# Patient Record
Sex: MALE | Race: WHITE | URBAN - METROPOLITAN AREA
[De-identification: names, ages, dates, MRNs, and addresses within clinical notes are randomized per-mention and may not be internally consistent; named-entity substitution may affect disease eponyms.]

---

## 2017-01-06 ENCOUNTER — OPTICAL OFFICE (OUTPATIENT)
Dept: URBAN - METROPOLITAN AREA CLINIC 146 | Facility: CLINIC | Age: 52
Setting detail: OPHTHALMOLOGY
End: 2017-01-06
Payer: COMMERCIAL

## 2017-01-06 ENCOUNTER — DOCTOR'S OFFICE (OUTPATIENT)
Dept: URBAN - METROPOLITAN AREA CLINIC 137 | Facility: CLINIC | Age: 52
Setting detail: OPHTHALMOLOGY
End: 2017-01-06
Payer: COMMERCIAL

## 2017-01-06 DIAGNOSIS — H52.4: ICD-10-CM

## 2017-01-06 DIAGNOSIS — H52.223: ICD-10-CM

## 2017-01-06 DIAGNOSIS — E11.9: ICD-10-CM

## 2017-01-06 PROCEDURE — V2020 VISION SVCS FRAMES PURCHASES: HCPCS | Performed by: OPHTHALMOLOGY

## 2017-01-06 PROCEDURE — V2781 PROGRESSIVE LENS PER LENS: HCPCS | Performed by: OPHTHALMOLOGY

## 2017-01-06 PROCEDURE — 92014 COMPRE OPH EXAM EST PT 1/>: CPT | Performed by: OPHTHALMOLOGY

## 2017-01-06 PROCEDURE — V2203 LENS SPHCYL BIFOCAL 4.00D/.1: HCPCS | Performed by: OPHTHALMOLOGY

## 2017-01-06 ASSESSMENT — REFRACTION_OUTSIDERX
OD_AXIS: 016
OS_SPHERE: +1.25
OS_VA2: 20/20(J1+)
OS_ADD: +1.75
OS_VA1: 20/20
OD_SPHERE: +1.00
OS_AXIS: 044
OS_VA3: 20/
OD_VA3: 20/
OS_CYLINDER: +0.50
OU_VA: 20/
OD_ADD: +1.75
OD_VA2: 20/20(J1+)
OD_CYLINDER: +0.25
OD_VA1: 20/20

## 2017-01-06 ASSESSMENT — REFRACTION_CURRENTRX
OD_OVR_VA: 20/
OD_AXIS: 166
OD_OVR_VA: 20/
OD_VPRISM_DIRECTION: PROGS
OD_OVR_VA: 20/
OS_OVR_VA: 20/
OS_OVR_VA: 20/
OD_ADD: +1.75
OS_VPRISM_DIRECTION: PROGS
OD_CYLINDER: +0.25
OS_SPHERE: +1.50
OS_CYLINDER: +0.50
OS_AXIS: 016
OD_SPHERE: +1.25
OS_ADD: +1.75
OS_OVR_VA: 20/

## 2017-01-06 ASSESSMENT — REFRACTION_MANIFEST
OS_AXIS: 027
OD_VA1: 20/20
OD_ADD: +2.25
OD_AXIS: 024
OU_VA: 20/
OS_VA2: 20/
OS_ADD: +2.25
OS_CYLINDER: +0.50
OD_VA3: 20/
OU_VA: 20/
OS_VA1: 20/
OS_VA2: 20/
OD_VA3: 20/
OS_VA3: 20/
OS_VA1: 20/20
OD_VA2: 20/
OS_SPHERE: +1.25
OD_CYLINDER: +0.50
OD_VA1: 20/
OS_VA3: 20/
OD_VA2: 20/
OD_SPHERE: +1.00

## 2017-01-06 ASSESSMENT — VISUAL ACUITY
OS_BCVA: 20/25-1
OD_BCVA: 20/20-1

## 2017-01-06 ASSESSMENT — REFRACTION_AUTOREFRACTION
OD_AXIS: 016
OD_CYLINDER: +0.25
OD_SPHERE: +1.00
OS_AXIS: 044
OS_CYLINDER: +0.50
OS_SPHERE: +1.25

## 2017-01-06 ASSESSMENT — CONFRONTATIONAL VISUAL FIELD TEST (CVF)
OS_FINDINGS: FULL
OD_FINDINGS: FULL

## 2017-01-06 ASSESSMENT — SPHEQUIV_DERIVED
OD_SPHEQUIV: 1.125
OD_SPHEQUIV: 1.25
OS_SPHEQUIV: 1.5
OS_SPHEQUIV: 1.5

## 2017-01-23 ENCOUNTER — LAB CONVERSION - ENCOUNTER (OUTPATIENT)
Dept: OTHER | Facility: OTHER | Age: 52
End: 2017-01-23

## 2017-01-23 LAB
CREATININE, RANDOM URINE (HISTORICAL): 353 MG/DL (ref 20–370)
HBA1C MFR BLD HPLC: 9.8 % OF TOTAL HGB
MAGNESIUM, UR (HISTORICAL): 1.5 MG/DL
MICROALBUMIN/CREATININE RATIO (HISTORICAL): 4 MCG/MG CREAT
TSH SERPL DL<=0.05 MIU/L-ACNC: 1.6 MIU/L (ref 0.4–4.5)

## 2017-01-24 ENCOUNTER — ALLSCRIPTS OFFICE VISIT (OUTPATIENT)
Dept: OTHER | Facility: OTHER | Age: 52
End: 2017-01-24

## 2017-04-24 ENCOUNTER — GENERIC CONVERSION - ENCOUNTER (OUTPATIENT)
Dept: OTHER | Facility: OTHER | Age: 52
End: 2017-04-24

## 2017-04-24 ENCOUNTER — APPOINTMENT (OUTPATIENT)
Dept: LAB | Facility: CLINIC | Age: 52
End: 2017-04-24
Payer: COMMERCIAL

## 2017-04-24 DIAGNOSIS — I10 ESSENTIAL (PRIMARY) HYPERTENSION: ICD-10-CM

## 2017-04-24 DIAGNOSIS — Z72.0 TOBACCO USE: ICD-10-CM

## 2017-04-24 DIAGNOSIS — R73.9 HYPERGLYCEMIA: ICD-10-CM

## 2017-04-24 DIAGNOSIS — M06.9 RHEUMATOID ARTHRITIS (HCC): ICD-10-CM

## 2017-04-24 DIAGNOSIS — R53.83 OTHER FATIGUE: ICD-10-CM

## 2017-04-24 DIAGNOSIS — E11.9 TYPE 2 DIABETES MELLITUS WITHOUT COMPLICATIONS (HCC): ICD-10-CM

## 2017-04-24 DIAGNOSIS — E78.5 HYPERLIPIDEMIA: ICD-10-CM

## 2017-04-24 LAB
ALBUMIN SERPL BCP-MCNC: 3.9 G/DL (ref 3.5–5)
ALP SERPL-CCNC: 42 U/L (ref 46–116)
ALT SERPL W P-5'-P-CCNC: 63 U/L (ref 12–78)
ANION GAP SERPL CALCULATED.3IONS-SCNC: 8 MMOL/L (ref 4–13)
AST SERPL W P-5'-P-CCNC: 27 U/L (ref 5–45)
BASOPHILS # BLD AUTO: 0.09 THOUSANDS/ΜL (ref 0–0.1)
BASOPHILS NFR BLD AUTO: 1 % (ref 0–1)
BILIRUB SERPL-MCNC: 0.58 MG/DL (ref 0.2–1)
BUN SERPL-MCNC: 14 MG/DL (ref 5–25)
CALCIUM SERPL-MCNC: 9.1 MG/DL (ref 8.3–10.1)
CHLORIDE SERPL-SCNC: 100 MMOL/L (ref 100–108)
CHOLEST SERPL-MCNC: 158 MG/DL (ref 50–200)
CO2 SERPL-SCNC: 28 MMOL/L (ref 21–32)
CREAT SERPL-MCNC: 1 MG/DL (ref 0.6–1.3)
CREAT UR-MCNC: 198 MG/DL
EOSINOPHIL # BLD AUTO: 0.24 THOUSAND/ΜL (ref 0–0.61)
EOSINOPHIL NFR BLD AUTO: 3 % (ref 0–6)
ERYTHROCYTE [DISTWIDTH] IN BLOOD BY AUTOMATED COUNT: 13.2 % (ref 11.6–15.1)
EST. AVERAGE GLUCOSE BLD GHB EST-MCNC: 235 MG/DL
GFR SERPL CREATININE-BSD FRML MDRD: >60 ML/MIN/1.73SQ M
GLUCOSE P FAST SERPL-MCNC: 259 MG/DL (ref 65–99)
HBA1C MFR BLD: 9.8 % (ref 4.2–6.3)
HCT VFR BLD AUTO: 48 % (ref 36.5–49.3)
HDLC SERPL-MCNC: 38 MG/DL (ref 40–60)
HGB BLD-MCNC: 16.7 G/DL (ref 12–17)
LDLC SERPL CALC-MCNC: 74 MG/DL (ref 0–100)
LYMPHOCYTES # BLD AUTO: 2.12 THOUSANDS/ΜL (ref 0.6–4.47)
LYMPHOCYTES NFR BLD AUTO: 30 % (ref 14–44)
MCH RBC QN AUTO: 30.1 PG (ref 26.8–34.3)
MCHC RBC AUTO-ENTMCNC: 34.8 G/DL (ref 31.4–37.4)
MCV RBC AUTO: 87 FL (ref 82–98)
MICROALBUMIN UR-MCNC: 11.5 MG/L (ref 0–20)
MICROALBUMIN/CREAT 24H UR: 6 MG/G CREATININE (ref 0–30)
MONOCYTES # BLD AUTO: 1.01 THOUSAND/ΜL (ref 0.17–1.22)
MONOCYTES NFR BLD AUTO: 14 % (ref 4–12)
NEUTROPHILS # BLD AUTO: 3.59 THOUSANDS/ΜL (ref 1.85–7.62)
NEUTS SEG NFR BLD AUTO: 52 % (ref 43–75)
NRBC BLD AUTO-RTO: 0 /100 WBCS
PLATELET # BLD AUTO: 187 THOUSANDS/UL (ref 149–390)
PMV BLD AUTO: 11.6 FL (ref 8.9–12.7)
POTASSIUM SERPL-SCNC: 4.2 MMOL/L (ref 3.5–5.3)
PROT SERPL-MCNC: 7.2 G/DL (ref 6.4–8.2)
RBC # BLD AUTO: 5.55 MILLION/UL (ref 3.88–5.62)
SODIUM SERPL-SCNC: 136 MMOL/L (ref 136–145)
TRIGL SERPL-MCNC: 229 MG/DL
TSH SERPL DL<=0.05 MIU/L-ACNC: 2.3 UIU/ML (ref 0.36–3.74)
WBC # BLD AUTO: 7.09 THOUSAND/UL (ref 4.31–10.16)

## 2017-04-24 PROCEDURE — 82043 UR ALBUMIN QUANTITATIVE: CPT

## 2017-04-24 PROCEDURE — 80053 COMPREHEN METABOLIC PANEL: CPT

## 2017-04-24 PROCEDURE — 82570 ASSAY OF URINE CREATININE: CPT

## 2017-04-24 PROCEDURE — 85025 COMPLETE CBC W/AUTO DIFF WBC: CPT

## 2017-04-24 PROCEDURE — 80061 LIPID PANEL: CPT

## 2017-04-24 PROCEDURE — 36415 COLL VENOUS BLD VENIPUNCTURE: CPT

## 2017-04-24 PROCEDURE — 84443 ASSAY THYROID STIM HORMONE: CPT

## 2017-04-24 PROCEDURE — 83036 HEMOGLOBIN GLYCOSYLATED A1C: CPT

## 2017-04-25 ENCOUNTER — ALLSCRIPTS OFFICE VISIT (OUTPATIENT)
Dept: OTHER | Facility: OTHER | Age: 52
End: 2017-04-25

## 2017-07-03 ENCOUNTER — APPOINTMENT (OUTPATIENT)
Dept: LAB | Facility: CLINIC | Age: 52
End: 2017-07-03
Payer: COMMERCIAL

## 2017-07-03 ENCOUNTER — TRANSCRIBE ORDERS (OUTPATIENT)
Dept: LAB | Facility: CLINIC | Age: 52
End: 2017-07-03

## 2017-07-03 DIAGNOSIS — K21.9 GASTROESOPHAGEAL REFLUX DISEASE, ESOPHAGITIS PRESENCE NOT SPECIFIED: ICD-10-CM

## 2017-07-03 DIAGNOSIS — M54.2 CERVICALGIA: ICD-10-CM

## 2017-07-03 DIAGNOSIS — Z79.899 ENCOUNTER FOR LONG-TERM (CURRENT) USE OF OTHER MEDICATIONS: ICD-10-CM

## 2017-07-03 DIAGNOSIS — M54.41 ACUTE BACK PAIN WITH SCIATICA, RIGHT: ICD-10-CM

## 2017-07-03 DIAGNOSIS — M54.2 CERVICALGIA: Primary | ICD-10-CM

## 2017-07-03 DIAGNOSIS — M05.79 SEROPOSITIVE RHEUMATOID ARTHRITIS OF MULTIPLE SITES (HCC): ICD-10-CM

## 2017-07-03 DIAGNOSIS — J44.9 OBSTRUCTIVE CHRONIC BRONCHITIS WITHOUT EXACERBATION (HCC): ICD-10-CM

## 2017-07-03 LAB
25(OH)D3 SERPL-MCNC: 16.7 NG/ML (ref 30–100)
ALBUMIN SERPL BCP-MCNC: 4 G/DL (ref 3.5–5)
ALP SERPL-CCNC: 44 U/L (ref 46–116)
ALT SERPL W P-5'-P-CCNC: 63 U/L (ref 12–78)
ANION GAP SERPL CALCULATED.3IONS-SCNC: 8 MMOL/L (ref 4–13)
AST SERPL W P-5'-P-CCNC: 27 U/L (ref 5–45)
BASOPHILS # BLD AUTO: 0.06 THOUSANDS/ΜL (ref 0–0.1)
BASOPHILS NFR BLD AUTO: 1 % (ref 0–1)
BILIRUB SERPL-MCNC: 0.58 MG/DL (ref 0.2–1)
BUN SERPL-MCNC: 13 MG/DL (ref 5–25)
CALCIUM SERPL-MCNC: 8.9 MG/DL (ref 8.3–10.1)
CHLORIDE SERPL-SCNC: 101 MMOL/L (ref 100–108)
CO2 SERPL-SCNC: 26 MMOL/L (ref 21–32)
CREAT SERPL-MCNC: 0.96 MG/DL (ref 0.6–1.3)
CRP SERPL QL: <3 MG/L
EOSINOPHIL # BLD AUTO: 0.18 THOUSAND/ΜL (ref 0–0.61)
EOSINOPHIL NFR BLD AUTO: 3 % (ref 0–6)
ERYTHROCYTE [DISTWIDTH] IN BLOOD BY AUTOMATED COUNT: 13.8 % (ref 11.6–15.1)
ERYTHROCYTE [SEDIMENTATION RATE] IN BLOOD: 2 MM/HOUR (ref 0–10)
GFR SERPL CREATININE-BSD FRML MDRD: >60 ML/MIN/1.73SQ M
GLUCOSE SERPL-MCNC: 288 MG/DL (ref 65–140)
HCT VFR BLD AUTO: 46 % (ref 36.5–49.3)
HGB BLD-MCNC: 15.9 G/DL (ref 12–17)
LYMPHOCYTES # BLD AUTO: 1.58 THOUSANDS/ΜL (ref 0.6–4.47)
LYMPHOCYTES NFR BLD AUTO: 24 % (ref 14–44)
MCH RBC QN AUTO: 30.5 PG (ref 26.8–34.3)
MCHC RBC AUTO-ENTMCNC: 34.6 G/DL (ref 31.4–37.4)
MCV RBC AUTO: 88 FL (ref 82–98)
MONOCYTES # BLD AUTO: 0.83 THOUSAND/ΜL (ref 0.17–1.22)
MONOCYTES NFR BLD AUTO: 13 % (ref 4–12)
NEUTROPHILS # BLD AUTO: 3.97 THOUSANDS/ΜL (ref 1.85–7.62)
NEUTS SEG NFR BLD AUTO: 59 % (ref 43–75)
NRBC BLD AUTO-RTO: 0 /100 WBCS
PLATELET # BLD AUTO: 175 THOUSANDS/UL (ref 149–390)
PMV BLD AUTO: 11.8 FL (ref 8.9–12.7)
POTASSIUM SERPL-SCNC: 3.9 MMOL/L (ref 3.5–5.3)
PROT SERPL-MCNC: 7.1 G/DL (ref 6.4–8.2)
PTH-INTACT SERPL-MCNC: 22.7 PG/ML (ref 14–72)
RBC # BLD AUTO: 5.21 MILLION/UL (ref 3.88–5.62)
SODIUM SERPL-SCNC: 135 MMOL/L (ref 136–145)
TSH SERPL DL<=0.05 MIU/L-ACNC: 1.64 UIU/ML (ref 0.36–3.74)
URATE SERPL-MCNC: 3 MG/DL (ref 4.2–8)
WBC # BLD AUTO: 6.66 THOUSAND/UL (ref 4.31–10.16)

## 2017-07-03 PROCEDURE — 85025 COMPLETE CBC W/AUTO DIFF WBC: CPT

## 2017-07-03 PROCEDURE — 84155 ASSAY OF PROTEIN SERUM: CPT

## 2017-07-03 PROCEDURE — 86430 RHEUMATOID FACTOR TEST QUAL: CPT

## 2017-07-03 PROCEDURE — 86707 HEPATITIS BE ANTIBODY: CPT

## 2017-07-03 PROCEDURE — 85652 RBC SED RATE AUTOMATED: CPT

## 2017-07-03 PROCEDURE — 84165 PROTEIN E-PHORESIS SERUM: CPT

## 2017-07-03 PROCEDURE — 86618 LYME DISEASE ANTIBODY: CPT

## 2017-07-03 PROCEDURE — 82306 VITAMIN D 25 HYDROXY: CPT

## 2017-07-03 PROCEDURE — 86200 CCP ANTIBODY: CPT

## 2017-07-03 PROCEDURE — 86140 C-REACTIVE PROTEIN: CPT

## 2017-07-03 PROCEDURE — 86235 NUCLEAR ANTIGEN ANTIBODY: CPT

## 2017-07-03 PROCEDURE — 36415 COLL VENOUS BLD VENIPUNCTURE: CPT

## 2017-07-03 PROCEDURE — 86617 LYME DISEASE ANTIBODY: CPT

## 2017-07-03 PROCEDURE — 82955 ASSAY OF G6PD ENZYME: CPT

## 2017-07-03 PROCEDURE — 86431 RHEUMATOID FACTOR QUANT: CPT

## 2017-07-03 PROCEDURE — 84550 ASSAY OF BLOOD/URIC ACID: CPT

## 2017-07-03 PROCEDURE — 86803 HEPATITIS C AB TEST: CPT

## 2017-07-03 PROCEDURE — 86038 ANTINUCLEAR ANTIBODIES: CPT

## 2017-07-03 PROCEDURE — 80053 COMPREHEN METABOLIC PANEL: CPT

## 2017-07-03 PROCEDURE — 83970 ASSAY OF PARATHORMONE: CPT

## 2017-07-03 PROCEDURE — 84443 ASSAY THYROID STIM HORMONE: CPT

## 2017-07-03 PROCEDURE — 86225 DNA ANTIBODY NATIVE: CPT

## 2017-07-04 LAB — HCV AB SER QL: NORMAL

## 2017-07-05 LAB
B BURGDOR IGG SER IA-ACNC: 0.04
B BURGDOR IGM SER IA-ACNC: 1.53
CRYOGLOB RF SER-ACNC: ABNORMAL [IU]/ML
DSDNA AB SER-ACNC: 2 IU/ML (ref 0–9)
ENA RNP AB SER-ACNC: <0.2 AI (ref 0–0.9)
ENA SM AB SER-ACNC: <0.2 AI (ref 0–0.9)
HBV E AB SERPL QL IA: NEGATIVE
RHEUMATOID FACT SER QL LA: POSITIVE
RYE IGE QN: NEGATIVE

## 2017-07-06 LAB
G6PD BLD QN: 199 U/10E12 RBC (ref 146–376)
RBC # BLD AUTO: 5.26 X10E6/UL (ref 4.14–5.8)

## 2017-07-07 LAB
B BURGDOR IGG PATRN SER IB-IMP: NEGATIVE
B BURGDOR IGM PATRN SER IB-IMP: NEGATIVE
B BURGDOR18KD IGG SER QL IB: ABNORMAL
B BURGDOR23KD IGG SER QL IB: ABNORMAL
B BURGDOR23KD IGM SER QL IB: PRESENT
B BURGDOR28KD IGG SER QL IB: ABNORMAL
B BURGDOR30KD IGG SER QL IB: ABNORMAL
B BURGDOR39KD IGG SER QL IB: ABNORMAL
B BURGDOR39KD IGM SER QL IB: ABNORMAL
B BURGDOR41KD IGG SER QL IB: ABNORMAL
B BURGDOR41KD IGM SER QL IB: ABNORMAL
B BURGDOR45KD IGG SER QL IB: ABNORMAL
B BURGDOR58KD IGG SER QL IB: ABNORMAL
B BURGDOR66KD IGG SER QL IB: ABNORMAL
B BURGDOR93KD IGG SER QL IB: ABNORMAL

## 2017-07-10 LAB — MISCELLANEOUS LAB TEST RESULT: NORMAL

## 2017-08-01 LAB — CCP IGA+IGG SERPL IA-ACNC: NORMAL

## 2017-08-25 DIAGNOSIS — E78.5 HYPERLIPIDEMIA: ICD-10-CM

## 2017-08-25 DIAGNOSIS — I10 ESSENTIAL (PRIMARY) HYPERTENSION: ICD-10-CM

## 2017-08-25 DIAGNOSIS — E11.9 TYPE 2 DIABETES MELLITUS WITHOUT COMPLICATIONS (HCC): ICD-10-CM

## 2017-08-25 DIAGNOSIS — R07.89 OTHER CHEST PAIN: ICD-10-CM

## 2017-08-25 DIAGNOSIS — M06.9 RHEUMATOID ARTHRITIS(714.0): ICD-10-CM

## 2017-08-28 ENCOUNTER — GENERIC CONVERSION - ENCOUNTER (OUTPATIENT)
Dept: OTHER | Facility: OTHER | Age: 52
End: 2017-08-28

## 2017-08-28 ENCOUNTER — APPOINTMENT (OUTPATIENT)
Dept: LAB | Facility: CLINIC | Age: 52
End: 2017-08-28
Payer: COMMERCIAL

## 2017-08-28 ENCOUNTER — TRANSCRIBE ORDERS (OUTPATIENT)
Dept: LAB | Facility: CLINIC | Age: 52
End: 2017-08-28

## 2017-08-28 DIAGNOSIS — E78.5 HYPERLIPIDEMIA: ICD-10-CM

## 2017-08-28 DIAGNOSIS — M05.79 SEROPOSITIVE RHEUMATOID ARTHRITIS OF MULTIPLE SITES (HCC): ICD-10-CM

## 2017-08-28 DIAGNOSIS — Z79.899 ENCOUNTER FOR LONG-TERM (CURRENT) USE OF OTHER MEDICATIONS: ICD-10-CM

## 2017-08-28 DIAGNOSIS — M06.9 RHEUMATOID ARTHRITIS(714.0): ICD-10-CM

## 2017-08-28 DIAGNOSIS — J44.9 OBSTRUCTIVE CHRONIC BRONCHITIS WITHOUT EXACERBATION (HCC): ICD-10-CM

## 2017-08-28 DIAGNOSIS — E55.9 UNSPECIFIED VITAMIN D DEFICIENCY: ICD-10-CM

## 2017-08-28 DIAGNOSIS — I65.29 STENOSIS OF CAROTID ARTERY, UNSPECIFIED LATERALITY: Primary | ICD-10-CM

## 2017-08-28 DIAGNOSIS — M25.551 RIGHT HIP PAIN: ICD-10-CM

## 2017-08-28 DIAGNOSIS — E11.9 TYPE 2 DIABETES MELLITUS WITHOUT COMPLICATIONS (HCC): ICD-10-CM

## 2017-08-28 DIAGNOSIS — I10 ESSENTIAL (PRIMARY) HYPERTENSION: ICD-10-CM

## 2017-08-28 LAB
25(OH)D3 SERPL-MCNC: 30.5 NG/ML (ref 30–100)
ALBUMIN SERPL BCP-MCNC: 3.8 G/DL (ref 3.5–5)
ALP SERPL-CCNC: 45 U/L (ref 46–116)
ALT SERPL W P-5'-P-CCNC: 45 U/L (ref 12–78)
ANION GAP SERPL CALCULATED.3IONS-SCNC: 7 MMOL/L (ref 4–13)
AST SERPL W P-5'-P-CCNC: 20 U/L (ref 5–45)
BASOPHILS # BLD AUTO: 0.06 THOUSANDS/ΜL (ref 0–0.1)
BASOPHILS NFR BLD AUTO: 1 % (ref 0–1)
BILIRUB SERPL-MCNC: 0.73 MG/DL (ref 0.2–1)
BUN SERPL-MCNC: 13 MG/DL (ref 5–25)
CALCIUM SERPL-MCNC: 8.6 MG/DL (ref 8.3–10.1)
CHLORIDE SERPL-SCNC: 103 MMOL/L (ref 100–108)
CHOLEST SERPL-MCNC: 124 MG/DL (ref 50–200)
CO2 SERPL-SCNC: 27 MMOL/L (ref 21–32)
CREAT SERPL-MCNC: 0.92 MG/DL (ref 0.6–1.3)
CREAT UR-MCNC: 274 MG/DL
CRP SERPL QL: <3 MG/L
EOSINOPHIL # BLD AUTO: 0.24 THOUSAND/ΜL (ref 0–0.61)
EOSINOPHIL NFR BLD AUTO: 4 % (ref 0–6)
ERYTHROCYTE [DISTWIDTH] IN BLOOD BY AUTOMATED COUNT: 13.1 % (ref 11.6–15.1)
ERYTHROCYTE [SEDIMENTATION RATE] IN BLOOD: 3 MM/HOUR (ref 0–10)
EST. AVERAGE GLUCOSE BLD GHB EST-MCNC: 240 MG/DL
GFR SERPL CREATININE-BSD FRML MDRD: 97 ML/MIN/1.73SQ M
GLUCOSE P FAST SERPL-MCNC: 207 MG/DL (ref 65–99)
HBA1C MFR BLD: 10 % (ref 4.2–6.3)
HCT VFR BLD AUTO: 47.6 % (ref 36.5–49.3)
HDLC SERPL-MCNC: 37 MG/DL (ref 40–60)
HGB BLD-MCNC: 16.7 G/DL (ref 12–17)
LDLC SERPL CALC-MCNC: 54 MG/DL (ref 0–100)
LYMPHOCYTES # BLD AUTO: 1.92 THOUSANDS/ΜL (ref 0.6–4.47)
LYMPHOCYTES NFR BLD AUTO: 28 % (ref 14–44)
MCH RBC QN AUTO: 30.5 PG (ref 26.8–34.3)
MCHC RBC AUTO-ENTMCNC: 35.1 G/DL (ref 31.4–37.4)
MCV RBC AUTO: 87 FL (ref 82–98)
MICROALBUMIN UR-MCNC: 20.2 MG/L (ref 0–20)
MICROALBUMIN/CREAT 24H UR: 7 MG/G CREATININE (ref 0–30)
MONOCYTES # BLD AUTO: 0.86 THOUSAND/ΜL (ref 0.17–1.22)
MONOCYTES NFR BLD AUTO: 13 % (ref 4–12)
NEUTROPHILS # BLD AUTO: 3.69 THOUSANDS/ΜL (ref 1.85–7.62)
NEUTS SEG NFR BLD AUTO: 54 % (ref 43–75)
NRBC BLD AUTO-RTO: 0 /100 WBCS
PLATELET # BLD AUTO: 193 THOUSANDS/UL (ref 149–390)
PMV BLD AUTO: 11.7 FL (ref 8.9–12.7)
POTASSIUM SERPL-SCNC: 4 MMOL/L (ref 3.5–5.3)
PROT SERPL-MCNC: 7 G/DL (ref 6.4–8.2)
RBC # BLD AUTO: 5.48 MILLION/UL (ref 3.88–5.62)
SODIUM SERPL-SCNC: 137 MMOL/L (ref 136–145)
TRIGL SERPL-MCNC: 165 MG/DL
WBC # BLD AUTO: 6.82 THOUSAND/UL (ref 4.31–10.16)

## 2017-08-28 PROCEDURE — 86140 C-REACTIVE PROTEIN: CPT

## 2017-08-28 PROCEDURE — 80053 COMPREHEN METABOLIC PANEL: CPT

## 2017-08-28 PROCEDURE — 83036 HEMOGLOBIN GLYCOSYLATED A1C: CPT

## 2017-08-28 PROCEDURE — 85025 COMPLETE CBC W/AUTO DIFF WBC: CPT

## 2017-08-28 PROCEDURE — 82043 UR ALBUMIN QUANTITATIVE: CPT

## 2017-08-28 PROCEDURE — 85652 RBC SED RATE AUTOMATED: CPT

## 2017-08-28 PROCEDURE — 82306 VITAMIN D 25 HYDROXY: CPT

## 2017-08-28 PROCEDURE — 80061 LIPID PANEL: CPT

## 2017-08-28 PROCEDURE — 36415 COLL VENOUS BLD VENIPUNCTURE: CPT

## 2017-08-28 PROCEDURE — 82570 ASSAY OF URINE CREATININE: CPT

## 2017-08-29 ENCOUNTER — GENERIC CONVERSION - ENCOUNTER (OUTPATIENT)
Dept: OTHER | Facility: OTHER | Age: 52
End: 2017-08-29

## 2017-08-30 ENCOUNTER — ALLSCRIPTS OFFICE VISIT (OUTPATIENT)
Dept: OTHER | Facility: OTHER | Age: 52
End: 2017-08-30

## 2017-08-30 ENCOUNTER — TRANSCRIBE ORDERS (OUTPATIENT)
Dept: ADMINISTRATIVE | Facility: HOSPITAL | Age: 52
End: 2017-08-30

## 2017-08-30 DIAGNOSIS — I65.21 STENOSIS OF RIGHT CAROTID ARTERY: Primary | ICD-10-CM

## 2017-09-01 ENCOUNTER — GENERIC CONVERSION - ENCOUNTER (OUTPATIENT)
Dept: OTHER | Facility: OTHER | Age: 52
End: 2017-09-01

## 2017-09-05 ENCOUNTER — GENERIC CONVERSION - ENCOUNTER (OUTPATIENT)
Dept: OTHER | Facility: OTHER | Age: 52
End: 2017-09-05

## 2017-09-08 ENCOUNTER — HOSPITAL ENCOUNTER (OUTPATIENT)
Dept: NON INVASIVE DIAGNOSTICS | Facility: CLINIC | Age: 52
Discharge: HOME | End: 2017-09-08
Payer: COMMERCIAL

## 2017-09-08 DIAGNOSIS — I65.21 STENOSIS OF RIGHT CAROTID ARTERY: ICD-10-CM

## 2017-09-08 PROCEDURE — 93880 EXTRACRANIAL BILAT STUDY: CPT

## 2017-09-15 ENCOUNTER — HOSPITAL ENCOUNTER (OUTPATIENT)
Dept: NON INVASIVE DIAGNOSTICS | Facility: CLINIC | Age: 52
Discharge: HOME | End: 2017-09-15
Payer: COMMERCIAL

## 2017-09-15 DIAGNOSIS — R07.89 OTHER CHEST PAIN: ICD-10-CM

## 2017-09-15 LAB
CHEST PAIN STATEMENT: NORMAL
MAX DIASTOLIC BP: 80 MMHG
MAX HEART RATE: 160 BPM
MAX PREDICTED HEART RATE: 171 BPM
MAX. SYSTOLIC BP: 192 MMHG
PROTOCOL NAME: NORMAL
REASON FOR TERMINATION: NORMAL
TARGET HR FORMULA: NORMAL
TEST INDICATION: NORMAL
TIME IN EXERCISE PHASE: 393 S

## 2017-09-15 PROCEDURE — 93017 CV STRESS TEST TRACING ONLY: CPT

## 2017-12-30 DIAGNOSIS — E11.9 TYPE 2 DIABETES MELLITUS WITHOUT COMPLICATIONS (HCC): ICD-10-CM

## 2017-12-30 DIAGNOSIS — E55.9 VITAMIN D DEFICIENCY: ICD-10-CM

## 2017-12-30 DIAGNOSIS — Z23 ENCOUNTER FOR IMMUNIZATION: ICD-10-CM

## 2017-12-30 DIAGNOSIS — I65.29 OCCLUSION AND STENOSIS OF UNSPECIFIED CAROTID ARTERY: ICD-10-CM

## 2017-12-30 DIAGNOSIS — Z12.11 ENCOUNTER FOR SCREENING FOR MALIGNANT NEOPLASM OF COLON: ICD-10-CM

## 2017-12-30 DIAGNOSIS — M06.9 RHEUMATOID ARTHRITIS(714.0): ICD-10-CM

## 2017-12-30 DIAGNOSIS — I10 ESSENTIAL (PRIMARY) HYPERTENSION: ICD-10-CM

## 2017-12-30 DIAGNOSIS — Z72.0 TOBACCO USE: ICD-10-CM

## 2017-12-30 DIAGNOSIS — Z12.5 ENCOUNTER FOR SCREENING FOR MALIGNANT NEOPLASM OF PROSTATE: ICD-10-CM

## 2017-12-30 DIAGNOSIS — E78.5 HYPERLIPIDEMIA: ICD-10-CM

## 2018-01-02 ENCOUNTER — ALLSCRIPTS OFFICE VISIT (OUTPATIENT)
Dept: OTHER | Facility: OTHER | Age: 53
End: 2018-01-02

## 2018-01-02 ENCOUNTER — GENERIC CONVERSION - ENCOUNTER (OUTPATIENT)
Dept: OTHER | Facility: OTHER | Age: 53
End: 2018-01-02

## 2018-01-03 NOTE — PROGRESS NOTES
Assessment   1  Diabetes mellitus type 2, uncontrolled (250 02) (E11 65)   2  Benign essential hypertension (401 1) (I10)   3  Carotid artery calcification (433 10) (I65 29)   4  Rheumatoid arthritis (714 0) (M06 9)   5  Hyperlipidemia (272 4) (E78 5)   6  Obesity (BMI 30 0-34 9) (278 00) (E66 9)   7  Current tobacco use (305 1) (Z72 0)   8  Vitamin D deficiency (268 9) (E55 9)    Plan   Benign essential hypertension, Carotid artery calcification, Diabetes mellitus type 2,    uncontrolled, Hyperlipidemia, Rheumatoid arthritis, Vitamin D deficiency    · (1) CBC/PLT/DIFF; Status:Active; Requested for:02May2018;    · (1) COMPREHENSIVE METABOLIC PANEL; Status:Active; Requested for:02May2018;    · (1) HEMOGLOBIN A1C; Status:Active; Requested for:02May2018;    · (1) LIPID PANEL, FASTING; Status:Active; Requested for:02May2018;    · (1) MICROALBUMIN CREATININE RATIO, RANDOM URINE; Status:Active; Requested    for:02May2018;    · (1) TSH WITH FT4 REFLEX; Status:Active; Requested for:02May2018;   Diabetes mellitus type 2, uncontrolled    · Basaglar KwikPen 100 UNIT/ML Subcutaneous Solution Pen-injector; INJECT 30    UNIT Twice daily   · *VB - Foot Exam; Status:Active; Requested JZW:[id number];   PMH: History of type 2 diabetes mellitus    · Lantus 100 UNIT/ML Subcutaneous Solution   · Victoza 18 MG/3ML Subcutaneous Solution Pen-injector; INJECT 1 8 MG Daily  SocHx: Current tobacco use    · Chantix 1 MG Oral Tablet; take 1 tablet by mouth twice daily   · Chantix Starting Month Jaswant 0 5 MG X 11 & 1 MG X 42 Oral Tablet; TAKE AS    DIRECTED PER PACKAGE INSTRUCTIONS                                     Discussion/Summary      -weight has increased due to lack of aerobic activity  He diabetic parameters are worse  Hemoglobin A1c has increased  Will increase dose of basal insulin to 30 units twice daily and continue, Januvia 100 mg daily, Victoza Repeat labs in 4 months is at ADA goal with LDL around 70   Continue atorvastatin 40 mg daily Hypertension is well-controlled on valsartan/chlorothiazide  Continue on same dosage  Refill provided counseled on benefits of smoking cessation  He is aware  Willing to try Chantix  Prescription provided for starter pack and maintenance therapy  Advised of side effects of medication  Patient will continue on Enbrel and leflunomide as prescribed by rheumatologist   History of carotid calcifications  Cardiac stress testing was negative  in 4 months with labs  PSA was normal  Patient will eventually need screening colonoscopy  He is potentially interested in bariatric surgery however he would have to quit smoking 1st  If he was going for bariatric surgery he would have to have upper endoscopy performed at which time lower endoscopy could also be performed  The patient was counseled regarding diagnostic results,-- instructions for management,-- risk factor reductions,-- prognosis,-- impressions  total time of encounter was 43 minutes-- and-- 23 minutes was spent counseling  Possible side effects of new medications were reviewed with the patient/guardian today  The treatment plan was reviewed with the patient/guardian  The patient/guardian understands and agrees with the treatment plan      Chief Complaint   4 month follow up and lab review  Patient is here today for follow up of chronic conditions described in HPI  History of Present Illness   Patient presents for four-month follow-up and review of labs  Patient has gained weight  He has not been as active during the winter  Hemoglobin A1c increased from 10 0-10 6%  Patient continues smoking cigarettes  Cholesterol is slightly worse but still at diabetic goal with LDL of 74  Patient remains on atorvastatin  Patient does continue to have rheumatic pain  He does see rheumatologist  Patient did undergo stress testing which was negative and just showed poor exercise tolerance  Patient is interested in smoking cessation   He has tried nicotine patch, Wellbutrin  Review of Systems        Constitutional: recent 7 lb weight gain, but-- No fever or chills, feels well, no tiredness, no recent weight gain or weight loss-- and-- as noted in HPI  Eyes: No complaints of eye pain, no red eyes, no discharge from eyes, no itchy eyes  ENT: no complaints of earache, no hearing loss, no nosebleeds, no nasal discharge, no sore throat, no hoarseness  Cardiovascular: No complaints of slow heart rate, no fast heart rate, no chest pain, no palpitations, no leg claudication, no lower extremity  Respiratory: No complaints of shortness of breath, no wheezing, no cough, no SOB on exertion, no orthopnea or PND  Gastrointestinal: No complaints of abdominal pain, no constipation, no nausea or vomiting, no diarrhea or bloody stools  Genitourinary: Erectile dysfunction, but-- as noted in HPI  Musculoskeletal: arthralgias,-- joint swelling,-- joint stiffness-- and-- Rheumatoid arthritis, but-- as noted in HPI  Integumentary: No complaints of skin rash or skin lesions, no itching, no skin wound, no dry skin  Neurological: No compliants of headache, no confusion, no convulsions, no numbness or tingling, no dizziness or fainting, no limb weakness, no difficulty walking  Psychiatric: Is not suicidal, no sleep disturbances, no anxiety or depression, no change in personality, no emotional problems  Endocrine: No complaints of proptosis, no hot flashes, no muscle weakness, no erectile dysfunction, no deepening of the voice, no feelings of weakness  Hematologic/Lymphatic: No complaints of swollen glands, no swollen glands in the neck, does not bleed easily, no easy bruising  Active Problems   1  Atypical chest pain (786 59) (R07 89)   2  Benign essential hypertension (401 1) (I10)   3  Carotid artery calcification (433 10) (I65 29)   4  COPD, mild (496) (J44 9)   5  Current tobacco use (305 1) (Z72 0)   6   Decreased libido (799 81) (R68 82)   7  Diabetes mellitus type 2, uncontrolled (250 02) (E11 65)   8  Erectile disorder due to medical condition in male patient (26 80) (N52 1)   9  Fatigue (780 79) (R53 83)   10  Hyperlipidemia (272 4) (E78 5)   11  Need for pneumococcal vaccination (V03 82) (Z23)   12  Needs flu shot (V04 81) (Z23)   13  Prostate cancer screening (V76 44) (Z12 5)   14  Rheumatoid arthritis (714 0) (M06 9)   15  Screen for colon cancer (V76 51) (Z12 11)   16  Vitamin D deficiency (268 9) (E55 9)    Past Medical History   1  History of hyperglycemia (V12 29) (Z86 39)   2  History of type 2 diabetes mellitus (V12 29) (Z86 39)   3  History of Need for 23-polyvalent pneumococcal polysaccharide vaccine (V03 82) (Z23)   4  History of Needs flu shot (V04 81) (Z23)     The active problems and past medical history were reviewed and updated today  Family History   Mother    1  Family history of Diabetes (250 00) (E11 9)   2  Family history of cerebrovascular accident (CVA) (V17 1) (Z82 3)   3  Family history of coronary artery disease (V17 3) (Z82 49)   4  Family history of Mental illness in member of household  Father    5  Family history of Diabetes (250 00) (E11 9)   6  Family history of cerebrovascular accident (CVA) (V17 1) (Z82 3)  Brother    9  Family history of substance abuse (V17 0) (Z81 4)  Family History    8  Family history of type 2 diabetes mellitus (V18 0) (Z83 3)   9  Family history of Heart disease (429 9) (I51 9)     The family history was reviewed and updated today  Social History    · Alcohol use (V49 89) (Z78 9)   · Caffeine use (V49 89) (F15 90)   · Current every day smoker (305 1) (F17 200)   · Current tobacco use (305 1) (Z72 0)   ·    · Occupation   · Three children  The social history was reviewed and updated today  The social history was reviewed and is unchanged  Current Meds    1   Accu-Chek FastClix Lancets Miscellaneous; ONE LANCET BEFORE MEALS AND AT     BEDTIME; Therapy: 43HQD8004 to (RTIVPNKH:30YQN3232)  Requested for: 23Oct2017; Last     YH:52IRH5879 Ordered   2  Accu-Chek SmartView In [employer]; Test blood sugar before meals and at bedtime; Therapy: 81FKB1130 to (Evaluate:31Jan2018)  Requested for: 23Oct2017; Last     Rx:23Oct2017 Ordered   3  Atorvastatin Calcium 40 MG Oral Tablet; TAKE 1 TABLET AT BEDTIME; Therapy: 96IKP2819 to ([id number])  Requested for: 30Aug2017; Last     Rx:30Aug2017 Ordered   4  Easy Touch Insulin Syringe 30G X 1/2 0 5 ML Miscellaneous; ONE SYRINGE AT     BEDTIME FOR LANTUS; Therapy: 53Gtv9743 to (Evaluate:01Oct2017)  Requested for: 02Aug2017; Last     Rx:02Aug2017 Ordered   5  Enbrel 25 MG/0 5ML Subcutaneous Solution Prefilled Syringe; INJECT 1 ML Weekly     Recorded   6  Fluticasone Propionate 50 MCG/ACT Nasal Suspension; USE 2 SPRAYS IN EACH     NOSTRIL ONCE DAILY; Therapy: 25Apr2017 to (Last Rx:28Xtp3934)  Requested for: 12Ngs8153 Ordered   7  Hydroxychloroquine Sulfate 200 MG Oral Tablet; Therapy: 01KRF1786 to (Evaluate:17Mar2016) Recorded   8  Ibuprofen 800 MG Oral Tablet; TAKE 1 TABLET EVERY 8 HOURS AS NEEDED Recorded   9  Januvia 100 MG Oral Tablet; ONE TAB DAILY; Therapy: 25Apr2016 to (Last Rx:17Hwz4803)  Requested for: 12Ick9466 Ordered   10  Lantus 100 UNIT/ML Subcutaneous Solution; INJECT 50 UNIT Bedtime; Therapy: 02REU2786 to (Last Rx:60Syr0052)  Requested for: 36Uar1744 Ordered   11  Leflunomide 20 MG Oral Tablet; Take 1 tablet daily; Therapy: 50VWW0250 to (Rosa Lights)  Requested for: 81EAD4849; Last      Rx:10Mar2016 Ordered   12  Naproxen 500 MG Oral Tablet; TAKE 1 TABLET EVERY 12 HOURS WITH FOOD AS      NEEDED; Therapy: 12VZQ3936 to (Evaluate:73Uqx4843)  Requested for: 83TVU3709; Last      Rx:09Ass7754 Ordered   13  NovoFine 30G X 8 MM Miscellaneous; TO BE USED WITH VICTOZA ONCE DAILY;       Therapy: 25Apr2016 to (Evaluate:20Jan2017)  Requested for: 00CDY3522; Last Rx: 64OMI2454 Ordered   14  Omeprazole 40 MG Oral Capsule Delayed Release; Therapy: 72TJU9209 to Recorded   15  ProAir  (90 Base) MCG/ACT Inhalation Aerosol Solution; INHALE 2 PUFFS      EVERY 4 HOURS AS NEEDED; Therapy: 13SFH5248 to (Last Rx:24Jan2017)  Requested for: 24Jan2017 Ordered   16  Unifine Pentips 32G X 4 MM Miscellaneous; ONE PEN NEEDLE EVERY DAY WITH      VICTOZA AND BASAGLAR; Therapy: 10XKM3918 to (Evaluate:15Mar2018)  Requested for: 48XMO7453; Last      Rx:86Pdq0100 Ordered   17  Valsartan-Hydrochlorothiazide 80-12 5 MG Oral Tablet; TAKE 1 TABLET DAILY; Therapy: 19BWT4152 to (Glory Morse)  Requested for: 34Buc3021; Last      Rx:76Qxv3142 Ordered   18  Ventolin  (90 Base) MCG/ACT Inhalation Aerosol Solution; 2 PUFFS EVERY      FOUR HOURS AS NEEDED --- DO NOT EXCEED 8 PUFFS IN 24 HOURS ---;      Therapy: 37USS2267 to (Evaluate:71Awm1718)  Requested for: 47JLY1571; Last      Rx:23Oct2017 Ordered   19  Victoza 18 MG/3ML Subcutaneous Solution Pen-injector; INJECT 1 8 MG Daily; Therapy: 25Apr2016 to (Evaluate:38Nip7065)  Requested for: 31Nmj2507; Last      Rx:92Bxj5935 Ordered   20  Vitamin D (Ergocalciferol) 39155 UNIT Oral Capsule; TAKE 1 TABLET ONCE WEEKLY; Therapy: 85WWH1857 to Recorded     The medication list was reviewed and updated today  Allergies   1  No Known Drug Allergies    Vitals   Vital Signs    Recorded: 01RQW5828 07:56AM   Heart Rate 88   Systolic 138   Diastolic 80   Height 5 ft 11 5 in   Weight 245 lb    BMI Calculated 33 69   BSA Calculated 2 31     Physical Exam        Constitutional      General appearance: Abnormal   well developed,-- appears healthy,-- well nourished,-- overweight,-- rested,-- not exhausted-- and-- well hydrated  Eyes      Conjunctiva and lids: No swelling, erythema, or discharge  Pupils and irises: Equal, round and reactive to light         Ears, Nose, Mouth, and Throat      External inspection of ears and nose: Normal        Otoscopic examination: Tympanic membrance translucent with normal light reflex  Canals patent without erythema  Nasal mucosa, septum, and turbinates: Normal without edema or erythema  Oropharynx: Normal with no erythema, edema, exudate or lesions  Pulmonary      Respiratory effort: No increased work of breathing or signs of respiratory distress  Auscultation of lungs: Clear to auscultation, equal breath sounds bilaterally, no wheezes, no rales, no rhonci  Cardiovascular      Palpation of heart: Normal PMI, no thrills  Auscultation of heart: Normal rate and rhythm, normal S1 and S2, without murmurs  Examination of extremities for edema and/or varicosities: Normal        Carotid pulses: Normal        Abdomen      Abdomen: Non-tender, no masses  Liver and spleen: No hepatomegaly or splenomegaly  Lymphatic      Palpation of lymph nodes in neck: No lymphadenopathy  Musculoskeletal      Gait and station: Normal        Digits and nails: Normal without clubbing or cyanosis  Inspection/palpation of joints, bones, and muscles: Abnormal  -- Patient does have rheumatic nodules at the left knee, large rheumatic nodules bilateral elbows, patient does have slight joint deformity with ulnar deviation of the fingers of bilateral hands  There is no significant warmth or effusions of his joints at this time  Skin      Skin and subcutaneous tissue: Normal without rashes or lesions  Neurologic      Cranial nerves: Cranial nerves 2-12 intact  Reflexes: 2+ and symmetric  Sensation: No sensory loss  Psychiatric      Orientation to person, place and time: Normal        Mood and affect: Normal        Diabetic Foot Screen: Normal           Socks and shoes removed, Right Foot Findings: normal foot, no swelling, no erythema  The right toes were normal-- and-- had full range of motion        The sensory exam showed normal vibratory sensation at the level of the toes on the right  Normal tactile sensation with monofilament testing throughout the right foot  Socks and shoes removed, Left Foot Findings: normal foot, no swelling, no erythema  The left toes were normal-- and-- had full range of motion  The sensory exam showed normal vibratory sensation at the level of the toes on the left  Normal tactile sensation with monofilament testing throughout the left foot  Pulses:      2+ in the dorsalis pedis on the right  Pulses:      2+ in the dorsalis pedis on the left  Assign Risk Category: 0: No loss of protective sensation, no deformity  No present risk        Signatures    Electronically signed by : Jareth Velasquez DO; Jan 2 2018  9:12AM EST                       (Author)

## 2018-01-09 ENCOUNTER — OPTICAL OFFICE (OUTPATIENT)
Dept: URBAN - METROPOLITAN AREA CLINIC 146 | Facility: CLINIC | Age: 53
Setting detail: OPHTHALMOLOGY
End: 2018-01-09
Payer: COMMERCIAL

## 2018-01-09 ENCOUNTER — DOCTOR'S OFFICE (OUTPATIENT)
Dept: URBAN - METROPOLITAN AREA CLINIC 137 | Facility: CLINIC | Age: 53
Setting detail: OPHTHALMOLOGY
End: 2018-01-09
Payer: COMMERCIAL

## 2018-01-09 ENCOUNTER — GENERIC CONVERSION - ENCOUNTER (OUTPATIENT)
Dept: FAMILY MEDICINE CLINIC | Facility: CLINIC | Age: 53
End: 2018-01-09

## 2018-01-09 DIAGNOSIS — H52.223: ICD-10-CM

## 2018-01-09 DIAGNOSIS — H52.4: ICD-10-CM

## 2018-01-09 LAB
LEFT EYE DIABETIC RETINOPATHY: NORMAL
RIGHT EYE DIABETIC RETINOPATHY: NORMAL

## 2018-01-09 PROCEDURE — 92014 COMPRE OPH EXAM EST PT 1/>: CPT | Performed by: OPHTHALMOLOGY

## 2018-01-09 PROCEDURE — V2781 PROGRESSIVE LENS PER LENS: HCPCS | Performed by: OPHTHALMOLOGY

## 2018-01-09 PROCEDURE — V2020 VISION SVCS FRAMES PURCHASES: HCPCS | Performed by: OPHTHALMOLOGY

## 2018-01-09 PROCEDURE — V2203 LENS SPHCYL BIFOCAL 4.00D/.1: HCPCS | Performed by: OPHTHALMOLOGY

## 2018-01-09 ASSESSMENT — REFRACTION_CURRENTRX
OS_OVR_VA: 20/
OS_VPRISM_DIRECTION: PROGS
OD_OVR_VA: 20/
OD_OVR_VA: 20/
OS_AXIS: 037
OD_SPHERE: +1.25
OD_OVR_VA: 20/
OS_OVR_VA: 20/
OD_VPRISM_DIRECTION: PROGS
OD_AXIS: 166
OS_OVR_VA: 20/
OS_ADD: +1.75
OD_ADD: +1.75
OS_CYLINDER: +1.50
OD_CYLINDER: +0.50
OS_SPHERE: +1.00

## 2018-01-09 ASSESSMENT — REFRACTION_AUTOREFRACTION
OS_SPHERE: +1.25
OD_CYLINDER: +0.25
OS_AXIS: 025
OD_SPHERE: +1.50
OD_AXIS: 042
OS_CYLINDER: +1.00

## 2018-01-09 ASSESSMENT — REFRACTION_OUTSIDERX
OS_ADD: +2.00
OU_VA: 20/
OD_CYLINDER: +0.50
OU_VA: 20/
OD_SPHERE: +1.25
OS_CYLINDER: +0.50
OS_CYLINDER: +0.75
OD_VA2: 20/20(J1+)
OS_VA2: 20/20(J1+)
OD_VA3: 20/
OD_VA1: 20/20
OD_ADD: +2.00
OS_VA1: 20/20
OS_SPHERE: +1.00
OS_VA3: 20/
OD_VA1: 20/20
OD_CYLINDER: +0.25
OD_VA2: 20/25(J1)
OS_VA2: 20/25(J1)
OS_VA3: 20/
OD_SPHERE: +1.00
OS_AXIS: 044
OD_AXIS: 50
OS_AXIS: 25
OS_VA1: 20/20
OS_SPHERE: +1.25
OS_ADD: +1.75
OD_ADD: +1.75
OD_VA3: 20/
OD_AXIS: 016

## 2018-01-09 ASSESSMENT — SPHEQUIV_DERIVED
OS_SPHEQUIV: 1.75
OD_SPHEQUIV: 1.625

## 2018-01-09 ASSESSMENT — REFRACTION_MANIFEST
OD_VA2: 20/
OD_VA3: 20/
OD_VA1: 20/
OS_VA2: 20/
OS_VA3: 20/
OU_VA: 20/
OS_VA1: 20/

## 2018-01-09 ASSESSMENT — VISUAL ACUITY
OD_BCVA: 20/25-2
OS_BCVA: 20/25-1

## 2018-01-09 ASSESSMENT — CONFRONTATIONAL VISUAL FIELD TEST (CVF)
OS_FINDINGS: FULL
OD_FINDINGS: FULL

## 2018-01-11 NOTE — RESULT NOTES
Discussion/Summary   Diabetes remains poorly controlled  Hemoglobin A1c of 9 8%     Verified Results  (1) CBC/PLT/DIFF 24Apr2017 07:56AM [facility]    Order Number: [id number]     Test Name Result Flag Reference   WBC COUNT 7 09 Thousand/uL  4 31-10 16   RBC COUNT 5 55 Million/uL  3 88-5 62   HEMOGLOBIN 16 7 g/dL  12 0-17 0   HEMATOCRIT 48 0 %  36 5-49 3   MCV 87 fL  82-98   MCH 30 1 pg  26 8-34 3   MCHC 34 8 g/dL  31 4-37 4   RDW 13 2 %  11 6-15 1   MPV 11 6 fL  8 9-12 7   PLATELET COUNT 875 Thousands/uL  149-390   nRBC AUTOMATED 0 /100 WBCs     NEUTROPHILS RELATIVE PERCENT 52 %  43-75   LYMPHOCYTES RELATIVE PERCENT 30 %  14-44   MONOCYTES RELATIVE PERCENT 14 % H 4-12   EOSINOPHILS RELATIVE PERCENT 3 %  0-6   BASOPHILS RELATIVE PERCENT 1 %  0-1   NEUTROPHILS ABSOLUTE COUNT 3 59 Thousands/? ??L  1 85-7 62   LYMPHOCYTES ABSOLUTE COUNT 2 12 Thousands/? ??L  0 60-4 47   MONOCYTES ABSOLUTE COUNT 1 01 Thousand/? ??L  0 17-1 22   EOSINOPHILS ABSOLUTE COUNT 0 24 Thousand/? ??L  0 00-0 61   BASOPHILS ABSOLUTE COUNT 0 09 Thousands/? ??L  0 00-0 10   - Patient Instructions: This bloodwork is non-fasting  Please drink two glasses of water morning of bloodwork       (1) COMPREHENSIVE METABOLIC PANEL 50YOX7997 27:12AE Braxton Hopper Order Number: [id number]     Test Name Result Flag Reference   SODIUM 136 mmol/L  136-145   POTASSIUM 4 2 mmol/L  3 5-5 3   CHLORIDE 100 mmol/L  100-108   CARBON DIOXIDE 28 mmol/L  21-32   ANION GAP (CALC) 8 mmol/L  4-13   BLOOD UREA NITROGEN 14 mg/dL  5-25   CREATININE 1 00 mg/dL  0 60-1 30   Standardized to IDMS reference method   CALCIUM 9 1 mg/dL  8 3-10 1   BILI, TOTAL 0 58 mg/dL  0 20-1 00   ALK PHOSPHATAS 42 U/L L    ALT (SGPT) 63 U/L  12-78   AST(SGOT) 27 U/L  5-45   ALBUMIN 3 9 g/dL  3 5-5 0   TOTAL PROTEIN 7 2 g/dL  6 4-8 2   eGFR Non-African American      >60 0 ml/min/1 73sq m   [employer] Disease Education Program recommendations are as follows:  GFR calculation is accurate only with a steady state creatinine  Chronic Kidney disease less than 60 ml/min/1 73 sq  meters  Kidney failure less than 15 ml/min/1 73 sq  meters  GLUCOSE FASTING 259 mg/dL H 65-99     (1) LIPID PANEL, FASTING 24Apr2017 07:56AM [facility]    Order Number: [id number]     Test Name Result Flag Reference   CHOLESTEROL 158 mg/dL     HDL,DIRECT 38 mg/dL L 40-60   Specimen collection should occur prior to Metamizole administration due to the potential for falsely depressed results  LDL CHOLESTEROL CALCULATED 74 mg/dL  0-100   - Patient Instructions: This is a fasting blood test  Water,black tea or black  coffee only after 9:00pm the night before test   Drink 2 glasses of water the morning of test       Triglyceride:         Normal              <150 mg/dl       Borderline High    150-199 mg/dl       High               200-499 mg/dl       Very High          >499 mg/dl  Cholesterol:         Desirable        <200 mg/dl      Borderline High  200-239 mg/dl      High             >239 mg/dl  HDL Cholesterol:        High    >59 mg/dL      Low     <41 mg/dL  LDL CALCULATED:    This screening LDL is a calculated result  It does not have the accuracy of the Direct Measured LDL in the monitoring of patients with hyperlipidemia and/or statin therapy  Direct Measure LDL (QPR942) must be ordered separately in these patients  TRIGLYCERIDES 229 mg/dL H <=150   Specimen collection should occur prior to N-Acetylcysteine or Metamizole administration due to the potential for falsely depressed results  (1) TSH WITH FT4 REFLEX 24Apr2017 07:56AM [facility]    Order Number: [id number]     Test Name Result Flag Reference   TSH 2 300 uIU/mL  0 358-3 740   Patients undergoing fluorescein dye angiography may retain small amounts of fluorescein in the body for 48-72 hours post procedure  Samples containing fluorescein can produce falsely depressed TSH values   If the patient had this procedure,a specimen should be resubmitted post fluorescein clearance  (1) HEMOGLOBIN A1C 11Bbw7477 07:56AM [facility] Order Number: [id number]     Test Name Result Flag Reference   HEMOGLOBIN A1C 9 8 % H 4 2-6 3   EST  AVG   GLUCOSE 235 mg/dl       (1) MICROALBUMIN CREATININE RATIO, RANDOM URINE 47Bmc1713 07:56AM [facility] Order Number: [id number]     Test Name Result Flag Reference   MICROALBUMIN/ CREAT R 6 mg/g creatinine  0-30   MICROALBUMIN,URINE 11 5 mg/L  0 0-20 0   CREATININE URINE 198 0 mg/dL

## 2018-01-13 VITALS
SYSTOLIC BLOOD PRESSURE: 126 MMHG | WEIGHT: 249.38 LBS | HEIGHT: 71 IN | HEART RATE: 84 BPM | BODY MASS INDEX: 34.91 KG/M2 | RESPIRATION RATE: 16 BRPM | DIASTOLIC BLOOD PRESSURE: 82 MMHG

## 2018-01-13 NOTE — RESULT NOTES
Verified Results  (1) TSH WITH FT4 REFLEX [id number] 11:03AM Estefany Look     Test Name Result Flag Reference   TSH 1 431 uIU/mL  0 358-3 740   - Patient Instructions: This is a fasting blood test  Water,black tea or black  coffee only after 9:00pm the night before test Drink 2 glasses of water the morning of test   Patients undergoing fluorescein dye angiography may retain small amounts of fluorescein in the body for 48-72 hours post procedure  Samples containing fluorescein can produce falsely depressed TSH values  If the patient had this procedure,a specimen should be resubmitted post fluorescein clearance  (1) TESTOSTERONE, FREE (DIRECT) AND TOTAL [id number] 11:03AM Hortensia Covington Order Number: [id number]     Test Name Result Flag Reference   FREE TESTOSTERONE, DIRECT 17 0 pg/mL  6 8 - 21 5   COMMENT Comment     Adult male reference interval is based on a population of lean males  up to 36years old     TESTOSTERONE (TOTAL) 318 ng/dL L 348 - 1197   Performed at:  [address] [place] [address]  [id number]  : Evie Diamond MD, Phone:  [phone]     (1) CBC/PLT/DIFF [id number] 11:03AM Hortensia Covington Order Number: [id number]     Test Name Result Flag Reference   WBC COUNT 7 28 Thousand/uL  4 31-10 16   RBC COUNT 5 22 Million/uL  3 88-5 62   HEMOGLOBIN 15 6 g/dL  12 0-17 0   HEMATOCRIT 44 6 %  36 5-49 3   MCV 85 fL  82-98   MCH 29 9 pg  26 8-34 3   MCHC 35 0 g/dL  31 4-37 4   RDW 13 5 %  11 6-15 1   MPV 10 1 fL  8 9-12 7   PLATELET COUNT 605 Thousands/uL  149-390   NEUTROPHILS RELATIVE PERCENT 57 %  43-75   LYMPHOCYTES RELATIVE PERCENT 31 %  14-44   MONOCYTES RELATIVE PERCENT 10 %  4-12   EOSINOPHILS RELATIVE PERCENT 2 %  0-6   BASOPHILS RELATIVE PERCENT 0 %  0-1   NEUTROPHILS ABSOLUTE COUNT 4 15 Thousands/?L  1 85-7 62   LYMPHOCYTES ABSOLUTE COUNT 2 28 Thousands/?L  0 60-4 47   MONOCYTES ABSOLUTE COUNT 0 69 Thousand/?L  0 17-1 22   EOSINOPHILS ABSOLUTE COUNT 0 13 Thousand/?L  0 00-0 61   BASOPHILS ABSOLUTE COUNT 0 03 Thousands/?L  0 00-0 10   - Patient Instructions: This bloodwork is non-fasting  Please drink two glasses of water morning of bloodwork  - Patient Instructions: This bloodwork is non-fasting  Please drink two glasses of water morning of bloodwork  (1) COMPREHENSIVE METABOLIC PANEL [id number] 35:59VH Willena Levels Order Number: [id number]     Test Name Result Flag Reference   GLUCOSE,RANDM 178 mg/dL H    If the patient is fasting, the ADA then defines impaired fasting glucose as > 100 mg/dL and diabetes as > or equal to 123 mg/dL  SODIUM 138 mmol/L  136-145   POTASSIUM 4 3 mmol/L  3 5-5 3   CHLORIDE 102 mmol/L  100-108   CARBON DIOXIDE 27 mmol/L  21-32   ANION GAP (CALC) 9 mmol/L  4-13   BLOOD UREA NITROGEN 12 mg/dL  5-25   CREATININE 0 92 mg/dL  0 60-1 30   Standardized to IDMS reference method   CALCIUM 8 9 mg/dL  8 3-10 1   BILI, TOTAL 0 50 mg/dL  0 20-1 00   ALK PHOSPHATAS 35 U/L L    ALT (SGPT) 46 U/L  12-78   AST(SGOT) 22 U/L  5-45   ALBUMIN 3 9 g/dL  3 5-5 0   TOTAL PROTEIN 7 1 g/dL  6 4-8 2   eGFR Non-African American      >60 0 ml/min/1 73sq m   - Patient Instructions: This is a fasting blood test  Water,black tea or black  coffee only after 9:00pm the night before test Drink 2 glasses of water the morning of test   National Kidney Disease Education Program recommendations are as follows:  GFR calculation is accurate only with a steady state creatinine  Chronic Kidney disease less than 60 ml/min/1 73 sq  meters  Kidney failure less than 15 ml/min/1 73 sq  meters  (1) HEMOGLOBIN A1C [id number] 11:03AM Willena Levels Order Number: [id number]     Test Name Result Flag Reference   HEMOGLOBIN A1C 7 4 % H 4 2-6 3   EST  AVG   GLUCOSE 166 mg/dl       (1) MICROALBUMIN CREATININE RATIO, RANDOM URINE [id number] 11:03AM Willena Levels Order Number: [id number]     Test Name Result Flag Reference   MICROALBUMIN/ CREAT R 5 mg/g creatinine  0-30   MICROALBUMIN,URINE 8 7 mg/L  0 0-20 0   CREATININE URINE 173 0 mg/dL       (1) LIPID PANEL, FASTING [id number] 11:03AM Willena Levels Order Number: [id number]     Test Name Result Flag Reference   CHOLESTEROL 146 mg/dL     HDL,DIRECT 45 mg/dL  40-60   Specimen collection should occur prior to Metamizole administration due to the potential for falsely depressed results  LDL CHOLESTEROL CALCULATED 77 mg/dL  0-100   - Patient Instructions: This is a fasting blood test  Water,black tea or black  coffee only after 9:00pm the night before test   Drink 2 glasses of water the morning of test     - Patient Instructions: This is a fasting blood test  Water,black tea or black  coffee only after 9:00pm the night before test Drink 2 glasses of water the morning of test   Triglyceride:         Normal              <150 mg/dl       Borderline High    150-199 mg/dl       High               200-499 mg/dl       Very High          >499 mg/dl  Cholesterol:         Desirable        <200 mg/dl      Borderline High  200-239 mg/dl      High             >239 mg/dl  HDL Cholesterol:        High    >59 mg/dL      Low     <41 mg/dL  LDL CALCULATED:    This screening LDL is a calculated result  It does not have the accuracy of the Direct Measured LDL in the monitoring of patients with hyperlipidemia and/or statin therapy  Direct Measure LDL (RKA886) must be ordered separately in these patients  TRIGLYCERIDES 122 mg/dL  <=150   Specimen collection should occur prior to N-Acetylcysteine or Metamizole administration due to the potential for falsely depressed results  Plan  Benign essential hypertension, Hyperlipidemia, Type 2 diabetes mellitus    · (1) CBC/PLT/DIFF; Status:Active; Requested for:22Jan2017;    · (1) COMPREHENSIVE METABOLIC PANEL; Status:Active; Requested for:22Jan2017;    · (1) HEMOGLOBIN A1C; Status:Active;  Requested [id number];    · (1) LIPID PANEL, FASTING; Status:Active; Requested for:22Jan2017;    · (1) MICROALBUMIN CREATININE RATIO, RANDOM URINE; Status:Active; Requested  for:22Jan2017;    · (1) TSH WITH FT4 REFLEX; Status:Active; Requested for:22Jan2017;   Hyperlipidemia    · Atorvastatin Calcium 40 MG Oral Tablet; TAKE ONE TABLET EVERY DAYIN THE  EVENING  Type 2 diabetes mellitus    · BD Insulin Syringe 30G X 1/2" 0 5 ML Miscellaneous; Use as directed with Lantus  at bedtime   · Lantus 100 UNIT/ML Subcutaneous Solution; INJECT 31 UNIT BEDTIME  ASDIRECTED BY YOUR DOCTOR   · NovoFine 30G X 8 MM Miscellaneous; TO BE USED WITH VICTOZA ONCE  DAILY   · Victoza 18 MG/3ML Subcutaneous Solution Pen-injector; INJECT 1 8 MG Daily    Discussion/Summary   Diabetic control is significantly improved  Last hemoglobin A1c yet  Please continue on same dosage of medications  Total testosterone slightly low however your free testosterone is at a normal value  No need for testosterone supplementation  Cholesterol is at goal  Please continue on all same medications  Follow-up in 4 months as scheduled  Please complete labs prior to office visit   See order

## 2018-01-14 VITALS
HEIGHT: 71 IN | HEART RATE: 78 BPM | DIASTOLIC BLOOD PRESSURE: 62 MMHG | SYSTOLIC BLOOD PRESSURE: 128 MMHG | BODY MASS INDEX: 33.88 KG/M2 | WEIGHT: 242 LBS | RESPIRATION RATE: 16 BRPM

## 2018-01-14 NOTE — RESULT NOTES
Verified Results  (1) VITAMIN D 25-HYDROXY [id number] 08:29AM Domonique Links     Test Name Result Flag Reference   VIT D 25-HYDROX 30 5 ng/mL  30 0-100 0   This assay is a certified procedure of the CDC Vitamin D Standardization Certification Program (VDSCP)     Deficiency <20ng/ml   Insufficiency 20-30ng/ml   Sufficient  ng/ml     *Patients undergoing fluorescein dye angiography may retain small amounts of fluorescein in the body for 48-72 hours post procedure  Samples containing fluorescein can produce falsely elevated Vitamin D values  If the patient had this procedure, a specimen should be resubmitted post fluorescein clearance       (1) C-REACTIVE PROTEIN [id number] 08:29AM Adams Links     Test Name Result Flag Reference   C-REACT PROTEIN <3 0 mg/L  <3 0     (1) SED RATE [id number] 08:29AM Domonique Links     Test Name Result Flag Reference   SED RATE 3 mm/hour  0-10

## 2018-01-15 VITALS
TEMPERATURE: 97.1 F | HEIGHT: 71 IN | RESPIRATION RATE: 16 BRPM | DIASTOLIC BLOOD PRESSURE: 84 MMHG | BODY MASS INDEX: 35.33 KG/M2 | HEART RATE: 78 BPM | SYSTOLIC BLOOD PRESSURE: 126 MMHG | WEIGHT: 252.38 LBS

## 2018-01-17 NOTE — RESULT NOTES
Discussion/Summary   On the up side of things your cholesterol is very well controlled however your diabetes is not well controlled, hemoglobin A1c of 10 0%     Verified Results  (1) CBC/PLT/DIFF [id number] 08:29AM Abhinav Ivins Order Number: [id number]     Test Name Result Flag Reference   WBC COUNT 6 82 Thousand/uL  4 31-10 16   RBC COUNT 5 48 Million/uL  3 88-5 62   HEMOGLOBIN 16 7 g/dL  12 0-17 0   HEMATOCRIT 47 6 %  36 5-49 3   MCV 87 fL  82-98   MCH 30 5 pg  26 8-34 3   MCHC 35 1 g/dL  31 4-37 4   RDW 13 1 %  11 6-15 1   MPV 11 7 fL  8 9-12 7   PLATELET COUNT 328 Thousands/uL  149-390   nRBC AUTOMATED 0 /100 WBCs     NEUTROPHILS RELATIVE PERCENT 54 %  43-75   LYMPHOCYTES RELATIVE PERCENT 28 %  14-44   MONOCYTES RELATIVE PERCENT 13 % H 4-12   EOSINOPHILS RELATIVE PERCENT 4 %  0-6   BASOPHILS RELATIVE PERCENT 1 %  0-1   NEUTROPHILS ABSOLUTE COUNT 3 69 Thousands/? ??L  1 85-7 62   LYMPHOCYTES ABSOLUTE COUNT 1 92 Thousands/? ??L  0 60-4 47   MONOCYTES ABSOLUTE COUNT 0 86 Thousand/? ??L  0 17-1 22   EOSINOPHILS ABSOLUTE COUNT 0 24 Thousand/? ??L  0 00-0 61   BASOPHILS ABSOLUTE COUNT 0 06 Thousands/? ??L  0 00-0 10     (1) COMPREHENSIVE METABOLIC PANEL [id number] 85:98IQ Abhinav Ivins Order Number: [id number]     Test Name Result Flag Reference   SODIUM 137 mmol/L  136-145   POTASSIUM 4 0 mmol/L  3 5-5 3   CHLORIDE 103 mmol/L  100-108   CARBON DIOXIDE 27 mmol/L  21-32   ANION GAP (CALC) 7 mmol/L  4-13   BLOOD UREA NITROGEN 13 mg/dL  5-25   CREATININE 0 92 mg/dL  0 60-1 30   Standardized to IDMS reference method   CALCIUM 8 6 mg/dL  8 3-10 1   BILI, TOTAL 0 73 mg/dL  0 20-1 00   ALK PHOSPHATAS 45 U/L L    ALT (SGPT) 45 U/L  12-78   Specimen collection should occur prior to Sulfasalazine and/or Sulfapyridine administration due to the potential for falsely depressed results     AST(SGOT) 20 U/L  5-45   Specimen collection should occur prior to Sulfasalazine administration due to the potential for falsely depressed results  ALBUMIN 3 8 g/dL  3 5-5 0   TOTAL PROTEIN 7 0 g/dL  6 4-8 2   eGFR 97 ml/min/1 73sq m     National Kidney Disease Education Program recommendations are as follows:  GFR calculation is accurate only with a steady state creatinine  Chronic Kidney disease less than 60 ml/min/1 73 sq  meters  Kidney failure less than 15 ml/min/1 73 sq  meters  GLUCOSE FASTING 207 mg/dL H 65-99   Specimen collection should occur prior to Sulfasalazine administration due to the potential for falsely depressed results  Specimen collection should occur prior to Sulfapyridine administration due to the potential for falsely elevated results  (1) LIPID PANEL, FASTING [id number] 08:29AM Maricel Goldstein Order Number: [id number]     Test Name Result Flag Reference   CHOLESTEROL 124 mg/dL     HDL,DIRECT 37 mg/dL L 40-60   Specimen collection should occur prior to Metamizole administration due to the potential for falsley depressed results  LDL CHOLESTEROL CALCULATED 54 mg/dL  0-100   Triglyceride:        Normal ??? ??? ??? ??? ??? ??? ??? <150 mg/dl   ??? ??? ???Borderline High ??? ??? 150-199 mg/dl   ??? ??? ? ?? High ??? ??? ??? ??? ??? ??? ??? 200-499 mg/dl   ??? ??? ? ??Very High ??? ??? ??? ??? ??? >499 mg/dl      Cholesterol:       Desirable ??? ??? ??? ??? <200 mg/dl   ??? ??? Borderline High ??? 200-239 mg/dl   ??? ??? High ??? ??? ??? ??? ??? ??? >239 mg/dl      HDL Cholesterol:       High ??? ???>59 mg/dL   ??? ??? Low ??? ??? <41 mg/dL      This screening LDL is a calculated result  It does not have the accuracy of the Direct Measured LDL in the monitoring of patients with hyperlipidemia and/or statin therapy  Direct Measure LDL (XSD430) must be ordered separately in these patients  TRIGLYCERIDES 165 mg/dL H <=150   Specimen collection should occur prior to N-Acetylcysteine or Metamizole administration due to the potential for falsely depressed results       (1) HEMOGLOBIN A1C [id number] 08:29AM The Clymb Pointer Order Number: [id number]     Test Name Result Flag Reference   HEMOGLOBIN A1C 10 0 % H 4 2-6 3   EST  AVG   GLUCOSE 240 mg/dl       (1) MICROALBUMIN CREATININE RATIO, RANDOM URINE [id number] 08:29AM [facility] Cly[facility] Pointer Order Number: [id number]     Test Name Result Flag Reference   MICROALBUMIN/ CREAT R 7 mg/g creatinine  0-30   MICROALBUMIN,URINE 20 2 mg/L H 0 0-20 0   CREATININE URINE 274 0 mg/dL

## 2018-01-17 NOTE — RESULT NOTES
Verified Results  US GALLBLADDER [id number] 08:02AM Alpa Barnes Order Number: [id number]    - Patient Instructions: To schedule this appointment, please contact Central Scheduling at [phone]   Order Number: [id number]    - Patient Instructions: To schedule this appointment, please contact Central Scheduling at [phone]  Test Name Result Flag Reference   US GALLBLADDER (Report)     RIGHT UPPER QUADRANT ULTRASOUND     INDICATION: Right upper quadrant pain  COMPARISON: None  TECHNIQUE:  Real-time ultrasound of the right upper quadrant was performed with a curvilinear transducer with both volumetric sweeps and still imaging techniques  FINDINGS:     PANCREAS: Visualized portions of the pancreas are within normal limits  AORTA AND IVC: Visualized portions are normal for patient age  LIVER:   Size: Mildly enlarged  The liver measures 17 2 cm in the midclavicular line  Contour: Surface contour is smooth  Parenchyma: There is moderate diffuse increased echogenicity with smooth echotexture and acoustic beam attenuation  Most consistent with moderate hepatic steatosis  No evidence of suspicious mass  Focal geographic hypoechogenicity adjacent to the gallbladder fossa most consistent with focal fatty sparing  The main portal vein is patent and hepatopetal       BILIARY:   The gallbladder is normal in caliber  No wall thickening or pericholecystic fluid  No stones or sludge identified  Phrygian cap configuration noted  No sonographic Christopher's sign  No intrahepatic biliary dilatation  CBD measures 2 mm  No choledocholithiasis  KIDNEY:    Right kidney measures 12 7 x 6 4 cm  Within normal limits  ASCITES:  None  IMPRESSION:     Mild hepatomegaly and steatosis  No acute abnormality identified         Workstation performed: [id number]     Signed by:   Elio Ramirez MD   10/10/16       Discussion/Summary   No acute abnormality on right upper quadrant ultrasound  No evidence of gallbladder disease  Please follow-up as scheduled in January   Call for an appointment sooner if still having right upper quadrant pain

## 2018-01-22 ENCOUNTER — GENERIC CONVERSION - ENCOUNTER (OUTPATIENT)
Dept: OTHER | Facility: OTHER | Age: 53
End: 2018-01-22

## 2018-01-22 VITALS
WEIGHT: 238.54 LBS | SYSTOLIC BLOOD PRESSURE: 130 MMHG | BODY MASS INDEX: 32.31 KG/M2 | TEMPERATURE: 98.3 F | HEIGHT: 72 IN | HEART RATE: 100 BPM | DIASTOLIC BLOOD PRESSURE: 60 MMHG

## 2018-01-23 VITALS
SYSTOLIC BLOOD PRESSURE: 130 MMHG | HEIGHT: 72 IN | HEART RATE: 88 BPM | DIASTOLIC BLOOD PRESSURE: 80 MMHG | WEIGHT: 245 LBS | BODY MASS INDEX: 33.18 KG/M2

## 2018-02-09 RX ORDER — NAPROXEN 500 MG/1
1 TABLET ORAL EVERY 12 HOURS
COMMUNITY
Start: 2016-01-06 | End: 2022-07-15 | Stop reason: SDUPTHER

## 2018-02-09 RX ORDER — HYDROXYCHLOROQUINE SULFATE 200 MG/1
200 TABLET, FILM COATED ORAL 2 TIMES DAILY
COMMUNITY
Start: 2016-01-06 | End: 2022-07-15 | Stop reason: SDUPTHER

## 2018-02-09 RX ORDER — VARENICLINE TARTRATE 25 MG
KIT ORAL
COMMUNITY
Start: 2018-01-02 | End: 2018-02-12

## 2018-02-09 RX ORDER — ATORVASTATIN CALCIUM 40 MG/1
1 TABLET, FILM COATED ORAL
COMMUNITY
Start: 2015-11-12 | End: 2018-02-12 | Stop reason: SDUPTHER

## 2018-02-09 RX ORDER — OMEPRAZOLE 40 MG/1
40 CAPSULE, DELAYED RELEASE ORAL
COMMUNITY
Start: 2017-07-03 | End: 2018-03-16

## 2018-02-09 RX ORDER — VALSARTAN AND HYDROCHLOROTHIAZIDE 80; 12.5 MG/1; MG/1
1 TABLET, FILM COATED ORAL DAILY
COMMUNITY
Start: 2015-11-12 | End: 2018-02-12 | Stop reason: SDUPTHER

## 2018-02-09 RX ORDER — ERGOCALCIFEROL 1.25 MG/1
1 CAPSULE ORAL WEEKLY
COMMUNITY
Start: 2017-08-17 | End: 2018-03-16

## 2018-02-09 RX ORDER — INSULIN GLARGINE 100 [IU]/ML
50 INJECTION, SOLUTION SUBCUTANEOUS
COMMUNITY
Start: 2016-02-16 | End: 2018-03-16

## 2018-02-09 RX ORDER — FLUTICASONE PROPIONATE 50 MCG
2 SPRAY, SUSPENSION (ML) NASAL
COMMUNITY
Start: 2017-04-25 | End: 2019-02-21 | Stop reason: SDUPTHER

## 2018-02-09 RX ORDER — ALBUTEROL SULFATE 90 UG/1
AEROSOL, METERED RESPIRATORY (INHALATION) EVERY 6 HOURS PRN
COMMUNITY
Start: 2016-01-06 | End: 2018-05-07

## 2018-02-09 RX ORDER — LANCETS
EACH MISCELLANEOUS
COMMUNITY
Start: 2016-01-06 | End: 2018-02-12 | Stop reason: SDUPTHER

## 2018-02-09 RX ORDER — VARENICLINE TARTRATE 1 MG/1
1 TABLET, FILM COATED ORAL 2 TIMES DAILY
COMMUNITY
Start: 2018-01-02 | End: 2018-02-12

## 2018-02-09 RX ORDER — LEFLUNOMIDE 20 MG/1
1 TABLET ORAL
COMMUNITY
Start: 2016-02-01 | End: 2022-07-15 | Stop reason: SDUPTHER

## 2018-02-12 ENCOUNTER — OFFICE VISIT (OUTPATIENT)
Dept: FAMILY MEDICINE CLINIC | Facility: CLINIC | Age: 53
End: 2018-02-12
Payer: COMMERCIAL

## 2018-02-12 VITALS
DIASTOLIC BLOOD PRESSURE: 84 MMHG | RESPIRATION RATE: 18 BRPM | WEIGHT: 249 LBS | SYSTOLIC BLOOD PRESSURE: 126 MMHG | HEIGHT: 71 IN | BODY MASS INDEX: 34.86 KG/M2 | HEART RATE: 82 BPM

## 2018-02-12 DIAGNOSIS — E11.9 TYPE 2 DIABETES MELLITUS WITHOUT COMPLICATION, WITHOUT LONG-TERM CURRENT USE OF INSULIN (HCC): ICD-10-CM

## 2018-02-12 DIAGNOSIS — R10.11 RUQ ABDOMINAL PAIN: Primary | ICD-10-CM

## 2018-02-12 DIAGNOSIS — I10 ESSENTIAL HYPERTENSION: Primary | ICD-10-CM

## 2018-02-12 DIAGNOSIS — L98.9 LESION OF SKIN OF SCALP: ICD-10-CM

## 2018-02-12 DIAGNOSIS — E78.2 MIXED HYPERLIPIDEMIA: Primary | ICD-10-CM

## 2018-02-12 PROCEDURE — 11100 PR BIOPSY OF SKIN LESION: CPT | Performed by: FAMILY MEDICINE

## 2018-02-12 PROCEDURE — 99213 OFFICE O/P EST LOW 20 MIN: CPT | Performed by: FAMILY MEDICINE

## 2018-02-12 RX ORDER — ETANERCEPT 50 MG/ML
SOLUTION SUBCUTANEOUS
COMMUNITY
Start: 2018-02-02 | End: 2018-02-12

## 2018-02-12 RX ORDER — ATORVASTATIN CALCIUM 40 MG/1
TABLET, FILM COATED ORAL
Qty: 90 TABLET | Refills: 1 | Status: SHIPPED | OUTPATIENT
Start: 2018-02-12 | End: 2018-07-31 | Stop reason: SDUPTHER

## 2018-02-12 RX ORDER — INSULIN GLARGINE 100 [IU]/ML
INJECTION, SOLUTION SUBCUTANEOUS
Qty: 15 ML | Refills: 1 | Status: SHIPPED | OUTPATIENT
Start: 2018-02-12 | End: 2018-04-06 | Stop reason: SDUPTHER

## 2018-02-12 RX ORDER — SITAGLIPTIN 100 MG/1
TABLET, FILM COATED ORAL
Qty: 90 TABLET | Refills: 1 | Status: SHIPPED | OUTPATIENT
Start: 2018-02-12 | End: 2018-07-31 | Stop reason: SDUPTHER

## 2018-02-12 RX ORDER — LANCETS
EACH MISCELLANEOUS 2 TIMES DAILY
Qty: 102 EACH | Refills: 1 | Status: SHIPPED | OUTPATIENT
Start: 2018-02-12 | End: 2018-04-11 | Stop reason: SDUPTHER

## 2018-02-12 RX ORDER — CHOLECALCIFEROL (VITAMIN D3) 50 MCG
2000 TABLET ORAL
COMMUNITY
Start: 2018-02-09 | End: 2019-02-08 | Stop reason: DRUGHIGH

## 2018-02-12 RX ORDER — VALSARTAN AND HYDROCHLOROTHIAZIDE 80; 12.5 MG/1; MG/1
TABLET, FILM COATED ORAL
Qty: 90 TABLET | Refills: 1 | Status: SHIPPED | OUTPATIENT
Start: 2018-02-12 | End: 2018-07-26

## 2018-02-12 NOTE — PROGRESS NOTES
Assessment/Plan:    RUQ abdominal pain  HIDA scan ordered  RUQ ultrasound was negative    Lesion of skin of scalp  Punch biopsy  After informed written consent was obtained, using Betadine for cleansing and 1% Lidocaine with epinephrine for anesthetic, with sterile technique a 6 mm punch biopsy was used to obtain a biopsy specimen of the lesion  Hemostasis was obtained by pressure and wound was closed 4 3-0 interupted sutures  Antibiotic dressing is applied, and wound care instructions provided  Be alert for any signs of cutaneous infection  The specimen is labeled and sent to pathology for evaluation  The procedure was well tolerated without complications  Procedures         Problem List Items Addressed This Visit     RUQ abdominal pain - Primary     HIDA scan ordered  RUQ ultrasound was negative         Relevant Orders    NM hepatobiliary w rx            Subjective:      Patient ID: Abel Sanchez  is a 50 y o  male  Patient presents for evaluation of lesion at the posterior aspect of the scalp  Raised lesion that the patient keeps on cutting  It is been there for a few years  He states that it seems to be increasing in size  Any time he goes shave his head he will nick it and it will bleed  Patient also continues to complain of right upper quadrant abdominal pain that is intermittent in nature  At times sharp and other times dull  Sometimes associated with eating  Did have right upper quadrant ultrasound last year which was negative for structural biliary disease  No past medical history on file  No family history on file  No past surgical history on file  reports that he has been smoking  He has never used smokeless tobacco  He reports that he drinks alcohol  He reports that he does not use drugs        Current Outpatient Prescriptions:     ACCU-CHEK FASTCLIX LANCETS MISC, by Does not apply route, Disp: , Rfl:     albuterol (VENTOLIN HFA) 90 mcg/act inhaler, Inhale, Disp: , Rfl:     atorvastatin (LIPITOR) 40 mg tablet, Take 1 tablet by mouth, Disp: , Rfl:     Cholecalciferol (VITAMIN D) 2000 units tablet, , Disp: , Rfl:     ergocalciferol (VITAMIN D2) 50,000 units, Take 1 tablet by mouth once a week, Disp: , Rfl:     Etanercept (ENBREL) 25 MG/0 5ML SOSY, Inject 1 mL under the skin once a week, Disp: , Rfl:     fluticasone (FLONASE) 50 mcg/act nasal spray, 2 sprays into each nostril daily, Disp: , Rfl:     glucose blood test strip, by In Vitro route, Disp: , Rfl:     hydroxychloroquine (PLAQUENIL) 200 mg tablet, Take by mouth, Disp: , Rfl:     insulin glargine (BASAGLAR KWIKPEN) injection pen 100 units/mL, Inject 30 Units under the skin 2 (two) times a day, Disp: , Rfl:     insulin glargine (LANTUS) 100 units/mL subcutaneous injection, Inject 50 Units under the skin, Disp: , Rfl:     Insulin Pen Needle (UNIFINE PENTIPS) 32G X 4 MM MISC, by Does not apply route, Disp: , Rfl:     Insulin Syringe-Needle U-100 (B-D INS SYRINGE 0 5CC/30GX1/2") 30G X 1/2" 0 5 ML MISC, by Does not apply route, Disp: , Rfl:     leflunomide (ARAVA) 20 MG tablet, Take 1 tablet by mouth daily, Disp: , Rfl:     Liraglutide (VICTOZA) 18 MG/3ML SOPN, Inject 1 8 mg under the skin daily, Disp: , Rfl:     naproxen (NAPROSYN) 500 mg tablet, Take 1 tablet by mouth every 12 (twelve) hours, Disp: , Rfl:     omeprazole (PriLOSEC) 40 MG capsule, Take by mouth, Disp: , Rfl:     sitaGLIPtin (JANUVIA) 100 mg tablet, Take 1 tablet by mouth daily, Disp: , Rfl:     valsartan-hydrochlorothiazide (DIOVAN-HCT) 80-12 5 MG per tablet, TAKE 1 TABLET DAILY  , Disp: 90 tablet, Rfl: 1    The following portions of the patient's history were reviewed and updated as appropriate: allergies, current medications, past family history, past medical history, past social history, past surgical history and problem list     Review of Systems   Constitutional: Negative  Cardiovascular: Negative      Gastrointestinal: Positive for abdominal pain  Negative for abdominal distention, blood in stool, constipation, diarrhea, nausea and vomiting  Skin:        Raised skin lesion on the posterior aspect of the scalp         @lab@    Objective:    /84 (BP Location: Left arm, Patient Position: Sitting, Cuff Size: Large)   Pulse 82   Resp 18   Ht 5' 11" (1 803 m)   Wt 113 kg (249 lb)   BMI 34 73 kg/m²      Physical Exam   Constitutional: He appears well-developed and well-nourished  Neck: Neck supple  Abdominal: Soft  Bowel sounds are normal  He exhibits no distension  There is no tenderness  There is no rebound and no guarding  Skin: Lesion noted  Problem List Items Addressed This Visit     RUQ abdominal pain - Primary     HIDA scan ordered   RUQ ultrasound was negative         Relevant Orders    NM hepatobiliary w rx

## 2018-02-12 NOTE — TELEPHONE ENCOUNTER
Patients pharmacy called and I did call wife and clarify if patient is in need of these  He is    Also requested Ventolin Inhaler which I do not see on his current medication list

## 2018-02-14 LAB
CLINICAL INFO: NORMAL
PATH REPORT.FINAL DX SPEC: NORMAL
PROCEDURE TYPE: NORMAL
SPECIMEN SOURCE: NORMAL

## 2018-02-19 ENCOUNTER — OFFICE VISIT (OUTPATIENT)
Dept: FAMILY MEDICINE CLINIC | Facility: CLINIC | Age: 53
End: 2018-02-19
Payer: COMMERCIAL

## 2018-02-19 VITALS
DIASTOLIC BLOOD PRESSURE: 78 MMHG | RESPIRATION RATE: 16 BRPM | OXYGEN SATURATION: 98 % | WEIGHT: 249 LBS | SYSTOLIC BLOOD PRESSURE: 138 MMHG | HEIGHT: 71 IN | HEART RATE: 111 BPM | BODY MASS INDEX: 34.86 KG/M2

## 2018-02-19 DIAGNOSIS — L98.9 LESION OF SKIN OF SCALP: Primary | ICD-10-CM

## 2018-02-19 PROBLEM — E66.9 OBESITY (BMI 30.0-34.9): Status: ACTIVE | Noted: 2018-01-02

## 2018-02-19 PROBLEM — E66.811 OBESITY (BMI 30.0-34.9): Status: ACTIVE | Noted: 2018-01-02

## 2018-02-19 PROBLEM — IMO0002 DIABETES MELLITUS TYPE 2, UNCONTROLLED: Status: ACTIVE | Noted: 2017-09-01

## 2018-02-19 PROBLEM — I65.29 CAROTID ARTERY CALCIFICATION: Status: ACTIVE | Noted: 2017-08-30

## 2018-02-19 PROBLEM — E55.9 VITAMIN D DEFICIENCY: Status: ACTIVE | Noted: 2017-08-30

## 2018-02-19 PROCEDURE — 99212 OFFICE O/P EST SF 10 MIN: CPT | Performed by: PHYSICIAN ASSISTANT

## 2018-02-19 PROCEDURE — 99024 POSTOP FOLLOW-UP VISIT: CPT | Performed by: PHYSICIAN ASSISTANT

## 2018-02-19 NOTE — ASSESSMENT & PLAN NOTE
- Discussed findings with Patient   They have scheduled an appointment with the surgeon for additional excision  - 4 sutures were removed with no bleeding or complications

## 2018-02-19 NOTE — PROGRESS NOTES
Assessment/Plan:      Diagnoses and all orders for this visit:    Lesion of skin of scalp    Other orders  -     Suture removal          Subjective:     Patient ID: Lowell Gomes  is a 50 y o  male      HPI    Review of Systems      Objective:     Physical Exam

## 2018-02-19 NOTE — PROGRESS NOTES
Suture removal  Date/Time: 2/19/2018 2:28 PM  Performed by: Lori Jaramillo  Authorized by: Lori Jaramillo     Patient location:  Clinic  Other Assisting Provider: No    Consent:     Consent obtained:  Verbal    Consent given by:  Patient    Risks discussed:  Bleeding, pain and wound separation    Alternatives discussed:  No treatment  Universal protocol:     Procedure explained and questions answered to patient or proxy's satisfaction: yes      Site/side marked: yes      Patient identity confirmed:  Verbally with patient  Location:     Laterality:  Median    [facility]  [address] [facility]  [facility]  Procedure details: Tools used:  Suture removal kit    Wound appearance:  No sign(s) of infection and draining    Number of sutures removed:  4  Post-procedure details:     Post-removal:  Band-Aid applied and antibiotic ointment applied    Patient tolerance of procedure: Tolerated well, no immediate complications        4 Sutures removed from posterior midline scalp  Procedure was tolerated well  Pt has an appointment to see surgical oncologist next Friday to discuss further plans  Recommended to keep the site clean

## 2018-02-22 ENCOUNTER — OFFICE VISIT (OUTPATIENT)
Dept: SURGICAL ONCOLOGY | Facility: CLINIC | Age: 53
End: 2018-02-22
Payer: COMMERCIAL

## 2018-02-22 VITALS
RESPIRATION RATE: 14 BRPM | HEIGHT: 71 IN | TEMPERATURE: 98 F | WEIGHT: 246.38 LBS | DIASTOLIC BLOOD PRESSURE: 82 MMHG | BODY MASS INDEX: 34.49 KG/M2 | HEART RATE: 80 BPM | SYSTOLIC BLOOD PRESSURE: 132 MMHG

## 2018-02-22 DIAGNOSIS — D23.9 CLEAR CELL HIDRADENOMA: Primary | ICD-10-CM

## 2018-02-22 PROCEDURE — 99244 OFF/OP CNSLTJ NEW/EST MOD 40: CPT | Performed by: SURGERY

## 2018-02-22 RX ORDER — HYDROCODONE BITARTRATE AND ACETAMINOPHEN 5; 325 MG/1; MG/1
1 TABLET ORAL EVERY 6 HOURS PRN
Qty: 15 TABLET | Refills: 0 | Status: SHIPPED | OUTPATIENT
Start: 2018-02-22 | End: 2019-02-07

## 2018-02-22 NOTE — LETTER
February 22, 2018     Rebeca Rod DO  [address]  [facility] [address]    Patient: Radha Nagel  YOB: 1965   Date of Visit: 2/22/2018       Dear Dr James Route:    Thank you for referring Vamsi Ornelas to me for evaluation  Below are my notes for this consultation  If you have questions, please do not hesitate to call me  I look forward to following your patient along with you           Sincerely,        Jia Fonseca MD        CC: No Recipients

## 2018-02-22 NOTE — PROGRESS NOTES
Surgical Oncology Consult       [address] [facility]  [address]  [address] [address] [phone]    [facility]  1965  [id number]      Chief Complaint   Patient presents with    Scalp lesion     Pt is here for consult regarding scalp lesion  Pt recently had a biopsy on posterior scalp lesion  He reports having a mole there for several years  He is currently on Enbrel for Ra  Positive family history - his sister has had several melanomas removed  No history exists  History of Present Illness:    51-year-old male who had a lesion on his scalp for approximately 3 years  Recently he cut this and it started to bleed  Intermittently it would become raised and then flatten out  Ultimately brought this to the attention of his primary physician  Biopsy revealed apocrine hidradenoma  The deep margin was involved  He comes in now to discuss further therapy  He denies any new abdominal pain, nausea or vomiting  No adenopathy  Review of Systems   Skin: Positive for wound  Complete ROS Surg Onc:   Constitutional: The patient denies new or recent history of general fatigue, no recent weight loss, no change in appetite  Eyes: No complaints of visual problems, no scleral icterus  ENT: no complaints of ear pain, no hoarseness, no difficulty swallowing,  no tinnitus and no new masses in head, oral cavity, or neck  Cardiovascular: No complaints of chest pain, no palpitations, no ankle edema  Respiratory: No complaints of shortness of breath, no cough  Gastrointestinal: No complaints of jaundice, no bloody stools, no pale stools  Genitourinary: No complaints of dysuria, no hematuria, no nocturia, no frequent urination, no urethral discharge  Musculoskeletal: No complaints of weakness, paralysis, joint stiffness or arthralgias    Integumentary: No complaints of rash, no new lesions  Neurological: No complaints of convulsions, no seizures, no dizziness  Hematologic/Lymphatic: No complaints of easy bruising  Endocrine:  No hot or cold intolerance  No polydipsia, polyphagia, or polyuria  Allergy/immunology:  No environmental allergies  No food allergies  Not immunocompromised  Skin:  No pallor or rash  Wound  Patient Active Problem List   Diagnosis    RUQ abdominal pain    Lesion of skin of scalp    Benign essential hypertension    Carotid artery calcification    COPD, mild (HCC)    Reduced libido    Erectile disorder due to medical condition in male patient    Diabetes mellitus type 2, uncontrolled ([place] [address] )    Hyperlipidemia    Obesity (BMI 30 0-34  9)    Vitamin D deficiency    Rheumatoid arthritis ([place] [address] )    Clear cell hidradenoma     Past Medical History:   Diagnosis Date    Diabetes mellitus, type 2 ([place] [address] )     Resolved: 1/2/2018    Hyperglycemia     Resolved: 4/25/2017     Past Surgical History:   Procedure Laterality Date    SKIN BIOPSY      scalp     Family History   Problem Relation Age of Onset    Diabetes Mother     Stroke Mother     Mental illness Mother     Diabetes Father     Stroke Father     Alcohol abuse Brother     Coronary artery disease Family      Age 51-55    Diabetes type II Family     Heart disease Family      Social History     Social History    Marital status: /Civil Union     Spouse name: N/A    Number of children: N/A    Years of education: N/A     Occupational History    [place]       Social History Main Topics    Smoking status: Current Every Day Smoker    Smokeless tobacco: Never Used    Alcohol use Yes      Comment: socially     Drug use: No    Sexual activity: Not on file     Other Topics Concern    Not on file     Social History Narrative    Caffeine Use- Coffee and Tea        Current Outpatient Prescriptions:     ACCU-CHEK FASTCLIX LANCETS MISC, by Does not apply route 2 (two) times a day, Disp: 102 each, Rfl: 1    albuterol (VENTOLIN HFA) 90 mcg/act inhaler, Inhale, Disp: , Rfl:     atorvastatin (LIPITOR) 40 mg tablet, TAKE 1 TABLET AT BEDTIME , Disp: 90 tablet, Rfl: 1    BASAGLAR KWIKPEN injection pen 100 units/mL, INJECT 30 UNIT TWICE DAILY, Disp: 15 mL, Rfl: 1    Cholecalciferol (VITAMIN D) 2000 units tablet, , Disp: , Rfl:     ergocalciferol (VITAMIN D2) 50,000 units, Take 1 tablet by mouth once a week, Disp: , Rfl:     fluticasone (FLONASE) 50 mcg/act nasal spray, 2 sprays into each nostril daily, Disp: , Rfl:     glucose blood test strip, 1 each by Other route 4 (four) times a day (before meals and at bedtime), Disp: 100 each, Rfl: 0    hydroxychloroquine (PLAQUENIL) 200 mg tablet, Take by mouth, Disp: , Rfl:     insulin glargine (LANTUS) 100 units/mL subcutaneous injection, Inject 50 Units under the skin, Disp: , Rfl:     Insulin Pen Needle (UNIFINE PENTIPS) 32G X 4 MM MISC, by Does not apply route, Disp: , Rfl:     Insulin Syringe-Needle U-100 (B-D INS SYRINGE 0 5CC/30GX1/2") 30G X 1/2" 0 5 ML MISC, by Does not apply route, Disp: , Rfl:     JANUVIA 100 MG tablet, ONE TAB DAILY, Disp: 90 tablet, Rfl: 1    leflunomide (ARAVA) 20 MG tablet, Take 1 tablet by mouth daily, Disp: , Rfl:     Liraglutide (VICTOZA) 18 MG/3ML SOPN, Inject 1 8 mg under the skin daily, Disp: , Rfl:     naproxen (NAPROSYN) 500 mg tablet, Take 1 tablet by mouth every 12 (twelve) hours, Disp: , Rfl:     omeprazole (PriLOSEC) 40 MG capsule, Take by mouth, Disp: , Rfl:     valsartan-hydrochlorothiazide (DIOVAN-HCT) 80-12 5 MG per tablet, TAKE 1 TABLET DAILY  , Disp: 90 tablet, Rfl: 1  No Known Allergies  There were no vitals filed for this visit  Physical Exam   Constitutional: General appearance: The Patient is well-developed and well-nourished who appears the stated age in no acute distress  Patient is pleasant and talkative  HEENT:  Normocephalic  Sclerae are anicteric   Mucous membranes are moist  Neck is supple without adenopathy  No JVD  Chest: The lungs are clear to auscultation  Cardiac: Heart is regular rate  Abdomen: Abdomen is soft, non-tender, non-distended and without masses  Extremities: There is no clubbing or cyanosis  There is no edema  Symmetric  Neuro: Grossly nonfocal  Gait is normal      Lymphatic: No evidence of cervical adenopathy bilaterally  No evidence of axillary adenopathy bilaterally  No evidence of inguinal adenopathy bilaterally  Skin: Warm, anicteric  There is a healing biopsy site on his posterior scalp  No satellite lesions  Psych:  Patient is pleasant and talkative  Breasts:      Pathology:  Scalp lesion revealed Apocrine hidradenoma    Labs:    Imaging  No results found  I reviewed the above laboratory and imaging data  Discussion/Summary:  22-year-old male with a scalp lesion that was an apocrine hidradenoma  Based on this pathology, this is benign  There apparently was some question that this could be carcinoma  We will have the pathology reviewed by our dermatopathologist   If this is benign, he will just need conservative wider excision of this lesion  If this is malignant, he will require wide excision along with sentinel lymph node biopsy and postoperative radiation  We will have his slides reviewed  I will set him up with Plastic surgery since he will need a skin graft for coverage  I did discuss conservative excision versus more aggressive surgery and treating this as if it is malignant to perform definitive surgery under 1 general anesthetic  After some discussion, we elected on conservative re-excision at this time  The risks of wide excision of his scalp hidradenoma were explained including bleeding, infection, recurrence, need for further surgery, and wound complications  Informed consent was obtained  We will schedule this at our earliest mutual convenience  He is agreeable to this  All his questions were answered

## 2018-02-27 ENCOUNTER — TELEPHONE (OUTPATIENT)
Dept: FAMILY MEDICINE CLINIC | Facility: CLINIC | Age: 53
End: 2018-02-27

## 2018-02-27 NOTE — TELEPHONE ENCOUNTER
Patients wife called to request a call back from Dr Refugio Olguin  They just met with the oncologist and had a couple of questions  She did not disclose the questions to me and she did not want to speak to anyone else from the clinical team  They just want a call back from Dr Refugio Olguin when he has a moment

## 2018-03-01 NOTE — TELEPHONE ENCOUNTER
Called and spoke with the patient's wife  They questions  The oncologic surgeon had pathology reviewed  Stated that the lesion is a benign lesion  And is not malignant  Deep margin still was not clear which could theoretically   Lead to recurrence of the lesion  He did recommend re-excision with simple closure  I informed her that  They were referred to the oncologic surgeon because I did not have familiarity with the tissue pathology  Recommendations for treatment are based upon his expertise

## 2018-03-14 DIAGNOSIS — E11.9 TYPE 2 DIABETES MELLITUS WITHOUT COMPLICATION, WITHOUT LONG-TERM CURRENT USE OF INSULIN (HCC): ICD-10-CM

## 2018-03-14 RX ORDER — BLOOD SUGAR DIAGNOSTIC
1 STRIP MISCELLANEOUS
Qty: 100 EACH | Refills: 0 | Status: SHIPPED | OUTPATIENT
Start: 2018-03-14 | End: 2018-04-11 | Stop reason: SDUPTHER

## 2018-03-16 NOTE — PRE-PROCEDURE INSTRUCTIONS
Pre-Surgery Instructions:   Medication Instructions    albuterol (VENTOLIN HFA) 90 mcg/act inhaler Instructed patient per Anesthesia Guidelines   atorvastatin (LIPITOR) 40 mg tablet Instructed patient per Anesthesia Guidelines   BASAGLAR KWIKPEN injection pen 100 units/mL Instructed patient per Anesthesia Guidelines   Cholecalciferol (VITAMIN D) 2000 units tablet Instructed patient per Anesthesia Guidelines   Etanercept (ENBREL SC) Patient was instructed by Physician and understands   fluticasone (FLONASE) 50 mcg/act nasal spray Instructed patient per Anesthesia Guidelines   hydroxychloroquine (PLAQUENIL) 200 mg tablet Patient was instructed by Physician and understands   JANUVIA 100 MG tablet Instructed patient per Anesthesia Guidelines   leflunomide (ARAVA) 20 MG tablet Patient was instructed by Physician and understands   Liraglutide (VICTOZA) 18 MG/3ML SOPN Instructed patient per Anesthesia Guidelines   naproxen (NAPROSYN) 500 mg tablet Instructed patient per Anesthesia Guidelines   valsartan-hydrochlorothiazide (DIOVAN-HCT) 80-12 5 MG per tablet Instructed patient per Anesthesia Guidelines      Pre op instructions reviewed with wife-showering instructions reviewed with wife

## 2018-03-19 ENCOUNTER — APPOINTMENT (OUTPATIENT)
Dept: LAB | Facility: CLINIC | Age: 53
End: 2018-03-19
Payer: COMMERCIAL

## 2018-03-19 ENCOUNTER — TRANSCRIBE ORDERS (OUTPATIENT)
Dept: LAB | Facility: CLINIC | Age: 53
End: 2018-03-19

## 2018-03-19 DIAGNOSIS — R53.83 FATIGUE, UNSPECIFIED TYPE: ICD-10-CM

## 2018-03-19 DIAGNOSIS — M54.2 CERVICALGIA: Primary | ICD-10-CM

## 2018-03-19 DIAGNOSIS — Z79.899 ENCOUNTER FOR LONG-TERM (CURRENT) USE OF HIGH-RISK MEDICATION: ICD-10-CM

## 2018-03-19 DIAGNOSIS — E11.9 DIABETES MELLITUS WITHOUT COMPLICATION (HCC): ICD-10-CM

## 2018-03-19 DIAGNOSIS — I10 BENIGN ESSENTIAL HYPERTENSION: ICD-10-CM

## 2018-03-19 DIAGNOSIS — D23.9 CLEAR CELL HIDRADENOMA: ICD-10-CM

## 2018-03-19 DIAGNOSIS — J44.9 OBSTRUCTIVE CHRONIC BRONCHITIS WITHOUT EXACERBATION (HCC): ICD-10-CM

## 2018-03-19 DIAGNOSIS — M06.39 RHEUMATOID NODULE OF MULTIPLE SITES (HCC): ICD-10-CM

## 2018-03-19 DIAGNOSIS — M54.2 CERVICALGIA: ICD-10-CM

## 2018-03-19 DIAGNOSIS — M05.79 SEROPOSITIVE RHEUMATOID ARTHRITIS OF MULTIPLE SITES (HCC): ICD-10-CM

## 2018-03-19 DIAGNOSIS — E55.9 AVITAMINOSIS D: ICD-10-CM

## 2018-03-19 LAB
25(OH)D3 SERPL-MCNC: 32.3 NG/ML (ref 30–100)
ALBUMIN SERPL BCP-MCNC: 4.4 G/DL (ref 3.5–5)
ALP SERPL-CCNC: 47 U/L (ref 46–116)
ALT SERPL W P-5'-P-CCNC: 74 U/L (ref 12–78)
ANION GAP SERPL CALCULATED.3IONS-SCNC: 8 MMOL/L (ref 4–13)
APTT PPP: 22 SECONDS (ref 23–35)
AST SERPL W P-5'-P-CCNC: 35 U/L (ref 5–45)
BASOPHILS # BLD AUTO: 0.06 THOUSANDS/ΜL (ref 0–0.1)
BASOPHILS NFR BLD AUTO: 1 % (ref 0–1)
BILIRUB SERPL-MCNC: 0.7 MG/DL (ref 0.2–1)
BUN SERPL-MCNC: 13 MG/DL (ref 5–25)
CALCIUM SERPL-MCNC: 9.1 MG/DL (ref 8.3–10.1)
CHLORIDE SERPL-SCNC: 100 MMOL/L (ref 100–108)
CO2 SERPL-SCNC: 29 MMOL/L (ref 21–32)
CREAT SERPL-MCNC: 1.06 MG/DL (ref 0.6–1.3)
CRP SERPL QL: <3 MG/L
EOSINOPHIL # BLD AUTO: 0.22 THOUSAND/ΜL (ref 0–0.61)
EOSINOPHIL NFR BLD AUTO: 4 % (ref 0–6)
ERYTHROCYTE [DISTWIDTH] IN BLOOD BY AUTOMATED COUNT: 13.7 % (ref 11.6–15.1)
ERYTHROCYTE [SEDIMENTATION RATE] IN BLOOD: 3 MM/HOUR (ref 0–10)
EST. AVERAGE GLUCOSE BLD GHB EST-MCNC: 223 MG/DL
GFR SERPL CREATININE-BSD FRML MDRD: 81 ML/MIN/1.73SQ M
GLUCOSE SERPL-MCNC: 252 MG/DL (ref 65–140)
HBA1C MFR BLD: 9.4 % (ref 4.2–6.3)
HCT VFR BLD AUTO: 46.5 % (ref 36.5–49.3)
HGB BLD-MCNC: 16.1 G/DL (ref 12–17)
LYMPHOCYTES # BLD AUTO: 1.78 THOUSANDS/ΜL (ref 0.6–4.47)
LYMPHOCYTES NFR BLD AUTO: 30 % (ref 14–44)
MCH RBC QN AUTO: 29.2 PG (ref 26.8–34.3)
MCHC RBC AUTO-ENTMCNC: 34.6 G/DL (ref 31.4–37.4)
MCV RBC AUTO: 84 FL (ref 82–98)
MONOCYTES # BLD AUTO: 0.61 THOUSAND/ΜL (ref 0.17–1.22)
MONOCYTES NFR BLD AUTO: 10 % (ref 4–12)
NEUTROPHILS # BLD AUTO: 3.26 THOUSANDS/ΜL (ref 1.85–7.62)
NEUTS SEG NFR BLD AUTO: 55 % (ref 43–75)
PLATELET # BLD AUTO: 198 THOUSANDS/UL (ref 149–390)
PMV BLD AUTO: 10.8 FL (ref 8.9–12.7)
POTASSIUM SERPL-SCNC: 4.3 MMOL/L (ref 3.5–5.3)
PROT SERPL-MCNC: 7.7 G/DL (ref 6.4–8.2)
RBC # BLD AUTO: 5.51 MILLION/UL (ref 3.88–5.62)
SODIUM SERPL-SCNC: 137 MMOL/L (ref 136–145)
WBC # BLD AUTO: 5.93 THOUSAND/UL (ref 4.31–10.16)

## 2018-03-19 PROCEDURE — 87340 HEPATITIS B SURFACE AG IA: CPT

## 2018-03-19 PROCEDURE — 83036 HEMOGLOBIN GLYCOSYLATED A1C: CPT

## 2018-03-19 PROCEDURE — 86140 C-REACTIVE PROTEIN: CPT

## 2018-03-19 PROCEDURE — 85652 RBC SED RATE AUTOMATED: CPT

## 2018-03-19 PROCEDURE — 80053 COMPREHEN METABOLIC PANEL: CPT

## 2018-03-19 PROCEDURE — 86803 HEPATITIS C AB TEST: CPT

## 2018-03-19 PROCEDURE — 86704 HEP B CORE ANTIBODY TOTAL: CPT

## 2018-03-19 PROCEDURE — 86706 HEP B SURFACE ANTIBODY: CPT

## 2018-03-19 PROCEDURE — 86480 TB TEST CELL IMMUN MEASURE: CPT

## 2018-03-19 PROCEDURE — 85730 THROMBOPLASTIN TIME PARTIAL: CPT

## 2018-03-19 PROCEDURE — 82306 VITAMIN D 25 HYDROXY: CPT

## 2018-03-19 PROCEDURE — 85025 COMPLETE CBC W/AUTO DIFF WBC: CPT

## 2018-03-19 PROCEDURE — 36415 COLL VENOUS BLD VENIPUNCTURE: CPT

## 2018-03-20 LAB
HBV CORE AB SER QL: NORMAL
HBV SURFACE AB SER-ACNC: <3.1 MIU/ML
HBV SURFACE AG SER QL: NORMAL
HCV AB SER QL: NORMAL

## 2018-03-21 LAB
ANNOTATION COMMENT IMP: NORMAL
GAMMA INTERFERON BACKGROUND BLD IA-ACNC: 0.01 IU/ML
M TB IFN-G BLD-IMP: NEGATIVE
M TB IFN-G CD4+ BCKGRND COR BLD-ACNC: 0.01 IU/ML
M TB IFN-G CD4+ T-CELLS BLD-ACNC: 0.02 IU/ML
MITOGEN IGNF BLD-ACNC: 7.1 IU/ML
QUANTIFERON-TB GOLD IN TUBE: NORMAL
SERVICE CMNT-IMP: NORMAL

## 2018-03-23 ENCOUNTER — APPOINTMENT (OUTPATIENT)
Dept: RADIOLOGY | Facility: CLINIC | Age: 53
End: 2018-03-23
Payer: COMMERCIAL

## 2018-03-23 PROCEDURE — 71046 X-RAY EXAM CHEST 2 VIEWS: CPT

## 2018-03-28 ENCOUNTER — HOSPITAL ENCOUNTER (OUTPATIENT)
Facility: HOSPITAL | Age: 53
Setting detail: OUTPATIENT SURGERY
Discharge: HOME | End: 2018-03-28
Attending: SURGERY | Admitting: SURGERY
Payer: COMMERCIAL

## 2018-03-28 ENCOUNTER — ANESTHESIA (OUTPATIENT)
Dept: PERIOP | Facility: HOSPITAL | Age: 53
End: 2018-03-28
Payer: COMMERCIAL

## 2018-03-28 ENCOUNTER — ANESTHESIA EVENT (OUTPATIENT)
Dept: PERIOP | Facility: HOSPITAL | Age: 53
End: 2018-03-28
Payer: COMMERCIAL

## 2018-03-28 VITALS
HEIGHT: 71 IN | RESPIRATION RATE: 18 BRPM | SYSTOLIC BLOOD PRESSURE: 125 MMHG | WEIGHT: 246 LBS | DIASTOLIC BLOOD PRESSURE: 72 MMHG | OXYGEN SATURATION: 95 % | TEMPERATURE: 97.9 F | BODY MASS INDEX: 34.44 KG/M2 | HEART RATE: 84 BPM

## 2018-03-28 DIAGNOSIS — D23.9 CLEAR CELL HIDRADENOMA: ICD-10-CM

## 2018-03-28 LAB
GLUCOSE SERPL-MCNC: 202 MG/DL (ref 65–140)
GLUCOSE SERPL-MCNC: 260 MG/DL (ref 65–140)

## 2018-03-28 PROCEDURE — 11624 EXC S/N/H/F/G MAL+MRG 3.1-4: CPT | Performed by: SURGERY

## 2018-03-28 PROCEDURE — 88305 TISSUE EXAM BY PATHOLOGIST: CPT | Performed by: PATHOLOGY

## 2018-03-28 PROCEDURE — 88342 IMHCHEM/IMCYTCHM 1ST ANTB: CPT | Performed by: PATHOLOGY

## 2018-03-28 PROCEDURE — 82948 REAGENT STRIP/BLOOD GLUCOSE: CPT

## 2018-03-28 PROCEDURE — 12032 INTMD RPR S/A/T/EXT 2.6-7.5: CPT | Performed by: SURGERY

## 2018-03-28 RX ORDER — ACETAMINOPHEN 325 MG/1
650 TABLET ORAL EVERY 6 HOURS PRN
Status: DISCONTINUED | OUTPATIENT
Start: 2018-03-28 | End: 2018-03-28 | Stop reason: HOSPADM

## 2018-03-28 RX ORDER — MIDAZOLAM HYDROCHLORIDE 1 MG/ML
INJECTION INTRAMUSCULAR; INTRAVENOUS AS NEEDED
Status: DISCONTINUED | OUTPATIENT
Start: 2018-03-28 | End: 2018-03-28 | Stop reason: SURG

## 2018-03-28 RX ORDER — PROPOFOL 10 MG/ML
INJECTION, EMULSION INTRAVENOUS AS NEEDED
Status: DISCONTINUED | OUTPATIENT
Start: 2018-03-28 | End: 2018-03-28 | Stop reason: SURG

## 2018-03-28 RX ORDER — METOCLOPRAMIDE HYDROCHLORIDE 5 MG/ML
INJECTION INTRAMUSCULAR; INTRAVENOUS AS NEEDED
Status: DISCONTINUED | OUTPATIENT
Start: 2018-03-28 | End: 2018-03-28 | Stop reason: SURG

## 2018-03-28 RX ORDER — ONDANSETRON 2 MG/ML
INJECTION INTRAMUSCULAR; INTRAVENOUS AS NEEDED
Status: DISCONTINUED | OUTPATIENT
Start: 2018-03-28 | End: 2018-03-28 | Stop reason: SURG

## 2018-03-28 RX ORDER — FENTANYL CITRATE/PF 50 MCG/ML
25 SYRINGE (ML) INJECTION
Status: DISCONTINUED | OUTPATIENT
Start: 2018-03-28 | End: 2018-03-28 | Stop reason: HOSPADM

## 2018-03-28 RX ORDER — OXYCODONE HYDROCHLORIDE AND ACETAMINOPHEN 5; 325 MG/1; MG/1
1 TABLET ORAL EVERY 4 HOURS PRN
Status: DISCONTINUED | OUTPATIENT
Start: 2018-03-28 | End: 2018-03-28 | Stop reason: HOSPADM

## 2018-03-28 RX ORDER — ONDANSETRON 2 MG/ML
4 INJECTION INTRAMUSCULAR; INTRAVENOUS EVERY 4 HOURS PRN
Status: DISCONTINUED | OUTPATIENT
Start: 2018-03-28 | End: 2018-03-28 | Stop reason: HOSPADM

## 2018-03-28 RX ORDER — BUPIVACAINE HYDROCHLORIDE AND EPINEPHRINE 5; 5 MG/ML; UG/ML
INJECTION, SOLUTION EPIDURAL; INTRACAUDAL; PERINEURAL AS NEEDED
Status: DISCONTINUED | OUTPATIENT
Start: 2018-03-28 | End: 2018-03-28 | Stop reason: HOSPADM

## 2018-03-28 RX ORDER — SODIUM CHLORIDE 9 MG/ML
INJECTION, SOLUTION INTRAVENOUS CONTINUOUS PRN
Status: DISCONTINUED | OUTPATIENT
Start: 2018-03-28 | End: 2018-03-28 | Stop reason: SURG

## 2018-03-28 RX ORDER — FENTANYL CITRATE 50 UG/ML
INJECTION, SOLUTION INTRAMUSCULAR; INTRAVENOUS AS NEEDED
Status: DISCONTINUED | OUTPATIENT
Start: 2018-03-28 | End: 2018-03-28 | Stop reason: SURG

## 2018-03-28 RX ADMIN — INSULIN HUMAN 10 UNITS: 100 INJECTION, SOLUTION PARENTERAL at 09:56

## 2018-03-28 RX ADMIN — LIDOCAINE HYDROCHLORIDE 100 MG: 20 INJECTION, SOLUTION INTRAVENOUS at 08:46

## 2018-03-28 RX ADMIN — METOCLOPRAMIDE HYDROCHLORIDE 10 MG: 5 INJECTION INTRAMUSCULAR; INTRAVENOUS at 09:01

## 2018-03-28 RX ADMIN — SODIUM CHLORIDE: 0.9 INJECTION, SOLUTION INTRAVENOUS at 08:44

## 2018-03-28 RX ADMIN — CEFAZOLIN SODIUM 2000 MG: 2 SOLUTION INTRAVENOUS at 08:40

## 2018-03-28 RX ADMIN — FENTANYL CITRATE 25 MCG: 50 INJECTION INTRAMUSCULAR; INTRAVENOUS at 09:22

## 2018-03-28 RX ADMIN — PROPOFOL 200 MG: 10 INJECTION, EMULSION INTRAVENOUS at 08:46

## 2018-03-28 RX ADMIN — MIDAZOLAM HYDROCHLORIDE 2 MG: 1 INJECTION, SOLUTION INTRAMUSCULAR; INTRAVENOUS at 08:42

## 2018-03-28 RX ADMIN — FENTANYL CITRATE 25 MCG: 50 INJECTION INTRAMUSCULAR; INTRAVENOUS at 09:12

## 2018-03-28 RX ADMIN — FENTANYL CITRATE 50 MCG: 50 INJECTION INTRAMUSCULAR; INTRAVENOUS at 08:46

## 2018-03-28 RX ADMIN — ONDANSETRON 4 MG: 2 INJECTION INTRAMUSCULAR; INTRAVENOUS at 09:01

## 2018-03-28 NOTE — PROGRESS NOTES
Surgical Oncology Consult       [address] [facility]  [address] [facility] [address]  [facility] [address] [facility] [address]  [address]   1965  [id number]      No chief complaint on file  Pt recently had a biopsy on posterior scalp lesion  He reports having a mole there for several years  He is currently on Enbrel for Ra  Positive family history - his sister has had several melanomas removed  No history exists  History of Present Illness:    51-year-old male who had a lesion on his scalp for approximately 3 years  Recently he cut this and it started to bleed  Intermittently it would become raised and then flatten out  Ultimately brought this to the attention of his primary physician  Biopsy revealed apocrine hidradenoma  The deep margin was involved  He comes in now to discuss further therapy  He denies any new abdominal pain, nausea or vomiting  No adenopathy  Review of Systems   Skin: Positive for wound  Complete ROS Surg Onc:   Constitutional: The patient denies new or recent history of general fatigue, no recent weight loss, no change in appetite  Eyes: No complaints of visual problems, no scleral icterus  ENT: no complaints of ear pain, no hoarseness, no difficulty swallowing,  no tinnitus and no new masses in head, oral cavity, or neck  Cardiovascular: No complaints of chest pain, no palpitations, no ankle edema  Respiratory: No complaints of shortness of breath, no cough  Gastrointestinal: No complaints of jaundice, no bloody stools, no pale stools  Genitourinary: No complaints of dysuria, no hematuria, no nocturia, no frequent urination, no urethral discharge  Musculoskeletal: No complaints of weakness, paralysis, joint stiffness or arthralgias  Integumentary: No complaints of rash, no new lesions  Neurological: No complaints of convulsions, no seizures, no dizziness  Hematologic/Lymphatic: No complaints of easy bruising  Endocrine:  No hot or cold intolerance  No polydipsia, polyphagia, or polyuria  Allergy/immunology:  No environmental allergies  No food allergies  Not immunocompromised  Skin:  No pallor or rash  Wound  Patient Active Problem List   Diagnosis    RUQ abdominal pain    Lesion of skin of scalp    Benign essential hypertension    Carotid artery calcification    COPD, mild (HCC)    Reduced libido    Erectile disorder due to medical condition in male patient    Diabetes mellitus type 2, uncontrolled ([place] [address] )    Hyperlipidemia    Obesity (BMI 30 0-34  9)    Vitamin D deficiency    Rheumatoid arthritis ([place] [address] )    Clear cell hidradenoma     Past Medical History:   Diagnosis Date    COPD (chronic obstructive pulmonary disease) ([place] [address] )     Diabetes mellitus, type 2 ([place] [address] )     Resolved: 1/2/2018    GERD (gastroesophageal reflux disease)     Hyperglycemia     Resolved: 4/25/2017    Hyperlipidemia     Hypertension     Neuropathy     RA (rheumatoid arthritis) ([place] [address] )      Past Surgical History:   Procedure Laterality Date    APPENDECTOMY      SKIN BIOPSY      scalp    WISDOM TOOTH EXTRACTION  1998     Family History   Problem Relation Age of Onset    Diabetes Mother     Stroke Mother     Mental illness Mother     Diabetes Father     Stroke Father     Alcohol abuse Brother     Coronary artery disease Family      Age 51-55    Diabetes type II Family     Heart disease Family      Social History     Social History    Marital status: /Civil Union     Spouse name: N/A    Number of children: N/A    Years of education: N/A     Occupational History           Social History Main Topics    Smoking status: Current Every Day Smoker     Packs/day: 1 00    Smokeless tobacco: Never Used    Alcohol use Yes      Comment: socially     Drug use: No    Sexual activity: Not on file     Other Topics Concern    Not on file     Social History Narrative    Caffeine Use- Coffee and Tea        Current Facility-Administered Medications:     ceFAZolin (ANCEF) IVPB (premix) 2,000 mg, 2,000 mg, Intravenous, Once, Ney Keyes MD  No Known Allergies  Vitals:    03/28/18 0748   BP: 133/81   Pulse: 92   Resp: 18   Temp: 97 6 °F (36 4 °C)   SpO2: 96%       Physical Exam   Constitutional: General appearance: The Patient is well-developed and well-nourished who appears the stated age in no acute distress  Patient is pleasant and talkative  HEENT:  Normocephalic  Sclerae are anicteric  Mucous membranes are moist  Neck is supple without adenopathy  No JVD  Chest: The lungs are clear to auscultation  Cardiac: Heart is regular rate  Abdomen: Abdomen is soft, non-tender, non-distended and without masses  Extremities: There is no clubbing or cyanosis  There is no edema  Symmetric  Neuro: Grossly nonfocal  Gait is normal      Lymphatic: No evidence of cervical adenopathy bilaterally  No evidence of axillary adenopathy bilaterally  No evidence of inguinal adenopathy bilaterally  Skin: Warm, anicteric  There is a healing biopsy site on his posterior scalp  No satellite lesions  Psych:  Patient is pleasant and talkative  Breasts:      Pathology:  Scalp lesion revealed Apocrine hidradenoma    Labs:    Imaging  No results found  I reviewed the above laboratory and imaging data  Discussion/Summary:  57-year-old male with a scalp lesion that was an apocrine hidradenoma  Based on this pathology, this is benign  There apparently was some question that this could be carcinoma  We will have the pathology reviewed by our dermatopathologist   If this is benign, he will just need conservative wider excision of this lesion  If this is malignant, he will require wide excision along with sentinel lymph node biopsy and postoperative radiation  We will have his slides reviewed    I will set him up with Plastic surgery since he will need a skin graft for coverage  I did discuss conservative excision versus more aggressive surgery and treating this as if it is malignant to perform definitive surgery under 1 general anesthetic  After some discussion, we elected on conservative re-excision at this time  The risks of wide excision of his scalp hidradenoma were explained including bleeding, infection, recurrence, need for further surgery, and wound complications  Informed consent was obtained  We will schedule this at our earliest mutual convenience  He is agreeable to this  All his questions were answered

## 2018-03-28 NOTE — ANESTHESIA PREPROCEDURE EVALUATION
Review of Systems/Medical History  Patient summary reviewed  Chart reviewed  No history of anesthetic complications     Cardiovascular  Exercise tolerance: good,  Hyperlipidemia, Hypertension ,    Pulmonary  Smoker cigarette smoker  ,        GI/Hepatic    GERD ,        Negative  ROS        Endo/Other  Diabetes poorly controlled type 2 Insulin,      GYN       Hematology  Negative hematology ROS      Musculoskeletal  Rheumatoid arthritis ,        Neurology  Negative neurology ROS      Psychology   Negative psychology ROS              Physical Exam    Airway    Mallampati score: III  TM Distance: >3 FB  Neck ROM: full     Dental   No notable dental hx     Cardiovascular  Cardiovascular exam normal    Pulmonary  Pulmonary exam normal     Other Findings      Lab Results   Component Value Date    WBC 5 93 03/19/2018    HGB 16 1 03/19/2018    HCT 46 5 03/19/2018    MCV 84 03/19/2018     03/19/2018     Lab Results   Component Value Date    GLUCOSE 252 (H) 03/19/2018    CALCIUM 9 1 03/19/2018     03/19/2018    K 4 3 03/19/2018    CO2 29 03/19/2018     03/19/2018    BUN 13 03/19/2018    CREATININE 1 06 03/19/2018     No results found for: INR, PROTIME  Lab Results   Component Value Date    PTT 22 (L) 03/19/2018     Type and Screen:      Anesthesia Plan  ASA Score- 3     Anesthesia Type- general with ASA Monitors  Additional Monitors:   Airway Plan: ETT and LMA  Plan Factors-    Induction- intravenous  Postoperative Plan- Plan for postoperative opioid use  Informed Consent- Anesthetic plan and risks discussed with patient and spouse  I personally reviewed this patient with the CRNA  Discussed and agreed on the Anesthesia Plan with the CRNA  Kelley Chester

## 2018-03-28 NOTE — ANESTHESIA POSTPROCEDURE EVALUATION
Post-Op Assessment Note      CV Status:  Stable    Mental Status:  Alert and awake    Hydration Status:  Euvolemic    PONV Controlled:  Controlled    Airway Patency:  Patent    Post Op Vitals Reviewed: Yes          Staff: CRNA, Anesthesiologist           BP   142/71   Temp   97 6   Pulse  93   Resp   12   SpO2   100

## 2018-03-28 NOTE — OP NOTE
OPERATIVE REPORT  PATIENT NAME: Rasta Vargas  :  1965  MRN: [id number]  Pt Location: AN OR ROOM 02    SURGERY DATE: 3/28/2018    Surgeon(s) and Role:     * Belen Luciano MD - Primary     * Zahira Chanel MD - Assisting    Preop Diagnosis:  Clear cell hidradenoma [D23 9]    Post-Op Diagnosis Codes:     * Clear cell hidradenoma [D23 9]    Procedure(s) (LRB):  EXCISION WIDE LESION HEAD/FACIAL/NECK (N/A)    Specimen(s):  ID Type Source Tests Collected by Time Destination   1 : excision hidradenitis scalp suture capo 12o'clock Tissue Hidradenitis TISSUE EXAM Belen Luciano MD 3/28/2018 0910        Estimated Blood Loss:   Minimal    Drains:       Anesthesia Type:   General    Operative Indications:  Clear cell hidradenoma [D219]  82-year-old male with recently diagnosed clear cell hydrated no  It was recommended that he undergo re-excision  Risks and benefits were explained  Informed consent was obtained  Patient was brought to the operating room  Operative Findings: The lesion measured 1 x 1 cm  The wide excision measured 2 x 4 cm    Complications:   None    Procedure and Technique:  After identifying the patient, general anesthesia was achieved  Patient was prepped and draped in the usual sterile fashion  A time-out was performed  0 5 cm margins were mapped out circumferentially around the lesion  An elliptical incision measuring 2 x 4 cm was made  Using sharp dissection this was taken down through the skin, subcutaneous tissue down to the underlying galea  This was excised in total   Specimen was oriented and handed off the table  Wound was copiously irrigated  There was excellent hemostasis  We now raised flaps to bring this together without significant tension  This was done with the Bovie electrocautery and there continued to be excellent hemostasis  Wound was copiously irrigated  There was excellent hemostasis    The incision was approximated with interrupted 2 0 Vicryl suture subcutaneously and then interrupted 2 0 nylon sutures in a vertical mattress fashion for the skin  Bacitracin was placed on the wound  Patient was extubated having tolerated the procedure well  I was present and participated in all aspects of this procedure       I was present for the entire procedure    Patient Disposition:  PACU     SIGNATURE: Perlita Hunter MD  DATE: March 28, 2018  TIME: 9:33 AM

## 2018-03-28 NOTE — DISCHARGE INSTRUCTIONS
POST-OPERATIVE WOUND CARE INSTRUCTIONS    Your wound is closed with:     Sutures on the outside-they will need to be removed in the office in 2 weeks       Wound care: You may remove your dressing after 24 hours   You may shower using soap and water to clean your wound  Gently pat it dry  You may redress your wound for comfort as needed  Activity:   Did not perform any heavy lifting or strenuous physical activity for 7 days  Your activity restrictions will be reevaluated at your postoperative visit  Medications: You may resume all your preoperative medications and diet  Pain medication as directed on the prescription given in the office  Other:   May use ice on the wound for 24-48 hours as needed for comfort  Call the office at [phone] if you have any of the followin  Redness, swelling, heat, unusual drainage or heavy bleeding from the wound     2  Fever greater than 101°F    3  Pain not relieved by the prescribed pain medication

## 2018-04-06 DIAGNOSIS — E11.9 TYPE 2 DIABETES MELLITUS WITHOUT COMPLICATION, WITHOUT LONG-TERM CURRENT USE OF INSULIN (HCC): ICD-10-CM

## 2018-04-06 RX ORDER — INSULIN GLARGINE 100 [IU]/ML
INJECTION, SOLUTION SUBCUTANEOUS
Qty: 15 ML | Refills: 1 | Status: SHIPPED | OUTPATIENT
Start: 2018-04-06 | End: 2018-05-09 | Stop reason: SDUPTHER

## 2018-04-11 DIAGNOSIS — E11.9 TYPE 2 DIABETES MELLITUS WITHOUT COMPLICATION, WITHOUT LONG-TERM CURRENT USE OF INSULIN (HCC): ICD-10-CM

## 2018-04-11 DIAGNOSIS — E13.9 DIABETES MELLITUS OF OTHER TYPE WITHOUT COMPLICATION, UNSPECIFIED WHETHER LONG TERM INSULIN USE (HCC): Primary | ICD-10-CM

## 2018-04-11 RX ORDER — BLOOD SUGAR DIAGNOSTIC
STRIP MISCELLANEOUS
Qty: 100 EACH | Refills: 0 | Status: SHIPPED | OUTPATIENT
Start: 2018-04-11 | End: 2018-05-07 | Stop reason: SDUPTHER

## 2018-04-11 RX ORDER — LANCETS
EACH MISCELLANEOUS
Qty: 102 EACH | Refills: 1 | Status: SHIPPED | OUTPATIENT
Start: 2018-04-11 | End: 2018-05-07 | Stop reason: SDUPTHER

## 2018-04-13 ENCOUNTER — OFFICE VISIT (OUTPATIENT)
Dept: SURGICAL ONCOLOGY | Facility: CLINIC | Age: 53
End: 2018-04-13

## 2018-04-13 VITALS
DIASTOLIC BLOOD PRESSURE: 88 MMHG | BODY MASS INDEX: 34.16 KG/M2 | SYSTOLIC BLOOD PRESSURE: 128 MMHG | HEIGHT: 71 IN | TEMPERATURE: 97.9 F | HEART RATE: 93 BPM | WEIGHT: 244 LBS | RESPIRATION RATE: 18 BRPM

## 2018-04-13 DIAGNOSIS — D23.9 CLEAR CELL HIDRADENOMA: Primary | ICD-10-CM

## 2018-04-13 PROCEDURE — 99024 POSTOP FOLLOW-UP VISIT: CPT | Performed by: SURGERY

## 2018-04-13 NOTE — PROGRESS NOTES
Surgical Oncology Follow Up       [address] [facility],[address]  [facility] [place] [address]   1965  [id number]    Diagnoses and all orders for this visit:    Clear cell hidradenoma  -     Ambulatory referral to Dermatology; Future      Chief Complaint   Patient presents with    Post-op     Pt here for post-op visit following wide excision of scalp hidradenoma  Incision is healing well; sutures are intact  Pt reports banging his head on accident, has noticed some drainage since then  No history exists  Staging:  Scalp hidradenoma March 2018  Treatment history: Wide excision scalp hidradenoma March 2018  Current treatment:  Observation  Disease status:   SEE    History of Present Illness:   Patient returns after his wide excision  He did have some pain at the site and noticed a small amount of drainage after week  This is stopped  He occasionally covers this to avoid trauma to the region  Review of Systems   Skin: Positive for wound (posterior superior scalp)  Complete ROS Surg Onc:   Complete ROS Surg Onc:   Constitutional: The patient denies new or recent history of general fatigue, no recent weight loss, no change in appetite  Eyes: No complaints of visual problems, no scleral icterus  ENT: no complaints of ear pain, no hoarseness, no difficulty swallowing,  no tinnitus and no new masses in head, oral cavity, or neck  Cardiovascular: No complaints of chest pain, no palpitations, no ankle edema  Respiratory: No complaints of shortness of breath, no cough  Gastrointestinal: No complaints of jaundice, no bloody stools, no pale stools  Genitourinary: No complaints of dysuria, no hematuria, no nocturia, no frequent urination, no urethral discharge  Musculoskeletal: No complaints of weakness, paralysis, joint stiffness or arthralgias    Integumentary: No complaints of rash, no new lesions  Neurological: No complaints of convulsions, no seizures, no dizziness  Hematologic/Lymphatic: No complaints of easy bruising  Endocrine:  No hot or cold intolerance  No polydipsia, polyphagia, or polyuria  Allergy/immunology:  No environmental allergies  No food allergies  Not immunocompromised  Skin:  No pallor or rash  Surgical wound  Patient Active Problem List   Diagnosis    RUQ abdominal pain    Lesion of skin of scalp    Benign essential hypertension    Carotid artery calcification    COPD, mild (HCC)    Reduced libido    Erectile disorder due to medical condition in male patient    Diabetes mellitus type 2, uncontrolled ([place] [id number] )    Hyperlipidemia    Obesity (BMI 30 0-34  9)    Vitamin D deficiency    [facility]oid arthritis ([facility] )    Clear cell hidradenoma     Past Medical History:   Diagnosis Date    COPD (chronic obstructive pulmonary disease) (HCC)     GERD (gastroesophageal reflux disease)     Hyperglycemia     Resolved: 4/25/2017    Hyperlipidemia     Neuropathy      Past Surgical History:   Procedure Laterality Date    APPENDECTOMY      MT EXC SKIN MALIG <0 5 CM REMAINDER BODY N/A 3/28/2018    Procedure: EXCISION WIDE LESION HEAD/FACIAL/NECK;  Surgeon: Radha Michaud MD;  [facility];  Service: Surgical Oncology    SKIN BIOPSY      scalp    WISDOM TOOTH EXTRACTION  1998     Family History   Problem Relation Age of Onset    Diabetes Mother     Stroke Mother     Mental illness Mother     Diabetes Father     Stroke Father     Alcohol abuse Brother     Coronary artery disease Family      Age 51-55    Diabetes type II Family     Heart disease Family      Social History     Social History    Marital status: /Civil Union     Spouse name: N/A    Number of children: N/A    Years of education: N/A     Occupational History    [place]       Social History Main Topics    Smoking status: Current Every Day Smoker     Packs/day: 1 00    Smokeless tobacco: Never Used    Alcohol use Yes      Comment: socially     Drug use: No    Sexual activity: Not on file     Other Topics Concern    Not on file     Social History Narrative    Caffeine Use- Coffee and Tea        Current Outpatient Prescriptions:     ACCU-CHEK FASTCLIX LANCETS MISC, ONE LANCET TWO TIMES A DAY, Disp: 102 each, Rfl: 1    ACCU-CHEK SMARTVIEW test strip, TEST BLOOD SUGAR FOUR TIMES A DAY, Disp: 100 each, Rfl: 0    albuterol (VENTOLIN HFA) 90 mcg/act inhaler, Inhale every 6 (six) hours as needed  , Disp: , Rfl:     atorvastatin (LIPITOR) 40 mg tablet, TAKE 1 TABLET AT BEDTIME , Disp: 90 tablet, Rfl: 1    BASAGLAR KWIKPEN injection pen 100 units/mL, INJECT 30 UNIT TWICE DAILY, Disp: 15 mL, Rfl: 1    Cholecalciferol (VITAMIN D) 2000 units tablet, 2,000 Units daily in the early morning  , Disp: , Rfl:     Etanercept (ENBREL SC), Inject 50 mg under the skin once a week Takes on Friday, Disp: , Rfl:     fluticasone (FLONASE) 50 mcg/act nasal spray, 2 sprays into each nostril daily at bedtime  , Disp: , Rfl:     HYDROcodone-acetaminophen (NORCO) 5-325 mg per tablet, Take 1 tablet by mouth every 6 (six) hours as needed for pain Max Daily Amount: 4 tablets, Disp: 15 tablet, Rfl: 0    hydroxychloroquine (PLAQUENIL) 200 mg tablet, Take 200 mg by mouth 2 (two) times a day  , Disp: , Rfl:     Insulin Pen Needle (UNIFINE PENTIPS) 32G X 4 MM MISC, Inject under the skin 3 (three) times a day, Disp: 100 each, Rfl: 0    Insulin Syringe-Needle U-100 (B-D INS SYRINGE 0 5CC/30GX1/2") 30G X 1/2" 0 5 ML MISC, by Does not apply route, Disp: , Rfl:     JANUVIA 100 MG tablet, ONE TAB DAILY (Patient taking differently: ONE TAB DAILY with dinner), Disp: 90 tablet, Rfl: 1    leflunomide (ARAVA) 20 MG tablet, Take 1 tablet by mouth daily with dinner  , Disp: , Rfl:     Liraglutide (VICTOZA) 18 MG/3ML SOPN, Inject 1 8 mg under the skin daily in the early morning  , Disp: , Rfl:     naproxen (NAPROSYN) 500 mg tablet, Take 1 tablet by mouth every 12 (twelve) hours, Disp: , Rfl:     valsartan-hydrochlorothiazide (DIOVAN-HCT) 80-12 5 MG per tablet, TAKE 1 TABLET DAILY  (Patient taking differently: TAKE 1 TABLET DAILY am), Disp: 90 tablet, Rfl: 1  No Known Allergies  Vitals:    04/13/18 0839   BP: 128/88   Pulse: 93   Resp: 18   Temp: 97 9 °F (36 6 °C)       Physical Exam  Constitutional: General appearance: The Patient is well-developed and well-nourished who appears the stated age in no acute distress  Patient is pleasant and talkative  HEENT:  Normocephalic  Sclerae are anicteric  Mucous membranes are moist  Neck is supple without adenopathy  No JVD  Extremities: There is no clubbing or cyanosis  There is no edema  Symmetric  Neuro: Grossly nonfocal  Gait is normal      Lymphatic: No evidence of cervical adenopathy bilaterally  No evidence of axillary adenopathy bilaterally  No evidence of inguinal adenopathy bilaterally  Skin: Warm, anicteric    wide excision site is healing well  No significant erythema or drainage  Psych:  Patient is pleasant and talkative  Breasts:        Pathology:  Surgical Pathology Report                         Case: [id number]                                    Authorizing Provider: Lina Isidro MD           Collected:           03/28/2018 0910               Ordering Location:     [facility]        Received:            03/28/2018 [address] [place] [address] Operating Room                                                       Pathologist:           Lydia Henry MD                                                              Specimen:    Lesion, excision hidradenoma scalp suture capo 12 o'clock                                  Final Diagnosis   A  Skin Lesion, excision hidradenoma scalp suture capo 12 o'clock:  - Scar and residual apocrine (clear cell) hidradenoma with atypical features (0 4 cm), margins uninvolved       See Note      Note: Sections reveal within the dermis and superficial subcutis, focal residual apocrine (clear cell) hidradenoma consisting of circumscribed but unencapsulated tumor nodules with cystic degeneration and connection to the epidermis  The tumor cells are composed of round to oval nuclei containing conspicuous nucleoli and eosinophilic cytoplasm and cells with eccentric small dark nuclei with clear cytoplasm  Duct-like structures are noted  Atypical features include occasional tumor cells with nuclear pleomorphism (giant nuclei and multinucleation) and macronucleoli and up to 3 mitoses/10 HPF  Ki-67 immunostain reveals a low proliferation index (< 10%)  The presence of atypical features is associated with an increased risk for recurrence and possible malignant biologic potential  Margins are uninvolved  Close clinical follow-up is advised  (Elicia LIVINGSTON  et al  Nathan's Pathology of the Skin, 4th ed  pp  [id number]  )  The patient's prior diagnostic slide ([id number]) is reviewed  A copy of the final report is faxed to Dr Yanni Sahu on 4/2/18 @ 1445 hours       Interpretation performed at [facility], [address] [place] [address]  Electronically signed by Marium Aguero MD on 4/2/2018 at  2:48 PM         Labs:      Imaging  Xr Chest Pa & Lateral    Result Date: 3/27/2018  Narrative: CHEST INDICATION:   D23 9: Other benign neoplasm of skin, unspecified  Preoperative examination  COMPARISON:  None EXAM PERFORMED/VIEWS:  XR CHEST PA & LATERAL  The frontal view was performed utilizing dual energy radiographic technique  FINDINGS: Cardiomediastinal silhouette appears unremarkable  The lungs are clear  No pneumothorax or pleural effusion  Osseous structures appear within normal limits for patient age  Impression: No acute cardiopulmonary disease  Workstation performed: [id number]     I reviewed the above laboratory and imaging data  Discussion/Summary:   42-year-old male status post wide excision of a hidradenoma  This did have atypical features  I did discuss that there is increased risk local recurrence or even malignant potential   He should continue to have follow-up  We will see him in 1 year  I have left the sutures in place  We will see him next week to remove the sutures as well  I have recommended that he close follow-up with a dermatologist   He is agreeable to this plan  All his questions were answered

## 2018-04-13 NOTE — LETTER
April 13, 2018     [employer] [facility]  [address]  [facility] [address]    Patient: Aden Robbins  YOB: 1965   Date of Visit: 4/13/2018       Dear Dr Rinku Johnson:    Thank you for referring Andre Salinas to me for evaluation  Below are my notes for this consultation  If you have questions, please do not hesitate to call me  I look forward to following your patient along with you  Sincerely,        Kirk Espino MD        CC: No Recipients  Kirk Espino MD  4/13/2018  9:27 AM  Sign at close encounter               Surgical Oncology Follow Up       [address] [place] [address] [facility]   1965  [id number]    Diagnoses and all orders for this visit:    Clear cell hidradenoma  -     Ambulatory referral to Dermatology; Future      Chief Complaint   Patient presents with    Post-op     Pt here for post-op visit following wide excision of scalp hidradenoma  Incision is healing well; sutures are intact  Pt reports banging his head on accident, has noticed some drainage since then  No history exists  Staging:  Scalp hidradenoma March 2018  Treatment history: Wide excision scalp hidradenoma March 2018  Current treatment:  Observation  Disease status:   SEE    History of Present Illness:   Patient returns after his wide excision  He did have some pain at the site and noticed a small amount of drainage after week  This is stopped  He occasionally covers this to avoid trauma to the region  Review of Systems   Skin: Positive for wound (posterior superior scalp)  Complete ROS Surg Onc:   Complete ROS Surg Onc:   Constitutional: The patient denies new or recent history of general fatigue, no recent weight loss, no change in appetite  Eyes: No complaints of visual problems, no scleral icterus     ENT: no complaints of ear pain, no hoarseness, no difficulty swallowing,  no tinnitus and no new masses in head, oral cavity, or neck  Cardiovascular: No complaints of chest pain, no palpitations, no ankle edema  Respiratory: No complaints of shortness of breath, no cough  Gastrointestinal: No complaints of jaundice, no bloody stools, no pale stools  Genitourinary: No complaints of dysuria, no hematuria, no nocturia, no frequent urination, no urethral discharge  Musculoskeletal: No complaints of weakness, paralysis, joint stiffness or arthralgias  Integumentary: No complaints of rash, no new lesions  Neurological: No complaints of convulsions, no seizures, no dizziness  Hematologic/Lymphatic: No complaints of easy bruising  Endocrine:  No hot or cold intolerance  No polydipsia, polyphagia, or polyuria  Allergy/immunology:  No environmental allergies  No food allergies  Not immunocompromised  Skin:  No pallor or rash  Surgical wound  Patient Active Problem List   Diagnosis    RUQ abdominal pain    Lesion of skin of scalp    Benign essential hypertension    Carotid artery calcification    COPD, mild (HCC)    Reduced libido    Erectile disorder due to medical condition in male patient    Diabetes mellitus type 2, uncontrolled ([facility] )    Hyperlipidemia    Obesity (BMI 30 0-34  9)    Vitamin D deficiency    Rheumatoid arthritis ([facility] )    Clear cell hidradenoma     Past Medical History:   Diagnosis Date    COPD (chronic obstructive pulmonary disease) (HCC)     GERD (gastroesophageal reflux disease)     Hyperglycemia     Resolved: 4/25/2017    Hyperlipidemia     Neuropathy      Past Surgical History:   Procedure Laterality Date    APPENDECTOMY      NV EXC SKIN MALIG <0 5 CM REMAINDER BODY N/A 3/28/2018    Procedure: EXCISION WIDE LESION HEAD/FACIAL/NECK;  Surgeon: Jarred Avila MD;  [facility];  Service: Surgical Oncology    SKIN BIOPSY      scalp    WISDOM TOOTH EXTRACTION  1998     Family History   Problem Relation Age of Onset    Diabetes Mother     Stroke Mother     Mental illness Mother     Diabetes Father     Stroke Father     Alcohol abuse Brother     Coronary artery disease Family      Age 51-55    Diabetes type II Family     Heart disease Family      Social History     Social History    Marital status: /Civil Union     Spouse name: N/A    Number of children: N/A    Years of education: N/A     Occupational History    [place]       Social History Main Topics    Smoking status: Current Every Day Smoker     Packs/day: 1 00    Smokeless tobacco: Never Used    Alcohol use Yes      Comment: socially     Drug use: No    Sexual activity: Not on file     Other Topics Concern    Not on file     Social History Narrative    Caffeine Use- Coffee and Tea        Current Outpatient Prescriptions:     ACCU-CHEK FASTCLIX LANCETS MISC, ONE LANCET TWO TIMES A DAY, Disp: 102 each, Rfl: 1    ACCU-CHEK SMARTVIEW test strip, TEST BLOOD SUGAR FOUR TIMES A DAY, Disp: 100 each, Rfl: 0    albuterol (VENTOLIN HFA) 90 mcg/act inhaler, Inhale every 6 (six) hours as needed  , Disp: , Rfl:     atorvastatin (LIPITOR) 40 mg tablet, TAKE 1 TABLET AT BEDTIME , Disp: 90 tablet, Rfl: 1    BASAGLAR KWIKPEN injection pen 100 units/mL, INJECT 30 UNIT TWICE DAILY, Disp: 15 mL, Rfl: 1    Cholecalciferol (VITAMIN D) 2000 units tablet, 2,000 Units daily in the early morning  , Disp: , Rfl:     Etanercept (ENBREL SC), Inject 50 mg under the skin once a week Takes on Friday, Disp: , Rfl:     fluticasone (FLONASE) 50 mcg/act nasal spray, 2 sprays into each nostril daily at bedtime  , Disp: , Rfl:     HYDROcodone-acetaminophen (NORCO) 5-325 mg per tablet, Take 1 tablet by mouth every 6 (six) hours as needed for pain Max Daily Amount: 4 tablets, Disp: 15 tablet, Rfl: 0    hydroxychloroquine (PLAQUENIL) 200 mg tablet, Take 200 mg by mouth 2 (two) times a day  , Disp: , Rfl:     Insulin Pen Needle (UNIFINE PENTIPS) 32G X 4 MM MISC, Inject under the skin 3 (three) times a day, Disp: 100 each, Rfl: 0    Insulin Syringe-Needle U-100 (B-D INS SYRINGE 0 5CC/30GX1/2") 30G X 1/2" 0 5 ML MISC, by Does not apply route, Disp: , Rfl:     JANUVIA 100 MG tablet, ONE TAB DAILY (Patient taking differently: ONE TAB DAILY with dinner), Disp: 90 tablet, Rfl: 1    leflunomide (ARAVA) 20 MG tablet, Take 1 tablet by mouth daily with dinner  , Disp: , Rfl:     Liraglutide (VICTOZA) 18 MG/3ML SOPN, Inject 1 8 mg under the skin daily in the early morning  , Disp: , Rfl:     naproxen (NAPROSYN) 500 mg tablet, Take 1 tablet by mouth every 12 (twelve) hours, Disp: , Rfl:     valsartan-hydrochlorothiazide (DIOVAN-HCT) 80-12 5 MG per tablet, TAKE 1 TABLET DAILY  (Patient taking differently: TAKE 1 TABLET DAILY am), Disp: 90 tablet, Rfl: 1  No Known Allergies  Vitals:    04/13/18 0839   BP: 128/88   Pulse: 93   Resp: 18   Temp: 97 9 °F (36 6 °C)       Physical Exam  Constitutional: General appearance: The Patient is well-developed and well-nourished who appears the stated age in no acute distress  Patient is pleasant and talkative  HEENT:  Normocephalic  Sclerae are anicteric  Mucous membranes are moist  Neck is supple without adenopathy  No JVD  Extremities: There is no clubbing or cyanosis  There is no edema  Symmetric  Neuro: Grossly nonfocal  Gait is normal      Lymphatic: No evidence of cervical adenopathy bilaterally  No evidence of axillary adenopathy bilaterally  No evidence of inguinal adenopathy bilaterally  Skin: Warm, anicteric     wide excision site is healing well  No significant erythema or drainage  Psych:  Patient is pleasant and talkative    Breasts:        Pathology:  Surgical Pathology Report                         Case: [id number]                                    Authorizing Provider: Duglas Gerardo MD           Collected:           03/28/2018 0910               Ordering Location:     [facility]        Received:            03/28/2018 [address] [place] [address] Operating Room                                                       Pathologist:           Kristen Multani MD                                                              Specimen:    Lesion, excision hidradenoma scalp suture capo 12 o'clock                                  Final Diagnosis   A  Skin Lesion, excision hidradenoma scalp suture capo 12 o'clock:  - Scar and residual apocrine (clear cell) hidradenoma with atypical features (0 4 cm), margins uninvolved  See Note      Note: Sections reveal within the dermis and superficial subcutis, focal residual apocrine (clear cell) hidradenoma consisting of circumscribed but unencapsulated tumor nodules with cystic degeneration and connection to the epidermis  The tumor cells are composed of round to oval nuclei containing conspicuous nucleoli and eosinophilic cytoplasm and cells with eccentric small dark nuclei with clear cytoplasm  Duct-like structures are noted  Atypical features include occasional tumor cells with nuclear pleomorphism (giant nuclei and multinucleation) and macronucleoli and up to 3 mitoses/10 HPF  Ki-67 immunostain reveals a low proliferation index (< 10%)  The presence of atypical features is associated with an increased risk for recurrence and possible malignant biologic potential  Margins are uninvolved  Close clinical follow-up is advised  (Elicia LIVINGSTON  et al  Nathan's Pathology of the Skin, 4th ed  pp  [id number]  )  The patient's prior diagnostic slide ([id number]) is reviewed  A copy of the final report is faxed to Dr Terri Brittle on 4/2/18 @ 1448 hours       Interpretation performed at [facility], [address] [place] [id number]  Electronically signed by Kristen Multani MD on 4/2/2018 at  2:48 PM         Labs:      Imaging  Xr Chest Pa & Lateral    Result Date: 3/27/2018  Narrative: CHEST INDICATION:   D23 9: Other benign neoplasm of skin, unspecified  Preoperative examination  COMPARISON:  None EXAM PERFORMED/VIEWS:  XR CHEST PA & LATERAL  The frontal view was performed utilizing dual energy radiographic technique  FINDINGS: Cardiomediastinal silhouette appears unremarkable  The lungs are clear  No pneumothorax or pleural effusion  Osseous structures appear within normal limits for patient age  Impression: No acute cardiopulmonary disease  Workstation performed: [id number]     I reviewed the above laboratory and imaging data  Discussion/Summary:   51-year-old male status post wide excision of a hidradenoma  This did have atypical features  I did discuss that there is increased risk local recurrence or even malignant potential   He should continue to have follow-up  We will see him in 1 year  I have left the sutures in place  We will see him next week to remove the sutures as well  I have recommended that he close follow-up with a dermatologist   He is agreeable to this plan  All his questions were answered

## 2018-04-18 ENCOUNTER — DOCUMENTATION (OUTPATIENT)
Dept: SURGICAL ONCOLOGY | Facility: CLINIC | Age: 53
End: 2018-04-18

## 2018-04-18 DIAGNOSIS — D23.9 CLEAR CELL HIDRADENOMA: Primary | ICD-10-CM

## 2018-05-02 DIAGNOSIS — E55.9 VITAMIN D DEFICIENCY: ICD-10-CM

## 2018-05-02 DIAGNOSIS — I65.29 OCCLUSION AND STENOSIS OF UNSPECIFIED CAROTID ARTERY: ICD-10-CM

## 2018-05-02 DIAGNOSIS — E11.65 TYPE 2 DIABETES MELLITUS WITH HYPERGLYCEMIA (HCC): ICD-10-CM

## 2018-05-02 DIAGNOSIS — I10 ESSENTIAL (PRIMARY) HYPERTENSION: ICD-10-CM

## 2018-05-02 DIAGNOSIS — E78.5 HYPERLIPIDEMIA: ICD-10-CM

## 2018-05-02 DIAGNOSIS — M06.9 RHEUMATOID ARTHRITIS (HCC): ICD-10-CM

## 2018-05-07 ENCOUNTER — APPOINTMENT (OUTPATIENT)
Dept: LAB | Facility: CLINIC | Age: 53
End: 2018-05-07
Payer: COMMERCIAL

## 2018-05-07 DIAGNOSIS — J45.909 UNCOMPLICATED ASTHMA, UNSPECIFIED ASTHMA SEVERITY, UNSPECIFIED WHETHER PERSISTENT: Primary | ICD-10-CM

## 2018-05-07 DIAGNOSIS — E11.9 TYPE 2 DIABETES MELLITUS WITHOUT COMPLICATION, WITHOUT LONG-TERM CURRENT USE OF INSULIN (HCC): ICD-10-CM

## 2018-05-07 DIAGNOSIS — E13.9 DIABETES MELLITUS OF OTHER TYPE WITHOUT COMPLICATION, UNSPECIFIED WHETHER LONG TERM INSULIN USE (HCC): ICD-10-CM

## 2018-05-07 DIAGNOSIS — I65.29 OCCLUSION AND STENOSIS OF UNSPECIFIED CAROTID ARTERY: ICD-10-CM

## 2018-05-07 DIAGNOSIS — M06.9 RHEUMATOID ARTHRITIS (HCC): ICD-10-CM

## 2018-05-07 DIAGNOSIS — E11.65 TYPE 2 DIABETES MELLITUS WITH HYPERGLYCEMIA (HCC): ICD-10-CM

## 2018-05-07 DIAGNOSIS — E78.5 HYPERLIPIDEMIA: ICD-10-CM

## 2018-05-07 DIAGNOSIS — E55.9 VITAMIN D DEFICIENCY: ICD-10-CM

## 2018-05-07 DIAGNOSIS — I10 ESSENTIAL (PRIMARY) HYPERTENSION: ICD-10-CM

## 2018-05-07 LAB
ALBUMIN SERPL BCP-MCNC: 3.8 G/DL (ref 3.5–5)
ALP SERPL-CCNC: 39 U/L (ref 46–116)
ALT SERPL W P-5'-P-CCNC: 52 U/L (ref 12–78)
ANION GAP SERPL CALCULATED.3IONS-SCNC: 7 MMOL/L (ref 4–13)
AST SERPL W P-5'-P-CCNC: 24 U/L (ref 5–45)
BASOPHILS # BLD AUTO: 0.07 THOUSANDS/ΜL (ref 0–0.1)
BASOPHILS NFR BLD AUTO: 1 % (ref 0–1)
BILIRUB SERPL-MCNC: 0.61 MG/DL (ref 0.2–1)
BUN SERPL-MCNC: 8 MG/DL (ref 5–25)
CALCIUM SERPL-MCNC: 8.7 MG/DL (ref 8.3–10.1)
CHLORIDE SERPL-SCNC: 107 MMOL/L (ref 100–108)
CHOLEST SERPL-MCNC: 119 MG/DL (ref 50–200)
CO2 SERPL-SCNC: 24 MMOL/L (ref 21–32)
CREAT SERPL-MCNC: 0.88 MG/DL (ref 0.6–1.3)
CREAT UR-MCNC: 255 MG/DL
EOSINOPHIL # BLD AUTO: 0.22 THOUSAND/ΜL (ref 0–0.61)
EOSINOPHIL NFR BLD AUTO: 4 % (ref 0–6)
ERYTHROCYTE [DISTWIDTH] IN BLOOD BY AUTOMATED COUNT: 13.3 % (ref 11.6–15.1)
EST. AVERAGE GLUCOSE BLD GHB EST-MCNC: 200 MG/DL
GFR SERPL CREATININE-BSD FRML MDRD: 100 ML/MIN/1.73SQ M
GLUCOSE P FAST SERPL-MCNC: 201 MG/DL (ref 65–99)
HBA1C MFR BLD: 8.6 % (ref 4.2–6.3)
HCT VFR BLD AUTO: 46.5 % (ref 36.5–49.3)
HDLC SERPL-MCNC: 39 MG/DL (ref 40–60)
HGB BLD-MCNC: 15.6 G/DL (ref 12–17)
LDLC SERPL CALC-MCNC: 59 MG/DL (ref 0–100)
LYMPHOCYTES # BLD AUTO: 1.69 THOUSANDS/ΜL (ref 0.6–4.47)
LYMPHOCYTES NFR BLD AUTO: 34 % (ref 14–44)
MCH RBC QN AUTO: 29.8 PG (ref 26.8–34.3)
MCHC RBC AUTO-ENTMCNC: 33.5 G/DL (ref 31.4–37.4)
MCV RBC AUTO: 89 FL (ref 82–98)
MICROALBUMIN UR-MCNC: 16.7 MG/L (ref 0–20)
MICROALBUMIN/CREAT 24H UR: 7 MG/G CREATININE (ref 0–30)
MONOCYTES # BLD AUTO: 0.58 THOUSAND/ΜL (ref 0.17–1.22)
MONOCYTES NFR BLD AUTO: 12 % (ref 4–12)
NEUTROPHILS # BLD AUTO: 2.4 THOUSANDS/ΜL (ref 1.85–7.62)
NEUTS SEG NFR BLD AUTO: 49 % (ref 43–75)
NONHDLC SERPL-MCNC: 80 MG/DL
NRBC BLD AUTO-RTO: 0 /100 WBCS
PLATELET # BLD AUTO: 199 THOUSANDS/UL (ref 149–390)
PMV BLD AUTO: 11.4 FL (ref 8.9–12.7)
POTASSIUM SERPL-SCNC: 4.1 MMOL/L (ref 3.5–5.3)
PROT SERPL-MCNC: 7 G/DL (ref 6.4–8.2)
RBC # BLD AUTO: 5.24 MILLION/UL (ref 3.88–5.62)
SODIUM SERPL-SCNC: 138 MMOL/L (ref 136–145)
TRIGL SERPL-MCNC: 105 MG/DL
TSH SERPL DL<=0.05 MIU/L-ACNC: 1.36 UIU/ML (ref 0.36–3.74)
WBC # BLD AUTO: 4.98 THOUSAND/UL (ref 4.31–10.16)

## 2018-05-07 PROCEDURE — 36415 COLL VENOUS BLD VENIPUNCTURE: CPT

## 2018-05-07 PROCEDURE — 82570 ASSAY OF URINE CREATININE: CPT

## 2018-05-07 PROCEDURE — 84443 ASSAY THYROID STIM HORMONE: CPT

## 2018-05-07 PROCEDURE — 3061F NEG MICROALBUMINURIA REV: CPT | Performed by: FAMILY MEDICINE

## 2018-05-07 PROCEDURE — 83036 HEMOGLOBIN GLYCOSYLATED A1C: CPT

## 2018-05-07 PROCEDURE — 85025 COMPLETE CBC W/AUTO DIFF WBC: CPT

## 2018-05-07 PROCEDURE — 80053 COMPREHEN METABOLIC PANEL: CPT

## 2018-05-07 PROCEDURE — 80061 LIPID PANEL: CPT

## 2018-05-07 PROCEDURE — 82043 UR ALBUMIN QUANTITATIVE: CPT

## 2018-05-07 RX ORDER — LIRAGLUTIDE 6 MG/ML
INJECTION SUBCUTANEOUS
Qty: 9 ML | Refills: 4 | Status: SHIPPED | OUTPATIENT
Start: 2018-05-07 | End: 2018-09-27 | Stop reason: SDUPTHER

## 2018-05-07 RX ORDER — LANCETS
EACH MISCELLANEOUS
Qty: 102 EACH | Refills: 1 | Status: SHIPPED | OUTPATIENT
Start: 2018-05-07 | End: 2018-08-29 | Stop reason: SDUPTHER

## 2018-05-07 RX ORDER — PEN NEEDLE, DIABETIC 32GX 5/32"
NEEDLE, DISPOSABLE MISCELLANEOUS 3 TIMES DAILY
Qty: 100 EACH | Refills: 0 | Status: SHIPPED | OUTPATIENT
Start: 2018-05-07 | End: 2018-06-06 | Stop reason: SDUPTHER

## 2018-05-07 RX ORDER — BLOOD SUGAR DIAGNOSTIC
STRIP MISCELLANEOUS
Qty: 100 EACH | Refills: 0 | Status: SHIPPED | OUTPATIENT
Start: 2018-05-07 | End: 2018-07-05 | Stop reason: SDUPTHER

## 2018-05-07 RX ORDER — ALBUTEROL SULFATE 90 UG/1
2 AEROSOL, METERED RESPIRATORY (INHALATION) EVERY 4 HOURS PRN
Qty: 1 INHALER | Refills: 0 | Status: SHIPPED | OUTPATIENT
Start: 2018-05-07 | End: 2018-06-06 | Stop reason: SDUPTHER

## 2018-05-08 RX ORDER — OMEPRAZOLE 40 MG/1
CAPSULE, DELAYED RELEASE ORAL
COMMUNITY
Start: 2018-04-11 | End: 2019-02-08 | Stop reason: SINTOL

## 2018-05-09 ENCOUNTER — OFFICE VISIT (OUTPATIENT)
Dept: FAMILY MEDICINE CLINIC | Facility: CLINIC | Age: 53
End: 2018-05-09
Payer: COMMERCIAL

## 2018-05-09 VITALS
BODY MASS INDEX: 33.6 KG/M2 | DIASTOLIC BLOOD PRESSURE: 76 MMHG | OXYGEN SATURATION: 98 % | HEART RATE: 108 BPM | SYSTOLIC BLOOD PRESSURE: 128 MMHG | WEIGHT: 240 LBS | HEIGHT: 71 IN | RESPIRATION RATE: 16 BRPM

## 2018-05-09 DIAGNOSIS — S01.00XS OPEN WOUND OF SCALP, UNSPECIFIED OPEN WOUND TYPE, SEQUELA: ICD-10-CM

## 2018-05-09 DIAGNOSIS — Z12.5 PROSTATE CANCER SCREENING: ICD-10-CM

## 2018-05-09 DIAGNOSIS — E55.9 VITAMIN D DEFICIENCY: ICD-10-CM

## 2018-05-09 DIAGNOSIS — M05.9 RHEUMATOID ARTHRITIS WITH POSITIVE RHEUMATOID FACTOR, INVOLVING UNSPECIFIED SITE (HCC): ICD-10-CM

## 2018-05-09 DIAGNOSIS — E11.9 TYPE 2 DIABETES MELLITUS WITHOUT COMPLICATION, WITHOUT LONG-TERM CURRENT USE OF INSULIN (HCC): ICD-10-CM

## 2018-05-09 DIAGNOSIS — I10 BENIGN ESSENTIAL HYPERTENSION: ICD-10-CM

## 2018-05-09 DIAGNOSIS — E78.2 MIXED HYPERLIPIDEMIA: ICD-10-CM

## 2018-05-09 DIAGNOSIS — D23.9 CLEAR CELL HIDRADENOMA: ICD-10-CM

## 2018-05-09 DIAGNOSIS — IMO0002 UNCONTROLLED TYPE 2 DIABETES MELLITUS WITH DIABETIC PERIPHERAL ANGIOPATHY WITHOUT GANGRENE, WITH LONG-TERM CURRENT USE OF INSULIN: Primary | ICD-10-CM

## 2018-05-09 PROBLEM — S01.00XA SCALP WOUND: Status: ACTIVE | Noted: 2018-05-09

## 2018-05-09 PROCEDURE — 99215 OFFICE O/P EST HI 40 MIN: CPT | Performed by: FAMILY MEDICINE

## 2018-05-09 RX ORDER — SULFAMETHOXAZOLE AND TRIMETHOPRIM 800; 160 MG/1; MG/1
1 TABLET ORAL EVERY 12 HOURS SCHEDULED
Qty: 20 TABLET | Refills: 0 | Status: SHIPPED | OUTPATIENT
Start: 2018-05-09 | End: 2018-05-19

## 2018-05-09 NOTE — ASSESSMENT & PLAN NOTE
Well controlled cholesterol profile on atorvastatin 40 mg once daily  Continue same    Repeat lipid profile in 4 months

## 2018-05-09 NOTE — ASSESSMENT & PLAN NOTE
Glycemic control is not ideal   Hemoglobin A1c has improved from 9 4-8 6%  Patient remains on Bill Loss  Increased dose of Basaglar  To 35 units twice daily  Repeat diabetic parameters in 4 months

## 2018-05-09 NOTE — ASSESSMENT & PLAN NOTE
Culture of the site obtained  Patient is immunocompromised on enbrel  Will need to be treated for community acquired MRSA with 10 day course of bactrim

## 2018-05-09 NOTE — ASSESSMENT & PLAN NOTE
Patient remains on Enbrel, leflunomide and Plaquenil  He does follow up with rheumatologist   Significant improvement in his rheumatic nodules and joint swelling especially at his elbows

## 2018-05-09 NOTE — ASSESSMENT & PLAN NOTE
Patient did have wide excision of hydradenoma from the scalp  This did show some malignant potential   He will be following up with dermatologist, actually establishing with dermatologist, in September  That was the earliest appointment  This was excised in its entirety    Clear margins on pathological specimen

## 2018-05-09 NOTE — PROGRESS NOTES
Subjective:      Patient ID: Carlos Munoz  is a 50 y o  male  Patient presents with his wife for follow-up of chronic medical conditions including insulin-requiring diabetes mellitus type 2, hyperlipidemia, history of hidradenitis [place] of the scalp, rheumatoid arthritis  Patient does see rheumatologist for management of rheumatoid arthritis  Patient was placed on Enbrel and has seen significant improvement in his joint pains  Patient does complain of feeling "off" patient states that initially after his scalp surgery he did have significant amount of swelling on the left side of his neck that extended into the left axillary region  This did improve  He was seen by oncologic surgeon and had incision evaluated and external sutures removed  There was a point when the patient hit his head at the site of his incision twice  There was some swelling but the wound never opened up  No fever  He does complain of occasional episodes of dizziness  Past Medical History:   Diagnosis Date    COPD (chronic obstructive pulmonary disease) (HCC)     GERD (gastroesophageal reflux disease)     Hyperglycemia     Resolved: 4/25/2017    Hyperlipidemia     Neuropathy        Family History   Problem Relation Age of Onset    Diabetes Mother     Stroke Mother     Mental illness Mother     Diabetes Father     Stroke Father     Alcohol abuse Brother     Coronary artery disease Family      Age 51-55    Diabetes type II Family     Heart disease Family        Past Surgical History:   Procedure Laterality Date    APPENDECTOMY      WA EXC SKIN MALIG <0 5 CM REMAINDER BODY N/A 3/28/2018    Procedure: EXCISION WIDE LESION HEAD/FACIAL/NECK;  Surgeon: Sivan Huff MD;  [facility];  Service: Surgical Oncology    SKIN BIOPSY      scalp    [address]        reports that he has been smoking  He has been smoking about 1 00 pack per day   He has never used smokeless tobacco  He reports that he drinks alcohol  He reports that he does not use drugs  Current Outpatient Prescriptions:     ACCU-CHEK FASTCLIX LANCETS MISC, ONE LANCET FOUR TIMES A DAY, Disp: 102 each, Rfl: 1    ACCU-CHEK SMARTVIEW test strip, TEST BLOOD SUGAR FOUR TIMES A DAY, Disp: 100 each, Rfl: 0    albuterol (VENTOLIN HFA) 90 mcg/act inhaler, Inhale 2 puffs every 4 (four) hours as needed for wheezing, Disp: 1 Inhaler, Rfl: 0    atorvastatin (LIPITOR) 40 mg tablet, TAKE 1 TABLET AT BEDTIME , Disp: 90 tablet, Rfl: 1    BASAGLAR KWIKPEN injection pen 100 units/mL, INJECT 30 UNIT TWICE DAILY, Disp: 15 mL, Rfl: 1    Cholecalciferol (VITAMIN D) 2000 units tablet, 2,000 Units daily in the early morning  , Disp: , Rfl:     Etanercept (ENBREL SC), Inject 50 mg under the skin once a week Takes on Friday, Disp: , Rfl:     fluticasone (FLONASE) 50 mcg/act nasal spray, 2 sprays into each nostril daily at bedtime  , Disp: , Rfl:     HYDROcodone-acetaminophen (NORCO) 5-325 mg per tablet, Take 1 tablet by mouth every 6 (six) hours as needed for pain Max Daily Amount: 4 tablets, Disp: 15 tablet, Rfl: 0    hydroxychloroquine (PLAQUENIL) 200 mg tablet, Take 200 mg by mouth 2 (two) times a day  , Disp: , Rfl:     Insulin Syringe-Needle U-100 (B-D INS SYRINGE 0 5CC/30GX1/2") 30G X 1/2" 0 5 ML MISC, by Does not apply route, Disp: , Rfl:     JANUVIA 100 MG tablet, ONE TAB DAILY (Patient taking differently: ONE TAB DAILY with dinner), Disp: 90 tablet, Rfl: 1    leflunomide (ARAVA) 20 MG tablet, Take 1 tablet by mouth daily with dinner  , Disp: , Rfl:     naproxen (NAPROSYN) 500 mg tablet, Take 1 tablet by mouth every 12 (twelve) hours, Disp: , Rfl:     omeprazole (PriLOSEC) 40 MG capsule, , Disp: , Rfl:     ULTICARE MICRO PEN NEEDLES 32G X 4 MM MISC, INJECT UNDER THE SKIN 3 (THREE) TIMES A DAY, Disp: 100 each, Rfl: 0    valsartan-hydrochlorothiazide (DIOVAN-HCT) 80-12 5 MG per tablet, TAKE 1 TABLET DAILY   (Patient taking differently: TAKE 1 TABLET DAILY am), Disp: 90 tablet, Rfl: 1    VICTOZA 18 MG/3ML SOPN, INJECT 1 8 MG DAILY, Disp: 9 mL, Rfl: 4    The following portions of the patient's history were reviewed and updated as appropriate: allergies, current medications, past family history, past medical history, past social history, past surgical history and problem list     Review of Systems   Constitutional: Negative  HENT: Negative  Eyes: Negative  Respiratory: Negative  Cardiovascular: Negative  Gastrointestinal: Negative  Endocrine: Negative  Genitourinary: Negative  Musculoskeletal: Positive for arthralgias and joint swelling  Skin: Positive for wound  Allergic/Immunologic: Positive for immunocompromised state (On treatment for rheumatoid arthritis)  Neurological: Positive for dizziness and light-headedness  Negative for headaches  Hematological: Negative  Psychiatric/Behavioral: Negative  All other systems reviewed and are negative  Objective:    /76   Pulse (!) 108   Resp 16   Ht 5' 11" (1 803 m)   Wt 109 kg (240 lb)   SpO2 98%   BMI 33 47 kg/m²      Physical Exam   Constitutional: He is oriented to person, place, and time  He appears well-developed and well-nourished  Overweight   HENT:   Head: Normocephalic  Eyes: Conjunctivae and EOM are normal  Pupils are equal, round, and reactive to light  Neck: Normal range of motion  Neck supple  Cardiovascular: Normal rate, regular rhythm and normal heart sounds  Pulmonary/Chest: Effort normal and breath sounds normal    Abdominal: Soft  Bowel sounds are normal    Musculoskeletal: Normal range of motion  Lymphadenopathy:     He has no cervical adenopathy  He has no axillary adenopathy  Neurological: He is alert and oriented to person, place, and time  Skin:        Psychiatric: He has a normal mood and affect   His behavior is normal  Judgment and thought content normal    Nursing note and vitals reviewed  Recent Results (from the past 1008 hour(s))   Tissue Exam    Collection Time: 03/28/18  9:10 AM   Result Value Ref Range    Case Report       Surgical Pathology Report                         Case: [id number]                                   Authorizing Provider:  Charlotte Esteban MD           Collected:           03/28/2018 0910              Ordering Location:     [facility]        Received:            03/28/2018 [address] [place] [address]                                                      Pathologist:           Francisco Coleman MD                                                             Specimen:    Lesion, excision hidradenoma scalp suture capo 12 o'clock                                  Final Diagnosis       A  Skin Lesion, excision hidradenoma scalp suture capo 12 o'clock:  - Scar and residual apocrine (clear cell) hidradenoma with atypical features (0 4 cm), margins uninvolved  See Note  Note: Sections reveal within the dermis and superficial subcutis, focal residual apocrine (clear cell) hidradenoma consisting of circumscribed but unencapsulated tumor nodules with cystic degeneration and connection to the epidermis  The tumor cells are composed of round to oval nuclei containing conspicuous nucleoli and eosinophilic cytoplasm and cells with eccentric small dark nuclei with clear cytoplasm  Duct-like structures are noted  Atypical features include occasional tumor cells with nuclear pleomorphism (giant nuclei and multinucleation) and macronucleoli and up to 3 mitoses/10 HPF  Ki-67 immunostain reveals a low proliferation index (< 10%)  The presence of atypical features is associated with an increased risk for recurrence and possible malignant biologic potential  Margins are uninvolved  Close clinical follow-up is advised  (Elicia LIVINGSTON  et al  Nathan's Pathology of the Skin, 4th ed  pp  [id number]  )  The patient's prior diagnostic slide ([id number]) is reviewed  A copy of the final report is faxed to Dr Mehul Aponte on 4/2/18 @ 1445 hours  Interpretation performed at [facility], [address] [place] [address]  Additional Information       All controls performed with the immunohistochemical stains reported above reacted appropriately  These tests were developed and their performance characteristics determined by [facility] or [facility]  They may not be cleared or approved by the [place]  Food and Drug Administration  The FDA has determined that such clearance or approval is not necessary  These tests are used for clinical purposes  They should not be regarded as investigational or for research  This laboratory has been approved by [place] [id number], designated as a high-complexity laboratory and is qualified to perform these tests  Gross Description       A  The specimen is received in formalin, labeled with the patient's name and hospital number, and is designated "excision hidradenoma scalp  The specimen consists of a tan ellipse of skin measuring 3 3 x 1 4 cm, excised to a maximum depth of 0 8 cm  The specimen is marked by 1 suture which according to the requisition slip indicates 12 o'clock  The specimen is oriented and inked as follows:  12-3 o'clock yellow, 3-6 o'clock green, 6-9 o'clock orange, 9-12 o'clock blue the skin surface exhibits an area of scarring measuring 0 6 cm in diameter which appears to extend to within 2 mm of the peripheral margin in the area of 6 o'clock  The specimen is sectioned in a 3-9 o'clock progression revealing unremarkable tan-yellow cut surfaces  Entirely submitted  Six cassettes  1:  3 and 9 o'clock tips, en face  2-6:  Center, sequentially submitted, 2 pieces in each cassette    Note: The estimated total formalin fixation time based upon information provided by the submitting clinician and the standard processing schedule is under 72 hours   [facility]       Clinical Information Apocrine (clear cell) samir [id number]    Fingerstick Glucose (POCT)    Collection Time: 03/28/18  9:51 AM   Result Value Ref Range    POC Glucose 260 (H) 65 - 140 mg/dl   CBC and differential    Collection Time: 05/07/18  8:33 AM   Result Value Ref Range    WBC 4 98 4 31 - 10 16 Thousand/uL    RBC 5 24 3 88 - 5 62 Million/uL    Hemoglobin 15 6 12 0 - 17 0 g/dL    Hematocrit 46 5 36 5 - 49 3 %    MCV 89 82 - 98 fL    MCH 29 8 26 8 - 34 3 pg    MCHC 33 5 31 4 - 37 4 g/dL    RDW 13 3 11 6 - 15 1 %    MPV 11 4 8 9 - 12 7 fL    Platelets 216 853 - 504 Thousands/uL    nRBC 0 /100 WBCs    Neutrophils Relative 49 43 - 75 %    Lymphocytes Relative 34 14 - 44 %    Monocytes Relative 12 4 - 12 %    Eosinophils Relative 4 0 - 6 %    Basophils Relative 1 0 - 1 %    Neutrophils Absolute 2 40 1 85 - 7 62 Thousands/µL    Lymphocytes Absolute 1 69 0 60 - 4 47 Thousands/µL    Monocytes Absolute 0 58 0 17 - 1 22 Thousand/µL    Eosinophils Absolute 0 22 0 00 - 0 61 Thousand/µL    Basophils Absolute 0 07 0 00 - 0 10 Thousands/µL   Comprehensive metabolic panel    Collection Time: 05/07/18  8:33 AM   Result Value Ref Range    Sodium 138 136 - 145 mmol/L    Potassium 4 1 3 5 - 5 3 mmol/L    Chloride 107 100 - 108 mmol/L    CO2 24 21 - 32 mmol/L    Anion Gap 7 4 - 13 mmol/L    BUN 8 5 - 25 mg/dL    Creatinine 0 88 0 60 - 1 30 mg/dL    Glucose, Fasting 201 (H) 65 - 99 mg/dL    Calcium 8 7 8 3 - 10 1 mg/dL    AST 24 5 - 45 U/L    ALT 52 12 - 78 U/L    Alkaline Phosphatase 39 (L) 46 - 116 U/L    Total Protein 7 0 6 4 - 8 2 g/dL    Albumin 3 8 3 5 - 5 0 g/dL    Total Bilirubin 0 61 0 20 - 1 00 mg/dL    eGFR 100 ml/min/1 73sq m   Lipid panel    Collection Time: 05/07/18  8:33 AM   Result Value Ref Range    Cholesterol 119 50 - 200 mg/dL    Triglycerides 105 <=150 mg/dL    HDL, Direct 39 (L) 40 - 60 mg/dL    LDL Calculated 59 0 - 100 mg/dL    Non-HDL-Chol (CHOL-HDL) 80 mg/dl   TSH, 3rd generation with T4 reflex    Collection Time: 05/07/18  8:33 AM   Result Value Ref Range    TSH 3RD GENERATON 1 360 0 358 - 3 740 uIU/mL   Hemoglobin A1c    Collection Time: 05/07/18  8:33 AM   Result Value Ref Range    Hemoglobin A1C 8 6 (H) 4 2 - 6 3 %     mg/dl   Microalbumin / creatinine urine ratio    Collection Time: 05/07/18  8:33 AM   Result Value Ref Range    Creatinine, Ur 255 0 mg/dL    Microalbum  ,U,Random 16 7 0 0 - 20 0 mg/L    Microalb Creat Ratio 7 0 - 30 mg/g creatinine       Assessment/Plan:    No problem-specific Assessment & Plan notes found for this encounter  Problem List Items Addressed This Visit     Benign essential hypertension       Hypertension is very well controlled on Diovan / HCT  Continue same  Relevant Orders    CBC and differential    Comprehensive metabolic panel    HEMOGLOBIN A1C W/ EAG ESTIMATION    Lipid panel    Microalbumin / creatinine urine ratio    TSH, 3rd generation with T4 reflex    Diabetes mellitus type 2, uncontrolled (HCC) - Primary       Glycemic control is not ideal   Hemoglobin A1c has improved from 9 4-8 6%  Patient remains on Ceci Ortega  Increased dose of Basaglar  To 35 units twice daily  Repeat diabetic parameters in 4 months  Relevant Medications    insulin glargine (BASAGLAR KWIKPEN) injection pen 100 units/mL    Other Relevant Orders    CBC and differential    Comprehensive metabolic panel    HEMOGLOBIN A1C W/ EAG ESTIMATION    Lipid panel    Microalbumin / creatinine urine ratio    TSH, 3rd generation with T4 reflex    Hyperlipidemia      Well controlled cholesterol profile on atorvastatin 40 mg once daily  Continue same    Repeat lipid profile in 4 months         Relevant Orders    CBC and differential    Comprehensive metabolic panel    HEMOGLOBIN A1C W/ EAG ESTIMATION    Lipid panel    Microalbumin / creatinine urine ratio    TSH, 3rd generation with T4 reflex    Vitamin D deficiency      Remains on vitamin-D supplementation check a vitamin-D level with next set of labs in 4 months         Relevant Orders    CBC and differential    Comprehensive metabolic panel    HEMOGLOBIN A1C W/ EAG ESTIMATION    Lipid panel    Microalbumin / creatinine urine ratio    TSH, 3rd generation with T4 reflex    Vitamin D 1,25 dihydroxy    Rheumatoid arthritis ([address] )       Patient remains on Enbrel, leflunomide and Plaquenil  He does follow up with rheumatologist   Significant improvement in his rheumatic nodules and joint swelling especially at his elbows  Relevant Orders    CBC and differential    Comprehensive metabolic panel    HEMOGLOBIN A1C W/ EAG ESTIMATION    Lipid panel    Microalbumin / creatinine urine ratio    TSH, 3rd generation with T4 reflex    Clear cell hidradenoma       Patient did have wide excision of hydradenoma from the scalp  This did show some malignant potential   He will be following up with dermatologist, actually establishing with dermatologist, in September  That was the earliest appointment  This was excised in its entirety  Clear margins on pathological specimen         Scalp wound     Culture of the site obtained  Patient is immunocompromised on enbrel  Will need to be treated for community acquired MRSA with 10 day course of bactrim           Relevant Medications    sulfamethoxazole-trimethoprim (BACTRIM DS) 800-160 mg per tablet    Other Relevant Orders    Wound culture and Gram stain    CBC and differential    Comprehensive metabolic panel    HEMOGLOBIN A1C W/ EAG ESTIMATION    Lipid panel    Microalbumin / creatinine urine ratio    TSH, 3rd generation with T4 reflex      Other Visit Diagnoses     Type 2 diabetes mellitus without complication, without long-term current use of insulin (HCC)        Relevant Medications    insulin glargine (BASAGLAR KWIKPEN) injection pen 100 units/mL    Other Relevant Orders    CBC and differential    Comprehensive metabolic panel    HEMOGLOBIN A1C W/ EAG ESTIMATION    Lipid panel    Microalbumin / creatinine urine ratio    TSH, 3rd generation with T4 reflex    Prostate cancer screening        Relevant Orders    PSA, total and free          I have spent 45 minutes with Patient and family today in which greater than 50% of this time was spent in counseling/coordination of care regarding Diagnostic results, Prognosis, Risks and benefits of tx options, Intructions for management, Patient and family education, Importance of tx compliance, Risk factor reductions and Impressions

## 2018-05-23 ENCOUNTER — OFFICE VISIT (OUTPATIENT)
Dept: FAMILY MEDICINE CLINIC | Facility: CLINIC | Age: 53
End: 2018-05-23
Payer: COMMERCIAL

## 2018-05-23 VITALS
TEMPERATURE: 98.3 F | DIASTOLIC BLOOD PRESSURE: 64 MMHG | SYSTOLIC BLOOD PRESSURE: 132 MMHG | BODY MASS INDEX: 34.16 KG/M2 | WEIGHT: 244 LBS | HEIGHT: 71 IN

## 2018-05-23 DIAGNOSIS — S01.00XS OPEN WOUND OF SCALP, UNSPECIFIED OPEN WOUND TYPE, SEQUELA: Primary | ICD-10-CM

## 2018-05-23 PROBLEM — M79.671 RIGHT FOOT PAIN: Status: ACTIVE | Noted: 2018-05-23

## 2018-05-23 PROCEDURE — 99213 OFFICE O/P EST LOW 20 MIN: CPT | Performed by: FAMILY MEDICINE

## 2018-05-23 PROCEDURE — 3008F BODY MASS INDEX DOCD: CPT | Performed by: FAMILY MEDICINE

## 2018-05-23 RX ORDER — ETANERCEPT 50 MG/ML
50 SOLUTION SUBCUTANEOUS WEEKLY
COMMUNITY
Start: 2018-05-08 | End: 2020-06-17 | Stop reason: ALTCHOICE

## 2018-05-23 NOTE — ASSESSMENT & PLAN NOTE
Uncertain as to the exact cause of his right foot pain and swelling  Pain is gradually improving  Clearly if he is having some swelling in did have some bruising then he may have had a ligamentous tear  There is no bony tenderness  He is able to bear weight    Based upon [place] foot and ankle rules he does not need imaging

## 2018-05-23 NOTE — ASSESSMENT & PLAN NOTE
Scalp wound and cellulitis of the scalp has resolved  Patient did finish course of antibiotic  I see no evidence of residual infection    Patient can resume his Enbrel for rheumatoid arthritis

## 2018-05-23 NOTE — PROGRESS NOTES
Subjective:      Patient ID: Darvin Cruz  is a 50 y o  male  Patient presents for follow-up in re-evaluation of scalp wound  Scalp wound had become infected postoperatively  Wound culture grew out heavy growth of Staph aureus and moderate growth of Serratia marcescens that was sensitive to clindamycin  Patient finish course of clindamycin approximately 4 days ago  No further drainage  Patient does get some crusting  Patient also states that 4 days ago he was walking and felt a pain and pop in his right foot  There was some swelling and bruising  He states that initially he was and able to bear weight but this is getting better  There is still some swelling in his foot  He does not recall tripping or rolling his ankle  Patient states that he was walking on a flat surface when this happened  Past Medical History:   Diagnosis Date    COPD (chronic obstructive pulmonary disease) (HCC)     GERD (gastroesophageal reflux disease)     Hyperglycemia     Resolved: 4/25/2017    Hyperlipidemia     Neuropathy        Family History   Problem Relation Age of Onset    Diabetes Mother     Stroke Mother     Mental illness Mother     Diabetes Father     Stroke Father     Alcohol abuse Brother     Coronary artery disease Family      Age 51-55    Diabetes type II Family     Heart disease Family        Past Surgical History:   Procedure Laterality Date    APPENDECTOMY      SD EXC SKIN MALIG <0 5 CM REMAINDER BODY N/A 3/28/2018    Procedure: EXCISION WIDE LESION HEAD/FACIAL/NECK;  Surgeon: Cristal Morgan MD;  [facility];  Service: Surgical Oncology    SKIN BIOPSY      scalp    [address]        reports that he has been smoking  He has been smoking about 1 00 pack per day  He has never used smokeless tobacco  He reports that he drinks alcohol  He reports that he does not use drugs        Current Outpatient Prescriptions:     ACCU-CHEK FASTCLIX LANCETS MISC, ONE LANCET FOUR TIMES A DAY, Disp: 102 each, Rfl: 1    ACCU-CHEK SMARTVIEW test strip, TEST BLOOD SUGAR FOUR TIMES A DAY, Disp: 100 each, Rfl: 0    albuterol (VENTOLIN HFA) 90 mcg/act inhaler, Inhale 2 puffs every 4 (four) hours as needed for wheezing, Disp: 1 Inhaler, Rfl: 0    atorvastatin (LIPITOR) 40 mg tablet, TAKE 1 TABLET AT BEDTIME , Disp: 90 tablet, Rfl: 1    Cholecalciferol (VITAMIN D) 2000 units tablet, 2,000 Units daily in the early morning  , Disp: , Rfl:     ENBREL SURECLICK 50 MG/ML injection, , Disp: , Rfl:     Etanercept (ENBREL SC), Inject 50 mg under the skin once a week Takes on Friday, Disp: , Rfl:     fluticasone (FLONASE) 50 mcg/act nasal spray, 2 sprays into each nostril daily at bedtime  , Disp: , Rfl:     HYDROcodone-acetaminophen (NORCO) 5-325 mg per tablet, Take 1 tablet by mouth every 6 (six) hours as needed for pain Max Daily Amount: 4 tablets, Disp: 15 tablet, Rfl: 0    hydroxychloroquine (PLAQUENIL) 200 mg tablet, Take 200 mg by mouth 2 (two) times a day  , Disp: , Rfl:     insulin glargine (BASAGLAR KWIKPEN) injection pen 100 units/mL, Inject 0 35 mL (35 Units total) under the skin 2 (two) times a day, Disp: 21 mL, Rfl: 0    Insulin Syringe-Needle U-100 (B-D INS SYRINGE 0 5CC/30GX1/2") 30G X 1/2" 0 5 ML MISC, by Does not apply route, Disp: , Rfl:     JANUVIA 100 MG tablet, ONE TAB DAILY (Patient taking differently: ONE TAB DAILY with dinner), Disp: 90 tablet, Rfl: 1    leflunomide (ARAVA) 20 MG tablet, Take 1 tablet by mouth daily with dinner  , Disp: , Rfl:     naproxen (NAPROSYN) 500 mg tablet, Take 1 tablet by mouth every 12 (twelve) hours, Disp: , Rfl:     omeprazole (PriLOSEC) 40 MG capsule, , Disp: , Rfl:     ULTICARE MICRO PEN NEEDLES 32G X 4 MM MISC, INJECT UNDER THE SKIN 3 (THREE) TIMES A DAY, Disp: 100 each, Rfl: 0    valsartan-hydrochlorothiazide (DIOVAN-HCT) 80-12 5 MG per tablet, TAKE 1 TABLET DAILY   (Patient taking differently: TAKE 1 TABLET DAILY am), Disp: 90 tablet, Rfl: 1    VICTOZA 18 MG/3ML SOPN, INJECT 1 8 MG DAILY, Disp: 9 mL, Rfl: 4    The following portions of the patient's history were reviewed and updated as appropriate: allergies, current medications, past family history, past medical history, past social history, past surgical history and problem list     Review of Systems   Constitutional: Negative  HENT: Negative  Eyes: Negative  Respiratory: Negative  Cardiovascular: Negative  Gastrointestinal: Negative  Endocrine: Negative  Genitourinary: Negative  Musculoskeletal: Positive for arthralgias and joint swelling  Swelling of his right foot   Skin: Positive for wound  Allergic/Immunologic: Positive for immunocompromised state (On treatment for rheumatoid arthritis)  Neurological: Positive for dizziness and light-headedness  Negative for headaches  Hematological: Negative  Psychiatric/Behavioral: Negative  All other systems reviewed and are negative  Objective:    /64   Temp 98 3 °F (36 8 °C) (Tympanic)   Ht 5' 11" (1 803 m)   Wt 111 kg (244 lb)   BMI 34 03 kg/m²      Physical Exam   Constitutional: He is oriented to person, place, and time  He appears well-developed and well-nourished  Overweight   HENT:   Head: Normocephalic  Eyes: Conjunctivae and EOM are normal  Pupils are equal, round, and reactive to light  Neck: Normal range of motion  Neck supple  Cardiovascular: Normal rate, regular rhythm and normal heart sounds  Pulmonary/Chest: Effort normal and breath sounds normal    Abdominal: Soft  Bowel sounds are normal    Musculoskeletal: Normal range of motion  Feet:    Lymphadenopathy:     He has no cervical adenopathy  He has no axillary adenopathy  Neurological: He is alert and oriented to person, place, and time  Skin:        Psychiatric: He has a normal mood and affect   His behavior is normal  Judgment and thought content normal    Nursing note and vitals reviewed          Recent Results (from the past 1008 hour(s))   CBC and differential    Collection Time: 05/07/18  8:33 AM   Result Value Ref Range    WBC 4 98 4 31 - 10 16 Thousand/uL    RBC 5 24 3 88 - 5 62 Million/uL    Hemoglobin 15 6 12 0 - 17 0 g/dL    Hematocrit 46 5 36 5 - 49 3 %    MCV 89 82 - 98 fL    MCH 29 8 26 8 - 34 3 pg    MCHC 33 5 31 4 - 37 4 g/dL    RDW 13 3 11 6 - 15 1 %    MPV 11 4 8 9 - 12 7 fL    Platelets 561 413 - 488 Thousands/uL    nRBC 0 /100 WBCs    Neutrophils Relative 49 43 - 75 %    Lymphocytes Relative 34 14 - 44 %    Monocytes Relative 12 4 - 12 %    Eosinophils Relative 4 0 - 6 %    Basophils Relative 1 0 - 1 %    Neutrophils Absolute 2 40 1 85 - 7 62 Thousands/µL    Lymphocytes Absolute 1 69 0 60 - 4 47 Thousands/µL    Monocytes Absolute 0 58 0 17 - 1 22 Thousand/µL    Eosinophils Absolute 0 22 0 00 - 0 61 Thousand/µL    Basophils Absolute 0 07 0 00 - 0 10 Thousands/µL   Comprehensive metabolic panel    Collection Time: 05/07/18  8:33 AM   Result Value Ref Range    Sodium 138 136 - 145 mmol/L    Potassium 4 1 3 5 - 5 3 mmol/L    Chloride 107 100 - 108 mmol/L    CO2 24 21 - 32 mmol/L    Anion Gap 7 4 - 13 mmol/L    BUN 8 5 - 25 mg/dL    Creatinine 0 88 0 60 - 1 30 mg/dL    Glucose, Fasting 201 (H) 65 - 99 mg/dL    Calcium 8 7 8 3 - 10 1 mg/dL    AST 24 5 - 45 U/L    ALT 52 12 - 78 U/L    Alkaline Phosphatase 39 (L) 46 - 116 U/L    Total Protein 7 0 6 4 - 8 2 g/dL    Albumin 3 8 3 5 - 5 0 g/dL    Total Bilirubin 0 61 0 20 - 1 00 mg/dL    eGFR 100 ml/min/1 73sq m   Lipid panel    Collection Time: 05/07/18  8:33 AM   Result Value Ref Range    Cholesterol 119 50 - 200 mg/dL    Triglycerides 105 <=150 mg/dL    HDL, Direct 39 (L) 40 - 60 mg/dL    LDL Calculated 59 0 - 100 mg/dL    Non-HDL-Chol (CHOL-HDL) 80 mg/dl   TSH, 3rd generation with T4 reflex    Collection Time: 05/07/18  8:33 AM   Result Value Ref Range    TSH 3RD GENERATON 1 360 0 358 - 3 740 uIU/mL   Hemoglobin A1c Collection Time: 05/07/18  8:33 AM   Result Value Ref Range    Hemoglobin A1C 8 6 (H) 4 2 - 6 3 %     mg/dl   Microalbumin / creatinine urine ratio    Collection Time: 05/07/18  8:33 AM   Result Value Ref Range    Creatinine, Ur 255 0 mg/dL    Microalbum  ,U,Random 16 7 0 0 - 20 0 mg/L    Microalb Creat Ratio 7 0 - 30 mg/g creatinine   Culture, Aerobic Bacteria    Collection Time: 05/09/18  9:02 AM   Result Value Ref Range    Culture, Aerobic and Anaerobic w/Gram Stain (A)        Assessment/Plan:    No problem-specific Assessment & Plan notes found for this encounter  Problem List Items Addressed This Visit     Scalp wound - Primary      Scalp wound and cellulitis of the scalp has resolved  Patient did finish course of antibiotic  I see no evidence of residual infection    Patient can resume his Enbrel for rheumatoid arthritis

## 2018-06-06 DIAGNOSIS — J45.909 UNCOMPLICATED ASTHMA, UNSPECIFIED ASTHMA SEVERITY, UNSPECIFIED WHETHER PERSISTENT: ICD-10-CM

## 2018-06-06 DIAGNOSIS — E13.9 DIABETES MELLITUS OF OTHER TYPE WITHOUT COMPLICATION, UNSPECIFIED WHETHER LONG TERM INSULIN USE (HCC): ICD-10-CM

## 2018-06-06 RX ORDER — PEN NEEDLE, DIABETIC 32GX 5/32"
NEEDLE, DISPOSABLE MISCELLANEOUS 3 TIMES DAILY
Qty: 100 EACH | Refills: 0 | Status: SHIPPED | OUTPATIENT
Start: 2018-06-06 | End: 2018-07-05 | Stop reason: SDUPTHER

## 2018-07-05 DIAGNOSIS — E13.9 DIABETES MELLITUS OF OTHER TYPE WITHOUT COMPLICATION, UNSPECIFIED WHETHER LONG TERM INSULIN USE (HCC): ICD-10-CM

## 2018-07-05 DIAGNOSIS — J45.909 UNCOMPLICATED ASTHMA, UNSPECIFIED ASTHMA SEVERITY, UNSPECIFIED WHETHER PERSISTENT: ICD-10-CM

## 2018-07-05 DIAGNOSIS — E11.9 TYPE 2 DIABETES MELLITUS WITHOUT COMPLICATION, WITHOUT LONG-TERM CURRENT USE OF INSULIN (HCC): ICD-10-CM

## 2018-07-05 RX ORDER — BLOOD SUGAR DIAGNOSTIC
STRIP MISCELLANEOUS
Qty: 100 EACH | Refills: 0 | Status: SHIPPED | OUTPATIENT
Start: 2018-07-05 | End: 2018-08-02 | Stop reason: SDUPTHER

## 2018-07-05 RX ORDER — PEN NEEDLE, DIABETIC 32GX 5/32"
NEEDLE, DISPOSABLE MISCELLANEOUS 3 TIMES DAILY
Qty: 100 EACH | Refills: 0 | Status: SHIPPED | OUTPATIENT
Start: 2018-07-05 | End: 2018-08-02 | Stop reason: SDUPTHER

## 2018-07-05 RX ORDER — INSULIN GLARGINE 100 [IU]/ML
INJECTION, SOLUTION SUBCUTANEOUS
Qty: 15 ML | Refills: 1 | Status: SHIPPED | OUTPATIENT
Start: 2018-07-05 | End: 2018-08-16 | Stop reason: SDUPTHER

## 2018-07-06 ENCOUNTER — APPOINTMENT (OUTPATIENT)
Dept: LAB | Facility: CLINIC | Age: 53
End: 2018-07-06
Payer: COMMERCIAL

## 2018-07-06 ENCOUNTER — TRANSCRIBE ORDERS (OUTPATIENT)
Dept: LAB | Facility: CLINIC | Age: 53
End: 2018-07-06

## 2018-07-06 DIAGNOSIS — E55.9 AVITAMINOSIS D: ICD-10-CM

## 2018-07-06 DIAGNOSIS — Z79.899 NEED FOR PROPHYLACTIC CHEMOTHERAPY: ICD-10-CM

## 2018-07-06 DIAGNOSIS — M06.39 RHEUMATOID NODULE OF MULTIPLE SITES (HCC): ICD-10-CM

## 2018-07-06 DIAGNOSIS — R80.9 DIABETES MELLITUS DUE TO UNDERLYING CONDITION WITH MICROALBUMINURIA, WITHOUT LONG-TERM CURRENT USE OF INSULIN (HCC): ICD-10-CM

## 2018-07-06 DIAGNOSIS — K21.9 GASTROESOPHAGEAL REFLUX DISEASE WITHOUT ESOPHAGITIS: ICD-10-CM

## 2018-07-06 DIAGNOSIS — R80.9 DIABETES MELLITUS DUE TO UNDERLYING CONDITION WITH MICROALBUMINURIA, WITHOUT LONG-TERM CURRENT USE OF INSULIN (HCC): Primary | ICD-10-CM

## 2018-07-06 DIAGNOSIS — E08.29 DIABETES MELLITUS DUE TO UNDERLYING CONDITION WITH MICROALBUMINURIA, WITHOUT LONG-TERM CURRENT USE OF INSULIN (HCC): Primary | ICD-10-CM

## 2018-07-06 DIAGNOSIS — E08.29 DIABETES MELLITUS DUE TO UNDERLYING CONDITION WITH MICROALBUMINURIA, WITHOUT LONG-TERM CURRENT USE OF INSULIN (HCC): ICD-10-CM

## 2018-07-06 LAB
25(OH)D3 SERPL-MCNC: 24.2 NG/ML (ref 30–100)
ALBUMIN SERPL BCP-MCNC: 3.8 G/DL (ref 3.5–5)
ALP SERPL-CCNC: 42 U/L (ref 46–116)
ALT SERPL W P-5'-P-CCNC: 52 U/L (ref 12–78)
ANION GAP SERPL CALCULATED.3IONS-SCNC: 7 MMOL/L (ref 4–13)
AST SERPL W P-5'-P-CCNC: 28 U/L (ref 5–45)
BASOPHILS # BLD AUTO: 0.08 THOUSANDS/ΜL (ref 0–0.1)
BASOPHILS NFR BLD AUTO: 1 % (ref 0–1)
BILIRUB SERPL-MCNC: 0.49 MG/DL (ref 0.2–1)
BUN SERPL-MCNC: 17 MG/DL (ref 5–25)
CALCIUM SERPL-MCNC: 8.7 MG/DL (ref 8.3–10.1)
CHLORIDE SERPL-SCNC: 106 MMOL/L (ref 100–108)
CO2 SERPL-SCNC: 24 MMOL/L (ref 21–32)
CREAT SERPL-MCNC: 0.92 MG/DL (ref 0.6–1.3)
CRP SERPL QL: <3 MG/L
EOSINOPHIL # BLD AUTO: 0.31 THOUSAND/ΜL (ref 0–0.61)
EOSINOPHIL NFR BLD AUTO: 5 % (ref 0–6)
ERYTHROCYTE [DISTWIDTH] IN BLOOD BY AUTOMATED COUNT: 13.8 % (ref 11.6–15.1)
ERYTHROCYTE [SEDIMENTATION RATE] IN BLOOD: 7 MM/HOUR (ref 0–10)
GFR SERPL CREATININE-BSD FRML MDRD: 97 ML/MIN/1.73SQ M
GLUCOSE SERPL-MCNC: 201 MG/DL (ref 65–140)
HCT VFR BLD AUTO: 45.3 % (ref 36.5–49.3)
HGB BLD-MCNC: 15.2 G/DL (ref 12–17)
IMM GRANULOCYTES # BLD AUTO: 0.03 THOUSAND/UL (ref 0–0.2)
IMM GRANULOCYTES NFR BLD AUTO: 0 % (ref 0–2)
LYMPHOCYTES # BLD AUTO: 1.89 THOUSANDS/ΜL (ref 0.6–4.47)
LYMPHOCYTES NFR BLD AUTO: 28 % (ref 14–44)
MCH RBC QN AUTO: 29.4 PG (ref 26.8–34.3)
MCHC RBC AUTO-ENTMCNC: 33.6 G/DL (ref 31.4–37.4)
MCV RBC AUTO: 88 FL (ref 82–98)
MONOCYTES # BLD AUTO: 0.94 THOUSAND/ΜL (ref 0.17–1.22)
MONOCYTES NFR BLD AUTO: 14 % (ref 4–12)
NEUTROPHILS # BLD AUTO: 3.63 THOUSANDS/ΜL (ref 1.85–7.62)
NEUTS SEG NFR BLD AUTO: 52 % (ref 43–75)
NRBC BLD AUTO-RTO: 0 /100 WBCS
PLATELET # BLD AUTO: 211 THOUSANDS/UL (ref 149–390)
PMV BLD AUTO: 11.4 FL (ref 8.9–12.7)
POTASSIUM SERPL-SCNC: 3.8 MMOL/L (ref 3.5–5.3)
PROT SERPL-MCNC: 7 G/DL (ref 6.4–8.2)
RBC # BLD AUTO: 5.17 MILLION/UL (ref 3.88–5.62)
SODIUM SERPL-SCNC: 137 MMOL/L (ref 136–145)
TSH SERPL DL<=0.05 MIU/L-ACNC: 1.54 UIU/ML (ref 0.36–3.74)
VIT B12 SERPL-MCNC: 403 PG/ML (ref 100–900)
WBC # BLD AUTO: 6.88 THOUSAND/UL (ref 4.31–10.16)

## 2018-07-06 PROCEDURE — 82607 VITAMIN B-12: CPT

## 2018-07-06 PROCEDURE — 82306 VITAMIN D 25 HYDROXY: CPT

## 2018-07-06 PROCEDURE — 85025 COMPLETE CBC W/AUTO DIFF WBC: CPT

## 2018-07-06 PROCEDURE — 36415 COLL VENOUS BLD VENIPUNCTURE: CPT

## 2018-07-06 PROCEDURE — 80053 COMPREHEN METABOLIC PANEL: CPT

## 2018-07-06 PROCEDURE — 84443 ASSAY THYROID STIM HORMONE: CPT

## 2018-07-06 PROCEDURE — 85652 RBC SED RATE AUTOMATED: CPT

## 2018-07-06 PROCEDURE — 86140 C-REACTIVE PROTEIN: CPT

## 2018-07-26 DIAGNOSIS — I10 ESSENTIAL HYPERTENSION: Primary | ICD-10-CM

## 2018-07-26 DIAGNOSIS — I10 ESSENTIAL HYPERTENSION: ICD-10-CM

## 2018-07-26 RX ORDER — VALSARTAN AND HYDROCHLOROTHIAZIDE 80; 12.5 MG/1; MG/1
TABLET, FILM COATED ORAL
Qty: 90 TABLET | Refills: 1 | OUTPATIENT
Start: 2018-07-26

## 2018-07-26 RX ORDER — LOSARTAN POTASSIUM AND HYDROCHLOROTHIAZIDE 12.5; 5 MG/1; MG/1
1 TABLET ORAL DAILY
Qty: 90 TABLET | Refills: 0 | Status: SHIPPED | OUTPATIENT
Start: 2018-07-26 | End: 2018-09-27 | Stop reason: SDUPTHER

## 2018-07-31 DIAGNOSIS — E78.2 MIXED HYPERLIPIDEMIA: ICD-10-CM

## 2018-07-31 DIAGNOSIS — E11.9 TYPE 2 DIABETES MELLITUS WITHOUT COMPLICATION, WITHOUT LONG-TERM CURRENT USE OF INSULIN (HCC): ICD-10-CM

## 2018-08-01 RX ORDER — SITAGLIPTIN 100 MG/1
TABLET, FILM COATED ORAL
Qty: 90 TABLET | Refills: 1 | Status: SHIPPED | OUTPATIENT
Start: 2018-08-01 | End: 2019-01-21 | Stop reason: SDUPTHER

## 2018-08-01 RX ORDER — ATORVASTATIN CALCIUM 40 MG/1
TABLET, FILM COATED ORAL
Qty: 90 TABLET | Refills: 1 | Status: SHIPPED | OUTPATIENT
Start: 2018-08-01 | End: 2019-01-21 | Stop reason: SDUPTHER

## 2018-08-02 DIAGNOSIS — J45.909 UNCOMPLICATED ASTHMA, UNSPECIFIED ASTHMA SEVERITY, UNSPECIFIED WHETHER PERSISTENT: ICD-10-CM

## 2018-08-02 DIAGNOSIS — E11.9 TYPE 2 DIABETES MELLITUS WITHOUT COMPLICATION, WITHOUT LONG-TERM CURRENT USE OF INSULIN (HCC): ICD-10-CM

## 2018-08-02 DIAGNOSIS — E13.9 DIABETES MELLITUS OF OTHER TYPE WITHOUT COMPLICATION, UNSPECIFIED WHETHER LONG TERM INSULIN USE (HCC): ICD-10-CM

## 2018-08-02 RX ORDER — PEN NEEDLE, DIABETIC 32GX 5/32"
NEEDLE, DISPOSABLE MISCELLANEOUS 3 TIMES DAILY
Qty: 100 EACH | Refills: 0 | Status: SHIPPED | OUTPATIENT
Start: 2018-08-02 | End: 2018-08-29 | Stop reason: SDUPTHER

## 2018-08-02 RX ORDER — BLOOD SUGAR DIAGNOSTIC
STRIP MISCELLANEOUS
Qty: 100 EACH | Refills: 0 | Status: SHIPPED | OUTPATIENT
Start: 2018-08-02 | End: 2018-08-29 | Stop reason: SDUPTHER

## 2018-08-16 DIAGNOSIS — E11.9 TYPE 2 DIABETES MELLITUS WITHOUT COMPLICATION, WITHOUT LONG-TERM CURRENT USE OF INSULIN (HCC): ICD-10-CM

## 2018-08-16 RX ORDER — INSULIN GLARGINE 100 [IU]/ML
INJECTION, SOLUTION SUBCUTANEOUS
Qty: 15 ML | Refills: 1 | Status: SHIPPED | OUTPATIENT
Start: 2018-08-16 | End: 2018-08-29 | Stop reason: SDUPTHER

## 2018-08-29 DIAGNOSIS — E11.9 TYPE 2 DIABETES MELLITUS WITHOUT COMPLICATION, WITHOUT LONG-TERM CURRENT USE OF INSULIN (HCC): ICD-10-CM

## 2018-08-29 DIAGNOSIS — J45.909 UNCOMPLICATED ASTHMA, UNSPECIFIED ASTHMA SEVERITY, UNSPECIFIED WHETHER PERSISTENT: ICD-10-CM

## 2018-08-29 DIAGNOSIS — E13.9 DIABETES MELLITUS OF OTHER TYPE WITHOUT COMPLICATION, UNSPECIFIED WHETHER LONG TERM INSULIN USE (HCC): ICD-10-CM

## 2018-08-29 RX ORDER — PEN NEEDLE, DIABETIC 32GX 5/32"
NEEDLE, DISPOSABLE MISCELLANEOUS 3 TIMES DAILY
Qty: 100 EACH | Refills: 0 | Status: SHIPPED | OUTPATIENT
Start: 2018-08-29 | End: 2018-09-27 | Stop reason: SDUPTHER

## 2018-08-29 RX ORDER — INSULIN GLARGINE 100 [IU]/ML
INJECTION, SOLUTION SUBCUTANEOUS
Qty: 15 ML | Refills: 1 | Status: SHIPPED | OUTPATIENT
Start: 2018-08-29 | End: 2018-09-27 | Stop reason: SDUPTHER

## 2018-08-29 RX ORDER — LANCETS
EACH MISCELLANEOUS
Qty: 102 EACH | Refills: 1 | Status: SHIPPED | OUTPATIENT
Start: 2018-08-29 | End: 2018-11-26 | Stop reason: SDUPTHER

## 2018-08-29 RX ORDER — BLOOD SUGAR DIAGNOSTIC
STRIP MISCELLANEOUS
Qty: 100 EACH | Refills: 0 | Status: SHIPPED | OUTPATIENT
Start: 2018-08-29 | End: 2018-10-25 | Stop reason: SDUPTHER

## 2018-09-10 ENCOUNTER — APPOINTMENT (OUTPATIENT)
Dept: LAB | Facility: CLINIC | Age: 53
End: 2018-09-10
Payer: COMMERCIAL

## 2018-09-10 DIAGNOSIS — Z12.5 PROSTATE CANCER SCREENING: ICD-10-CM

## 2018-09-10 DIAGNOSIS — I10 BENIGN ESSENTIAL HYPERTENSION: ICD-10-CM

## 2018-09-10 DIAGNOSIS — S01.00XS OPEN WOUND OF SCALP, UNSPECIFIED OPEN WOUND TYPE, SEQUELA: ICD-10-CM

## 2018-09-10 DIAGNOSIS — M05.9 RHEUMATOID ARTHRITIS WITH POSITIVE RHEUMATOID FACTOR, INVOLVING UNSPECIFIED SITE (HCC): ICD-10-CM

## 2018-09-10 DIAGNOSIS — IMO0002 UNCONTROLLED TYPE 2 DIABETES MELLITUS WITH DIABETIC PERIPHERAL ANGIOPATHY WITHOUT GANGRENE, WITH LONG-TERM CURRENT USE OF INSULIN: ICD-10-CM

## 2018-09-10 DIAGNOSIS — E55.9 VITAMIN D DEFICIENCY: ICD-10-CM

## 2018-09-10 DIAGNOSIS — E78.2 MIXED HYPERLIPIDEMIA: ICD-10-CM

## 2018-09-10 DIAGNOSIS — E11.9 TYPE 2 DIABETES MELLITUS WITHOUT COMPLICATION, WITHOUT LONG-TERM CURRENT USE OF INSULIN (HCC): ICD-10-CM

## 2018-09-10 LAB
ALBUMIN SERPL BCP-MCNC: 3.7 G/DL (ref 3.5–5)
ALP SERPL-CCNC: 41 U/L (ref 46–116)
ALT SERPL W P-5'-P-CCNC: 51 U/L (ref 12–78)
ANION GAP SERPL CALCULATED.3IONS-SCNC: 4 MMOL/L (ref 4–13)
AST SERPL W P-5'-P-CCNC: 25 U/L (ref 5–45)
BASOPHILS # BLD AUTO: 0.06 THOUSANDS/ΜL (ref 0–0.1)
BASOPHILS NFR BLD AUTO: 1 % (ref 0–1)
BILIRUB SERPL-MCNC: 0.73 MG/DL (ref 0.2–1)
BUN SERPL-MCNC: 16 MG/DL (ref 5–25)
CALCIUM SERPL-MCNC: 8.5 MG/DL (ref 8.3–10.1)
CHLORIDE SERPL-SCNC: 104 MMOL/L (ref 100–108)
CHOLEST SERPL-MCNC: 119 MG/DL (ref 50–200)
CO2 SERPL-SCNC: 26 MMOL/L (ref 21–32)
CREAT SERPL-MCNC: 0.91 MG/DL (ref 0.6–1.3)
CREAT UR-MCNC: 255 MG/DL
EOSINOPHIL # BLD AUTO: 0.27 THOUSAND/ΜL (ref 0–0.61)
EOSINOPHIL NFR BLD AUTO: 5 % (ref 0–6)
ERYTHROCYTE [DISTWIDTH] IN BLOOD BY AUTOMATED COUNT: 13.6 % (ref 11.6–15.1)
EST. AVERAGE GLUCOSE BLD GHB EST-MCNC: 137 MG/DL
GFR SERPL CREATININE-BSD FRML MDRD: 98 ML/MIN/1.73SQ M
GLUCOSE P FAST SERPL-MCNC: 169 MG/DL (ref 65–99)
HBA1C MFR BLD: 6.4 % (ref 4.2–6.3)
HCT VFR BLD AUTO: 46.1 % (ref 36.5–49.3)
HDLC SERPL-MCNC: 35 MG/DL (ref 40–60)
HGB BLD-MCNC: 15.5 G/DL (ref 12–17)
IMM GRANULOCYTES # BLD AUTO: 0.03 THOUSAND/UL (ref 0–0.2)
IMM GRANULOCYTES NFR BLD AUTO: 1 % (ref 0–2)
LDLC SERPL CALC-MCNC: 47 MG/DL (ref 0–100)
LYMPHOCYTES # BLD AUTO: 1.4 THOUSANDS/ΜL (ref 0.6–4.47)
LYMPHOCYTES NFR BLD AUTO: 23 % (ref 14–44)
MCH RBC QN AUTO: 30.3 PG (ref 26.8–34.3)
MCHC RBC AUTO-ENTMCNC: 33.6 G/DL (ref 31.4–37.4)
MCV RBC AUTO: 90 FL (ref 82–98)
MICROALBUMIN UR-MCNC: 15.5 MG/L (ref 0–20)
MICROALBUMIN/CREAT 24H UR: 6 MG/G CREATININE (ref 0–30)
MONOCYTES # BLD AUTO: 0.78 THOUSAND/ΜL (ref 0.17–1.22)
MONOCYTES NFR BLD AUTO: 13 % (ref 4–12)
NEUTROPHILS # BLD AUTO: 3.52 THOUSANDS/ΜL (ref 1.85–7.62)
NEUTS SEG NFR BLD AUTO: 57 % (ref 43–75)
NONHDLC SERPL-MCNC: 84 MG/DL
NRBC BLD AUTO-RTO: 0 /100 WBCS
PLATELET # BLD AUTO: 216 THOUSANDS/UL (ref 149–390)
PMV BLD AUTO: 10.9 FL (ref 8.9–12.7)
POTASSIUM SERPL-SCNC: 4.1 MMOL/L (ref 3.5–5.3)
PROT SERPL-MCNC: 7.1 G/DL (ref 6.4–8.2)
RBC # BLD AUTO: 5.11 MILLION/UL (ref 3.88–5.62)
SODIUM SERPL-SCNC: 134 MMOL/L (ref 136–145)
TRIGL SERPL-MCNC: 186 MG/DL
TSH SERPL DL<=0.05 MIU/L-ACNC: 1.63 UIU/ML (ref 0.36–3.74)
WBC # BLD AUTO: 6.06 THOUSAND/UL (ref 4.31–10.16)

## 2018-09-10 PROCEDURE — 84153 ASSAY OF PSA TOTAL: CPT

## 2018-09-10 PROCEDURE — 82570 ASSAY OF URINE CREATININE: CPT

## 2018-09-10 PROCEDURE — 80053 COMPREHEN METABOLIC PANEL: CPT

## 2018-09-10 PROCEDURE — 84154 ASSAY OF PSA FREE: CPT

## 2018-09-10 PROCEDURE — 84443 ASSAY THYROID STIM HORMONE: CPT

## 2018-09-10 PROCEDURE — 83036 HEMOGLOBIN GLYCOSYLATED A1C: CPT

## 2018-09-10 PROCEDURE — 36415 COLL VENOUS BLD VENIPUNCTURE: CPT

## 2018-09-10 PROCEDURE — 85025 COMPLETE CBC W/AUTO DIFF WBC: CPT

## 2018-09-10 PROCEDURE — 82043 UR ALBUMIN QUANTITATIVE: CPT

## 2018-09-10 PROCEDURE — 3061F NEG MICROALBUMINURIA REV: CPT | Performed by: FAMILY MEDICINE

## 2018-09-10 PROCEDURE — 82652 VIT D 1 25-DIHYDROXY: CPT

## 2018-09-10 PROCEDURE — 80061 LIPID PANEL: CPT

## 2018-09-11 LAB
PSA FREE MFR SERPL: 25 %
PSA FREE SERPL-MCNC: 0.05 NG/ML
PSA SERPL-MCNC: 0.2 NG/ML (ref 0–4)

## 2018-09-12 ENCOUNTER — OFFICE VISIT (OUTPATIENT)
Dept: FAMILY MEDICINE CLINIC | Facility: CLINIC | Age: 53
End: 2018-09-12
Payer: COMMERCIAL

## 2018-09-12 VITALS
WEIGHT: 236 LBS | HEIGHT: 71 IN | SYSTOLIC BLOOD PRESSURE: 130 MMHG | OXYGEN SATURATION: 98 % | DIASTOLIC BLOOD PRESSURE: 62 MMHG | HEART RATE: 87 BPM | RESPIRATION RATE: 16 BRPM | BODY MASS INDEX: 33.04 KG/M2

## 2018-09-12 DIAGNOSIS — E78.2 MIXED HYPERLIPIDEMIA: ICD-10-CM

## 2018-09-12 DIAGNOSIS — Z12.11 COLON CANCER SCREENING: ICD-10-CM

## 2018-09-12 DIAGNOSIS — Z23 FLU VACCINE NEED: Primary | ICD-10-CM

## 2018-09-12 DIAGNOSIS — E55.9 VITAMIN D DEFICIENCY: ICD-10-CM

## 2018-09-12 DIAGNOSIS — I10 BENIGN ESSENTIAL HYPERTENSION: ICD-10-CM

## 2018-09-12 DIAGNOSIS — M05.9 RHEUMATOID ARTHRITIS WITH POSITIVE RHEUMATOID FACTOR, INVOLVING UNSPECIFIED SITE (HCC): ICD-10-CM

## 2018-09-12 DIAGNOSIS — E66.9 OBESITY (BMI 30.0-34.9): ICD-10-CM

## 2018-09-12 DIAGNOSIS — IMO0002 UNCONTROLLED TYPE 2 DIABETES MELLITUS WITH DIABETIC PERIPHERAL ANGIOPATHY WITHOUT GANGRENE, WITH LONG-TERM CURRENT USE OF INSULIN: ICD-10-CM

## 2018-09-12 DIAGNOSIS — D23.9 CLEAR CELL HIDRADENOMA: ICD-10-CM

## 2018-09-12 PROBLEM — S01.00XA SCALP WOUND: Status: RESOLVED | Noted: 2018-05-09 | Resolved: 2018-09-12

## 2018-09-12 PROBLEM — M79.671 RIGHT FOOT PAIN: Status: RESOLVED | Noted: 2018-05-23 | Resolved: 2018-09-12

## 2018-09-12 PROBLEM — R10.11 RUQ ABDOMINAL PAIN: Status: RESOLVED | Noted: 2018-02-12 | Resolved: 2018-09-12

## 2018-09-12 PROBLEM — L98.9 LESION OF SKIN OF SCALP: Status: RESOLVED | Noted: 2018-02-12 | Resolved: 2018-09-12

## 2018-09-12 LAB — 1,25(OH)2D3 SERPL-MCNC: 52.7 PG/ML (ref 19.9–79.3)

## 2018-09-12 PROCEDURE — 90682 RIV4 VACC RECOMBINANT DNA IM: CPT

## 2018-09-12 PROCEDURE — 3078F DIAST BP <80 MM HG: CPT | Performed by: FAMILY MEDICINE

## 2018-09-12 PROCEDURE — 3075F SYST BP GE 130 - 139MM HG: CPT | Performed by: FAMILY MEDICINE

## 2018-09-12 PROCEDURE — 99215 OFFICE O/P EST HI 40 MIN: CPT | Performed by: FAMILY MEDICINE

## 2018-09-12 PROCEDURE — 3725F SCREEN DEPRESSION PERFORMED: CPT

## 2018-09-12 RX ORDER — UBIDECARENONE 75 MG
CAPSULE ORAL DAILY
COMMUNITY
End: 2019-04-09

## 2018-09-12 NOTE — ASSESSMENT & PLAN NOTE
Hyperlipidemia is significantly improved which is likely related to significant changes in his diet  He is now at diabetic goal with LDL less than 70  Continue on atorvastatin 40 mg once daily    Repeat lipid profile in 4 months

## 2018-09-12 NOTE — ASSESSMENT & PLAN NOTE
Lab Results   Component Value Date    HGBA1C 6 4 (H) 09/10/2018       No results for input(s): POCGLU in the last 72 hours  significantly improved glycemic control  I believe this is related to improvement in his diet  Patient remains on [place] [employer] and basic lumbar  No change in regimen    Repeat diabetic parameters in 4 months  Blood Sugar Average: Last 72 hrs:

## 2018-09-12 NOTE — ASSESSMENT & PLAN NOTE
Discussion with patient in regards to options for colon cancer screening now that he is 50years of age  Patient will eventually choose colonoscopy  He would like to defer it at this time    Discussion at his next visit in 4 months

## 2018-09-12 NOTE — ASSESSMENT & PLAN NOTE
Patient did have wide excision of hydradenoma from the scalp  Clear margins  Establishing with dermatologist later on today

## 2018-09-12 NOTE — PROGRESS NOTES
Subjective:      Patient ID: Marek Miles  is a 50 y o  male  Patient presents for four-month follow-up of chronic conditions including hypertension, hyperlipidemia, peripheral neuropathy, rheumatoid arthritis, diabetes mellitus type 2 that is insulin requiring  Patient and his wife have been following a more strict diet  She is trying to qualify for bariatric surgery  He is also following her diet as well  He has lost 8 lb  His diabetic parameters have improved tremendously  Hemoglobin A1c decreased from 8 6-6 4%  Cholesterol is also better than it has ever been with an LDL of 47  Patient generally feels well  He has been seeing rheumatologist   Rheumatoid arthritis is being treated  Patient will be seeing dermatologist for 1st time today  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  Patient did see podiatrist for diabetic foot care  Remainder of labs are excellent  Normal CBC, CMP with the exception of fasting glucose of 169 and a serum sodium of 134 which does correct to 136 when accounting for elevated glucose  Urine is negative for microalbumin  Hemoglobin A1c of 6 4%    PSA of 0 2        Past Medical History:   Diagnosis Date    COPD (chronic obstructive pulmonary disease) (HCC)     GERD (gastroesophageal reflux disease)     Hyperglycemia     Resolved: 4/25/2017    Hyperlipidemia     Neuropathy        Family History   Problem Relation Age of Onset    Diabetes Mother     Stroke Mother     Mental illness Mother     Diabetes Father     Stroke Father     Alcohol abuse Brother     Coronary artery disease Family         Age 51-55    Diabetes type II Family     Heart disease Family        Past Surgical History:   Procedure Laterality Date    APPENDECTOMY      ME EXC SKIN MALIG <0 5 CM REMAINDER BODY N/A 3/28/2018    Procedure: EXCISION WIDE LESION HEAD/FACIAL/NECK;  Surgeon: Virginia Coburn MD;  [facility];  Service: Surgical Oncology    SKIN BIOPSY scalp    [address]        reports that he has been smoking  He has been smoking about 1 00 pack per day  He has never used smokeless tobacco  He reports that he drinks alcohol  He reports that he does not use drugs        Current Outpatient Prescriptions:     ACCU-CHEK FASTCLIX LANCETS MISC, ONE LANCET FOUR TIMES A DAY, Disp: 102 each, Rfl: 1    ACCU-CHEK SMARTVIEW test strip, TEST BLOOD SUGAR FOUR TIMES A DAY, Disp: 100 each, Rfl: 0    atorvastatin (LIPITOR) 40 mg tablet, TAKE 1 TABLET AT BEDTIME , Disp: 90 tablet, Rfl: 1    BASAGLAR KWIKPEN 100 units/mL injection pen, INJECT 35 UNIT TWICE DAILY, Disp: 15 mL, Rfl: 1    Cholecalciferol (VITAMIN D) 2000 units tablet, 2,000 Units daily in the early morning  , Disp: , Rfl:     cyanocobalamin (VITAMIN B-12) 100 mcg tablet, Take by mouth daily, Disp: , Rfl:     ENBREL SURECLICK 50 MG/ML injection, , Disp: , Rfl:     Etanercept (ENBREL SC), Inject 50 mg under the skin once a week Takes on Friday, Disp: , Rfl:     fluticasone (FLONASE) 50 mcg/act nasal spray, 2 sprays into each nostril daily at bedtime  , Disp: , Rfl:     HYDROcodone-acetaminophen (NORCO) 5-325 mg per tablet, Take 1 tablet by mouth every 6 (six) hours as needed for pain Max Daily Amount: 4 tablets, Disp: 15 tablet, Rfl: 0    hydroxychloroquine (PLAQUENIL) 200 mg tablet, Take 200 mg by mouth 2 (two) times a day  , Disp: , Rfl:     insulin glargine (BASAGLAR KWIKPEN) injection pen 100 units/mL, Inject 0 35 mL (35 Units total) under the skin 2 (two) times a day, Disp: 21 mL, Rfl: 0    Insulin Syringe-Needle U-100 (B-D INS SYRINGE 0 5CC/30GX1/2") 30G X 1/2" 0 5 ML MISC, by Does not apply route, Disp: , Rfl:     JANUVIA 100 MG tablet, TAKE ONE TABLET EVERY DAY, Disp: 90 tablet, Rfl: 1    leflunomide (ARAVA) 20 MG tablet, Take 1 tablet by mouth daily with dinner  , Disp: , Rfl:     losartan-hydrochlorothiazide (HYZAAR) 50-12 5 mg per tablet, Take 1 tablet by mouth daily, Disp: 90 tablet, Rfl: 0    naproxen (NAPROSYN) 500 mg tablet, Take 1 tablet by mouth every 12 (twelve) hours, Disp: , Rfl:     omeprazole (PriLOSEC) 40 MG capsule, , Disp: , Rfl:     ULTICARE MICRO PEN NEEDLES 32G X 4 MM MISC, INJECT UNDER THE SKIN 3 (THREE) TIMES A DAY, Disp: 100 each, Rfl: 0    VENTOLIN  (90 Base) MCG/ACT inhaler, INHALE 2 PUFFS EVERY 4 (FOUR) HOURS AS NEEDED FOR WHEEZING, Disp: 18 g, Rfl: 0    VICTOZA 18 MG/3ML SOPN, INJECT 1 8 MG DAILY, Disp: 9 mL, Rfl: 4    The following portions of the patient's history were reviewed and updated as appropriate: allergies, current medications, past family history, past medical history, past social history, past surgical history and problem list     Review of Systems   Constitutional: Positive for unexpected weight change (8lb weight loss)  HENT: Negative  Eyes: Negative  Respiratory: Negative  Cardiovascular: Negative  Gastrointestinal: Negative  Endocrine: Negative  Genitourinary: Negative  Musculoskeletal: Positive for arthralgias  Negative for joint swelling  No foot swelling at this time  No isolated joint swelling  Skin: Negative for wound  Allergic/Immunologic: Positive for immunocompromised state (On treatment for rheumatoid arthritis)  Neurological: Positive for dizziness  Negative for light-headedness, numbness and headaches  Hematological: Negative  Psychiatric/Behavioral: Negative  All other systems reviewed and are negative  Objective:    /62   Pulse 87   Resp 16   Ht 5' 11" (1 803 m)   Wt 107 kg (236 lb)   SpO2 98%   BMI 32 92 kg/m²      Physical Exam   Constitutional: He is oriented to person, place, and time  He appears well-developed and well-nourished  HENT:   Head: Normocephalic  Eyes: Conjunctivae and EOM are normal  Pupils are equal, round, and reactive to light  Neck: Normal range of motion  Neck supple     Cardiovascular: Normal rate, regular rhythm and normal heart sounds  Pulmonary/Chest: Effort normal and breath sounds normal    Abdominal: Soft  Bowel sounds are normal    Musculoskeletal: Normal range of motion  Neurological: He is alert and oriented to person, place, and time  Psychiatric: He has a normal mood and affect  His behavior is normal  Judgment and thought content normal    Nursing note and vitals reviewed          Recent Results (from the past 1008 hour(s))   CBC and differential    Collection Time: 09/10/18  9:15 AM   Result Value Ref Range    WBC 6 06 4 31 - 10 16 Thousand/uL    RBC 5 11 3 88 - 5 62 Million/uL    Hemoglobin 15 5 12 0 - 17 0 g/dL    Hematocrit 46 1 36 5 - 49 3 %    MCV 90 82 - 98 fL    MCH 30 3 26 8 - 34 3 pg    MCHC 33 6 31 4 - 37 4 g/dL    RDW 13 6 11 6 - 15 1 %    MPV 10 9 8 9 - 12 7 fL    Platelets 032 465 - 953 Thousands/uL    nRBC 0 /100 WBCs    Neutrophils Relative 57 43 - 75 %    Immat GRANS % 1 0 - 2 %    Lymphocytes Relative 23 14 - 44 %    Monocytes Relative 13 (H) 4 - 12 %    Eosinophils Relative 5 0 - 6 %    Basophils Relative 1 0 - 1 %    Neutrophils Absolute 3 52 1 85 - 7 62 Thousands/µL    Immature Grans Absolute 0 03 0 00 - 0 20 Thousand/uL    Lymphocytes Absolute 1 40 0 60 - 4 47 Thousands/µL    Monocytes Absolute 0 78 0 17 - 1 22 Thousand/µL    Eosinophils Absolute 0 27 0 00 - 0 61 Thousand/µL    Basophils Absolute 0 06 0 00 - 0 10 Thousands/µL   Comprehensive metabolic panel    Collection Time: 09/10/18  9:15 AM   Result Value Ref Range    Sodium 134 (L) 136 - 145 mmol/L    Potassium 4 1 3 5 - 5 3 mmol/L    Chloride 104 100 - 108 mmol/L    CO2 26 21 - 32 mmol/L    ANION GAP 4 4 - 13 mmol/L    BUN 16 5 - 25 mg/dL    Creatinine 0 91 0 60 - 1 30 mg/dL    Glucose, Fasting 169 (H) 65 - 99 mg/dL    Calcium 8 5 8 3 - 10 1 mg/dL    AST 25 5 - 45 U/L    ALT 51 12 - 78 U/L    Alkaline Phosphatase 41 (L) 46 - 116 U/L    Total Protein 7 1 6 4 - 8 2 g/dL    Albumin 3 7 3 5 - 5 0 g/dL    Total Bilirubin 0 73 0 20 - 1 00 mg/dL    eGFR 98 ml/min/1 73sq m   HEMOGLOBIN A1C W/ EAG ESTIMATION    Collection Time: 09/10/18  9:15 AM   Result Value Ref Range    Hemoglobin A1C 6 4 (H) 4 2 - 6 3 %     mg/dl   Lipid panel    Collection Time: 09/10/18  9:15 AM   Result Value Ref Range    Cholesterol 119 50 - 200 mg/dL    Triglycerides 186 (H) <=150 mg/dL    HDL, Direct 35 (L) 40 - 60 mg/dL    LDL Calculated 47 0 - 100 mg/dL    Non-HDL-Chol (CHOL-HDL) 84 mg/dl   Microalbumin / creatinine urine ratio    Collection Time: 09/10/18  9:15 AM   Result Value Ref Range    Creatinine, Ur 255 0 mg/dL    Microalbum  ,U,Random 15 5 0 0 - 20 0 mg/L    Microalb Creat Ratio 6 0 - 30 mg/g creatinine   TSH, 3rd generation with T4 reflex    Collection Time: 09/10/18  9:15 AM   Result Value Ref Range    TSH 3RD GENERATON 1 630 0 358 - 3 740 uIU/mL   PSA, total and free    Collection Time: 09/10/18  9:15 AM   Result Value Ref Range    Prostate Specific Antigen Total 0 2 0 0 - 4 0 ng/mL    PSA, Free 0 05 N/A ng/mL    PSA, Free Pct 25 0 %       Assessment/Plan:    No problem-specific Assessment & Plan notes found for this encounter  Problem List Items Addressed This Visit     Benign essential hypertension       Hypertension is very well controlled on Diovan / HCT  Continue same  Relevant Orders    CBC and differential    TSH, 3rd generation with Free T4 reflex    Diabetes mellitus type 2, uncontrolled ([facility] )     Lab Results   Component Value Date    HGBA1C 6 4 (H) 09/10/2018       No results for input(s): POCGLU in the last 72 hours  significantly improved glycemic control  I believe this is related to improvement in his diet  Patient remains on [employer] and basic lumbar  No change in regimen    Repeat diabetic parameters in 4 months  Blood Sugar Average: Last 72 hrs:           Relevant Orders    Hemoglobin A1C    Microalbumin / creatinine urine ratio    Hyperlipidemia       Hyperlipidemia is significantly improved which is likely related to significant changes in his diet  He is now at diabetic goal with LDL less than 70  Continue on atorvastatin 40 mg once daily  Repeat lipid profile in 4 months         Relevant Orders    Comprehensive metabolic panel    Lipid panel    Obesity (BMI 30 0-34  9)      Commended on recent weight loss through dietary modification  Vitamin D deficiency       Continue on vitamin-D supplementation  Check vitamin-D level in 4 months  Relevant Orders    Vitamin D 1,25 dihydroxy    Rheumatoid arthritis ([facility] [address] )       Patient remains on Enbrel, leflunomide and Plaquenil  He does follow up with rheumatologist   Significant improvement in his rheumatic nodules and joint swelling especially at his elbows  Clear cell hidradenoma       Patient did have wide excision of hydradenoma from the scalp  Clear margins  Establishing with dermatologist later on today  Colon cancer screening      Other Visit Diagnoses     Flu vaccine need    -  Primary    Relevant Orders    influenza vaccine, [id number], quadrivalent, recombinant, PF, 0 5 mL, for patients 18-49 yr with comorbidities (FLUBLOK) (Completed)          I have spent 44 minutes with Patient and family today in which greater than 50% of this time was spent in counseling/coordination of care regarding Diagnostic results, Prognosis, Risks and benefits of tx options, Intructions for management, Patient and family education, Importance of tx compliance, Risk factor reductions and Impressions

## 2018-09-27 DIAGNOSIS — E13.9 DIABETES MELLITUS OF OTHER TYPE WITHOUT COMPLICATION, UNSPECIFIED WHETHER LONG TERM INSULIN USE (HCC): ICD-10-CM

## 2018-09-27 DIAGNOSIS — J45.909 UNCOMPLICATED ASTHMA, UNSPECIFIED ASTHMA SEVERITY, UNSPECIFIED WHETHER PERSISTENT: ICD-10-CM

## 2018-09-27 DIAGNOSIS — I10 ESSENTIAL HYPERTENSION: ICD-10-CM

## 2018-09-27 DIAGNOSIS — E11.9 TYPE 2 DIABETES MELLITUS WITHOUT COMPLICATION, WITHOUT LONG-TERM CURRENT USE OF INSULIN (HCC): ICD-10-CM

## 2018-09-27 RX ORDER — PEN NEEDLE, DIABETIC 32GX 5/32"
NEEDLE, DISPOSABLE MISCELLANEOUS 3 TIMES DAILY
Qty: 100 EACH | Refills: 0 | Status: SHIPPED | OUTPATIENT
Start: 2018-09-27 | End: 2018-10-25 | Stop reason: SDUPTHER

## 2018-09-27 RX ORDER — LIRAGLUTIDE 6 MG/ML
INJECTION SUBCUTANEOUS
Qty: 9 ML | Refills: 4 | Status: SHIPPED | OUTPATIENT
Start: 2018-09-27 | End: 2019-02-21 | Stop reason: SDUPTHER

## 2018-09-27 RX ORDER — LOSARTAN POTASSIUM AND HYDROCHLOROTHIAZIDE 12.5; 5 MG/1; MG/1
1 TABLET ORAL DAILY
Qty: 90 TABLET | Refills: 0 | Status: SHIPPED | OUTPATIENT
Start: 2018-09-27 | End: 2019-01-21 | Stop reason: SDUPTHER

## 2018-09-27 RX ORDER — INSULIN GLARGINE 100 [IU]/ML
INJECTION, SOLUTION SUBCUTANEOUS
Qty: 15 ML | Refills: 1 | Status: SHIPPED | OUTPATIENT
Start: 2018-09-27 | End: 2018-11-13 | Stop reason: SDUPTHER

## 2018-10-25 DIAGNOSIS — J45.909 UNCOMPLICATED ASTHMA, UNSPECIFIED ASTHMA SEVERITY, UNSPECIFIED WHETHER PERSISTENT: ICD-10-CM

## 2018-10-25 DIAGNOSIS — E13.9 DIABETES MELLITUS OF OTHER TYPE WITHOUT COMPLICATION, UNSPECIFIED WHETHER LONG TERM INSULIN USE (HCC): ICD-10-CM

## 2018-10-25 DIAGNOSIS — E11.9 TYPE 2 DIABETES MELLITUS WITHOUT COMPLICATION, WITHOUT LONG-TERM CURRENT USE OF INSULIN (HCC): ICD-10-CM

## 2018-10-25 RX ORDER — PEN NEEDLE, DIABETIC 32GX 5/32"
NEEDLE, DISPOSABLE MISCELLANEOUS 3 TIMES DAILY
Qty: 100 EACH | Refills: 0 | Status: SHIPPED | OUTPATIENT
Start: 2018-10-25 | End: 2018-12-21 | Stop reason: SDUPTHER

## 2018-10-25 RX ORDER — BLOOD SUGAR DIAGNOSTIC
STRIP MISCELLANEOUS
Qty: 100 EACH | Refills: 0 | Status: SHIPPED | OUTPATIENT
Start: 2018-10-25 | End: 2018-11-26 | Stop reason: SDUPTHER

## 2018-11-02 ENCOUNTER — APPOINTMENT (OUTPATIENT)
Dept: LAB | Facility: CLINIC | Age: 53
End: 2018-11-02
Payer: COMMERCIAL

## 2018-11-02 ENCOUNTER — TRANSCRIBE ORDERS (OUTPATIENT)
Dept: LAB | Facility: CLINIC | Age: 53
End: 2018-11-02

## 2018-11-02 DIAGNOSIS — Z79.899 NEED FOR PROPHYLACTIC CHEMOTHERAPY: Primary | ICD-10-CM

## 2018-11-02 DIAGNOSIS — E55.9 AVITAMINOSIS D: ICD-10-CM

## 2018-11-02 DIAGNOSIS — R53.83 FATIGUE, UNSPECIFIED TYPE: ICD-10-CM

## 2018-11-02 DIAGNOSIS — Z79.899 NEED FOR PROPHYLACTIC CHEMOTHERAPY: ICD-10-CM

## 2018-11-02 DIAGNOSIS — M25.551 RIGHT HIP PAIN: ICD-10-CM

## 2018-11-02 DIAGNOSIS — K21.9 GASTROESOPHAGEAL REFLUX DISEASE, ESOPHAGITIS PRESENCE NOT SPECIFIED: ICD-10-CM

## 2018-11-02 DIAGNOSIS — M05.79 SEROPOSITIVE RHEUMATOID ARTHRITIS OF MULTIPLE SITES (HCC): ICD-10-CM

## 2018-11-02 LAB
ALBUMIN SERPL BCP-MCNC: 3.7 G/DL (ref 3.5–5)
ALP SERPL-CCNC: 40 U/L (ref 46–116)
ALT SERPL W P-5'-P-CCNC: 43 U/L (ref 12–78)
ANION GAP SERPL CALCULATED.3IONS-SCNC: 4 MMOL/L (ref 4–13)
AST SERPL W P-5'-P-CCNC: 23 U/L (ref 5–45)
BASOPHILS # BLD AUTO: 0.06 THOUSANDS/ΜL (ref 0–0.1)
BASOPHILS NFR BLD AUTO: 1 % (ref 0–1)
BILIRUB SERPL-MCNC: 0.82 MG/DL (ref 0.2–1)
BUN SERPL-MCNC: 14 MG/DL (ref 5–25)
CALCIUM SERPL-MCNC: 8.1 MG/DL (ref 8.3–10.1)
CHLORIDE SERPL-SCNC: 105 MMOL/L (ref 100–108)
CO2 SERPL-SCNC: 25 MMOL/L (ref 21–32)
CREAT SERPL-MCNC: 1.02 MG/DL (ref 0.6–1.3)
CRP SERPL QL: <3 MG/L
EOSINOPHIL # BLD AUTO: 0.24 THOUSAND/ΜL (ref 0–0.61)
EOSINOPHIL NFR BLD AUTO: 4 % (ref 0–6)
ERYTHROCYTE [DISTWIDTH] IN BLOOD BY AUTOMATED COUNT: 13.5 % (ref 11.6–15.1)
ERYTHROCYTE [SEDIMENTATION RATE] IN BLOOD: 7 MM/HOUR (ref 0–10)
GFR SERPL CREATININE-BSD FRML MDRD: 85 ML/MIN/1.73SQ M
GLUCOSE P FAST SERPL-MCNC: 186 MG/DL (ref 65–99)
HCT VFR BLD AUTO: 43.8 % (ref 36.5–49.3)
HGB BLD-MCNC: 14.9 G/DL (ref 12–17)
IMM GRANULOCYTES # BLD AUTO: 0.03 THOUSAND/UL (ref 0–0.2)
IMM GRANULOCYTES NFR BLD AUTO: 1 % (ref 0–2)
LYMPHOCYTES # BLD AUTO: 1.35 THOUSANDS/ΜL (ref 0.6–4.47)
LYMPHOCYTES NFR BLD AUTO: 24 % (ref 14–44)
MCH RBC QN AUTO: 30.3 PG (ref 26.8–34.3)
MCHC RBC AUTO-ENTMCNC: 34 G/DL (ref 31.4–37.4)
MCV RBC AUTO: 89 FL (ref 82–98)
MONOCYTES # BLD AUTO: 0.95 THOUSAND/ΜL (ref 0.17–1.22)
MONOCYTES NFR BLD AUTO: 17 % (ref 4–12)
NEUTROPHILS # BLD AUTO: 2.94 THOUSANDS/ΜL (ref 1.85–7.62)
NEUTS SEG NFR BLD AUTO: 53 % (ref 43–75)
NRBC BLD AUTO-RTO: 0 /100 WBCS
PLATELET # BLD AUTO: 214 THOUSANDS/UL (ref 149–390)
PMV BLD AUTO: 11.1 FL (ref 8.9–12.7)
POTASSIUM SERPL-SCNC: 3.9 MMOL/L (ref 3.5–5.3)
PROT SERPL-MCNC: 6.6 G/DL (ref 6.4–8.2)
RBC # BLD AUTO: 4.92 MILLION/UL (ref 3.88–5.62)
SODIUM SERPL-SCNC: 134 MMOL/L (ref 136–145)
WBC # BLD AUTO: 5.57 THOUSAND/UL (ref 4.31–10.16)

## 2018-11-02 PROCEDURE — 85025 COMPLETE CBC W/AUTO DIFF WBC: CPT

## 2018-11-02 PROCEDURE — 36415 COLL VENOUS BLD VENIPUNCTURE: CPT

## 2018-11-02 PROCEDURE — 80053 COMPREHEN METABOLIC PANEL: CPT

## 2018-11-02 PROCEDURE — 85652 RBC SED RATE AUTOMATED: CPT

## 2018-11-02 PROCEDURE — 86140 C-REACTIVE PROTEIN: CPT

## 2018-11-13 DIAGNOSIS — E11.9 TYPE 2 DIABETES MELLITUS WITHOUT COMPLICATION, WITHOUT LONG-TERM CURRENT USE OF INSULIN (HCC): ICD-10-CM

## 2018-11-13 RX ORDER — INSULIN GLARGINE 100 [IU]/ML
INJECTION, SOLUTION SUBCUTANEOUS
Qty: 15 ML | Refills: 1 | Status: SHIPPED | OUTPATIENT
Start: 2018-11-13 | End: 2018-12-21 | Stop reason: SDUPTHER

## 2018-11-26 DIAGNOSIS — J45.909 UNCOMPLICATED ASTHMA, UNSPECIFIED ASTHMA SEVERITY, UNSPECIFIED WHETHER PERSISTENT: ICD-10-CM

## 2018-11-26 DIAGNOSIS — E11.9 TYPE 2 DIABETES MELLITUS WITHOUT COMPLICATION, WITHOUT LONG-TERM CURRENT USE OF INSULIN (HCC): ICD-10-CM

## 2018-11-26 RX ORDER — LANCETS
EACH MISCELLANEOUS
Qty: 102 EACH | Refills: 1 | Status: SHIPPED | OUTPATIENT
Start: 2018-11-26 | End: 2019-04-11

## 2018-11-26 RX ORDER — BLOOD SUGAR DIAGNOSTIC
STRIP MISCELLANEOUS
Qty: 100 EACH | Refills: 0 | Status: SHIPPED | OUTPATIENT
Start: 2018-11-26 | End: 2019-01-21 | Stop reason: SDUPTHER

## 2018-12-21 DIAGNOSIS — E13.9 DIABETES MELLITUS OF OTHER TYPE WITHOUT COMPLICATION, UNSPECIFIED WHETHER LONG TERM INSULIN USE (HCC): ICD-10-CM

## 2018-12-21 DIAGNOSIS — E11.9 TYPE 2 DIABETES MELLITUS WITHOUT COMPLICATION, WITHOUT LONG-TERM CURRENT USE OF INSULIN (HCC): ICD-10-CM

## 2018-12-21 RX ORDER — INSULIN GLARGINE 100 [IU]/ML
INJECTION, SOLUTION SUBCUTANEOUS
Qty: 15 ML | Refills: 1 | Status: SHIPPED | OUTPATIENT
Start: 2018-12-21 | End: 2019-02-11 | Stop reason: SDUPTHER

## 2018-12-24 DIAGNOSIS — J45.909 UNCOMPLICATED ASTHMA, UNSPECIFIED ASTHMA SEVERITY, UNSPECIFIED WHETHER PERSISTENT: ICD-10-CM

## 2018-12-24 RX ORDER — PEN NEEDLE, DIABETIC 32GX 5/32"
NEEDLE, DISPOSABLE MISCELLANEOUS 3 TIMES DAILY
Qty: 100 EACH | Refills: 3 | Status: SHIPPED | OUTPATIENT
Start: 2018-12-24 | End: 2019-03-19 | Stop reason: SDUPTHER

## 2019-01-07 ENCOUNTER — APPOINTMENT (OUTPATIENT)
Dept: LAB | Facility: HOSPITAL | Age: 54
End: 2019-01-07
Payer: COMMERCIAL

## 2019-01-07 ENCOUNTER — TRANSCRIBE ORDERS (OUTPATIENT)
Dept: ADMINISTRATIVE | Facility: HOSPITAL | Age: 54
End: 2019-01-07

## 2019-01-07 DIAGNOSIS — B35.1 DERMATOPHYTOSIS OF NAIL: Primary | ICD-10-CM

## 2019-01-07 DIAGNOSIS — B35.1 DERMATOPHYTOSIS OF NAIL: ICD-10-CM

## 2019-01-07 LAB
ALBUMIN SERPL BCP-MCNC: 3.9 G/DL (ref 3.5–5)
ALP SERPL-CCNC: 56 U/L (ref 46–116)
ALT SERPL W P-5'-P-CCNC: 37 U/L (ref 12–78)
AST SERPL W P-5'-P-CCNC: 19 U/L (ref 5–45)
BILIRUB DIRECT SERPL-MCNC: 0.16 MG/DL (ref 0–0.2)
BILIRUB SERPL-MCNC: 0.6 MG/DL (ref 0.2–1)
PROT SERPL-MCNC: 7.7 G/DL (ref 6.4–8.2)

## 2019-01-07 PROCEDURE — 36415 COLL VENOUS BLD VENIPUNCTURE: CPT

## 2019-01-07 PROCEDURE — 80076 HEPATIC FUNCTION PANEL: CPT

## 2019-01-21 DIAGNOSIS — E11.9 TYPE 2 DIABETES MELLITUS WITHOUT COMPLICATION, WITHOUT LONG-TERM CURRENT USE OF INSULIN (HCC): ICD-10-CM

## 2019-01-21 DIAGNOSIS — I10 ESSENTIAL HYPERTENSION: ICD-10-CM

## 2019-01-21 DIAGNOSIS — J45.909 UNCOMPLICATED ASTHMA, UNSPECIFIED ASTHMA SEVERITY, UNSPECIFIED WHETHER PERSISTENT: ICD-10-CM

## 2019-01-21 DIAGNOSIS — E78.2 MIXED HYPERLIPIDEMIA: ICD-10-CM

## 2019-01-21 RX ORDER — BLOOD SUGAR DIAGNOSTIC
STRIP MISCELLANEOUS
Qty: 100 EACH | Refills: 0 | Status: SHIPPED | OUTPATIENT
Start: 2019-01-21 | End: 2019-04-11

## 2019-01-21 RX ORDER — SITAGLIPTIN 100 MG/1
TABLET, FILM COATED ORAL
Qty: 90 TABLET | Refills: 1 | Status: SHIPPED | OUTPATIENT
Start: 2019-01-21 | End: 2019-07-08 | Stop reason: SDUPTHER

## 2019-01-21 RX ORDER — ATORVASTATIN CALCIUM 40 MG/1
TABLET, FILM COATED ORAL
Qty: 90 TABLET | Refills: 1 | Status: SHIPPED | OUTPATIENT
Start: 2019-01-21 | End: 2019-07-08 | Stop reason: SDUPTHER

## 2019-01-21 RX ORDER — LOSARTAN POTASSIUM AND HYDROCHLOROTHIAZIDE 12.5; 5 MG/1; MG/1
1 TABLET ORAL DAILY
Qty: 90 TABLET | Refills: 1 | Status: SHIPPED | OUTPATIENT
Start: 2019-01-21 | End: 2019-04-17 | Stop reason: SDUPTHER

## 2019-01-25 ENCOUNTER — APPOINTMENT (OUTPATIENT)
Dept: LAB | Facility: CLINIC | Age: 54
End: 2019-01-25
Payer: COMMERCIAL

## 2019-01-25 DIAGNOSIS — IMO0002 UNCONTROLLED TYPE 2 DIABETES MELLITUS WITH DIABETIC PERIPHERAL ANGIOPATHY WITHOUT GANGRENE, WITH LONG-TERM CURRENT USE OF INSULIN: ICD-10-CM

## 2019-01-25 DIAGNOSIS — E78.2 MIXED HYPERLIPIDEMIA: ICD-10-CM

## 2019-01-25 DIAGNOSIS — E55.9 VITAMIN D DEFICIENCY: ICD-10-CM

## 2019-01-25 DIAGNOSIS — I10 BENIGN ESSENTIAL HYPERTENSION: ICD-10-CM

## 2019-01-25 LAB
ALBUMIN SERPL BCP-MCNC: 3.8 G/DL (ref 3.5–5)
ALP SERPL-CCNC: 63 U/L (ref 46–116)
ALT SERPL W P-5'-P-CCNC: 31 U/L (ref 12–78)
ANION GAP SERPL CALCULATED.3IONS-SCNC: 5 MMOL/L (ref 4–13)
AST SERPL W P-5'-P-CCNC: 17 U/L (ref 5–45)
BASOPHILS # BLD AUTO: 0.08 THOUSANDS/ΜL (ref 0–0.1)
BASOPHILS NFR BLD AUTO: 1 % (ref 0–1)
BILIRUB SERPL-MCNC: 0.61 MG/DL (ref 0.2–1)
BUN SERPL-MCNC: 15 MG/DL (ref 5–25)
CALCIUM SERPL-MCNC: 8.7 MG/DL (ref 8.3–10.1)
CHLORIDE SERPL-SCNC: 105 MMOL/L (ref 100–108)
CHOLEST SERPL-MCNC: 132 MG/DL (ref 50–200)
CO2 SERPL-SCNC: 25 MMOL/L (ref 21–32)
CREAT SERPL-MCNC: 0.87 MG/DL (ref 0.6–1.3)
CREAT UR-MCNC: 295 MG/DL
EOSINOPHIL # BLD AUTO: 0.38 THOUSAND/ΜL (ref 0–0.61)
EOSINOPHIL NFR BLD AUTO: 5 % (ref 0–6)
ERYTHROCYTE [DISTWIDTH] IN BLOOD BY AUTOMATED COUNT: 13.7 % (ref 11.6–15.1)
EST. AVERAGE GLUCOSE BLD GHB EST-MCNC: 148 MG/DL
GFR SERPL CREATININE-BSD FRML MDRD: 100 ML/MIN/1.73SQ M
GLUCOSE P FAST SERPL-MCNC: 118 MG/DL (ref 65–99)
HBA1C MFR BLD: 6.8 % (ref 4.2–6.3)
HCT VFR BLD AUTO: 47.2 % (ref 36.5–49.3)
HDLC SERPL-MCNC: 38 MG/DL (ref 40–60)
HGB BLD-MCNC: 15.6 G/DL (ref 12–17)
IMM GRANULOCYTES # BLD AUTO: 0.03 THOUSAND/UL (ref 0–0.2)
IMM GRANULOCYTES NFR BLD AUTO: 0 % (ref 0–2)
LDLC SERPL CALC-MCNC: 76 MG/DL (ref 0–100)
LYMPHOCYTES # BLD AUTO: 1.24 THOUSANDS/ΜL (ref 0.6–4.47)
LYMPHOCYTES NFR BLD AUTO: 16 % (ref 14–44)
MCH RBC QN AUTO: 29.4 PG (ref 26.8–34.3)
MCHC RBC AUTO-ENTMCNC: 33.1 G/DL (ref 31.4–37.4)
MCV RBC AUTO: 89 FL (ref 82–98)
MICROALBUMIN UR-MCNC: 15.5 MG/L (ref 0–20)
MICROALBUMIN/CREAT 24H UR: 5 MG/G CREATININE (ref 0–30)
MONOCYTES # BLD AUTO: 0.87 THOUSAND/ΜL (ref 0.17–1.22)
MONOCYTES NFR BLD AUTO: 11 % (ref 4–12)
NEUTROPHILS # BLD AUTO: 5.1 THOUSANDS/ΜL (ref 1.85–7.62)
NEUTS SEG NFR BLD AUTO: 67 % (ref 43–75)
NONHDLC SERPL-MCNC: 94 MG/DL
NRBC BLD AUTO-RTO: 0 /100 WBCS
PLATELET # BLD AUTO: 255 THOUSANDS/UL (ref 149–390)
PMV BLD AUTO: 11.1 FL (ref 8.9–12.7)
POTASSIUM SERPL-SCNC: 4 MMOL/L (ref 3.5–5.3)
PROT SERPL-MCNC: 7.8 G/DL (ref 6.4–8.2)
RBC # BLD AUTO: 5.31 MILLION/UL (ref 3.88–5.62)
SODIUM SERPL-SCNC: 135 MMOL/L (ref 136–145)
TRIGL SERPL-MCNC: 91 MG/DL
TSH SERPL DL<=0.05 MIU/L-ACNC: 1.6 UIU/ML (ref 0.36–3.74)
WBC # BLD AUTO: 7.7 THOUSAND/UL (ref 4.31–10.16)

## 2019-01-25 PROCEDURE — 82652 VIT D 1 25-DIHYDROXY: CPT

## 2019-01-25 PROCEDURE — 84443 ASSAY THYROID STIM HORMONE: CPT

## 2019-01-25 PROCEDURE — 3061F NEG MICROALBUMINURIA REV: CPT | Performed by: FAMILY MEDICINE

## 2019-01-25 PROCEDURE — 82043 UR ALBUMIN QUANTITATIVE: CPT

## 2019-01-25 PROCEDURE — 82570 ASSAY OF URINE CREATININE: CPT

## 2019-01-25 PROCEDURE — 80061 LIPID PANEL: CPT

## 2019-01-25 PROCEDURE — 36415 COLL VENOUS BLD VENIPUNCTURE: CPT

## 2019-01-25 PROCEDURE — 83036 HEMOGLOBIN GLYCOSYLATED A1C: CPT

## 2019-01-25 PROCEDURE — 85025 COMPLETE CBC W/AUTO DIFF WBC: CPT

## 2019-01-25 PROCEDURE — 80053 COMPREHEN METABOLIC PANEL: CPT

## 2019-01-25 RX ORDER — ERGOCALCIFEROL 1.25 MG/1
50000 CAPSULE ORAL WEEKLY
COMMUNITY
Start: 2018-12-27

## 2019-01-25 RX ORDER — TERBINAFINE HYDROCHLORIDE 250 MG/1
TABLET ORAL
COMMUNITY
Start: 2019-01-03 | End: 2019-04-09

## 2019-01-29 ENCOUNTER — APPOINTMENT (OUTPATIENT)
Dept: RADIOLOGY | Facility: CLINIC | Age: 54
End: 2019-01-29
Payer: COMMERCIAL

## 2019-01-29 ENCOUNTER — OFFICE VISIT (OUTPATIENT)
Dept: FAMILY MEDICINE CLINIC | Facility: CLINIC | Age: 54
End: 2019-01-29
Payer: COMMERCIAL

## 2019-01-29 VITALS
BODY MASS INDEX: 32.9 KG/M2 | SYSTOLIC BLOOD PRESSURE: 132 MMHG | WEIGHT: 235 LBS | HEIGHT: 71 IN | DIASTOLIC BLOOD PRESSURE: 68 MMHG | OXYGEN SATURATION: 98 % | HEART RATE: 94 BPM | RESPIRATION RATE: 16 BRPM

## 2019-01-29 DIAGNOSIS — E11.42 TYPE 2 DIABETES MELLITUS WITH DIABETIC POLYNEUROPATHY, WITH LONG-TERM CURRENT USE OF INSULIN (HCC): ICD-10-CM

## 2019-01-29 DIAGNOSIS — M79.89 SWELLING OF RIGHT FOOT: ICD-10-CM

## 2019-01-29 DIAGNOSIS — M05.9 RHEUMATOID ARTHRITIS WITH POSITIVE RHEUMATOID FACTOR, INVOLVING UNSPECIFIED SITE (HCC): ICD-10-CM

## 2019-01-29 DIAGNOSIS — I10 BENIGN ESSENTIAL HYPERTENSION: ICD-10-CM

## 2019-01-29 DIAGNOSIS — L02.811 ABSCESS OF SCALP: Primary | ICD-10-CM

## 2019-01-29 DIAGNOSIS — Z79.4 TYPE 2 DIABETES MELLITUS WITH DIABETIC POLYNEUROPATHY, WITH LONG-TERM CURRENT USE OF INSULIN (HCC): ICD-10-CM

## 2019-01-29 DIAGNOSIS — L03.811 CELLULITIS OF OCCIPITAL REGION OF SCALP: ICD-10-CM

## 2019-01-29 LAB — 1,25(OH)2D3 SERPL-MCNC: 53.4 PG/ML (ref 19.9–79.3)

## 2019-01-29 PROCEDURE — 73630 X-RAY EXAM OF FOOT: CPT

## 2019-01-29 PROCEDURE — 99215 OFFICE O/P EST HI 40 MIN: CPT | Performed by: FAMILY MEDICINE

## 2019-01-29 RX ORDER — CLINDAMYCIN HYDROCHLORIDE 300 MG/1
300 CAPSULE ORAL 3 TIMES DAILY
Qty: 30 CAPSULE | Refills: 0 | Status: SHIPPED | OUTPATIENT
Start: 2019-01-29 | End: 2019-02-08 | Stop reason: ALTCHOICE

## 2019-01-29 NOTE — PROGRESS NOTES
Subjective:      Patient ID: Carla Thomas  is a 48 y o  male  Patient presents with his wife for follow-up of chronic conditions including diabetes, rheumatoid arthritis, hyperlipidemia, hypertension, obesity  Patient presents complaining of several week history of pain at the back of his neck on the right side which has been present for several weeks  Patient does have a scabbed lesion  Patient also complains of significant swelling and pain in his right foot alone for the past several months  Asymmetric swelling with right foot swelling and no swelling on his left  Patient does have diabetic peripheral neuropathy and decreased sensation in both feet  No ulcerations or open wounds on his feet  His podiatrist is Dr Chika Trujillo  Labs reviewed with patient which were actually quite normal for him  Patient has a hemoglobin A1c of 6 8%  Patient has rheumatoid arthritis on Plaquenil and Enbrel  Patient is immunocompromised due to antirheumatic agents  He does have follow-up with rheumatologist as well as endocrinologist         Past Medical History:   Diagnosis Date    COPD (chronic obstructive pulmonary disease) ([facility] [address] )     GERD (gastroesophageal reflux disease)     Hyperglycemia     Resolved: 4/25/2017    Hyperlipidemia     Neuropathy        Family History   Problem Relation Age of Onset    Diabetes Mother     Stroke Mother     Mental illness Mother     Diabetes Father     Stroke Father     Alcohol abuse Brother     Coronary artery disease Family         Age 51-55    Diabetes type II Family     Heart disease Family        Past Surgical History:   Procedure Laterality Date    APPENDECTOMY      [place] EXC SKIN MALIG <0 5 CM REMAINDER BODY N/A 3/28/2018    Procedure: EXCISION WIDE LESION HEAD/FACIAL/NECK;  Surgeon: Driss Watts MD;  [facility];  Service: Surgical Oncology    SKIN BIOPSY      scalp    [address] [facility] [address]        reports that he has been smoking    He has been smoking about 1 00 pack per day  He has never used smokeless tobacco  He reports that he drinks alcohol  He reports that he does not use drugs        Current Outpatient Prescriptions:     ACCU-CHEK FASTCLIX LANCETS MISC, ONE LANCET FOUR TIMES A DAY, Disp: 102 each, Rfl: 1    ACCU-CHEK SMARTVIEW test strip, TEST BLOOD SUGAR FOUR TIMES A DAY, Disp: 100 each, Rfl: 0    atorvastatin (LIPITOR) 40 mg tablet, TAKE 1 TABLET AT BEDTIME , Disp: 90 tablet, Rfl: 1    BASAGLAR KWIKPEN 100 units/mL injection pen, INJECT 35 UNIT TWICE DAILY, Disp: 15 mL, Rfl: 1    Cholecalciferol (VITAMIN D) 2000 units tablet, 2,000 Units daily in the early morning  , Disp: , Rfl:     cyanocobalamin (VITAMIN B-12) 100 mcg tablet, Take by mouth daily, Disp: , Rfl:     ENBREL SURECLICK 50 MG/ML injection, , Disp: , Rfl:     ergocalciferol (VITAMIN D2) 50,000 units, , Disp: , Rfl:     Etanercept (ENBREL SC), Inject 50 mg under the skin once a week Takes on Friday, Disp: , Rfl:     fluticasone (FLONASE) 50 mcg/act nasal spray, 2 sprays into each nostril daily at bedtime  , Disp: , Rfl:     HYDROcodone-acetaminophen (NORCO) 5-325 mg per tablet, Take 1 tablet by mouth every 6 (six) hours as needed for pain Max Daily Amount: 4 tablets, Disp: 15 tablet, Rfl: 0    hydroxychloroquine (PLAQUENIL) 200 mg tablet, Take 200 mg by mouth 2 (two) times a day  , Disp: , Rfl:     insulin glargine (BASAGLAR KWIKPEN) injection pen 100 units/mL, Inject 0 35 mL (35 Units total) under the skin 2 (two) times a day, Disp: 21 mL, Rfl: 0    Insulin Syringe-Needle U-100 (B-D INS SYRINGE 0 5CC/30GX1/2") 30G X 1/2" 0 5 ML MISC, by Does not apply route, Disp: , Rfl:     JANUVIA 100 MG tablet, TAKE ONE TABLET EVERY DAY, Disp: 90 tablet, Rfl: 1    leflunomide (ARAVA) 20 MG tablet, Take 1 tablet by mouth daily with dinner  , Disp: , Rfl:     losartan-hydrochlorothiazide (HYZAAR) 50-12 5 mg per tablet, TAKE 1 TABLET BY MOUTH DAILY, Disp: 90 tablet, Rfl: 1   naproxen (NAPROSYN) 500 mg tablet, Take 1 tablet by mouth every 12 (twelve) hours, Disp: , Rfl:     omeprazole (PriLOSEC) 40 MG capsule, , Disp: , Rfl:     terbinafine (LamISIL) 250 mg tablet, , Disp: , Rfl:     ULTICARE MICRO PEN NEEDLES 32G X 4 MM MISC, INJECT UNDER THE SKIN 3 (THREE) TIMES A DAY, Disp: 100 each, Rfl: 3    VENTOLIN  (90 Base) MCG/ACT inhaler, INHALE 2 PUFFS EVERY 4 (FOUR) HOURS AS NEEDED FOR WHEEZING, Disp: 18 g, Rfl: 1    VICTOZA injection, INJECT 1 8 MG DAILY, Disp: 9 mL, Rfl: 4    clindamycin (CLEOCIN) 300 MG capsule, Take 1 capsule (300 mg total) by mouth 3 (three) times a day for 10 days, Disp: 30 capsule, Rfl: 0    The following portions of the patient's history were reviewed and updated as appropriate: allergies, current medications, past family history, past medical history, past social history, past surgical history and problem list     Review of Systems   Constitutional: Positive for fatigue  Negative for chills, fever and unexpected weight change  HENT: Negative  Eyes: Negative  Respiratory: Negative  Cardiovascular: Positive for leg swelling (Swelling of the right foot)  Gastrointestinal: Negative  Endocrine: Negative  Genitourinary: Negative  Musculoskeletal: Negative  Skin: Positive for rash and wound  Allergic/Immunologic: Positive for immunocompromised state (On treatment for rheumatoid arthritis)  Neurological: Positive for numbness ( bilateral feet) and headaches  Hematological: Negative  Psychiatric/Behavioral: Negative  All other systems reviewed and are negative  Objective:    /68   Pulse 94   Resp 16   Ht 5' 11" (1 803 m)   Wt 107 kg (235 lb)   SpO2 98%   BMI 32 78 kg/m²      Physical Exam   Constitutional: He is oriented to person, place, and time  He appears well-developed and well-nourished  No distress  Overweight   HENT:   Head: Normocephalic  Eyes: Pupils are equal, round, and reactive to light  Conjunctivae and EOM are normal    Neck: Normal range of motion  Neck supple  Cardiovascular: Normal rate, regular rhythm and normal heart sounds  No murmur heard  Pulmonary/Chest: Effort normal and breath sounds normal    Abdominal: Soft  Bowel sounds are normal    Musculoskeletal: Normal range of motion  He exhibits edema (Pedal edema of the right foot)  Right ankle: He exhibits swelling  He exhibits normal range of motion, no ecchymosis, no deformity and normal pulse  No tenderness  Feet:    Neurological: He is alert and oriented to person, place, and time  No cranial nerve deficit  Coordination normal    Skin: There is erythema  Psychiatric: He has a normal mood and affect  His behavior is normal  Judgment and thought content normal    Nursing note and vitals reviewed          Recent Results (from the past 1008 hour(s))   Hepatic function panel    Collection Time: 01/07/19 11:05 AM   Result Value Ref Range    Total Bilirubin 0 60 0 20 - 1 00 mg/dL    Bilirubin, Direct 0 16 0 00 - 0 20 mg/dL    Alkaline Phosphatase 56 46 - 116 U/L    AST 19 5 - 45 U/L    ALT 37 12 - 78 U/L    Total Protein 7 7 6 4 - 8 2 g/dL    Albumin 3 9 3 5 - 5 0 g/dL   CBC and differential    Collection Time: 01/25/19  8:47 AM   Result Value Ref Range    WBC 7 70 4 31 - 10 16 Thousand/uL    RBC 5 31 3 88 - 5 62 Million/uL    Hemoglobin 15 6 12 0 - 17 0 g/dL    Hematocrit 47 2 36 5 - 49 3 %    MCV 89 82 - 98 fL    MCH 29 4 26 8 - 34 3 pg    MCHC 33 1 31 4 - 37 4 g/dL    RDW 13 7 11 6 - 15 1 %    MPV 11 1 8 9 - 12 7 fL    Platelets 716 320 - 068 Thousands/uL    nRBC 0 /100 WBCs    Neutrophils Relative 67 43 - 75 %    Immat GRANS % 0 0 - 2 %    Lymphocytes Relative 16 14 - 44 %    Monocytes Relative 11 4 - 12 %    Eosinophils Relative 5 0 - 6 %    Basophils Relative 1 0 - 1 %    Neutrophils Absolute 5 10 1 85 - 7 62 Thousands/µL    Immature Grans Absolute 0 03 0 00 - 0 20 Thousand/uL    Lymphocytes Absolute 1 24 0 60 - 4 47 Thousands/µL    Monocytes Absolute 0 87 0 17 - 1 22 Thousand/µL    Eosinophils Absolute 0 38 0 00 - 0 61 Thousand/µL    Basophils Absolute 0 08 0 00 - 0 10 Thousands/µL   Comprehensive metabolic panel    Collection Time: 01/25/19  8:47 AM   Result Value Ref Range    Sodium 135 (L) 136 - 145 mmol/L    Potassium 4 0 3 5 - 5 3 mmol/L    Chloride 105 100 - 108 mmol/L    CO2 25 21 - 32 mmol/L    ANION GAP 5 4 - 13 mmol/L    BUN 15 5 - 25 mg/dL    Creatinine 0 87 0 60 - 1 30 mg/dL    Glucose, Fasting 118 (H) 65 - 99 mg/dL    Calcium 8 7 8 3 - 10 1 mg/dL    AST 17 5 - 45 U/L    ALT 31 12 - 78 U/L    Alkaline Phosphatase 63 46 - 116 U/L    Total Protein 7 8 6 4 - 8 2 g/dL    Albumin 3 8 3 5 - 5 0 g/dL    Total Bilirubin 0 61 0 20 - 1 00 mg/dL    eGFR 100 ml/min/1 73sq m   Hemoglobin A1C    Collection Time: 01/25/19  8:47 AM   Result Value Ref Range    Hemoglobin A1C 6 8 (H) 4 2 - 6 3 %     mg/dl   Lipid panel    Collection Time: 01/25/19  8:47 AM   Result Value Ref Range    Cholesterol 132 50 - 200 mg/dL    Triglycerides 91 <=150 mg/dL    HDL, Direct 38 (L) 40 - 60 mg/dL    LDL Calculated 76 0 - 100 mg/dL    Non-HDL-Chol (CHOL-HDL) 94 mg/dl   Microalbumin / creatinine urine ratio    Collection Time: 01/25/19  8:47 AM   Result Value Ref Range    Creatinine, Ur 295 0 mg/dL    Microalbum  ,U,Random 15 5 0 0 - 20 0 mg/L    Microalb Creat Ratio 5 0 - 30 mg/g creatinine   TSH, 3rd generation with Free T4 reflex    Collection Time: 01/25/19  8:47 AM   Result Value Ref Range    TSH 3RD GENERATON 1 600 0 358 - 3 740 uIU/mL   Vitamin D 1,25 dihydroxy    Collection Time: 01/25/19  8:47 AM   Result Value Ref Range    Vit D, 1,25-Dihydroxy 53 4 19 9 - 79 3 pg/mL       Assessment/Plan:    Incision and Drainage  Date/Time: 1/30/2019 8:01 AM  Performed by: Vasu Hernández by: Moose Fischer     Patient location:  Clinic  Other Assisting Provider: No    Consent:     Consent obtained:  Verbal    Consent given by: Patient    Risks discussed:  Bleeding, incomplete drainage, pain and infection    Alternatives discussed:  No treatment and observation  Universal protocol:     Procedure explained and questions answered to patient or proxy's satisfaction: yes    Location:     Type:  Abscess    Size:  6cm    Location:  Head/neck    Head/neck location:  Scalp  Pre-procedure details:     Skin preparation:  Betadine  Anesthesia (see MAR for exact dosages): Anesthesia method:  Local infiltration    Local anesthetic:  Lidocaine 1% WITH epi (5cc)  Procedure details:     Complexity:  Intermediate    Incision types:  Stab incision    Scalpel blade:  11    Approach:  Puncture    Incision depth:  Subcutaneous    Wound management:  Probed and deloculated    Drainage:  Purulent    Drainage amount:  Copious    Wound treatment:  Packing placed    Packing materials:  1/4 in iodoform gauze    Amount 1/4" iodoform:  2cm  Post-procedure details:     Patient tolerance of procedure: Tolerated well, no immediate complications  Comments:      Return in 2 days for packing change      Type 2 diabetes mellitus with diabetic polyneuropathy, with long-term current use of insulin (HCC)  Lab Results   Component Value Date    HGBA1C 6 8 (H) 01/25/2019       No results for input(s): POCGLU in the last 72 hours  Blood Sugar Average: Last 72 hrs:     Diabetes is fairly well controlled on Basaglar, Januvia and Victoza  Continue on same dosages of medications  Follow up with Endocrinology as scheduled  Will likely need repeat diabetic parameters in 4 months      Benign essential hypertension  Hypertension is well controlled on current dose of Hyzaar  Continue same  Abscess of scalp  I and D performed with copious drainage  Purulent drainage  Specimen sent for culture and sensitivity    -patient is immunocompromised since he is on antirheumatic agents  Patient was instructed to hold his Enbrel dose  Next dose is due on Thursday    Patient will be placed on clindamycin 300 mg t i d  X1 week  Patient will return in 2 days for packing change    Cellulitis of occipital region of scalp  I and D performed  Patient placed on clindamycin 300 mg t i d  X7 days  Will return in 2 days for packing change    Rheumatoid arthritis ([facility] [address] )  Up patient does follow up with rheumatologist   He is instructed to hold his Enbrel and Plaquenil due to his current infection in his scalp  Swelling of right foot  Uncertain of the exact etiology of his right foot swelling  With the patient's diabetic neuropathy it would be questionable whether not he has a skeletal injury  He is able to bear weight  Will send the patient for x-ray of the right foot  May need MRI of the foot          Problem List Items Addressed This Visit     Benign essential hypertension     Hypertension is well controlled on current dose of Hyzaar  Continue same  Type 2 diabetes mellitus with diabetic polyneuropathy, with long-term current use of insulin ([facility])     Lab Results   Component Value Date    HGBA1C 6 8 (H) 01/25/2019       No results for input(s): POCGLU in the last 72 hours  Blood Sugar Average: Last 72 hrs:     Diabetes is fairly well controlled on Basaglar, Januvia and Victoza  Continue on same dosages of medications  Follow up with Endocrinology as scheduled  Will likely need repeat diabetic parameters in 4 months           Rheumatoid arthritis ([facility] [address] )     Up patient does follow up with rheumatologist   He is instructed to hold his Enbrel and Plaquenil due to his current infection in his scalp  Abscess of scalp     I and D performed with copious drainage  Purulent drainage  Specimen sent for culture and sensitivity    -patient is immunocompromised since he is on antirheumatic agents  Patient was instructed to hold his Enbrel dose  Next dose is due on Thursday  Patient will be placed on clindamycin 300 mg t i d  X1 week    Patient will return in 2 days for packing change         Cellulitis of occipital region of scalp     I and D performed  Patient placed on clindamycin 300 mg t i d  X7 days  Will return in 2 days for packing change         Relevant Medications    clindamycin (CLEOCIN) 300 MG capsule    Swelling of right foot - Primary     Uncertain of the exact etiology of his right foot swelling  With the patient's diabetic neuropathy it would be questionable whether not he has a skeletal injury  He is able to bear weight  Will send the patient for x-ray of the right foot  May need MRI of the foot         Relevant Orders    XR foot 3+ vw right            BMI Counseling: Body mass index is 32 78 kg/m²  Discussed the patient's BMI with him  The BMI is above average  BMI counseling and education was provided to the patient  Nutrition recommendations include moderation in carbohydrate intake  I have spent 48 minutes with Patient and family today in which greater than 50% of this time was spent in counseling/coordination of care regarding Diagnostic results, Prognosis, Risks and benefits of tx options, Intructions for management, Patient and family education, Importance of tx compliance, Risk factor reductions and Impressions

## 2019-01-30 ENCOUNTER — OPTICAL OFFICE (OUTPATIENT)
Dept: URBAN - METROPOLITAN AREA CLINIC 146 | Facility: CLINIC | Age: 54
Setting detail: OPHTHALMOLOGY
End: 2019-01-30
Payer: COMMERCIAL

## 2019-01-30 ENCOUNTER — DOCTOR'S OFFICE (OUTPATIENT)
Dept: URBAN - METROPOLITAN AREA CLINIC 137 | Facility: CLINIC | Age: 54
Setting detail: OPHTHALMOLOGY
End: 2019-01-30
Payer: COMMERCIAL

## 2019-01-30 DIAGNOSIS — H52.223: ICD-10-CM

## 2019-01-30 DIAGNOSIS — H52.4: ICD-10-CM

## 2019-01-30 PROBLEM — E11.42 TYPE 2 DIABETES MELLITUS WITH DIABETIC POLYNEUROPATHY, WITH LONG-TERM CURRENT USE OF INSULIN (HCC): Status: ACTIVE | Noted: 2017-09-01

## 2019-01-30 PROBLEM — Z79.4 TYPE 2 DIABETES MELLITUS WITH DIABETIC POLYNEUROPATHY, WITH LONG-TERM CURRENT USE OF INSULIN (HCC): Status: ACTIVE | Noted: 2017-09-01

## 2019-01-30 PROCEDURE — V2020 VISION SVCS FRAMES PURCHASES: HCPCS | Performed by: OPTOMETRIST

## 2019-01-30 PROCEDURE — 92014 COMPRE OPH EXAM EST PT 1/>: CPT | Performed by: OPTOMETRIST

## 2019-01-30 PROCEDURE — V2203 LENS SPHCYL BIFOCAL 4.00D/.1: HCPCS | Performed by: OPTOMETRIST

## 2019-01-30 PROCEDURE — 92015 DETERMINE REFRACTIVE STATE: CPT | Performed by: OPTOMETRIST

## 2019-01-30 PROCEDURE — V2781 PROGRESSIVE LENS PER LENS: HCPCS | Performed by: OPTOMETRIST

## 2019-01-30 PROCEDURE — 10060 I&D ABSCESS SIMPLE/SINGLE: CPT | Performed by: FAMILY MEDICINE

## 2019-01-30 ASSESSMENT — SPHEQUIV_DERIVED
OS_SPHEQUIV: 1.75
OD_SPHEQUIV: 1.625
OS_SPHEQUIV: 1.875

## 2019-01-30 ASSESSMENT — CONFRONTATIONAL VISUAL FIELD TEST (CVF)
OS_FINDINGS: FULL
OD_FINDINGS: FULL

## 2019-01-30 ASSESSMENT — REFRACTION_MANIFEST
OS_AXIS: 115
OD_VA3: 20/
OS_VA2: 20/
OU_VA: 20/
OD_SPHERE: +1.75
OD_VA3: 20/
OS_ADD: +2.00
OS_VA3: 20/
OU_VA: 20/20
OS_VA2: 20/25(J1)
OS_CYLINDER: -0.50
OD_VA2: 20/
OS_VA3: 20/
OD_CYLINDER: -0.25
OD_AXIS: 150
OD_VA2: 20/25(J1)
OD_ADD: +2.00
OS_VA1: 20/
OS_SPHERE: +2.00
OD_VA1: 20/20
OS_VA1: 20/25+
OD_VA1: 20/

## 2019-01-30 ASSESSMENT — REFRACTION_CURRENTRX
OS_CYLINDER: -1.00
OD_OVR_VA: 20/
OD_ADD: +2.00
OD_AXIS: 153
OS_OVR_VA: 20/
OS_OVR_VA: 20/
OD_VPRISM_DIRECTION: PROGS
OS_ADD: +2.00
OS_OVR_VA: 20/
OD_SPHERE: +1.75
OD_OVR_VA: 20/
OD_CYLINDER: -0.25
OS_VPRISM_DIRECTION: PROGS
OS_AXIS: 116
OD_OVR_VA: 20/
OS_SPHERE: +2.00

## 2019-01-30 ASSESSMENT — REFRACTION_AUTOREFRACTION
OS_CYLINDER: -0.75
OD_CYLINDER: SPH
OS_AXIS: 116
OS_SPHERE: +2.25
OD_SPHERE: +1.50

## 2019-01-30 ASSESSMENT — VISUAL ACUITY
OD_BCVA: 20/40+2
OS_BCVA: 20/25+1

## 2019-01-30 NOTE — ASSESSMENT & PLAN NOTE
Up patient does follow up with rheumatologist   He is instructed to hold his Enbrel and Plaquenil due to his current infection in his scalp

## 2019-01-30 NOTE — ASSESSMENT & PLAN NOTE
Lab Results   Component Value Date    HGBA1C 6 8 (H) 01/25/2019       No results for input(s): POCGLU in the last 72 hours  Blood Sugar Average: Last 72 hrs:     Diabetes is fairly well controlled on Basaglar, Januvia and Victoza  Continue on same dosages of medications  Follow up with Endocrinology as scheduled    Will likely need repeat diabetic parameters in 4 months

## 2019-01-30 NOTE — ASSESSMENT & PLAN NOTE
Uncertain of the exact etiology of his right foot swelling  With the patient's diabetic neuropathy it would be questionable whether not he has a skeletal injury  He is able to bear weight  Will send the patient for x-ray of the right foot    May need MRI of the foot

## 2019-01-30 NOTE — ASSESSMENT & PLAN NOTE
I and D performed  Patient placed on clindamycin 300 mg t i d  X7 days    Will return in 2 days for packing change

## 2019-01-30 NOTE — ASSESSMENT & PLAN NOTE
I and D performed with copious drainage  Purulent drainage  Specimen sent for culture and sensitivity    -patient is immunocompromised since he is on antirheumatic agents  Patient was instructed to hold his Enbrel dose  Next dose is due on Thursday  Patient will be placed on clindamycin 300 mg t i d  X1 week    Patient will return in 2 days for packing change

## 2019-01-31 ENCOUNTER — OFFICE VISIT (OUTPATIENT)
Dept: FAMILY MEDICINE CLINIC | Facility: CLINIC | Age: 54
End: 2019-01-31

## 2019-01-31 ENCOUNTER — TELEPHONE (OUTPATIENT)
Dept: FAMILY MEDICINE CLINIC | Facility: CLINIC | Age: 54
End: 2019-01-31

## 2019-01-31 VITALS
SYSTOLIC BLOOD PRESSURE: 128 MMHG | WEIGHT: 235 LBS | HEIGHT: 71 IN | OXYGEN SATURATION: 98 % | DIASTOLIC BLOOD PRESSURE: 76 MMHG | TEMPERATURE: 96.4 F | BODY MASS INDEX: 32.9 KG/M2 | HEART RATE: 96 BPM

## 2019-01-31 DIAGNOSIS — L03.811 CELLULITIS OF OCCIPITAL REGION OF SCALP: Primary | ICD-10-CM

## 2019-01-31 DIAGNOSIS — L02.811 ABSCESS OF SCALP: ICD-10-CM

## 2019-01-31 PROCEDURE — 99024 POSTOP FOLLOW-UP VISIT: CPT | Performed by: FAMILY MEDICINE

## 2019-01-31 NOTE — TELEPHONE ENCOUNTER
Patient wife called in and said Dr Frank Lindo (podiatry) is not in network with their insurance and we should not send his records over to that office  I advised to her to call her insurance company and get a list of providers in network with her plan and go with one of them  She is going to do that and then let us know who they are going to and then we can send the records to them

## 2019-01-31 NOTE — ASSESSMENT & PLAN NOTE
Cellulitis is improving since I and D was performed  Patient remains on clindamycin  Awaiting final culture results

## 2019-01-31 NOTE — PROGRESS NOTES
Subjective:      Patient ID: Abel Sanchez  is a 48 y o  male  Patient presents for packing change following I&D of abscess on the right posterior scalp  Patient is tolerating antibiotics well  Preliminary culture showed  gram-positive in pairs and clusters  Still waiting final identification  He is tolerating clindamycin  Wife has been changing dressing  There has been some bloody drainage on the dressings  Less pain in pressure in his neck  Still waiting reading on x-ray of his right foot  From what I can see there appears to be a destructive process going on at metatarsal heads  Not certain what it differential would be on this  Past Medical History:   Diagnosis Date    COPD (chronic obstructive pulmonary disease) (HCC)     GERD (gastroesophageal reflux disease)     Hyperglycemia     Resolved: 4/25/2017    Hyperlipidemia     Neuropathy        Family History   Problem Relation Age of Onset    Diabetes Mother     Stroke Mother     Mental illness Mother     Diabetes Father     Stroke Father     Alcohol abuse Brother     Coronary artery disease Family         Age 51-55    Diabetes type II Family     Heart disease Family        Past Surgical History:   Procedure Laterality Date    APPENDECTOMY      GA EXC SKIN MALIG <0 5 CM REMAINDER BODY N/A 3/28/2018    Procedure: EXCISION WIDE LESION HEAD/FACIAL/NECK;  Surgeon: Lulu Craig MD;  [facility];  Service: Surgical Oncology    SKIN BIOPSY      scalp    [address]        reports that he has been smoking  He has been smoking about 1 00 pack per day  He has never used smokeless tobacco  He reports that he drinks alcohol  He reports that he does not use drugs        Current Outpatient Prescriptions:     ACCU-CHEK FASTCLIX LANCETS MISC, ONE LANCET FOUR TIMES A DAY, Disp: 102 each, Rfl: 1    ACCU-CHEK SMARTVIEW test strip, TEST BLOOD SUGAR FOUR TIMES A DAY, Disp: 100 each, Rfl: 0    atorvastatin (LIPITOR) 40 mg tablet, TAKE 1 TABLET AT BEDTIME , Disp: 90 tablet, Rfl: 1    BASAGLAR KWIKPEN 100 units/mL injection pen, INJECT 35 UNIT TWICE DAILY, Disp: 15 mL, Rfl: 1    Cholecalciferol (VITAMIN D) 2000 units tablet, 2,000 Units daily in the early morning  , Disp: , Rfl:     clindamycin (CLEOCIN) 300 MG capsule, Take 1 capsule (300 mg total) by mouth 3 (three) times a day for 10 days, Disp: 30 capsule, Rfl: 0    cyanocobalamin (VITAMIN B-12) 100 mcg tablet, Take by mouth daily, Disp: , Rfl:     ENBREL SURECLICK 50 MG/ML injection, , Disp: , Rfl:     ergocalciferol (VITAMIN D2) 50,000 units, , Disp: , Rfl:     Etanercept (ENBREL SC), Inject 50 mg under the skin once a week Takes on Friday, Disp: , Rfl:     fluticasone (FLONASE) 50 mcg/act nasal spray, 2 sprays into each nostril daily at bedtime  , Disp: , Rfl:     HYDROcodone-acetaminophen (NORCO) 5-325 mg per tablet, Take 1 tablet by mouth every 6 (six) hours as needed for pain Max Daily Amount: 4 tablets, Disp: 15 tablet, Rfl: 0    hydroxychloroquine (PLAQUENIL) 200 mg tablet, Take 200 mg by mouth 2 (two) times a day  , Disp: , Rfl:     insulin glargine (BASAGLAR KWIKPEN) injection pen 100 units/mL, Inject 0 35 mL (35 Units total) under the skin 2 (two) times a day, Disp: 21 mL, Rfl: 0    Insulin Syringe-Needle U-100 (B-D INS SYRINGE 0 5CC/30GX1/2") 30G X 1/2" 0 5 ML MISC, by Does not apply route, Disp: , Rfl:     JANUVIA 100 MG tablet, TAKE ONE TABLET EVERY DAY, Disp: 90 tablet, Rfl: 1    leflunomide (ARAVA) 20 MG tablet, Take 1 tablet by mouth daily with dinner  , Disp: , Rfl:     losartan-hydrochlorothiazide (HYZAAR) 50-12 5 mg per tablet, TAKE 1 TABLET BY MOUTH DAILY, Disp: 90 tablet, Rfl: 1    naproxen (NAPROSYN) 500 mg tablet, Take 1 tablet by mouth every 12 (twelve) hours, Disp: , Rfl:     omeprazole (PriLOSEC) 40 MG capsule, , Disp: , Rfl:     terbinafine (LamISIL) 250 mg tablet, , Disp: , Rfl:     ULTICARE MICRO PEN NEEDLES 32G X 4 MM MISC, INJECT UNDER THE SKIN 3 (THREE) TIMES A DAY, Disp: 100 each, Rfl: 3    VENTOLIN  (90 Base) MCG/ACT inhaler, INHALE 2 PUFFS EVERY 4 (FOUR) HOURS AS NEEDED FOR WHEEZING, Disp: 18 g, Rfl: 1    VICTOZA injection, INJECT 1 8 MG DAILY, Disp: 9 mL, Rfl: 4    The following portions of the patient's history were reviewed and updated as appropriate: allergies, current medications, past family history, past medical history, past social history, past surgical history and problem list     Review of Systems   Constitutional: Negative  Musculoskeletal: Positive for arthralgias and joint swelling  Skin: Positive for wound  Objective:    /76   Pulse 96   Temp (!) 96 4 °F (35 8 °C) (Tympanic)   Ht 5' 11" (1 803 m)   Wt 107 kg (235 lb)   SpO2 98%   BMI 32 78 kg/m²      Physical Exam   Constitutional: He appears well-developed and well-nourished  Skin:   Wound on the posterior aspect of his scalp remains open with packing in place  Cellulitis has improved           Recent Results (from the past 1008 hour(s))   Hepatic function panel    Collection Time: 01/07/19 11:05 AM   Result Value Ref Range    Total Bilirubin 0 60 0 20 - 1 00 mg/dL    Bilirubin, Direct 0 16 0 00 - 0 20 mg/dL    Alkaline Phosphatase 56 46 - 116 U/L    AST 19 5 - 45 U/L    ALT 37 12 - 78 U/L    Total Protein 7 7 6 4 - 8 2 g/dL    Albumin 3 9 3 5 - 5 0 g/dL   CBC and differential    Collection Time: 01/25/19  8:47 AM   Result Value Ref Range    WBC 7 70 4 31 - 10 16 Thousand/uL    RBC 5 31 3 88 - 5 62 Million/uL    Hemoglobin 15 6 12 0 - 17 0 g/dL    Hematocrit 47 2 36 5 - 49 3 %    MCV 89 82 - 98 fL    MCH 29 4 26 8 - 34 3 pg    MCHC 33 1 31 4 - 37 4 g/dL    RDW 13 7 11 6 - 15 1 %    MPV 11 1 8 9 - 12 7 fL    Platelets 118 292 - 517 Thousands/uL    nRBC 0 /100 WBCs    Neutrophils Relative 67 43 - 75 %    Immat GRANS % 0 0 - 2 %    Lymphocytes Relative 16 14 - 44 %    Monocytes Relative 11 4 - 12 %    Eosinophils Relative 5 0 - 6 %    Basophils Relative 1 0 - 1 %    Neutrophils Absolute 5 10 1 85 - 7 62 Thousands/µL    Immature Grans Absolute 0 03 0 00 - 0 20 Thousand/uL    Lymphocytes Absolute 1 24 0 60 - 4 47 Thousands/µL    Monocytes Absolute 0 87 0 17 - 1 22 Thousand/µL    Eosinophils Absolute 0 38 0 00 - 0 61 Thousand/µL    Basophils Absolute 0 08 0 00 - 0 10 Thousands/µL   Comprehensive metabolic panel    Collection Time: 01/25/19  8:47 AM   Result Value Ref Range    Sodium 135 (L) 136 - 145 mmol/L    Potassium 4 0 3 5 - 5 3 mmol/L    Chloride 105 100 - 108 mmol/L    CO2 25 21 - 32 mmol/L    ANION GAP 5 4 - 13 mmol/L    BUN 15 5 - 25 mg/dL    Creatinine 0 87 0 60 - 1 30 mg/dL    Glucose, Fasting 118 (H) 65 - 99 mg/dL    Calcium 8 7 8 3 - 10 1 mg/dL    AST 17 5 - 45 U/L    ALT 31 12 - 78 U/L    Alkaline Phosphatase 63 46 - 116 U/L    Total Protein 7 8 6 4 - 8 2 g/dL    Albumin 3 8 3 5 - 5 0 g/dL    Total Bilirubin 0 61 0 20 - 1 00 mg/dL    eGFR 100 ml/min/1 73sq m   Hemoglobin A1C    Collection Time: 01/25/19  8:47 AM   Result Value Ref Range    Hemoglobin A1C 6 8 (H) 4 2 - 6 3 %     mg/dl   Lipid panel    Collection Time: 01/25/19  8:47 AM   Result Value Ref Range    Cholesterol 132 50 - 200 mg/dL    Triglycerides 91 <=150 mg/dL    HDL, Direct 38 (L) 40 - 60 mg/dL    LDL Calculated 76 0 - 100 mg/dL    Non-HDL-Chol (CHOL-HDL) 94 mg/dl   Microalbumin / creatinine urine ratio    Collection Time: 01/25/19  8:47 AM   Result Value Ref Range    Creatinine, Ur 295 0 mg/dL    Microalbum  ,U,Random 15 5 0 0 - 20 0 mg/L    Microalb Creat Ratio 5 0 - 30 mg/g creatinine   TSH, 3rd generation with Free T4 reflex    Collection Time: 01/25/19  8:47 AM   Result Value Ref Range    TSH 3RD GENERATON 1 600 0 358 - 3 740 uIU/mL   Vitamin D 1,25 dihydroxy    Collection Time: 01/25/19  8:47 AM   Result Value Ref Range    Vit D, 1,25-Dihydroxy 53 4 19 9 - 79 3 pg/mL   Culture, Aerobic and Anaerobic w/Gram Stain    Collection Time: 01/29/19  9:12 AM   Result Value Ref Range    Anaerobic Culture      Culture, Aerobic and Anaerobic w/Gram Stain         Assessment/Plan:    Abscess of scalp  Packing changed  Wound only accommodated approximately 1 cm of iodoform gauze  He will return in 4 days for packing change and re-evaluation    Cellulitis of occipital region of scalp  Cellulitis is improving since I and D was performed  Patient remains on clindamycin  Awaiting final culture results  Problem List Items Addressed This Visit     Abscess of scalp     Packing changed  Wound only accommodated approximately 1 cm of iodoform gauze  He will return in 4 days for packing change and re-evaluation         Cellulitis of occipital region of scalp - Primary     Cellulitis is improving since I and D was performed  Patient remains on clindamycin  Awaiting final culture results

## 2019-01-31 NOTE — ASSESSMENT & PLAN NOTE
Packing changed  Wound only accommodated approximately 1 cm of iodoform gauze    He will return in 4 days for packing change and re-evaluation

## 2019-01-31 NOTE — TELEPHONE ENCOUNTER
Per Jacque Mota, podiatry would like Dr Refugio Olguin to order an mri of the foot  Hopefully results will be available for his appt with them next week

## 2019-02-01 ENCOUNTER — TELEPHONE (OUTPATIENT)
Dept: OTHER | Facility: OTHER | Age: 54
End: 2019-02-01

## 2019-02-01 DIAGNOSIS — S92.901A UNSPECIFIED FRACTURE OF RIGHT FOOT, INITIAL ENCOUNTER FOR CLOSED FRACTURE: Primary | ICD-10-CM

## 2019-02-01 DIAGNOSIS — M79.89 SWELLING OF RIGHT FOOT: ICD-10-CM

## 2019-02-01 NOTE — TELEPHONE ENCOUNTER
Mr Alexsander Hirsch wife, Claudia Lopez called to speak with Tara Gunter  Her  has an appointment for an MRI and they need for Tara Gunter to contact the insurance company regarding their concerns  Claudia Lopez can be reached at [phone]

## 2019-02-04 ENCOUNTER — HOSPITAL ENCOUNTER (OUTPATIENT)
Dept: MRI IMAGING | Facility: HOSPITAL | Age: 54
Discharge: HOME | End: 2019-02-04
Payer: COMMERCIAL

## 2019-02-04 ENCOUNTER — OFFICE VISIT (OUTPATIENT)
Dept: FAMILY MEDICINE CLINIC | Facility: CLINIC | Age: 54
End: 2019-02-04
Payer: COMMERCIAL

## 2019-02-04 VITALS
OXYGEN SATURATION: 98 % | DIASTOLIC BLOOD PRESSURE: 80 MMHG | BODY MASS INDEX: 32.9 KG/M2 | RESPIRATION RATE: 16 BRPM | WEIGHT: 235 LBS | HEART RATE: 84 BPM | SYSTOLIC BLOOD PRESSURE: 128 MMHG | TEMPERATURE: 96.4 F | HEIGHT: 71 IN

## 2019-02-04 DIAGNOSIS — E11.42 TYPE 2 DIABETES MELLITUS WITH DIABETIC POLYNEUROPATHY, WITH LONG-TERM CURRENT USE OF INSULIN (HCC): ICD-10-CM

## 2019-02-04 DIAGNOSIS — I10 BENIGN ESSENTIAL HYPERTENSION: ICD-10-CM

## 2019-02-04 DIAGNOSIS — E55.9 VITAMIN D DEFICIENCY: ICD-10-CM

## 2019-02-04 DIAGNOSIS — Z79.4 TYPE 2 DIABETES MELLITUS WITH DIABETIC POLYNEUROPATHY, WITH LONG-TERM CURRENT USE OF INSULIN (HCC): ICD-10-CM

## 2019-02-04 DIAGNOSIS — Z12.5 PROSTATE CANCER SCREENING: ICD-10-CM

## 2019-02-04 DIAGNOSIS — M79.89 SWELLING OF RIGHT FOOT: ICD-10-CM

## 2019-02-04 DIAGNOSIS — E78.2 MIXED HYPERLIPIDEMIA: Primary | ICD-10-CM

## 2019-02-04 DIAGNOSIS — S92.901A UNSPECIFIED FRACTURE OF RIGHT FOOT, INITIAL ENCOUNTER FOR CLOSED FRACTURE: ICD-10-CM

## 2019-02-04 DIAGNOSIS — L03.811 CELLULITIS OF OCCIPITAL REGION OF SCALP: ICD-10-CM

## 2019-02-04 DIAGNOSIS — L02.811 ABSCESS OF SCALP: ICD-10-CM

## 2019-02-04 PROCEDURE — 3079F DIAST BP 80-89 MM HG: CPT | Performed by: FAMILY MEDICINE

## 2019-02-04 PROCEDURE — 99215 OFFICE O/P EST HI 40 MIN: CPT | Performed by: FAMILY MEDICINE

## 2019-02-04 PROCEDURE — 3074F SYST BP LT 130 MM HG: CPT | Performed by: FAMILY MEDICINE

## 2019-02-04 PROCEDURE — 73718 MRI LOWER EXTREMITY W/O DYE: CPT

## 2019-02-04 NOTE — ASSESSMENT & PLAN NOTE
Packing was removed  Wound will be allowed to close by secondary intention  Wife will monitor the area to make certain that the abscess does not recur    Patient can resume his Enbrel this Thursday

## 2019-02-04 NOTE — ASSESSMENT & PLAN NOTE
Fairly well controlled on atorvastatin 40 mg once daily  Continue same    Recheck lipid profile in 4 months

## 2019-02-04 NOTE — ASSESSMENT & PLAN NOTE
Lab Results   Component Value Date    HGBA1C 6 8 (H) 01/25/2019       No results for input(s): POCGLU in the last 72 hours  Blood Sugar Average: Last 72 hrs:     Diabetes is fairly well controlled on current insulin regimen  Continue same  Recheck diabetic parameters in 4 months    Follow up with endocrinology as scheduled

## 2019-02-04 NOTE — ASSESSMENT & PLAN NOTE
Patient will be seeing a new podiatrist, Dr Norah Chavira, at [facility]  Patient will likely need surgical intervention to correct the fractures of his foot  He will likely need preop clearance as well

## 2019-02-04 NOTE — ASSESSMENT & PLAN NOTE
Patient is to finish course of clindamycin  Staph aureus that was sensitive to all antibiotics including clindamycin  Cellulitis has almost completely resolved  Explained to the patient that he should not wait for an infection to get that severe  Patient is immunocompromised due to his Enbrel  He cannot mount an appropriate immune response to even a common staph infection

## 2019-02-04 NOTE — PROGRESS NOTES
Subjective:      Patient ID: Erika Cobb  is a 48 y o  male  Patient presents with his wife for re-evaluation of cellulitis and abscess on the back of the scalp  Wife has been changing dressings  Less drainage  No fevers or chills  Culture returned with heavy growth of Staph aureus sensitive to all antibiotics  Patient has been holding his Enbrel for rheumatoid arthritis due to infection  Patient also has multiple metatarsal fractures of his right foot  He will be seeing Podiatry this evening  MRI of the right foot has been ordered  He will likely need surgical intervention as the fractures of the 2nd and 3rd metatarsals are displaced  Patient also has longstanding history of diabetes mellitus with peripheral neuropathy, insulin-requiring diabetes, hypertension, hyperlipidemia  Labs were reviewed which showed hemoglobin A1c of 6 8% with scant amount of microalbumin in his urine  Cholesterol is almost at goal with an LDL of 76  Patient does follow up with rheumatologist in regards to rheumatoid arthritis  He feels that his Enbrel is no longer as effective as it previously was            Past Medical History:   Diagnosis Date    [facility]chronic obstructive pulmonary disease) ([facility])     GERD (gastroesophageal reflux disease)     Hyperglycemia     Resolved: 4/25/2017    Hyperlipidemia     Neuropathy        Family History   Problem Relation Age of Onset    Diabetes Mother     Stroke Mother     Mental illness Mother     Diabetes Father     Stroke Father     Alcohol abuse Brother     Coronary artery disease Family         Age 51-55    Diabetes type II Family     Heart disease Family        Past Surgical History:   Procedure Laterality Date    APPENDECTOMY      GA EXC SKIN MALIG <0 5 CM REMAINDER BODY N/A 3/28/2018    Procedure: EXCISION WIDE LESION HEAD/FACIAL/NECK;  Surgeon: Brynn Page MD;  [facility];  Service: Surgical Oncology    SKIN BIOPSY      scalp    CHELSEA TOOTH EXTRACTION  1998        reports that he has been smoking  He has been smoking about 1 00 pack per day  He has never used smokeless tobacco  He reports that he drinks alcohol  He reports that he does not use drugs        Current Outpatient Prescriptions:     ACCU-CHEK FASTCLIX LANCETS MISC, ONE LANCET FOUR TIMES A DAY, Disp: 102 each, Rfl: 1    ACCU-CHEK SMARTVIEW test strip, TEST BLOOD SUGAR FOUR TIMES A DAY, Disp: 100 each, Rfl: 0    atorvastatin (LIPITOR) 40 mg tablet, TAKE 1 TABLET AT BEDTIME , Disp: 90 tablet, Rfl: 1    BASAGLAR KWIKPEN 100 units/mL injection pen, INJECT 35 UNIT TWICE DAILY, Disp: 15 mL, Rfl: 1    Cholecalciferol (VITAMIN D) 2000 units tablet, 2,000 Units daily in the early morning  , Disp: , Rfl:     clindamycin (CLEOCIN) 300 MG capsule, Take 1 capsule (300 mg total) by mouth 3 (three) times a day for 10 days, Disp: 30 capsule, Rfl: 0    cyanocobalamin (VITAMIN B-12) 100 mcg tablet, Take by mouth daily, Disp: , Rfl:     ENBREL SURECLICK 50 MG/ML injection, , Disp: , Rfl:     ergocalciferol (VITAMIN D2) 50,000 units, , Disp: , Rfl:     Etanercept (ENBREL SC), Inject 50 mg under the skin once a week Takes on Friday, Disp: , Rfl:     fluticasone (FLONASE) 50 mcg/act nasal spray, 2 sprays into each nostril daily at bedtime  , Disp: , Rfl:     HYDROcodone-acetaminophen (NORCO) 5-325 mg per tablet, Take 1 tablet by mouth every 6 (six) hours as needed for pain Max Daily Amount: 4 tablets, Disp: 15 tablet, Rfl: 0    hydroxychloroquine (PLAQUENIL) 200 mg tablet, Take 200 mg by mouth 2 (two) times a day  , Disp: , Rfl:     insulin glargine (BASAGLAR KWIKPEN) injection pen 100 units/mL, Inject 0 35 mL (35 Units total) under the skin 2 (two) times a day, Disp: 21 mL, Rfl: 0    Insulin Syringe-Needle U-100 (B-D INS SYRINGE 0 5CC/30GX1/2") 30G X 1/2" 0 5 ML MISC, by Does not apply route, Disp: , Rfl:     JANUVIA 100 MG tablet, TAKE ONE TABLET EVERY DAY, Disp: 90 tablet, Rfl: 1   leflunomide (ARAVA) 20 MG tablet, Take 1 tablet by mouth daily with dinner  , Disp: , Rfl:     losartan-hydrochlorothiazide (HYZAAR) 50-12 5 mg per tablet, TAKE 1 TABLET BY MOUTH DAILY, Disp: 90 tablet, Rfl: 1    naproxen (NAPROSYN) 500 mg tablet, Take 1 tablet by mouth every 12 (twelve) hours, Disp: , Rfl:     omeprazole (PriLOSEC) 40 MG capsule, , Disp: , Rfl:     terbinafine (LamISIL) 250 mg tablet, , Disp: , Rfl:     ULTICARE MICRO PEN NEEDLES 32G X 4 MM MISC, INJECT UNDER THE SKIN 3 (THREE) TIMES A DAY, Disp: 100 each, Rfl: 3    VENTOLIN  (90 Base) MCG/ACT inhaler, INHALE 2 PUFFS EVERY 4 (FOUR) HOURS AS NEEDED FOR WHEEZING, Disp: 18 g, Rfl: 1    VICTOZA injection, INJECT 1 8 MG DAILY, Disp: 9 mL, Rfl: 4    The following portions of the patient's history were reviewed and updated as appropriate: allergies, current medications, past family history, past medical history, past social history, past surgical history and problem list     Review of Systems   Constitutional: Negative  Negative for unexpected weight change  HENT: Negative  Eyes: Negative  Respiratory: Negative  Cardiovascular: Negative  Gastrointestinal: Negative  Endocrine: Negative  Genitourinary: Negative  Musculoskeletal: Positive for arthralgias ( right foot)  Negative for neck pain  Skin: Negative  Negative for rash and wound  Allergic/Immunologic: Negative  Neurological: Positive for numbness (Bilateral feet)  Hematological: Negative  Psychiatric/Behavioral: Negative  All other systems reviewed and are negative  Objective:    /80   Pulse 84   Temp (!) 96 4 °F (35 8 °C) (Tympanic)   Resp 16   Ht 5' 11" (1 803 m)   Wt 107 kg (235 lb)   SpO2 98%   BMI 32 78 kg/m²      Physical Exam   Constitutional: He is oriented to person, place, and time  He appears well-developed and well-nourished  No distress  Obese   HENT:   Head: Normocephalic     Eyes: Pupils are equal, round, and reactive to light  Conjunctivae and EOM are normal    Neck: Normal range of motion  Neck supple  No thyromegaly present  Cardiovascular: Normal rate, regular rhythm and normal heart sounds  No murmur heard  Pulmonary/Chest: Effort normal and breath sounds normal    Abdominal: Soft  Bowel sounds are normal    Musculoskeletal: Normal range of motion  He exhibits tenderness (Right foot)  Neurological: He is alert and oriented to person, place, and time  Skin:   Right posterior scalp wound is healing well  Packing was removed  Scant amount of drainage  Area was probed with a Q-tip  No further packing placed  Cellulitis of his neck has completely resolved  Psychiatric: He has a normal mood and affect  His behavior is normal  Judgment and thought content normal    Nursing note and vitals reviewed          Recent Results (from the past 1008 hour(s))   Hepatic function panel    Collection Time: 01/07/19 11:05 AM   Result Value Ref Range    Total Bilirubin 0 60 0 20 - 1 00 mg/dL    Bilirubin, Direct 0 16 0 00 - 0 20 mg/dL    Alkaline Phosphatase 56 46 - 116 U/L    AST 19 5 - 45 U/L    ALT 37 12 - 78 U/L    Total Protein 7 7 6 4 - 8 2 g/dL    Albumin 3 9 3 5 - 5 0 g/dL   CBC and differential    Collection Time: 01/25/19  8:47 AM   Result Value Ref Range    WBC 7 70 4 31 - 10 16 Thousand/uL    RBC 5 31 3 88 - 5 62 Million/uL    Hemoglobin 15 6 12 0 - 17 0 g/dL    Hematocrit 47 2 36 5 - 49 3 %    MCV 89 82 - 98 fL    MCH 29 4 26 8 - 34 3 pg    MCHC 33 1 31 4 - 37 4 g/dL    RDW 13 7 11 6 - 15 1 %    MPV 11 1 8 9 - 12 7 fL    Platelets 210 004 - 475 Thousands/uL    nRBC 0 /100 WBCs    Neutrophils Relative 67 43 - 75 %    Immat GRANS % 0 0 - 2 %    Lymphocytes Relative 16 14 - 44 %    Monocytes Relative 11 4 - 12 %    Eosinophils Relative 5 0 - 6 %    Basophils Relative 1 0 - 1 %    Neutrophils Absolute 5 10 1 85 - 7 62 Thousands/µL    Immature Grans Absolute 0 03 0 00 - 0 20 Thousand/uL    Lymphocytes Absolute 1  24 0 60 - 4 47 Thousands/µL    Monocytes Absolute 0 87 0 17 - 1 22 Thousand/µL    Eosinophils Absolute 0 38 0 00 - 0 61 Thousand/µL    Basophils Absolute 0 08 0 00 - 0 10 Thousands/µL   Comprehensive metabolic panel    Collection Time: 01/25/19  8:47 AM   Result Value Ref Range    Sodium 135 (L) 136 - 145 mmol/L    Potassium 4 0 3 5 - 5 3 mmol/L    Chloride 105 100 - 108 mmol/L    CO2 25 21 - 32 mmol/L    ANION GAP 5 4 - 13 mmol/L    BUN 15 5 - 25 mg/dL    Creatinine 0 87 0 60 - 1 30 mg/dL    Glucose, Fasting 118 (H) 65 - 99 mg/dL    Calcium 8 7 8 3 - 10 1 mg/dL    AST 17 5 - 45 U/L    ALT 31 12 - 78 U/L    Alkaline Phosphatase 63 46 - 116 U/L    Total Protein 7 8 6 4 - 8 2 g/dL    Albumin 3 8 3 5 - 5 0 g/dL    Total Bilirubin 0 61 0 20 - 1 00 mg/dL    eGFR 100 ml/min/1 73sq m   Hemoglobin A1C    Collection Time: 01/25/19  8:47 AM   Result Value Ref Range    Hemoglobin A1C 6 8 (H) 4 2 - 6 3 %     mg/dl   Lipid panel    Collection Time: 01/25/19  8:47 AM   Result Value Ref Range    Cholesterol 132 50 - 200 mg/dL    Triglycerides 91 <=150 mg/dL    HDL, Direct 38 (L) 40 - 60 mg/dL    LDL Calculated 76 0 - 100 mg/dL    Non-HDL-Chol (CHOL-HDL) 94 mg/dl   Microalbumin / creatinine urine ratio    Collection Time: 01/25/19  8:47 AM   Result Value Ref Range    Creatinine, Ur 295 0 mg/dL    Microalbum  ,U,Random 15 5 0 0 - 20 0 mg/L    Microalb Creat Ratio 5 0 - 30 mg/g creatinine   TSH, 3rd generation with Free T4 reflex    Collection Time: 01/25/19  8:47 AM   Result Value Ref Range    TSH 3RD GENERATON 1 600 0 358 - 3 740 uIU/mL   Vitamin D 1,25 dihydroxy    Collection Time: 01/25/19  8:47 AM   Result Value Ref Range    Vit D, 1,25-Dihydroxy 53 4 19 9 - 79 3 pg/mL   Culture, Aerobic and Anaerobic w/Gram Stain    Collection Time: 01/29/19  9:12 AM   Result Value Ref Range    Anaerobic Culture      Culture, Aerobic and Anaerobic w/Gram Stain (A)        Assessment/Plan:    Type 2 diabetes mellitus with diabetic polyneuropathy, with long-term current use of insulin (HCC)  Lab Results   Component Value Date    HGBA1C 6 8 (H) 01/25/2019       No results for input(s): POCGLU in the last 72 hours  Blood Sugar Average: Last 72 hrs:     Diabetes is fairly well controlled on current insulin regimen  Continue same  Recheck diabetic parameters in 4 months  Follow up with endocrinology as scheduled    Benign essential hypertension  Hypertension remains well controlled on Hyzaar  Continue same  Cellulitis of occipital region of scalp  Patient is to finish course of clindamycin  Staph aureus that was sensitive to all antibiotics including clindamycin  Cellulitis has almost completely resolved  Explained to the patient that he should not wait for an infection to get that severe  Patient is immunocompromised due to his Enbrel  He cannot mount an appropriate immune response to even a common staph infection  Abscess of scalp  Packing was removed  Wound will be allowed to close by secondary intention  Wife will monitor the area to make certain that the abscess does not recur  Patient can resume his Enbrel this Thursday    Unspecified fracture of right foot, initial encounter for closed fracture  Patient will be seeing a new podiatrist, Dr Cleophus Buerger, at [facility]  Patient will likely need surgical intervention to correct the fractures of his foot  He will likely need preop clearance as well  Hyperlipidemia  Fairly well controlled on atorvastatin 40 mg once daily  Continue same  Recheck lipid profile in 4 months    Prostate cancer screening  Screening PSA with next set of labs    Vitamin D deficiency    Continue on vitamin-D supplementation  Check vitamin-D level in 4 months  Problem List Items Addressed This Visit     Benign essential hypertension     Hypertension remains well controlled on Hyzaar  Continue same           Relevant Orders    CBC and differential    TSH, 3rd generation with Free T4 reflex    Type 2 diabetes mellitus with diabetic polyneuropathy, with long-term current use of insulin (HCC)     Lab Results   Component Value Date    HGBA1C 6 8 (H) 01/25/2019       No results for input(s): POCGLU in the last 72 hours  Blood Sugar Average: Last 72 hrs:     Diabetes is fairly well controlled on current insulin regimen  Continue same  Recheck diabetic parameters in 4 months  Follow up with endocrinology as scheduled         Relevant Orders    Comprehensive metabolic panel    Hemoglobin A1C    Microalbumin / creatinine urine ratio    Hyperlipidemia - Primary     Fairly well controlled on atorvastatin 40 mg once daily  Continue same  Recheck lipid profile in 4 months         Relevant Orders    Comprehensive metabolic panel    Lipid panel    Vitamin D deficiency       Continue on vitamin-D supplementation  Check vitamin-D level in 4 months  Relevant Orders    Vitamin D 1,25 dihydroxy    Abscess of scalp     Packing was removed  Wound will be allowed to close by secondary intention  Wife will monitor the area to make certain that the abscess does not recur  Patient can resume his Enbrel this Thursday         Cellulitis of occipital region of scalp     Patient is to finish course of clindamycin  Staph aureus that was sensitive to all antibiotics including clindamycin  Cellulitis has almost completely resolved  Explained to the patient that he should not wait for an infection to get that severe  Patient is immunocompromised due to his Enbrel  He cannot mount an appropriate immune response to even a common staph infection  Unspecified fracture of right foot, initial encounter for closed fracture     Patient will be seeing a new podiatrist, Dr Anayeli Thomas, at [facility]  Patient will likely need surgical intervention to correct the fractures of his foot  He will likely need preop clearance as well           Prostate cancer screening     Screening PSA with next set of labs         Relevant Orders PSA, Total Screen          I have spent 44 minutes with Patient and family today in which greater than 50% of this time was spent in counseling/coordination of care regarding Diagnostic results, Prognosis, Risks and benefits of tx options, Intructions for management, Patient and family education, Importance of tx compliance, Risk factor reductions and Impressions

## 2019-02-05 ENCOUNTER — TELEPHONE (OUTPATIENT)
Dept: FAMILY MEDICINE CLINIC | Facility: CLINIC | Age: 54
End: 2019-02-05

## 2019-02-05 NOTE — TELEPHONE ENCOUNTER
Please inform patient that the MRI does show 3 old fractures    He does need to follow up with Podiatry and hopefully they provided him with a disc with the MRI images to review with podiatrist

## 2019-02-07 ENCOUNTER — OFFICE VISIT (OUTPATIENT)
Dept: FAMILY MEDICINE CLINIC | Facility: CLINIC | Age: 54
End: 2019-02-07
Payer: COMMERCIAL

## 2019-02-07 VITALS
OXYGEN SATURATION: 99 % | HEIGHT: 71 IN | HEART RATE: 99 BPM | WEIGHT: 235 LBS | BODY MASS INDEX: 32.9 KG/M2 | DIASTOLIC BLOOD PRESSURE: 78 MMHG | TEMPERATURE: 97 F | SYSTOLIC BLOOD PRESSURE: 142 MMHG | RESPIRATION RATE: 16 BRPM

## 2019-02-07 DIAGNOSIS — S92.901A UNSPECIFIED FRACTURE OF RIGHT FOOT, INITIAL ENCOUNTER FOR CLOSED FRACTURE: ICD-10-CM

## 2019-02-07 DIAGNOSIS — Z01.818 PRE-OPERATIVE CLEARANCE: Primary | ICD-10-CM

## 2019-02-07 DIAGNOSIS — E11.610 CHARCOT FOOT DUE TO DIABETES MELLITUS (HCC): ICD-10-CM

## 2019-02-07 DIAGNOSIS — Z01.818 PRE-OP EVALUATION: ICD-10-CM

## 2019-02-07 DIAGNOSIS — M05.9 RHEUMATOID ARTHRITIS WITH POSITIVE RHEUMATOID FACTOR, INVOLVING UNSPECIFIED SITE (HCC): ICD-10-CM

## 2019-02-07 PROCEDURE — 93000 ELECTROCARDIOGRAM COMPLETE: CPT | Performed by: FAMILY MEDICINE

## 2019-02-07 PROCEDURE — 99244 OFF/OP CNSLTJ NEW/EST MOD 40: CPT | Performed by: FAMILY MEDICINE

## 2019-02-07 NOTE — ASSESSMENT & PLAN NOTE
Lab Results   Component Value Date    HGBA1C 6 8 (H) 01/25/2019       No results for input(s): POCGLU in the last 72 hours  Blood Sugar Average: Last 72 hrs:     Hemoglobin A1c of 6 8% is fairly well controlled  Unfortunately he has had longstanding diabetes    Management of Charcot foot as per Podiatry

## 2019-02-07 NOTE — ASSESSMENT & PLAN NOTE
Management as per podiatry  Patient is medically cleared for surgery under general anesthesia  Patient is a diabetic and does have history of rheumatoid arthritis  Presently his antirheumatic agents are on hold  He will need to see rheumatologist to determine how long he should remain off of his antirheumatic agents    Patient also advised to stop smoking to allow his bones the best opportunity to heal

## 2019-02-07 NOTE — PROGRESS NOTES
PRE-OPERATIVE EVALUATION  [facility]    NAME: Josep Quiroz  AGE: 48 y o  SEX: male  : 1965     DATE: 2019      Pre-Operative Evaluation      Chief Complaint: Pre-operative Evaluation     Surgery: Right foot surgery, Charcot foot, metatarsal fractures  Anticipated Date of Surgery: 2019  Referring Provider: Dr Amada Uribe     History of Present Illness:     Josep Quiroz  is a 48 y o  male who presents to the office today for a preoperative consultation at the request of surgeon, Dr Amada Uribe, who plans on performing right foot surgery on 2019  Planned anesthesia is general  Patient has a bleeding risk of: no recent abnormal bleeding  Patient does not have objections to receiving blood products if needed  Current anti-platelet/anti-coagulation medications that the patient is prescribed includes: None        Assessment of Chronic Conditions:   - Diabetes Mellitus: Optimized   - Hypertension: well controlled  - Rheumatoid Arthritis: on a immunospressive drugs presently being held     Assessment of Cardiac Risk:  · Denies unstable or severe angina or MI in the last 6 weeks or history of stent placement in the last year   · Denies decompensated heart failure (e g  New onset heart failure, NYHA functional class IV heart failure, or worsening existing heart failure)  · Denies significant arrhythmias such as high grade AV block, symptomatic ventricular arrhythmia, newly recognized ventricular tachycardia, supraventricular tachycardia with resting heart rate >100, or symptomatic bradycardia  · Denies severe heart valve disease including aortic stenosis or symptomatic mitral stenosis     Exercise Capacity:  · Able to walk 4 blocks without symptoms?: Yes, unable walk presently due to broken foot  · Able to walk 2 flights without symptoms?: Yes, unable to walk presently due to broken foot    Prior Anesthesia Reactions: No     Personal history of venous thromboembolic disease? No    History of steroid use for >2 weeks within last year? No    STOP-BANG Sleep Apnea Screening Questionnaire:         Review of Systems:     Review of Systems   Constitutional: Negative  HENT: Negative  Eyes: Negative  Respiratory: Negative  Cardiovascular: Negative  Gastrointestinal: Negative  Endocrine: Negative  Genitourinary: Negative  Musculoskeletal: Positive for arthralgias (Right foot pain) and gait problem ( antalgic gait due to right foot pain)  Skin: Negative  Allergic/Immunologic: Negative  Neurological: Positive for numbness ( bilateral feet)  Hematological: Negative  Psychiatric/Behavioral: Negative  All other systems reviewed and are negative  Current Problem List:     Patient Active Problem List   Diagnosis    Benign essential hypertension    Carotid artery calcification    Reduced libido    Erectile disorder due to medical condition in male patient    Type 2 diabetes mellitus with diabetic polyneuropathy, with long-term [facility]    Hyperlipidemia    Obesity (BMI 30 0-34  9)    Vitamin D deficiency    Rheumatoid arthritis (HCC)    Clear cell hidradenoma    Colon cancer screening    Abscess of scalp    Cellulitis of occipital region of scalp    Swelling of right foot    Unspecified fracture of right foot, initial encounter for closed fracture    Prostate cancer screening       Allergies:     No Known Allergies    Current Medications:       Current Outpatient Prescriptions:     ACCU-CHEK FASTCLIX LANCETS MISC, ONE LANCET FOUR TIMES A DAY, Disp: 102 each, Rfl: 1    ACCU-CHEK SMARTVIEW test strip, TEST BLOOD SUGAR FOUR TIMES A DAY, Disp: 100 each, Rfl: 0    atorvastatin (LIPITOR) 40 mg tablet, TAKE 1 TABLET AT BEDTIME , Disp: 90 tablet, Rfl: 1    BASAGLAR KWIKPEN 100 units/mL injection pen, INJECT 35 UNIT TWICE DAILY, Disp: 15 mL, Rfl: 1    Cholecalciferol (VITAMIN D) 2000 units tablet, 2,000 Units daily in the early morning  , Disp: , Rfl:     clindamycin (CLEOCIN) 300 MG capsule, Take 1 capsule (300 mg total) by mouth 3 (three) times a day for 10 days, Disp: 30 capsule, Rfl: 0    cyanocobalamin (VITAMIN B-12) 100 mcg tablet, Take by mouth daily, Disp: , Rfl:     ENBREL SURECLICK 50 MG/ML injection, , Disp: , Rfl:     ergocalciferol (VITAMIN D2) 50,000 units, , Disp: , Rfl:     Etanercept (ENBREL SC), Inject 50 mg under the skin once a week Takes on Friday, Disp: , Rfl:     fluticasone (FLONASE) 50 mcg/act nasal spray, 2 sprays into each nostril daily at bedtime  , Disp: , Rfl:     HYDROcodone-acetaminophen (NORCO) 5-325 mg per tablet, Take 1 tablet by mouth every 6 (six) hours as needed for pain Max Daily Amount: 4 tablets, Disp: 15 tablet, Rfl: 0    hydroxychloroquine (PLAQUENIL) 200 mg tablet, Take 200 mg by mouth 2 (two) times a day  , Disp: , Rfl:     insulin glargine (BASAGLAR KWIKPEN) injection pen 100 units/mL, Inject 0 35 mL (35 Units total) under the skin 2 (two) times a day, Disp: 21 mL, Rfl: 0    Insulin Syringe-Needle U-100 (B-D INS SYRINGE 0 5CC/30GX1/2") 30G X 1/2" 0 5 ML MISC, by Does not apply route, Disp: , Rfl:     JANUVIA 100 MG tablet, TAKE ONE TABLET EVERY DAY, Disp: 90 tablet, Rfl: 1    leflunomide (ARAVA) 20 MG tablet, Take 1 tablet by mouth daily with dinner  , Disp: , Rfl:     losartan-hydrochlorothiazide (HYZAAR) 50-12 5 mg per tablet, TAKE 1 TABLET BY MOUTH DAILY, Disp: 90 tablet, Rfl: 1    naproxen (NAPROSYN) 500 mg tablet, Take 1 tablet by mouth every 12 (twelve) hours, Disp: , Rfl:     omeprazole (PriLOSEC) 40 MG capsule, , Disp: , Rfl:     terbinafine (LamISIL) 250 mg tablet, , Disp: , Rfl:     ULTICARE MICRO PEN NEEDLES 32G X 4 MM MISC, INJECT UNDER THE SKIN 3 (THREE) TIMES A DAY, Disp: 100 each, Rfl: 3    VENTOLIN  (90 Base) MCG/ACT inhaler, INHALE 2 PUFFS EVERY 4 (FOUR) HOURS AS NEEDED FOR WHEEZING, Disp: 18 g, Rfl: 1    VICTOZA injection, INJECT 1 8 MG DAILY, Disp: 9 mL, Rfl: 4    Past Medical History:       Past Medical History:   Diagnosis Date    COPD (chronic obstructive pulmonary disease) (HCC)     GERD (gastroesophageal reflux disease)     Hyperglycemia     Resolved: 4/25/2017    Hyperlipidemia     Neuropathy         Past Surgical History:   Procedure Laterality Date    APPENDECTOMY      [place] EXC SKIN MALIG <0 5 CM REMAINDER BODY N/A 3/28/2018    Procedure: EXCISION WIDE LESION HEAD/FACIAL/NECK;  Surgeon: Lulu Craig MD;  [facility];  Service: Surgical Oncology    SKIN BIOPSY      scalp    WISDOM TOOTH EXTRACTION  1998        Family History   Problem Relation Age of Onset    Diabetes Mother     Stroke Mother     Mental illness Mother     Diabetes Father     Stroke Father     Alcohol abuse Brother     Coronary artery disease Family         Age 51-55    Diabetes type II Family     Heart disease Family         Social History     Social History    Marital status: /Civil Union     Spouse name: N/A    Number of children: N/A    Years of education: N/A     Occupational History    [place]       Social History Main Topics    Smoking status: Current Every Day Smoker     Packs/day: 1 00    Smokeless tobacco: Never Used    Alcohol use Yes      Comment: socially     Drug use: No    Sexual activity: Not on file     Other Topics Concern    Not on file     Social History Narrative    Caffeine Use- Coffee and Tea         Physical Exam:     Physical Exam   Constitutional: He is oriented to person, place, and time  He appears well-developed and well-nourished  Obese   HENT:   Head: Normocephalic  Eyes: Pupils are equal, round, and reactive to light  Conjunctivae and EOM are normal    Neck: Normal range of motion  Neck supple  Cardiovascular: Normal rate, regular rhythm and normal heart sounds  No murmur heard  Pulmonary/Chest: Effort normal and breath sounds normal    Abdominal: Soft   Bowel sounds are normal  Musculoskeletal: He exhibits edema (Right foot swelling) and tenderness  Neurological: He is alert and oriented to person, place, and time  Psychiatric: He has a normal mood and affect  His behavior is normal  Judgment and thought content normal    Nursing note and vitals reviewed  Data:     Pre-operative work-up    Laboratory Results: I have personally reviewed the pertinent laboratory results/reports     EKG: I have personally reviewed pertinent reports  Normal EKG  No evidence of ischemia        Assessment & Recommendations:     1  Pre-operative clearance  POCT ECG   2  Unspecified fracture of right foot, initial encounter for closed fracture         Pre-Op Evaluation Assessment  48 y o  male with planned surgery:  Right foot surgery  Known risk factors for perioperative complications: Diabetes mellitus  Tobacco use  Cardiac Risk Estimation: per the Revised Cardiac Risk Index (Circ  100:1043, 1999), the patient's risk factors for cardiac complications include None, putting him in: RCI RISK CLASS II (1 risk factor, risk of major cardiac compl  appr  1 3%)  Current medications which may produce withdrawal symptoms if withheld perioperatively:  None  Problem List Items Addressed This Visit     Rheumatoid arthritis [facility])     Patient will be seeing rheumatologist tomorrow  Rheumatologist needs to decide when he would be able to resume his anti rheumatic agents  Enbrel makes him immunocompromised more susceptible to infection  Patient will be having external fixation does ice going through his foot  That is a significant risk for infection given his diabetes and immunosuppressive agents         Unspecified fracture of right foot, initial encounter for closed fracture     Management as per podiatry  Patient is medically cleared for surgery under general anesthesia  Patient is a diabetic and does have history of rheumatoid arthritis  Presently his antirheumatic agents are on hold    He will need to see rheumatologist to determine how long he should remain off of his antirheumatic agents  Patient also advised to stop smoking to allow his bones the best opportunity to heal         Relevant Orders    Crutches    Charcot foot due to diabetes mellitus ([address] )     Lab Results   Component Value Date    HGBA1C 6 8 (H) 01/25/2019       No results for input(s): POCGLU in the last 72 hours  Blood Sugar Average: Last 72 hrs:     Hemoglobin A1c of 6 8% is fairly well controlled  Unfortunately he has had longstanding diabetes  Management of Charcot foot as per Podiatry         Pre-op evaluation     Preoperative examination completed  Note sent to podiatrist office clearing him for surgery           Other Visit Diagnoses     Pre-operative clearance    -  Primary    Relevant Orders    POCT ECG (Completed)        Pre-Op Evaluation Plan  1  Further preoperative workup as follows:   - None; no further preoperative work-up is required    2  Medication Management/Recommendations:   - Patient should continue antihypertensive medications up through and including the day of surgery  - Patient should continue his statin medication up through and including the day of surgery  - Insulin Management: Patient instructed to hold basal insulin night before surgery  Can take Januvia and Victoza day of surgery  If inpatient after surgery will require medicine consultation  - Patient has been instructed to avoid aspirin containing medications or non-steroidal anti-inflammatory drugs for the week preceding surgery  3  Prophylaxis for cardiac events with perioperative beta-blockers: not indicated  4  Patient requires further consultation with: Rheumatology to determine management antirheumatic drugs following surgery    Clearance  Patient is CLEARED for surgery without any additional cardiac testing       DO Victoriano Hopkins [address]  [address] [facility]  [address] [facility] [address]  Phone# [phone]  Fax#  [phone]

## 2019-02-07 NOTE — ASSESSMENT & PLAN NOTE
Patient will be seeing rheumatologist tomorrow  Rheumatologist needs to decide when he would be able to resume his anti rheumatic agents  Enbrel makes him immunocompromised more susceptible to infection  Patient will be having external fixation does ice going through his foot    That is a significant risk for infection given his diabetes and immunosuppressive agents

## 2019-02-07 NOTE — H&P (VIEW-ONLY)
PRE-OPERATIVE EVALUATION  [facility]    NAME: Jovanni Stephens  AGE: 48 y o  SEX: male  : 1965     DATE: 2019      Pre-Operative Evaluation      Chief Complaint: Pre-operative Evaluation     Surgery: Right foot surgery, Charcot foot, metatarsal fractures  Anticipated Date of Surgery: 2019  Referring Provider: Dr Bob Villalta     History of Present Illness:     Jovanni Stephens  is a 48 y o  male who presents to the office today for a preoperative consultation at the request of surgeon, Dr Bob Villalta, who plans on performing right foot surgery on 2019  Planned anesthesia is general  Patient has a bleeding risk of: no recent abnormal bleeding  Patient does not have objections to receiving blood products if needed  Current anti-platelet/anti-coagulation medications that the patient is prescribed includes: None        Assessment of Chronic Conditions:   - Diabetes Mellitus: Optimized   - Hypertension: well controlled  - Rheumatoid Arthritis: on a immunospressive drugs presently being held     Assessment of Cardiac Risk:  · Denies unstable or severe angina or MI in the last 6 weeks or history of stent placement in the last year   · Denies decompensated heart failure (e g  New onset heart failure, NYHA functional class IV heart failure, or worsening existing heart failure)  · Denies significant arrhythmias such as high grade AV block, symptomatic ventricular arrhythmia, newly recognized ventricular tachycardia, supraventricular tachycardia with resting heart rate >100, or symptomatic bradycardia  · Denies severe heart valve disease including aortic stenosis or symptomatic mitral stenosis     Exercise Capacity:  · Able to walk 4 blocks without symptoms?: Yes, unable walk presently due to broken foot  · Able to walk 2 flights without symptoms?: Yes, unable to walk presently due to broken foot    Prior Anesthesia Reactions: No     Personal history of venous thromboembolic disease? No    History of steroid use for >2 weeks within last year? No    STOP-BANG Sleep Apnea Screening Questionnaire:         Review of Systems:     Review of Systems   Constitutional: Negative  HENT: Negative  Eyes: Negative  Respiratory: Negative  Cardiovascular: Negative  Gastrointestinal: Negative  Endocrine: Negative  Genitourinary: Negative  Musculoskeletal: Positive for arthralgias (Right foot pain) and gait problem ( antalgic gait due to right foot pain)  Skin: Negative  Allergic/Immunologic: Negative  Neurological: Positive for numbness ( bilateral feet)  Hematological: Negative  Psychiatric/Behavioral: Negative  All other systems reviewed and are negative  Current Problem List:     Patient Active Problem List   Diagnosis    Benign essential hypertension    Carotid artery calcification    Reduced libido    Erectile disorder due to medical condition in male patient    Type 2 diabetes mellitus with diabetic polyneuropathy, with long-term current use of insulin ([facility] )    Hyperlipidemia    Obesity (BMI 30 0-34  9)    Vitamin D deficiency    Rheumatoid arthritis (HCC)    Clear cell hidradenoma    Colon cancer screening    Abscess of scalp    Cellulitis of occipital region of scalp    Swelling of right foot    Unspecified fracture of right foot, initial encounter for closed fracture    Prostate cancer screening       Allergies:     No Known Allergies    Current Medications:       Current Outpatient Prescriptions:     ACCU-CHEK FASTCLIX LANCETS MISC, ONE LANCET FOUR TIMES A DAY, Disp: 102 each, Rfl: 1    ACCU-CHEK SMARTVIEW test strip, TEST BLOOD SUGAR FOUR TIMES A DAY, Disp: 100 each, Rfl: 0    atorvastatin (LIPITOR) 40 mg tablet, TAKE 1 TABLET AT BEDTIME , Disp: 90 tablet, Rfl: 1    BASAGLAR KWIKPEN 100 units/mL injection pen, INJECT 35 UNIT TWICE DAILY, Disp: 15 mL, Rfl: 1    Cholecalciferol (VITAMIN D) 2000 units tablet, 2,000 Units daily in the early morning  , Disp: , Rfl:     clindamycin (CLEOCIN) 300 MG capsule, Take 1 capsule (300 mg total) by mouth 3 (three) times a day for 10 days, Disp: 30 capsule, Rfl: 0    cyanocobalamin (VITAMIN B-12) 100 mcg tablet, Take by mouth daily, Disp: , Rfl:     ENBREL SURECLICK 50 MG/ML injection, , Disp: , Rfl:     ergocalciferol (VITAMIN D2) 50,000 units, , Disp: , Rfl:     Etanercept (ENBREL SC), Inject 50 mg under the skin once a week Takes on Friday, Disp: , Rfl:     fluticasone (FLONASE) 50 mcg/act nasal spray, 2 sprays into each nostril daily at bedtime  , Disp: , Rfl:     HYDROcodone-acetaminophen (NORCO) 5-325 mg per tablet, Take 1 tablet by mouth every 6 (six) hours as needed for pain Max Daily Amount: 4 tablets, Disp: 15 tablet, Rfl: 0    hydroxychloroquine (PLAQUENIL) 200 mg tablet, Take 200 mg by mouth 2 (two) times a day  , Disp: , Rfl:     insulin glargine (BASAGLAR KWIKPEN) injection pen 100 units/mL, Inject 0 35 mL (35 Units total) under the skin 2 (two) times a day, Disp: 21 mL, Rfl: 0    Insulin Syringe-Needle U-100 (B-D INS SYRINGE 0 5CC/30GX1/2") 30G X 1/2" 0 5 ML MISC, by Does not apply route, Disp: , Rfl:     JANUVIA 100 MG tablet, TAKE ONE TABLET EVERY DAY, Disp: 90 tablet, Rfl: 1    leflunomide (ARAVA) 20 MG tablet, Take 1 tablet by mouth daily with dinner  , Disp: , Rfl:     losartan-hydrochlorothiazide (HYZAAR) 50-12 5 mg per tablet, TAKE 1 TABLET BY MOUTH DAILY, Disp: 90 tablet, Rfl: 1    naproxen (NAPROSYN) 500 mg tablet, Take 1 tablet by mouth every 12 (twelve) hours, Disp: , Rfl:     omeprazole (PriLOSEC) 40 MG capsule, , Disp: , Rfl:     terbinafine (LamISIL) 250 mg tablet, , Disp: , Rfl:     ULTICARE MICRO PEN NEEDLES 32G X 4 MM MISC, INJECT UNDER THE SKIN 3 (THREE) TIMES A DAY, Disp: 100 each, Rfl: 3    VENTOLIN  (90 Base) MCG/ACT inhaler, INHALE 2 PUFFS EVERY 4 (FOUR) HOURS AS NEEDED FOR WHEEZING, Disp: 18 g, Rfl: 1    VICTOZA injection, INJECT 1 8 MG DAILY, Disp: 9 mL, Rfl: 4    Past Medical History:       Past Medical History:   Diagnosis Date    COPD (chronic obstructive pulmonary disease) (HCC)     GERD (gastroesophageal reflux disease)     Hyperglycemia     Resolved: 4/25/2017    Hyperlipidemia     Neuropathy         Past Surgical History:   Procedure Laterality Date    APPENDECTOMY      [place] EXC SKIN MALIG <0 5 CM REMAINDER BODY N/A 3/28/2018    Procedure: EXCISION WIDE LESION HEAD/FACIAL/NECK;  Surgeon: Marek Blancas MD;  [facility];  Service: Surgical Oncology    SKIN BIOPSY      scalp    WISDOM TOOTH EXTRACTION  1998        Family History   Problem Relation Age of Onset    Diabetes Mother     Stroke Mother     Mental illness Mother     Diabetes Father     Stroke Father     Alcohol abuse Brother     Coronary artery disease Family         Age 51-55    Diabetes type II Family     Heart disease Family         Social History     Social History    Marital status: /Civil Union     Spouse name: N/A    Number of children: N/A    Years of education: N/A     Occupational History           Social History Main Topics    Smoking status: Current Every Day Smoker     Packs/day: 1 00    Smokeless tobacco: Never Used    Alcohol use Yes      Comment: socially     Drug use: No    Sexual activity: Not on file     Other Topics Concern    Not on file     Social History Narrative    Caffeine Use- Coffee and Tea         Physical Exam:     Physical Exam   Constitutional: He is oriented to person, place, and time  He appears well-developed and well-nourished  Obese   HENT:   Head: Normocephalic  Eyes: Pupils are equal, round, and reactive to light  Conjunctivae and EOM are normal    Neck: Normal range of motion  Neck supple  Cardiovascular: Normal rate, regular rhythm and normal heart sounds  No murmur heard  Pulmonary/Chest: Effort normal and breath sounds normal    Abdominal: Soft   Bowel sounds are normal  Musculoskeletal: He exhibits edema (Right foot swelling) and tenderness  Neurological: He is alert and oriented to person, place, and time  Psychiatric: He has a normal mood and affect  His behavior is normal  Judgment and thought content normal    Nursing note and vitals reviewed  Data:     Pre-operative work-up    Laboratory Results: I have personally reviewed the pertinent laboratory results/reports     EKG: I have personally reviewed pertinent reports  Normal EKG  No evidence of ischemia        Assessment & Recommendations:     1  Pre-operative clearance  POCT ECG   2  Unspecified fracture of right foot, initial encounter for closed fracture         Pre-Op Evaluation Assessment  48 y o  male with planned surgery:  Right foot surgery  Known risk factors for perioperative complications: Diabetes mellitus  Tobacco use  Cardiac Risk Estimation: per the Revised Cardiac Risk Index (Circ  100:1043, 1999), the patient's risk factors for cardiac complications include None, putting him in: RCI RISK CLASS II (1 risk factor, risk of major cardiac compl  appr  1 3%)  Current medications which may produce withdrawal symptoms if withheld perioperatively:  None  Problem List Items Addressed This Visit     Rheumatoid arthritis [facility])     Patient will be seeing rheumatologist tomorrow  Rheumatologist needs to decide when he would be able to resume his anti rheumatic agents  Enbrel makes him immunocompromised more susceptible to infection  Patient will be having external fixation does ice going through his foot  That is a significant risk for infection given his diabetes and immunosuppressive agents         Unspecified fracture of right foot, initial encounter for closed fracture     Management as per podiatry  Patient is medically cleared for surgery under general anesthesia  Patient is a diabetic and does have history of rheumatoid arthritis  Presently his antirheumatic agents are on hold    He will need to see rheumatologist to determine how long he should remain off of his antirheumatic agents  Patient also advised to stop smoking to allow his bones the best opportunity to heal         Relevant Orders    Crutches    Charcot foot due to diabetes mellitus ([address] )     Lab Results   Component Value Date    HGBA1C 6 8 (H) 01/25/2019       No results for input(s): POCGLU in the last 72 hours  Blood Sugar Average: Last 72 hrs:     Hemoglobin A1c of 6 8% is fairly well controlled  Unfortunately he has had longstanding diabetes  Management of Charcot foot as per Podiatry         Pre-op evaluation     Preoperative examination completed  Note sent to podiatrist office clearing him for surgery           Other Visit Diagnoses     Pre-operative clearance    -  Primary    Relevant Orders    POCT ECG (Completed)        Pre-Op Evaluation Plan  1  Further preoperative workup as follows:   - None; no further preoperative work-up is required    2  Medication Management/Recommendations:   - Patient should continue antihypertensive medications up through and including the day of surgery  - Patient should continue his statin medication up through and including the day of surgery  - Insulin Management: Patient instructed to hold basal insulin night before surgery  Can take Januvia and Victoza day of surgery  If inpatient after surgery will require medicine consultation  - Patient has been instructed to avoid aspirin containing medications or non-steroidal anti-inflammatory drugs for the week preceding surgery  3  Prophylaxis for cardiac events with perioperative beta-blockers: not indicated  4  Patient requires further consultation with: Rheumatology to determine management antirheumatic drugs following surgery    Clearance  Patient is CLEARED for surgery without any additional cardiac testing       DO Victoriano Kemp   [address] [facility]  [address] [facility] [address]  Phone# [phone]  Fax#  [phone]

## 2019-02-11 ENCOUNTER — TELEPHONE (OUTPATIENT)
Dept: FAMILY MEDICINE CLINIC | Facility: CLINIC | Age: 54
End: 2019-02-11

## 2019-02-11 ENCOUNTER — ANESTHESIA EVENT (OUTPATIENT)
Dept: PERIOP | Facility: HOSPITAL | Age: 54
End: 2019-02-11
Payer: COMMERCIAL

## 2019-02-11 DIAGNOSIS — E11.9 TYPE 2 DIABETES MELLITUS WITHOUT COMPLICATION, WITHOUT LONG-TERM CURRENT USE OF INSULIN (HCC): ICD-10-CM

## 2019-02-11 RX ORDER — INSULIN GLARGINE 100 [IU]/ML
INJECTION, SOLUTION SUBCUTANEOUS
Qty: 15 ML | Refills: 1 | Status: SHIPPED | OUTPATIENT
Start: 2019-02-11 | End: 2019-03-19 | Stop reason: SDUPTHER

## 2019-02-11 NOTE — TELEPHONE ENCOUNTER
[facility] called to let us know patient has surgery with them tomorrow  In the preop clearance they received from us, they mentioned they had no heart and lung summary for the patient from us and that we need to make an addendum to our preop clearance summary      Nallely Paige  Fax: [phone]

## 2019-02-12 ENCOUNTER — HOSPITAL ENCOUNTER (OUTPATIENT)
Dept: RADIOLOGY | Facility: HOSPITAL | Age: 54
Setting detail: OUTPATIENT SURGERY
Discharge: HOME | End: 2019-02-12
Payer: COMMERCIAL

## 2019-02-12 ENCOUNTER — ANESTHESIA (OUTPATIENT)
Dept: PERIOP | Facility: HOSPITAL | Age: 54
End: 2019-02-12
Payer: COMMERCIAL

## 2019-02-12 ENCOUNTER — HOSPITAL ENCOUNTER (OUTPATIENT)
Facility: HOSPITAL | Age: 54
Setting detail: OBSERVATION
Discharge: HOME | End: 2019-02-15
Attending: PODIATRIST | Admitting: PODIATRIST
Payer: COMMERCIAL

## 2019-02-12 ENCOUNTER — APPOINTMENT (OUTPATIENT)
Dept: RADIOLOGY | Facility: HOSPITAL | Age: 54
End: 2019-02-12
Payer: COMMERCIAL

## 2019-02-12 DIAGNOSIS — M14.671 CHARCOT'S JOINT, RIGHT ANKLE AND FOOT: ICD-10-CM

## 2019-02-12 DIAGNOSIS — Z98.890 S/P FOOT SURGERY, RIGHT: Primary | ICD-10-CM

## 2019-02-12 LAB
GLUCOSE SERPL-MCNC: 115 MG/DL (ref 65–140)
GLUCOSE SERPL-MCNC: 153 MG/DL (ref 65–140)
GLUCOSE SERPL-MCNC: 173 MG/DL (ref 65–140)

## 2019-02-12 PROCEDURE — 82948 REAGENT STRIP/BLOOD GLUCOSE: CPT

## 2019-02-12 PROCEDURE — C1713 ANCHOR/SCREW BN/BN,TIS/BN: HCPCS | Performed by: PODIATRIST

## 2019-02-12 PROCEDURE — 73630 X-RAY EXAM OF FOOT: CPT

## 2019-02-12 PROCEDURE — 73620 X-RAY EXAM OF FOOT: CPT

## 2019-02-12 DEVICE — WIRE BAYONET 2MM: Type: IMPLANTABLE DEVICE | Site: FOOT | Status: FUNCTIONAL

## 2019-02-12 DEVICE — CLAMP ANGULAR CORRECTION: Type: IMPLANTABLE DEVICE | Site: FOOT | Status: FUNCTIONAL

## 2019-02-12 DEVICE — BOLT W/ HEX 20MM: Type: IMPLANTABLE DEVICE | Site: FOOT | Status: FUNCTIONAL

## 2019-02-12 DEVICE — JOINT UNIVERSAL HINGED: Type: IMPLANTABLE DEVICE | Site: FOOT | Status: FUNCTIONAL

## 2019-02-12 DEVICE — ROD THREADED 25MM: Type: IMPLANTABLE DEVICE | Site: FOOT | Status: FUNCTIONAL

## 2019-02-12 DEVICE — NUT 10MM: Type: IMPLANTABLE DEVICE | Site: FOOT | Status: FUNCTIONAL

## 2019-02-12 DEVICE — IMPLANTABLE DEVICE: Type: IMPLANTABLE DEVICE | Site: FOOT | Status: FUNCTIONAL

## 2019-02-12 DEVICE — POST 2 HOLE MALE: Type: IMPLANTABLE DEVICE | Site: FOOT | Status: FUNCTIONAL

## 2019-02-12 DEVICE — WIRE OLIVE 2MM: Type: IMPLANTABLE DEVICE | Site: FOOT | Status: FUNCTIONAL

## 2019-02-12 DEVICE — BOLT UNIVERSALWIRE FIXATION: Type: IMPLANTABLE DEVICE | Site: FOOT | Status: FUNCTIONAL

## 2019-02-12 DEVICE — BOLT WITH HEX 16MM: Type: IMPLANTABLE DEVICE | Site: FOOT | Status: FUNCTIONAL

## 2019-02-12 DEVICE — RING OVAL 160MM FULL: Type: IMPLANTABLE DEVICE | Site: FOOT | Status: FUNCTIONAL

## 2019-02-12 DEVICE — POST 1 HOLE MALE: Type: IMPLANTABLE DEVICE | Site: FOOT | Status: FUNCTIONAL

## 2019-02-12 DEVICE — RAIL SPEED FRAME: Type: IMPLANTABLE DEVICE | Site: FOOT | Status: FUNCTIONAL

## 2019-02-12 RX ORDER — PROPOFOL 10 MG/ML
INJECTION, EMULSION INTRAVENOUS AS NEEDED
Status: DISCONTINUED | OUTPATIENT
Start: 2019-02-12 | End: 2019-02-12 | Stop reason: SURG

## 2019-02-12 RX ORDER — GLYCOPYRROLATE 0.2 MG/ML
INJECTION INTRAMUSCULAR; INTRAVENOUS AS NEEDED
Status: DISCONTINUED | OUTPATIENT
Start: 2019-02-12 | End: 2019-02-12 | Stop reason: SURG

## 2019-02-12 RX ORDER — MEPERIDINE HYDROCHLORIDE 50 MG/ML
12.5 INJECTION INTRAMUSCULAR; INTRAVENOUS; SUBCUTANEOUS AS NEEDED
Status: DISCONTINUED | OUTPATIENT
Start: 2019-02-12 | End: 2019-02-12 | Stop reason: HOSPADM

## 2019-02-12 RX ORDER — NICOTINE 21 MG/24HR
14 PATCH, TRANSDERMAL 24 HOURS TRANSDERMAL DAILY
Status: DISCONTINUED | OUTPATIENT
Start: 2019-02-12 | End: 2019-02-15 | Stop reason: HOSPADM

## 2019-02-12 RX ORDER — ERGOCALCIFEROL 1.25 MG/1
50000 CAPSULE ORAL WEEKLY
Status: DISCONTINUED | OUTPATIENT
Start: 2019-02-12 | End: 2019-02-15 | Stop reason: HOSPADM

## 2019-02-12 RX ORDER — ROCURONIUM BROMIDE 10 MG/ML
INJECTION, SOLUTION INTRAVENOUS AS NEEDED
Status: DISCONTINUED | OUTPATIENT
Start: 2019-02-12 | End: 2019-02-12 | Stop reason: SURG

## 2019-02-12 RX ORDER — CLINDAMYCIN HYDROCHLORIDE 300 MG/1
300 CAPSULE ORAL 3 TIMES DAILY
COMMUNITY
End: 2019-02-15 | Stop reason: HOSPADM

## 2019-02-12 RX ORDER — NAPROXEN 500 MG/1
500 TABLET ORAL EVERY 12 HOURS
Status: DISCONTINUED | OUTPATIENT
Start: 2019-02-12 | End: 2019-02-15 | Stop reason: HOSPADM

## 2019-02-12 RX ORDER — ROPIVACAINE HYDROCHLORIDE 5 MG/ML
INJECTION, SOLUTION EPIDURAL; INFILTRATION; PERINEURAL
Status: COMPLETED | OUTPATIENT
Start: 2019-02-12 | End: 2019-02-12

## 2019-02-12 RX ORDER — TERBINAFINE HYDROCHLORIDE 250 MG/1
250 TABLET ORAL DAILY
Status: DISCONTINUED | OUTPATIENT
Start: 2019-02-12 | End: 2019-02-14 | Stop reason: RX

## 2019-02-12 RX ORDER — LEFLUNOMIDE 20 MG/1
20 TABLET ORAL
Status: DISCONTINUED | OUTPATIENT
Start: 2019-02-12 | End: 2019-02-14 | Stop reason: RX

## 2019-02-12 RX ORDER — BUPIVACAINE HYDROCHLORIDE 5 MG/ML
INJECTION, SOLUTION PERINEURAL
Status: COMPLETED | OUTPATIENT
Start: 2019-02-12 | End: 2019-02-12

## 2019-02-12 RX ORDER — LEFLUNOMIDE 20 MG/1
20 TABLET ORAL
Status: DISCONTINUED | OUTPATIENT
Start: 2019-02-12 | End: 2019-02-12 | Stop reason: CLARIF

## 2019-02-12 RX ORDER — ATORVASTATIN CALCIUM 40 MG/1
40 TABLET, FILM COATED ORAL
Status: DISCONTINUED | OUTPATIENT
Start: 2019-02-12 | End: 2019-02-15 | Stop reason: HOSPADM

## 2019-02-12 RX ORDER — OXYCODONE HYDROCHLORIDE AND ACETAMINOPHEN 5; 325 MG/1; MG/1
2 TABLET ORAL EVERY 4 HOURS PRN
Status: DISCONTINUED | OUTPATIENT
Start: 2019-02-12 | End: 2019-02-13

## 2019-02-12 RX ORDER — FENTANYL CITRATE 50 UG/ML
INJECTION, SOLUTION INTRAMUSCULAR; INTRAVENOUS AS NEEDED
Status: DISCONTINUED | OUTPATIENT
Start: 2019-02-12 | End: 2019-02-12 | Stop reason: SURG

## 2019-02-12 RX ORDER — FLUTICASONE PROPIONATE 50 MCG
2 SPRAY, SUSPENSION (ML) NASAL
Status: DISCONTINUED | OUTPATIENT
Start: 2019-02-12 | End: 2019-02-15 | Stop reason: HOSPADM

## 2019-02-12 RX ORDER — HYDROXYCHLOROQUINE SULFATE 200 MG/1
200 TABLET, FILM COATED ORAL 2 TIMES DAILY
Status: DISCONTINUED | OUTPATIENT
Start: 2019-02-12 | End: 2019-02-15 | Stop reason: HOSPADM

## 2019-02-12 RX ORDER — SODIUM CHLORIDE 9 MG/ML
125 INJECTION, SOLUTION INTRAVENOUS CONTINUOUS
Status: DISCONTINUED | OUTPATIENT
Start: 2019-02-12 | End: 2019-02-12

## 2019-02-12 RX ORDER — OXYCODONE HYDROCHLORIDE AND ACETAMINOPHEN 5; 325 MG/1; MG/1
1 TABLET ORAL EVERY 6 HOURS PRN
Status: DISCONTINUED | OUTPATIENT
Start: 2019-02-12 | End: 2019-02-13

## 2019-02-12 RX ORDER — MAGNESIUM HYDROXIDE 1200 MG/15ML
LIQUID ORAL AS NEEDED
Status: DISCONTINUED | OUTPATIENT
Start: 2019-02-12 | End: 2019-02-12 | Stop reason: HOSPADM

## 2019-02-12 RX ORDER — ONDANSETRON 2 MG/ML
INJECTION INTRAMUSCULAR; INTRAVENOUS AS NEEDED
Status: DISCONTINUED | OUTPATIENT
Start: 2019-02-12 | End: 2019-02-12 | Stop reason: SURG

## 2019-02-12 RX ORDER — HYDROMORPHONE HCL/PF 1 MG/ML
0.5 SYRINGE (ML) INJECTION
Status: DISCONTINUED | OUTPATIENT
Start: 2019-02-12 | End: 2019-02-12 | Stop reason: HOSPADM

## 2019-02-12 RX ORDER — FENTANYL CITRATE/PF 50 MCG/ML
50 SYRINGE (ML) INJECTION
Status: DISCONTINUED | OUTPATIENT
Start: 2019-02-12 | End: 2019-02-12 | Stop reason: HOSPADM

## 2019-02-12 RX ORDER — METOPROLOL TARTRATE 5 MG/5ML
INJECTION INTRAVENOUS AS NEEDED
Status: DISCONTINUED | OUTPATIENT
Start: 2019-02-12 | End: 2019-02-12 | Stop reason: SURG

## 2019-02-12 RX ORDER — ALBUTEROL SULFATE 90 UG/1
2 AEROSOL, METERED RESPIRATORY (INHALATION) EVERY 4 HOURS PRN
Status: DISCONTINUED | OUTPATIENT
Start: 2019-02-12 | End: 2019-02-15 | Stop reason: HOSPADM

## 2019-02-12 RX ORDER — LIDOCAINE HYDROCHLORIDE 10 MG/ML
INJECTION, SOLUTION INFILTRATION; PERINEURAL AS NEEDED
Status: DISCONTINUED | OUTPATIENT
Start: 2019-02-12 | End: 2019-02-12 | Stop reason: SURG

## 2019-02-12 RX ORDER — ALBUTEROL SULFATE 2.5 MG/3ML
2.5 SOLUTION RESPIRATORY (INHALATION) ONCE AS NEEDED
Status: DISCONTINUED | OUTPATIENT
Start: 2019-02-12 | End: 2019-02-12 | Stop reason: HOSPADM

## 2019-02-12 RX ORDER — INSULIN GLARGINE 100 [IU]/ML
35 INJECTION, SOLUTION SUBCUTANEOUS EVERY 12 HOURS SCHEDULED
Status: DISCONTINUED | OUTPATIENT
Start: 2019-02-12 | End: 2019-02-15 | Stop reason: HOSPADM

## 2019-02-12 RX ORDER — METOCLOPRAMIDE HYDROCHLORIDE 5 MG/ML
INJECTION INTRAMUSCULAR; INTRAVENOUS AS NEEDED
Status: DISCONTINUED | OUTPATIENT
Start: 2019-02-12 | End: 2019-02-12 | Stop reason: SURG

## 2019-02-12 RX ORDER — MIDAZOLAM HYDROCHLORIDE 1 MG/ML
INJECTION INTRAMUSCULAR; INTRAVENOUS AS NEEDED
Status: DISCONTINUED | OUTPATIENT
Start: 2019-02-12 | End: 2019-02-12 | Stop reason: SURG

## 2019-02-12 RX ADMIN — LIDOCAINE HYDROCHLORIDE 100 MG: 10 INJECTION, SOLUTION INFILTRATION; PERINEURAL at 10:54

## 2019-02-12 RX ADMIN — MIDAZOLAM 4 MG: 1 INJECTION INTRAMUSCULAR; INTRAVENOUS at 10:27

## 2019-02-12 RX ADMIN — PROPOFOL 200 MG: 10 INJECTION, EMULSION INTRAVENOUS at 10:54

## 2019-02-12 RX ADMIN — FENTANYL CITRATE 100 MCG: 50 INJECTION, SOLUTION INTRAMUSCULAR; INTRAVENOUS at 10:27

## 2019-02-12 RX ADMIN — HYDROXYCHLOROQUINE SULFATE 200 MG: 200 TABLET, FILM COATED ORAL at 19:49

## 2019-02-12 RX ADMIN — ROPIVACAINE HYDROCHLORIDE 25 ML: 5 INJECTION, SOLUTION EPIDURAL; INFILTRATION; PERINEURAL at 10:27

## 2019-02-12 RX ADMIN — NEOSTIGMINE METHYLSULFATE 2 MG: 1 INJECTION INTRAMUSCULAR; INTRAVENOUS; SUBCUTANEOUS at 12:53

## 2019-02-12 RX ADMIN — METOPROLOL TARTRATE 2 MG: 5 INJECTION INTRAVENOUS at 11:21

## 2019-02-12 RX ADMIN — METOCLOPRAMIDE HYDROCHLORIDE 10 MG: 5 INJECTION INTRAMUSCULAR; INTRAVENOUS at 11:06

## 2019-02-12 RX ADMIN — FENTANYL CITRATE 100 MCG: 50 INJECTION, SOLUTION INTRAMUSCULAR; INTRAVENOUS at 11:15

## 2019-02-12 RX ADMIN — SITAGLIPTIN 100 MG: 100 TABLET, FILM COATED ORAL at 16:04

## 2019-02-12 RX ADMIN — CEFAZOLIN SODIUM 2000 MG: 1 INJECTION, POWDER, FOR SOLUTION INTRAMUSCULAR; INTRAVENOUS at 10:50

## 2019-02-12 RX ADMIN — FLUTICASONE PROPIONATE 2 SPRAY: 50 SPRAY, METERED NASAL at 23:47

## 2019-02-12 RX ADMIN — GLYCOPYRROLATE 0.4 MG: 0.2 INJECTION, SOLUTION INTRAMUSCULAR; INTRAVENOUS at 12:57

## 2019-02-12 RX ADMIN — NICOTINE 14 MG: 14 PATCH TRANSDERMAL at 16:04

## 2019-02-12 RX ADMIN — ONDANSETRON 4 MG: 2 INJECTION INTRAMUSCULAR; INTRAVENOUS at 12:23

## 2019-02-12 RX ADMIN — ROCURONIUM BROMIDE 50 MG: 10 INJECTION INTRAVENOUS at 10:50

## 2019-02-12 RX ADMIN — NEOSTIGMINE METHYLSULFATE 2 MG: 1 INJECTION INTRAMUSCULAR; INTRAVENOUS; SUBCUTANEOUS at 12:57

## 2019-02-12 RX ADMIN — OXYCODONE HYDROCHLORIDE AND ACETAMINOPHEN 1 TABLET: 5; 325 TABLET ORAL at 16:13

## 2019-02-12 RX ADMIN — SODIUM CHLORIDE: 0.9 INJECTION, SOLUTION INTRAVENOUS at 11:32

## 2019-02-12 RX ADMIN — BUPIVACAINE HYDROCHLORIDE 10 ML: 5 INJECTION, SOLUTION PERINEURAL at 10:27

## 2019-02-12 RX ADMIN — NAPROXEN 500 MG: 500 TABLET ORAL at 16:04

## 2019-02-12 RX ADMIN — PROPOFOL 100 MG: 10 INJECTION, EMULSION INTRAVENOUS at 13:04

## 2019-02-12 RX ADMIN — CEFAZOLIN SODIUM 1000 MG: 10 INJECTION, POWDER, FOR SOLUTION INTRAVENOUS at 19:49

## 2019-02-12 RX ADMIN — GLYCOPYRROLATE 0.4 MG: 0.2 INJECTION, SOLUTION INTRAMUSCULAR; INTRAVENOUS at 12:53

## 2019-02-12 RX ADMIN — ATORVASTATIN CALCIUM 40 MG: 40 TABLET, FILM COATED ORAL at 23:47

## 2019-02-12 RX ADMIN — ENOXAPARIN SODIUM 40 MG: 40 INJECTION SUBCUTANEOUS at 16:04

## 2019-02-12 RX ADMIN — SODIUM CHLORIDE 125 ML/HR: 0.9 INJECTION, SOLUTION INTRAVENOUS at 09:01

## 2019-02-12 RX ADMIN — ROPIVACAINE HYDROCHLORIDE 15 ML: 5 INJECTION, SOLUTION EPIDURAL; INFILTRATION; PERINEURAL at 10:38

## 2019-02-12 NOTE — OP NOTE
OPERATIVE REPORT  PATIENT NAME: Greyson Lundy  :  1965  MRN: [id number]  Pt Location: [facility] OR ROOM 03    SURGERY DATE: 2019    Surgeon(s) and Role: * Ave Hernandez DPM - Primary     * Dallas Means DPM - Assisting    Preop Diagnosis:  Charcot's joint, right ankle and foot [M14 671]     Post-Op Diagnosis Codes:     * Charcot's joint, right ankle and foot [M14 671]    Procedures:   1  Endoscopic gastrocnemius recession   2  Application of external fixator  3  Closed reduction of fractures with percutaneous pinning   4  Intraoperative C-arm interpretation     Hemostasis: PTT about the right thigh for 16 minutes at 300mmHg     Estimated Blood Loss:   25 mL    Materials: SEAL external fixator system     Anesthesia Type:   General w/ Popliteal Block    Operative Indications:  Charcot's joint, right ankle and foot [M14 671]    Operative Findings:  Consistent with diagnosis: right foot tight gastrocnemius tendon which upon release, the foot was able to achieve satisfactory dorsiflexion on the ankle joint, right foot fractures of midfoot were closed reduced with percutaneous pinning and satisfactorily brought out to length as confirmed with intraoperative fluoroscopy  Complications:   None     Procedure and Technique:  Under mild sedation, the patient was brought into the operating room and placed on the operating room table in the supine position  A pneumatic thigh tourniquet was then placed around the patient's right thigh with ample webril padding  A time out was performed to confirm the correct patient, procedure and site with all parties in agreement  Following IV sedation the lower extremity was then scrubbed, prepped and draped in the usual aseptic manner  An esmarch bandage was utilized to exsanguinate the patient's lower extremity and the pneumatic thigh tourniquet was then inflated  The esmarch bandage was removed and the lower extremity was placed on the operating room table  A skin marker was utilized to plan incision site for the endoscopic gastrocnemius recession  Attention directed to the medial lower extremity where a sterile 15 blade was utilized to make a small linear incision approximately midway up the calf region  Hemostat utilized to bluntly dissect through adipose tissue  Spatula inserted to strum correct plane between gastroc fascia and crural fasia after which trocar was inserted from medial to lateral  Stab incision made laterally just over the point of the trocar and the trocar was advanced through  Inner portion of trocar removed and camera inserted  Gastroc aponeurosis was visualized after which hook blade passed through medial portal and advanced through aponeurosis from lateral to medial  Satisfactory correction was confirmed both by visualizing the underlying muscle belly and increased dorsiflexion of the foot on the ankle as seen clinically  Camera removed and obturator cannula removed as one unit  Portals primarily closed with nylon  Tourniquet was deflated and normal hyperemic flush noted the lower extremity  Attention was then directed to the lower extremity where SEAL external fixator system applied  The tibial block of the frame had been pre-built and was placed around the patient's lower extremity  This was modified as needed and secured with wires through the tibial x4, in respect to safe corridors  Wires adequately tensioned  Half pins were then inserted percutaneously through the calcaneus and the foot plate was secured utilizing bolts and nuts as needed  Wire inserted through the distal metatarsals and another wire inserted through the proximal metatarsals  This was secured onto frame and distracted  The fractures to the midfoot were satisfactory closed reduced as confirmed on radiograph  Nylon sutures applied around some wires in foot where increased sanguinous drainage was noted   Incision sites dressed with xeroform and biopatches/4x4's applied surrounding pin and wire sites  The lower extremity was wrapped with kerlix and ACE wraps  The patient tolerated the procedure well and is to be admitted as outpatient no charge bed for continued monitoring and evaluation by physical therapy       Patient Disposition:  PACU     SIGNATURE: Joanne Velasquez DPM  DATE: February 12, 2019  TIME: 1:51 PM

## 2019-02-12 NOTE — H&P
H&P - Manolo Thompson  48 y o  male MRN: [id number]  Unit/Bed#: OR [place] Encounter: [id number]    Assessment/Plan   1  49 y/o male s/p RLE endoscopic gastrocnemius recession and closed reduction of RLE midfoot fractures with application of external fixator admitted outpatient no charge bed for continued care and observation (DOS: 2/12/19 by Dr Saira Dominguez)   2  DM2 with neuropathy   3  HTN  4  HLD   5  GERD  6  Rheumatoid Arthritis   7  COPD   8  Tobacco Dependence     DVT prophylaxis: Lovenox     Plan:  -patient s/p RLE surgical procedures as listed above   -admitted outpatient no charge bed for continued care and monitoring   -c/w home medications while in-house   -c/w ancef 1g q 8 hours for 3 more doses   -PT to evaluate patient   -NWB to RLE with use of knee scooter, activity as tolerated   -CM consulted for assistance in coverage for lovenox outpatient basis   -nicotine patch ordered while in-house     History of Present Illness     HPI:  Jasmin Méndez  is a 48 y o  male with past medical history of DM2 with neuropathy, HTN, HLD, GERD, RA, COPD, and tobacco dependence admitted s/p s/p RLE endoscopic gastrocnemius recession and closed reduction of RLE midfoot fractures with application of external fixator  Physical therapy is to evaluate patient prior to discharge  Consults  Review of Systems   Constitutional: Negative  HENT: Negative  Eyes: Negative  Respiratory: Negative  Cardiovascular: Negative  Gastrointestinal: Negative  Musculoskeletal: RLE with external fixator   Skin: External fixator to RLE   Neurological: Negative  Psych: negative       Historical Information   Past Medical History:   Diagnosis Date    Arthritis     At risk for falls     Cancer [facility])     sweat glands    COPD (chronic obstructive pulmonary disease) ([facility] )     Crutches as ambulation aid     Diabetes mellitus ([facility] [facility] )     GERD (gastroesophageal reflux disease)     Hyperglycemia Resolved: 4/25/2017    Hyperlipidemia     Hypertension     Kidney stone     Neuropathy     Numbness and tingling of both lower extremities     RA (rheumatoid arthritis) (HCC)     Shortness of breath     Skin abnormality     has a healing wound on head from procedure - covered with guaze    Wears glasses      Past Surgical History:   Procedure Laterality Date    APPENDECTOMY      CT EXC SKIN MALIG <0 5 CM REMAINDER BODY N/A 3/28/2018    Procedure: EXCISION WIDE LESION HEAD/FACIAL/NECK;  Surgeon: Brynn Page MD;  Location: [facility];  Service: Surgical Oncology    SKIN BIOPSY      scalp    WISDOM TOOTH EXTRACTION  1998     Social History   Social History     Substance and Sexual Activity   Alcohol Use Yes    Frequency: Monthly or less    Comment: 1 every 2 weeks     Social History     Substance and Sexual Activity   Drug Use No     Social History     Tobacco Use   Smoking Status Current Every Day Smoker    Packs/day: 1 00   Smokeless Tobacco Never Used     Family History:   Family History   Problem Relation Age of Onset    Diabetes Mother     Stroke Mother     Mental illness Mother     Diabetes Father     Stroke Father     Alcohol abuse Brother     Coronary artery disease Family         Age 51-55    Diabetes type II Family     Heart disease Family      Meds/Allergies   Medications Prior to Admission   Medication    ACCU-CHEK FASTCLIX LANCETS MISC    ACCU-CHEK SMARTVIEW test strip    atorvastatin (LIPITOR) 40 mg tablet    BASAGLAR KWIKPEN 100 units/mL injection pen    cyanocobalamin (VITAMIN B-12) 100 mcg tablet    ENBREL SURECLICK 50 MG/ML injection    ergocalciferol (VITAMIN D2) 50,000 units    fluticasone (FLONASE) 50 mcg/act nasal spray    hydroxychloroquine (PLAQUENIL) 200 mg tablet    Insulin Syringe-Needle U-100 (B-D INS SYRINGE 0 5CC/30GX1/2") 30G X 1/2" 0 5 ML MISC    JANUVIA 100 MG tablet    leflunomide (ARAVA) 20 MG tablet    losartan-hydrochlorothiazide (HYZAAR) 50-12 5 mg per tablet    naproxen (NAPROSYN) 500 mg tablet    ULTICARE MICRO PEN NEEDLES 32G X 4 MM MISC    VENTOLIN  (90 Base) MCG/ACT inhaler    VICTOZA injection    clindamycin (CLEOCIN) 300 MG capsule    terbinafine (LamISIL) 250 mg tablet     No Known Allergies    Objective   First Vitals:   Blood Pressure: 141/71 (02/12/19 0817)  Pulse: 92 (02/12/19 0817)  Temperature: 97 5 °F (36 4 °C) (02/12/19 0817)  Temp Source: Tympanic (02/12/19 0817)  Respirations: 16 (02/12/19 0817)  Height: 6' (182 9 cm) (02/08/19 1519)  Weight - Scale: 104 kg (230 lb) (02/08/19 1519)  SpO2: 98 % (02/12/19 0817)    Current Vitals:   Blood Pressure: 122/66 (02/12/19 1351)  Pulse: 78 (02/12/19 1351)  Temperature: 98 6 °F (37 °C) (02/12/19 1321)  Temp Source: Tympanic (02/12/19 0817)  Respirations: 12 (02/12/19 1351)  Height: 6' (182 9 cm) (02/12/19 0817)  Weight - Scale: 104 kg (230 lb) (02/12/19 0817)  SpO2: 98 % (02/12/19 1351)    /66   Pulse 78   Temp 98 6 °F (37 °C)   Resp 12   Ht 6' (1 829 m)   Wt 104 kg (230 lb)   SpO2 98%   BMI 31 19 kg/m²      General Appearance:    Alert, cooperative, no distress   Head:    Normocephalic, without obvious abnormality, atraumatic   Eyes:    PERRL, conjunctiva/corneas clear, EOM's intact        Nose:   Moist mucous membranes   Neck:   Supple, symmetrical, trachea midline   Back:     Symmetric   Lungs:     Respirations unlabored       Abdomen:     Soft, non-tender   Extremities:   External fixator to RLE                  Lab Results:   Admission on 02/12/2019   Component Date Value    POC Glucose 02/12/2019 153*    POC Glucose 02/12/2019 115                    Invalid input(s): LABAEARO          Imaging: I have personally reviewed pertinent films in PACS  EKG, Pathology, and Other Studies: I have personally reviewed pertinent reports        Code Status: No Order  Advance Directive and Living Will:      Power of :    POLST:

## 2019-02-12 NOTE — ANESTHESIA POSTPROCEDURE EVALUATION
Post-Op Assessment Note    CV Status:  Stable  Pain Score: 2    Pain management: adequate     Mental Status:  Alert and awake   Hydration Status:  Euvolemic   PONV Controlled:  Controlled   Airway Patency:  Patent  Airway: intubated   Post Op Vitals Reviewed: Yes      Staff: Anesthesiologist           BP      Temp     Pulse     Resp      SpO2

## 2019-02-12 NOTE — ANESTHESIA PROCEDURE NOTES
Peripheral Block    Patient location during procedure: holding area  Start time: 2/12/2019 10:27 AM  Reason for block: at surgeon's request and post-op pain management  Staffing  Anesthesiologist: Barrett Person DO  Preanesthetic Checklist  Completed: patient identified, site marked, surgical consent, pre-op evaluation, timeout performed, IV checked, risks and benefits discussed and monitors and equipment checked  Peripheral Block  Patient position: supine  Prep: ChloraPrep  Patient monitoring: heart rate, continuous pulse ox and frequent blood pressure checks  Block type: popliteal  Laterality: right  Injection technique: single-shot  Procedures: ultrasound guided and nerve stimulator  Ultrasound permanent image savedbupivacaine (MARCAINE) 0 5 % perineural infiltration, 10 mL  ropivacaine (NAROPIN) 0 5 % perineural infiltration, 25 mL  Needle  Needle type: Stimuplex   Needle gauge: 22 G  Needle length: 10 cm  Needle localization: nerve stimulator and ultrasound guidance  Assessment  Injection assessment: incremental injection, local visualized surrounding nerve on ultrasound, no paresthesia on injection and negative aspiration for heme  Paresthesia pain: none  Heart rate change: no  Slow fractionated injection: yes  Post-procedure:  site cleaned  patient tolerated the procedure well with no immediate complications

## 2019-02-12 NOTE — ANESTHESIA PREPROCEDURE EVALUATION
Review of Systems/Medical History  Patient summary reviewed  Chart reviewed  No history of anesthetic complications     Cardiovascular  Hyperlipidemia, Hypertension controlled,    Pulmonary  Smoker cigarette smoker  , Tobacco cessation counseling given , COPD mild- PRN medicaiton , Shortness of breath,        GI/Hepatic    GERD well controlled,        Kidney stones,        Endo/Other  Diabetes well controlled type 2 Insulin,   Obesity    GYN       Hematology   Musculoskeletal  Rheumatoid arthritis Severity: moderate,   Arthritis     Neurology    Diabetic neuropathy,    Psychology   Negative psychology ROS              Physical Exam    Airway    Mallampati score: II  TM Distance: >3 FB  Neck ROM: full     Dental   No notable dental hx     Cardiovascular  Rhythm: regular, Rate: normal, Cardiovascular exam normal    Pulmonary  Pulmonary exam normal     Other Findings        Anesthesia Plan  ASA Score- 3     Anesthesia Type- general and regional with ASA Monitors  Additional Monitors:   Airway Plan:         Plan Factors-Patient not instructed to abstain from smoking on day of procedure  Patient did not smoke on day of surgery  Induction- intravenous  Postoperative Plan-     Informed Consent- Anesthetic plan and risks discussed with patient

## 2019-02-12 NOTE — ANESTHESIA PROCEDURE NOTES
Peripheral Block    Patient location during procedure: holding area  Start time: 2/12/2019 10:34 AM  Reason for block: at surgeon's request and post-op pain management  Staffing  Anesthesiologist: Pete Bell DO  Preanesthetic Checklist  Completed: patient identified, site marked, surgical consent, pre-op evaluation, timeout performed, IV checked, risks and benefits discussed and monitors and equipment checked  Peripheral Block  Patient position: supine  Prep: ChloraPrep  Patient monitoring: heart rate, continuous pulse ox and frequent blood pressure checks  Block type: adductor canal block  Laterality: right  Injection technique: single-shot  Procedures: ultrasound guidedropivacaine (NAROPIN) 0 5 % perineural infiltration, 15 mL  Needle  Needle type: Stimuplex   Needle gauge: 22 G  Needle length: 10 cm  Assessment  Injection assessment: incremental injection, local visualized surrounding nerve on ultrasound, negative aspiration for heme and no paresthesia on injection  Paresthesia pain: none  Heart rate change: no  Slow fractionated injection: yes  Post-procedure:  site cleaned  patient tolerated the procedure well with no immediate complications

## 2019-02-13 LAB
ERYTHROCYTE [DISTWIDTH] IN BLOOD BY AUTOMATED COUNT: 13.8 % (ref 11.6–15.1)
GLUCOSE SERPL-MCNC: 108 MG/DL (ref 65–140)
GLUCOSE SERPL-MCNC: 130 MG/DL (ref 65–140)
GLUCOSE SERPL-MCNC: 165 MG/DL (ref 65–140)
GLUCOSE SERPL-MCNC: 236 MG/DL (ref 65–140)
HCT VFR BLD AUTO: 38.4 % (ref 36.5–49.3)
HGB BLD-MCNC: 12.6 G/DL (ref 12–17)
MCH RBC QN AUTO: 30.1 PG (ref 26.8–34.3)
MCHC RBC AUTO-ENTMCNC: 32.8 G/DL (ref 31.4–37.4)
MCV RBC AUTO: 92 FL (ref 82–98)
PLATELET # BLD AUTO: 182 THOUSANDS/UL (ref 149–390)
PMV BLD AUTO: 11.3 FL (ref 8.9–12.7)
RBC # BLD AUTO: 4.19 MILLION/UL (ref 3.88–5.62)
WBC # BLD AUTO: 7.46 THOUSAND/UL (ref 4.31–10.16)

## 2019-02-13 PROCEDURE — 82948 REAGENT STRIP/BLOOD GLUCOSE: CPT

## 2019-02-13 PROCEDURE — 85027 COMPLETE CBC AUTOMATED: CPT | Performed by: PODIATRIST

## 2019-02-13 RX ORDER — HYDROCODONE BITARTRATE AND ACETAMINOPHEN 5; 325 MG/1; MG/1
1 TABLET ORAL EVERY 4 HOURS PRN
Status: DISCONTINUED | OUTPATIENT
Start: 2019-02-13 | End: 2019-02-15 | Stop reason: HOSPADM

## 2019-02-13 RX ORDER — DOCUSATE SODIUM 100 MG/1
100 CAPSULE, LIQUID FILLED ORAL 2 TIMES DAILY
Status: DISCONTINUED | OUTPATIENT
Start: 2019-02-13 | End: 2019-02-15 | Stop reason: HOSPADM

## 2019-02-13 RX ORDER — LORAZEPAM 2 MG/ML
0.5 INJECTION INTRAMUSCULAR EVERY 6 HOURS PRN
Status: DISCONTINUED | OUTPATIENT
Start: 2019-02-13 | End: 2019-02-15 | Stop reason: HOSPADM

## 2019-02-13 RX ORDER — HYDROMORPHONE HCL/PF 1 MG/ML
1 SYRINGE (ML) INJECTION EVERY 4 HOURS PRN
Status: DISCONTINUED | OUTPATIENT
Start: 2019-02-13 | End: 2019-02-13

## 2019-02-13 RX ORDER — HYDROMORPHONE HCL/PF 1 MG/ML
1 SYRINGE (ML) INJECTION
Status: DISCONTINUED | OUTPATIENT
Start: 2019-02-13 | End: 2019-02-15 | Stop reason: HOSPADM

## 2019-02-13 RX ORDER — TRAMADOL HYDROCHLORIDE 50 MG/1
50 TABLET ORAL EVERY 4 HOURS PRN
Status: DISCONTINUED | OUTPATIENT
Start: 2019-02-13 | End: 2019-02-15 | Stop reason: HOSPADM

## 2019-02-13 RX ORDER — GABAPENTIN 100 MG/1
100 CAPSULE ORAL
Status: DISCONTINUED | OUTPATIENT
Start: 2019-02-13 | End: 2019-02-15 | Stop reason: HOSPADM

## 2019-02-13 RX ORDER — HYDROCODONE BITARTRATE AND ACETAMINOPHEN 5; 325 MG/1; MG/1
2 TABLET ORAL EVERY 4 HOURS PRN
Status: DISCONTINUED | OUTPATIENT
Start: 2019-02-13 | End: 2019-02-15 | Stop reason: HOSPADM

## 2019-02-13 RX ADMIN — SITAGLIPTIN 100 MG: 100 TABLET, FILM COATED ORAL at 08:32

## 2019-02-13 RX ADMIN — CEFAZOLIN SODIUM 1000 MG: 10 INJECTION, POWDER, FOR SOLUTION INTRAVENOUS at 02:22

## 2019-02-13 RX ADMIN — OXYCODONE HYDROCHLORIDE AND ACETAMINOPHEN 2 TABLET: 5; 325 TABLET ORAL at 14:10

## 2019-02-13 RX ADMIN — NICOTINE 14 MG: 14 PATCH TRANSDERMAL at 08:31

## 2019-02-13 RX ADMIN — INSULIN GLARGINE 35 UNITS: 100 INJECTION, SOLUTION SUBCUTANEOUS at 21:33

## 2019-02-13 RX ADMIN — ATORVASTATIN CALCIUM 40 MG: 40 TABLET, FILM COATED ORAL at 21:33

## 2019-02-13 RX ADMIN — MORPHINE SULFATE 0.5 MG: 2 INJECTION, SOLUTION INTRAMUSCULAR; INTRAVENOUS at 03:20

## 2019-02-13 RX ADMIN — HYDROXYCHLOROQUINE SULFATE 200 MG: 200 TABLET, FILM COATED ORAL at 17:19

## 2019-02-13 RX ADMIN — HYDROMORPHONE HYDROCHLORIDE 1 MG: 1 INJECTION, SOLUTION INTRAMUSCULAR; INTRAVENOUS; SUBCUTANEOUS at 22:26

## 2019-02-13 RX ADMIN — TRAMADOL HYDROCHLORIDE 50 MG: 50 TABLET, COATED ORAL at 19:40

## 2019-02-13 RX ADMIN — GABAPENTIN 100 MG: 100 CAPSULE ORAL at 21:33

## 2019-02-13 RX ADMIN — HYDROMORPHONE HYDROCHLORIDE 1 MG: 1 INJECTION, SOLUTION INTRAMUSCULAR; INTRAVENOUS; SUBCUTANEOUS at 15:52

## 2019-02-13 RX ADMIN — INSULIN GLARGINE 35 UNITS: 100 INJECTION, SOLUTION SUBCUTANEOUS at 08:32

## 2019-02-13 RX ADMIN — MORPHINE SULFATE 0.5 MG: 2 INJECTION, SOLUTION INTRAMUSCULAR; INTRAVENOUS at 08:32

## 2019-02-13 RX ADMIN — DOCUSATE SODIUM 100 MG: 100 CAPSULE, LIQUID FILLED ORAL at 17:19

## 2019-02-13 RX ADMIN — ENOXAPARIN SODIUM 40 MG: 40 INJECTION SUBCUTANEOUS at 08:32

## 2019-02-13 RX ADMIN — HYDROCODONE BITARTRATE AND ACETAMINOPHEN 2 TABLET: 5; 325 TABLET ORAL at 17:20

## 2019-02-13 RX ADMIN — HYDROCODONE BITARTRATE AND ACETAMINOPHEN 2 TABLET: 5; 325 TABLET ORAL at 21:33

## 2019-02-13 RX ADMIN — NAPROXEN 500 MG: 500 TABLET ORAL at 02:21

## 2019-02-13 RX ADMIN — HYDROXYCHLOROQUINE SULFATE 200 MG: 200 TABLET, FILM COATED ORAL at 08:32

## 2019-02-13 RX ADMIN — OXYCODONE HYDROCHLORIDE AND ACETAMINOPHEN 2 TABLET: 5; 325 TABLET ORAL at 05:24

## 2019-02-13 RX ADMIN — FLUTICASONE PROPIONATE 2 SPRAY: 50 SPRAY, METERED NASAL at 21:38

## 2019-02-13 RX ADMIN — HYDROMORPHONE HYDROCHLORIDE 1 MG: 1 INJECTION, SOLUTION INTRAMUSCULAR; INTRAVENOUS; SUBCUTANEOUS at 11:27

## 2019-02-13 RX ADMIN — OXYCODONE HYDROCHLORIDE AND ACETAMINOPHEN 1 TABLET: 5; 325 TABLET ORAL at 00:05

## 2019-02-13 RX ADMIN — CEFAZOLIN SODIUM 1000 MG: 10 INJECTION, POWDER, FOR SOLUTION INTRAVENOUS at 10:36

## 2019-02-13 RX ADMIN — OXYCODONE HYDROCHLORIDE AND ACETAMINOPHEN 2 TABLET: 5; 325 TABLET ORAL at 10:07

## 2019-02-13 RX ADMIN — INSULIN GLARGINE 35 UNITS: 100 INJECTION, SOLUTION SUBCUTANEOUS at 00:05

## 2019-02-13 RX ADMIN — NAPROXEN 500 MG: 500 TABLET ORAL at 15:52

## 2019-02-13 NOTE — UTILIZATION REVIEW
Initial Clinical Review    Age/Sex: 48 y o  male     Surgery Date:    2/12/2019    Procedure: right lower extremity endoscopic gastroc recession with closed reduction of right lower extremity midfoot fractures, application of ex fix           Anesthesia:   Peripheral block    Admission Orders: Date/Time/Statement:   OBS  ORDER   2/13  @   1439  Orders Placed This Encounter   Procedures    Place in Observation     Standing Status:   Standing     Number of Occurrences:   1     Order Specific Question:   Admitting Physician     Answer:   [facility] [[id number]]     Order Specific Question:   Level of Care     Answer:   Med Surg [[id number]]     Vital Signs: BP 94/59 (BP Location: Right arm)   Pulse 72   Temp (!) 97 3 °F (36 3 °C) (Tympanic)   Resp 18   Ht 6' (1 829 m)   Wt 104 kg (230 lb)   SpO2 98%   BMI 31 19 kg/m²   Diet:        Diet Orders   (From admission, onward)            Start     Ordered    02/12/19 1410  Diet Regular; Regular House  Diet effective now     Question Answer Comment   Diet Type Regular    Regular Regular House    RD to adjust diet per protocol? Yes        02/12/19 1410        Mobility:    As star    DVT Prophylaxis:    SCD'S    Pain Control:   Pain Medications             ENBREL SURECLICK 50 MG/ML injection 50 mg once a week Friday's    leflunomide (ARAVA) 20 MG tablet Take 1 tablet by mouth daily with dinner      naproxen (NAPROSYN) 500 mg tablet Take 1 tablet by mouth every 12 (twelve) hours          SQ  lovenox  Daily  flonase daily  Insulin  Q 12 hrs  Plaquenil   BID  Naprosyn  Q 12 hrs  Nicotine patch daily  victoza  Daily  IV  Dilaudid  Prn ( x1  2/13 thus far)  IV  MSo4  PRN   (  X 2  2/13 thus far)      Network Utilization Review Department  Phone: [phone]; Fax [phone]  [email or web address]  ATTENTION: Please call with any questions or concerns to [phone]  and carefully listen to the prompts so that you are directed to the right person     Send all requests for admission clinical reviews, approved or denied determinations and any other requests to fax [phone]   All voicemails are confidential

## 2019-02-13 NOTE — CASE MANAGEMENT
CM sent Lovenox script to [email or web address] per pt request  Pt will have a $0 co-pay for this medication  Med will be held at [facility] until the rest of his discharge medications are sent down as well

## 2019-02-13 NOTE — PROGRESS NOTES
Progress Note - Podiatry  Valerio Ch  48 y o  male MRN: [id number]  Unit/Bed#: E2 -01 Encounter: [id number]    Assessment/Plan:  1  Status post right lower extremity endoscopic gastroc recession with closed reduction of right lower extremity midfoot fractures, application of ex fix admitted outpatient no charge bed for continued care, DOS: 2/12/19  2  Dm type 2 with neuropathy  3  HTN  4  HLD  5  GERD  6  Rheumatoid arthritis  7  COPD  8  Tobacco dependence  9  DVT prophylaxis:  Lovenox    -dressing was clean dry and intact  -discharge planning pending PT evaluation and Case Management consult for discharge with Lovenox  -nonweightbearing to the right lower extremity, pt has knee scooter  -rx for Lovenox given to CM for pre-auth  -pain not controlled, switched from morphine to dilaudid  -possible d/c tomorrow if pain controlled  -pt to be d/c with po doxycycline 100mg BID    Subjective/Objective   Chief Complaint: No chief complaint on file  Subjective: 48 y o  y/o male was seen and evaluated at bedside  Blood pressure 94/59, pulse 72, temperature (!) 97 3 °F (36 3 °C), temperature source Tympanic, resp  rate 18, height 6' (1 829 m), weight 104 kg (230 lb), SpO2 98 %  ,Body mass index is 31 19 kg/m²  Invasive Devices     Peripheral Intravenous Line            Peripheral IV 02/12/19 Left Forearm 1 day                Physical Exam:   General: Alert, cooperative and no distress  Lungs: Non labored breathing  Heart: Positive S1, S2  Abdomen: Soft, non-tender  Extremity:       Neurovascular status gross motor function baseline  Dressing left clean dry and intact      Lab, Imaging and other studies:   CBC:   Lab Results   Component Value Date    WBC 7 46 02/13/2019    HGB 12 6 02/13/2019    HCT 38 4 02/13/2019    MCV 92 02/13/2019     02/13/2019    MCH 30 1 02/13/2019    MCHC 32 8 02/13/2019    RDW 13 8 02/13/2019    MPV 11 3 02/13/2019       Imaging: I have personally reviewed pertinent films in PACS  EKG, Pathology, and Other Studies: I have personally reviewed pertinent reports    VTE Pharmacologic Prophylaxis: Enoxaparin (Lovenox)

## 2019-02-13 NOTE — PHYSICAL THERAPY NOTE
PHYSICAL THERAPY NOTE          Patient Name: Angela Lamas  Today's Date: 2/13/2019     PT order received  Attempted PT eval but pt requested to defer at this time 2* to severe post-op pain  Will continue to follow as appropriate  Nsg notified   Callum Denney, PT

## 2019-02-13 NOTE — PLAN OF CARE
Problem: Potential for Falls  Goal: Patient will remain free of falls  Description  INTERVENTIONS:  - Assess patient frequently for physical needs  -  Identify cognitive and physical deficits and behaviors that affect risk of falls    -  [place] fall precautions as indicated by assessment   - Educate patient/family on patient safety including physical limitations  - Instruct patient to call for assistance with activity based on assessment  - Modify environment to reduce risk of injury  - Consider OT/PT consult to assist with strengthening/mobility  Outcome: Progressing

## 2019-02-14 LAB
GLUCOSE SERPL-MCNC: 102 MG/DL (ref 65–140)
GLUCOSE SERPL-MCNC: 133 MG/DL (ref 65–140)
GLUCOSE SERPL-MCNC: 209 MG/DL (ref 65–140)
GLUCOSE SERPL-MCNC: 209 MG/DL (ref 65–140)

## 2019-02-14 PROCEDURE — G8978 MOBILITY CURRENT STATUS: HCPCS

## 2019-02-14 PROCEDURE — 97530 THERAPEUTIC ACTIVITIES: CPT

## 2019-02-14 PROCEDURE — G8979 MOBILITY GOAL STATUS: HCPCS

## 2019-02-14 PROCEDURE — 82948 REAGENT STRIP/BLOOD GLUCOSE: CPT

## 2019-02-14 PROCEDURE — 97163 PT EVAL HIGH COMPLEX 45 MIN: CPT

## 2019-02-14 RX ORDER — DOXYCYCLINE HYCLATE 100 MG/1
100 CAPSULE ORAL EVERY 12 HOURS SCHEDULED
Status: DISCONTINUED | OUTPATIENT
Start: 2019-02-14 | End: 2019-02-15 | Stop reason: HOSPADM

## 2019-02-14 RX ADMIN — INSULIN GLARGINE 35 UNITS: 100 INJECTION, SOLUTION SUBCUTANEOUS at 22:27

## 2019-02-14 RX ADMIN — HYDROMORPHONE HYDROCHLORIDE 1 MG: 1 INJECTION, SOLUTION INTRAMUSCULAR; INTRAVENOUS; SUBCUTANEOUS at 20:41

## 2019-02-14 RX ADMIN — SITAGLIPTIN 100 MG: 100 TABLET, FILM COATED ORAL at 08:00

## 2019-02-14 RX ADMIN — TRAMADOL HYDROCHLORIDE 50 MG: 50 TABLET, COATED ORAL at 14:03

## 2019-02-14 RX ADMIN — NAPROXEN 500 MG: 500 TABLET ORAL at 14:02

## 2019-02-14 RX ADMIN — HYDROCODONE BITARTRATE AND ACETAMINOPHEN 2 TABLET: 5; 325 TABLET ORAL at 03:35

## 2019-02-14 RX ADMIN — INSULIN LISPRO 1 UNITS: 100 INJECTION, SOLUTION INTRAVENOUS; SUBCUTANEOUS at 12:09

## 2019-02-14 RX ADMIN — HYDROMORPHONE HYDROCHLORIDE 1 MG: 1 INJECTION, SOLUTION INTRAMUSCULAR; INTRAVENOUS; SUBCUTANEOUS at 04:21

## 2019-02-14 RX ADMIN — NAPROXEN 500 MG: 500 TABLET ORAL at 03:35

## 2019-02-14 RX ADMIN — HYDROMORPHONE HYDROCHLORIDE 1 MG: 1 INJECTION, SOLUTION INTRAMUSCULAR; INTRAVENOUS; SUBCUTANEOUS at 16:07

## 2019-02-14 RX ADMIN — LOSARTAN POTASSIUM: 50 TABLET, FILM COATED ORAL at 08:00

## 2019-02-14 RX ADMIN — GABAPENTIN 100 MG: 100 CAPSULE ORAL at 22:24

## 2019-02-14 RX ADMIN — INSULIN GLARGINE 35 UNITS: 100 INJECTION, SOLUTION SUBCUTANEOUS at 07:55

## 2019-02-14 RX ADMIN — FLUTICASONE PROPIONATE 2 SPRAY: 50 SPRAY, METERED NASAL at 22:26

## 2019-02-14 RX ADMIN — DOCUSATE SODIUM 100 MG: 100 CAPSULE, LIQUID FILLED ORAL at 08:00

## 2019-02-14 RX ADMIN — DOCUSATE SODIUM 100 MG: 100 CAPSULE, LIQUID FILLED ORAL at 18:01

## 2019-02-14 RX ADMIN — HYDROMORPHONE HYDROCHLORIDE 1 MG: 1 INJECTION, SOLUTION INTRAMUSCULAR; INTRAVENOUS; SUBCUTANEOUS at 23:49

## 2019-02-14 RX ADMIN — DOXYCYCLINE HYCLATE 100 MG: 100 CAPSULE ORAL at 22:23

## 2019-02-14 RX ADMIN — ATORVASTATIN CALCIUM 40 MG: 40 TABLET, FILM COATED ORAL at 22:24

## 2019-02-14 RX ADMIN — HYDROXYCHLOROQUINE SULFATE 200 MG: 200 TABLET, FILM COATED ORAL at 18:01

## 2019-02-14 RX ADMIN — HYDROXYCHLOROQUINE SULFATE 200 MG: 200 TABLET, FILM COATED ORAL at 08:00

## 2019-02-14 RX ADMIN — HYDROCODONE BITARTRATE AND ACETAMINOPHEN 2 TABLET: 5; 325 TABLET ORAL at 12:09

## 2019-02-14 RX ADMIN — INSULIN LISPRO 1 UNITS: 100 INJECTION, SOLUTION INTRAVENOUS; SUBCUTANEOUS at 22:27

## 2019-02-14 RX ADMIN — ENOXAPARIN SODIUM 40 MG: 40 INJECTION SUBCUTANEOUS at 08:00

## 2019-02-14 RX ADMIN — HYDROCODONE BITARTRATE AND ACETAMINOPHEN 2 TABLET: 5; 325 TABLET ORAL at 22:24

## 2019-02-14 RX ADMIN — HYDROCODONE BITARTRATE AND ACETAMINOPHEN 2 TABLET: 5; 325 TABLET ORAL at 07:55

## 2019-02-14 RX ADMIN — TRAMADOL HYDROCHLORIDE 50 MG: 50 TABLET, COATED ORAL at 08:56

## 2019-02-14 RX ADMIN — HYDROCODONE BITARTRATE AND ACETAMINOPHEN 2 TABLET: 5; 325 TABLET ORAL at 18:02

## 2019-02-14 RX ADMIN — NICOTINE 14 MG: 14 PATCH TRANSDERMAL at 07:56

## 2019-02-14 NOTE — PLAN OF CARE
Problem: DISCHARGE PLANNING - CARE MANAGEMENT  Goal: Discharge to post-acute care or home with appropriate resources  Description  INTERVENTIONS:  - Conduct assessment to determine patient/family and health care team treatment goals, and need for post-acute services based on payer coverage, community resources, and patient preferences, and barriers to discharge  - Address psychosocial, clinical, and financial barriers to discharge as identified in assessment in conjunction with the patient/family and health care team  - Arrange appropriate level of post-acute services according to patient?s   needs and preference and payer coverage in collaboration with the physician and health care team  - Communicate with and update the patient/family, physician, and health care team regarding progress on the discharge plan  - Arrange appropriate transportation to post-acute venues  Outcome: Progressing   Pt will discharge with the appropriate services  CM following

## 2019-02-14 NOTE — UTILIZATION REVIEW
Continued Stay Review      OBS  ORDER   2/13  @    1439    Date/POD#:     2/14/2019   POD   #  2    Vital Signs: /61 (BP Location: Right arm)   Pulse 79   Temp (!) 96 4 °F (35 8 °C) (Tympanic)   Resp 18   Ht 6' (1 829 m)   Wt 104 kg (230 lb)   SpO2 94%   BMI 31 19 kg/m²      Assessment/Plan: Status post right lower extremity endoscopic gastroc recession with closed reduction of right lower extremity midfoot fractures, application of ex fix admitted outpatient no charge bed for continued care, DOS: 2/12/19  2  Dm type 2 with neuropathy  3   HTN  4  HLD  5  GERD  6  Rheumatoid arthritis  7  COPD  8  Tobacco dependence  9  DVT prophylaxis:  Lovenox     -dressing left clean dry and intact  -discharge planning pending PT evaluation and pain control, however pain is improving  -per CM, lovenox rx approved  -nonweightbearing to the right lower extremity, pt has knee scooter  -possible d/c  if pain controlled and pending PT eval as pt refused yesterday  -pt to be d/c with po doxycycline 100mg BID          Medications:   Scheduled Meds:   Current Facility-Administered Medications:  albuterol 2 puff Inhalation Q4H PRN Cleatis Dural, DPM   atorvastatin 40 mg Oral HS Patricia Julieth, DPM   docusate sodium 100 mg Oral BID Britney [place], DPM   doxycycline hyclate 100 mg Oral Q12H [address] Juju Samanthatadt, DPM   enoxaparin 40 mg Subcutaneous Daily Cleatis Dural, DPM   ergocalciferol 50,000 Units Oral Weekly Patricia Julieth, DPM   fluticasone 2 spray Nasal HS Patricia Julieth, DPM   gabapentin 100 mg Oral HS Kareen Harp, DPM   HYDROcodone-acetaminophen 1 tablet Oral Q4H PRN Kareen Harp, DPM   HYDROcodone-acetaminophen 2 tablet Oral Q4H PRN Kareen Harp, DPM   HYDROmorphone 1 mg Intravenous Q3H PRN Kareen Harp, DPM   hydroxychloroquine 200 mg Oral BID Patricia Julieth, DPM   insulin glargine 35 Units Subcutaneous Q12H [address] Patriciamarbella Clancy, DPM   insulin lispro 1-5 Units Subcutaneous TID AC Juju Edmonds, DPM   insulin lispro 1-5 Units Subcutaneous HS Datano Bartlett, DPM   leflunomide 20 mg Oral Daily With Dinner Patricia Clancy, DPM   liraglutide 1 8 mg Subcutaneous Daily Patricia Clancy, NADYA   LORazepam 0 5 mg Intravenous Q6H PRN Juju Edmonds, DPM   losartan potassium-hydrochlorothiazide (HYZAAR 100/12  5) combo dose  Oral Daily Patricia Clancy, DPM   naproxen 500 mg Oral Q12H Maribel Shaw, DPM   nicotine 14 mg Transdermal Daily Patricia Clancy, DPM   sitaGLIPtin 100 mg Oral Daily Patricia Clancy, DPM   terbinafine 250 mg Oral Daily Patricia Clancy, DPM   traMADol 50 mg Oral Q4H PRN Juan Johnson DPM     Continuous Infusions:    PRN Meds:   albuterol    HYDROcodone-acetaminophen    HYDROcodone-acetaminophen    HYDROmorphone    LORazepam    traMADol   IV  Dilaudid  PRN  (  X 2  2/14 thus far)    Pertinent Labs/Diagnostic Results:    No kabs  2/14    Age/Sex: 48 y o  male     Discharge Plan:    Home      Network Utilization Review Department  Phone: [phone]; Fax [phone]  [email or web address]  ATTENTION: Please call with any questions or concerns to [phone]  and carefully listen to the prompts so that you are directed to the right person  Send all requests for admission clinical reviews, approved or denied determinations and any other requests to fax [phone]   All voicemails are confidential

## 2019-02-14 NOTE — PHYSICAL THERAPY NOTE
PT EVALUATION (11:15-11:35=20mins)    Pt  Name: Aries Butler  Age: 48 y o  MRN: [id number]  LENGTH OF STAY: 0    Patient Active Problem List   Diagnosis    Benign essential hypertension    Carotid artery calcification    Reduced libido    Erectile disorder due to medical condition in male patient    Type 2 diabetes mellitus with diabetic polyneuropathy, with long-term current use of insulin ([facility])    Hyperlipidemia    Obesity (BMI 30 0-34  9)    Vitamin D deficiency    Rheumatoid arthritis ([facility] )    Clear cell hidradenoma    Colon cancer screening    Abscess of scalp    Cellulitis of occipital region of scalp    Swelling of right foot    Unspecified fracture of right foot, initial encounter for closed fracture    Prostate cancer screening    Charcot foot due to diabetes mellitus ([facility] )    Pre-op evaluation    S/P foot surgery, right       Admitting Diagnoses:   Charcot's joint, right ankle and foot [F99 607]    Past Medical History:   Diagnosis Date    Arthritis     At risk for falls     Cancer [facility])     sweat glands    [facility] obstructive [facility] disease[facility]     Crutches as ambulation aid     Diabetes mellitus [facility]     GERD (gastroesophageal reflux disease)     Hyperglycemia     Resolved: 4/25/2017    Hyperlipidemia     Hypertension     Kidney stone     Neuropathy     Numbness and tingling of both lower extremities     RA (rheumatoid arthritis) ([facility] )     Shortness of breath     Skin abnormality     has a healing wound on head from procedure - covered with guaze    Wears glasses        Past Surgical History:   Procedure Laterality Date    APPENDECTOMY      CLOSED REDUCTION FOOT DISLOCATION Right 2/12/2019    Procedure: C/R FRACTURE;  Surgeon: Allyson Beltre DPM;  [facility]: [facility];  Service: Podiatry    [address] <0 5 CM REMAINDER BODY N/A 3/28/2018    Procedure: EXCISION WIDE LESION HEAD/FACIAL/NECK;  Surgeon: Lulu Craig MD; [facility];  Service: Surgical Oncology    NM GASTROCNEMIUS RECESSION Right 2/12/2019    Procedure: ENDO GASTROC RECESSION, APPLICATION OF EXTERNAL FIXATOR;  Surgeon: Segundo Campbell DPM;  [facility];  Service: Podiatry    SKIN BIOPSY      scalp    WISDOM TOOTH EXTRACTION  1998       Imaging Studies:  XR foot 3+ vw right    (Results Pending)        02/14/19 1200   Note Type   Note type Eval/Treat   Pain Assessment   Pain Assessment 0-10   Pain Score 8   Pain Type Acute pain;Surgical pain   Pain Location Ankle;Leg   Pain Orientation Right   Pain Descriptors Burning;Cramping   Hospital Pain Intervention(s) Medication (See MAR); Repositioned; Ambulation/increased activity; Emotional support; Rest   Home Living   Type of [address] One level;Stairs to enter with rails  (1+3STE)   Home Equipment Crutches;Walker;Cane   Prior Function   Level of [place] Independent with ADLs and functional mobility  (w/o AD)   Lives With Spouse   Receives Help From Family;Friend(s)   ADL Assistance Independent   Falls in the last 6 months 0   Restrictions/Precautions   RLE Weight Bearing Per Order NWB   Braces or Orthoses Other (Comment)  ((+) external fixator)   Other Precautions Fall Risk;Pain;WBS   General   Family/Caregiver Present Yes  (wife)   Cognition   Overall Cognitive Status WFL   Arousal/Participation Alert   Orientation Level Oriented X4   Following Commands Follows all commands and directions without difficulty   RUE Assessment   RUE Assessment WFL   RUE Strength   RUE Overall Strength Within Functional Limits - able to perform ADL tasks with strength   LUE Assessment   LUE Assessment WFL   LUE Strength   LUE Overall Strength Within Functional Limits - able to perform ADL tasks with strength   RLE Assessment   RLE Assessment WFL   Strength RLE   RLE Overall Strength 3/5   R Ankle Dorsiflexion   (not tested)   R Ankle Plantar Flexion   (not tested)   LLE Assessment   LLE Assessment [facility] Strength LLE   LLE Overall Strength 4+/5   Coordination   Movements are Fluid and Coordinated 1   Sensation X  (neuropathy)   Bed Mobility   Supine to Sit 5  Supervision   Additional items HOB elevated; Bedrails; Increased time required;Verbal cues;LE management   Additional Comments cues for techniques   Transfers   Sit to Stand 4  Minimal assistance   Additional items Assist x 1;Bedrails; Increased time required;Verbal cues   Stand to Sit 4  Minimal assistance   Additional items Assist x 1; Armrests; Increased time required;Verbal cues   Toilet transfer 4  Minimal assistance   Additional items Assist x 1; Armrests; Increased time required;Verbal cues; Commode   Additional Comments cues for techniques   Ambulation/Elevation   Gait pattern Decreased foot clearance; Excessively slow  (hops/pivots to L foot; able to maintain NWB to R foot 100% )   Gait Assistance 4  Minimal assist   Additional items Assist x 1;Verbal cues; Tactile cues   Assistive Device Rolling walker   Distance 2'x2 bed<> BSC   Balance   Static Sitting Normal   Static Standing Fair   Ambulatory Fair -   Endurance Deficit   Endurance Deficit Yes   Endurance Deficit Description pain   Activity Tolerance   Activity Tolerance Patient limited by pain   Nurse Made Aware Flor   Assessment   Prognosis Good   Problem List Decreased strength;Decreased endurance; Impaired balance;Decreased mobility;Pain;Orthopedic restrictions   Assessment Pt  53 y o male admitted for RLE endoscopic gastrocnemius recession and closed reduction of RLE midfoot fractures with application of external fixator on 2/12/19 2* to Charcot's joint, right ankle and foot M14 671  Pt referred to PT for mobility assessment & D/C planning w/ orders of activity as tolerated and NWB R foot  PTA, pt reports being I w/o AD  On eval, pt demonstrate dec mobility, balance, endurance & amb 2* to NWB status & post-op pain  Pt require S for bed mobility & minAx1 for transfers & amb w/ RW + cues for techniques  Pt able to maintain NWB to R foot 100% of the time  Limit amb 2* to L Charcot foot & pt reported that podiatry is recommending a Crow boot for L foot upon D/C  No dizziness reported t/o session  Nsg staff most recent vital signs as follows: /61 (BP Location: Right arm)   Pulse 79   Temp (!) 96 4 °F (35 8 °C) (Tympanic)   Resp 18   Ht 6' (1 829 m)   Wt 104 kg (230 lb)   SpO2 94%   BMI 31 19 kg/m²   Pt tolerated further transfer training, please see PT tx note below for details  At end of session, pt left pt on [facility] as per pt request for BM  Call bell in reach  Fall precautions reinforced w/ good understanding  Pt functioning below baseline hence will continue skilled PT to improve function & safety  At this time, will anticipate good progress in PT for safe D/C to home  Will recommend RW, w/c & family support at D/C  WC recommendation: 20" Standard WC, Desk length Armrests and Swingaway elevating legrests  CM to follow  Nsg staff to continue to mobilized pt (OOB in chair for all meals & BSC for bathroom needs) as tolerated to prevent further decline in function  Nsg notified  Barriers to Discharge Inaccessible home environment   Barriers to Discharge Comments REY   Goals   Patient Goals less pain   STG Expiration Date 02/21/19   Short Term Goal #1 Goals to be met in 7 days; pt will be able to: 1) inc strength & balance by 1/2 grade to improve overall functional mobility & dec fall risk; 2) inc bed mobility to I for pt to be able to get in/OOB safely w/ proper techniques 100% of the time, to dec caregiver assistance & safely function at home; 3) inc transfers to modified I for pt to transition safely from one surface to another w/o % of the time, to dec caregiver assistance & safely function at home; 4) inc amb w/ RW approx   <10' w/ modified I for pt to ambulate as tolerated w/o any % of the time, to dec caregiver assistance & safely function at home; 5) inc barthel score to 80 to decrease overall risk for falls; 6) negotiate stairs w/ modified I w/ appropriate method (most likely seated method) for inc safety during stair mgt inside/outside of home & dec caregiver assistance; 7) pt/caregiver ed; 8) modified I w/ w/c mobility & safety   Treatment Day 1   Plan   Treatment/Interventions Functional transfer training;LE strengthening/ROM; Elevations; Therapeutic exercise; Endurance training;Patient/family training;Bed mobility;Gait training;Spoke to nursing;Family;Spoke to case management   PT Frequency Other (Comment)  (3-5x/wk)   Recommendation   Recommendation Home with family support   Equipment Recommended Wheelchair;Walker  (RW & BSC)   PT - OK to Discharge Yes  (when medically cleared)   Barthel Index   Feeding 10   Bathing 5   Grooming Score 5   Dressing Score 5   Bladder Score 10   Bowels Score 10   Toilet Use Score 5   Transfers (Bed/Chair) Score 10   Mobility (Level Surface) Score 0   Stairs Score 0   Barthel Index Score 60   Hx/personal factors: co-morbidities, inaccessible home, use of AD, pain, WB restrictions, fall risk and assist w/ ADL's  Examination: dec mobility, dec balance, dec endurance, dec amb, moderate fall risk, pain, WB restrictions  Clinical: unpredictable (ongoing medical status, abnormal lab values, moderate fall risk and pain mgt)  Complexity: high     PT TREATMENT NOTE:    TIME IN: 11:35  TIME OUT: 12:00  TOTAL TIME: 25mins    S: Pt agreeable to further transfer training  O/A: Pt continue to require minAx1 for transfers + cues for techniques  Able to stand pivot w/ RW w/ minAx1 but unable w/o RW  Pt instructed on stair mgt-seated method w/ good understanding  Also discussed other appropriate AD & decided that the w/c is the most appropriate due to L foot poor condition (Charcot foot needing Crow boot), NWB status to R foot & presence of an extensive external fixator on the R foot/leg  Pt agreeable to w/c recommendations   Pt has a w/c script from his doctor which was given to MARAL Roy CM to follow  Pt declined HHPT  P: Will continue PT per POC         Atif Lindo PT

## 2019-02-14 NOTE — PLAN OF CARE
Problem: Potential for Falls  Goal: Patient will remain free of falls  Description  INTERVENTIONS:  - Assess patient frequently for physical needs  -  Identify cognitive and physical deficits and behaviors that affect risk of falls    -  [place] fall precautions as indicated by assessment   - Educate patient/family on patient safety including physical limitations  - Instruct patient to call for assistance with activity based on assessment  - Modify environment to reduce risk of injury  - Consider OT/PT consult to assist with strengthening/mobility  Outcome: Progressing     Problem: Prexisting or High Potential for Compromised Skin Integrity  Goal: Skin integrity is maintained or improved  Description  INTERVENTIONS:  - Identify patients at risk for skin breakdown  - Assess and monitor skin integrity  - Assess and monitor nutrition and hydration status  - Monitor labs (i e  albumin)  - Assess for incontinence   - Turn and reposition patient  - Assist with mobility/ambulation  - Relieve pressure over bony prominences  - Avoid friction and shearing  - Provide appropriate hygiene as needed including keeping skin clean and dry  - Evaluate need for skin moisturizer/barrier cream  - Collaborate with interdisciplinary team (i e  Nutrition, Rehabilitation, etc )   - Patient/family teaching  Outcome: Progressing

## 2019-02-14 NOTE — PROGRESS NOTES
Progress Note - Podiatry  Josep Seats  48 y o  male MRN: [id number]  Unit/Bed#: E2 -01 Encounter: [id number]    Assessment/Plan:  1  Status post right lower extremity endoscopic gastroc recession with closed reduction of right lower extremity midfoot fractures, application of ex fix admitted outpatient no charge bed for continued care, DOS: 2/12/19  2  Dm type 2 with neuropathy  3  HTN  4  HLD  5  GERD  6  Rheumatoid arthritis  7  COPD  8  Tobacco dependence  9  DVT prophylaxis:  Lovenox     -dressing left clean dry and intact  -discharge planning pending PT evaluation and pain control, however pain is improving  -per CM, lovenox rx approved  -nonweightbearing to the right lower extremity, pt has knee scooter  -possible d/c  if pain controlled and pending PT eval as pt refused yesterday  -pt to be d/c with po doxycycline 100mg BID          Subjective/Objective   Chief Complaint: No chief complaint on file  Subjective: 48 y o  y/o male was seen and evaluated at bedside  Blood pressure 131/61, pulse 79, temperature (!) 96 4 °F (35 8 °C), temperature source Tympanic, resp  rate 18, height 6' (1 829 m), weight 104 kg (230 lb), SpO2 94 %  ,Body mass index is 31 19 kg/m²  Invasive Devices     Peripheral Intravenous Line            Peripheral IV 02/12/19 Left Forearm 1 day                Physical Exam:   General: Alert, cooperative and no distress  Lungs: Non labored breathing  Heart: Positive S1, S2  Abdomen: Soft, non-tender  Extremity:       NVS and gross motor function at baseline  Ex fix and dressing left clean dry and intact  Lab, Imaging and other studies:   CBC: No results found for: WBC, HGB, HCT, MCV, PLT, ADJUSTEDWBC, MCH, MCHC, RDW, MPV, NRBC    Imaging: I have personally reviewed pertinent films in PACS  EKG, Pathology, and Other Studies: I have personally reviewed pertinent reports    VTE Pharmacologic Prophylaxis: Enoxaparin (Lovenox)

## 2019-02-14 NOTE — PLAN OF CARE
Problem: PHYSICAL THERAPY ADULT  Goal: Performs mobility at highest level of function for planned discharge setting  See evaluation for individualized goals  Description  Treatment/Interventions: Functional transfer training, LE strengthening/ROM, Elevations, Therapeutic exercise, Endurance training, Patient/family training, Bed mobility, Gait training, Spoke to nursing, Family, Spoke to case management  Equipment Recommended: Wheelchair, Walker(RW, w/c, [facility])       See flowsheet documentation for full assessment, interventions and recommendations  Note:   Prognosis: Good  Problem List: Decreased strength, Decreased endurance, Impaired balance, Decreased mobility, Pain, Orthopedic restrictions  Assessment: Pt  53 y o male admitted for RLE endoscopic gastrocnemius recession and closed reduction of RLE midfoot fractures with application of external fixator on 2/12/19 2* to Charcot's joint, right ankle and foot M14 671  Pt referred to PT for mobility assessment & D/C planning w/ orders of activity as tolerated and NWB R foot  PTA, pt reports being I w/o AD  On eval, pt demonstrate dec mobility, balance, endurance & amb 2* to NWB status & post-op pain  Pt require S for bed mobility & minAx1 for transfers & amb w/ RW + cues for techniques  Pt able to maintain NWB to R foot 100% of the time  Limit amb 2* to L Charcot foot & pt reported that podiatry is recommending a Crow boot for L foot upon D/C  No dizziness reported t/o session  Nsg staff most recent vital signs as follows: /61 (BP Location: Right arm)   Pulse 79   Temp (!) 96 4 °F (35 8 °C) (Tympanic)   Resp 18   Ht 6' (1 829 m)   Wt 104 kg (230 lb)   SpO2 94%   BMI 31 19 kg/m²   Pt tolerated further transfer training, please see PT tx note below for details  At end of session, pt left pt on [facility] as per pt request for BM  Call bell in reach  Fall precautions reinforced w/ good understanding   Pt functioning below baseline hence will continue skilled PT to improve function & safety  At this time, will anticipate good progress in PT for safe D/C to home  Will recommend RW, w/c & family support at D/C  WC recommendation: 20" Standard WC, Desk length Armrests and Swingaway elevating legrests  CM to follow  Nsg staff to continue to mobilized pt (OOB in chair for all meals & BSC for bathroom needs) as tolerated to prevent further decline in function  Nsg notified  Barriers to Discharge: Inaccessible home environment  Barriers to Discharge Comments: REY  Recommendation: Home with family support     PT - OK to Discharge: Yes(when medically cleared)    PT TREATMENT NOTE:    TIME IN: 11:35  TIME OUT: 12:00  TOTAL TIME: 25mins    S: Pt agreeable to further transfer training  O/A: Pt continue to require minAx1 for transfers + cues for techniques  Able to stand pivot w/ RW w/ minAx1 but unable w/o RW  Pt instructed on stair mgt-seated method w/ good understanding  Also discussed other appropriate AD & decided that the w/c is the most appropriate due to L foot poor condition (Charcot foot needing Crow boot), NWB status to R foot & presence of an extensive external fixator on the R foot/leg  Pt agreeable to w/c recommendations  Pt has a w/c script from his doctor which was given to Giuseppe Ferrera CM to follow  Pt declined HHPT  P: Will continue PT per POC  See flowsheet documentation for full assessment

## 2019-02-14 NOTE — SOCIAL WORK
CM met with pt, his wife, and daughter at bedside to discuss discharge needs  Pt lives in a one level home with his wife  ADL's PTA were completed independently  Pt owns a RW, cane, and crutches  PT recommending a w/c for pt  CM sent orders to [address] [facility]  Pt denies VNA/STR hx  We are holding off on any therapy until pt's foot has healed  PCP identified as Dr Jennifer Roberto  Pharmacy identified as [address]; pt would like to use [facility] [employer] at D/C  Pt does drive however with his condition, pt's wife will be providing transportation  Pt will have transport home; plans on having friends carry him up the stairs  CM did offer a w/c van and pt declined  Pt denies POA but reports that he has a friend who is a  who will assist them  Pt asking about disability  CM advised him to follow up with his PCP due to the need for follow up  No other needs expressed or identified at this time  MARAL following

## 2019-02-15 ENCOUNTER — TELEPHONE (OUTPATIENT)
Dept: FAMILY MEDICINE CLINIC | Facility: CLINIC | Age: 54
End: 2019-02-15

## 2019-02-15 ENCOUNTER — TRANSITIONAL CARE MANAGEMENT (OUTPATIENT)
Dept: FAMILY MEDICINE CLINIC | Facility: CLINIC | Age: 54
End: 2019-02-15

## 2019-02-15 VITALS
SYSTOLIC BLOOD PRESSURE: 115 MMHG | DIASTOLIC BLOOD PRESSURE: 63 MMHG | HEIGHT: 72 IN | BODY MASS INDEX: 31.15 KG/M2 | WEIGHT: 230 LBS | RESPIRATION RATE: 18 BRPM | HEART RATE: 82 BPM | OXYGEN SATURATION: 91 % | TEMPERATURE: 98 F

## 2019-02-15 DIAGNOSIS — Z72.0 TOBACCO USE: Primary | ICD-10-CM

## 2019-02-15 LAB
GLUCOSE SERPL-MCNC: 210 MG/DL (ref 65–140)
GLUCOSE SERPL-MCNC: 218 MG/DL (ref 65–140)

## 2019-02-15 PROCEDURE — 82948 REAGENT STRIP/BLOOD GLUCOSE: CPT

## 2019-02-15 RX ORDER — CYCLOBENZAPRINE HCL 5 MG
10 TABLET ORAL 3 TIMES DAILY PRN
Qty: 18 TABLET | Refills: 0 | Status: SHIPPED | OUTPATIENT
Start: 2019-02-15 | End: 2019-03-12 | Stop reason: SDUPTHER

## 2019-02-15 RX ORDER — DOXYCYCLINE HYCLATE 100 MG/1
100 CAPSULE ORAL EVERY 12 HOURS SCHEDULED
Qty: 18 CAPSULE | Refills: 0 | Status: SHIPPED | OUTPATIENT
Start: 2019-02-15 | End: 2019-02-24

## 2019-02-15 RX ORDER — NICOTINE 21 MG/24HR
1 PATCH, TRANSDERMAL 24 HOURS TRANSDERMAL EVERY 24 HOURS
Qty: 28 PATCH | Refills: 0 | Status: SHIPPED | OUTPATIENT
Start: 2019-02-15 | End: 2019-03-20 | Stop reason: SDUPTHER

## 2019-02-15 RX ORDER — HYDROCODONE BITARTRATE AND ACETAMINOPHEN 5; 325 MG/1; MG/1
1 TABLET ORAL EVERY 6 HOURS PRN
Qty: 24 TABLET | Refills: 0 | Status: SHIPPED | OUTPATIENT
Start: 2019-02-15 | End: 2019-02-25

## 2019-02-15 RX ADMIN — INSULIN LISPRO 2 UNITS: 100 INJECTION, SOLUTION INTRAVENOUS; SUBCUTANEOUS at 08:14

## 2019-02-15 RX ADMIN — INSULIN LISPRO 2 UNITS: 100 INJECTION, SOLUTION INTRAVENOUS; SUBCUTANEOUS at 12:29

## 2019-02-15 RX ADMIN — SITAGLIPTIN 100 MG: 100 TABLET, FILM COATED ORAL at 08:11

## 2019-02-15 RX ADMIN — NAPROXEN 500 MG: 500 TABLET ORAL at 03:05

## 2019-02-15 RX ADMIN — LOSARTAN POTASSIUM: 50 TABLET, FILM COATED ORAL at 08:11

## 2019-02-15 RX ADMIN — INSULIN GLARGINE 35 UNITS: 100 INJECTION, SOLUTION SUBCUTANEOUS at 08:08

## 2019-02-15 RX ADMIN — DOXYCYCLINE HYCLATE 100 MG: 100 CAPSULE ORAL at 08:10

## 2019-02-15 RX ADMIN — HYDROCODONE BITARTRATE AND ACETAMINOPHEN 2 TABLET: 5; 325 TABLET ORAL at 03:05

## 2019-02-15 RX ADMIN — HYDROCODONE BITARTRATE AND ACETAMINOPHEN 2 TABLET: 5; 325 TABLET ORAL at 12:14

## 2019-02-15 RX ADMIN — HYDROMORPHONE HYDROCHLORIDE 1 MG: 1 INJECTION, SOLUTION INTRAMUSCULAR; INTRAVENOUS; SUBCUTANEOUS at 07:12

## 2019-02-15 RX ADMIN — HYDROXYCHLOROQUINE SULFATE 200 MG: 200 TABLET, FILM COATED ORAL at 08:10

## 2019-02-15 RX ADMIN — ENOXAPARIN SODIUM 40 MG: 40 INJECTION SUBCUTANEOUS at 08:10

## 2019-02-15 RX ADMIN — TRAMADOL HYDROCHLORIDE 50 MG: 50 TABLET, COATED ORAL at 10:04

## 2019-02-15 RX ADMIN — DOCUSATE SODIUM 100 MG: 100 CAPSULE, LIQUID FILLED ORAL at 08:10

## 2019-02-15 RX ADMIN — HYDROCODONE BITARTRATE AND ACETAMINOPHEN 2 TABLET: 5; 325 TABLET ORAL at 08:11

## 2019-02-15 RX ADMIN — NICOTINE 14 MG: 14 PATCH TRANSDERMAL at 08:11

## 2019-02-15 NOTE — TELEPHONE ENCOUNTER
Patient has been on the nicotine patches for 4 days and the hospital did not discharge him with any! They are wondering if he can get them sent over to [facility] for them     They close at 4:30   Nicotine 14mg patches

## 2019-02-15 NOTE — PROGRESS NOTES
Progress Note - Podiatry  Juan Jose Martines  48 y o  male MRN: [id number]  Unit/Bed#: E2 -01 Encounter: [id number]    Assessment/Plan:  3  47 y/o male s/p RLE endoscopic gastrocnemius recession and closed reduction of RLE midfoot fractures with application of external fixator admitted outpatient no charge bed for continued care and observation (DOS: 2/12/19 by Dr Mai Perez)   2  DM2 with neuropathy   3  HTN  4  HLD   5  GERD  6  Rheumatoid Arthritis   7  COPD   8  Tobacco Dependence      DVT prophylaxis: Lovenox     Plan:  -patient evaluated: afebrile, no leukocytosis on prior lab draw, nontoxic in appearance   -dressings intact to RLE: to be kept intact until follow-up appointment with Dr Yoon Martines has seen and evaluated patient and cleared for discharge home with use of wheel chair   -lovenox arranged for patient through CM, rx for wheelchair given to CM and wheelchair to be delivered today   -patient reports difficulty having bowel movement as he is "not able to relax": will rx flexeril to be taken as needed and recommend stool softener outpatient   -patient to be discharged on doxycycline 100mg BID, flexeril, and vicodin   -follow-up with Dr Mai Perez outpatient basis for continued care     Dispo: Today as cleared per PT and discharge needs have been met through CM  Patient's wife to provide discharge home  Subjective/Objective   Chief Complaint: No chief complaint on file  Subjective: 48 y o  y/o male was seen and evaluated at bedside  Reports difficulty having a bowel movement as he is unable to relax  Reports pain is well-controlled  Blood pressure 126/67, pulse 82, temperature (!) 97 1 °F (36 2 °C), temperature source Tympanic, resp  rate 18, height 6' (1 829 m), weight 104 kg (230 lb), SpO2 91 %  ,Body mass index is 31 19 kg/m²      Invasive Devices     Peripheral Intravenous Line            Peripheral IV 02/12/19 Left Forearm 2 days              Physical Exam: General: Alert, cooperative and no distress  Lungs: Non labored breathing  Abdomen: Soft, non-tender    Extremity: RLE external fixator clean, dry, and intact, gross sensation intact to RLE       Lab, Imaging and other studies:   CBC: No results found for: WBC, HGB, HCT, MCV, PLT, ADJUSTEDWBC, MCH, MCHC, RDW, MPV, NRBC    VTE Pharmacologic Prophylaxis: Enoxaparin (Lovenox)

## 2019-02-15 NOTE — DISCHARGE SUMMARY
Discharge Summary -   Nevaeh Chatterjee  48 y o  male MRN: [id number]  Unit/Bed#: E2 -76 Encounter: [id number]    Admission Date: 2/12/2019     Admitting Diagnosis: Charcot's joint, right ankle and foot [M14 [id number]]    HPI: Nevaeh Chatterjee  is a 48 y o  male with past medical history of DM2 with neuropathy, HTN, HLD, GERD, RA, COPD, and tobacco dependence admitted s/p RLE endoscopic gastrocnemius recession and closed reduction of RLE midfoot fractures with application of external fixator (DOS: 2/12/19 by Dr Miguel Blanco)  Physical therapy is to evaluate patient prior to discharge  Procedures Performed: ENDO GASTROC RECESSION, APPLICATION OF EXTERNAL FIXATOR: 91098 (CPT®)  C/R FRACTURE:     Hospital Course: Patient admitted s/p RLE endoscopic gastrocnemius recession and closed reduction of RLE midfoot fractures with application of external fixator (DOS: 2/12/19 by Dr Miguel Blanco) as outpatient no charge bed  He initially was not amenable to treatment due to post-op pain however was amenable to evaluation 2/14/19  He was evaluated by PT with recommendation for wheel chair and cleared for discharge with home support  Patient's pain well-controlled on PO pain meds  Lovenox and wheelchair arranged through CM  Patient to be discharged on pain medication, flexeril, lovenox, and doxycycline  His wife is to provided transportation home  All discharge needs have been met  Patient stable for discharge and is to follow-up with Dr Miguel Blanco outpatient basis for continued care and treatment  Significant Findings, Care, Treatment and Services Provided: As listed above     Complications: None     Discharge Diagnosis: Stable     Condition at Discharge: stable     Discharge instructions/Information to patient and family:   See after visit summary for information provided to patient and family        Provisions for Follow-Up Care/Important appointments:  Provided   See after visit summary for information related to follow-up care and any pertinent home health orders  Disposition: Home    Planned Readmission: No    Discharge Statement   I spent 30 minutes discharging the patient  This time was spent on the day of discharge  I had direct contact with the patient on the day of discharge  The details of this patient's discharge     Discharge Medications:  See after visit summary for reconciled discharge medications provided to patient and family

## 2019-02-15 NOTE — DISCHARGE INSTRUCTIONS
Podiatry: Remain NWB to the right lower extremity with use of wheelchair for assistance  Keep dressings clean, dry, and intact  Take lovenox and antibiotics as prescribed  Take pain medication and muscle relaxant as needed

## 2019-02-21 DIAGNOSIS — E11.9 TYPE 2 DIABETES MELLITUS WITHOUT COMPLICATION, WITHOUT LONG-TERM CURRENT USE OF INSULIN (HCC): ICD-10-CM

## 2019-02-21 DIAGNOSIS — E13.9 DIABETES MELLITUS OF OTHER TYPE WITHOUT COMPLICATION, UNSPECIFIED WHETHER LONG TERM INSULIN USE (HCC): ICD-10-CM

## 2019-02-25 RX ORDER — LIRAGLUTIDE 6 MG/ML
INJECTION SUBCUTANEOUS
Qty: 9 ML | Refills: 4 | Status: SHIPPED | OUTPATIENT
Start: 2019-02-25 | End: 2019-07-08 | Stop reason: SDUPTHER

## 2019-02-25 RX ORDER — FLUTICASONE PROPIONATE 50 MCG
SPRAY, SUSPENSION (ML) NASAL
Qty: 16 G | Refills: 11 | Status: SHIPPED | OUTPATIENT
Start: 2019-02-25 | End: 2020-02-25

## 2019-03-01 ENCOUNTER — OFFICE VISIT (OUTPATIENT)
Dept: FAMILY MEDICINE CLINIC | Facility: CLINIC | Age: 54
End: 2019-03-01
Payer: COMMERCIAL

## 2019-03-01 VITALS
TEMPERATURE: 98 F | BODY MASS INDEX: 31.19 KG/M2 | HEIGHT: 72 IN | DIASTOLIC BLOOD PRESSURE: 84 MMHG | OXYGEN SATURATION: 99 % | SYSTOLIC BLOOD PRESSURE: 130 MMHG | HEART RATE: 110 BPM

## 2019-03-01 DIAGNOSIS — M05.9 RHEUMATOID ARTHRITIS WITH POSITIVE RHEUMATOID FACTOR, INVOLVING UNSPECIFIED SITE (HCC): ICD-10-CM

## 2019-03-01 DIAGNOSIS — E11.610 CHARCOT FOOT DUE TO DIABETES MELLITUS (HCC): Primary | ICD-10-CM

## 2019-03-01 PROBLEM — D23.9: Status: RESOLVED | Noted: 2018-02-22 | Resolved: 2019-03-01

## 2019-03-01 PROCEDURE — 1111F DSCHRG MED/CURRENT MED MERGE: CPT | Performed by: FAMILY MEDICINE

## 2019-03-01 PROCEDURE — 99495 TRANSJ CARE MGMT MOD F2F 14D: CPT | Performed by: FAMILY MEDICINE

## 2019-03-01 RX ORDER — DOXYCYCLINE 100 MG/1
TABLET ORAL
COMMUNITY
End: 2019-04-09

## 2019-03-01 RX ORDER — FENOPROFEN CALCIUM 600 MG
TABLET ORAL
Refills: 2 | COMMUNITY
Start: 2019-02-21 | End: 2019-07-17 | Stop reason: ALTCHOICE

## 2019-03-01 RX ORDER — HYDROCODONE BITARTRATE AND ACETAMINOPHEN 5; 325 MG/1; MG/1
TABLET ORAL
COMMUNITY
End: 2019-04-09

## 2019-03-01 NOTE — ASSESSMENT & PLAN NOTE
Direction of rheumatologist   Patient remains off of antirheumatic agents while he is postop and allowing his foot to heal   Clearly he is not physically active so hopefully his rheumatoid will not be as bothersome

## 2019-03-01 NOTE — ASSESSMENT & PLAN NOTE
Lab Results   Component Value Date    HGBA1C 6 8 (H) 01/25/2019       No results for input(s): POCGLU in the last 72 hours  Blood Sugar Average: Last 72 hrs:     Patient is now status post surgery with external fixation  In looking at his postoperative x-rays, podiatrist has him in excellent alignment for healing to take place  Patient did have physical therapy come to the house however the really can't do anything since he is not able to bear weight on his right foot for until external fixation device is removed  That is up to the discretion of podiatrist     It was advised that if he is having worsening pain despite being on Nalfon then he would need referral to pain management  If that is the case then I would have the patient contact my office and I will facilitate referral to pain management  Understandably despite the patient's diabetic neuropathy I would think that having titanium fixation rods inserted into bones would probably be somewhat uncomfortable

## 2019-03-01 NOTE — ASSESSMENT & PLAN NOTE
Patient is status post ORIF with external fixators  He does need to follow up with Podiatry as scheduled

## 2019-03-01 NOTE — PROGRESS NOTES
Subjective:      Patient ID: Woody Garcia  is a 48 y o  male  TCM Call (since 1/29/2019)     Date and time call was made  2/15/2019  3:14 PM    Hospital care reviewed  Records reviewed    Patient was hospitialized at  [place] [address]    Date of Admission  02/12/19    Date of discharge  02/15/19    Diagnosis  S/P FOOT SURGERY RT; Charcot's joint, right ankle and foot     Disposition  Home    Were the patients medications reviewed and updated  Yes    Current Symptoms  -- RIGHT FOOT PAIN       TCM Call (since 1/29/2019)     Post hospital issues  None    Should patient be enrolled in anticoag monitoring? No    Scheduled for follow up? Yes    Did you obtain your prescribed medications  Yes    Do you need help managing your prescriptions or medications  No    Is transportation to your appointment needed  No    I have advised the patient to call PCP with any new or worsening symptoms    GAIL SMITH MA    Living Arrangements  Family members    Support System  Family    Do you have social support  Yes, as much as I need    Are you recieving any outpatient services  No    Are you recieving home care services  No    Are you using any community resources  No    Current waiver services  No    Have you fallen in the last 12 months  No    Interperter language line needed  No      Patient presents with his wife for TCM visit following hospitalization at [address] [place] [address] for surgery to address Charcot joint of the right foot and ankle  Discharge summary below as per Dr Bradford Padilla DPM (resident) working with Dr Rayne Dhillon:    "Patient admitted s/p RLE endoscopic gastrocnemius recession and closed reduction of RLE midfoot fractures with application of external fixator (DOS: 2/12/19 by Dr Eva Jacobson) as outpatient no charge bed  He initially was not amenable to treatment due to post-op pain however was amenable to evaluation 2/14/19   He was evaluated by PT with recommendation for wheel chair and cleared for discharge with home support  Patient's pain well-controlled on PO pain meds  Lovenox and wheelchair arranged through CM  Patient to be discharged on pain medication, flexeril, lovenox, and doxycycline  His wife is to provided transportation home  All discharge needs have been met  Patient stable for discharge and is to follow-up with Dr Segundo Campbell outpatient basis for continued care and treatment  "      Patient states that he is still having a good bit of difficulty with control of his pain  Patient states that the external fixator is simply uncomfortable all of the time a regardless of the position and support that he has underneath his leg  External fixator has the pressure a loaded off of his foot with the fixation pins  Patient has been taking Vicodin and was given a prescription for Nalfon which she has yet to start  Mobility has been an issue as the patient is unable to bear weight on his right foot and the external fixator is rather bulky  Patient does need assistance with transfer, getting dressed  Has difficulty finding close that will actually fit over top of the external fixator  His wife had the cut a pair of sweat pants  Patient does have rheumatoid arthritis but has not been able to use Enbrel following his surgery    He is due for follow-up with podiatrist       Past Medical History:   Diagnosis Date    Arthritis     At risk for falls     Cancer [facility])     sweat glands    COPD (chronic obstructive pulmonary disease) ([facility]     Crutches as ambulation aid     Diabetes mellitus ([facility]     GERD (gastroesophageal reflux disease)     Hyperglycemia     Resolved: 4/25/2017    Hyperlipidemia     Hypertension     Kidney stone     Neuropathy     Numbness and tingling of both lower extremities     RA (rheumatoid arthritis) (HCC)     Shortness of breath     Skin abnormality     has a healing wound on head from procedure - covered with guaze    Wears glasses        Family History   Problem Relation Age of Onset    Diabetes Mother     Stroke Mother     Mental illness Mother     Diabetes Father     Stroke Father     Alcohol abuse Brother     Coronary artery disease Family         Age 51-55    Diabetes type II Family     Heart disease Family        Past Surgical History:   Procedure Laterality Date    APPENDECTOMY      CLOSED REDUCTION FOOT DISLOCATION Right 2/12/2019    Procedure: C/R FRACTURE;  Surgeon: Ave Hernandez DPM;  Location: [facility];  Service: Podiatry    [address] <0 5 CM REMAINDER BODY N/A 3/28/2018    Procedure: EXCISION WIDE LESION HEAD/FACIAL/NECK;  Surgeon: Maria Luz aRe MD;  [facility];  Service: Surgical Oncology    ID GASTROCNEMIUS RECESSION Right 2/12/2019    Procedure: ENDO GASTROC RECESSION, APPLICATION OF EXTERNAL FIXATOR;  Surgeon: Ave Hernandez DPM;  [facility];  Service: Podiatry    SKIN BIOPSY      scalp    [address]        reports that he has been smoking  He has been smoking about 1 00 pack per day  He has never used smokeless tobacco  He reports that he drinks alcohol  He reports that he does not use drugs        Current Outpatient Medications:     ACCU-CHEK FASTCLIX LANCETS MISC, ONE LANCET FOUR TIMES A DAY, Disp: 102 each, Rfl: 1    ACCU-CHEK SMARTVIEW test strip, TEST BLOOD SUGAR FOUR TIMES A DAY, Disp: 100 each, Rfl: 0    atorvastatin (LIPITOR) 40 mg tablet, TAKE 1 TABLET AT BEDTIME , Disp: 90 tablet, Rfl: 1    BASAGLAR KWIKPEN 100 units/mL injection pen, INJECT 35 UNIT TWICE DAILY, Disp: 15 mL, Rfl: 1    cyanocobalamin (VITAMIN B-12) 100 mcg tablet, Take by mouth daily, Disp: , Rfl:     cyclobenzaprine (FLEXERIL) 5 mg tablet, Take 2 tablets (10 mg total) by mouth 3 (three) times a day as needed for muscle spasms, Disp: 18 tablet, Rfl: 0    doxycycline (ADOXA) 100 MG tablet, doxycycline monohydrate 100 mg tablet, Disp: , Rfl:     ENBREL SURECLICK 50 MG/ML injection, 50 mg once a week Friday's, Disp: , Rfl:     enoxaparin (LOVENOX) 40 mg/0 4 mL, Inject 0 4 mL (40 mg total) under the skin daily for 30 days, Disp: 12 mL, Rfl: 0    ergocalciferol (VITAMIN D2) 50,000 units, , Disp: , Rfl:     fenoprofen (NALFON) 600 MG, TAKE ONE TABLET BY MOUTH THREE TIMES DAILY TO FOUR TIMES DAILY AS NEEDED, Disp: , Rfl: 2    fluticasone (FLONASE) 50 mcg/act nasal spray, USE 2 SPRAYS IN EACH NOSTRIL ONCE DAILY, Disp: 16 g, Rfl: 11    HYDROcodone-acetaminophen (NORCO) 5-325 mg per tablet, hydrocodone 5 mg-acetaminophen 325 mg tablet, Disp: , Rfl:     hydroxychloroquine (PLAQUENIL) 200 mg tablet, Take 200 mg by mouth 2 (two) times a day  , Disp: , Rfl:     Insulin Syringe-Needle U-100 (B-D INS SYRINGE 0 5CC/30GX1/2") 30G X 1/2" 0 5 ML MISC, by Does not apply route, Disp: , Rfl:     JANUVIA 100 MG tablet, TAKE ONE TABLET EVERY DAY, Disp: 90 tablet, Rfl: 1    leflunomide (ARAVA) 20 MG tablet, Take 1 tablet by mouth daily with dinner  , Disp: , Rfl:     losartan-hydrochlorothiazide (HYZAAR) 50-12 5 mg per tablet, TAKE 1 TABLET BY MOUTH DAILY, Disp: 90 tablet, Rfl: 1    naproxen (NAPROSYN) 500 mg tablet, Take 1 tablet by mouth every 12 (twelve) hours, Disp: , Rfl:     nicotine (NICODERM CQ) 14 mg/24hr TD 24 hr patch, Place 1 patch on the skin every 24 hours, Disp: 28 patch, Rfl: 0    terbinafine (LamISIL) 250 mg tablet, , Disp: , Rfl:     ULTICARE MICRO PEN NEEDLES 32G X 4 MM MISC, INJECT UNDER THE SKIN 3 (THREE) TIMES A DAY, Disp: 100 each, Rfl: 3    VENTOLIN  (90 Base) MCG/ACT inhaler, INHALE 2 PUFFS EVERY 4 (FOUR) HOURS AS NEEDED FOR WHEEZING, Disp: 18 g, Rfl: 1    VICTOZA injection, INJECT 1 8 MG DAILY, Disp: 9 mL, Rfl: 4    The following portions of the patient's history were reviewed and updated as appropriate: allergies, current medications, past family history, past medical history, past social history, past surgical history and problem list     Review of Systems   Constitutional: Negative  HENT: Negative  Eyes: Negative  Respiratory: Negative  Cardiovascular: Negative  Gastrointestinal: Negative  Endocrine: Negative  Genitourinary: Negative  Musculoskeletal: Positive for arthralgias ( right foot and ankle and also starting to get pain in his right hip)  Skin: Negative  Allergic/Immunologic: Negative  Neurological: Positive for numbness (Bilateral feet)  Hematological: Negative  Psychiatric/Behavioral: Negative  All other systems reviewed and are negative  Objective:    /84   Pulse (!) 110   Temp 98 °F (36 7 °C) (Tympanic)   Ht 6' (1 829 m)   SpO2 99%   BMI 31 19 kg/m²      Physical Exam   Constitutional: He appears well-developed and well-nourished  No distress  HENT:   Head: Normocephalic  Cardiovascular: Normal rate, regular rhythm and normal heart sounds  Pulmonary/Chest: Effort normal and breath sounds normal    Musculoskeletal:   Patient is right foot and ankle are in external fixator  I did not undress his bandages  I would leave that to podiatrist to perform  I do not see any evidence of infection  Excellent perfusion of all of his toes           Recent Results (from the past 1008 hour(s))   CBC and differential    Collection Time: 01/25/19  8:47 AM   Result Value Ref Range    WBC 7 70 4 31 - 10 16 Thousand/uL    RBC 5 31 3 88 - 5 62 Million/uL    Hemoglobin 15 6 12 0 - 17 0 g/dL    Hematocrit 47 2 36 5 - 49 3 %    MCV 89 82 - 98 fL    MCH 29 4 26 8 - 34 3 pg    MCHC 33 1 31 4 - 37 4 g/dL    RDW 13 7 11 6 - 15 1 %    MPV 11 1 8 9 - 12 7 fL    Platelets 790 299 - 325 Thousands/uL    nRBC 0 /100 WBCs    Neutrophils Relative 67 43 - 75 %    Immat GRANS % 0 0 - 2 %    Lymphocytes Relative 16 14 - 44 %    Monocytes Relative 11 4 - 12 %    Eosinophils Relative 5 0 - 6 %    Basophils Relative 1 0 - 1 %    Neutrophils Absolute 5 10 1 85 - 7 62 Thousands/µL    Immature Grans Absolute 0 03 0 00 - 0 20 Thousand/uL Lymphocytes Absolute 1 24 0 60 - 4 47 Thousands/µL    Monocytes Absolute 0 87 0 17 - 1 22 Thousand/µL    Eosinophils Absolute 0 38 0 00 - 0 61 Thousand/µL    Basophils Absolute 0 08 0 00 - 0 10 Thousands/µL   Comprehensive metabolic panel    Collection Time: 01/25/19  8:47 AM   Result Value Ref Range    Sodium 135 (L) 136 - 145 mmol/L    Potassium 4 0 3 5 - 5 3 mmol/L    Chloride 105 100 - 108 mmol/L    CO2 25 21 - 32 mmol/L    ANION GAP 5 4 - 13 mmol/L    BUN 15 5 - 25 mg/dL    Creatinine 0 87 0 60 - 1 30 mg/dL    Glucose, Fasting 118 (H) 65 - 99 mg/dL    Calcium 8 7 8 3 - 10 1 mg/dL    AST 17 5 - 45 U/L    ALT 31 12 - 78 U/L    Alkaline Phosphatase 63 46 - 116 U/L    Total Protein 7 8 6 4 - 8 2 g/dL    Albumin 3 8 3 5 - 5 0 g/dL    Total Bilirubin 0 61 0 20 - 1 00 mg/dL    eGFR 100 ml/min/1 73sq m   Hemoglobin A1C    Collection Time: 01/25/19  8:47 AM   Result Value Ref Range    Hemoglobin A1C 6 8 (H) 4 2 - 6 3 %     mg/dl   Lipid panel    Collection Time: 01/25/19  8:47 AM   Result Value Ref Range    Cholesterol 132 50 - 200 mg/dL    Triglycerides 91 <=150 mg/dL    HDL, Direct 38 (L) 40 - 60 mg/dL    LDL Calculated 76 0 - 100 mg/dL    Non-HDL-Chol (CHOL-HDL) 94 mg/dl   Microalbumin / creatinine urine ratio    Collection Time: 01/25/19  8:47 AM   Result Value Ref Range    Creatinine, Ur 295 0 mg/dL    Microalbum  ,U,Random 15 5 0 0 - 20 0 mg/L    Microalb Creat Ratio 5 0 - 30 mg/g creatinine   TSH, 3rd generation with Free T4 reflex    Collection Time: 01/25/19  8:47 AM   Result Value Ref Range    TSH 3RD GENERATON 1 600 0 358 - 3 740 uIU/mL   Vitamin D 1,25 dihydroxy    Collection Time: 01/25/19  8:47 AM   Result Value Ref Range    Vit D, 1,25-Dihydroxy 53 4 19 9 - 79 3 pg/mL   Culture, Aerobic and Anaerobic w/Gram Stain    Collection Time: 01/29/19  9:12 AM   Result Value Ref Range    Anaerobic Culture      Culture, Aerobic and Anaerobic w/Gram Stain (A)    Fingerstick Glucose (POCT)    Collection Time: 02/12/19  9:03 AM   Result Value Ref Range    POC Glucose 153 (H) 65 - 140 mg/dl   Fingerstick Glucose (POCT)    Collection Time: 02/12/19  1:35 PM   Result Value Ref Range    POC Glucose 115 65 - 140 mg/dl   Fingerstick Glucose (POCT)    Collection Time: 02/12/19 10:48 PM   Result Value Ref Range    POC Glucose 173 (H) 65 - 140 mg/dl   CBC    Collection Time: 02/13/19  4:28 AM   Result Value Ref Range    WBC 7 46 4 31 - 10 16 Thousand/uL    RBC 4 19 3 88 - 5 62 Million/uL    Hemoglobin 12 6 12 0 - 17 0 g/dL    Hematocrit 38 4 36 5 - 49 3 %    MCV 92 82 - 98 fL    MCH 30 1 26 8 - 34 3 pg    MCHC 32 8 31 4 - 37 4 g/dL    RDW 13 8 11 6 - 15 1 %    Platelets 963 752 - 118 Thousands/uL    MPV 11 3 8 9 - 12 7 fL   Fingerstick Glucose (POCT)    Collection Time: 02/13/19  6:39 AM   Result Value Ref Range    POC Glucose 165 (H) 65 - 140 mg/dl   Fingerstick Glucose (POCT)    Collection Time: 02/13/19 12:13 PM   Result Value Ref Range    POC Glucose 236 (H) 65 - 140 mg/dl   Fingerstick Glucose (POCT)    Collection Time: 02/13/19  4:49 PM   Result Value Ref Range    POC Glucose 108 65 - 140 mg/dl   Fingerstick Glucose (POCT)    Collection Time: 02/13/19  9:21 PM   Result Value Ref Range    POC Glucose 130 65 - 140 mg/dl   Fingerstick Glucose (POCT)    Collection Time: 02/14/19  8:16 AM   Result Value Ref Range    POC Glucose 133 65 - 140 mg/dl   Fingerstick Glucose (POCT)    Collection Time: 02/14/19 11:07 AM   Result Value Ref Range    POC Glucose 209 (H) 65 - 140 mg/dl   Fingerstick Glucose (POCT)    Collection Time: 02/14/19  4:51 PM   Result Value Ref Range    POC Glucose 102 65 - 140 mg/dl   Fingerstick Glucose (POCT)    Collection Time: 02/14/19 10:23 PM   Result Value Ref Range    POC Glucose 209 (H) 65 - 140 mg/dl   Fingerstick Glucose (POCT)    Collection Time: 02/15/19  7:55 AM   Result Value Ref Range    POC Glucose 218 (H) 65 - 140 mg/dl   Fingerstick Glucose (POCT)    Collection Time: 02/15/19 11:35 AM   Result Value Ref Range    POC Glucose 210 (H) 65 - 140 mg/dl       Assessment/Plan:    Unspecified fracture of right foot, initial encounter for closed fracture  Patient is status post ORIF with external fixators  He does need to follow up with Podiatry as scheduled  Charcot foot due to diabetes mellitus ([facility] )  Lab Results   Component Value Date    HGBA1C 6 8 (H) 01/25/2019       No results for input(s): POCGLU in the last 72 hours  Blood Sugar Average: Last 72 hrs:     Patient is now status post surgery with external fixation  In looking at his postoperative x-rays, podiatrist has him in excellent alignment for healing to take place  Patient did have physical therapy come to the house however the really can't do anything since he is not able to bear weight on his right foot for until external fixation device is removed  That is up to the discretion of podiatrist     It was advised that if he is having worsening pain despite being on Nalfon then he would need referral to pain management  If that is the case then I would have the patient contact my office and I will facilitate referral to pain management  Understandably despite the patient's diabetic neuropathy I would think that having titanium fixation rods inserted into bones would probably be somewhat uncomfortable  Rheumatoid arthritis ([facility] )  Direction of rheumatologist   Patient remains off of antirheumatic agents while he is postop and allowing his foot to heal   Clearly he is not physically active so hopefully his rheumatoid will not be as bothersome          Problem List Items Addressed This Visit        Endocrine    Charcot foot due to diabetes mellitus ([facility] ) - Primary     Lab Results   Component Value Date    HGBA1C 6 8 (H) 01/25/2019       No results for input(s): POCGLU in the last 72 hours  Blood Sugar Average: Last 72 hrs:     Patient is now status post surgery with external fixation    In looking at his postoperative x-rays, podiatrist has him in excellent alignment for healing to take place  Patient did have physical therapy come to the house however the really can't do anything since he is not able to bear weight on his right foot for until external fixation device is removed  That is up to the discretion of podiatrist     It was advised that if he is having worsening pain despite being on Nalfon then he would need referral to pain management  If that is the case then I would have the patient contact my office and I will facilitate referral to pain management  Understandably despite the patient's diabetic neuropathy I would think that having titanium fixation rods inserted into bones would probably be somewhat uncomfortable              Musculoskeletal and Integument    Rheumatoid arthritis [facility])     Direction of rheumatologist   Patient remains off of antirheumatic agents while he is postop and allowing his foot to heal   Clearly he is not physically active so hopefully his rheumatoid will not be as bothersome

## 2019-03-12 DIAGNOSIS — Z98.890 S/P FOOT SURGERY, RIGHT: ICD-10-CM

## 2019-03-12 NOTE — TELEPHONE ENCOUNTER
Patients wife called and requested a refill of Flexeril advising in message that he is having quit a bit of spasms in leg that he had surgery on  Did you wish to prescribe or should he contact the surgeon for a refill of this medication?

## 2019-03-13 DIAGNOSIS — Z98.890 S/P FOOT SURGERY, RIGHT: ICD-10-CM

## 2019-03-13 RX ORDER — CYCLOBENZAPRINE HCL 5 MG
10 TABLET ORAL 3 TIMES DAILY PRN
Qty: 18 TABLET | Refills: 0 | Status: SHIPPED | OUTPATIENT
Start: 2019-03-13 | End: 2019-03-13 | Stop reason: SDUPTHER

## 2019-03-13 RX ORDER — CYCLOBENZAPRINE HCL 5 MG
10 TABLET ORAL 3 TIMES DAILY PRN
Qty: 30 TABLET | Refills: 0 | Status: SHIPPED | OUTPATIENT
Start: 2019-03-13 | End: 2019-03-19 | Stop reason: SDUPTHER

## 2019-03-19 DIAGNOSIS — Z98.890 S/P FOOT SURGERY, RIGHT: ICD-10-CM

## 2019-03-19 DIAGNOSIS — E11.9 TYPE 2 DIABETES MELLITUS WITHOUT COMPLICATION, WITHOUT LONG-TERM CURRENT USE OF INSULIN (HCC): ICD-10-CM

## 2019-03-19 DIAGNOSIS — E13.9 DIABETES MELLITUS OF OTHER TYPE WITHOUT COMPLICATION, UNSPECIFIED WHETHER LONG TERM INSULIN USE (HCC): ICD-10-CM

## 2019-03-19 DIAGNOSIS — J45.909 UNCOMPLICATED ASTHMA, UNSPECIFIED ASTHMA SEVERITY, UNSPECIFIED WHETHER PERSISTENT: ICD-10-CM

## 2019-03-19 NOTE — TELEPHONE ENCOUNTER
Patient is also requesting a refill of the Nicotine patches she advised in message that he was given the wrong dose to start he should of had 20 mg? He was given 14 mg which is step 2 he has been using them but still having cravings  Should he continue on the step 2 dosage?

## 2019-03-20 DIAGNOSIS — Z72.0 TOBACCO USE: ICD-10-CM

## 2019-03-20 RX ORDER — NICOTINE 21 MG/24HR
1 PATCH, TRANSDERMAL 24 HOURS TRANSDERMAL EVERY 24 HOURS
Qty: 28 PATCH | Refills: 0 | Status: SHIPPED | OUTPATIENT
Start: 2019-03-20 | End: 2019-07-17 | Stop reason: ALTCHOICE

## 2019-03-20 RX ORDER — PEN NEEDLE, DIABETIC 32GX 5/32"
NEEDLE, DISPOSABLE MISCELLANEOUS 3 TIMES DAILY
Qty: 100 EACH | Refills: 3 | Status: SHIPPED | OUTPATIENT
Start: 2019-03-20 | End: 2019-04-17

## 2019-03-20 RX ORDER — INSULIN GLARGINE 100 [IU]/ML
INJECTION, SOLUTION SUBCUTANEOUS
Qty: 15 ML | Refills: 1 | Status: SHIPPED | OUTPATIENT
Start: 2019-03-20 | End: 2019-05-10 | Stop reason: SDUPTHER

## 2019-03-20 RX ORDER — CYCLOBENZAPRINE HCL 5 MG
TABLET ORAL
Qty: 30 TABLET | Refills: 0 | Status: SHIPPED | OUTPATIENT
Start: 2019-03-20 | End: 2019-03-26 | Stop reason: SDUPTHER

## 2019-03-20 NOTE — TELEPHONE ENCOUNTER
Step to is correct  21 mg patches are usually for somebody who smokes more than 2 packs per day  He should continue on 14 mg patches    Let me know and I will send a refill to the pharmacy

## 2019-03-21 DIAGNOSIS — E13.9 DIABETES MELLITUS OF OTHER TYPE WITHOUT COMPLICATION, UNSPECIFIED WHETHER LONG TERM INSULIN USE (HCC): ICD-10-CM

## 2019-03-21 RX ORDER — PEN NEEDLE, DIABETIC 32GX 5/32"
NEEDLE, DISPOSABLE MISCELLANEOUS 3 TIMES DAILY
Qty: 100 EACH | Refills: 3 | OUTPATIENT
Start: 2019-03-21

## 2019-03-26 DIAGNOSIS — Z98.890 S/P FOOT SURGERY, RIGHT: ICD-10-CM

## 2019-03-26 RX ORDER — CYCLOBENZAPRINE HCL 10 MG
10 TABLET ORAL 3 TIMES DAILY PRN
Qty: 90 TABLET | Refills: 0 | Status: SHIPPED | OUTPATIENT
Start: 2019-03-26 | End: 2019-04-30 | Stop reason: SDUPTHER

## 2019-03-26 NOTE — TELEPHONE ENCOUNTER
Prescription sent over for 30 day supply of 10 mg tablets  It is been something of a difficulty because 10 mg tablets were on back order so I was not ordering a 30 day supply of 5 mg tablets since he is taking 2 at a time which would just end up being an exorbitant amount of tablets  Number 90 sent to pharmacy

## 2019-04-09 ENCOUNTER — OFFICE VISIT (OUTPATIENT)
Dept: FAMILY MEDICINE CLINIC | Facility: CLINIC | Age: 54
End: 2019-04-09
Payer: COMMERCIAL

## 2019-04-09 VITALS
HEIGHT: 72 IN | HEART RATE: 108 BPM | SYSTOLIC BLOOD PRESSURE: 142 MMHG | DIASTOLIC BLOOD PRESSURE: 74 MMHG | TEMPERATURE: 96.9 F | BODY MASS INDEX: 31.19 KG/M2 | RESPIRATION RATE: 16 BRPM | OXYGEN SATURATION: 98 %

## 2019-04-09 DIAGNOSIS — E11.610 CHARCOT FOOT DUE TO DIABETES MELLITUS (HCC): Primary | ICD-10-CM

## 2019-04-09 DIAGNOSIS — Z98.890 S/P FOOT SURGERY, RIGHT: ICD-10-CM

## 2019-04-09 PROBLEM — M79.89 SWELLING OF RIGHT FOOT: Status: RESOLVED | Noted: 2019-01-29 | Resolved: 2019-04-09

## 2019-04-09 PROBLEM — L03.811 CELLULITIS OF OCCIPITAL REGION OF SCALP: Status: RESOLVED | Noted: 2019-01-29 | Resolved: 2019-04-09

## 2019-04-09 PROCEDURE — 99244 OFF/OP CNSLTJ NEW/EST MOD 40: CPT | Performed by: FAMILY MEDICINE

## 2019-04-11 ENCOUNTER — TRANSCRIBE ORDERS (OUTPATIENT)
Dept: LAB | Facility: CLINIC | Age: 54
End: 2019-04-11

## 2019-04-11 ENCOUNTER — APPOINTMENT (OUTPATIENT)
Dept: LAB | Facility: CLINIC | Age: 54
End: 2019-04-11
Payer: COMMERCIAL

## 2019-04-11 DIAGNOSIS — M79.671 RIGHT FOOT PAIN: ICD-10-CM

## 2019-04-11 DIAGNOSIS — R60.0 LOCALIZED EDEMA: ICD-10-CM

## 2019-04-11 DIAGNOSIS — M14.671 CHARCOT'S JOINT OF ANKLE, RIGHT: Primary | ICD-10-CM

## 2019-04-11 DIAGNOSIS — M14.671 CHARCOT'S JOINT OF ANKLE, RIGHT: ICD-10-CM

## 2019-04-11 LAB
ANION GAP SERPL CALCULATED.3IONS-SCNC: 5 MMOL/L (ref 4–13)
BASOPHILS # BLD AUTO: 0.07 THOUSANDS/ΜL (ref 0–0.1)
BASOPHILS NFR BLD AUTO: 1 % (ref 0–1)
BUN SERPL-MCNC: 17 MG/DL (ref 5–25)
CALCIUM SERPL-MCNC: 9.3 MG/DL (ref 8.3–10.1)
CHLORIDE SERPL-SCNC: 101 MMOL/L (ref 100–108)
CO2 SERPL-SCNC: 31 MMOL/L (ref 21–32)
CREAT SERPL-MCNC: 1.08 MG/DL (ref 0.6–1.3)
EOSINOPHIL # BLD AUTO: 0.26 THOUSAND/ΜL (ref 0–0.61)
EOSINOPHIL NFR BLD AUTO: 4 % (ref 0–6)
ERYTHROCYTE [DISTWIDTH] IN BLOOD BY AUTOMATED COUNT: 13.2 % (ref 11.6–15.1)
EST. AVERAGE GLUCOSE BLD GHB EST-MCNC: 166 MG/DL
GFR SERPL CREATININE-BSD FRML MDRD: 79 ML/MIN/1.73SQ M
GLUCOSE P FAST SERPL-MCNC: 321 MG/DL (ref 65–99)
HBA1C MFR BLD: 7.4 % (ref 4.2–6.3)
HCT VFR BLD AUTO: 44.6 % (ref 36.5–49.3)
HGB BLD-MCNC: 15.1 G/DL (ref 12–17)
IMM GRANULOCYTES # BLD AUTO: 0.1 THOUSAND/UL (ref 0–0.2)
IMM GRANULOCYTES NFR BLD AUTO: 1 % (ref 0–2)
INR PPP: 0.94 (ref 0.86–1.17)
LYMPHOCYTES # BLD AUTO: 1.24 THOUSANDS/ΜL (ref 0.6–4.47)
LYMPHOCYTES NFR BLD AUTO: 17 % (ref 14–44)
MCH RBC QN AUTO: 29.5 PG (ref 26.8–34.3)
MCHC RBC AUTO-ENTMCNC: 33.9 G/DL (ref 31.4–37.4)
MCV RBC AUTO: 87 FL (ref 82–98)
MONOCYTES # BLD AUTO: 0.67 THOUSAND/ΜL (ref 0.17–1.22)
MONOCYTES NFR BLD AUTO: 9 % (ref 4–12)
NEUTROPHILS # BLD AUTO: 4.99 THOUSANDS/ΜL (ref 1.85–7.62)
NEUTS SEG NFR BLD AUTO: 68 % (ref 43–75)
NRBC BLD AUTO-RTO: 0 /100 WBCS
PLATELET # BLD AUTO: 238 THOUSANDS/UL (ref 149–390)
PMV BLD AUTO: 10.8 FL (ref 8.9–12.7)
POTASSIUM SERPL-SCNC: 4.5 MMOL/L (ref 3.5–5.3)
PROTHROMBIN TIME: 12.7 SECONDS (ref 11.8–14.2)
RBC # BLD AUTO: 5.12 MILLION/UL (ref 3.88–5.62)
SODIUM SERPL-SCNC: 137 MMOL/L (ref 136–145)
WBC # BLD AUTO: 7.33 THOUSAND/UL (ref 4.31–10.16)

## 2019-04-11 PROCEDURE — 36415 COLL VENOUS BLD VENIPUNCTURE: CPT

## 2019-04-11 PROCEDURE — 85610 PROTHROMBIN TIME: CPT

## 2019-04-11 PROCEDURE — 85025 COMPLETE CBC W/AUTO DIFF WBC: CPT

## 2019-04-11 PROCEDURE — 80048 BASIC METABOLIC PNL TOTAL CA: CPT

## 2019-04-11 PROCEDURE — 83036 HEMOGLOBIN GLYCOSYLATED A1C: CPT

## 2019-04-17 ENCOUNTER — ANESTHESIA EVENT (OUTPATIENT)
Dept: PERIOP | Facility: HOSPITAL | Age: 54
End: 2019-04-17
Payer: COMMERCIAL

## 2019-04-17 DIAGNOSIS — I10 ESSENTIAL HYPERTENSION: ICD-10-CM

## 2019-04-17 RX ORDER — LOSARTAN POTASSIUM AND HYDROCHLOROTHIAZIDE 12.5; 5 MG/1; MG/1
1 TABLET ORAL DAILY
Qty: 90 TABLET | Refills: 1 | Status: SHIPPED | OUTPATIENT
Start: 2019-04-17 | End: 2019-10-03 | Stop reason: SDUPTHER

## 2019-04-18 ENCOUNTER — HOSPITAL ENCOUNTER (OUTPATIENT)
Facility: HOSPITAL | Age: 54
Setting detail: OUTPATIENT SURGERY
Discharge: HOME | End: 2019-04-18
Attending: PODIATRIST | Admitting: PODIATRIST
Payer: COMMERCIAL

## 2019-04-18 ENCOUNTER — APPOINTMENT (OUTPATIENT)
Dept: RADIOLOGY | Facility: HOSPITAL | Age: 54
End: 2019-04-18
Payer: COMMERCIAL

## 2019-04-18 ENCOUNTER — HOSPITAL ENCOUNTER (OUTPATIENT)
Dept: RADIOLOGY | Facility: HOSPITAL | Age: 54
Setting detail: OUTPATIENT SURGERY
Discharge: HOME | End: 2019-04-18
Payer: COMMERCIAL

## 2019-04-18 ENCOUNTER — ANESTHESIA (OUTPATIENT)
Dept: PERIOP | Facility: HOSPITAL | Age: 54
End: 2019-04-18
Payer: COMMERCIAL

## 2019-04-18 VITALS
RESPIRATION RATE: 18 BRPM | OXYGEN SATURATION: 97 % | HEIGHT: 72 IN | BODY MASS INDEX: 32.51 KG/M2 | WEIGHT: 240 LBS | SYSTOLIC BLOOD PRESSURE: 110 MMHG | DIASTOLIC BLOOD PRESSURE: 62 MMHG | TEMPERATURE: 97.6 F | HEART RATE: 83 BPM

## 2019-04-18 DIAGNOSIS — Z98.890 S/P FOOT SURGERY, RIGHT: Primary | ICD-10-CM

## 2019-04-18 DIAGNOSIS — M14.671 CHARCOT'S JOINT OF ANKLE, RIGHT: ICD-10-CM

## 2019-04-18 LAB
GLUCOSE SERPL-MCNC: 113 MG/DL (ref 65–140)
GLUCOSE SERPL-MCNC: 166 MG/DL (ref 65–140)

## 2019-04-18 PROCEDURE — 82948 REAGENT STRIP/BLOOD GLUCOSE: CPT

## 2019-04-18 PROCEDURE — C1713 ANCHOR/SCREW BN/BN,TIS/BN: HCPCS | Performed by: PODIATRIST

## 2019-04-18 PROCEDURE — 73630 X-RAY EXAM OF FOOT: CPT

## 2019-04-18 DEVICE — IMPLANTABLE DEVICE: Type: IMPLANTABLE DEVICE | Site: LEG | Status: FUNCTIONAL

## 2019-04-18 DEVICE — INJECTABLE HA BONE CEMENT
Type: IMPLANTABLE DEVICE | Site: LEG | Status: FUNCTIONAL
Brand: HYDROSET XT

## 2019-04-18 RX ORDER — MIDAZOLAM HYDROCHLORIDE 1 MG/ML
INJECTION INTRAMUSCULAR; INTRAVENOUS AS NEEDED
Status: DISCONTINUED | OUTPATIENT
Start: 2019-04-18 | End: 2019-04-18 | Stop reason: SURG

## 2019-04-18 RX ORDER — ONDANSETRON 2 MG/ML
INJECTION INTRAMUSCULAR; INTRAVENOUS AS NEEDED
Status: DISCONTINUED | OUTPATIENT
Start: 2019-04-18 | End: 2019-04-18 | Stop reason: SURG

## 2019-04-18 RX ORDER — ONDANSETRON 2 MG/ML
4 INJECTION INTRAMUSCULAR; INTRAVENOUS ONCE AS NEEDED
Status: DISCONTINUED | OUTPATIENT
Start: 2019-04-18 | End: 2019-04-18 | Stop reason: HOSPADM

## 2019-04-18 RX ORDER — CEFAZOLIN SODIUM 2 G/50ML
2000 SOLUTION INTRAVENOUS
Status: DISCONTINUED | OUTPATIENT
Start: 2019-04-18 | End: 2019-04-18 | Stop reason: HOSPADM

## 2019-04-18 RX ORDER — PROPOFOL 10 MG/ML
INJECTION, EMULSION INTRAVENOUS AS NEEDED
Status: DISCONTINUED | OUTPATIENT
Start: 2019-04-18 | End: 2019-04-18 | Stop reason: SURG

## 2019-04-18 RX ORDER — OXYCODONE HYDROCHLORIDE AND ACETAMINOPHEN 5; 325 MG/1; MG/1
1 TABLET ORAL EVERY 6 HOURS PRN
Status: DISCONTINUED | OUTPATIENT
Start: 2019-04-18 | End: 2019-04-18 | Stop reason: HOSPADM

## 2019-04-18 RX ORDER — OXYCODONE HYDROCHLORIDE AND ACETAMINOPHEN 5; 325 MG/1; MG/1
1 TABLET ORAL EVERY 6 HOURS PRN
Qty: 24 TABLET | Refills: 0 | Status: SHIPPED | OUTPATIENT
Start: 2019-04-18 | End: 2019-04-18 | Stop reason: SDUPTHER

## 2019-04-18 RX ORDER — SODIUM CHLORIDE 9 MG/ML
125 INJECTION, SOLUTION INTRAVENOUS CONTINUOUS
Status: DISCONTINUED | OUTPATIENT
Start: 2019-04-18 | End: 2019-04-18 | Stop reason: HOSPADM

## 2019-04-18 RX ORDER — FENTANYL CITRATE 50 UG/ML
INJECTION, SOLUTION INTRAMUSCULAR; INTRAVENOUS AS NEEDED
Status: DISCONTINUED | OUTPATIENT
Start: 2019-04-18 | End: 2019-04-18 | Stop reason: SURG

## 2019-04-18 RX ORDER — FENTANYL CITRATE/PF 50 MCG/ML
50 SYRINGE (ML) INJECTION
Status: DISCONTINUED | OUTPATIENT
Start: 2019-04-18 | End: 2019-04-18 | Stop reason: HOSPADM

## 2019-04-18 RX ORDER — OXYCODONE HYDROCHLORIDE AND ACETAMINOPHEN 5; 325 MG/1; MG/1
1 TABLET ORAL EVERY 6 HOURS PRN
Qty: 16 TABLET | Refills: 0 | Status: SHIPPED | OUTPATIENT
Start: 2019-04-18 | End: 2019-04-24

## 2019-04-18 RX ORDER — HYDROMORPHONE HCL/PF 1 MG/ML
0.5 SYRINGE (ML) INJECTION
Status: DISCONTINUED | OUTPATIENT
Start: 2019-04-18 | End: 2019-04-18 | Stop reason: HOSPADM

## 2019-04-18 RX ORDER — MINERAL OIL
OIL (ML) MISCELLANEOUS AS NEEDED
Status: DISCONTINUED | OUTPATIENT
Start: 2019-04-18 | End: 2019-04-18 | Stop reason: HOSPADM

## 2019-04-18 RX ORDER — MEPERIDINE HYDROCHLORIDE 50 MG/ML
12.5 INJECTION INTRAMUSCULAR; INTRAVENOUS; SUBCUTANEOUS ONCE AS NEEDED
Status: DISCONTINUED | OUTPATIENT
Start: 2019-04-18 | End: 2019-04-18 | Stop reason: HOSPADM

## 2019-04-18 RX ADMIN — FENTANYL CITRATE 25 MCG: 50 INJECTION, SOLUTION INTRAMUSCULAR; INTRAVENOUS at 10:27

## 2019-04-18 RX ADMIN — FENTANYL CITRATE 25 MCG: 50 INJECTION, SOLUTION INTRAMUSCULAR; INTRAVENOUS at 10:37

## 2019-04-18 RX ADMIN — SODIUM CHLORIDE 125 ML/HR: 0.9 INJECTION, SOLUTION INTRAVENOUS at 08:49

## 2019-04-18 RX ADMIN — ONDANSETRON 4 MG: 2 INJECTION INTRAMUSCULAR; INTRAVENOUS at 10:58

## 2019-04-18 RX ADMIN — SODIUM CHLORIDE: 0.9 INJECTION, SOLUTION INTRAVENOUS at 10:46

## 2019-04-18 RX ADMIN — FENTANYL CITRATE 50 MCG: 50 INJECTION, SOLUTION INTRAMUSCULAR; INTRAVENOUS at 11:40

## 2019-04-18 RX ADMIN — OXYCODONE HYDROCHLORIDE AND ACETAMINOPHEN 1 TABLET: 5; 325 TABLET ORAL at 13:03

## 2019-04-18 RX ADMIN — MIDAZOLAM 2 MG: 1 INJECTION INTRAMUSCULAR; INTRAVENOUS at 10:19

## 2019-04-18 RX ADMIN — PROPOFOL 300 MG: 10 INJECTION, EMULSION INTRAVENOUS at 10:23

## 2019-04-18 RX ADMIN — FENTANYL CITRATE 50 MCG: 50 INJECTION, SOLUTION INTRAMUSCULAR; INTRAVENOUS at 11:51

## 2019-04-18 RX ADMIN — FENTANYL CITRATE 50 MCG: 50 INJECTION, SOLUTION INTRAMUSCULAR; INTRAVENOUS at 11:04

## 2019-04-18 RX ADMIN — CEFAZOLIN SODIUM 2000 MG: 2 SOLUTION INTRAVENOUS at 10:19

## 2019-04-26 PROBLEM — L02.811 ABSCESS OF SCALP: Status: RESOLVED | Noted: 2019-01-29 | Resolved: 2019-04-26

## 2019-04-30 DIAGNOSIS — Z98.890 S/P FOOT SURGERY, RIGHT: ICD-10-CM

## 2019-05-01 ENCOUNTER — OFFICE VISIT (OUTPATIENT)
Dept: SURGICAL ONCOLOGY | Facility: CLINIC | Age: 54
End: 2019-05-01
Payer: COMMERCIAL

## 2019-05-01 VITALS
DIASTOLIC BLOOD PRESSURE: 78 MMHG | HEART RATE: 105 BPM | HEIGHT: 72 IN | SYSTOLIC BLOOD PRESSURE: 124 MMHG | TEMPERATURE: 98.7 F | BODY MASS INDEX: 32.55 KG/M2

## 2019-05-01 DIAGNOSIS — D23.9 CLEAR CELL HIDRADENOMA: Primary | ICD-10-CM

## 2019-05-01 PROCEDURE — 99213 OFFICE O/P EST LOW 20 MIN: CPT | Performed by: SURGERY

## 2019-05-02 RX ORDER — CYCLOBENZAPRINE HCL 10 MG
10 TABLET ORAL 3 TIMES DAILY PRN
Qty: 90 TABLET | Refills: 0 | Status: SHIPPED | OUTPATIENT
Start: 2019-05-02 | End: 2019-07-17 | Stop reason: ALTCHOICE

## 2019-05-10 DIAGNOSIS — E11.9 TYPE 2 DIABETES MELLITUS WITHOUT COMPLICATION, WITHOUT LONG-TERM CURRENT USE OF INSULIN (HCC): ICD-10-CM

## 2019-05-12 RX ORDER — INSULIN GLARGINE 100 [IU]/ML
INJECTION, SOLUTION SUBCUTANEOUS
Qty: 21 ML | Refills: 1 | Status: SHIPPED | OUTPATIENT
Start: 2019-05-12 | End: 2019-05-28 | Stop reason: SDUPTHER

## 2019-05-13 DIAGNOSIS — J45.909 UNCOMPLICATED ASTHMA, UNSPECIFIED ASTHMA SEVERITY, UNSPECIFIED WHETHER PERSISTENT: ICD-10-CM

## 2019-05-13 RX ORDER — ALBUTEROL SULFATE 90 UG/1
AEROSOL, METERED RESPIRATORY (INHALATION)
Qty: 18 G | Refills: 1 | Status: SHIPPED | OUTPATIENT
Start: 2019-05-13 | End: 2019-06-07 | Stop reason: SDUPTHER

## 2019-05-24 ENCOUNTER — APPOINTMENT (OUTPATIENT)
Dept: LAB | Facility: CLINIC | Age: 54
End: 2019-05-24
Payer: COMMERCIAL

## 2019-05-24 DIAGNOSIS — Z12.5 PROSTATE CANCER SCREENING: ICD-10-CM

## 2019-05-24 DIAGNOSIS — Z79.4 TYPE 2 DIABETES MELLITUS WITH DIABETIC POLYNEUROPATHY, WITH LONG-TERM CURRENT USE OF INSULIN (HCC): ICD-10-CM

## 2019-05-24 DIAGNOSIS — E11.42 TYPE 2 DIABETES MELLITUS WITH DIABETIC POLYNEUROPATHY, WITH LONG-TERM CURRENT USE OF INSULIN (HCC): ICD-10-CM

## 2019-05-24 DIAGNOSIS — E78.2 MIXED HYPERLIPIDEMIA: ICD-10-CM

## 2019-05-24 DIAGNOSIS — I10 BENIGN ESSENTIAL HYPERTENSION: ICD-10-CM

## 2019-05-24 DIAGNOSIS — E55.9 VITAMIN D DEFICIENCY: ICD-10-CM

## 2019-05-24 LAB
ALBUMIN SERPL BCP-MCNC: 3.8 G/DL (ref 3.5–5)
ALP SERPL-CCNC: 55 U/L (ref 46–116)
ALT SERPL W P-5'-P-CCNC: 44 U/L (ref 12–78)
ANION GAP SERPL CALCULATED.3IONS-SCNC: 7 MMOL/L (ref 4–13)
AST SERPL W P-5'-P-CCNC: 21 U/L (ref 5–45)
BASOPHILS # BLD AUTO: 0.07 THOUSANDS/ΜL (ref 0–0.1)
BASOPHILS NFR BLD AUTO: 1 % (ref 0–1)
BILIRUB SERPL-MCNC: 0.56 MG/DL (ref 0.2–1)
BUN SERPL-MCNC: 14 MG/DL (ref 5–25)
CALCIUM SERPL-MCNC: 8.8 MG/DL (ref 8.3–10.1)
CHLORIDE SERPL-SCNC: 104 MMOL/L (ref 100–108)
CHOLEST SERPL-MCNC: 148 MG/DL (ref 50–200)
CO2 SERPL-SCNC: 27 MMOL/L (ref 21–32)
CREAT SERPL-MCNC: 0.98 MG/DL (ref 0.6–1.3)
CREAT UR-MCNC: 177 MG/DL
EOSINOPHIL # BLD AUTO: 0.14 THOUSAND/ΜL (ref 0–0.61)
EOSINOPHIL NFR BLD AUTO: 2 % (ref 0–6)
ERYTHROCYTE [DISTWIDTH] IN BLOOD BY AUTOMATED COUNT: 13.4 % (ref 11.6–15.1)
EST. AVERAGE GLUCOSE BLD GHB EST-MCNC: 186 MG/DL
GFR SERPL CREATININE-BSD FRML MDRD: 89 ML/MIN/1.73SQ M
GLUCOSE P FAST SERPL-MCNC: 181 MG/DL (ref 65–99)
HBA1C MFR BLD: 8.1 % (ref 4.2–6.3)
HCT VFR BLD AUTO: 46.1 % (ref 36.5–49.3)
HDLC SERPL-MCNC: 47 MG/DL (ref 40–60)
HGB BLD-MCNC: 15.3 G/DL (ref 12–17)
IMM GRANULOCYTES # BLD AUTO: 0.09 THOUSAND/UL (ref 0–0.2)
IMM GRANULOCYTES NFR BLD AUTO: 1 % (ref 0–2)
LDLC SERPL CALC-MCNC: 78 MG/DL (ref 0–100)
LYMPHOCYTES # BLD AUTO: 1.71 THOUSANDS/ΜL (ref 0.6–4.47)
LYMPHOCYTES NFR BLD AUTO: 22 % (ref 14–44)
MCH RBC QN AUTO: 29.2 PG (ref 26.8–34.3)
MCHC RBC AUTO-ENTMCNC: 33.2 G/DL (ref 31.4–37.4)
MCV RBC AUTO: 88 FL (ref 82–98)
MICROALBUMIN UR-MCNC: 9.4 MG/L (ref 0–20)
MICROALBUMIN/CREAT 24H UR: 5 MG/G CREATININE (ref 0–30)
MONOCYTES # BLD AUTO: 0.81 THOUSAND/ΜL (ref 0.17–1.22)
MONOCYTES NFR BLD AUTO: 10 % (ref 4–12)
NEUTROPHILS # BLD AUTO: 5.02 THOUSANDS/ΜL (ref 1.85–7.62)
NEUTS SEG NFR BLD AUTO: 64 % (ref 43–75)
NONHDLC SERPL-MCNC: 101 MG/DL
NRBC BLD AUTO-RTO: 0 /100 WBCS
PLATELET # BLD AUTO: 229 THOUSANDS/UL (ref 149–390)
PMV BLD AUTO: 10.2 FL (ref 8.9–12.7)
POTASSIUM SERPL-SCNC: 4.4 MMOL/L (ref 3.5–5.3)
PROT SERPL-MCNC: 7.5 G/DL (ref 6.4–8.2)
PSA SERPL-MCNC: 0.2 NG/ML (ref 0–4)
RBC # BLD AUTO: 5.24 MILLION/UL (ref 3.88–5.62)
SODIUM SERPL-SCNC: 138 MMOL/L (ref 136–145)
TRIGL SERPL-MCNC: 117 MG/DL
TSH SERPL DL<=0.05 MIU/L-ACNC: 2.92 UIU/ML (ref 0.36–3.74)
WBC # BLD AUTO: 7.84 THOUSAND/UL (ref 4.31–10.16)

## 2019-05-24 PROCEDURE — 82652 VIT D 1 25-DIHYDROXY: CPT

## 2019-05-24 PROCEDURE — 36415 COLL VENOUS BLD VENIPUNCTURE: CPT

## 2019-05-24 PROCEDURE — 82570 ASSAY OF URINE CREATININE: CPT

## 2019-05-24 PROCEDURE — 80061 LIPID PANEL: CPT

## 2019-05-24 PROCEDURE — 83036 HEMOGLOBIN GLYCOSYLATED A1C: CPT

## 2019-05-24 PROCEDURE — 82043 UR ALBUMIN QUANTITATIVE: CPT

## 2019-05-24 PROCEDURE — 80053 COMPREHEN METABOLIC PANEL: CPT

## 2019-05-24 PROCEDURE — G0103 PSA SCREENING: HCPCS

## 2019-05-24 PROCEDURE — 3061F NEG MICROALBUMINURIA REV: CPT | Performed by: FAMILY MEDICINE

## 2019-05-24 PROCEDURE — 85025 COMPLETE CBC W/AUTO DIFF WBC: CPT

## 2019-05-24 PROCEDURE — 84443 ASSAY THYROID STIM HORMONE: CPT

## 2019-05-24 RX ORDER — PEN NEEDLE, DIABETIC 32GX 5/32"
NEEDLE, DISPOSABLE MISCELLANEOUS
COMMUNITY
Start: 2019-04-18 | End: 2020-02-12 | Stop reason: SDUPTHER

## 2019-05-28 ENCOUNTER — OFFICE VISIT (OUTPATIENT)
Dept: FAMILY MEDICINE CLINIC | Facility: CLINIC | Age: 54
End: 2019-05-28
Payer: COMMERCIAL

## 2019-05-28 VITALS
HEART RATE: 86 BPM | TEMPERATURE: 98.2 F | DIASTOLIC BLOOD PRESSURE: 72 MMHG | RESPIRATION RATE: 16 BRPM | OXYGEN SATURATION: 98 % | WEIGHT: 248.8 LBS | HEIGHT: 72 IN | BODY MASS INDEX: 33.7 KG/M2 | SYSTOLIC BLOOD PRESSURE: 148 MMHG

## 2019-05-28 DIAGNOSIS — E11.9 TYPE 2 DIABETES MELLITUS WITHOUT COMPLICATION, WITHOUT LONG-TERM CURRENT USE OF INSULIN (HCC): ICD-10-CM

## 2019-05-28 DIAGNOSIS — E11.610 CHARCOT FOOT DUE TO DIABETES MELLITUS (HCC): ICD-10-CM

## 2019-05-28 DIAGNOSIS — Z23 NEED FOR PROPHYLACTIC VACCINATION AGAINST STREPTOCOCCUS PNEUMONIAE (PNEUMOCOCCUS): ICD-10-CM

## 2019-05-28 DIAGNOSIS — E78.2 MIXED HYPERLIPIDEMIA: ICD-10-CM

## 2019-05-28 DIAGNOSIS — Z00.00 PHYSICAL EXAM, ANNUAL: Primary | ICD-10-CM

## 2019-05-28 DIAGNOSIS — Z72.0 TOBACCO USE: ICD-10-CM

## 2019-05-28 DIAGNOSIS — I10 BENIGN ESSENTIAL HYPERTENSION: ICD-10-CM

## 2019-05-28 DIAGNOSIS — Z98.890 S/P FOOT SURGERY, RIGHT: ICD-10-CM

## 2019-05-28 DIAGNOSIS — E66.9 OBESITY (BMI 30.0-34.9): ICD-10-CM

## 2019-05-28 DIAGNOSIS — E11.42 TYPE 2 DIABETES MELLITUS WITH DIABETIC POLYNEUROPATHY, WITH LONG-TERM CURRENT USE OF INSULIN (HCC): ICD-10-CM

## 2019-05-28 DIAGNOSIS — Z79.4 TYPE 2 DIABETES MELLITUS WITH DIABETIC POLYNEUROPATHY, WITH LONG-TERM CURRENT USE OF INSULIN (HCC): ICD-10-CM

## 2019-05-28 DIAGNOSIS — I65.29 CAROTID ARTERY CALCIFICATION, UNSPECIFIED LATERALITY: ICD-10-CM

## 2019-05-28 DIAGNOSIS — E55.9 VITAMIN D DEFICIENCY: ICD-10-CM

## 2019-05-28 DIAGNOSIS — M05.9 RHEUMATOID ARTHRITIS WITH POSITIVE RHEUMATOID FACTOR, INVOLVING UNSPECIFIED SITE (HCC): ICD-10-CM

## 2019-05-28 PROBLEM — S92.901A UNSPECIFIED FRACTURE OF RIGHT FOOT, INITIAL ENCOUNTER FOR CLOSED FRACTURE: Status: RESOLVED | Noted: 2019-02-01 | Resolved: 2019-05-28

## 2019-05-28 LAB — 1,25(OH)2D3 SERPL-MCNC: 32.9 PG/ML (ref 19.9–79.3)

## 2019-05-28 PROCEDURE — 4004F PT TOBACCO SCREEN RCVD TLK: CPT | Performed by: FAMILY MEDICINE

## 2019-05-28 PROCEDURE — 3008F BODY MASS INDEX DOCD: CPT | Performed by: FAMILY MEDICINE

## 2019-05-28 PROCEDURE — 90471 IMMUNIZATION ADMIN: CPT | Performed by: FAMILY MEDICINE

## 2019-05-28 PROCEDURE — 99215 OFFICE O/P EST HI 40 MIN: CPT | Performed by: FAMILY MEDICINE

## 2019-05-28 PROCEDURE — 3725F SCREEN DEPRESSION PERFORMED: CPT | Performed by: FAMILY MEDICINE

## 2019-05-28 PROCEDURE — 90670 PCV13 VACCINE IM: CPT | Performed by: FAMILY MEDICINE

## 2019-06-07 DIAGNOSIS — E11.9 TYPE 2 DIABETES MELLITUS WITHOUT COMPLICATION, WITHOUT LONG-TERM CURRENT USE OF INSULIN (HCC): ICD-10-CM

## 2019-06-07 DIAGNOSIS — J45.909 UNCOMPLICATED ASTHMA, UNSPECIFIED ASTHMA SEVERITY, UNSPECIFIED WHETHER PERSISTENT: ICD-10-CM

## 2019-06-07 RX ORDER — ALBUTEROL SULFATE 90 UG/1
AEROSOL, METERED RESPIRATORY (INHALATION)
Qty: 18 G | Refills: 1 | OUTPATIENT
Start: 2019-06-07

## 2019-06-07 RX ORDER — INSULIN GLARGINE 100 [IU]/ML
INJECTION, SOLUTION SUBCUTANEOUS
Qty: 21 ML | Refills: 1 | OUTPATIENT
Start: 2019-06-07

## 2019-06-07 RX ORDER — ALBUTEROL SULFATE 90 UG/1
2 AEROSOL, METERED RESPIRATORY (INHALATION) EVERY 4 HOURS PRN
Qty: 18 G | Refills: 1 | Status: SHIPPED | OUTPATIENT
Start: 2019-06-07 | End: 2019-07-08 | Stop reason: SDUPTHER

## 2019-06-07 NOTE — TELEPHONE ENCOUNTER
So they sent a refill for 2 Basaglar pens and 1 albuterol  I refilled Domenico Ruelas on May 28  Please contact the pharmacy    Verbally authorize for albuterol and they should have his basal insulin pen

## 2019-06-17 DIAGNOSIS — Z98.890 S/P FOOT SURGERY, RIGHT: Primary | ICD-10-CM

## 2019-07-08 DIAGNOSIS — E11.9 TYPE 2 DIABETES MELLITUS WITHOUT COMPLICATION, WITHOUT LONG-TERM CURRENT USE OF INSULIN (HCC): ICD-10-CM

## 2019-07-08 DIAGNOSIS — E78.2 MIXED HYPERLIPIDEMIA: ICD-10-CM

## 2019-07-08 DIAGNOSIS — J45.909 UNCOMPLICATED ASTHMA, UNSPECIFIED ASTHMA SEVERITY, UNSPECIFIED WHETHER PERSISTENT: ICD-10-CM

## 2019-07-08 DIAGNOSIS — E13.9 DIABETES MELLITUS OF OTHER TYPE WITHOUT COMPLICATION, UNSPECIFIED WHETHER LONG TERM INSULIN USE (HCC): ICD-10-CM

## 2019-07-08 RX ORDER — ALBUTEROL SULFATE 90 UG/1
2 AEROSOL, METERED RESPIRATORY (INHALATION) EVERY 4 HOURS PRN
Qty: 18 G | Refills: 1 | Status: SHIPPED | OUTPATIENT
Start: 2019-07-08 | End: 2019-09-03 | Stop reason: SDUPTHER

## 2019-07-08 RX ORDER — ATORVASTATIN CALCIUM 40 MG/1
TABLET, FILM COATED ORAL
Qty: 90 TABLET | Refills: 1 | Status: SHIPPED | OUTPATIENT
Start: 2019-07-08 | End: 2020-02-06

## 2019-07-08 RX ORDER — LIRAGLUTIDE 6 MG/ML
INJECTION SUBCUTANEOUS
Qty: 9 ML | Refills: 4 | Status: SHIPPED | OUTPATIENT
Start: 2019-07-08 | End: 2019-12-02 | Stop reason: SDUPTHER

## 2019-07-08 RX ORDER — SITAGLIPTIN 100 MG/1
TABLET, FILM COATED ORAL
Qty: 90 TABLET | Refills: 1 | Status: SHIPPED | OUTPATIENT
Start: 2019-07-08 | End: 2019-12-30 | Stop reason: SDUPTHER

## 2019-07-10 ENCOUNTER — HOSPITAL ENCOUNTER (OUTPATIENT)
Dept: MRI IMAGING | Facility: HOSPITAL | Age: 54
Discharge: HOME | End: 2019-07-10
Attending: PODIATRIST
Payer: COMMERCIAL

## 2019-07-10 ENCOUNTER — TRANSCRIBE ORDERS (OUTPATIENT)
Dept: ADMINISTRATIVE | Facility: HOSPITAL | Age: 54
End: 2019-07-10

## 2019-07-10 DIAGNOSIS — L97.412 ULCER OF RIGHT HEEL, WITH FAT LAYER EXPOSED (HCC): ICD-10-CM

## 2019-07-10 DIAGNOSIS — L97.412 ULCER OF RIGHT HEEL, WITH FAT LAYER EXPOSED (HCC): Primary | ICD-10-CM

## 2019-07-10 PROCEDURE — 73718 MRI LOWER EXTREMITY W/O DYE: CPT

## 2019-07-17 ENCOUNTER — OFFICE VISIT (OUTPATIENT)
Dept: FAMILY MEDICINE CLINIC | Facility: CLINIC | Age: 54
End: 2019-07-17
Payer: COMMERCIAL

## 2019-07-17 VITALS
HEIGHT: 72 IN | SYSTOLIC BLOOD PRESSURE: 140 MMHG | BODY MASS INDEX: 33.74 KG/M2 | RESPIRATION RATE: 18 BRPM | HEART RATE: 90 BPM | OXYGEN SATURATION: 98 % | DIASTOLIC BLOOD PRESSURE: 60 MMHG | TEMPERATURE: 98.2 F

## 2019-07-17 DIAGNOSIS — M25.512 CHRONIC PAIN OF BOTH SHOULDERS: ICD-10-CM

## 2019-07-17 DIAGNOSIS — M25.511 CHRONIC PAIN OF BOTH SHOULDERS: ICD-10-CM

## 2019-07-17 DIAGNOSIS — G89.29 CHRONIC PAIN OF BOTH SHOULDERS: ICD-10-CM

## 2019-07-17 DIAGNOSIS — I10 BENIGN ESSENTIAL HYPERTENSION: ICD-10-CM

## 2019-07-17 DIAGNOSIS — E11.42 TYPE 2 DIABETES MELLITUS WITH DIABETIC POLYNEUROPATHY, WITH LONG-TERM CURRENT USE OF INSULIN (HCC): ICD-10-CM

## 2019-07-17 DIAGNOSIS — Z79.4 TYPE 2 DIABETES MELLITUS WITH DIABETIC POLYNEUROPATHY, WITH LONG-TERM CURRENT USE OF INSULIN (HCC): ICD-10-CM

## 2019-07-17 DIAGNOSIS — M05.9 RHEUMATOID ARTHRITIS WITH POSITIVE RHEUMATOID FACTOR, INVOLVING UNSPECIFIED SITE (HCC): Primary | ICD-10-CM

## 2019-07-17 PROBLEM — S91.301A NON-HEALING WOUND OF RIGHT HEEL: Status: ACTIVE | Noted: 2019-07-17

## 2019-07-17 PROCEDURE — 99244 OFF/OP CNSLTJ NEW/EST MOD 40: CPT | Performed by: FAMILY MEDICINE

## 2019-07-17 RX ORDER — HYDROCODONE BITARTRATE AND ACETAMINOPHEN 5; 325 MG/1; MG/1
1 TABLET ORAL EVERY 6 HOURS PRN
Qty: 60 TABLET | Refills: 0 | Status: SHIPPED | OUTPATIENT
Start: 2019-07-17 | End: 2020-02-12 | Stop reason: ALTCHOICE

## 2019-07-17 RX ORDER — LEVOFLOXACIN 500 MG/1
TABLET, FILM COATED ORAL
COMMUNITY
Start: 2019-07-16 | End: 2019-07-17 | Stop reason: SDUPTHER

## 2019-07-17 RX ORDER — LEVOFLOXACIN 500 MG/1
TABLET, FILM COATED ORAL EVERY 24 HOURS
COMMUNITY
End: 2019-10-08

## 2019-07-17 NOTE — ASSESSMENT & PLAN NOTE
Patient remains off of antirheumatic agents following surgery  Unfortunately has nonhealing wound and what appears to be possible osteomyelitis on MRI  He will need to remain off of immunosuppressive drugs  Because he is not on any immunosuppressive drugs he is having significant pain  I did provide a prescription for p r n  Vicodin for the patient to help address is rheumatic pain    He primarily will need this for sleeping as he states it is difficult to sleep due to the pain

## 2019-07-17 NOTE — ASSESSMENT & PLAN NOTE
Lab Results   Component Value Date    HGBA1C 8 1 (H) 05/24/2019       No results for input(s): POCGLU in the last 72 hours  Blood Sugar Average: Last 72 hrs:     Fair control of diabetes  It is been difficult to control over the long-term  He is on basal insulin, Januvia and victoza    Patient instructed to hold insulin day of surgery

## 2019-07-17 NOTE — PROGRESS NOTES
PRE-OPERATIVE EVALUATION  [facility]    NAME: Mike Canchola  AGE: 48 y o  SEX: male  : 1965     DATE: 2019     Pre-Operative Evaluation      Chief Complaint: Pre-operative Evaluation     Surgery:  I and D of the right foot with bone graft  Anticipated Date of Surgery:  2019  Referring Provider: Dr Kory Burns      History of Present Illness:     Mike Canchola  is a 48 y o  male who presents to the office today for a preoperative consultation at the request of surgeon, Dr Balwinder Currie, who plans on performing I and D of right foot with bone graft on 2019  Planned anesthesia is IV sedation  Patient has a bleeding risk of: no recent abnormal bleeding  Patient does not have objections to receiving blood products if needed  Current anti-platelet/anti-coagulation medications that the patient is prescribed includes: None  Patient did have postoperative MRI which showed a sinus tract leading from the right side of the foot to the calcaneus with marrow edema concerning for osteomyelitis  Patient has been on Levaquin       Assessment of Chronic Conditions:   - Diabetes Mellitus: Fair control   - Hypertension: Optimized     Assessment of Cardiac Risk:  · Denies unstable or severe angina or MI in the last 6 weeks or history of stent placement in the last year   · Denies decompensated heart failure (e g  New onset heart failure, NYHA functional class IV heart failure, or worsening existing heart failure)  · Denies significant arrhythmias such as high grade AV block, symptomatic ventricular arrhythmia, newly recognized ventricular tachycardia, supraventricular tachycardia with resting heart rate >100, or symptomatic bradycardia  · Denies severe heart valve disease including aortic stenosis or symptomatic mitral stenosis     Exercise Capacity:  · Able to walk 4 blocks without symptoms?: No, not presently able to ambulate due to being wheelchair  · Able to walk 2 flights without symptoms?: No, not presently able to ambulate due to being in wheelchair    Prior Anesthesia Reactions: No     Personal history of venous thromboembolic disease? No    History of steroid use for >2 weeks within last year? No    STOP-BANG Sleep Apnea Screening Questionnaire:         Review of Systems:     Review of Systems   Constitutional: Positive for unexpected weight change (Weight gain)  HENT: Negative  Eyes: Negative  Respiratory: Negative  Cardiovascular: Negative  Gastrointestinal: Negative  Endocrine: Negative  Genitourinary: Negative  Musculoskeletal: Positive for arthralgias ( diffuse arthralgias due to rheumatoid arthritis), gait problem ( wheelchair bound postoperatively) and joint swelling  Skin: Positive for wound (Wound at the right lateral heel)  Allergic/Immunologic: Negative  Neurological: Positive for numbness ( history of diabetic neuropathy a Charcot foot)  Hematological: Negative  Psychiatric/Behavioral: Negative  All other systems reviewed and are negative  Current Problem List:     Patient Active Problem List   Diagnosis    Benign essential hypertension    Carotid artery calcification    Reduced libido    Erectile disorder due to medical condition in male patient    Type 2 diabetes mellitus with diabetic polyneuropathy, with long-term current use of insulin ([facility] )    Hyperlipidemia    Obesity (BMI 30 0-34  9)    Vitamin D deficiency    Rheumatoid arthritis ([facility] )    Colon cancer screening    Prostate cancer screening    Charcot foot due to diabetes mellitus ([place] [address] )    Pre-op evaluation    S/P foot surgery, right    Tobacco use    Physical exam, annual       Allergies:     No Known Allergies    Current Medications:       Current Outpatient Medications:     albuterol (PROVENTIL HFA,VENTOLIN HFA) 90 mcg/act inhaler, INHALE 2 PUFFS EVERY 4 (FOUR) HOURS AS NEEDED FOR WHEEZING, Disp: 18 g, Rfl: 1    atorvastatin (LIPITOR) 40 mg tablet, TAKE 1 TABLET AT BEDTIME , Disp: 90 tablet, Rfl: 1    ergocalciferol (VITAMIN D2) 50,000 units, Take 50,000 Units by mouth once a week , Disp: , Rfl:     fluticasone (FLONASE) 50 mcg/act nasal spray, USE 2 SPRAYS IN EACH NOSTRIL ONCE DAILY, Disp: 16 g, Rfl: 11    hydroxychloroquine (PLAQUENIL) 200 mg tablet, Take 200 mg by mouth 2 (two) times a day  , Disp: , Rfl:     insulin glargine (BASAGLAR KWIKPEN) 100 units/mL injection pen, Inject 40 Units under the skin every 12 (twelve) hours, Disp: 36 mL, Rfl: 1    JANUVIA 100 MG tablet, TAKE ONE TABLET EVERY DAY, Disp: 90 tablet, Rfl: 1    levofloxacin (LEVAQUIN) 500 mg tablet, every 24 hours, Disp: , Rfl:     losartan-hydrochlorothiazide (HYZAAR) 50-12 5 mg per tablet, TAKE 1 TABLET BY MOUTH DAILY, Disp: 90 tablet, Rfl: 1    naproxen (NAPROSYN) 500 mg tablet, Take 1 tablet by mouth every 12 (twelve) hours, Disp: , Rfl:     ULTICARE MICRO PEN NEEDLES 32G X 4 MM MISC, , Disp: , Rfl:     VICTOZA injection, INJECT 1 8 MG DAILY, Disp: 9 mL, Rfl: 4    ENBREL SURECLICK 50 MG/ML injection, 50 mg once a week Friday's- on hold for surgery, Disp: , Rfl:     leflunomide (ARAVA) 20 MG tablet, Take 1 tablet by mouth daily with dinner On hold for surgery, Disp: , Rfl:     Past Medical History:       Past Medical History:   Diagnosis Date    At risk for falls     Cataract     giacomo    COPD (chronic obstructive pulmonary disease) (HCC)     Hyperlipidemia     Kidney stone     Neuropathy     Seasonal allergies     Uses wheelchair     and crutches- NWB RLE    Wears glasses         Past Surgical History:   Procedure Laterality Date    APPENDECTOMY      CLOSED REDUCTION FOOT DISLOCATION Right 2/12/2019    Procedure: C/R FRACTURE;  Surgeon: Tonio Cabrera DPM;  [facility];  Service: Podiatry    [address] <0 5 CM REMAINDER BODY N/A 3/28/2018    Procedure: EXCISION WIDE LESION HEAD/FACIAL/NECK;  Surgeon: Kirk Espino MD;  [facility]; Service: Surgical Oncology    MD GASTROCNEMIUS RECESSION Right 2/12/2019    Procedure: ENDO GASTROC RECESSION, APPLICATION OF EXTERNAL FIXATOR;  Surgeon: Erlinda Franks DPM;  [facility];  Service: Podiatry    MD REMOVE EXTERN BONE FIX DEV W ANESTH Right 4/18/2019    Procedure: FRAME REMOVAL HARDWARE FOOT WITH APPLICATION OF GRAFT;  Surgeon: Erlinda Franks DPM;  [facility];  Service: Podiatry    SKIN BIOPSY      scalp    WISDOM TOOTH EXTRACTION  1998        Family History   Problem Relation Age of Onset    Diabetes Mother     Stroke Mother     Mental illness Mother     Diabetes Father     Stroke Father     Alcohol abuse Brother     Coronary artery disease Family         Age 51-55    Diabetes type II Family     Heart disease Family         Social History     Socioeconomic History    Marital status: /Civil Union     Spouse name: Not on file    Number of children: Not on file    Years of education: Not on file    Highest education level: Not on file   Occupational History    Occupation: [employer] Financial resource strain: Not on file    Food insecurity:     Worry: Not on file     Inability: Not on file   Impres Medical needs:     Medical: Not on file     Non-medical: Not on file   Tobacco Use    Smoking status: Current Every Day Smoker     Packs/day: 0 25     Years: 40 00     Pack years: 10 00     Types: Cigarettes    Smokeless tobacco: Never Used   Substance and Sexual Activity    Alcohol use: Yes     Frequency: Monthly or less     Drinks per session: 1 or 2     Binge frequency: Never     Comment: beer    Drug use: No    Sexual activity: Not on file   Lifestyle    Physical activity:     Days per week: Not on file     Minutes per session: Not on file    Stress: Not on file   Relationships    Social connections:     Talks on phone: Not on file     Gets together: Not on file     Attends Zoroastrianism service: Not on file     Active member of club or organization: Not on file     Attends meetings of clubs or organizations: Not on file     Relationship status: Not on file    Intimate partner violence:     Fear of current or ex partner: Not on file     Emotionally abused: Not on file     Physically abused: Not on file     Forced sexual activity: Not on file   Other Topics Concern    Not on file   Social History Narrative    Caffeine Use- Coffee and Tea         Physical Exam:     Physical Exam   Constitutional: He is oriented to person, place, and time  He appears well-developed and well-nourished  No distress  Obese   HENT:   Head: Normocephalic  Eyes: Pupils are equal, round, and reactive to light  Conjunctivae and EOM are normal    Neck: Normal range of motion  Neck supple  Cardiovascular: Normal rate, regular rhythm and normal heart sounds  No murmur heard  Pulmonary/Chest: Effort normal and breath sounds normal    Abdominal: Soft  Bowel sounds are normal    Musculoskeletal: He exhibits tenderness ( bilateral shoulders) and deformity ( patient does have rheumatic nodules both elbows)  He exhibits no edema  Patient is in a wheelchair  He does have some loss of muscle mass the right calf  Patient does have swelling of the MCPs of both hands due to rheumatoid arthritis  Neurological: He is alert and oriented to person, place, and time  A sensory deficit ( patient does have Charcot foot on the right side  He does have sensation however there is quite a bit of dysesthesia) is present  He exhibits abnormal muscle tone ( patient does have atrophy of the right calf)  Skin:   Wound on the dorsum of his right foot is being addressed by Podiatry   Psychiatric: He has a normal mood and affect  His behavior is normal  Judgment and thought content normal    Nursing note and vitals reviewed         Data:     Pre-operative work-up    Laboratory Results:  No lab results are available    EKG:  Patient had initial EKG prior to surgery in February Assessment & Recommendations:     Problem List Items Addressed This Visit        Endocrine    Type 2 diabetes mellitus with diabetic polyneuropathy, with long-term current use of insulin ([facility] )     Lab Results   Component Value Date    HGBA1C 8 1 (H) 05/24/2019       No results for input(s): POCGLU in the last 72 hours  Blood Sugar Average: Last 72 hrs:     Fair control of diabetes  It is been difficult to control over the long-term  He is on basal insulin, Januvia and victoza  Patient instructed to hold insulin day of surgery            Cardiovascular and Mediastinum    Benign essential hypertension     Well controlled on losartan/hydrochlorothiazide  Continue same  Musculoskeletal and Integument    Rheumatoid arthritis ([facility] ) - Primary     Patient remains off of antirheumatic agents following surgery  Unfortunately has nonhealing wound and what appears to be possible osteomyelitis on MRI  He will need to remain off of immunosuppressive drugs  Because he is not on any immunosuppressive drugs he is having significant pain  I did provide a prescription for p r n  Vicodin for the patient to help address is rheumatic pain  He primarily will need this for sleeping as he states it is difficult to sleep due to the pain         Relevant Medications    HYDROcodone-acetaminophen (NORCO) 5-325 mg per tablet       Other    Chronic pain of both shoulders     Off of antirheumatic agents  Given prescription for p r n  Vicodin Prior to prescribing the controlled substance, a patient search was performed on the [place] prescription drug monitoring program web site  There was no evidence of diversion or misuse  Prescription provided         Relevant Medications    HYDROcodone-acetaminophen (NORCO) 5-325 mg per tablet          Pre-Op Evaluation Assessment  48 y o  male with planned surgery:  I&D of the right foot with bone graft      Known risk factors for perioperative complications: Diabetes mellitus  Cardiac Risk Estimation: per the Revised Cardiac Risk Index (Circ  100:1043, 1999), the patient's risk factors for cardiac complications include None, putting him in: RCI RISK CLASS II (1 risk factor, risk of major cardiac compl  appr  1 3%)  Current medications which may produce withdrawal symptoms if withheld perioperatively:  None  Pre-Op Evaluation Plan  1  Further preoperative workup as follows:   - None; no further preoperative work-up is required    2  Medication Management/Recommendations:   - Insulin Management: Advised on hold insulin day of surgery    3  Prophylaxis for cardiac events with perioperative beta-blockers: not indicated  4  Patient requires further consultation with: None    Clearance  Patient is CLEARED for surgery without any additional cardiac testing       Christal Goff DO  [facility]  [address] [place] [address]  Phone#  [phone]  Fax#  [phone]

## 2019-07-17 NOTE — ASSESSMENT & PLAN NOTE
Off of antirheumatic agents  Given prescription for p r n  Vicodin Prior to prescribing the controlled substance, a patient search was performed on the [place] prescription drug monitoring program web site  There was no evidence of diversion or misuse    Prescription provided

## 2019-07-18 ENCOUNTER — TELEPHONE (OUTPATIENT)
Dept: FAMILY MEDICINE CLINIC | Facility: CLINIC | Age: 54
End: 2019-07-18

## 2019-07-18 NOTE — TELEPHONE ENCOUNTER
A prior authorization was done for this patients Hydrocodone 5- 325 mg and was denied by his insurance

## 2019-08-07 DIAGNOSIS — E13.9 DIABETES MELLITUS OF OTHER TYPE WITHOUT COMPLICATION, UNSPECIFIED WHETHER LONG TERM INSULIN USE (HCC): ICD-10-CM

## 2019-08-07 RX ORDER — PEN NEEDLE, DIABETIC 32GX 5/32"
NEEDLE, DISPOSABLE MISCELLANEOUS 3 TIMES DAILY
Qty: 100 EACH | Refills: 3 | Status: SHIPPED | OUTPATIENT
Start: 2019-08-07

## 2019-09-03 DIAGNOSIS — J45.909 UNCOMPLICATED ASTHMA, UNSPECIFIED ASTHMA SEVERITY, UNSPECIFIED WHETHER PERSISTENT: ICD-10-CM

## 2019-09-03 DIAGNOSIS — E11.9 TYPE 2 DIABETES MELLITUS WITHOUT COMPLICATION, WITHOUT LONG-TERM CURRENT USE OF INSULIN (HCC): ICD-10-CM

## 2019-09-04 RX ORDER — INSULIN GLARGINE 100 [IU]/ML
INJECTION, SOLUTION SUBCUTANEOUS
Qty: 36 ML | Refills: 1 | Status: SHIPPED | OUTPATIENT
Start: 2019-09-04 | End: 2019-10-08 | Stop reason: SDUPTHER

## 2019-09-04 RX ORDER — ALBUTEROL SULFATE 90 UG/1
2 AEROSOL, METERED RESPIRATORY (INHALATION) EVERY 4 HOURS PRN
Qty: 18 G | Refills: 1 | Status: SHIPPED | OUTPATIENT
Start: 2019-09-04 | End: 2019-11-04 | Stop reason: SDUPTHER

## 2019-09-09 ENCOUNTER — TRANSCRIBE ORDERS (OUTPATIENT)
Dept: PHYSICAL THERAPY | Facility: CLINIC | Age: 54
End: 2019-09-09

## 2019-09-09 ENCOUNTER — EVALUATION (OUTPATIENT)
Dept: PHYSICAL THERAPY | Facility: CLINIC | Age: 54
End: 2019-09-09
Payer: COMMERCIAL

## 2019-09-09 DIAGNOSIS — S92.901G CLOSED MULTIPLE FRACTURES OF RIGHT FOOT WITH DELAYED HEALING, SUBSEQUENT ENCOUNTER: ICD-10-CM

## 2019-09-09 DIAGNOSIS — M14.671 CHARCOT'S JOINT OF RIGHT FOOT: Primary | ICD-10-CM

## 2019-09-09 PROCEDURE — 97110 THERAPEUTIC EXERCISES: CPT | Performed by: PHYSICAL THERAPIST

## 2019-09-09 PROCEDURE — 97162 PT EVAL MOD COMPLEX 30 MIN: CPT | Performed by: PHYSICAL THERAPIST

## 2019-09-09 PROCEDURE — 97112 NEUROMUSCULAR REEDUCATION: CPT | Performed by: PHYSICAL THERAPIST

## 2019-09-09 NOTE — LETTER
2019    Leila MetzgerNADYA  [address] [place] [address]  [address] [place] [address]    Patient: Edilson Coburn  YOB: 1965   Date of Visit: 2019     Encounter Diagnosis     ICD-10-CM    1  Charcot's joint of right foot M14 671    2  Closed multiple fractures of right foot with delayed healing, subsequent encounter S91 751G        Dear Dr Lele Calhoun: Thank you for your recent referral of Edilson Coburn    Please review the attached evaluation summary from Renny's recent visit  Please verify that you agree with the plan of care by signing the attached order  If you have any questions or concerns, please do not hesitate to call  I sincerely appreciate the opportunity to share in the care of one of your patients and hope to have another opportunity to work with you in the near future  Sincerely,    Sukumar Monique, PT      Referring Provider:      I certify that I have read the below Plan of Care and certify the need for these services furnished under this plan of treatment while under my care  Leila MetzgerNADYA  [address] [place] [address]  VIA Facsimile: [phone]          PT Evaluation     Today's date: 2019  Patient name: Edilson oCburn  : 1965  MRN: [id number]  Referring provider: Vanessa Castro DPM  Dx:   Encounter Diagnosis     ICD-10-CM    1  Charcot's joint of right foot M14 671    2  Closed multiple fractures of right foot with delayed healing, subsequent encounter U62 966G                   Assessment  Assessment details: Edilson Coburn  is a 48 y o  male presents with signs and symptoms consistent with:   Charcot's joint of right foot  (primary encounter diagnosis)  Closed multiple fractures of right foot with delayed healing, subsequent encounter    [place] has the above listed impairments and will benefit from skilled PT to address deficits to return to prior level of function  Impairments: abnormal coordination, abnormal gait, abnormal muscle firing, abnormal muscle tone, abnormal or restricted ROM, abnormal movement, activity intolerance, impaired balance, impaired physical strength, lacks appropriate home exercise program, pain with function and weight-bearing intolerance  Barriers to therapy: Infection  3 surgeries on right foot  PIC line    Charcot foot    Understanding of Dx/Px/POC: good   Prognosis: good    Goals  Impairment Goals 4-6 weeks  - Decrease pain to <4/10  - Improve ankle AROM to functional  - Increase knee strength to 4+/5 throughout  - Increase hip strength to 4/5 throughout  - Increase ankle strength to 4+/5 throughout    Functional Goals 6-8 weeks  - Return to Prior Level of Function  - Increase Functional Status Measure (FOTO) to: 37  - Patient will be independent with HEP  - Patient will be able to squat functionally  - Patient will be able to perform sit to stand functionally  - Patient will be able to ascend and descend stairs functionally  - Patient will be able to walk independently      Plan  Patient would benefit from: skilled PT  Referral necessary: No  Planned modality interventions: cryotherapy, electrical stimulation/Russian stimulation, H-Wave, TENS, thermotherapy: hydrocollator packs and unattended electrical stimulation  Planned therapy interventions: abdominal trunk stabilization, activity modification, ADL retraining, balance/weight bearing training, breathing training, body mechanics training, coordination, flexibility, functional ROM exercises, graded exercise, home exercise program, work reintegration, therapeutic training, therapeutic exercise, therapeutic activities, stretching, strengthening, self care, postural training, patient education, neuromuscular re-education, muscle pump exercises, motor coordination training, Heath taping, manual therapy, joint mobilization, IADL retraining, balance and gait training  Frequency: 2x week  Duration in visits: 16  Duration in weeks: 8  Treatment plan discussed with: patient, PTA and referring physician        Subjective Evaluation    Pain  Current pain ratin  At best pain ratin  At worst pain ratin  Location: Right heel pain    Patient Goals  Patient goal: Get back to work and hiking  WORK: he is not working currently  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, 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  He reports that he plans on returning to work, but getting help with the heavier stuff  He is not working currently  HOME LIFE:  Typically he would cook/clean a little, and mow, and maintains some outside gardens  HOBBIES/EXERCISE: Likes to hike, and do photography  PAIN LOCATION/DESCRIPTORS:  Foot is mostly numb  He has pain in his heel and random pains  AGGRAVATING FACTORS:  Walking currently with walking boot and bilateral axillary crutches  He has been able to put 30% weight on his foot  Unable to raise onto toes  He is having difficulty with any weight bearing tasks  EASES: rest     LATENCY: unable to tell  DAY PATTERN:  Activity dependent  IMAGING:  Bones are healed  PLOF:   Able to work and hike  HISTORY OF CURRENT INJURY:  He reports that this all started 2 years ago  He reports that last Nov his foot started swelling, and he had multiple fractures in his foot  Had a surgery to repair the fractures, then a surgery to take off the external fixator, and then to clean out the infection in the heel  He reports that the foot has been swelling recently, and this is what is most bothersome  He has advanced stage arthritis  Currently has a PIC line, but will be finished with this in 1 week  Objective     Observations   Left Ankle/Foot   Positive for effusion and incision  Additional Observation Details  All incisions healing well  No redness  Left arm PIC line      Active Range of Motion   Left Ankle/Foot Dorsiflexion (ke): 0 degrees   Plantar flexion: 45 degrees   Inversion: 20 degrees   Eversion: 14 degrees     Right Ankle/Foot   Dorsiflexion (ke): -4 degrees with pain  Plantar flexion: 30 degrees   Inversion: 12 degrees   Eversion: 4 degrees     Swelling   Left Ankle/Foot   Metatarsal heads: 24 cm  Figure 8: 56 5 cm    Right Ankle/Foot   Metatarsal heads: 26 cm  Figure 8: 57 cm          Diagnosis: Charcot foot right, multiple fractures, 3 surgeries    Surgical fixation   Precautions: WBAT, advanced stage RA   Manual Therapy 9/9/19       STM as tolerated (Very sensitive feet)                                        Exercise Diary         Ankle 4 way 10x ea       Toe F/E/Abd 10x  Difficulty with Abd       gastroc stretch with strap        Seated great toe stretch        Seated HR        BAPs board 4 way        All below In boot:        - weight shifts side to side & F/B        -Mini squats        - biodex % of WB                                                                                        Modalities

## 2019-09-09 NOTE — PROGRESS NOTES
PT Evaluation     Today's date: 2019  Patient name: Aden Robbins  : 1965  MRN: [id number]  Referring provider: Real Oconnor DPM  Dx:   Encounter Diagnosis     ICD-10-CM    1  Charcot's joint of right foot M14 671    2  Closed multiple fractures of right foot with delayed healing, subsequent encounter S99 570G                   Assessment  Assessment details: Aden Robbins  is a 48 y o  male presents with signs and symptoms consistent with:   Charcot's joint of right foot  (primary encounter diagnosis)  Closed multiple fractures of right foot with delayed healing, subsequent encounter    Bennett Pickering has the above listed impairments and will benefit from skilled PT to address deficits to return to prior level of function  Impairments: abnormal coordination, abnormal gait, abnormal muscle firing, abnormal muscle tone, abnormal or restricted ROM, abnormal movement, activity intolerance, impaired balance, impaired physical strength, lacks appropriate home exercise program, pain with function and weight-bearing intolerance  Barriers to therapy: Infection  3 surgeries on right foot  PIC line    Charcot foot    Understanding of Dx/Px/POC: good   Prognosis: good    Goals  Impairment Goals 4-6 weeks  - Decrease pain to <4/10  - Improve ankle AROM to functional  - Increase knee strength to 4+/5 throughout  - Increase hip strength to 4/5 throughout  - Increase ankle strength to 4+/5 throughout    Functional Goals 6-8 weeks  - Return to Prior Level of Function  - Increase Functional Status Measure (FOTO) to: 37  - Patient will be independent with HEP  - Patient will be able to squat functionally  - Patient will be able to perform sit to stand functionally  - Patient will be able to ascend and descend stairs functionally  - Patient will be able to walk independently      Plan  Patient would benefit from: skilled PT  Referral necessary: No  Planned modality interventions: cryotherapy, electrical stimulation/Russian stimulation, H-Wave, TENS, thermotherapy: hydrocollator packs and unattended electrical stimulation  Planned therapy interventions: abdominal trunk stabilization, activity modification, ADL retraining, balance/weight bearing training, breathing training, body mechanics training, coordination, flexibility, functional ROM exercises, graded exercise, home exercise program, work reintegration, therapeutic training, therapeutic exercise, therapeutic activities, stretching, strengthening, self care, postural training, patient education, neuromuscular re-education, muscle pump exercises, motor coordination training, Heath taping, manual therapy, joint mobilization, IADL retraining, balance and gait training  Frequency: 2x week  Duration in visits: 16  Duration in weeks: 8  Treatment plan discussed with: patient, PTA and referring physician        Subjective Evaluation    Pain  Current pain ratin  At best pain ratin  At worst pain ratin  Location: Right heel pain    Patient Goals  Patient goal: Get back to work and hiking  WORK: he is not working currently  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, 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  He reports that he plans on returning to work, but getting help with the heavier stuff  He is not working currently  HOME LIFE:  Typically he would cook/clean a little, and mow, and maintains some outside gardens  HOBBIES/EXERCISE: Likes to hike, and do photography  PAIN LOCATION/DESCRIPTORS:  Foot is mostly numb  He has pain in his heel and random pains  AGGRAVATING FACTORS:  Walking currently with walking boot and bilateral axillary crutches  He has been able to put 30% weight on his foot  Unable to raise onto toes  He is having difficulty with any weight bearing tasks  EASES: rest     LATENCY: unable to tell  DAY PATTERN:  Activity dependent  IMAGING:  Bones are healed     PLOF:   Able to work and suzette     HISTORY OF CURRENT INJURY:  He reports that this all started 2 years ago  He reports that last Nov his foot started swelling, and he had multiple fractures in his foot  Had a surgery to repair the fractures, then a surgery to take off the external fixator, and then to clean out the infection in the heel  He reports that the foot has been swelling recently, and this is what is most bothersome  He has advanced stage arthritis  Currently has a PIC line, but will be finished with this in 1 week  Objective     Observations   Left Ankle/Foot   Positive for effusion and incision  Additional Observation Details  All incisions healing well  No redness  Left arm PIC line  Active Range of Motion   Left Ankle/Foot   Dorsiflexion (ke): 0 degrees   Plantar flexion: 45 degrees   Inversion: 20 degrees   Eversion: 14 degrees     Right Ankle/Foot   Dorsiflexion (ke): -4 degrees with pain  Plantar flexion: 30 degrees   Inversion: 12 degrees   Eversion: 4 degrees     Swelling   Left Ankle/Foot   Metatarsal heads: 24 cm  Figure 8: 56 5 cm    Right Ankle/Foot   Metatarsal heads: 26 cm  Figure 8: 57 cm          Diagnosis: Charcot foot right, multiple fractures, 3 surgeries    Surgical fixation   Precautions: WBAT, advanced stage RA   Manual Therapy 9/9/19       STM as tolerated (Very sensitive feet)                                        Exercise Diary         Ankle 4 way 10x ea       Toe F/E/Abd 10x  Difficulty with Abd       gastroc stretch with strap        Seated great toe stretch        Seated HR        BAPs board 4 way        All below In boot:        - weight shifts side to side & F/B        -Mini squats        - biodex % of WB                                                                                        Modalities

## 2019-09-11 ENCOUNTER — TELEPHONE (OUTPATIENT)
Dept: FAMILY MEDICINE CLINIC | Facility: CLINIC | Age: 54
End: 2019-09-11

## 2019-09-11 DIAGNOSIS — Z79.4 TYPE 2 DIABETES MELLITUS WITH DIABETIC POLYNEUROPATHY, WITH LONG-TERM CURRENT USE OF INSULIN (HCC): Primary | ICD-10-CM

## 2019-09-11 DIAGNOSIS — E11.42 TYPE 2 DIABETES MELLITUS WITH DIABETIC POLYNEUROPATHY, WITH LONG-TERM CURRENT USE OF INSULIN (HCC): Primary | ICD-10-CM

## 2019-09-11 RX ORDER — LANCETS
EACH MISCELLANEOUS
Qty: 100 EACH | Refills: 3 | Status: SHIPPED | OUTPATIENT
Start: 2019-09-11 | End: 2019-11-04 | Stop reason: SDUPTHER

## 2019-09-12 ENCOUNTER — OFFICE VISIT (OUTPATIENT)
Dept: PHYSICAL THERAPY | Facility: CLINIC | Age: 54
End: 2019-09-12
Payer: COMMERCIAL

## 2019-09-12 DIAGNOSIS — E11.42 TYPE 2 DIABETES MELLITUS WITH DIABETIC POLYNEUROPATHY, WITH LONG-TERM CURRENT USE OF INSULIN (HCC): Primary | ICD-10-CM

## 2019-09-12 DIAGNOSIS — S92.901G CLOSED MULTIPLE FRACTURES OF RIGHT FOOT WITH DELAYED HEALING, SUBSEQUENT ENCOUNTER: ICD-10-CM

## 2019-09-12 DIAGNOSIS — Z79.4 TYPE 2 DIABETES MELLITUS WITH DIABETIC POLYNEUROPATHY, WITH LONG-TERM CURRENT USE OF INSULIN (HCC): Primary | ICD-10-CM

## 2019-09-12 DIAGNOSIS — M14.671 CHARCOT'S JOINT OF RIGHT FOOT: Primary | ICD-10-CM

## 2019-09-12 PROCEDURE — 97140 MANUAL THERAPY 1/> REGIONS: CPT

## 2019-09-12 PROCEDURE — 97110 THERAPEUTIC EXERCISES: CPT

## 2019-09-12 PROCEDURE — 97112 NEUROMUSCULAR REEDUCATION: CPT

## 2019-09-12 NOTE — TELEPHONE ENCOUNTER
Wife called in and said that the lancets were called in but the strips weren't  She would like these sent to [facility]

## 2019-09-12 NOTE — PROGRESS NOTES
Daily Note     Today's date: 2019  Patient name: Imani Reynoso  : 1965  MRN: [id number]  Referring provider: Fozia Marcus DPM  Dx:   Encounter Diagnosis     ICD-10-CM    1  Charcot's joint of right foot M14 671    2  Closed multiple fractures of right foot with delayed healing, subsequent encounter S90 855G                   Subjective: Pt reports that he is doing about the same  He states that he is trying to slowly put more weight on his foot  Objective: See treatment diary below      Assessment: Tolerated treatment well  Pt able to shift weight to have 50% in all directions on B/L LE on biodex  Pt tolerated 60% WB on RLE, although he reports increased LBP after  Cues for seated/supine exercises needed  Pt will benefit from continued therapy  Plan: Continue per plan of care      Diagnosis: Charcot foot right, multiple fractures, 3 surgeries    Surgical fixation   Precautions: WBAT, advanced stage RA, PICC line   Manual Therapy 19      STM as tolerated (Very sensitive feet)  [address]is [address], PTA                                      Exercise Diary         Ankle 4 way 10x ea 20x ea      Toe F/E/Abd 10x  Difficulty with Abd 10x      gastroc stretch with strap  3x30 sec      Seated great toe stretch  9r20xrd      Seated HR  10x2      BAPs board 4 way        All below In boot:        - weight shifts side to side & F/B        -Mini squats        - biodex % of WB  3 mins                                                                                      Modalities                                  {

## 2019-09-16 ENCOUNTER — OFFICE VISIT (OUTPATIENT)
Dept: PHYSICAL THERAPY | Facility: CLINIC | Age: 54
End: 2019-09-16
Payer: COMMERCIAL

## 2019-09-16 DIAGNOSIS — S92.901G CLOSED MULTIPLE FRACTURES OF RIGHT FOOT WITH DELAYED HEALING, SUBSEQUENT ENCOUNTER: ICD-10-CM

## 2019-09-16 DIAGNOSIS — M14.671 CHARCOT'S JOINT OF RIGHT FOOT: Primary | ICD-10-CM

## 2019-09-16 PROCEDURE — 97110 THERAPEUTIC EXERCISES: CPT

## 2019-09-16 PROCEDURE — 97112 NEUROMUSCULAR REEDUCATION: CPT

## 2019-09-16 PROCEDURE — 97140 MANUAL THERAPY 1/> REGIONS: CPT

## 2019-09-19 ENCOUNTER — OFFICE VISIT (OUTPATIENT)
Dept: PHYSICAL THERAPY | Facility: CLINIC | Age: 54
End: 2019-09-19
Payer: COMMERCIAL

## 2019-09-19 DIAGNOSIS — S92.901G CLOSED MULTIPLE FRACTURES OF RIGHT FOOT WITH DELAYED HEALING, SUBSEQUENT ENCOUNTER: ICD-10-CM

## 2019-09-19 DIAGNOSIS — M14.671 CHARCOT'S JOINT OF RIGHT FOOT: Primary | ICD-10-CM

## 2019-09-19 PROCEDURE — 97110 THERAPEUTIC EXERCISES: CPT

## 2019-09-19 PROCEDURE — 97140 MANUAL THERAPY 1/> REGIONS: CPT

## 2019-09-19 PROCEDURE — 97112 NEUROMUSCULAR REEDUCATION: CPT

## 2019-09-19 NOTE — PROGRESS NOTES
Daily Note     Today's date: 2019  Patient name: Vannessa Valdes  : 1965  MRN: [id number]  Referring provider: Trent Vazqeuz DPM  Dx:   Encounter Diagnosis     ICD-10-CM    1  Charcot's joint of right foot M14 671    2  Closed multiple fractures of right foot with delayed healing, subsequent encounter S92 680G                   Subjective: Pt states that he has been having some pain in his R hip and knee  He reports no other changes  Objective: See treatment diary below      Assessment: Tolerated treatment well  Pt progressing slowly with WB on RLE with boot donned  Added stretches to address pt's R hip pain  Pt able to perform all ex's with no complaints of increased pain  Discussed with pt his HEP, and slowly increasing his ambulation with boot donned and crutches as pt admits to mostly walking NWB on RLE  Discussed pt's precautions and to continue to progress slowly  Pt will benefit from continued therapy  Plan: Continue per plan of care     Diagnosis: Charcot foot right, multiple fractures, 3 surgeries    Surgical fixation   Precautions: WBAT, advanced stage RA, PICC line   Manual Therapy 19    STM as tolerated (Very sensitive feet)  Denis 113, PTA TJH, PTA TJH, PTA                                    Exercise Diary         Ankle 4 way 10x ea 20x ea 20x ea 1# 20x ea    Toe F/E/Abd 10x  Difficulty with Abd 10x 10x   Difficulty with Abd 10x ea    gastroc stretch with strap  3x30 sec 3x30 sec 3x30 sec    Seated great toe stretch  5p25fft 3x30 sec 3x30 sec    Seated HR  10x2  10x2    BAPs board 4 way    20x ea way    All below In boot:        - weight shifts side to side & F/B    S/s 15x  F/B 15x    -Mini squats    10x    - biodex % of WB  3 mins 3 mins 3 mins to 80% on R    Hamstring stretch    3x30 sec    SKTC    15x RLE                                                                    Modalities

## 2019-09-24 ENCOUNTER — OFFICE VISIT (OUTPATIENT)
Dept: PHYSICAL THERAPY | Facility: CLINIC | Age: 54
End: 2019-09-24
Payer: COMMERCIAL

## 2019-09-24 DIAGNOSIS — S92.901G CLOSED MULTIPLE FRACTURES OF RIGHT FOOT WITH DELAYED HEALING, SUBSEQUENT ENCOUNTER: ICD-10-CM

## 2019-09-24 DIAGNOSIS — M14.671 CHARCOT'S JOINT OF RIGHT FOOT: Primary | ICD-10-CM

## 2019-09-24 PROCEDURE — 97140 MANUAL THERAPY 1/> REGIONS: CPT | Performed by: PHYSICAL THERAPIST

## 2019-09-24 PROCEDURE — 97112 NEUROMUSCULAR REEDUCATION: CPT | Performed by: PHYSICAL THERAPIST

## 2019-09-24 PROCEDURE — 97110 THERAPEUTIC EXERCISES: CPT | Performed by: PHYSICAL THERAPIST

## 2019-09-24 NOTE — PROGRESS NOTES
Daily Note     Today's date: 2019  Patient name: Andrew Penny  : 1965  MRN: [id number]  Referring provider: Janet Antonio DPM  Dx:   Encounter Diagnosis     ICD-10-CM    1  Charcot's joint of right foot M14 671    2  Closed multiple fractures of right foot with delayed healing, subsequent encounter S92 661G                   Subjective: Patient states when he was increasing his weight bearing, he felt some shifting in the foot  He reports that he has an appointment in the morning with his MD to discuss his x-ray  He reports that he is going to try to talk his referring doc into switching his boot  Objective: See treatment diary below      Assessment: Tolerated treatment well  Patient exhibited good technique with therapeutic exercises  He was able to tolerate all the exercises today without increased pain  Progress as able next visit  Plan: Continue per plan of care  Diagnosis: Charcot foot right, multiple fractures, 3 surgeries    Surgical fixation   Precautions: WBAT, advanced stage RA, PICC line   Manual Therapy 19   STM as tolerated (Very sensitive feet)  Denis 113, PTA TJH, PTA TJH, PTA    PROM     HJS, PT                           Exercise Diary         Ankle 4 way 10x ea 20x ea 20x ea 1# 20x ea    Toe F/E/Abd 10x  Difficulty with Abd 10x 10x   Difficulty with Abd 10x ea    gastroc stretch with strap  3x30 sec 3x30 sec 3x30 sec 2x95mbs   Seated great toe stretch  5m29qam 3x30 sec 3x30 sec 2m35qxa   Seated HR  10x2  10x2 20x   BAPs board 4 way    20x ea way 10x ea focus on control   Hamstring stretch    3x30 sec 9p07kem   SKTC    15x RLE 15x                                   All below In boot:        - weight shifts side to side & F/B    S/s 15x  F/B 15x 20x ea   -Mini squats    10x 10x   - biodex % of WB  3 mins 3 mins 3 mins to 80% on R 5 mins to 75% on right Modalities

## 2019-09-26 ENCOUNTER — OFFICE VISIT (OUTPATIENT)
Dept: PHYSICAL THERAPY | Facility: CLINIC | Age: 54
End: 2019-09-26
Payer: COMMERCIAL

## 2019-09-26 DIAGNOSIS — S92.901G CLOSED MULTIPLE FRACTURES OF RIGHT FOOT WITH DELAYED HEALING, SUBSEQUENT ENCOUNTER: ICD-10-CM

## 2019-09-26 DIAGNOSIS — M14.671 CHARCOT'S JOINT OF RIGHT FOOT: Primary | ICD-10-CM

## 2019-09-26 PROCEDURE — 97112 NEUROMUSCULAR REEDUCATION: CPT | Performed by: PHYSICAL THERAPIST

## 2019-09-26 PROCEDURE — 97116 GAIT TRAINING THERAPY: CPT | Performed by: PHYSICAL THERAPIST

## 2019-09-26 PROCEDURE — 97110 THERAPEUTIC EXERCISES: CPT | Performed by: PHYSICAL THERAPIST

## 2019-09-26 NOTE — PROGRESS NOTES
Daily Note     Today's date: 2019  Patient name: Jackie Dinh  : 1965  MRN: [id number]  Referring provider: Mart Paez DPM  Dx:   Encounter Diagnosis     ICD-10-CM    1  Charcot's joint of right foot M14 671    2  Closed multiple fractures of right foot with delayed healing, subsequent encounter B92 734G                   Subjective: Patient reports that he saw his MD yesterday and the MD confirmed that he does have a small break in his toe  He reports that they will not be doing anything about this fracture since he does not have pain  He reports that he has been ordered shoes and orthotics  This is what he will be progressed to once he can walk with just the walking boot  Objective: See treatment diary below      Assessment: Tolerated treatment well  Patient exhibited good technique with therapeutic exercises  He continues to have extensive difficulty moving toes  He is improving slowly toward long term goals  Patient demonstrated significant difficulty ambulating with one crutch  Plan: Continue per plan of care  Diagnosis: Charcot foot right, multiple fractures, 3 surgeries    Surgical fixation   Precautions: WBAT, advanced stage RA, PICC line   Manual Therapy 19   STM as tolerated (Very sensitive feet) Hold today  [address]is 113, PTA TJH, PTA    PROM Hold today    HJS, PT                           Exercise Diary         Ankle 4 way YTB 10x ea  20x ea 1# 20x ea    Seated toe yoga- Toe F/E/Abd 15x ea  10x   Difficulty with Abd 10x ea    gastroc stretch with strap 0b15wed  3x30 sec 3x30 sec 5r04ird   Seated great toe stretch 3r71apb  3x30 sec 3x30 sec 2f49gvm   Seated HR 20x   10x2 20x   BAPs board 4 way 10x ea    20x ea way 10x ea focus on control   Hamstring stretch 9u71ehj   3x30 sec 6a51cce   SKTC 20x   15x RLE 15x   Seated DF stretch 4g84oci                               All below In boot:        - weight shifts side to side & F/B 20x ea S/s 15x  F/B 15x 20x ea   -Mini squats 10x   10x 10x   - biodex % of WB 75%, 20x side to side  3 mins 3 mins to 80% on R 5 mins to 75% on right   -Biodex: HARLAN roberson 1x ea                                                               Gait training Attempted 1 crutch  Patient unable secondary to pain without significant limp  Educated on decreasing the amount of use on the right crutch over the weekend                 Modalities

## 2019-09-30 ENCOUNTER — OFFICE VISIT (OUTPATIENT)
Dept: PHYSICAL THERAPY | Facility: CLINIC | Age: 54
End: 2019-09-30
Payer: COMMERCIAL

## 2019-09-30 DIAGNOSIS — M14.671 CHARCOT'S JOINT OF RIGHT FOOT: Primary | ICD-10-CM

## 2019-09-30 DIAGNOSIS — S92.901G CLOSED MULTIPLE FRACTURES OF RIGHT FOOT WITH DELAYED HEALING, SUBSEQUENT ENCOUNTER: ICD-10-CM

## 2019-09-30 PROCEDURE — 97112 NEUROMUSCULAR REEDUCATION: CPT

## 2019-09-30 PROCEDURE — 97140 MANUAL THERAPY 1/> REGIONS: CPT

## 2019-09-30 PROCEDURE — 97110 THERAPEUTIC EXERCISES: CPT

## 2019-09-30 NOTE — PROGRESS NOTES
Daily Note     Today's date: 2019  Patient name: Elaine Puente  : 1965  MRN: [id number]  Referring provider: Harish Bradshaw DPM  Dx:   Encounter Diagnosis     ICD-10-CM    1  Charcot's joint of right foot M14 671    2  Closed multiple fractures of right foot with delayed healing, subsequent encounter S92 321G                   Subjective: Pt states that his foot is sore in spots, but he realizes that is due to him walking with one crutch most of the time  Objective: See treatment diary below      Assessment: Tolerated treatment well  Pt requires cues for control of BAPS board, but is able to correct with cues  No complaints of increased sx's throughout session  He uses good technique with one crutch this session with no cues needed  Tenderness noted during manual therapy  Pt reports decreased pain and tenderness after  Pt progressing slowly towards his long term goals  Plan: Continue per plan of care     Diagnosis: Charcot foot right, multiple fractures, 3 surgeries    Surgical fixation   Precautions: WBAT, advanced stage RA, PICC line   Manual Therapy 19   STM as tolerated (Very sensitive feet) Hold today [address], PTA  TJH, PTA    PROM Hold today    HJS, PT                           Exercise Diary         Ankle 4 way YTB 10x ea YTB 20x ea  1# 20x ea    Seated toe yoga- Toe F/E/Abd 15x ea 15x ea  10x ea    gastroc stretch with strap 3h91unp 3x30 sec  3x30 sec 6y41sbt   Seated great toe stretch 2d29dth 3x30 sec  3x30 sec 0p89zbi   Seated HR 20x 20x  10x2 20x   BAPs board 4 way 10x ea  10x ea  20x ea way 10x ea focus on control   Hamstring stretch 3o89nau 3x30 sec  3x30 sec 5e03rlw   SKTC 20x   15x RLE 15x   Seated DF stretch 9r28xzm                               All below In boot:        - weight shifts side to side & F/B 20x ea 20x ea  S/s 15x  F/B 15x 20x ea   -Mini squats 10x 10x  10x 10x   - biodex % of WB 75%, 20x side to side 85%, 20x side to side  3 mins to 80% on R 5 mins to 75% on right   -Biodex: HARLAN roberson 1x ea 1x ea                                                              Gait training Attempted 1 crutch  Patient unable secondary to pain without significant limp  Educated on decreasing the amount of use on the right crutch over the weekend                 Modalities

## 2019-10-01 ENCOUNTER — APPOINTMENT (OUTPATIENT)
Dept: PHYSICAL THERAPY | Facility: CLINIC | Age: 54
End: 2019-10-01
Payer: COMMERCIAL

## 2019-10-03 DIAGNOSIS — I10 ESSENTIAL HYPERTENSION: ICD-10-CM

## 2019-10-03 RX ORDER — LOSARTAN POTASSIUM AND HYDROCHLOROTHIAZIDE 12.5; 5 MG/1; MG/1
1 TABLET ORAL DAILY
Qty: 90 TABLET | Refills: 1 | Status: SHIPPED | OUTPATIENT
Start: 2019-10-03 | End: 2020-02-12

## 2019-10-07 ENCOUNTER — APPOINTMENT (OUTPATIENT)
Dept: LAB | Facility: CLINIC | Age: 54
End: 2019-10-07
Payer: COMMERCIAL

## 2019-10-07 DIAGNOSIS — I10 BENIGN ESSENTIAL HYPERTENSION: ICD-10-CM

## 2019-10-07 DIAGNOSIS — E11.42 TYPE 2 DIABETES MELLITUS WITH DIABETIC POLYNEUROPATHY, WITH LONG-TERM CURRENT USE OF INSULIN (HCC): ICD-10-CM

## 2019-10-07 DIAGNOSIS — Z79.4 TYPE 2 DIABETES MELLITUS WITH DIABETIC POLYNEUROPATHY, WITH LONG-TERM CURRENT USE OF INSULIN (HCC): ICD-10-CM

## 2019-10-07 DIAGNOSIS — E78.2 MIXED HYPERLIPIDEMIA: ICD-10-CM

## 2019-10-07 DIAGNOSIS — E55.9 VITAMIN D DEFICIENCY: ICD-10-CM

## 2019-10-07 LAB
25(OH)D3 SERPL-MCNC: 37.5 NG/ML (ref 30–100)
ALBUMIN SERPL BCP-MCNC: 3.8 G/DL (ref 3.5–5)
ALP SERPL-CCNC: 69 U/L (ref 46–116)
ALT SERPL W P-5'-P-CCNC: 41 U/L (ref 12–78)
ANION GAP SERPL CALCULATED.3IONS-SCNC: 10 MMOL/L (ref 4–13)
AST SERPL W P-5'-P-CCNC: 23 U/L (ref 5–45)
BASOPHILS # BLD AUTO: 0.06 THOUSANDS/ΜL (ref 0–0.1)
BASOPHILS NFR BLD AUTO: 1 % (ref 0–1)
BILIRUB SERPL-MCNC: 0.5 MG/DL (ref 0.2–1)
BUN SERPL-MCNC: 17 MG/DL (ref 5–25)
CALCIUM SERPL-MCNC: 9 MG/DL (ref 8.3–10.1)
CHLORIDE SERPL-SCNC: 102 MMOL/L (ref 100–108)
CHOLEST SERPL-MCNC: 155 MG/DL (ref 50–200)
CO2 SERPL-SCNC: 25 MMOL/L (ref 21–32)
CREAT SERPL-MCNC: 1 MG/DL (ref 0.6–1.3)
CREAT UR-MCNC: 218 MG/DL
EOSINOPHIL # BLD AUTO: 0.14 THOUSAND/ΜL (ref 0–0.61)
EOSINOPHIL NFR BLD AUTO: 2 % (ref 0–6)
ERYTHROCYTE [DISTWIDTH] IN BLOOD BY AUTOMATED COUNT: 13.6 % (ref 11.6–15.1)
EST. AVERAGE GLUCOSE BLD GHB EST-MCNC: 192 MG/DL
GFR SERPL CREATININE-BSD FRML MDRD: 87 ML/MIN/1.73SQ M
GLUCOSE P FAST SERPL-MCNC: 258 MG/DL (ref 65–99)
HBA1C MFR BLD: 8.3 % (ref 4.2–6.3)
HCT VFR BLD AUTO: 48.5 % (ref 36.5–49.3)
HDLC SERPL-MCNC: 36 MG/DL (ref 40–60)
HGB BLD-MCNC: 16.4 G/DL (ref 12–17)
IMM GRANULOCYTES # BLD AUTO: 0.08 THOUSAND/UL (ref 0–0.2)
IMM GRANULOCYTES NFR BLD AUTO: 1 % (ref 0–2)
LDLC SERPL CALC-MCNC: 64 MG/DL (ref 0–100)
LYMPHOCYTES # BLD AUTO: 1.55 THOUSANDS/ΜL (ref 0.6–4.47)
LYMPHOCYTES NFR BLD AUTO: 23 % (ref 14–44)
MCH RBC QN AUTO: 29.4 PG (ref 26.8–34.3)
MCHC RBC AUTO-ENTMCNC: 33.8 G/DL (ref 31.4–37.4)
MCV RBC AUTO: 87 FL (ref 82–98)
MICROALBUMIN UR-MCNC: 12.6 MG/L (ref 0–20)
MICROALBUMIN/CREAT 24H UR: 6 MG/G CREATININE (ref 0–30)
MONOCYTES # BLD AUTO: 0.69 THOUSAND/ΜL (ref 0.17–1.22)
MONOCYTES NFR BLD AUTO: 10 % (ref 4–12)
NEUTROPHILS # BLD AUTO: 4.38 THOUSANDS/ΜL (ref 1.85–7.62)
NEUTS SEG NFR BLD AUTO: 63 % (ref 43–75)
NONHDLC SERPL-MCNC: 119 MG/DL
NRBC BLD AUTO-RTO: 0 /100 WBCS
PLATELET # BLD AUTO: 208 THOUSANDS/UL (ref 149–390)
PMV BLD AUTO: 10.4 FL (ref 8.9–12.7)
POTASSIUM SERPL-SCNC: 4.3 MMOL/L (ref 3.5–5.3)
PROT SERPL-MCNC: 7.1 G/DL (ref 6.4–8.2)
RBC # BLD AUTO: 5.58 MILLION/UL (ref 3.88–5.62)
SODIUM SERPL-SCNC: 137 MMOL/L (ref 136–145)
TRIGL SERPL-MCNC: 277 MG/DL
TSH SERPL DL<=0.05 MIU/L-ACNC: 2.75 UIU/ML (ref 0.36–3.74)
WBC # BLD AUTO: 6.9 THOUSAND/UL (ref 4.31–10.16)

## 2019-10-07 PROCEDURE — 84443 ASSAY THYROID STIM HORMONE: CPT

## 2019-10-07 PROCEDURE — 82306 VITAMIN D 25 HYDROXY: CPT

## 2019-10-07 PROCEDURE — 36415 COLL VENOUS BLD VENIPUNCTURE: CPT

## 2019-10-07 PROCEDURE — 80061 LIPID PANEL: CPT

## 2019-10-07 PROCEDURE — 83036 HEMOGLOBIN GLYCOSYLATED A1C: CPT

## 2019-10-07 PROCEDURE — 80053 COMPREHEN METABOLIC PANEL: CPT

## 2019-10-07 PROCEDURE — 85025 COMPLETE CBC W/AUTO DIFF WBC: CPT

## 2019-10-07 PROCEDURE — 82043 UR ALBUMIN QUANTITATIVE: CPT

## 2019-10-07 PROCEDURE — 82570 ASSAY OF URINE CREATININE: CPT

## 2019-10-07 RX ORDER — AMMONIUM LACTATE 12 G/100G
LOTION TOPICAL
COMMUNITY
Start: 2019-09-25 | End: 2020-02-12 | Stop reason: ALTCHOICE

## 2019-10-08 ENCOUNTER — TRANSCRIBE ORDERS (OUTPATIENT)
Dept: PHYSICAL THERAPY | Facility: CLINIC | Age: 54
End: 2019-10-08

## 2019-10-08 ENCOUNTER — OFFICE VISIT (OUTPATIENT)
Dept: FAMILY MEDICINE CLINIC | Facility: CLINIC | Age: 54
End: 2019-10-08
Payer: COMMERCIAL

## 2019-10-08 ENCOUNTER — EVALUATION (OUTPATIENT)
Dept: PHYSICAL THERAPY | Facility: CLINIC | Age: 54
End: 2019-10-08
Payer: COMMERCIAL

## 2019-10-08 VITALS
SYSTOLIC BLOOD PRESSURE: 142 MMHG | WEIGHT: 255 LBS | DIASTOLIC BLOOD PRESSURE: 72 MMHG | HEART RATE: 102 BPM | HEIGHT: 72 IN | RESPIRATION RATE: 16 BRPM | BODY MASS INDEX: 34.54 KG/M2 | OXYGEN SATURATION: 97 %

## 2019-10-08 DIAGNOSIS — Z23 FLU VACCINE NEED: ICD-10-CM

## 2019-10-08 DIAGNOSIS — E66.9 OBESITY (BMI 30.0-34.9): ICD-10-CM

## 2019-10-08 DIAGNOSIS — I65.29 CAROTID ARTERY CALCIFICATION, UNSPECIFIED LATERALITY: ICD-10-CM

## 2019-10-08 DIAGNOSIS — E11.42 TYPE 2 DIABETES MELLITUS WITH DIABETIC POLYNEUROPATHY, WITH LONG-TERM CURRENT USE OF INSULIN (HCC): Primary | ICD-10-CM

## 2019-10-08 DIAGNOSIS — E11.610 CHARCOT FOOT DUE TO DIABETES MELLITUS (HCC): ICD-10-CM

## 2019-10-08 DIAGNOSIS — I10 BENIGN ESSENTIAL HYPERTENSION: ICD-10-CM

## 2019-10-08 DIAGNOSIS — E11.9 TYPE 2 DIABETES MELLITUS WITHOUT COMPLICATION, WITHOUT LONG-TERM CURRENT USE OF INSULIN (HCC): ICD-10-CM

## 2019-10-08 DIAGNOSIS — E78.2 MIXED HYPERLIPIDEMIA: ICD-10-CM

## 2019-10-08 DIAGNOSIS — Z79.4 TYPE 2 DIABETES MELLITUS WITH DIABETIC POLYNEUROPATHY, WITH LONG-TERM CURRENT USE OF INSULIN (HCC): Primary | ICD-10-CM

## 2019-10-08 DIAGNOSIS — M14.671 CHARCOT'S JOINT OF RIGHT FOOT: Primary | ICD-10-CM

## 2019-10-08 DIAGNOSIS — Z72.0 TOBACCO USE: ICD-10-CM

## 2019-10-08 DIAGNOSIS — S92.901G CLOSED MULTIPLE FRACTURES OF RIGHT FOOT WITH DELAYED HEALING, SUBSEQUENT ENCOUNTER: ICD-10-CM

## 2019-10-08 DIAGNOSIS — M05.9 RHEUMATOID ARTHRITIS WITH POSITIVE RHEUMATOID FACTOR, INVOLVING UNSPECIFIED SITE (HCC): ICD-10-CM

## 2019-10-08 PROBLEM — S91.301A NON-HEALING WOUND OF RIGHT HEEL: Status: RESOLVED | Noted: 2019-07-17 | Resolved: 2019-10-08

## 2019-10-08 PROCEDURE — 90682 RIV4 VACC RECOMBINANT DNA IM: CPT | Performed by: FAMILY MEDICINE

## 2019-10-08 PROCEDURE — 97110 THERAPEUTIC EXERCISES: CPT | Performed by: PHYSICAL THERAPIST

## 2019-10-08 PROCEDURE — 90471 IMMUNIZATION ADMIN: CPT | Performed by: FAMILY MEDICINE

## 2019-10-08 PROCEDURE — 97140 MANUAL THERAPY 1/> REGIONS: CPT | Performed by: PHYSICAL THERAPIST

## 2019-10-08 PROCEDURE — 3008F BODY MASS INDEX DOCD: CPT | Performed by: FAMILY MEDICINE

## 2019-10-08 PROCEDURE — 99215 OFFICE O/P EST HI 40 MIN: CPT | Performed by: FAMILY MEDICINE

## 2019-10-08 PROCEDURE — 97112 NEUROMUSCULAR REEDUCATION: CPT | Performed by: PHYSICAL THERAPIST

## 2019-10-08 RX ORDER — CYCLOBENZAPRINE HCL 10 MG
10 TABLET ORAL 3 TIMES DAILY PRN
Qty: 90 TABLET | Refills: 3 | Status: SHIPPED | OUTPATIENT
Start: 2019-10-08 | End: 2019-12-30 | Stop reason: SDUPTHER

## 2019-10-08 NOTE — LETTER
2019    Maeve Castro DPM  [address] [address]  [address] [facility] [address]    Patient: Yoni Ward  YOB: 1965   Date of Visit: 10/8/2019     Encounter Diagnosis     ICD-10-CM    1  Charcot's joint of right foot M14 671    2  Closed multiple fractures of right foot with delayed healing, subsequent encounter S92 901G        Dear Dr Reece Scales: Thank you for your recent referral of Yoni Ward    Please review the attached evaluation summary from Renny's recent visit  Please verify that you agree with the plan of care by signing the attached order  If you have any questions or concerns, please do not hesitate to call  I sincerely appreciate the opportunity to share in the care of one of your patients and hope to have another opportunity to work with you in the near future  Sincerely,    Delaney Romero, PT      Referring Provider:      I certify that I have read the below Plan of Care and certify the need for these services furnished under this plan of treatment while under my care  Maeve Castro DPM  [address] [facility] [address]  [address] [place] [address]  VIA Facsimile: [phone]          PT Re-Evaluation     Today's date: 10/8/2019  Patient name: Yoni Ward  : 1965  MRN: [id number]  Referring provider: Siomara Hinkle DPM  Dx:   Encounter Diagnosis     ICD-10-CM    1  Charcot's joint of right foot M14 671    2  Closed multiple fractures of right foot with delayed healing, subsequent encounter S92 901G                   Assessment  Assessment details: Yoni Ward  has been compliant with attending PT and home exercise program since initial eval   Maria A Rodriguez  has made improvements in objective data since initial eval but continues to have limitations compared to prior level of function   Maria A Rodriguez continues to have deficits in the above listed impairments and would benefit from additional skilled PT to address these deficits to return to prior level of function  Impairments: abnormal coordination, abnormal gait, abnormal muscle firing, abnormal muscle tone, abnormal or restricted ROM, abnormal movement, activity intolerance, impaired balance, impaired physical strength, lacks appropriate home exercise program, pain with function and weight-bearing intolerance  Barriers to therapy: Infection  3 surgeries on right foot  PIC line    Charcot foot    Understanding of Dx/Px/POC: good   Prognosis: good    Goals  Impairment Goals 4-6 weeks  - Decrease pain to <4/10 -Progressing  - Improve ankle AROM to functional -Progressing  - Increase knee strength to 4+/5 throughout -Progressing  - Increase hip strength to 4/5 throughout -Progressing  - Increase ankle strength to 4+/5 throughout -Progressing    Functional Goals 6-8 weeks  - Return to Prior Level of Function -Progressing  - Increase Functional Status Measure (FOTO) to: 37 -Progressing  - Patient will be independent with HEP -Progressing  - Patient will be able to squat functionally -Progressing  - Patient will be able to perform sit to stand functionally -Progressing  - Patient will be able to ascend and descend stairs functionally -Progressing  - Patient will be able to walk independently -Progressing      Plan  Patient would benefit from: skilled PT  Referral necessary: No  Planned modality interventions: cryotherapy, electrical stimulation/Russian stimulation, H-Wave, TENS, thermotherapy: hydrocollator packs and unattended electrical stimulation  Planned therapy interventions: abdominal trunk stabilization, activity modification, ADL retraining, balance/weight bearing training, breathing training, body mechanics training, coordination, flexibility, functional ROM exercises, graded exercise, home exercise program, work reintegration, therapeutic training, therapeutic exercise, therapeutic activities, stretching, strengthening, self care, postural training, patient education, neuromuscular re-education, muscle pump exercises, motor coordination training, Heath taping, manual therapy, joint mobilization, IADL retraining, balance and gait training  Frequency: 2x week  Duration in visits: 16  Duration in weeks: 8  Treatment plan discussed with: patient, PTA and referring physician        Subjective Evaluation    Pain  Current pain ratin  At best pain ratin  At worst pain ratin  Location: Right heel pain    Patient Goals  Patient goal: Get back to work and hiking   -Progressing        Patient reports that he feels like he is about 50% improved since starting PT  He notes that he feels like he is getting some feeling back in his foot  WORK: he is not working currently  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, 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  He reports that he plans on returning to work, but getting help with the heavier stuff  He is not working currently  PAIN LOCATION/DESCRIPTORS:  Foot is mostly numb  He has pain in his heel and random pains  AGGRAVATING FACTORS:  Walking currently with walking boot and single axillary crutche  He has been able to put 95% weight on his foot with the boot on  Unable to raise onto toes  He is having difficulty with any weight bearing tasks  He reports he has difficulty walking without the crutch  States that his hip and knee really bother him  Reports that he stood a lot this weekend at a class reunion  He is getting his new shoes soon  He reports he has been told that he can wean out of the boot as able  Objective     Observations   Left Ankle/Foot   Positive for effusion and incision  Additional Observation Details  All incisions healing well  No redness          Active Range of Motion   Left Ankle/Foot   Dorsiflexion (ke): 0 degrees   Plantar flexion: 45 degrees   Inversion: 20 degrees   Eversion: 14 degrees     Right Ankle/Foot   Dorsiflexion (ke): 0 degrees with pain  Plantar flexion: 35 degrees   Inversion: 16 degrees   Eversion: 6 degrees     Swelling   Left Ankle/Foot   Metatarsal heads: 24 cm  Figure 8: 56 5 cm    Right Ankle/Foot   Metatarsal heads: 24 75 cm  Figure 8: 55 5 cm      Flowsheet Rows      Most Recent Value   PT/OT G-Codes   Current Score  32   Projected Score  43          Diagnosis: Charcot foot right, multiple fractures, 3 surgeries  Surgical fixation   Precautions: WBAT, advanced stage RA, PICC line   Manual Therapy 9/26/19 9/30/19 10/8/19     STM as tolerated (Very sensitive feet) Hold today [address], PTA HJS, PT     PROM Hold today       RE   HJS, PT                     Exercise Diary         Ankle 4 way YTB 10x ea YTB 20x ea      Seated toe yoga- Toe F/E/Abd 15x ea 15x ea      gastroc stretch with strap 7b92xlt 3x30 sec 1c33kzg     Seated great toe stretch 0p26fcs 3x30 sec HEP     Seated HR 20x 20x 20x      BAPs board 4 way 10x ea  10x ea SLOW     Hamstring stretch 9y51tes 3x30 sec 0u31wpq     SKTC 20x       Seated DF stretch 4y59xkw  1j49uwf     ABC's   2x     Spikey ball roll   2 mins              All below In boot:        - weight shifts side to side & F/B 20x ea 20x ea      -Mini squats 10x 10x      - biodex % of WB 75%, 20x side to side 85%, 20x side to side      -Biodex: maze, LOS 1x ea 1x ea      - lateral steps at mirror                                                        Gait training Attempted 1 crutch  Patient unable secondary to pain without significant limp  Educated on decreasing the amount of use on the right crutch over the weekend  Able to walk with 1 crutch today  Patient instructed to begin trying to wean off the crutch  He is improving slowly toward long term goals                 Modalities

## 2019-10-08 NOTE — PROGRESS NOTES
Subjective:      Patient ID: Torrey Arango  is a 48 y o  male  51-year-old male presents with his wife for follow-up of chronic conditions including diabetes mellitus type 2 that is insulin requiring, Charcot foot of the right foot status post extensive surgical reconstruction, hyperlipidemia, hypertension, tobacco abuse  Patient is still undergoing therapy and it is a very slow healing process following surgical reconstruction of his right foot  Patient did develop osteomyelitis which was treated with IV antibiotics  Patient is still in a walking boot but eventually is going to progress to wearing diabetic sneakers  He is going through physical therapy  This is at the direction of the podiatrist   Unfortunately with the patient's surgery he remains less active  He has not been able to work  They are collecting so security insurance but this is not enough 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  He is hoping to return back to work just with somebody doing all of the hard labor for him  Patient has gained considerable amount of weight with the inactivity and with that his diabetes is less than ideally controlled  Hemoglobin A1c increased up to 8 3%  Patient does admit to more frequent snacking  Labs reviewed with vitamin-D of 37 5, normal CBC  No leukocytosis  Essentially normal CMP with the exception of a fasting glucose of 258  Total cholesterol 155, triglycerides 277, HDL 36, LDL 64  TSH 2 753  Urine is essentially negative for microalbumin  Patient would like to receive flu vaccination  Patient was given clearance to start back on antirheumatic agents given his history of rheumatoid arthritis        Past Medical History:   Diagnosis Date    At risk for falls     Cataract     giacomo    COPD (chronic obstructive pulmonary disease) (HCC)     Hyperlipidemia     Kidney stone     Neuropathy     Rheumatoid arthritis (HCC)     Seasonal allergies     Uses wheelchair     and crutches- NWB RLE    Wears glasses        Family History   Problem Relation Age of Onset    Diabetes Mother     Stroke Mother     Mental illness Mother     Diabetes Father     Stroke Father     Alcohol abuse Brother     Coronary artery disease Family         Age 51-55    Diabetes type II Family     Heart disease Family        Past Surgical History:   Procedure Laterality Date    APPENDECTOMY      CLOSED REDUCTION FOOT DISLOCATION Right 2/12/2019    Procedure: C/R FRACTURE;  Surgeon: Nataliia Benites DPM;  [facility];  Service: Podiatry    FOOT SURGERY Right 07/2019    Removal of the bone graft and cleaned out infection, and placed new bone graft    WI EXC SKIN MALIG <0 5 CM REMAINDER BODY N/A 3/28/2018    Procedure: EXCISION WIDE LESION HEAD/FACIAL/NECK;  Surgeon: Kevyn Jeronimo MD;  [facility];  Service: Surgical Oncology    WI GASTROCNEMIUS RECESSION Right 2/12/2019    Procedure: ENDO GASTROC RECESSION, APPLICATION OF EXTERNAL FIXATOR;  Surgeon: Nataliia Benites DPM;  [facility];  Service: Podiatry    WI REMOVE EXTERN BONE FIX DEV W ANESTH Right 4/18/2019    Procedure: FRAME REMOVAL HARDWARE FOOT WITH APPLICATION OF GRAFT;  Surgeon: Nataliia Benites DPM;  [facility];  Service: Podiatry    SKIN BIOPSY      scalp   [address]        reports that he has been smoking cigarettes  He has a 40 00 pack-year smoking history  He has never used smokeless tobacco  He reports that he drinks alcohol  He reports that he does not use drugs        Current Outpatient Medications:     ACCU-CHEK FASTCLIX LANCETS MISC, by Does not apply route 4 (four) times a day (before meals and at bedtime), Disp: 100 each, Rfl: 3    albuterol (PROVENTIL HFA,VENTOLIN HFA) 90 mcg/act inhaler, INHALE 2 PUFFS EVERY 4 (FOUR) HOURS AS NEEDED FOR WHEEZING, Disp: 18 g, Rfl: 1    ammonium lactate (LAC-HYDRIN) 12 % lotion, , Disp: , Rfl:     atorvastatin (LIPITOR) 40 mg tablet, TAKE 1 TABLET AT BEDTIME , Disp: 90 tablet, Rfl: 1    ENBREL SURECLICK 50 MG/ML injection, 50 mg once a week Friday's- on hold for surgery, Disp: , Rfl:     ergocalciferol (VITAMIN D2) 50,000 units, Take 50,000 Units by mouth once a week , Disp: , Rfl:     fluticasone (FLONASE) 50 mcg/act nasal spray, USE 2 SPRAYS IN EACH NOSTRIL ONCE DAILY, Disp: 16 g, Rfl: 11    glucose blood (ACCU-CHEK GUIDE) test strip, Test before meals and at bedtime, Disp: 100 each, Rfl: 3    HYDROcodone-acetaminophen (NORCO) 5-325 mg per tablet, Take 1 tablet by mouth every 6 (six) hours as needed for painMax Daily Amount: 4 tablets, Disp: 60 tablet, Rfl: 0    hydroxychloroquine (PLAQUENIL) 200 mg tablet, Take 200 mg by mouth 2 (two) times a day  , Disp: , Rfl:     insulin glargine (BASAGLAR KWIKPEN) 100 units/mL injection pen, Inject 44 Units under the skin every 12 (twelve) hours, Disp: 40 mL, Rfl: 1    JANUVIA 100 MG tablet, TAKE ONE TABLET EVERY DAY, Disp: 90 tablet, Rfl: 1    leflunomide (ARAVA) 20 MG tablet, Take 1 tablet by mouth daily with dinner On hold for surgery, Disp: , Rfl:     losartan-hydrochlorothiazide (HYZAAR) 50-12 5 mg per tablet, TAKE 1 TABLET BY MOUTH DAILY, Disp: 90 tablet, Rfl: 1    naproxen (NAPROSYN) 500 mg tablet, Take 1 tablet by mouth every 12 (twelve) hours, Disp: , Rfl:     ULTICARE MICRO PEN NEEDLES 32G X 4 MM MISC, , Disp: , Rfl:     ULTICARE MICRO PEN NEEDLES 32G X 4 MM MISC, INJECT UNDER THE SKIN 3 (THREE) TIMES A DAY, Disp: 100 each, Rfl: 3    VICTOZA injection, INJECT 1 8 MG DAILY, Disp: 9 mL, Rfl: 4    cyclobenzaprine (FLEXERIL) 10 mg tablet, Take 1 tablet (10 mg total) by mouth 3 (three) times a day as needed for muscle spasms, Disp: 90 tablet, Rfl: 3    The following portions of the patient's history were reviewed and updated as appropriate: allergies, current medications, past family history, past medical history, past social history, past surgical history and problem list     Review of Systems   Constitutional: Positive for unexpected weight change ( weight gain)  HENT: Negative  Eyes: Negative  Respiratory: Negative  Cardiovascular: Negative  Gastrointestinal: Negative  Endocrine: Negative  Genitourinary: Negative  Musculoskeletal: Positive for arthralgias, gait problem (Ambulating with walking boot and 1 crutch) and joint swelling  Skin: Negative  Allergic/Immunologic: Negative  Neurological: Positive for weakness and numbness (paresthesia right foot)  Hematological: Negative  Psychiatric/Behavioral: Negative  All other systems reviewed and are negative  Objective:    /72   Pulse 102   Resp 16   Ht 6' (1 829 m)   Wt 116 kg (255 lb)   SpO2 97%   BMI 34 58 kg/m²      Physical Exam   Constitutional: He is oriented to person, place, and time  He appears well-developed and well-nourished  Obese   HENT:   Head: Normocephalic  Eyes: Pupils are equal, round, and reactive to light  Conjunctivae and EOM are normal    Neck: Normal range of motion  Neck supple  Cardiovascular: Normal rate, regular rhythm and normal heart sounds  Pulmonary/Chest: Effort normal and breath sounds normal    Abdominal: Soft  Bowel sounds are normal    Musculoskeletal: He exhibits edema, tenderness and deformity  Patient is wearing walking boot on his right lower extremity  No appreciable swelling noted  I did not remove his walking boot as he does have all appointment scheduled with podiatrist      Neurological: He is alert and oriented to person, place, and time  Psychiatric: He has a normal mood and affect  His behavior is normal  Judgment and thought content normal    Nursing note and vitals reviewed          Recent Results (from the past 1008 hour(s))   CBC and differential    Collection Time: 10/07/19  8:37 AM   Result Value Ref Range    WBC 6 90 4 31 - 10 16 Thousand/uL    RBC 5 58 3 88 - 5 62 Million/uL    Hemoglobin 16 4 12 0 - 17 0 g/dL    Hematocrit 48 5 36 5 - 49 3 % MCV 87 82 - 98 fL    MCH 29 4 26 8 - 34 3 pg    MCHC 33 8 31 4 - 37 4 g/dL    RDW 13 6 11 6 - 15 1 %    MPV 10 4 8 9 - 12 7 fL    Platelets 544 006 - 496 Thousands/uL    nRBC 0 /100 WBCs    Neutrophils Relative 63 43 - 75 %    Immat GRANS % 1 0 - 2 %    Lymphocytes Relative 23 14 - 44 %    Monocytes Relative 10 4 - 12 %    Eosinophils Relative 2 0 - 6 %    Basophils Relative 1 0 - 1 %    Neutrophils Absolute 4 38 1 85 - 7 62 Thousands/µL    Immature Grans Absolute 0 08 0 00 - 0 20 Thousand/uL    Lymphocytes Absolute 1 55 0 60 - 4 47 Thousands/µL    Monocytes Absolute 0 69 0 17 - 1 22 Thousand/µL    Eosinophils Absolute 0 14 0 00 - 0 61 Thousand/µL    Basophils Absolute 0 06 0 00 - 0 10 Thousands/µL   Comprehensive metabolic panel    Collection Time: 10/07/19  8:37 AM   Result Value Ref Range    Sodium 137 136 - 145 mmol/L    Potassium 4 3 3 5 - 5 3 mmol/L    Chloride 102 100 - 108 mmol/L    CO2 25 21 - 32 mmol/L    ANION GAP 10 4 - 13 mmol/L    BUN 17 5 - 25 mg/dL    Creatinine 1 00 0 60 - 1 30 mg/dL    Glucose, Fasting 258 (H) 65 - 99 mg/dL    Calcium 9 0 8 3 - 10 1 mg/dL    AST 23 5 - 45 U/L    ALT 41 12 - 78 U/L    Alkaline Phosphatase 69 46 - 116 U/L    Total Protein 7 1 6 4 - 8 2 g/dL    Albumin 3 8 3 5 - 5 0 g/dL    Total Bilirubin 0 50 0 20 - 1 00 mg/dL    eGFR 87 ml/min/1 73sq m   Hemoglobin A1C    Collection Time: 10/07/19  8:37 AM   Result Value Ref Range    Hemoglobin A1C 8 3 (H) 4 2 - 6 3 %     mg/dl   Lipid panel    Collection Time: 10/07/19  8:37 AM   Result Value Ref Range    Cholesterol 155 50 - 200 mg/dL    Triglycerides 277 (H) <=150 mg/dL    HDL, Direct 36 (L) 40 - 60 mg/dL    LDL Calculated 64 0 - 100 mg/dL    Non-HDL-Chol (CHOL-HDL) 119 mg/dl   TSH, 3rd generation with Free T4 reflex    Collection Time: 10/07/19  8:37 AM   Result Value Ref Range    TSH 3RD GENERATON 2 753 0 358 - 3 740 uIU/mL   Microalbumin / creatinine urine ratio    Collection Time: 10/07/19  8:37 AM   Result Value Ref Range    Creatinine, Ur 218 0 mg/dL    Microalbum  ,U,Random 12 6 0 0 - 20 0 mg/L    Microalb Creat Ratio 6 0 - 30 mg/g creatinine   Vitamin D 25 hydroxy    Collection Time: 10/07/19  8:37 AM   Result Value Ref Range    Vit D, 25-Hydroxy 37 5 30 0 - 100 0 ng/mL       Assessment/Plan:    Type 2 diabetes mellitus without complication, without long-term current use of insulin ([facility])    Lab Results   Component Value Date    HGBA1C 8 3 (H) 10/07/2019     Not ideally controlled, some of this is related to his it activity as well as some dietary indiscretion  Patient needs to be more compliant with following diabetic diet  He is aware of this  Increased dose of basaglar to 44 units twice daily  Continue on Januvia 100 mg once daily  Recheck diabetic parameters in 4 months    Charcot foot due to diabetes mellitus [facility])    Lab Results   Component Value Date    HGBA1C 8 3 (H) 10/07/2019      Status post extensive reconstructive surgery  Follow podiatrist as scheduled  Stressed the importance of tight glycemic control    Benign essential hypertension  Fair control of hypertension on losartan/hydrochlorothiazide  Continue same  Carotid artery calcification  Last carotid Doppler was performed in 2017  Patient will be due for repeat carotid Doppler  He would like to get out of his walking boot before going for additional testing    Rheumatoid arthritis [facility])  Follow-up with Rheumatology  Patient was given clearance to start disease modifying agents  Patient has Plaquenil Arava and Enbrel  Hyperlipidemia  Hyperlipidemia is actually well controlled on atorvastatin 20 mg once daily  Continue same    Recheck lipid profile in 4 months    Tobacco use  again counseled on the benefits of smoking cessation          Problem List Items Addressed This Visit        Endocrine    Charcot foot due to diabetes mellitus [facility])       Lab Results   Component Value Date    HGBA1C 8 3 (H) 10/07/2019      Status post extensive reconstructive surgery  Follow podiatrist as scheduled  Stressed the importance of tight glycemic control         Relevant Medications    insulin glargine (BASAGLAR KWIKPEN) 100 units/mL injection pen    cyclobenzaprine (FLEXERIL) 10 mg tablet    Type 2 diabetes mellitus without complication, without long-term current use of insulin ([facility]) - Primary       Lab Results   Component Value Date    HGBA1C 8 3 (H) 10/07/2019     Not ideally controlled, some of this is related to his it activity as well as some dietary indiscretion  Patient needs to be more compliant with following diabetic diet  He is aware of this  Increased dose of basaglar to 44 units twice daily  Continue on Januvia 100 mg once daily  Recheck diabetic parameters in 4 months         Relevant Medications    insulin glargine (BASAGLAR KWIKPEN) 100 units/mL injection pen       Cardiovascular and Mediastinum    Benign essential hypertension     Fair control of hypertension on losartan/hydrochlorothiazide  Continue same  Relevant Medications    cyclobenzaprine (FLEXERIL) 10 mg tablet    Other Relevant Orders    CBC and differential    Comprehensive metabolic panel    TSH, 3rd generation with Free T4 reflex    Carotid artery calcification     Last carotid Doppler was performed in 2017  Patient will be due for repeat carotid Doppler  He would like to get out of his walking boot before going for additional testing            Musculoskeletal and Integument    Rheumatoid arthritis [facility])     Follow-up with Rheumatology  Patient was given clearance to start disease modifying agents  Patient has Plaquenil Arava and Enbrel  Other    Hyperlipidemia     Hyperlipidemia is actually well controlled on atorvastatin 20 mg once daily  Continue same  Recheck lipid profile in 4 months         Relevant Orders    Lipid panel    Obesity (BMI 30 0-34  9)    Tobacco use     again counseled on the benefits of smoking cessation           Other Visit Diagnoses     Flu vaccine need        Relevant Orders    influenza vaccine, [id number], quadrivalent, recombinant, PF, 0 5 mL, for patients 18 yr+ (FLUBLOK) (Completed)          BMI Counseling: Body mass index is 34 58 kg/m²  The BMI is above normal  Nutrition recommendations include moderation in carbohydrate intake, increasing intake of lean protein, reducing intake of saturated fat and trans fat and reducing intake of cholesterol  Exercise recommendations include exercising 3-5 times per week  I have spent 46 minutes with Patient and family today in which greater than 50% of this time was spent in counseling/coordination of care regarding Diagnostic results, Prognosis, Risks and benefits of tx options, Intructions for management, Patient and family education, Importance of tx compliance, Risk factor reductions and Impressions  Tobacco Cessation Counseling: Tobacco cessation counseling and education was provided  The patient is sincerely urged to quit consumption of tobacco  He is not ready to quit tobacco  The numerous health risks of tobacco consumption were discussed  If he decides to quit, there are a number of helpful adjunctive aids, and he can see me to discuss nicotine replacement therapy, chantix, or bupropion anytime in the future

## 2019-10-08 NOTE — ASSESSMENT & PLAN NOTE
Last carotid Doppler was performed in 2017  Patient will be due for repeat carotid Doppler    He would like to get out of his walking boot before going for additional testing

## 2019-10-08 NOTE — ASSESSMENT & PLAN NOTE
Hyperlipidemia is actually well controlled on atorvastatin 20 mg once daily  Continue same    Recheck lipid profile in 4 months

## 2019-10-08 NOTE — ASSESSMENT & PLAN NOTE
Lab Results   Component Value Date    HGBA1C 8 3 (H) 10/07/2019      Status post extensive reconstructive surgery  Follow podiatrist as scheduled    Stressed the importance of tight glycemic control

## 2019-10-08 NOTE — ASSESSMENT & PLAN NOTE
Lab Results   Component Value Date    HGBA1C 8 3 (H) 10/07/2019     Not ideally controlled, some of this is related to his it activity as well as some dietary indiscretion  Patient needs to be more compliant with following diabetic diet  He is aware of this  Increased dose of basaglar to 44 units twice daily  Continue on Januvia 100 mg once daily    Recheck diabetic parameters in 4 months

## 2019-10-08 NOTE — ASSESSMENT & PLAN NOTE
Follow-up with Rheumatology  Patient was given clearance to start disease modifying agents  Patient has Plaquenil Arava and Enbrel

## 2019-10-08 NOTE — PROGRESS NOTES
PT Re-Evaluation     Today's date: 10/8/2019  Patient name: Vilma Cooks  : 1965  MRN: [id number]  Referring provider: Stella Osorio DPM  Dx:   Encounter Diagnosis     ICD-10-CM    1  Charcot's joint of right foot M14 671    2  Closed multiple fractures of right foot with delayed healing, subsequent encounter S91 575G                   Assessment  Assessment details: Vilma Cooks  has been compliant with attending PT and home exercise program since initial eval   Candance Risser  has made improvements in objective data since initial eval but continues to have limitations compared to prior level of function  Candance Risser continues to have deficits in the above listed impairments and would benefit from additional skilled PT to address these deficits to return to prior level of function  Impairments: abnormal coordination, abnormal gait, abnormal muscle firing, abnormal muscle tone, abnormal or restricted ROM, abnormal movement, activity intolerance, impaired balance, impaired physical strength, lacks appropriate home exercise program, pain with function and weight-bearing intolerance  Barriers to therapy: Infection  3 surgeries on right foot  PIC line    Charcot foot    Understanding of Dx/Px/POC: good   Prognosis: good    Goals  Impairment Goals 4-6 weeks  - Decrease pain to <4/10 -Progressing  - Improve ankle AROM to functional -Progressing  - Increase knee strength to 4+/5 throughout -Progressing  - Increase hip strength to 4/5 throughout -Progressing  - Increase ankle strength to 4+/5 throughout -Progressing    Functional Goals 6-8 weeks  - Return to Prior Level of Function -Progressing  - Increase Functional Status Measure (FOTO) to: 37 -Progressing  - Patient will be independent with HEP -Progressing  - Patient will be able to squat functionally -Progressing  - Patient will be able to perform sit to stand functionally -Progressing  - Patient will be able to ascend and descend stairs functionally -Progressing  - Patient will be able to walk independently -Progressing      Plan  Patient would benefit from: skilled PT  Referral necessary: No  Planned modality interventions: cryotherapy, electrical stimulation/Russian stimulation, H-Wave, TENS, thermotherapy: hydrocollator packs and unattended electrical stimulation  Planned therapy interventions: abdominal trunk stabilization, activity modification, ADL retraining, balance/weight bearing training, breathing training, body mechanics training, coordination, flexibility, functional ROM exercises, graded exercise, home exercise program, work reintegration, therapeutic training, therapeutic exercise, therapeutic activities, stretching, strengthening, self care, postural training, patient education, neuromuscular re-education, muscle pump exercises, motor coordination training, Heath taping, manual therapy, joint mobilization, IADL retraining, balance and gait training  Frequency: 2x week  Duration in visits: 16  Duration in weeks: 8  Treatment plan discussed with: patient, PTA and referring physician        Subjective Evaluation    Pain  Current pain ratin  At best pain ratin  At worst pain ratin  Location: Right heel pain    Patient Goals  Patient goal: Get back to work and hiking   -Progressing        Patient reports that he feels like he is about 50% improved since starting PT  He notes that he feels like he is getting some feeling back in his foot  WORK: he is not working currently  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, 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  He reports that he plans on returning to work, but getting help with the heavier stuff  He is not working currently  PAIN LOCATION/DESCRIPTORS:  Foot is mostly numb  He has pain in his heel and random pains  AGGRAVATING FACTORS:  Walking currently with walking boot and single axillary crutche    He has been able to put 95% weight on his foot with the boot on  Unable to raise onto toes  He is having difficulty with any weight bearing tasks  He reports he has difficulty walking without the crutch  States that his hip and knee really bother him  Reports that he stood a lot this weekend at a class reunion  He is getting his new shoes soon  He reports he has been told that he can wean out of the boot as able  Objective     Observations   Left Ankle/Foot   Positive for effusion and incision  Additional Observation Details  All incisions healing well  No redness  Active Range of Motion   Left Ankle/Foot   Dorsiflexion (ke): 0 degrees   Plantar flexion: 45 degrees   Inversion: 20 degrees   Eversion: 14 degrees     Right Ankle/Foot   Dorsiflexion (ke): 0 degrees with pain  Plantar flexion: 35 degrees   Inversion: 16 degrees   Eversion: 6 degrees     Swelling   Left Ankle/Foot   Metatarsal heads: 24 cm  Figure 8: 56 5 cm    Right Ankle/Foot   Metatarsal heads: 24 75 cm  Figure 8: 55 5 cm      Flowsheet Rows      Most Recent Value   PT/OT G-Codes   Current Score  32   Projected Score  43          Diagnosis: Charcot foot right, multiple fractures, 3 surgeries    Surgical fixation   Precautions: WBAT, advanced stage RA, PICC line   Manual Therapy 9/26/19 9/30/19 10/8/19     STM as tolerated (Very sensitive feet) Hold today Denis 113, PTA HJS, PT     PROM Hold today       RE   HJS, PT                     Exercise Diary         Ankle 4 way YTB 10x ea YTB 20x ea      Seated toe yoga- Toe F/E/Abd 15x ea 15x ea      gastroc stretch with strap [id number] 3x30 sec [id number]     Seated great toe stretch [id number] 3x30 sec HEP     Seated HR 20x 20x 20x      BAPs board 4 way 10x ea  10x ea SLOW     Hamstring stretch [id number] 3x30 sec [id number]     SKTC 20x       Seated DF stretch [id number]  [id number]     ABC's   2x     Spikey ball roll   2 mins              All below In boot:        - weight shifts side to side & F/B 20x ea 20x ea      -Mini squats 10x 10x - biodex % of WB 75%, 20x side to side 85%, 20x side to side      -Biodex: maze, LOS 1x ea 1x ea      - lateral steps at mirror                                                        Gait training Attempted 1 crutch  Patient unable secondary to pain without significant limp  Educated on decreasing the amount of use on the right crutch over the weekend  Able to walk with 1 crutch today  Patient instructed to begin trying to wean off the crutch  He is improving slowly toward long term goals                 Modalities

## 2019-10-10 ENCOUNTER — OFFICE VISIT (OUTPATIENT)
Dept: PHYSICAL THERAPY | Facility: CLINIC | Age: 54
End: 2019-10-10
Payer: COMMERCIAL

## 2019-10-10 DIAGNOSIS — S92.901G CLOSED MULTIPLE FRACTURES OF RIGHT FOOT WITH DELAYED HEALING, SUBSEQUENT ENCOUNTER: ICD-10-CM

## 2019-10-10 DIAGNOSIS — M14.671 CHARCOT'S JOINT OF RIGHT FOOT: Primary | ICD-10-CM

## 2019-10-10 PROCEDURE — 97140 MANUAL THERAPY 1/> REGIONS: CPT

## 2019-10-10 PROCEDURE — 97112 NEUROMUSCULAR REEDUCATION: CPT

## 2019-10-10 PROCEDURE — 97110 THERAPEUTIC EXERCISES: CPT

## 2019-10-10 NOTE — PROGRESS NOTES
Daily Note     Today's date: 10/10/2019  Patient name: David Galloway  : 1965  MRN: [id number]  Referring provider: Vee Yen DPM  Dx:   Encounter Diagnosis     ICD-10-CM    1  Charcot's joint of right foot M14 671    2  Closed multiple fractures of right foot with delayed healing, subsequent encounter S92 281G                   Subjective: Pt reports that he continues to walk with 1 crutch, and knows that he needs to go without, but his foot is very sensitive  Objective: See treatment diary below      Assessment: Tolerated treatment well  Pt able to perform progressions with no complaints of increased sx's  Pt continues to demonstrate tenderness during manual therapy, although it is noted he is able to tolerate increased pressure  Discussed slowly weaning off of the crutch with short distance walks inside his house  Pt was agreeable  Pt progressing slowly towards her goals  Plan: Continue per plan of care     Diagnosis: Charcot foot right, multiple fractures, 3 surgeries    Surgical fixation   Precautions: WBAT, advanced stage RA, PICC line   Manual Therapy 9/26/19 9/30/19 10/8/19 10/10/19    STM as tolerated (Very sensitive feet) Hold today [address], PTA HJS, PT TJH, PTa    PROM Hold today       RE   HJS, PT                     Exercise Diary         Ankle 4 way YTB 10x ea YTB 20x ea  RTB 15x ea    Seated toe yoga- Toe F/E/Abd 15x ea 15x ea      gastroc stretch with strap [id number] 3x30 sec 6m27jhw 3x30 sec    Seated great toe stretch 0n14uns 3x30 sec HEP HEP    Seated HR 20x 20x 20x  20x    BAPs board 4 way 10x ea  10x ea SLOW 15x ea    Hamstring stretch 9l08kog 3x30 sec 4b37vje 3x30 sec    SKTC 20x       Seated DF stretch 1g81gfa  4o40vsr 3x30 sec    ABC's   2x 2x    Spikey ball roll   2 mins  2 mins            All below In boot:        - weight shifts side to side & F/B 20x ea 20x ea  20x ea    -Mini squats 10x 10x  10x    - biodex % of WB 75%, 20x side to side 85%, 20x side to side -Biodex: HARLAN roberson 1x ea 1x ea      - lateral steps at mirror    3x                                                     Gait training Attempted 1 crutch  Patient unable secondary to pain without significant limp  Educated on decreasing the amount of use on the right crutch over the weekend  Able to walk with 1 crutch today  Patient instructed to begin trying to wean off the crutch  He is improving slowly toward long term goals                 Modalities

## 2019-10-15 ENCOUNTER — OFFICE VISIT (OUTPATIENT)
Dept: PHYSICAL THERAPY | Facility: CLINIC | Age: 54
End: 2019-10-15
Payer: COMMERCIAL

## 2019-10-15 DIAGNOSIS — S92.901G CLOSED MULTIPLE FRACTURES OF RIGHT FOOT WITH DELAYED HEALING, SUBSEQUENT ENCOUNTER: ICD-10-CM

## 2019-10-15 DIAGNOSIS — M14.671 CHARCOT'S JOINT OF RIGHT FOOT: Primary | ICD-10-CM

## 2019-10-15 PROCEDURE — 97112 NEUROMUSCULAR REEDUCATION: CPT

## 2019-10-15 PROCEDURE — 97140 MANUAL THERAPY 1/> REGIONS: CPT

## 2019-10-15 PROCEDURE — 97110 THERAPEUTIC EXERCISES: CPT

## 2019-10-15 NOTE — PROGRESS NOTES
Daily Note     Today's date: 10/15/2019  Patient name: Susan Guy  : 1965  MRN: [id number]  Referring provider: Kaylan Bright DPM  Dx:   Encounter Diagnosis     ICD-10-CM    1  Charcot's joint of right foot M14 671    2  Closed multiple fractures of right foot with delayed healing, subsequent encounter S99 141G                   Subjective: Pt attends therapy 20 mins late  Pt reports no changes  He states that things are about the same  Pt reports having some increased swelling yesterday after being more active over the weekend  Objective: See treatment diary below      Assessment: Tolerated treatment well  Pt is progressing slowly towards his goals  He continues with minimal tenderness along his heel/plantar fascia, which is slowly improving  He is able to put more weight on his RLE with boot donned during standing there ex  No complaints of increased sx's  Plan: Continue per plan of care     Diagnosis: Charcot foot right, multiple fractures, 3 surgeries    Surgical fixation   Precautions: WBAT, advanced stage RA, PICC line   Manual Therapy  9/30/19 10/8/19 10/10/19 10/15/19   STM as tolerated (Very sensitive feet)  Denis 113, PTA HJS, PT TJH, PTa TJH, PTA   PROM        RE   HJS, PT                     Exercise Diary         Ankle 4 way  YTB 20x ea  RTB 15x ea    Seated toe yoga- Toe F/E/Abd  15x ea      gastroc stretch with strap  3x30 sec [id number] 3x30 sec 3x30 sec   Seated great toe stretch  3x30 sec HEP HEP    Seated HR  20x 20x  20x 20x   BAPs board 4 way  10x ea SLOW 15x ea 15x ea   Hamstring stretch  3x30 sec [id number] 3x30 sec    SKTC        Seated DF stretch   2v62gca 3x30 sec    ABC's   2x 2x    Spikey ball roll   2 mins  2 mins            All below In boot:        - weight shifts side to side & F/B  20x ea  20x ea 20x ea   -Mini squats  10x  10x    - biodex % of WB  85%, 20x side to side      -Biodex: maze, LOS  1x ea      - lateral steps at mirror    3x  3x Gait training Attempted 1 crutch  Patient unable secondary to pain without significant limp  Educated on decreasing the amount of use on the right crutch over the weekend  Able to walk with 1 crutch today  Patient instructed to begin trying to wean off the crutch  He is improving slowly toward long term goals                 Modalities

## 2019-10-17 ENCOUNTER — OFFICE VISIT (OUTPATIENT)
Dept: PHYSICAL THERAPY | Facility: CLINIC | Age: 54
End: 2019-10-17
Payer: COMMERCIAL

## 2019-10-17 DIAGNOSIS — M14.671 CHARCOT'S JOINT OF RIGHT FOOT: Primary | ICD-10-CM

## 2019-10-17 DIAGNOSIS — S92.901G CLOSED MULTIPLE FRACTURES OF RIGHT FOOT WITH DELAYED HEALING, SUBSEQUENT ENCOUNTER: ICD-10-CM

## 2019-10-17 PROCEDURE — 97140 MANUAL THERAPY 1/> REGIONS: CPT

## 2019-10-17 PROCEDURE — 97112 NEUROMUSCULAR REEDUCATION: CPT

## 2019-10-17 PROCEDURE — 97110 THERAPEUTIC EXERCISES: CPT

## 2019-10-17 NOTE — PROGRESS NOTES
Daily Note     Today's date: 10/17/2019  Patient name: Tracy Van  : 1965  MRN: [id number]  Referring provider: Shanta Da Silva DPM  Dx:   Encounter Diagnosis     ICD-10-CM    1  Charcot's joint of right foot M14 671    2  Closed multiple fractures of right foot with delayed healing, subsequent encounter S92 207G                   Subjective: Pt reports that he is trying to walk around his house without any crutches  He states that his knee and hip are bothering him some because of the boot  Objective: See treatment diary below      Assessment: Tolerated treatment well  Pt unable to perform standing tandem stance with L lead due to boot  He has no complaints of increased pain with standing ex's  His gait is improving with no AD  Less tenderness noted with manual therapy  Pt is progressing slowly towards his long term goals  Plan: Continue per plan of care     Diagnosis: Charcot foot right, multiple fractures, 3 surgeries    Surgical fixation   Precautions: WBAT, advanced stage RA, PICC line   Manual Therapy 10/17/19  10/8/19 10/10/19 10/15/19   STM as tolerated (Very sensitive feet) Denis 113, PTA  HJS, PT TJH, PTa TJH, PTA   PROM        RE   HJS, PT                     Exercise Diary         Ankle 4 way RTB 20x ea   RTB 15x ea    Seated toe yoga- Toe F/E/Abd        gastroc stretch with strap 3x30 sec  [id number] 3x30 sec 3x30 sec      HEP HEP    Seated HR 20x  20x  20x 20x   BAPs board 4 way 20x ea and clck/counter 10x  SLOW 15x ea 15x ea   Hamstring stretch 3x30 sec  7r18mcr 3x30 sec    SKTC 15x       Seated DF stretch   0m89fsk 3x30 sec    ABC's 2x  2x 2x    Spikey ball roll   2 mins  2 mins            All below In boot:        - weight shifts side to side & F/B 20x ea   20x ea 20x ea   -Mini squats 15x   10x    - biodex % of WB        -Biodex: HARLAN roberson        - lateral steps at mirror 4x   3x  3x   Tandem stance R lead only  3x30 sec                                               Gait training   Able to walk with 1 crutch today  Patient instructed to begin trying to wean off the crutch  He is improving slowly toward long term goals                 Modalities

## 2019-10-22 ENCOUNTER — OFFICE VISIT (OUTPATIENT)
Dept: PHYSICAL THERAPY | Facility: CLINIC | Age: 54
End: 2019-10-22
Payer: COMMERCIAL

## 2019-10-22 DIAGNOSIS — M14.671 CHARCOT'S JOINT OF RIGHT FOOT: Primary | ICD-10-CM

## 2019-10-22 DIAGNOSIS — S92.901G CLOSED MULTIPLE FRACTURES OF RIGHT FOOT WITH DELAYED HEALING, SUBSEQUENT ENCOUNTER: ICD-10-CM

## 2019-10-22 PROCEDURE — 97140 MANUAL THERAPY 1/> REGIONS: CPT

## 2019-10-22 PROCEDURE — 97110 THERAPEUTIC EXERCISES: CPT | Performed by: PHYSICAL THERAPIST

## 2019-10-22 PROCEDURE — 97112 NEUROMUSCULAR REEDUCATION: CPT

## 2019-10-22 NOTE — PROGRESS NOTES
Daily Note     Today's date: 10/22/2019  Patient name: Dana Meza  : 1965  MRN: [id number]  Referring provider: Jocelyn Lowry DPM  Dx:   Encounter Diagnosis     ICD-10-CM    1  Charcot's joint of right foot M14 671    2  Closed multiple fractures of right foot with delayed healing, subsequent encounter S98 561G                   Subjective: Pt reports that he did some hiking over the weekend with his boot donned  He states that he didn't go real far, but it was difficult over the uneven surfaces  He attends therapy ambulating with no crutch  Objective: See treatment diary below      Assessment: Tolerated treatment well  Pt was able to perform strengthening progressions in boot with no complaints of increased sx's  He required cues for form and technique, and to perform movements slowly  Pt tolerates deeper pressure during manual therapy  He is progressing slowly towards his long term goals  Plan: Continue per plan of care     Diagnosis: Charcot foot right, multiple fractures, 3 surgeries    Surgical fixation   Precautions: WBAT, advanced stage RA, PICC line   Manual Therapy 10/17/19 10/22/19  10/10/19 10/15/19   STM as tolerated (Very sensitive feet) Denis 113, PTA TJH, PTA  TJH, PTa TJH, PTA   PROM        RE                        Exercise Diary         Ankle 4 way RTB 20x ea GTB 20x ea  RTB 15x ea    Seated toe yoga- Toe F/E/Abd        gastroc stretch with strap 3x30 sec 3x30 sec  3x30 sec 3x30 sec       HEP    Seated HR 20x 20x  20x 20x   BAPs board 4 way 20x ea and clck/counter 10x 20x ea and clck/counter 10x  15x ea 15x ea   Hamstring stretch 3x30 sec   3x30 sec    SKTC 15x       Seated DF stretch  3x30 sec  3x30 sec    ABC's 2x   2x    Spikey ball roll  2 mins  2 mins            All below In boot:        - weight shifts side to side & F/B 20x ea   20x ea 20x ea   -Mini squats 15x   10x    - biodex % of WB        -Biodex: maze, LOS        - lateral steps at mirror 4x 4x  3x  3x Tandem stance R lead only  3x30 sec NBOS foam balance EC  3x30 sec light HHA      Wobble board balance  2 mins ea way      Step ups  6" 15x ea      Leg press  85# 20x      SLB   2x30 sec ea              Gait training   Able to walk with 1 crutch today  Patient instructed to begin trying to wean off the crutch  He is improving slowly toward long term goals                 Modalities

## 2019-10-24 ENCOUNTER — OFFICE VISIT (OUTPATIENT)
Dept: PHYSICAL THERAPY | Facility: CLINIC | Age: 54
End: 2019-10-24
Payer: COMMERCIAL

## 2019-10-24 DIAGNOSIS — M14.671 CHARCOT'S JOINT OF RIGHT FOOT: Primary | ICD-10-CM

## 2019-10-24 DIAGNOSIS — S92.901G CLOSED MULTIPLE FRACTURES OF RIGHT FOOT WITH DELAYED HEALING, SUBSEQUENT ENCOUNTER: ICD-10-CM

## 2019-10-24 PROCEDURE — 97110 THERAPEUTIC EXERCISES: CPT

## 2019-10-24 PROCEDURE — 97140 MANUAL THERAPY 1/> REGIONS: CPT

## 2019-10-24 PROCEDURE — 97112 NEUROMUSCULAR REEDUCATION: CPT

## 2019-10-24 NOTE — PROGRESS NOTES
Daily Note     Today's date: 10/24/2019  Patient name: Skyla Rdz  : 1965  MRN: [id number]  Referring provider: Elisabeth Carvajal DPM  Dx:   Encounter Diagnosis     ICD-10-CM    1  Charcot's joint of right foot M14 671    2  Closed multiple fractures of right foot with delayed healing, subsequent encounter S92 091G                   Subjective: Pt attends therapy with shoe fitted with insert  He states that he was cleared by his surgeon to slowly increase his time in the shoe and he has no other restrictions  Objective: See treatment diary below      Assessment: Tolerated treatment well  Pt able to progress to standing in shoe with wt shifting with no complaints of increased sx's  He was able to safely ambulate with 1 crutch throughout clinic  Discussed with pt to continue to slowly increase his standing time in his shoe as directed by his surgeon  Pt reported understanding  Pt continues to have decreased tenderness during STM and tolerates increased pressure  He is progressing slowly towards his goals  Plan: Continue per plan of care       Diagnosis: Charcot foot right, multiple fractures, 3 surgeries  Surgical fixation   Precautions: WBAT, advanced stage RA, PICC line   Manual Therapy 10/17/19 10/22/19 10/24/19  10/15/19   STM as tolerated (Very sensitive feet) Torika 113, PTA TJH, PTA TJH, PTA  TJH, PTA   PROM        RE                        Exercise Diary         Ankle 4 way RTB 20x ea GTB 20x ea BTB 20x ea     Seated toe yoga- Toe F/E/Abd        gastroc stretch with strap 3x30 sec 3x30 sec 3x30 sec  3x30 sec           Seated HR 20x 20x 20x  20x   BAPs board 4 way 20x ea and clck/counter 10x 20x ea and clck/counter 10x 20x ea and clck/counter  10x  15x ea   Hamstring stretch 3x30 sec       SKTC 15x       Seated DF stretch  3x30 sec 3x30 sec     ABC's 2x       Spikey ball roll  2 mins 2 mins             All below In boot:   Out of boot:   In shoe     - weight shifts side to side & F/B 20x ea  20x ea  20x ea   -Mini squats 15x       - biodex % of WB        -Biodex: maze, LOS        - lateral steps at mirror 4x 4x   3x   Tandem stance R lead only  3x30 sec NBOS foam balance EC  3x30 sec light HHA      Wobble board balance  2 mins ea way      Step ups  6" 15x ea      Leg press  85# 20x      SLB   2x30 sec ea              Gait training   With 1 crutch with shoe donned             Modalities

## 2019-10-29 ENCOUNTER — OFFICE VISIT (OUTPATIENT)
Dept: PHYSICAL THERAPY | Facility: CLINIC | Age: 54
End: 2019-10-29
Payer: COMMERCIAL

## 2019-10-29 DIAGNOSIS — S92.901G CLOSED MULTIPLE FRACTURES OF RIGHT FOOT WITH DELAYED HEALING, SUBSEQUENT ENCOUNTER: ICD-10-CM

## 2019-10-29 DIAGNOSIS — M14.671 CHARCOT'S JOINT OF RIGHT FOOT: Primary | ICD-10-CM

## 2019-10-29 PROCEDURE — 97110 THERAPEUTIC EXERCISES: CPT | Performed by: PHYSICAL THERAPIST

## 2019-10-29 PROCEDURE — 97140 MANUAL THERAPY 1/> REGIONS: CPT | Performed by: PHYSICAL THERAPIST

## 2019-10-29 PROCEDURE — 97112 NEUROMUSCULAR REEDUCATION: CPT | Performed by: PHYSICAL THERAPIST

## 2019-10-29 NOTE — PROGRESS NOTES
Daily Note     Today's date: 10/29/2019  Patient name: Edel Looney  : 1965  MRN: [id number]  Referring provider: Milan Lopez DPM  Dx:   Encounter Diagnosis     ICD-10-CM    1  Charcot's joint of right foot M14 671    2  Closed multiple fractures of right foot with delayed healing, subsequent encounter Y24 705S                   Subjective: Patient reports that he has been able to walk without the AD  He reports his only complaint is that he feels like he has increased swelling under the ball of his foot (under the metatarsals) and that it feels "full"  Objective: See treatment diary below      Assessment: Tolerated treatment well  Patient exhibited good technique with therapeutic exercises  He was able to perform all exercises today without increased pain  He required extensive cues to perform all exercises without compensation  He is improving slowly toward long term goals  Plan: Continue per plan of care  Diagnosis: Charcot foot right, multiple fractures, 3 surgeries  Surgical fixation   Precautions: WBAT, advanced stage RA, PICC line, DO NOT DO STANDING HR   Manual Therapy 10/17/19 10/22/19 10/24/19 10/29/19    STM as tolerated (Very sensitive feet) Denis 113, PTA TJH, PTA TJH, PTA HJS, PT                                    Exercise Diary         Ankle 4 way RTB 20x ea GTB 20x ea BTB 20x ea HEP    Seated toe yoga- Toe F/E/Abd    D/c     gastroc stretch with strap 3x30 sec 3x30 sec 3x30 sec [id number]    Seated HR 20x 20x 20x 20x    BAPs board 4 way 20x ea and clck/counter 10x 20x ea and clck/counter 10x 20x ea and clck/counter  10x 10x ea    Hamstring stretch 3x30 sec   D/c     Seated DF stretch  3x30 sec 3x30 sec [id number]    Spikey ball roll  2 mins 2 mins 2 mins    Short foot    NV                                    All below In boot:   Out of boot:   In shoe     - weight shifts side to side & F/B 20x ea  20x ea D/c     -Mini squats 15x   15x    - lateral steps at mirror 4x 4x 4x    Tandem stance R lead only  3x30 sec NBOS foam balance EC  3x30 sec light HHA  NBOS foam balance EC  3x30 sec light HHA    Wobble board balance  2 mins ea way  2 mins ea     Step ups  6" 15x ea  6" 15x ea    Leg press  85# 20x  85# 20x     SLB   2x30 sec ea  [id number] ea            Gait training   With 1 crutch with shoe donned Gait training without an AD- heel to toe walking            Modalities

## 2019-10-31 ENCOUNTER — OFFICE VISIT (OUTPATIENT)
Dept: PHYSICAL THERAPY | Facility: CLINIC | Age: 54
End: 2019-10-31
Payer: COMMERCIAL

## 2019-10-31 DIAGNOSIS — M14.671 CHARCOT'S JOINT OF RIGHT FOOT: Primary | ICD-10-CM

## 2019-10-31 DIAGNOSIS — S92.901G CLOSED MULTIPLE FRACTURES OF RIGHT FOOT WITH DELAYED HEALING, SUBSEQUENT ENCOUNTER: ICD-10-CM

## 2019-10-31 PROCEDURE — 97112 NEUROMUSCULAR REEDUCATION: CPT

## 2019-10-31 PROCEDURE — 97110 THERAPEUTIC EXERCISES: CPT

## 2019-10-31 PROCEDURE — 97140 MANUAL THERAPY 1/> REGIONS: CPT

## 2019-10-31 NOTE — PROGRESS NOTES
Daily Note     Today's date: 10/31/2019  Patient name: Scarlett Raymond  : 1965  MRN: [id number]  Referring provider: Kareem Demarco DPM  Dx:   Encounter Diagnosis     ICD-10-CM    1  Charcot's joint of right foot M14 671    2  Closed multiple fractures of right foot with delayed healing, subsequent encounter S93 933G                   Subjective: Pt reports that he has had pain along the bottom of his foot with certain movements, but states that when he stops that movement the pain stops  Objective: See treatment diary below      Assessment: Tolerated treatment well  Pt was able to perform all strengthening exercises and progressions with no complaints of increased sx's  Encouraged pt to continue with his HEP, especially ankle tband and rolling a ball on his PF  Pt reported understanding  He is progressing nicely towards his long term goals  Plan: Continue per plan of care     Diagnosis: Charcot foot right, multiple fractures, 3 surgeries  Surgical fixation   Precautions: WBAT, advanced stage RA, PICC line, DO NOT DO STANDING HR   Manual Therapy  10/22/19 10/24/19 10/29/19 10/31/19   STM as tolerated (Very sensitive feet)  Denis 113, PTA TJH, PTA HJS, PT TJH, PTA                                   Exercise Diary         Ankle 4 way  GTB 20x ea BTB 20x ea HEP    Seated toe yoga- Toe F/E/Abd    D/c     gastroc stretch with strap  3x30 sec 3x30 sec [id number]    Seated HR  20x 20x 20x 20x   BAPs board 4 way  20x ea and clck/counter 10x 20x ea and clck/counter  10x 10x ea 10x ea   Hamstring stretch    D/c     Seated DF stretch  3x30 sec 3x30 sec [id number] 3x30 sec   Spikey ball roll  2 mins 2 mins 2 mins 2 mins   Short foot    NV                                    All below In boot:   Out of boot:   In shoe     - weight shifts side to side & F/B   20x ea D/c     -Mini squats    15x 15x   - lateral steps at mirror  4x  4x GTB at knees 4x   Tandem stance  NBOS foam balance EC  3x30 sec light HHA  NBOS foam balance EC  3x30 sec light HHA Tandem on foam 3x30 sec   Wobble board balance  2 mins ea way  2 mins ea  2 mins ea   Step ups  6" 15x ea  6" 15x ea 6" up and over 15x ea   Leg press  85# 20x  85# 20x  100# 20x   SLB   2x30 sec ea  [id number] ea 2x30 sec ea           Gait training   With 1 crutch with shoe donned Gait training without an AD- heel to toe walking            Modalities

## 2019-11-04 DIAGNOSIS — E11.42 TYPE 2 DIABETES MELLITUS WITH DIABETIC POLYNEUROPATHY, WITH LONG-TERM CURRENT USE OF INSULIN (HCC): ICD-10-CM

## 2019-11-04 DIAGNOSIS — J45.909 UNCOMPLICATED ASTHMA, UNSPECIFIED ASTHMA SEVERITY, UNSPECIFIED WHETHER PERSISTENT: ICD-10-CM

## 2019-11-04 DIAGNOSIS — Z79.4 TYPE 2 DIABETES MELLITUS WITH DIABETIC POLYNEUROPATHY, WITH LONG-TERM CURRENT USE OF INSULIN (HCC): ICD-10-CM

## 2019-11-04 RX ORDER — ALBUTEROL SULFATE 90 UG/1
2 AEROSOL, METERED RESPIRATORY (INHALATION) EVERY 4 HOURS PRN
Qty: 18 G | Refills: 1 | Status: SHIPPED | OUTPATIENT
Start: 2019-11-04 | End: 2019-12-30 | Stop reason: SDUPTHER

## 2019-11-04 RX ORDER — LANCETS
EACH MISCELLANEOUS
Qty: 100 EACH | Refills: 3 | Status: SHIPPED | OUTPATIENT
Start: 2019-11-04 | End: 2020-04-21

## 2019-11-05 ENCOUNTER — OFFICE VISIT (OUTPATIENT)
Dept: PHYSICAL THERAPY | Facility: CLINIC | Age: 54
End: 2019-11-05
Payer: COMMERCIAL

## 2019-11-05 DIAGNOSIS — M14.671 CHARCOT'S JOINT OF RIGHT FOOT: Primary | ICD-10-CM

## 2019-11-05 DIAGNOSIS — S92.901G CLOSED MULTIPLE FRACTURES OF RIGHT FOOT WITH DELAYED HEALING, SUBSEQUENT ENCOUNTER: ICD-10-CM

## 2019-11-05 PROCEDURE — 97112 NEUROMUSCULAR REEDUCATION: CPT | Performed by: PHYSICAL THERAPIST

## 2019-11-05 PROCEDURE — 97110 THERAPEUTIC EXERCISES: CPT | Performed by: PHYSICAL THERAPIST

## 2019-11-05 NOTE — PROGRESS NOTES
Daily Note     Today's date: 2019  Patient name: Tali Lr  : 1965  MRN: [id number]  Referring provider: Carmen Raygoza DPM  Dx:   Encounter Diagnosis     ICD-10-CM    1  Charcot's joint of right foot M14 671    2  Closed multiple fractures of right foot with delayed healing, subsequent encounter C42 531Y                   Subjective: Patient reports that he has been going up and down step ladders  He reports that he is having pain in his knee and hip on the right side  He was instructed to get a prescription from his MD for these body parts  He reports that he is really working on his diet  Objective: See treatment diary below      Assessment: Tolerated treatment well  Patient exhibited good technique with therapeutic exercises  He was able to tolerate all exercises today without increased pain  He is improving slowly toward long term goals  Plan: Potential discharge next visit  Diagnosis: Charcot foot right, multiple fractures, 3 surgeries  Surgical fixation   Precautions: WBAT, advanced stage RA, PICC line, DO NOT DO STANDING HR   Manual Therapy 11/5/19  10/24/19 10/29/19 BT10/31/19   STM as tolerated (Very sensitive feet) Hold manuals secondary to likely discharge on Thursday      Dunatimka 113, PTA HJS, PT Denis 113, PTA                                   Exercise Diary         Ankle 4 way BTB 10x ea  BTB 20x ea HEP    gastroc stretch with strap [id number]  3x30 sec [id number]    Seated HR 20x  20x 20x 20x   BAPs board 4 way 10x ea  20x ea and clck/counter  10x 10x ea 10x ea   Seated DF stretch 4k47ybp  3x30 sec 5b30dog 3x30 sec   Spikey ball roll 2 mins  2 mins 2 mins 2 mins   Short foot unable   NV                                            -Mini squats 20x   15x 15x   - lateral steps at mirror GTB 4x    4x GTB at knees 4x   Tandem stance on foam 7w33zie ea   NBOS foam balance EC  3x30 sec light HHA Tandem on foam 3x30 sec   Wobble board balance 2 mins ea   2 mins ea  2 mins ea Reason for Call:  Other call back    Detailed comments: patient Care giver, Shahriar. Would like a call back from regarding his mother. He need's documentation that his mother has alzheimer's. Please call. [phone]    Phone Number Patient can be reached at: [phone]    Best Time: anytime    Can we leave a detailed message on this number? YES    Call taken on 2/3/2017 at 1:58 PM by SHANNAN MURCIA     Step up and over 6 In 15xea   6" 15x ea 6" up and over 15x ea   Leg press hold   85# 20x  100# 20x   SLB  hold   [id number] ea 2x30 sec ea           Gait training   With 1 crutch with shoe donned Gait training without an AD- heel to toe walking            Modalities

## 2019-11-07 ENCOUNTER — EVALUATION (OUTPATIENT)
Dept: PHYSICAL THERAPY | Facility: CLINIC | Age: 54
End: 2019-11-07
Payer: COMMERCIAL

## 2019-11-07 DIAGNOSIS — M14.671 CHARCOT'S JOINT OF RIGHT FOOT: Primary | ICD-10-CM

## 2019-11-07 DIAGNOSIS — S92.901G CLOSED MULTIPLE FRACTURES OF RIGHT FOOT WITH DELAYED HEALING, SUBSEQUENT ENCOUNTER: ICD-10-CM

## 2019-11-07 PROCEDURE — 97110 THERAPEUTIC EXERCISES: CPT | Performed by: PHYSICAL THERAPIST

## 2019-11-07 PROCEDURE — 97140 MANUAL THERAPY 1/> REGIONS: CPT | Performed by: PHYSICAL THERAPIST

## 2019-11-07 PROCEDURE — 97112 NEUROMUSCULAR REEDUCATION: CPT | Performed by: PHYSICAL THERAPIST

## 2019-11-07 NOTE — PROGRESS NOTES
PT Discharge    Today's date: 2019  Patient name: Dana Meza  : 1965  MRN: [id number]  Referring provider: Jocelyn Lowry DPM  Dx:   Encounter Diagnosis     ICD-10-CM    1  Charcot's joint of right foot M14 [id number]    2  Closed multiple fractures of right foot with delayed healing, subsequent encounter [id number]                   Assessment  Assessment details: Dana Meza  has been compliant with attending PT and home exercise program since initial eval   Elba Carranza  has made improvements in objective data since initial evalulation and has achieved all goals  Patient reports having returned to their prior level of function  Patient provided with updated Home Exercise Program, all questions answered, and verbalized understanding, agreeing to plan of care  Thus it was mutually decided to discontinue this episode of care and transition to Home Exercise Program   He will return in 3 months if his foot is not making steady improvements  Barriers to therapy: Infection  3 surgeries on right foot  PIC line    Charcot foot    Understanding of Dx/Px/POC: good   Prognosis: good    Goals  Impairment Goals 4-6 weeks  - Decrease pain to <4/10 -MET  - Improve ankle AROM to functional -MET  - Increase knee strength to 4+/5 throughout -MET  - Increase hip strength to 4/5 throughout -MET  - Increase ankle strength to 4+/5 throughout - Progressing    Functional Goals 6-8 weeks  - Return to Prior Level of Function -Progressing  - Increase Functional Status Measure (FOTO) to: 37 -Progressing  - Patient will be independent with HEP -MET  - Patient will be able to squat functionally -MET  - Patient will be able to perform sit to stand functionally -MET- only with shoes on  - Patient will be able to ascend and descend stairs functionally -MET  - Patient will be able to walk independently -MET      Plan  Referral necessary: No  Planned therapy interventions: home exercise program  Treatment plan discussed with: patient        Subjective Evaluation    Pain  Current pain ratin  At best pain ratin  At worst pain ratin  Location: Right heel pain    Patient Goals  Patient goal: Get back to work and hiking   -Progressing        Patient reports that he feels like he is about 80% improved since starting PT  He notes that he feels like he is getting some feeling back in his foot  WORK: he is not working currently  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, 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  He reports that he plans on returning to work, but getting help with the heavier stuff  He is not working currently  PAIN LOCATION/DESCRIPTORS:  Foot is mostly numb, toes specifically, but reports that it is not as numb  He has pain in his heel and random pains  AGGRAVATING FACTORS:  Walking currently with shoe and orthotic  He has been able to put 100% weight on his foot with just the shoe on  Unable to raise onto toes- MD said to avoid this  States that his hip and knee really bother him  He may return for this in the future  Objective     Observations   Left Ankle/Foot   Positive for effusion and incision  Additional Observation Details  All incisions healed well  No redness          Active Range of Motion   Left Ankle/Foot   Dorsiflexion (ke): 0 degrees   Plantar flexion: 45 degrees   Inversion: 20 degrees   Eversion: 14 degrees     Right Ankle/Foot   Dorsiflexion (ke): 6 degrees with pain  Plantar flexion: 40 degrees   Inversion: 20 degrees   Eversion: 10 degrees     Strength/Myotome Testing     Left Hip   Planes of Motion   Flexion: 4  Extension: 4  Abduction: 4-    Right Hip   Planes of Motion   Flexion: 4  Extension: 4  Abduction: 4-    Left Knee   Flexion: 4+  Extension: 5    Right Knee   Flexion: 4+  Extension: 5    Right Ankle/Foot   Dorsiflexion: 5  Plantar flexion: 3+  Inversion: 4  Eversion: 4    Swelling   Left Ankle/Foot   Metatarsal heads: 24 cm  Figure 8: 56 5 cm    Right Ankle/Foot   Metatarsal heads: 25 5 cm  Figure 8: 60 5 cm    Ambulation   Weight-Bearing Status   Weight-Bearing Status (Left): weight-bearing as tolerated   Weight-Bearing Status (Right): weight-bearing as tolerated    Assistive device used: none    Additional Weight-Bearing Status Details  Only wearing orthotics and tennis shoes currently  Flowsheet Rows      Most Recent Value   PT/OT G-Codes   Current Score  49   Projected Score  43          Diagnosis: Charcot foot right, multiple fractures, 3 surgeries  Surgical fixation   Precautions: WBAT, advanced stage RA, PICC line, DO NOT DO STANDING HR   Manual Therapy 11/5/19 11/7/19  10/29/19 BT10/31/19   STM as tolerated (Very sensitive feet) Hold manuals secondary to likely discharge on Thursday  hold  HJS, PT [address], PTA                                   Exercise Diary   Patient insists he does not need a print out of his HEP  Ankle 4 way BTB 10x ea   HEP    gastroc stretch with strap [id number]   [id number]    Seated HR 20x   20x 20x   BAPs board 4 way 10x ea   10x ea 10x ea   Seated DF stretch [id number]   [id number] 3x30 sec   Spikey ball roll 2 mins   2 mins 2 mins   Short foot unable   NV                                            -Mini squats 20x   15x 15x   - lateral steps at mirror GTB 4x    4x GTB at knees 4x   Tandem stance on foam [id number] ea   NBOS foam balance EC  3x30 sec light HHA Tandem on foam 3x30 sec   Wobble board balance 2 mins ea   2 mins ea  2 mins ea   Step up and over 6 In 15xea   6" 15x ea 6" up and over 15x ea   Leg press hold   85# 20x  100# 20x   SLB  hold   [id number] ea 2x30 sec ea   Pt education  Reviewed HEP        Gait training   With 1 crutch with shoe donned Gait training without an AD- heel to toe walking            Modalities

## 2019-11-12 ENCOUNTER — APPOINTMENT (OUTPATIENT)
Dept: PHYSICAL THERAPY | Facility: CLINIC | Age: 54
End: 2019-11-12
Payer: COMMERCIAL

## 2019-11-14 ENCOUNTER — APPOINTMENT (OUTPATIENT)
Dept: PHYSICAL THERAPY | Facility: CLINIC | Age: 54
End: 2019-11-14
Payer: COMMERCIAL

## 2019-11-19 ENCOUNTER — APPOINTMENT (OUTPATIENT)
Dept: PHYSICAL THERAPY | Facility: CLINIC | Age: 54
End: 2019-11-19
Payer: COMMERCIAL

## 2019-11-21 ENCOUNTER — APPOINTMENT (OUTPATIENT)
Dept: PHYSICAL THERAPY | Facility: CLINIC | Age: 54
End: 2019-11-21
Payer: COMMERCIAL

## 2019-12-02 DIAGNOSIS — Z79.4 TYPE 2 DIABETES MELLITUS WITH DIABETIC POLYNEUROPATHY, WITH LONG-TERM CURRENT USE OF INSULIN (HCC): ICD-10-CM

## 2019-12-02 DIAGNOSIS — E13.9 DIABETES MELLITUS OF OTHER TYPE WITHOUT COMPLICATION, UNSPECIFIED WHETHER LONG TERM INSULIN USE (HCC): ICD-10-CM

## 2019-12-02 DIAGNOSIS — E11.9 TYPE 2 DIABETES MELLITUS WITHOUT COMPLICATION, WITHOUT LONG-TERM CURRENT USE OF INSULIN (HCC): ICD-10-CM

## 2019-12-02 DIAGNOSIS — E11.42 TYPE 2 DIABETES MELLITUS WITH DIABETIC POLYNEUROPATHY, WITH LONG-TERM CURRENT USE OF INSULIN (HCC): ICD-10-CM

## 2019-12-02 RX ORDER — BLOOD SUGAR DIAGNOSTIC
STRIP MISCELLANEOUS
Qty: 100 EACH | Refills: 3 | Status: SHIPPED | OUTPATIENT
Start: 2019-12-02 | End: 2020-04-21

## 2019-12-02 RX ORDER — LIRAGLUTIDE 6 MG/ML
INJECTION SUBCUTANEOUS
Qty: 9 ML | Refills: 4 | Status: SHIPPED | OUTPATIENT
Start: 2019-12-02 | End: 2020-04-21

## 2019-12-02 RX ORDER — LANCETS
EACH MISCELLANEOUS
Qty: 100 EACH | Refills: 3 | Status: SHIPPED | OUTPATIENT
Start: 2019-12-02 | End: 2020-02-12 | Stop reason: SDUPTHER

## 2019-12-02 RX ORDER — PEN NEEDLE, DIABETIC 32GX 5/32"
NEEDLE, DISPOSABLE MISCELLANEOUS
Qty: 100 EACH | Refills: 2 | Status: SHIPPED | OUTPATIENT
Start: 2019-12-02 | End: 2020-02-25

## 2019-12-02 RX ORDER — INSULIN GLARGINE 100 [IU]/ML
INJECTION, SOLUTION SUBCUTANEOUS
Qty: 40 ML | Refills: 1 | Status: SHIPPED | OUTPATIENT
Start: 2019-12-02 | End: 2020-02-12 | Stop reason: SDUPTHER

## 2019-12-30 DIAGNOSIS — E11.42 TYPE 2 DIABETES MELLITUS WITH DIABETIC POLYNEUROPATHY, WITH LONG-TERM CURRENT USE OF INSULIN (HCC): ICD-10-CM

## 2019-12-30 DIAGNOSIS — E11.9 TYPE 2 DIABETES MELLITUS WITHOUT COMPLICATION, WITHOUT LONG-TERM CURRENT USE OF INSULIN (HCC): ICD-10-CM

## 2019-12-30 DIAGNOSIS — J45.909 UNCOMPLICATED ASTHMA, UNSPECIFIED ASTHMA SEVERITY, UNSPECIFIED WHETHER PERSISTENT: ICD-10-CM

## 2019-12-30 DIAGNOSIS — E11.610 CHARCOT FOOT DUE TO DIABETES MELLITUS (HCC): ICD-10-CM

## 2019-12-30 DIAGNOSIS — I10 BENIGN ESSENTIAL HYPERTENSION: ICD-10-CM

## 2019-12-30 DIAGNOSIS — Z79.4 TYPE 2 DIABETES MELLITUS WITH DIABETIC POLYNEUROPATHY, WITH LONG-TERM CURRENT USE OF INSULIN (HCC): ICD-10-CM

## 2019-12-30 RX ORDER — BLOOD SUGAR DIAGNOSTIC
STRIP MISCELLANEOUS
Qty: 100 EACH | Refills: 3 | Status: SHIPPED | OUTPATIENT
Start: 2019-12-30 | End: 2020-02-12 | Stop reason: SDUPTHER

## 2019-12-30 RX ORDER — INSULIN GLARGINE 100 [IU]/ML
INJECTION, SOLUTION SUBCUTANEOUS
Qty: 40 ML | Refills: 1 | Status: SHIPPED | OUTPATIENT
Start: 2019-12-30 | End: 2020-02-12 | Stop reason: SDUPTHER

## 2019-12-30 RX ORDER — SITAGLIPTIN 100 MG/1
TABLET, FILM COATED ORAL
Qty: 90 TABLET | Refills: 1 | Status: SHIPPED | OUTPATIENT
Start: 2019-12-30 | End: 2020-06-18

## 2019-12-30 RX ORDER — CYCLOBENZAPRINE HCL 10 MG
TABLET ORAL
Qty: 90 TABLET | Refills: 3 | Status: SHIPPED | OUTPATIENT
Start: 2019-12-30 | End: 2020-04-21

## 2019-12-30 RX ORDER — ALBUTEROL SULFATE 90 UG/1
2 AEROSOL, METERED RESPIRATORY (INHALATION) EVERY 4 HOURS PRN
Qty: 18 G | Refills: 1 | Status: SHIPPED | OUTPATIENT
Start: 2019-12-30 | End: 2020-02-25

## 2020-02-06 DIAGNOSIS — E78.2 MIXED HYPERLIPIDEMIA: ICD-10-CM

## 2020-02-06 RX ORDER — ATORVASTATIN CALCIUM 40 MG/1
TABLET, FILM COATED ORAL
Qty: 90 TABLET | Refills: 1 | Status: SHIPPED | OUTPATIENT
Start: 2020-02-06 | End: 2020-08-12

## 2020-02-10 ENCOUNTER — TRANSCRIBE ORDERS (OUTPATIENT)
Dept: LAB | Facility: CLINIC | Age: 55
End: 2020-02-10

## 2020-02-10 ENCOUNTER — DOCTOR'S OFFICE (OUTPATIENT)
Dept: URBAN - METROPOLITAN AREA CLINIC 137 | Facility: CLINIC | Age: 55
Setting detail: OPHTHALMOLOGY
End: 2020-02-10
Payer: COMMERCIAL

## 2020-02-10 ENCOUNTER — APPOINTMENT (OUTPATIENT)
Dept: LAB | Facility: CLINIC | Age: 55
End: 2020-02-10
Payer: COMMERCIAL

## 2020-02-10 VITALS — HEIGHT: 60 IN

## 2020-02-10 DIAGNOSIS — E11.42 TYPE 2 DIABETES MELLITUS WITH DIABETIC POLYNEUROPATHY, WITH LONG-TERM CURRENT USE OF INSULIN (HCC): ICD-10-CM

## 2020-02-10 DIAGNOSIS — Z79.4 TYPE 2 DIABETES MELLITUS WITH DIABETIC POLYNEUROPATHY, WITH LONG-TERM CURRENT USE OF INSULIN (HCC): ICD-10-CM

## 2020-02-10 DIAGNOSIS — H52.03: ICD-10-CM

## 2020-02-10 DIAGNOSIS — I10 BENIGN ESSENTIAL HYPERTENSION: ICD-10-CM

## 2020-02-10 DIAGNOSIS — E78.2 MIXED HYPERLIPIDEMIA: ICD-10-CM

## 2020-02-10 DIAGNOSIS — H52.4: ICD-10-CM

## 2020-02-10 PROBLEM — E11.9 DIABETES TYPE 2 NO RETINOPATHY: Status: ACTIVE | Noted: 2017-01-06

## 2020-02-10 PROBLEM — H52.223 ASTIGMATISM, REGULAR; BOTH EYES: Status: ACTIVE | Noted: 2020-02-10

## 2020-02-10 PROBLEM — H25.013 CATARACT CORTICAL SENILE; BOTH EYES: Status: ACTIVE | Noted: 2018-01-09

## 2020-02-10 LAB
ALBUMIN SERPL BCP-MCNC: 3.7 G/DL (ref 3.5–5)
ALP SERPL-CCNC: 60 U/L (ref 46–116)
ALT SERPL W P-5'-P-CCNC: 44 U/L (ref 12–78)
ANION GAP SERPL CALCULATED.3IONS-SCNC: 8 MMOL/L (ref 4–13)
AST SERPL W P-5'-P-CCNC: 28 U/L (ref 5–45)
BASOPHILS # BLD AUTO: 0.07 THOUSANDS/ΜL (ref 0–0.1)
BASOPHILS NFR BLD AUTO: 1 % (ref 0–1)
BILIRUB SERPL-MCNC: 0.51 MG/DL (ref 0.2–1)
BUN SERPL-MCNC: 15 MG/DL (ref 5–25)
CALCIUM SERPL-MCNC: 8.9 MG/DL (ref 8.3–10.1)
CHLORIDE SERPL-SCNC: 103 MMOL/L (ref 100–108)
CHOLEST SERPL-MCNC: 153 MG/DL (ref 50–200)
CO2 SERPL-SCNC: 27 MMOL/L (ref 21–32)
CREAT SERPL-MCNC: 0.99 MG/DL (ref 0.6–1.3)
CREAT UR-MCNC: 217 MG/DL
EOSINOPHIL # BLD AUTO: 0.18 THOUSAND/ΜL (ref 0–0.61)
EOSINOPHIL NFR BLD AUTO: 3 % (ref 0–6)
ERYTHROCYTE [DISTWIDTH] IN BLOOD BY AUTOMATED COUNT: 14 % (ref 11.6–15.1)
EST. AVERAGE GLUCOSE BLD GHB EST-MCNC: 189 MG/DL
GFR SERPL CREATININE-BSD FRML MDRD: 87 ML/MIN/1.73SQ M
GLUCOSE P FAST SERPL-MCNC: 185 MG/DL (ref 65–99)
HBA1C MFR BLD: 8.2 % (ref 4.2–6.3)
HCT VFR BLD AUTO: 48.9 % (ref 36.5–49.3)
HDLC SERPL-MCNC: 39 MG/DL
HGB BLD-MCNC: 16.3 G/DL (ref 12–17)
IMM GRANULOCYTES # BLD AUTO: 0.06 THOUSAND/UL (ref 0–0.2)
IMM GRANULOCYTES NFR BLD AUTO: 1 % (ref 0–2)
LDLC SERPL CALC-MCNC: 67 MG/DL (ref 0–100)
LYMPHOCYTES # BLD AUTO: 1.66 THOUSANDS/ΜL (ref 0.6–4.47)
LYMPHOCYTES NFR BLD AUTO: 25 % (ref 14–44)
MCH RBC QN AUTO: 29.7 PG (ref 26.8–34.3)
MCHC RBC AUTO-ENTMCNC: 33.3 G/DL (ref 31.4–37.4)
MCV RBC AUTO: 89 FL (ref 82–98)
MICROALBUMIN UR-MCNC: 13.8 MG/L (ref 0–20)
MICROALBUMIN/CREAT 24H UR: 6 MG/G CREATININE (ref 0–30)
MONOCYTES # BLD AUTO: 1.02 THOUSAND/ΜL (ref 0.17–1.22)
MONOCYTES NFR BLD AUTO: 16 % (ref 4–12)
NEUTROPHILS # BLD AUTO: 3.59 THOUSANDS/ΜL (ref 1.85–7.62)
NEUTS SEG NFR BLD AUTO: 54 % (ref 43–75)
NONHDLC SERPL-MCNC: 114 MG/DL
NRBC BLD AUTO-RTO: 0 /100 WBCS
PLATELET # BLD AUTO: 198 THOUSANDS/UL (ref 149–390)
PMV BLD AUTO: 11.1 FL (ref 8.9–12.7)
POTASSIUM SERPL-SCNC: 4.4 MMOL/L (ref 3.5–5.3)
PROT SERPL-MCNC: 7.3 G/DL (ref 6.4–8.2)
RBC # BLD AUTO: 5.48 MILLION/UL (ref 3.88–5.62)
SODIUM SERPL-SCNC: 138 MMOL/L (ref 136–145)
TRIGL SERPL-MCNC: 235 MG/DL
TSH SERPL DL<=0.05 MIU/L-ACNC: 2.09 UIU/ML (ref 0.36–3.74)
WBC # BLD AUTO: 6.58 THOUSAND/UL (ref 4.31–10.16)

## 2020-02-10 PROCEDURE — 80061 LIPID PANEL: CPT

## 2020-02-10 PROCEDURE — 82570 ASSAY OF URINE CREATININE: CPT

## 2020-02-10 PROCEDURE — 80053 COMPREHEN METABOLIC PANEL: CPT

## 2020-02-10 PROCEDURE — 85025 COMPLETE CBC W/AUTO DIFF WBC: CPT

## 2020-02-10 PROCEDURE — 92014 COMPRE OPH EXAM EST PT 1/>: CPT | Performed by: OPTOMETRIST

## 2020-02-10 PROCEDURE — 84443 ASSAY THYROID STIM HORMONE: CPT

## 2020-02-10 PROCEDURE — 82043 UR ALBUMIN QUANTITATIVE: CPT

## 2020-02-10 PROCEDURE — 36415 COLL VENOUS BLD VENIPUNCTURE: CPT

## 2020-02-10 PROCEDURE — 83036 HEMOGLOBIN GLYCOSYLATED A1C: CPT

## 2020-02-10 ASSESSMENT — REFRACTION_MANIFEST
OD_VA1: 20/20
OD_CYLINDER: -0.25
OD_ADD: +2.00
OU_VA: 20/20
OS_VA3: 20/
OD_AXIS: 150
OS_VA1: 20/20-
OD_SPHERE: +1.75
OS_VA2: 20/25(J1)
OD_VA2: 20/25(J1)
OS_AXIS: 115
OS_SPHERE: +2.00
OD_VA3: 20/
OS_ADD: +2.00
OS_CYLINDER: -0.50

## 2020-02-10 ASSESSMENT — REFRACTION_CURRENTRX
OS_VPRISM_DIRECTION: PROGS
OD_AXIS: 150
OD_ADD: +2.00
OD_CYLINDER: -0.25
OD_OVR_VA: 20/
OS_SPHERE: +2.25
OS_CYLINDER: -0.50
OS_AXIS: 116
OD_VPRISM_DIRECTION: PROGS
OD_SPHERE: +1.75
OS_ADD: +2.00
OS_OVR_VA: 20/

## 2020-02-10 ASSESSMENT — REFRACTION_AUTOREFRACTION
OD_SPHERE: +2.00
OD_AXIS: 136
OS_SPHERE: +2.75
OD_CYLINDER: -0.50
OS_CYLINDER: -1.25
OS_AXIS: 080

## 2020-02-10 ASSESSMENT — SPHEQUIV_DERIVED
OD_SPHEQUIV: 1.75
OS_SPHEQUIV: 2.125
OD_SPHEQUIV: 1.625
OS_SPHEQUIV: 1.75

## 2020-02-10 ASSESSMENT — VISUAL ACUITY
OD_BCVA: 20/25
OS_BCVA: 20/30-2

## 2020-02-10 ASSESSMENT — CONFRONTATIONAL VISUAL FIELD TEST (CVF)
OS_FINDINGS: FULL
OD_FINDINGS: FULL

## 2020-02-12 ENCOUNTER — OFFICE VISIT (OUTPATIENT)
Dept: FAMILY MEDICINE CLINIC | Facility: CLINIC | Age: 55
End: 2020-02-12
Payer: COMMERCIAL

## 2020-02-12 VITALS
OXYGEN SATURATION: 98 % | SYSTOLIC BLOOD PRESSURE: 140 MMHG | WEIGHT: 253.8 LBS | RESPIRATION RATE: 18 BRPM | HEART RATE: 88 BPM | BODY MASS INDEX: 34.38 KG/M2 | HEIGHT: 72 IN | DIASTOLIC BLOOD PRESSURE: 76 MMHG

## 2020-02-12 DIAGNOSIS — Z12.5 PROSTATE CANCER SCREENING: ICD-10-CM

## 2020-02-12 DIAGNOSIS — E78.2 MIXED HYPERLIPIDEMIA: ICD-10-CM

## 2020-02-12 DIAGNOSIS — R10.11 PAIN, ABDOMINAL, RUQ: ICD-10-CM

## 2020-02-12 DIAGNOSIS — I10 BENIGN ESSENTIAL HYPERTENSION: ICD-10-CM

## 2020-02-12 DIAGNOSIS — Z12.11 COLON CANCER SCREENING: Primary | ICD-10-CM

## 2020-02-12 DIAGNOSIS — E55.9 VITAMIN D DEFICIENCY: ICD-10-CM

## 2020-02-12 DIAGNOSIS — E11.610 TYPE 2 DIABETES MELLITUS WITH DIABETIC NEUROPATHIC ARTHROPATHY, WITH LONG-TERM CURRENT USE OF INSULIN (HCC): ICD-10-CM

## 2020-02-12 DIAGNOSIS — E11.9 TYPE 2 DIABETES MELLITUS WITHOUT COMPLICATION, WITHOUT LONG-TERM CURRENT USE OF INSULIN (HCC): ICD-10-CM

## 2020-02-12 DIAGNOSIS — E11.610 CHARCOT FOOT DUE TO DIABETES MELLITUS (HCC): ICD-10-CM

## 2020-02-12 DIAGNOSIS — M05.9 RHEUMATOID ARTHRITIS WITH POSITIVE RHEUMATOID FACTOR, INVOLVING UNSPECIFIED SITE (HCC): ICD-10-CM

## 2020-02-12 DIAGNOSIS — I10 ESSENTIAL HYPERTENSION: ICD-10-CM

## 2020-02-12 DIAGNOSIS — Z79.4 TYPE 2 DIABETES MELLITUS WITH DIABETIC NEUROPATHIC ARTHROPATHY, WITH LONG-TERM CURRENT USE OF INSULIN (HCC): ICD-10-CM

## 2020-02-12 PROCEDURE — 3077F SYST BP >= 140 MM HG: CPT | Performed by: FAMILY MEDICINE

## 2020-02-12 PROCEDURE — 4010F ACE/ARB THERAPY RXD/TAKEN: CPT | Performed by: FAMILY MEDICINE

## 2020-02-12 PROCEDURE — 4004F PT TOBACCO SCREEN RCVD TLK: CPT | Performed by: FAMILY MEDICINE

## 2020-02-12 PROCEDURE — 99215 OFFICE O/P EST HI 40 MIN: CPT | Performed by: FAMILY MEDICINE

## 2020-02-12 PROCEDURE — 3008F BODY MASS INDEX DOCD: CPT | Performed by: FAMILY MEDICINE

## 2020-02-12 PROCEDURE — 3078F DIAST BP <80 MM HG: CPT | Performed by: FAMILY MEDICINE

## 2020-02-12 RX ORDER — LOSARTAN POTASSIUM AND HYDROCHLOROTHIAZIDE 12.5; 1 MG/1; MG/1
1 TABLET ORAL DAILY
Qty: 90 TABLET | Refills: 1 | Status: SHIPPED | OUTPATIENT
Start: 2020-02-12 | End: 2020-05-15

## 2020-02-12 NOTE — ASSESSMENT & PLAN NOTE
Lab Results   Component Value Date    HGBA1C 8 2 (H) 02/10/2020     Patient is status post extensive reconstruction    Follow-up with podiatry as scheduled

## 2020-02-12 NOTE — ASSESSMENT & PLAN NOTE
Hypertension is not ideally controlled, increased dose of losartan to 100 mg once daily and continue hydrochlorothiazide 12 5 mg once daily  Re-evaluate in 4 months    Weight loss would be beneficial   Patient is aware

## 2020-02-12 NOTE — PROGRESS NOTES
Subjective:      Patient ID: David Galloway  is a 48 y o  male  Patient presents with his wife for follow-up of chronic conditions  TSH 2 094, normal CBC, normal CMP with the exception of fasting glucose of 185, hemoglobin A1c of 8 2% which is down from 8 3% months ago total cholesterol 153, triglycerides 235, HDL 39, LDL 67 urine shows trace amount of microalbumin  Patient still does have intermittent pain in his right foot  Apparently he still has to other fractures that the podiatrist is watching closely  He is able to bear weight on his right foot  He is thankful that his right foot was not amputated  He did recently have diabetic eye examination this past Monday  Overall the patient feels well  He has been following up with Rheumatology as well as Oncology  Patient also still gets dull pain in the right upper quadrant as well as foul taste with belching  Abdominal pain is not always associated with eating  He did have complaint of this almost 4 years ago  Biliary ultrasound was negative  He was supposed to go for HIDA scan but that was at the time that 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 so he never went for HIDA scan    Patient is also longstanding diabetic with complications      Past Medical History:   Diagnosis Date    At risk for falls     Cataract     giacomo    COPD (chronic obstructive pulmonary disease) (HCC)     Hyperlipidemia     Kidney stone     Neuropathy     Rheumatoid arthritis (HCC)     Seasonal allergies     Uses wheelchair     and crutches- NWB RLE    Wears glasses        Family History   Problem Relation Age of Onset    Diabetes Mother     Stroke Mother     Mental illness Mother     Diabetes Father     Stroke Father     Alcohol abuse Brother     Coronary artery disease Family         Age 51-55    Diabetes type II Family     Heart disease Family        Past Surgical History:   Procedure Laterality Date    APPENDECTOMY      CLOSED REDUCTION FOOT DISLOCATION Right 2/12/2019    Procedure: C/R FRACTURE;  Surgeon: Floyd Branham DPM;  [facility];  Service: Podiatry    FOOT SURGERY Right 07/2019    Removal of the bone graft and cleaned out infection, and placed new bone graft    MO EXC SKIN MALIG <0 5 CM REMAINDER BODY N/A 3/28/2018    Procedure: EXCISION WIDE LESION HEAD/FACIAL/NECK;  Surgeon: Denver Romance, MD;  [facility];  Service: Surgical Oncology    MO GASTROCNEMIUS RECESSION Right 2/12/2019    Procedure: ENDO GASTROC RECESSION, APPLICATION OF EXTERNAL FIXATOR;  Surgeon: Floyd Branham DPM;  [facility];  Service: Podiatry    MO REMOVE EXTERN BONE FIX DEV W ANESTH Right 4/18/2019    Procedure: FRAME REMOVAL HARDWARE FOOT WITH APPLICATION OF GRAFT;  Surgeon: Floyd Branham DPM;  [facility];  Service: Podiatry    SKIN BIOPSY      scalp   [address]        reports that he has been smoking cigarettes  He has a 40 00 pack-year smoking history  He has never used smokeless tobacco  He reports that he drinks alcohol  He reports that he does not use drugs        Current Outpatient Medications:     ACCU-CHEK FASTCLIX LANCETS MISC, TEST BLOOD SUGAR FOUR TIMES A DAY BEFORE MEALS AND BEDTIME, Disp: 100 each, Rfl: 3    ACCU-CHEK GUIDE test strip, TEST BEFORE MEALS AND AT BEDTIME, Disp: 100 each, Rfl: 3    albuterol (PROVENTIL HFA,VENTOLIN HFA) 90 mcg/act inhaler, INHALE 2 PUFFS EVERY 4 (FOUR) HOURS AS NEEDED FOR WHEEZING, Disp: 18 g, Rfl: 1    atorvastatin (LIPITOR) 40 mg tablet, TAKE 1 TABLET AT BEDTIME , Disp: 90 tablet, Rfl: 1    BD PEN NEEDLE WILFREDO U/F 32G X 4 MM MISC, INJECT UNDER THE SKIN 3 (THREE) TIMES A DAY, Disp: 100 each, Rfl: 2    cyclobenzaprine (FLEXERIL) 10 mg tablet, TAKE 1 TABLET (10 MG) BY MOUTH 3 (THREE) TIMES A DAY AS NEEDED FOR MUSCLE SPASMS, Disp: 90 tablet, Rfl: 3    ENBREL SURECLICK 50 MG/ML injection, 50 mg once a week Friday's- on hold for surgery, Disp: , Rfl:    ergocalciferol (VITAMIN D2) 50,000 units, Take 50,000 Units by mouth once a week , Disp: , Rfl:     fluticasone (FLONASE) 50 mcg/act nasal spray, USE 2 SPRAYS IN EACH NOSTRIL ONCE DAILY, Disp: 16 g, Rfl: 11    hydroxychloroquine (PLAQUENIL) 200 mg tablet, Take 200 mg by mouth 2 (two) times a day  , Disp: , Rfl:     insulin glargine (BASAGLAR KWIKPEN) 100 units/mL injection pen, Inject 50 Units under the skin every 12 (twelve) hours, Disp: 50 mL, Rfl: 1    JANUVIA 100 MG tablet, TAKE ONE TABLET EVERY DAY, Disp: 90 tablet, Rfl: 1    leflunomide (ARAVA) 20 MG tablet, Take 1 tablet by mouth daily with dinner On hold for surgery, Disp: , Rfl:     naproxen (NAPROSYN) 500 mg tablet, Take 1 tablet by mouth every 12 (twelve) hours, Disp: , Rfl:     ULTICARE MICRO PEN NEEDLES 32G X 4 MM MISC, INJECT UNDER THE SKIN 3 (THREE) TIMES A DAY, Disp: 100 each, Rfl: 3    VICTOZA injection, INJECT 1 8 MG DAILY, Disp: 9 mL, Rfl: 4    losartan-hydrochlorothiazide (HYZAAR) 100-12 5 MG per tablet, Take 1 tablet by mouth daily, Disp: 90 tablet, Rfl: 1    The following portions of the patient's history were reviewed and updated as appropriate: allergies, current medications, past family history, past medical history, past social history, past surgical history and problem list     Review of Systems   Constitutional: Negative  Negative for unexpected weight change  HENT: Negative  Eyes: Positive for visual disturbance  Respiratory: Negative  Cardiovascular: Negative  Gastrointestinal: Positive for abdominal pain  Endocrine: Negative  Genitourinary: Negative  Musculoskeletal: Positive for arthralgias, joint swelling and myalgias  Negative for gait problem  Skin: Negative  Allergic/Immunologic: Negative  Neurological: Positive for weakness and numbness (b/l feet)  Hematological: Negative  Psychiatric/Behavioral: Negative  All other systems reviewed and are negative            Objective:    /76 Pulse 88   Resp 18   Ht 6' (1 829 m)   Wt 115 kg (253 lb 12 8 oz)   SpO2 98%   BMI 34 42 kg/m²      Physical Exam   Constitutional: He is oriented to person, place, and time  He appears well-developed and well-nourished  Obese   HENT:   Head: Normocephalic and atraumatic  Right Ear: External ear normal    Left Ear: External ear normal    Nose: Nose normal    Mouth/Throat: Oropharynx is clear and moist    Eyes: Pupils are equal, round, and reactive to light  Conjunctivae and EOM are normal    Neck: Normal range of motion  Neck supple  Cardiovascular: Normal rate, regular rhythm and normal heart sounds  No murmur heard  Pulmonary/Chest: Effort normal and breath sounds normal    Abdominal: Soft  Bowel sounds are normal    Musculoskeletal: He exhibits tenderness and deformity (Rheumatic nodules)  He exhibits no edema  I did not have the patient remove his shoes as he does see Podiatry on a regular basis   Neurological: He is alert and oriented to person, place, and time  Psychiatric: He has a normal mood and affect  His behavior is normal  Judgment and thought content normal    Nursing note and vitals reviewed          Recent Results (from the past 1008 hour(s))   CBC and differential    Collection Time: 02/10/20  8:34 AM   Result Value Ref Range    WBC 6 58 4 31 - 10 16 Thousand/uL    RBC 5 48 3 88 - 5 62 Million/uL    Hemoglobin 16 3 12 0 - 17 0 g/dL    Hematocrit 48 9 36 5 - 49 3 %    MCV 89 82 - 98 fL    MCH 29 7 26 8 - 34 3 pg    MCHC 33 3 31 4 - 37 4 g/dL    RDW 14 0 11 6 - 15 1 %    MPV 11 1 8 9 - 12 7 fL    Platelets 342 899 - 892 Thousands/uL    nRBC 0 /100 WBCs    Neutrophils Relative 54 43 - 75 %    Immat GRANS % 1 0 - 2 %    Lymphocytes Relative 25 14 - 44 %    Monocytes Relative 16 (H) 4 - 12 %    Eosinophils Relative 3 0 - 6 %    Basophils Relative 1 0 - 1 %    Neutrophils Absolute 3 59 1 85 - 7 62 Thousands/µL    Immature Grans Absolute 0 06 0 00 - 0 20 Thousand/uL    Lymphocytes Absolute 1 66 0 60 - 4 47 Thousands/µL    Monocytes Absolute 1 02 0 17 - 1 22 Thousand/µL    Eosinophils Absolute 0 18 0 00 - 0 61 Thousand/µL    Basophils Absolute 0 07 0 00 - 0 10 Thousands/µL   Comprehensive metabolic panel    Collection Time: 02/10/20  8:34 AM   Result Value Ref Range    Sodium 138 136 - 145 mmol/L    Potassium 4 4 3 5 - 5 3 mmol/L    Chloride 103 100 - 108 mmol/L    CO2 27 21 - 32 mmol/L    ANION GAP 8 4 - 13 mmol/L    BUN 15 5 - 25 mg/dL    Creatinine 0 99 0 60 - 1 30 mg/dL    Glucose, Fasting 185 (H) 65 - 99 mg/dL    Calcium 8 9 8 3 - 10 1 mg/dL    AST 28 5 - 45 U/L    ALT 44 12 - 78 U/L    Alkaline Phosphatase 60 46 - 116 U/L    Total Protein 7 3 6 4 - 8 2 g/dL    Albumin 3 7 3 5 - 5 0 g/dL    Total Bilirubin 0 51 0 20 - 1 00 mg/dL    eGFR 87 ml/min/1 73sq m   Hemoglobin A1C    Collection Time: 02/10/20  8:34 AM   Result Value Ref Range    Hemoglobin A1C 8 2 (H) 4 2 - 6 3 %     mg/dl   Lipid panel    Collection Time: 02/10/20  8:34 AM   Result Value Ref Range    Cholesterol 153 50 - 200 mg/dL    Triglycerides 235 (H) <=150 mg/dL    HDL, Direct 39 (L) >=40 mg/dL    LDL Calculated 67 0 - 100 mg/dL    Non-HDL-Chol (CHOL-HDL) 114 mg/dl   Microalbumin / creatinine urine ratio    Collection Time: 02/10/20  8:34 AM   Result Value Ref Range    Creatinine, Ur 217 0 mg/dL    Microalbum  ,U,Random 13 8 0 0 - 20 0 mg/L    Microalb Creat Ratio 6 0 - 30 mg/g creatinine   TSH, 3rd generation with Free T4 reflex    Collection Time: 02/10/20  8:34 AM   Result Value Ref Range    TSH 3RD GENERATON 2 094 0 358 - 3 740 uIU/mL       Assessment/Plan:    Type 2 diabetes mellitus with diabetic neuropathic arthropathy, with long-term current use of insulin (HCC)    Lab Results   Component Value Date    HGBA1C 8 2 (H) 02/10/2020     Slight improvement but still not ideally controlled  Patient needs better glycemic control of especially given his history of diabetic complications    Increased dose of basal insulin to 50 units twice daily, continue on Victoza as well as Januvia  Repeat diabetic parameters in 4 months    Charcot foot due to diabetes mellitus [facility])    Lab Results   Component Value Date    HGBA1C 8 2 (H) 02/10/2020     Patient is status post extensive reconstruction  Follow-up with podiatry as scheduled    Benign essential hypertension  Hypertension is not ideally controlled, increased dose of losartan to 100 mg once daily and continue hydrochlorothiazide 12 5 mg once daily  Re-evaluate in 4 months  Weight loss would be beneficial   Patient is aware    Rheumatoid arthritis [facility])  Follow-up with Rheumatology as scheduled  Rheumatology address his medications    Pain, abdominal, RUQ  Right upper quadrant ultrasound was negative in 2016  HIDA scan was ordered in 2018  Somehow that is still an active order  Patient should have that order performed  The may require him to have repeat ultrasound  Does not sound as though his pain is biliary in nature  He is getting found paced with belching  I would suspect that he likely has diabetic gastroparesis  If HIDA scan is negative then he will need to go for gastric emptying study    Hyperlipidemia  Hyperlipidemia is very well controlled on current dose of atorvastatin  Continue same  Repeat lipid profile in 4 months    Prostate cancer screening  PSA with next set of labs    Vitamin D deficiency  Therapeutic vitamin-D level on supplementation  Check vitamin D level once again in 4 months          Problem List Items Addressed This Visit        Endocrine    Charcot foot due to diabetes mellitus ([address] )       Lab Results   Component Value Date    HGBA1C 8 2 (H) 02/10/2020     Patient is status post extensive reconstruction    Follow-up with podiatry as scheduled         Relevant Medications    insulin glargine (BASAGLAR KWIKPEN) 100 units/mL injection pen    Type 2 diabetes mellitus with diabetic neuropathic arthropathy, with long-term current use of insulin [facility])       Lab Results   Component Value Date    HGBA1C 8 2 (H) 02/10/2020     Slight improvement but still not ideally controlled  Patient needs better glycemic control of especially given his history of diabetic complications  Increased dose of basal insulin to 50 units twice daily, continue on Victoza as well as Januvia  Repeat diabetic parameters in 4 months         Relevant Medications    insulin glargine (BASAGLAR KWIKPEN) 100 units/mL injection pen       Cardiovascular and Mediastinum    Benign essential hypertension     Hypertension is not ideally controlled, increased dose of losartan to 100 mg once daily and continue hydrochlorothiazide 12 5 mg once daily  Re-evaluate in 4 months  Weight loss would be beneficial   Patient is aware         Relevant Medications    losartan-hydrochlorothiazide (HYZAAR) 100-12 5 MG per tablet       Musculoskeletal and Integument    Rheumatoid arthritis ([facility] [address] )     Follow-up with Rheumatology as scheduled  Rheumatology address his medications            Other    Colon cancer screening - Primary    Relevant Orders    Ambulatory referral to Gastroenterology    Hyperlipidemia     Hyperlipidemia is very well controlled on current dose of atorvastatin  Continue same  Repeat lipid profile in 4 months         Relevant Orders    Lipid panel    Pain, abdominal, RUQ     Right upper quadrant ultrasound was negative in 2016  HIDA scan was ordered in 2018  Somehow that is still an active order  Patient should have that order performed  The may require him to have repeat ultrasound  Does not sound as though his pain is biliary in nature  He is getting found paced with belching  I would suspect that he likely has diabetic gastroparesis    If HIDA scan is negative then he will need to go for gastric emptying study         Prostate cancer screening     PSA with next set of labs         Relevant Orders    PSA, Total Screen    Vitamin D deficiency     Therapeutic vitamin-D level on supplementation  Check vitamin D level once again in 4 months           Other Visit Diagnoses     Essential hypertension        Relevant Medications    losartan-hydrochlorothiazide (HYZAAR) 100-12 5 MG per tablet    Other Relevant Orders    CBC and differential    TSH, 3rd generation with Free T4 reflex          BMI Counseling: Body mass index is 34 42 kg/m²  The BMI is above normal  Nutrition recommendations include moderation in carbohydrate intake  I have spent 45 minutes with Patient and family today in which greater than 50% of this time was spent in counseling/coordination of care regarding Diagnostic results, Prognosis, Risks and benefits of tx options, Intructions for management, Patient and family education, Importance of tx compliance, Risk factor reductions and Impressions

## 2020-02-12 NOTE — ASSESSMENT & PLAN NOTE
Hyperlipidemia is very well controlled on current dose of atorvastatin  Continue same    Repeat lipid profile in 4 months

## 2020-02-12 NOTE — ASSESSMENT & PLAN NOTE
Lab Results   Component Value Date    HGBA1C 8 2 (H) 02/10/2020     Slight improvement but still not ideally controlled  Patient needs better glycemic control of especially given his history of diabetic complications  Increased dose of basal insulin to 50 units twice daily, continue on Victoza as well as Januvia    Repeat diabetic parameters in 4 months

## 2020-02-12 NOTE — ASSESSMENT & PLAN NOTE
Right upper quadrant ultrasound was negative in 2016  HIDA scan was ordered in 2018  Somehow that is still an active order  Patient should have that order performed  The may require him to have repeat ultrasound  Does not sound as though his pain is biliary in nature  He is getting found paced with belching  I would suspect that he likely has diabetic gastroparesis    If HIDA scan is negative then he will need to go for gastric emptying study

## 2020-02-19 LAB
LEFT EYE DIABETIC RETINOPATHY: NORMAL
RIGHT EYE DIABETIC RETINOPATHY: NORMAL

## 2020-02-25 DIAGNOSIS — E13.9 DIABETES MELLITUS OF OTHER TYPE WITHOUT COMPLICATION, UNSPECIFIED WHETHER LONG TERM INSULIN USE (HCC): ICD-10-CM

## 2020-02-25 DIAGNOSIS — J45.909 UNCOMPLICATED ASTHMA, UNSPECIFIED ASTHMA SEVERITY, UNSPECIFIED WHETHER PERSISTENT: ICD-10-CM

## 2020-02-25 DIAGNOSIS — E11.9 TYPE 2 DIABETES MELLITUS WITHOUT COMPLICATION, WITHOUT LONG-TERM CURRENT USE OF INSULIN (HCC): ICD-10-CM

## 2020-02-25 DIAGNOSIS — E11.42 TYPE 2 DIABETES MELLITUS WITH DIABETIC POLYNEUROPATHY, WITH LONG-TERM CURRENT USE OF INSULIN (HCC): ICD-10-CM

## 2020-02-25 DIAGNOSIS — Z79.4 TYPE 2 DIABETES MELLITUS WITH DIABETIC POLYNEUROPATHY, WITH LONG-TERM CURRENT USE OF INSULIN (HCC): ICD-10-CM

## 2020-02-25 RX ORDER — PEN NEEDLE, DIABETIC 32GX 5/32"
NEEDLE, DISPOSABLE MISCELLANEOUS
Qty: 100 EACH | Refills: 2 | Status: SHIPPED | OUTPATIENT
Start: 2020-02-25 | End: 2020-06-18 | Stop reason: SDUPTHER

## 2020-02-25 RX ORDER — ALBUTEROL SULFATE 90 UG/1
2 AEROSOL, METERED RESPIRATORY (INHALATION) EVERY 4 HOURS PRN
Qty: 18 G | Refills: 1 | Status: SHIPPED | OUTPATIENT
Start: 2020-02-25 | End: 2020-04-21

## 2020-02-25 RX ORDER — INSULIN GLARGINE 100 [IU]/ML
INJECTION, SOLUTION SUBCUTANEOUS
Qty: 40 ML | Refills: 1 | Status: SHIPPED | OUTPATIENT
Start: 2020-02-25 | End: 2020-03-25

## 2020-02-25 RX ORDER — FLUTICASONE PROPIONATE 50 MCG
SPRAY, SUSPENSION (ML) NASAL
Qty: 16 G | Refills: 11 | Status: SHIPPED | OUTPATIENT
Start: 2020-02-25 | End: 2021-01-04

## 2020-03-16 ENCOUNTER — HOSPITAL ENCOUNTER (OUTPATIENT)
Dept: NUCLEAR MEDICINE | Facility: HOSPITAL | Age: 55
Discharge: HOME | End: 2020-03-16
Payer: COMMERCIAL

## 2020-03-16 DIAGNOSIS — K82.8 BILIARY DYSKINESIA: ICD-10-CM

## 2020-03-16 DIAGNOSIS — R10.11 PAIN, ABDOMINAL, RUQ: Primary | ICD-10-CM

## 2020-03-16 DIAGNOSIS — R10.11 RUQ ABDOMINAL PAIN: ICD-10-CM

## 2020-03-16 PROCEDURE — 78227 HEPATOBIL SYST IMAGE W/DRUG: CPT

## 2020-03-16 PROCEDURE — A9537 TC99M MEBROFENIN: HCPCS

## 2020-03-16 RX ADMIN — SINCALIDE 2.3 MCG: 5 INJECTION, POWDER, LYOPHILIZED, FOR SOLUTION INTRAVENOUS at 10:17

## 2020-03-25 DIAGNOSIS — E11.9 TYPE 2 DIABETES MELLITUS WITHOUT COMPLICATION, WITHOUT LONG-TERM CURRENT USE OF INSULIN (HCC): ICD-10-CM

## 2020-03-25 RX ORDER — INSULIN GLARGINE 100 [IU]/ML
INJECTION, SOLUTION SUBCUTANEOUS
Qty: 40 ML | Refills: 1 | Status: SHIPPED | OUTPATIENT
Start: 2020-03-25 | End: 2020-04-21 | Stop reason: SDUPTHER

## 2020-04-21 ENCOUNTER — TELEMEDICINE (OUTPATIENT)
Dept: GASTROENTEROLOGY | Facility: AMBULARY SURGERY CENTER | Age: 55
End: 2020-04-21

## 2020-04-21 ENCOUNTER — TELEPHONE (OUTPATIENT)
Dept: GASTROENTEROLOGY | Facility: AMBULARY SURGERY CENTER | Age: 55
End: 2020-04-21

## 2020-04-21 DIAGNOSIS — Z79.4 TYPE 2 DIABETES MELLITUS WITH DIABETIC POLYNEUROPATHY, WITH LONG-TERM CURRENT USE OF INSULIN (HCC): ICD-10-CM

## 2020-04-21 DIAGNOSIS — K21.9 GASTROESOPHAGEAL REFLUX DISEASE WITHOUT ESOPHAGITIS: ICD-10-CM

## 2020-04-21 DIAGNOSIS — J45.909 UNCOMPLICATED ASTHMA, UNSPECIFIED ASTHMA SEVERITY, UNSPECIFIED WHETHER PERSISTENT: ICD-10-CM

## 2020-04-21 DIAGNOSIS — E11.610 CHARCOT FOOT DUE TO DIABETES MELLITUS (HCC): ICD-10-CM

## 2020-04-21 DIAGNOSIS — Z12.11 COLON CANCER SCREENING: Primary | ICD-10-CM

## 2020-04-21 DIAGNOSIS — E11.9 TYPE 2 DIABETES MELLITUS WITHOUT COMPLICATION, WITHOUT LONG-TERM CURRENT USE OF INSULIN (HCC): ICD-10-CM

## 2020-04-21 DIAGNOSIS — E11.42 TYPE 2 DIABETES MELLITUS WITH DIABETIC POLYNEUROPATHY, WITH LONG-TERM CURRENT USE OF INSULIN (HCC): ICD-10-CM

## 2020-04-21 DIAGNOSIS — R10.11 RIGHT UPPER QUADRANT ABDOMINAL PAIN: ICD-10-CM

## 2020-04-21 DIAGNOSIS — E13.9 DIABETES MELLITUS OF OTHER TYPE WITHOUT COMPLICATION, UNSPECIFIED WHETHER LONG TERM INSULIN USE (HCC): ICD-10-CM

## 2020-04-21 DIAGNOSIS — I10 BENIGN ESSENTIAL HYPERTENSION: ICD-10-CM

## 2020-04-21 PROCEDURE — 99203 OFFICE O/P NEW LOW 30 MIN: CPT | Performed by: INTERNAL MEDICINE

## 2020-04-21 RX ORDER — LANCETS
EACH MISCELLANEOUS
Qty: 102 EACH | Refills: 3 | Status: SHIPPED | OUTPATIENT
Start: 2020-04-21 | End: 2020-08-12

## 2020-04-21 RX ORDER — LIRAGLUTIDE 6 MG/ML
INJECTION SUBCUTANEOUS
Qty: 9 ML | Refills: 4 | Status: SHIPPED | OUTPATIENT
Start: 2020-04-21 | End: 2020-09-15

## 2020-04-21 RX ORDER — ALBUTEROL SULFATE 90 UG/1
2 AEROSOL, METERED RESPIRATORY (INHALATION) EVERY 4 HOURS PRN
Qty: 18 G | Refills: 1 | Status: SHIPPED | OUTPATIENT
Start: 2020-04-21 | End: 2020-06-18

## 2020-04-21 RX ORDER — BLOOD SUGAR DIAGNOSTIC
STRIP MISCELLANEOUS
Qty: 100 EACH | Refills: 3 | Status: SHIPPED | OUTPATIENT
Start: 2020-04-21 | End: 2020-05-18

## 2020-04-21 RX ORDER — CYCLOBENZAPRINE HCL 10 MG
TABLET ORAL
Qty: 90 TABLET | Refills: 3 | Status: SHIPPED | OUTPATIENT
Start: 2020-04-21 | End: 2020-08-12

## 2020-05-01 ENCOUNTER — TELEPHONE (OUTPATIENT)
Dept: SURGICAL ONCOLOGY | Facility: CLINIC | Age: 55
End: 2020-05-01

## 2020-05-05 ENCOUNTER — OFFICE VISIT (OUTPATIENT)
Dept: SURGICAL ONCOLOGY | Facility: CLINIC | Age: 55
End: 2020-05-05
Payer: COMMERCIAL

## 2020-05-05 VITALS
SYSTOLIC BLOOD PRESSURE: 148 MMHG | HEART RATE: 84 BPM | DIASTOLIC BLOOD PRESSURE: 70 MMHG | WEIGHT: 245 LBS | RESPIRATION RATE: 16 BRPM | TEMPERATURE: 96.1 F | BODY MASS INDEX: 33.18 KG/M2 | HEIGHT: 72 IN

## 2020-05-05 DIAGNOSIS — D23.9 CLEAR CELL HIDRADENOMA: Primary | ICD-10-CM

## 2020-05-05 PROCEDURE — 3077F SYST BP >= 140 MM HG: CPT | Performed by: NURSE PRACTITIONER

## 2020-05-05 PROCEDURE — 4004F PT TOBACCO SCREEN RCVD TLK: CPT | Performed by: NURSE PRACTITIONER

## 2020-05-05 PROCEDURE — 99213 OFFICE O/P EST LOW 20 MIN: CPT | Performed by: NURSE PRACTITIONER

## 2020-05-05 PROCEDURE — 3078F DIAST BP <80 MM HG: CPT | Performed by: NURSE PRACTITIONER

## 2020-05-05 PROCEDURE — 3008F BODY MASS INDEX DOCD: CPT | Performed by: NURSE PRACTITIONER

## 2020-05-05 PROCEDURE — 2022F DILAT RTA XM EVC RTNOPTHY: CPT | Performed by: NURSE PRACTITIONER

## 2020-05-05 PROCEDURE — 3052F HG A1C>EQUAL 8.0%<EQUAL 9.0%: CPT | Performed by: NURSE PRACTITIONER

## 2020-05-15 DIAGNOSIS — I10 ESSENTIAL HYPERTENSION: ICD-10-CM

## 2020-05-15 RX ORDER — LOSARTAN POTASSIUM AND HYDROCHLOROTHIAZIDE 12.5; 1 MG/1; MG/1
1 TABLET ORAL DAILY
Qty: 90 TABLET | Refills: 1 | Status: SHIPPED | OUTPATIENT
Start: 2020-05-15 | End: 2020-11-03 | Stop reason: SDUPTHER

## 2020-05-18 DIAGNOSIS — E11.9 TYPE 2 DIABETES MELLITUS WITHOUT COMPLICATION, WITHOUT LONG-TERM CURRENT USE OF INSULIN (HCC): ICD-10-CM

## 2020-05-18 DIAGNOSIS — Z79.4 TYPE 2 DIABETES MELLITUS WITH DIABETIC POLYNEUROPATHY, WITH LONG-TERM CURRENT USE OF INSULIN (HCC): ICD-10-CM

## 2020-05-18 DIAGNOSIS — E11.42 TYPE 2 DIABETES MELLITUS WITH DIABETIC POLYNEUROPATHY, WITH LONG-TERM CURRENT USE OF INSULIN (HCC): ICD-10-CM

## 2020-05-18 RX ORDER — BLOOD SUGAR DIAGNOSTIC
STRIP MISCELLANEOUS
Qty: 100 EACH | Refills: 3 | Status: SHIPPED | OUTPATIENT
Start: 2020-05-18 | End: 2020-08-12

## 2020-05-18 RX ORDER — INSULIN GLARGINE 100 [IU]/ML
INJECTION, SOLUTION SUBCUTANEOUS
Qty: 40 ML | Refills: 1 | Status: SHIPPED | OUTPATIENT
Start: 2020-05-18 | End: 2020-07-17

## 2020-05-19 ENCOUNTER — CONSULT (OUTPATIENT)
Dept: DERMATOLOGY | Facility: CLINIC | Age: 55
End: 2020-05-19
Payer: COMMERCIAL

## 2020-05-19 VITALS — BODY MASS INDEX: 33.1 KG/M2 | WEIGHT: 244.4 LBS | TEMPERATURE: 98.2 F | HEIGHT: 72 IN

## 2020-05-19 DIAGNOSIS — D48.5 NEOPLASM OF UNCERTAIN BEHAVIOR OF SKIN: ICD-10-CM

## 2020-05-19 DIAGNOSIS — D22.9 MULTIPLE BENIGN MELANOCYTIC NEVI: ICD-10-CM

## 2020-05-19 DIAGNOSIS — D17.0 LIPOMA OF FACE: Primary | ICD-10-CM

## 2020-05-19 DIAGNOSIS — L81.4 LENTIGO: ICD-10-CM

## 2020-05-19 DIAGNOSIS — L91.8 ACROCHORDON: ICD-10-CM

## 2020-05-19 DIAGNOSIS — L73.9 FOLLICULITIS: ICD-10-CM

## 2020-05-19 PROCEDURE — 88305 TISSUE EXAM BY PATHOLOGIST: CPT | Performed by: STUDENT IN AN ORGANIZED HEALTH CARE EDUCATION/TRAINING PROGRAM

## 2020-05-19 PROCEDURE — 11102 TANGNTL BX SKIN SINGLE LES: CPT | Performed by: DERMATOLOGY

## 2020-05-19 PROCEDURE — 88312 SPECIAL STAINS GROUP 1: CPT | Performed by: STUDENT IN AN ORGANIZED HEALTH CARE EDUCATION/TRAINING PROGRAM

## 2020-05-19 PROCEDURE — 99204 OFFICE O/P NEW MOD 45 MIN: CPT | Performed by: DERMATOLOGY

## 2020-05-19 RX ORDER — CLINDAMYCIN PHOSPHATE 11.9 MG/ML
SOLUTION TOPICAL
Qty: 60 ML | Refills: 11 | Status: SHIPPED | OUTPATIENT
Start: 2020-05-19 | End: 2021-05-01 | Stop reason: HOSPADM

## 2020-05-19 RX ORDER — PHENOL 1.4 %
600 AEROSOL, SPRAY (ML) MUCOUS MEMBRANE DAILY
COMMUNITY

## 2020-06-16 ENCOUNTER — TRANSCRIBE ORDERS (OUTPATIENT)
Dept: LAB | Facility: CLINIC | Age: 55
End: 2020-06-16

## 2020-06-16 ENCOUNTER — APPOINTMENT (OUTPATIENT)
Dept: LAB | Facility: CLINIC | Age: 55
End: 2020-06-16
Payer: COMMERCIAL

## 2020-06-16 DIAGNOSIS — Z12.5 PROSTATE CANCER SCREENING: ICD-10-CM

## 2020-06-16 DIAGNOSIS — E55.9 VITAMIN D DEFICIENCY: ICD-10-CM

## 2020-06-16 DIAGNOSIS — E11.9 TYPE 2 DIABETES MELLITUS WITHOUT COMPLICATION, WITHOUT LONG-TERM CURRENT USE OF INSULIN (HCC): ICD-10-CM

## 2020-06-16 DIAGNOSIS — E13.69 OTHER SPECIFIED DIABETES MELLITUS WITH OTHER SPECIFIED COMPLICATION, UNSPECIFIED WHETHER LONG TERM INSULIN USE (HCC): ICD-10-CM

## 2020-06-16 DIAGNOSIS — E55.9 AVITAMINOSIS D: ICD-10-CM

## 2020-06-16 DIAGNOSIS — M05.79 SEROPOSITIVE RHEUMATOID ARTHRITIS OF MULTIPLE SITES (HCC): Primary | ICD-10-CM

## 2020-06-16 DIAGNOSIS — I10 ESSENTIAL HYPERTENSION: ICD-10-CM

## 2020-06-16 DIAGNOSIS — M05.79 SEROPOSITIVE RHEUMATOID ARTHRITIS OF MULTIPLE SITES (HCC): ICD-10-CM

## 2020-06-16 DIAGNOSIS — E78.2 MIXED HYPERLIPIDEMIA: ICD-10-CM

## 2020-06-16 DIAGNOSIS — Z79.899 ENCOUNTER FOR LONG-TERM (CURRENT) USE OF OTHER MEDICATIONS: ICD-10-CM

## 2020-06-16 LAB
ALBUMIN SERPL BCP-MCNC: 3.7 G/DL (ref 3.5–5)
ALP SERPL-CCNC: 58 U/L (ref 46–116)
ALT SERPL W P-5'-P-CCNC: 40 U/L (ref 12–78)
ANION GAP SERPL CALCULATED.3IONS-SCNC: 5 MMOL/L (ref 4–13)
AST SERPL W P-5'-P-CCNC: 27 U/L (ref 5–45)
BASOPHILS # BLD AUTO: 0.1 THOUSANDS/ΜL (ref 0–0.1)
BASOPHILS NFR BLD AUTO: 1 % (ref 0–1)
BILIRUB SERPL-MCNC: 0.88 MG/DL (ref 0.2–1)
BUN SERPL-MCNC: 17 MG/DL (ref 5–25)
CALCIUM SERPL-MCNC: 9 MG/DL (ref 8.3–10.1)
CHLORIDE SERPL-SCNC: 105 MMOL/L (ref 100–108)
CHOLEST SERPL-MCNC: 126 MG/DL (ref 50–200)
CO2 SERPL-SCNC: 27 MMOL/L (ref 21–32)
CREAT SERPL-MCNC: 1.03 MG/DL (ref 0.6–1.3)
CREAT UR-MCNC: 186 MG/DL
CRP SERPL QL: <3 MG/L
EOSINOPHIL # BLD AUTO: 0.22 THOUSAND/ΜL (ref 0–0.61)
EOSINOPHIL NFR BLD AUTO: 3 % (ref 0–6)
ERYTHROCYTE [DISTWIDTH] IN BLOOD BY AUTOMATED COUNT: 13.5 % (ref 11.6–15.1)
ERYTHROCYTE [SEDIMENTATION RATE] IN BLOOD: 24 MM/HOUR (ref 0–10)
EST. AVERAGE GLUCOSE BLD GHB EST-MCNC: 140 MG/DL
GFR SERPL CREATININE-BSD FRML MDRD: 83 ML/MIN/1.73SQ M
GLUCOSE P FAST SERPL-MCNC: 114 MG/DL (ref 65–99)
HBA1C MFR BLD: 6.5 %
HCT VFR BLD AUTO: 49.6 % (ref 36.5–49.3)
HDLC SERPL-MCNC: 37 MG/DL
HGB BLD-MCNC: 16.5 G/DL (ref 12–17)
IMM GRANULOCYTES # BLD AUTO: 0.04 THOUSAND/UL (ref 0–0.2)
IMM GRANULOCYTES NFR BLD AUTO: 1 % (ref 0–2)
LDLC SERPL CALC-MCNC: 68 MG/DL (ref 0–100)
LYMPHOCYTES # BLD AUTO: 1.43 THOUSANDS/ΜL (ref 0.6–4.47)
LYMPHOCYTES NFR BLD AUTO: 19 % (ref 14–44)
MCH RBC QN AUTO: 30.1 PG (ref 26.8–34.3)
MCHC RBC AUTO-ENTMCNC: 33.3 G/DL (ref 31.4–37.4)
MCV RBC AUTO: 91 FL (ref 82–98)
MICROALBUMIN UR-MCNC: 14 MG/L (ref 0–20)
MICROALBUMIN/CREAT 24H UR: 8 MG/G CREATININE (ref 0–30)
MONOCYTES # BLD AUTO: 0.8 THOUSAND/ΜL (ref 0.17–1.22)
MONOCYTES NFR BLD AUTO: 11 % (ref 4–12)
NEUTROPHILS # BLD AUTO: 4.92 THOUSANDS/ΜL (ref 1.85–7.62)
NEUTS SEG NFR BLD AUTO: 65 % (ref 43–75)
NONHDLC SERPL-MCNC: 89 MG/DL
NRBC BLD AUTO-RTO: 0 /100 WBCS
PLATELET # BLD AUTO: 211 THOUSANDS/UL (ref 149–390)
PMV BLD AUTO: 11.5 FL (ref 8.9–12.7)
POTASSIUM SERPL-SCNC: 4.7 MMOL/L (ref 3.5–5.3)
PROT SERPL-MCNC: 7.7 G/DL (ref 6.4–8.2)
PSA SERPL-MCNC: 0.3 NG/ML (ref 0–4)
RBC # BLD AUTO: 5.48 MILLION/UL (ref 3.88–5.62)
SODIUM SERPL-SCNC: 137 MMOL/L (ref 136–145)
TRIGL SERPL-MCNC: 106 MG/DL
TSH SERPL DL<=0.05 MIU/L-ACNC: 2.01 UIU/ML (ref 0.36–3.74)
WBC # BLD AUTO: 7.51 THOUSAND/UL (ref 4.31–10.16)

## 2020-06-16 PROCEDURE — G0103 PSA SCREENING: HCPCS

## 2020-06-16 PROCEDURE — 86140 C-REACTIVE PROTEIN: CPT

## 2020-06-16 PROCEDURE — 36415 COLL VENOUS BLD VENIPUNCTURE: CPT

## 2020-06-16 PROCEDURE — 84443 ASSAY THYROID STIM HORMONE: CPT

## 2020-06-16 PROCEDURE — 82570 ASSAY OF URINE CREATININE: CPT

## 2020-06-16 PROCEDURE — 3044F HG A1C LEVEL LT 7.0%: CPT | Performed by: FAMILY MEDICINE

## 2020-06-16 PROCEDURE — 80053 COMPREHEN METABOLIC PANEL: CPT

## 2020-06-16 PROCEDURE — 83036 HEMOGLOBIN GLYCOSYLATED A1C: CPT

## 2020-06-16 PROCEDURE — 3061F NEG MICROALBUMINURIA REV: CPT | Performed by: FAMILY MEDICINE

## 2020-06-16 PROCEDURE — 85652 RBC SED RATE AUTOMATED: CPT

## 2020-06-16 PROCEDURE — 80061 LIPID PANEL: CPT

## 2020-06-16 PROCEDURE — 82043 UR ALBUMIN QUANTITATIVE: CPT

## 2020-06-16 PROCEDURE — 85025 COMPLETE CBC W/AUTO DIFF WBC: CPT

## 2020-06-17 ENCOUNTER — TELEPHONE (OUTPATIENT)
Dept: GASTROENTEROLOGY | Facility: AMBULARY SURGERY CENTER | Age: 55
End: 2020-06-17

## 2020-06-17 ENCOUNTER — PREP FOR PROCEDURE (OUTPATIENT)
Dept: GASTROENTEROLOGY | Facility: AMBULARY SURGERY CENTER | Age: 55
End: 2020-06-17

## 2020-06-17 DIAGNOSIS — K21.9 GASTROESOPHAGEAL REFLUX DISEASE WITHOUT ESOPHAGITIS: Primary | ICD-10-CM

## 2020-06-17 DIAGNOSIS — R10.11 RIGHT UPPER QUADRANT ABDOMINAL PAIN: ICD-10-CM

## 2020-06-17 DIAGNOSIS — Z20.822 ENCOUNTER FOR LABORATORY TESTING FOR COVID-19 VIRUS: ICD-10-CM

## 2020-06-17 DIAGNOSIS — Z12.11 COLON CANCER SCREENING: ICD-10-CM

## 2020-06-18 ENCOUNTER — OFFICE VISIT (OUTPATIENT)
Dept: FAMILY MEDICINE CLINIC | Facility: CLINIC | Age: 55
End: 2020-06-18
Payer: COMMERCIAL

## 2020-06-18 VITALS
BODY MASS INDEX: 32.29 KG/M2 | SYSTOLIC BLOOD PRESSURE: 128 MMHG | DIASTOLIC BLOOD PRESSURE: 82 MMHG | HEIGHT: 72 IN | HEART RATE: 98 BPM | OXYGEN SATURATION: 100 % | RESPIRATION RATE: 16 BRPM | WEIGHT: 238.4 LBS | TEMPERATURE: 98.2 F

## 2020-06-18 DIAGNOSIS — E11.610 CHARCOT FOOT DUE TO DIABETES MELLITUS (HCC): ICD-10-CM

## 2020-06-18 DIAGNOSIS — E78.2 MIXED HYPERLIPIDEMIA: ICD-10-CM

## 2020-06-18 DIAGNOSIS — D23.9 CLEAR CELL HIDRADENOMA: ICD-10-CM

## 2020-06-18 DIAGNOSIS — Z79.4 TYPE 2 DIABETES MELLITUS WITH DIABETIC NEUROPATHIC ARTHROPATHY, WITH LONG-TERM CURRENT USE OF INSULIN (HCC): ICD-10-CM

## 2020-06-18 DIAGNOSIS — E11.9 TYPE 2 DIABETES MELLITUS WITHOUT COMPLICATION, WITHOUT LONG-TERM CURRENT USE OF INSULIN (HCC): ICD-10-CM

## 2020-06-18 DIAGNOSIS — E11.42 TYPE 2 DIABETES MELLITUS WITH DIABETIC POLYNEUROPATHY, WITH LONG-TERM CURRENT USE OF INSULIN (HCC): ICD-10-CM

## 2020-06-18 DIAGNOSIS — E11.610 TYPE 2 DIABETES MELLITUS WITH DIABETIC NEUROPATHIC ARTHROPATHY, WITH LONG-TERM CURRENT USE OF INSULIN (HCC): ICD-10-CM

## 2020-06-18 DIAGNOSIS — J45.909 UNCOMPLICATED ASTHMA, UNSPECIFIED ASTHMA SEVERITY, UNSPECIFIED WHETHER PERSISTENT: ICD-10-CM

## 2020-06-18 DIAGNOSIS — E66.9 OBESITY (BMI 30.0-34.9): ICD-10-CM

## 2020-06-18 DIAGNOSIS — Z79.4 TYPE 2 DIABETES MELLITUS WITH DIABETIC POLYNEUROPATHY, WITH LONG-TERM CURRENT USE OF INSULIN (HCC): ICD-10-CM

## 2020-06-18 DIAGNOSIS — K21.9 GASTROESOPHAGEAL REFLUX DISEASE WITHOUT ESOPHAGITIS: ICD-10-CM

## 2020-06-18 DIAGNOSIS — E13.9 DIABETES MELLITUS OF OTHER TYPE WITHOUT COMPLICATION, UNSPECIFIED WHETHER LONG TERM INSULIN USE (HCC): ICD-10-CM

## 2020-06-18 DIAGNOSIS — E55.9 VITAMIN D DEFICIENCY: ICD-10-CM

## 2020-06-18 DIAGNOSIS — K82.8 BILIARY DYSKINESIA: ICD-10-CM

## 2020-06-18 DIAGNOSIS — I10 BENIGN ESSENTIAL HYPERTENSION: Primary | ICD-10-CM

## 2020-06-18 PROCEDURE — 4004F PT TOBACCO SCREEN RCVD TLK: CPT | Performed by: FAMILY MEDICINE

## 2020-06-18 PROCEDURE — 3079F DIAST BP 80-89 MM HG: CPT | Performed by: FAMILY MEDICINE

## 2020-06-18 PROCEDURE — 99215 OFFICE O/P EST HI 40 MIN: CPT | Performed by: FAMILY MEDICINE

## 2020-06-18 PROCEDURE — 3008F BODY MASS INDEX DOCD: CPT | Performed by: FAMILY MEDICINE

## 2020-06-18 PROCEDURE — 3074F SYST BP LT 130 MM HG: CPT | Performed by: FAMILY MEDICINE

## 2020-06-18 PROCEDURE — 3044F HG A1C LEVEL LT 7.0%: CPT | Performed by: FAMILY MEDICINE

## 2020-06-18 PROCEDURE — 2022F DILAT RTA XM EVC RTNOPTHY: CPT | Performed by: FAMILY MEDICINE

## 2020-06-18 RX ORDER — SITAGLIPTIN 100 MG/1
TABLET, FILM COATED ORAL
Qty: 90 TABLET | Refills: 1 | Status: SHIPPED | OUTPATIENT
Start: 2020-06-18 | End: 2020-11-03 | Stop reason: SDUPTHER

## 2020-06-18 RX ORDER — ALBUTEROL SULFATE 90 UG/1
2 AEROSOL, METERED RESPIRATORY (INHALATION) EVERY 4 HOURS PRN
Qty: 18 G | Refills: 1 | Status: SHIPPED | OUTPATIENT
Start: 2020-06-18 | End: 2020-08-12

## 2020-07-10 ENCOUNTER — ANESTHESIA EVENT (OUTPATIENT)
Dept: GASTROENTEROLOGY | Facility: AMBULARY SURGERY CENTER | Age: 55
End: 2020-07-10

## 2020-07-16 DIAGNOSIS — E11.9 TYPE 2 DIABETES MELLITUS WITHOUT COMPLICATION, WITHOUT LONG-TERM CURRENT USE OF INSULIN (HCC): ICD-10-CM

## 2020-07-17 RX ORDER — INSULIN GLARGINE 100 [IU]/ML
INJECTION, SOLUTION SUBCUTANEOUS
Qty: 30 ML | Refills: 1 | Status: SHIPPED | OUTPATIENT
Start: 2020-07-17 | End: 2020-09-15

## 2020-07-19 ENCOUNTER — DOCUMENTATION (OUTPATIENT)
Dept: URGENT CARE | Facility: MEDICAL CENTER | Age: 55
End: 2020-07-19

## 2020-07-19 ENCOUNTER — APPOINTMENT (OUTPATIENT)
Dept: URGENT CARE | Facility: MEDICAL CENTER | Age: 55
End: 2020-07-19
Payer: COMMERCIAL

## 2020-07-19 DIAGNOSIS — Z20.822 ENCOUNTER FOR LABORATORY TESTING FOR COVID-19 VIRUS: ICD-10-CM

## 2020-07-19 PROCEDURE — U0003 INFECTIOUS AGENT DETECTION BY NUCLEIC ACID (DNA OR RNA); SEVERE ACUTE RESPIRATORY SYNDROME CORONAVIRUS 2 (SARS-COV-2) (CORONAVIRUS DISEASE [COVID-19]), AMPLIFIED PROBE TECHNIQUE, MAKING USE OF HIGH THROUGHPUT TECHNOLOGIES AS DESCRIBED BY CMS-2020-01-R: HCPCS | Performed by: PHYSICIAN ASSISTANT

## 2020-07-21 LAB
INPATIENT: NORMAL
SARS-COV-2 RNA SPEC QL NAA+PROBE: NOT DETECTED

## 2020-07-24 ENCOUNTER — ANESTHESIA (OUTPATIENT)
Dept: GASTROENTEROLOGY | Facility: AMBULARY SURGERY CENTER | Age: 55
End: 2020-07-24

## 2020-07-24 ENCOUNTER — HOSPITAL ENCOUNTER (OUTPATIENT)
Dept: GASTROENTEROLOGY | Facility: AMBULARY SURGERY CENTER | Age: 55
Setting detail: OUTPATIENT SURGERY
Discharge: HOME | End: 2020-07-24
Attending: INTERNAL MEDICINE | Admitting: INTERNAL MEDICINE
Payer: COMMERCIAL

## 2020-07-24 VITALS
BODY MASS INDEX: 33.46 KG/M2 | HEIGHT: 72 IN | OXYGEN SATURATION: 98 % | SYSTOLIC BLOOD PRESSURE: 145 MMHG | RESPIRATION RATE: 16 BRPM | WEIGHT: 247 LBS | TEMPERATURE: 96.8 F | HEART RATE: 78 BPM | DIASTOLIC BLOOD PRESSURE: 78 MMHG

## 2020-07-24 DIAGNOSIS — K21.9 GASTROESOPHAGEAL REFLUX DISEASE WITHOUT ESOPHAGITIS: ICD-10-CM

## 2020-07-24 DIAGNOSIS — R10.11 RIGHT UPPER QUADRANT ABDOMINAL PAIN: ICD-10-CM

## 2020-07-24 DIAGNOSIS — Z12.11 COLON CANCER SCREENING: ICD-10-CM

## 2020-07-24 DIAGNOSIS — K25.3 ACUTE GASTRIC ULCER WITHOUT HEMORRHAGE OR PERFORATION: Primary | ICD-10-CM

## 2020-07-24 LAB
GLUCOSE SERPL-MCNC: 74 MG/DL (ref 65–140)
GLUCOSE SERPL-MCNC: 80 MG/DL (ref 65–140)

## 2020-07-24 PROCEDURE — 43239 EGD BIOPSY SINGLE/MULTIPLE: CPT | Performed by: INTERNAL MEDICINE

## 2020-07-24 PROCEDURE — 88305 TISSUE EXAM BY PATHOLOGIST: CPT | Performed by: PATHOLOGY

## 2020-07-24 PROCEDURE — 45380 COLONOSCOPY AND BIOPSY: CPT | Performed by: INTERNAL MEDICINE

## 2020-07-24 PROCEDURE — NC001 PR NO CHARGE: Performed by: INTERNAL MEDICINE

## 2020-07-24 PROCEDURE — 82948 REAGENT STRIP/BLOOD GLUCOSE: CPT

## 2020-07-24 RX ORDER — PROPOFOL 10 MG/ML
INJECTION, EMULSION INTRAVENOUS AS NEEDED
Status: DISCONTINUED | OUTPATIENT
Start: 2020-07-24 | End: 2020-07-24 | Stop reason: SURG

## 2020-07-24 RX ORDER — SODIUM CHLORIDE, SODIUM LACTATE, POTASSIUM CHLORIDE, CALCIUM CHLORIDE 600; 310; 30; 20 MG/100ML; MG/100ML; MG/100ML; MG/100ML
INJECTION, SOLUTION INTRAVENOUS CONTINUOUS PRN
Status: DISCONTINUED | OUTPATIENT
Start: 2020-07-24 | End: 2020-07-24 | Stop reason: SURG

## 2020-07-24 RX ORDER — PROPOFOL 10 MG/ML
INJECTION, EMULSION INTRAVENOUS CONTINUOUS PRN
Status: DISCONTINUED | OUTPATIENT
Start: 2020-07-24 | End: 2020-07-24 | Stop reason: SURG

## 2020-07-24 RX ORDER — LIDOCAINE HYDROCHLORIDE 10 MG/ML
INJECTION, SOLUTION EPIDURAL; INFILTRATION; INTRACAUDAL; PERINEURAL AS NEEDED
Status: DISCONTINUED | OUTPATIENT
Start: 2020-07-24 | End: 2020-07-24 | Stop reason: SURG

## 2020-07-24 RX ORDER — PANTOPRAZOLE SODIUM 40 MG/1
40 TABLET, DELAYED RELEASE ORAL 2 TIMES DAILY
Qty: 60 TABLET | Refills: 60 | Status: SHIPPED | OUTPATIENT
Start: 2020-07-24 | End: 2020-10-04 | Stop reason: SDUPTHER

## 2020-07-24 RX ADMIN — LIDOCAINE HYDROCHLORIDE 50 MG: 10 INJECTION, SOLUTION EPIDURAL; INFILTRATION; INTRACAUDAL; PERINEURAL at 08:34

## 2020-07-24 RX ADMIN — SODIUM CHLORIDE, SODIUM LACTATE, POTASSIUM CHLORIDE, AND CALCIUM CHLORIDE: .6; .31; .03; .02 INJECTION, SOLUTION INTRAVENOUS at 08:33

## 2020-07-24 RX ADMIN — PROPOFOL 80 MG: 10 INJECTION, EMULSION INTRAVENOUS at 08:34

## 2020-07-24 RX ADMIN — PROPOFOL 80 MCG/KG/MIN: 10 INJECTION, EMULSION INTRAVENOUS at 08:35

## 2020-07-24 RX ADMIN — SODIUM CHLORIDE, SODIUM LACTATE, POTASSIUM CHLORIDE, AND CALCIUM CHLORIDE: .6; .31; .03; .02 INJECTION, SOLUTION INTRAVENOUS at 08:57

## 2020-07-24 RX ADMIN — PROPOFOL 20 MG: 10 INJECTION, EMULSION INTRAVENOUS at 08:35

## 2020-07-24 NOTE — ANESTHESIA POSTPROCEDURE EVALUATION
Post-Op Assessment Note    CV Status:  Stable  Pain Score: 0    Pain management: adequate     Mental Status:  Alert and awake   Hydration Status:  Euvolemic   PONV Controlled:  Controlled   Airway Patency:  Patent   Post Op Vitals Reviewed: Yes      Staff: CRNA           BP      Temp     Pulse     Resp      SpO2   99

## 2020-07-24 NOTE — H&P
History and Physical -  [facility]  Lowell Gomes  48 y o  male MRN: [id number]        HPI:  72-year-old male with history of hypertension, hyperlipidemia, diabetes mellitus was referred for screening colonoscopy  Patient reports having issues with acid reflux and also right upper quadrant pain  He was seen by a surgeon regarding gallbladder ejection fraction of 22 percent      Historical Information   Past Medical History:   Diagnosis Date    Arthritis     At risk for falls     Broken foot     right    Cataract     giacomo    COPD [facility] obstructive pulmonary disease) [facility]     Diabetes mellitus ([facility] )     Hyperlipidemia     Hypertension     Kidney stone     Neuropathy     Rheumatoid arthritis (HCC)     Seasonal allergies     Snores     Uses wheelchair     and crutches- NWB RLE    Wears glasses      Past Surgical History:   Procedure Laterality Date    APPENDECTOMY      CLOSED REDUCTION FOOT DISLOCATION Right 2/12/2019    Procedure: C/R FRACTURE;  Surgeon: Yuko Ahumada DPM;  Location: [facility];  Service: Podiatry    FOOT SURGERY Right 07/2019    Removal of the bone graft and cleaned out infection, and placed new bone graft    MI EXC SKIN MALIG <0 5 CM REMAINDER BODY N/A 3/28/2018    Procedure: EXCISION WIDE LESION HEAD/FACIAL/NECK;  Surgeon: Michelle Dozier MD;  [facility];  Service: Surgical Oncology    MI GASTROCNEMIUS RECESSION Right 2/12/2019    Procedure: ENDO GASTROC RECESSION, APPLICATION OF EXTERNAL FIXATOR;  Surgeon: Yuko Ahumada DPM;  [facility];  Service: Podiatry    MI REMOVE EXTERN BONE FIX DEV W ANESTH Right 4/18/2019    Procedure: FRAME REMOVAL HARDWARE FOOT WITH APPLICATION OF GRAFT;  Surgeon: Yuko Ahumada DPM;  [facility];  Service: Podiatry    SKIN BIOPSY      scalp    WISDOM TOOTH EXTRACTION  1998     Social History   Social History     Substance and Sexual Activity   Alcohol Use Yes    Frequency: Monthly or less    Drinks per session: 1 or 2    Binge frequency: Never    Comment: beer     Social History     Substance and Sexual Activity   Drug Use No     Social History     Tobacco Use   Smoking Status Current Every Day Smoker    Packs/day: 1 00    Years: 40 00    Pack years: 40 00    Types: Cigarettes   Smokeless Tobacco Never Used     Family History   Problem Relation Age of Onset    Diabetes Mother     Stroke Mother     Mental illness Mother     Diabetes Father     Stroke Father     Alcohol abuse Brother     Coronary artery disease Family         Age 51-55    Diabetes type II Family     Heart disease Family        Meds/Allergies       (Not in a hospital admission)    No Known Allergies    Objective     Blood pressure 151/86, pulse 80, temperature (!) 97 2 °F (36 2 °C), temperature source Temporal, resp  rate 18, height 6' (1 829 m), weight 112 kg (247 lb), SpO2 99 %      PHYSICAL EXAM:    Gen: NAD  CV: S1 & S2 normal, RRR  CHEST: Clear to auscultate  ABD: soft, NT/ND, good bowel sounds  EXT: no edema    ASSESSMENT:     GERD, right upper quadrant pain, screening for colon cancer    PLAN:    EGD and colonoscopy

## 2020-07-24 NOTE — DISCHARGE INSTRUCTIONS
Gastritis   WHAT YOU NEED TO KNOW:   Gastritis is inflammation or irritation of the lining of your stomach  DISCHARGE INSTRUCTIONS:   Call 911 for any of the following:   · You develop chest pain or shortness of breath  Seek care immediately if:   · You vomit blood  · You have black or bloody bowel movements  · You have severe stomach or back pain  Contact your healthcare provider if:   · You have a fever  · You have new or worsening symptoms, even after treatment  · You have questions or concerns about your condition or care  Medicines:   · Medicines  may be given to help treat a bacterial infection or decrease stomach acid  · Take your medicine as directed  Contact your healthcare provider if you think your medicine is not helping or if you have side effects  Tell him or her if you are allergic to any medicine  Keep a list of the medicines, vitamins, and herbs you take  Include the amounts, and when and why you take them  Bring the list or the pill bottles to follow-up visits  Carry your medicine list with you in case of an emergency  Manage or prevent gastritis:   · Do not smoke  Nicotine and other chemicals in cigarettes and cigars can make your symptoms worse and cause lung damage  Ask your healthcare provider for information if you currently smoke and need help to quit  E-cigarettes or smokeless tobacco still contain nicotine  Talk to your healthcare provider before you use these products  · Do not drink alcohol  Alcohol can prevent healing and make your gastritis worse  Talk to your healthcare provider if you need help to stop drinking  · Do not take NSAIDs or aspirin unless directed  These and similar medicines can cause irritation  If your healthcare provider says it is okay to take NSAIDs, take them with food  · Do not eat foods that cause irritation  Foods such as oranges and salsa can cause burning or pain  Eat a variety of healthy foods   Examples include fruits (not citrus), vegetables, low-fat dairy products, beans, whole-grain breads, and lean meats and fish  Try to eat small meals, and drink water with your meals  Do not eat for at least 3 hours before you go to bed  · Find ways to relax and decrease stress  Stress can increase stomach acid and make gastritis worse  Activities such as yoga, meditation, or listening to music can help you relax  Spend time with friends, or do things you enjoy  Follow up with your healthcare provider as directed: You may need ongoing tests or treatment, or referral to a gastroenterologist  Write down your questions so you remember to ask them during your visits  © 2017 [employer]  Information is for End User's use only and may not be sold, redistributed or otherwise used for commercial purposes  All illustrations and images included in CareNotes® are the copyrighted property of [employer]  or [employer]  The above information is an  only  It is not intended as medical advice for individual conditions or treatments  Talk to your doctor, nurse or pharmacist before following any medical regimen to see if it is safe and effective for you  Colorectal Polyps   WHAT YOU NEED TO KNOW:   Colorectal polyps are small growths of tissue in the lining of the colon and rectum  Most polyps are hyperplastic polyps and are usually benign (noncancerous)  Certain types of polyps, called adenomatous polyps, may turn into cancer  DISCHARGE INSTRUCTIONS:   Follow up with your healthcare provider or gastroenterologist as directed: You may need to return for more tests, such as another colonoscopy  Write down your questions so you remember to ask them during your visits  Reduce your risk for colorectal polyps:   · Eat a variety of healthy foods:  Healthy foods include fruit, vegetables, whole-grain breads, low-fat dairy products, beans, lean meat, and fish   Ask if you need to be on a special diet  · Maintain a healthy weight:  Ask your healthcare provider if you need to lose weight and how much you need to lose  Ask for help with a weight loss program     · Exercise:  Begin to exercise slowly and do more as you get stronger  Talk with your healthcare provider before you start an exercise program      · Limit alcohol:  Your risk for polyps increases the more you drink  · Do not smoke: If you smoke, it is never too late to quit  Ask for information about how to stop  For support and more information:   · Alaina Hawkins ([facility])  [address] [place], [place] [address]  Phone: 7- [phone]  Web Address: www digestive  niddk nih gov  Contact your healthcare provider or gastroenterologist if:   · You have a fever  · You have chills, a cough, or feel weak and achy  · You have abdominal pain that does not go away or gets worse after you take medicine  · Your abdomen is swollen  · You are losing weight without trying  · You have questions or concerns about your condition or care  Seek care immediately or call 911 if:   · You have sudden shortness of breath  · You have a fast heart rate, fast breathing, or are too dizzy to stand up  · You have severe abdominal pain  · You see blood in your bowel movement  © 2017 [employer]  Information is for End User's use only and may not be sold, redistributed or otherwise used for commercial purposes  All illustrations and images included in CareNotes® are the copyrighted property of [employer]  or [employer]  The above information is an  only  It is not intended as medical advice for individual conditions or treatments  Talk to your doctor, nurse or pharmacist before following any medical regimen to see if it is safe and effective for you

## 2020-07-24 NOTE — ANESTHESIA PREPROCEDURE EVALUATION
Review of Systems/Medical History  Patient summary reviewed    No history of anesthetic complications     Cardiovascular  Exercise tolerance (METS): >4,  Hyperlipidemia, Hypertension , No angina ,    Pulmonary  Smoker cigarette smoker  , Tobacco cessation counseling given , COPD moderate- medication dependent , No shortness of breath, No recent URI ,        GI/Hepatic    GERD well controlled,        Negative  ROS        Endo/Other  Diabetes type 2 Insulin,   Obesity    GYN       Hematology   Musculoskeletal  Rheumatoid arthritis Severity: moderate,   Arthritis     Neurology  No seizures ,  No CVA , Diabetic neuropathy,    Psychology           Physical Exam    Airway    Mallampati score: II  TM Distance: >3 FB  Neck ROM: full     Dental   No notable dental hx     Cardiovascular  Rhythm: regular, Rate: normal, Cardiovascular exam normal    Pulmonary  Pulmonary exam normal     Other Findings        Anesthesia Plan  ASA Score- 3     Anesthesia Type- IV sedation with anesthesia with ASA Monitors  Additional Monitors:   Airway Plan:         Plan Factors-Patient not instructed to abstain from smoking on day of procedure  Patient did not smoke on day of surgery  Induction- intravenous  Postoperative Plan-     Informed Consent- Anesthetic plan and risks discussed with patient  I personally reviewed this patient with the CRNA  Discussed and agreed on the Anesthesia Plan with the CRNA  Uli Bolaños

## 2020-07-29 ENCOUNTER — TELEPHONE (OUTPATIENT)
Dept: GASTROENTEROLOGY | Facility: MEDICAL CENTER | Age: 55
End: 2020-07-29

## 2020-07-29 NOTE — TELEPHONE ENCOUNTER
----- Message from Tri Lopez MD sent at 7/28/2020  1:58 PM EDT -----  Gastric biopsies are benign and negative for H pylori  Patient to continue Protonix twice daily     Schedule for repeat upper endoscopy in 3 months to check for ulcer healing    Rectal polyps removed came back as benign hyperplastic    Repeat colonoscopy in 5 years

## 2020-08-06 ENCOUNTER — PREP FOR PROCEDURE (OUTPATIENT)
Dept: GASTROENTEROLOGY | Facility: CLINIC | Age: 55
End: 2020-08-06

## 2020-08-06 DIAGNOSIS — K25.3 ACUTE GASTRIC ULCER WITHOUT HEMORRHAGE OR PERFORATION: Primary | ICD-10-CM

## 2020-08-06 DIAGNOSIS — K21.9 GASTROESOPHAGEAL REFLUX DISEASE WITHOUT ESOPHAGITIS: ICD-10-CM

## 2020-08-06 NOTE — TELEPHONE ENCOUNTER
Called pt, scheduled EGD with Dr Marianne Azul on 10/29 at [facility]  Reviewed prep, reminded needs a  and will get a call the day before with the arrival time  Mailed prep and diabetic h/o to pt

## 2020-08-12 DIAGNOSIS — Z79.4 TYPE 2 DIABETES MELLITUS WITH DIABETIC POLYNEUROPATHY, WITH LONG-TERM CURRENT USE OF INSULIN (HCC): ICD-10-CM

## 2020-08-12 DIAGNOSIS — E78.2 MIXED HYPERLIPIDEMIA: ICD-10-CM

## 2020-08-12 DIAGNOSIS — E11.42 TYPE 2 DIABETES MELLITUS WITH DIABETIC POLYNEUROPATHY, WITH LONG-TERM CURRENT USE OF INSULIN (HCC): ICD-10-CM

## 2020-08-12 DIAGNOSIS — I10 BENIGN ESSENTIAL HYPERTENSION: ICD-10-CM

## 2020-08-12 DIAGNOSIS — E11.610 CHARCOT FOOT DUE TO DIABETES MELLITUS (HCC): ICD-10-CM

## 2020-08-12 DIAGNOSIS — J45.909 UNCOMPLICATED ASTHMA, UNSPECIFIED ASTHMA SEVERITY, UNSPECIFIED WHETHER PERSISTENT: ICD-10-CM

## 2020-08-12 RX ORDER — ALBUTEROL SULFATE 90 UG/1
2 AEROSOL, METERED RESPIRATORY (INHALATION) EVERY 4 HOURS PRN
Qty: 18 G | Refills: 1 | Status: SHIPPED | OUTPATIENT
Start: 2020-08-12 | End: 2020-10-14

## 2020-08-12 RX ORDER — ATORVASTATIN CALCIUM 40 MG/1
TABLET, FILM COATED ORAL
Qty: 90 TABLET | Refills: 1 | Status: SHIPPED | OUTPATIENT
Start: 2020-08-12 | End: 2020-11-03 | Stop reason: SDUPTHER

## 2020-08-12 RX ORDER — LANCETS
EACH MISCELLANEOUS
Qty: 102 EACH | Refills: 3 | Status: SHIPPED | OUTPATIENT
Start: 2020-08-12 | End: 2020-12-07

## 2020-08-12 RX ORDER — CYCLOBENZAPRINE HCL 10 MG
TABLET ORAL
Qty: 90 TABLET | Refills: 3 | Status: SHIPPED | OUTPATIENT
Start: 2020-08-12 | End: 2020-12-07

## 2020-08-12 RX ORDER — BLOOD SUGAR DIAGNOSTIC
STRIP MISCELLANEOUS
Qty: 100 EACH | Refills: 3 | Status: SHIPPED | OUTPATIENT
Start: 2020-08-12 | End: 2020-12-07

## 2020-09-08 ENCOUNTER — OFFICE VISIT (OUTPATIENT)
Dept: DERMATOLOGY | Facility: CLINIC | Age: 55
End: 2020-09-08
Payer: COMMERCIAL

## 2020-09-08 VITALS — HEIGHT: 72 IN | TEMPERATURE: 97.7 F | WEIGHT: 252.4 LBS | BODY MASS INDEX: 34.19 KG/M2

## 2020-09-08 DIAGNOSIS — L72.0 EPIDERMAL CYST: Primary | ICD-10-CM

## 2020-09-08 PROCEDURE — 4004F PT TOBACCO SCREEN RCVD TLK: CPT | Performed by: DERMATOLOGY

## 2020-09-08 PROCEDURE — 99213 OFFICE O/P EST LOW 20 MIN: CPT | Performed by: DERMATOLOGY

## 2020-09-15 DIAGNOSIS — E11.9 TYPE 2 DIABETES MELLITUS WITHOUT COMPLICATION, WITHOUT LONG-TERM CURRENT USE OF INSULIN (HCC): ICD-10-CM

## 2020-09-15 DIAGNOSIS — E11.42 TYPE 2 DIABETES MELLITUS WITH DIABETIC POLYNEUROPATHY, WITH LONG-TERM CURRENT USE OF INSULIN (HCC): ICD-10-CM

## 2020-09-15 DIAGNOSIS — E13.9 DIABETES MELLITUS OF OTHER TYPE WITHOUT COMPLICATION, UNSPECIFIED WHETHER LONG TERM INSULIN USE (HCC): ICD-10-CM

## 2020-09-15 DIAGNOSIS — Z79.4 TYPE 2 DIABETES MELLITUS WITH DIABETIC POLYNEUROPATHY, WITH LONG-TERM CURRENT USE OF INSULIN (HCC): ICD-10-CM

## 2020-09-15 RX ORDER — LIRAGLUTIDE 6 MG/ML
INJECTION SUBCUTANEOUS
Qty: 9 ML | Refills: 4 | Status: SHIPPED | OUTPATIENT
Start: 2020-09-15 | End: 2021-02-04

## 2020-09-15 RX ORDER — INSULIN GLARGINE 100 [IU]/ML
INJECTION, SOLUTION SUBCUTANEOUS
Qty: 30 ML | Refills: 1 | Status: SHIPPED | OUTPATIENT
Start: 2020-09-15 | End: 2020-11-10

## 2020-09-15 RX ORDER — PEN NEEDLE, DIABETIC 32GX 5/32"
NEEDLE, DISPOSABLE MISCELLANEOUS
Qty: 100 EACH | Refills: 3 | Status: SHIPPED | OUTPATIENT
Start: 2020-09-15 | End: 2021-02-04

## 2020-10-04 DIAGNOSIS — K25.3 ACUTE GASTRIC ULCER WITHOUT HEMORRHAGE OR PERFORATION: ICD-10-CM

## 2020-10-07 RX ORDER — PANTOPRAZOLE SODIUM 40 MG/1
40 TABLET, DELAYED RELEASE ORAL 2 TIMES DAILY
Qty: 60 TABLET | Refills: 2 | Status: SHIPPED | OUTPATIENT
Start: 2020-10-07 | End: 2020-11-10

## 2020-10-14 DIAGNOSIS — J45.909 UNCOMPLICATED ASTHMA, UNSPECIFIED ASTHMA SEVERITY, UNSPECIFIED WHETHER PERSISTENT: ICD-10-CM

## 2020-10-14 RX ORDER — ALBUTEROL SULFATE 90 UG/1
2 AEROSOL, METERED RESPIRATORY (INHALATION) EVERY 4 HOURS PRN
Qty: 18 G | Refills: 1 | Status: SHIPPED | OUTPATIENT
Start: 2020-10-14 | End: 2020-12-07

## 2020-10-15 ENCOUNTER — ANESTHESIA EVENT (OUTPATIENT)
Dept: GASTROENTEROLOGY | Facility: AMBULARY SURGERY CENTER | Age: 55
End: 2020-10-15

## 2020-10-20 ENCOUNTER — PROCEDURE VISIT (OUTPATIENT)
Dept: DERMATOLOGY | Facility: CLINIC | Age: 55
End: 2020-10-20
Payer: COMMERCIAL

## 2020-10-20 VITALS — WEIGHT: 254 LBS | HEIGHT: 72 IN | TEMPERATURE: 96.7 F | BODY MASS INDEX: 34.4 KG/M2

## 2020-10-20 DIAGNOSIS — L72.3 INFLAMED EPIDERMOID CYST OF SKIN: Primary | ICD-10-CM

## 2020-10-20 PROCEDURE — 11444 EXC FACE-MM B9+MARG 3.1-4 CM: CPT | Performed by: DERMATOLOGY

## 2020-10-20 PROCEDURE — 12053 INTMD RPR FACE/MM 5.1-7.5 CM: CPT | Performed by: DERMATOLOGY

## 2020-10-27 ENCOUNTER — OFFICE VISIT (OUTPATIENT)
Dept: DERMATOLOGY | Facility: CLINIC | Age: 55
End: 2020-10-27

## 2020-10-27 VITALS — WEIGHT: 253 LBS | TEMPERATURE: 97.1 F | BODY MASS INDEX: 34.27 KG/M2 | HEIGHT: 72 IN

## 2020-10-27 DIAGNOSIS — Z48.02 VISIT FOR SUTURE REMOVAL: Primary | ICD-10-CM

## 2020-10-27 PROCEDURE — 87186 SC STD MICRODIL/AGAR DIL: CPT | Performed by: DERMATOLOGY

## 2020-10-27 PROCEDURE — 87205 SMEAR GRAM STAIN: CPT | Performed by: DERMATOLOGY

## 2020-10-27 PROCEDURE — 87070 CULTURE OTHR SPECIMN AEROBIC: CPT | Performed by: DERMATOLOGY

## 2020-10-27 PROCEDURE — 99024 POSTOP FOLLOW-UP VISIT: CPT | Performed by: DERMATOLOGY

## 2020-10-27 PROCEDURE — 87147 CULTURE TYPE IMMUNOLOGIC: CPT | Performed by: DERMATOLOGY

## 2020-10-27 RX ORDER — DOXYCYCLINE 100 MG/1
100 CAPSULE ORAL 2 TIMES DAILY
Qty: 20 CAPSULE | Refills: 0 | Status: SHIPPED | OUTPATIENT
Start: 2020-10-27 | End: 2020-11-06

## 2020-10-29 ENCOUNTER — ANESTHESIA (OUTPATIENT)
Dept: GASTROENTEROLOGY | Facility: AMBULARY SURGERY CENTER | Age: 55
End: 2020-10-29

## 2020-10-29 ENCOUNTER — HOSPITAL ENCOUNTER (OUTPATIENT)
Dept: GASTROENTEROLOGY | Facility: AMBULARY SURGERY CENTER | Age: 55
Setting detail: OUTPATIENT SURGERY
Discharge: HOME | End: 2020-10-29
Attending: INTERNAL MEDICINE | Admitting: INTERNAL MEDICINE
Payer: COMMERCIAL

## 2020-10-29 VITALS
TEMPERATURE: 98.4 F | HEART RATE: 93 BPM | RESPIRATION RATE: 18 BRPM | HEIGHT: 72 IN | WEIGHT: 247 LBS | OXYGEN SATURATION: 97 % | DIASTOLIC BLOOD PRESSURE: 75 MMHG | SYSTOLIC BLOOD PRESSURE: 127 MMHG | BODY MASS INDEX: 33.46 KG/M2

## 2020-10-29 VITALS — HEART RATE: 99 BPM

## 2020-10-29 DIAGNOSIS — K25.3 ACUTE GASTRIC ULCER WITHOUT HEMORRHAGE OR PERFORATION: ICD-10-CM

## 2020-10-29 DIAGNOSIS — K21.9 GASTROESOPHAGEAL REFLUX DISEASE WITHOUT ESOPHAGITIS: ICD-10-CM

## 2020-10-29 LAB — GLUCOSE SERPL-MCNC: 208 MG/DL (ref 65–140)

## 2020-10-29 PROCEDURE — 82948 REAGENT STRIP/BLOOD GLUCOSE: CPT

## 2020-10-29 PROCEDURE — 43239 EGD BIOPSY SINGLE/MULTIPLE: CPT | Performed by: INTERNAL MEDICINE

## 2020-10-29 PROCEDURE — 88305 TISSUE EXAM BY PATHOLOGIST: CPT | Performed by: PATHOLOGY

## 2020-10-29 RX ORDER — LIDOCAINE HYDROCHLORIDE 10 MG/ML
0.5 INJECTION, SOLUTION EPIDURAL; INFILTRATION; INTRACAUDAL; PERINEURAL ONCE AS NEEDED
Status: DISCONTINUED | OUTPATIENT
Start: 2020-10-29 | End: 2020-11-02 | Stop reason: HOSPADM

## 2020-10-29 RX ORDER — SUCRALFATE ORAL 1 G/10ML
1 SUSPENSION ORAL 4 TIMES DAILY
Qty: 420 ML | Refills: 3 | Status: SHIPPED | OUTPATIENT
Start: 2020-10-29 | End: 2020-10-29 | Stop reason: HOSPADM

## 2020-10-29 RX ORDER — METOCLOPRAMIDE HYDROCHLORIDE 5 MG/ML
10 INJECTION INTRAMUSCULAR; INTRAVENOUS ONCE AS NEEDED
Status: CANCELLED | OUTPATIENT
Start: 2020-10-29

## 2020-10-29 RX ORDER — DIPHENHYDRAMINE HYDROCHLORIDE 50 MG/ML
12.5 INJECTION INTRAMUSCULAR; INTRAVENOUS ONCE AS NEEDED
Status: CANCELLED | OUTPATIENT
Start: 2020-10-29

## 2020-10-29 RX ORDER — ONDANSETRON 2 MG/ML
4 INJECTION INTRAMUSCULAR; INTRAVENOUS ONCE AS NEEDED
Status: CANCELLED | OUTPATIENT
Start: 2020-10-29

## 2020-10-29 RX ORDER — PROPOFOL 10 MG/ML
INJECTION, EMULSION INTRAVENOUS AS NEEDED
Status: DISCONTINUED | OUTPATIENT
Start: 2020-10-29 | End: 2020-10-29

## 2020-10-29 RX ORDER — GLYCOPYRROLATE 0.2 MG/ML
INJECTION INTRAMUSCULAR; INTRAVENOUS AS NEEDED
Status: DISCONTINUED | OUTPATIENT
Start: 2020-10-29 | End: 2020-10-29

## 2020-10-29 RX ORDER — SODIUM CHLORIDE, SODIUM LACTATE, POTASSIUM CHLORIDE, CALCIUM CHLORIDE 600; 310; 30; 20 MG/100ML; MG/100ML; MG/100ML; MG/100ML
125 INJECTION, SOLUTION INTRAVENOUS CONTINUOUS
Status: DISCONTINUED | OUTPATIENT
Start: 2020-10-29 | End: 2020-11-02 | Stop reason: HOSPADM

## 2020-10-29 RX ORDER — SUCRALFATE 1 G/1
1 TABLET ORAL 4 TIMES DAILY
Qty: 120 TABLET | Refills: 4 | Status: SHIPPED | OUTPATIENT
Start: 2020-10-29 | End: 2021-03-01

## 2020-10-29 RX ORDER — LIDOCAINE HYDROCHLORIDE 20 MG/ML
INJECTION, SOLUTION EPIDURAL; INFILTRATION; INTRACAUDAL; PERINEURAL AS NEEDED
Status: DISCONTINUED | OUTPATIENT
Start: 2020-10-29 | End: 2020-10-29

## 2020-10-29 RX ADMIN — SODIUM CHLORIDE, SODIUM LACTATE, POTASSIUM CHLORIDE, AND CALCIUM CHLORIDE: .6; .31; .03; .02 INJECTION, SOLUTION INTRAVENOUS at 09:21

## 2020-10-29 RX ADMIN — PROPOFOL 150 MG: 10 INJECTION, EMULSION INTRAVENOUS at 09:32

## 2020-10-29 RX ADMIN — LIDOCAINE HYDROCHLORIDE 80 MG: 20 INJECTION, SOLUTION EPIDURAL; INFILTRATION; INTRACAUDAL at 09:27

## 2020-10-29 RX ADMIN — GLYCOPYRROLATE 0.2 MG: 0.2 INJECTION, SOLUTION INTRAMUSCULAR; INTRAVENOUS at 09:24

## 2020-10-29 RX ADMIN — LIDOCAINE HYDROCHLORIDE 20 MG: 20 INJECTION, SOLUTION EPIDURAL; INFILTRATION; INTRACAUDAL at 09:32

## 2020-10-31 LAB
BACTERIA WND AEROBE CULT: ABNORMAL
GRAM STN SPEC: ABNORMAL
GRAM STN SPEC: ABNORMAL

## 2020-11-02 ENCOUNTER — LAB (OUTPATIENT)
Dept: LAB | Facility: CLINIC | Age: 55
End: 2020-11-02
Payer: COMMERCIAL

## 2020-11-02 ENCOUNTER — TRANSCRIBE ORDERS (OUTPATIENT)
Dept: LAB | Facility: CLINIC | Age: 55
End: 2020-11-02

## 2020-11-02 DIAGNOSIS — I10 BENIGN ESSENTIAL HYPERTENSION: ICD-10-CM

## 2020-11-02 DIAGNOSIS — E55.9 VITAMIN D DEFICIENCY: ICD-10-CM

## 2020-11-02 DIAGNOSIS — Z79.4 TYPE 2 DIABETES MELLITUS WITH DIABETIC NEUROPATHIC ARTHROPATHY, WITH LONG-TERM CURRENT USE OF INSULIN (HCC): ICD-10-CM

## 2020-11-02 DIAGNOSIS — E11.610 TYPE 2 DIABETES MELLITUS WITH DIABETIC NEUROPATHIC ARTHROPATHY, WITH LONG-TERM CURRENT USE OF INSULIN (HCC): ICD-10-CM

## 2020-11-02 DIAGNOSIS — E66.9 OBESITY (BMI 30.0-34.9): ICD-10-CM

## 2020-11-02 DIAGNOSIS — E78.2 MIXED HYPERLIPIDEMIA: ICD-10-CM

## 2020-11-02 LAB
ALBUMIN SERPL BCP-MCNC: 3.6 G/DL (ref 3.5–5)
ALP SERPL-CCNC: 57 U/L (ref 46–116)
ALT SERPL W P-5'-P-CCNC: 38 U/L (ref 12–78)
ANION GAP SERPL CALCULATED.3IONS-SCNC: 10 MMOL/L (ref 4–13)
AST SERPL W P-5'-P-CCNC: 19 U/L (ref 5–45)
BASOPHILS # BLD AUTO: 0.1 THOUSANDS/ΜL (ref 0–0.1)
BASOPHILS NFR BLD AUTO: 1 % (ref 0–1)
BILIRUB SERPL-MCNC: 0.41 MG/DL (ref 0.2–1)
BUN SERPL-MCNC: 16 MG/DL (ref 5–25)
CALCIUM SERPL-MCNC: 9.2 MG/DL (ref 8.3–10.1)
CHLORIDE SERPL-SCNC: 101 MMOL/L (ref 100–108)
CHOLEST SERPL-MCNC: 144 MG/DL (ref 50–200)
CO2 SERPL-SCNC: 28 MMOL/L (ref 21–32)
CREAT SERPL-MCNC: 1.19 MG/DL (ref 0.6–1.3)
CREAT UR-MCNC: 252 MG/DL
EOSINOPHIL # BLD AUTO: 0.19 THOUSAND/ΜL (ref 0–0.61)
EOSINOPHIL NFR BLD AUTO: 3 % (ref 0–6)
ERYTHROCYTE [DISTWIDTH] IN BLOOD BY AUTOMATED COUNT: 13.2 % (ref 11.6–15.1)
EST. AVERAGE GLUCOSE BLD GHB EST-MCNC: 200 MG/DL
GFR SERPL CREATININE-BSD FRML MDRD: 70 ML/MIN/1.73SQ M
GLUCOSE P FAST SERPL-MCNC: 204 MG/DL (ref 65–99)
HBA1C MFR BLD: 8.6 %
HCT VFR BLD AUTO: 50.8 % (ref 36.5–49.3)
HDLC SERPL-MCNC: 40 MG/DL
HGB BLD-MCNC: 16.8 G/DL (ref 12–17)
IMM GRANULOCYTES # BLD AUTO: 0.11 THOUSAND/UL (ref 0–0.2)
IMM GRANULOCYTES NFR BLD AUTO: 2 % (ref 0–2)
LDLC SERPL CALC-MCNC: 77 MG/DL (ref 0–100)
LYMPHOCYTES # BLD AUTO: 2.12 THOUSANDS/ΜL (ref 0.6–4.47)
LYMPHOCYTES NFR BLD AUTO: 30 % (ref 14–44)
MCH RBC QN AUTO: 29.5 PG (ref 26.8–34.3)
MCHC RBC AUTO-ENTMCNC: 33.1 G/DL (ref 31.4–37.4)
MCV RBC AUTO: 89 FL (ref 82–98)
MICROALBUMIN UR-MCNC: 19.6 MG/L (ref 0–20)
MICROALBUMIN/CREAT 24H UR: 8 MG/G CREATININE (ref 0–30)
MONOCYTES # BLD AUTO: 1.13 THOUSAND/ΜL (ref 0.17–1.22)
MONOCYTES NFR BLD AUTO: 16 % (ref 4–12)
NEUTROPHILS # BLD AUTO: 3.51 THOUSANDS/ΜL (ref 1.85–7.62)
NEUTS SEG NFR BLD AUTO: 48 % (ref 43–75)
NONHDLC SERPL-MCNC: 104 MG/DL
NRBC BLD AUTO-RTO: 0 /100 WBCS
PLATELET # BLD AUTO: 218 THOUSANDS/UL (ref 149–390)
PMV BLD AUTO: 10.6 FL (ref 8.9–12.7)
POTASSIUM SERPL-SCNC: 4.2 MMOL/L (ref 3.5–5.3)
PROT SERPL-MCNC: 7.7 G/DL (ref 6.4–8.2)
RBC # BLD AUTO: 5.7 MILLION/UL (ref 3.88–5.62)
SODIUM SERPL-SCNC: 139 MMOL/L (ref 136–145)
TRIGL SERPL-MCNC: 136 MG/DL
TSH SERPL DL<=0.05 MIU/L-ACNC: 2.64 UIU/ML (ref 0.36–3.74)
WBC # BLD AUTO: 7.16 THOUSAND/UL (ref 4.31–10.16)

## 2020-11-02 PROCEDURE — 83036 HEMOGLOBIN GLYCOSYLATED A1C: CPT

## 2020-11-02 PROCEDURE — 36415 COLL VENOUS BLD VENIPUNCTURE: CPT

## 2020-11-02 PROCEDURE — 80053 COMPREHEN METABOLIC PANEL: CPT

## 2020-11-02 PROCEDURE — 85025 COMPLETE CBC W/AUTO DIFF WBC: CPT

## 2020-11-02 PROCEDURE — 80061 LIPID PANEL: CPT

## 2020-11-02 PROCEDURE — 3061F NEG MICROALBUMINURIA REV: CPT | Performed by: FAMILY MEDICINE

## 2020-11-02 PROCEDURE — 84443 ASSAY THYROID STIM HORMONE: CPT

## 2020-11-02 PROCEDURE — 82652 VIT D 1 25-DIHYDROXY: CPT

## 2020-11-02 PROCEDURE — 82570 ASSAY OF URINE CREATININE: CPT

## 2020-11-02 PROCEDURE — 3052F HG A1C>EQUAL 8.0%<EQUAL 9.0%: CPT | Performed by: FAMILY MEDICINE

## 2020-11-02 PROCEDURE — 82043 UR ALBUMIN QUANTITATIVE: CPT

## 2020-11-03 ENCOUNTER — OFFICE VISIT (OUTPATIENT)
Dept: FAMILY MEDICINE CLINIC | Facility: CLINIC | Age: 55
End: 2020-11-03
Payer: COMMERCIAL

## 2020-11-03 VITALS
OXYGEN SATURATION: 98 % | HEART RATE: 114 BPM | DIASTOLIC BLOOD PRESSURE: 84 MMHG | HEIGHT: 72 IN | WEIGHT: 252 LBS | BODY MASS INDEX: 34.13 KG/M2 | TEMPERATURE: 97.9 F | RESPIRATION RATE: 18 BRPM | SYSTOLIC BLOOD PRESSURE: 128 MMHG

## 2020-11-03 DIAGNOSIS — I10 ESSENTIAL HYPERTENSION: ICD-10-CM

## 2020-11-03 DIAGNOSIS — E11.610 CHARCOT FOOT DUE TO DIABETES MELLITUS (HCC): ICD-10-CM

## 2020-11-03 DIAGNOSIS — E78.2 MIXED HYPERLIPIDEMIA: ICD-10-CM

## 2020-11-03 DIAGNOSIS — E11.610 TYPE 2 DIABETES MELLITUS WITH DIABETIC NEUROPATHIC ARTHROPATHY, WITH LONG-TERM CURRENT USE OF INSULIN (HCC): Primary | ICD-10-CM

## 2020-11-03 DIAGNOSIS — I10 BENIGN ESSENTIAL HYPERTENSION: ICD-10-CM

## 2020-11-03 DIAGNOSIS — Z72.0 TOBACCO USE: ICD-10-CM

## 2020-11-03 DIAGNOSIS — E66.9 OBESITY (BMI 30.0-34.9): ICD-10-CM

## 2020-11-03 DIAGNOSIS — E11.9 TYPE 2 DIABETES MELLITUS WITHOUT COMPLICATION, WITHOUT LONG-TERM CURRENT USE OF INSULIN (HCC): ICD-10-CM

## 2020-11-03 DIAGNOSIS — E55.9 VITAMIN D DEFICIENCY: ICD-10-CM

## 2020-11-03 DIAGNOSIS — M45.9 RHEUMATOID ARTHRITIS INVOLVING VERTEBRA, UNSPECIFIED WHETHER RHEUMATOID FACTOR PRESENT (HCC): ICD-10-CM

## 2020-11-03 DIAGNOSIS — Z79.4 TYPE 2 DIABETES MELLITUS WITH DIABETIC NEUROPATHIC ARTHROPATHY, WITH LONG-TERM CURRENT USE OF INSULIN (HCC): Primary | ICD-10-CM

## 2020-11-03 DIAGNOSIS — Z23 FLU VACCINE NEED: ICD-10-CM

## 2020-11-03 DIAGNOSIS — Z23 NEED FOR SHINGLES VACCINE: ICD-10-CM

## 2020-11-03 PROCEDURE — 90682 RIV4 VACC RECOMBINANT DNA IM: CPT | Performed by: FAMILY MEDICINE

## 2020-11-03 PROCEDURE — 3074F SYST BP LT 130 MM HG: CPT | Performed by: FAMILY MEDICINE

## 2020-11-03 PROCEDURE — 90471 IMMUNIZATION ADMIN: CPT | Performed by: FAMILY MEDICINE

## 2020-11-03 PROCEDURE — 3008F BODY MASS INDEX DOCD: CPT | Performed by: FAMILY MEDICINE

## 2020-11-03 PROCEDURE — 4004F PT TOBACCO SCREEN RCVD TLK: CPT | Performed by: FAMILY MEDICINE

## 2020-11-03 PROCEDURE — 99215 OFFICE O/P EST HI 40 MIN: CPT | Performed by: FAMILY MEDICINE

## 2020-11-03 PROCEDURE — 3725F SCREEN DEPRESSION PERFORMED: CPT | Performed by: FAMILY MEDICINE

## 2020-11-03 PROCEDURE — 3079F DIAST BP 80-89 MM HG: CPT | Performed by: FAMILY MEDICINE

## 2020-11-03 RX ORDER — LOSARTAN POTASSIUM AND HYDROCHLOROTHIAZIDE 12.5; 1 MG/1; MG/1
1 TABLET ORAL DAILY
Qty: 90 TABLET | Refills: 1 | Status: SHIPPED | OUTPATIENT
Start: 2020-11-03 | End: 2020-11-10

## 2020-11-03 RX ORDER — ZOSTER VACCINE RECOMBINANT, ADJUVANTED 50 MCG/0.5
0.5 KIT INTRAMUSCULAR ONCE
Qty: 1 EACH | Refills: 1 | Status: SHIPPED | OUTPATIENT
Start: 2020-11-03 | End: 2020-11-03

## 2020-11-03 RX ORDER — ATORVASTATIN CALCIUM 40 MG/1
40 TABLET, FILM COATED ORAL
Qty: 90 TABLET | Refills: 1 | Status: SHIPPED | OUTPATIENT
Start: 2020-11-03 | End: 2021-02-04

## 2020-11-04 LAB — 1,25(OH)2D3 SERPL-MCNC: 52.1 PG/ML (ref 19.9–79.3)

## 2020-11-05 ENCOUNTER — TELEPHONE (OUTPATIENT)
Dept: GASTROENTEROLOGY | Facility: AMBULARY SURGERY CENTER | Age: 55
End: 2020-11-05

## 2020-11-09 ENCOUNTER — TELEPHONE (OUTPATIENT)
Dept: FAMILY MEDICINE CLINIC | Facility: CLINIC | Age: 55
End: 2020-11-09

## 2020-11-09 ENCOUNTER — PATIENT MESSAGE (OUTPATIENT)
Dept: DERMATOLOGY | Facility: CLINIC | Age: 55
End: 2020-11-09

## 2020-11-09 DIAGNOSIS — L01.00 IMPETIGO: Primary | ICD-10-CM

## 2020-11-09 DIAGNOSIS — Z23 NEED FOR SHINGLES VACCINE: Primary | ICD-10-CM

## 2020-11-09 RX ORDER — ZOSTER VACCINE RECOMBINANT, ADJUVANTED 50 MCG/0.5
0.5 KIT INTRAMUSCULAR ONCE
Qty: 1 EACH | Refills: 1 | Status: SHIPPED | OUTPATIENT
Start: 2020-11-09 | End: 2020-11-09

## 2020-11-10 DIAGNOSIS — E11.9 TYPE 2 DIABETES MELLITUS WITHOUT COMPLICATION, WITHOUT LONG-TERM CURRENT USE OF INSULIN (HCC): ICD-10-CM

## 2020-11-10 DIAGNOSIS — K25.3 ACUTE GASTRIC ULCER WITHOUT HEMORRHAGE OR PERFORATION: ICD-10-CM

## 2020-11-10 DIAGNOSIS — I10 ESSENTIAL HYPERTENSION: ICD-10-CM

## 2020-11-10 RX ORDER — LOSARTAN POTASSIUM AND HYDROCHLOROTHIAZIDE 12.5; 1 MG/1; MG/1
1 TABLET ORAL DAILY
Qty: 90 TABLET | Refills: 1 | Status: SHIPPED | OUTPATIENT
Start: 2020-11-10 | End: 2021-04-23

## 2020-11-10 RX ORDER — PANTOPRAZOLE SODIUM 40 MG/1
40 TABLET, DELAYED RELEASE ORAL 2 TIMES DAILY
Qty: 180 TABLET | Refills: 6 | Status: SHIPPED | OUTPATIENT
Start: 2020-11-10 | End: 2021-01-15

## 2020-11-10 RX ORDER — INSULIN GLARGINE 100 [IU]/ML
INJECTION, SOLUTION SUBCUTANEOUS
Qty: 30 ML | Refills: 1 | Status: SHIPPED | OUTPATIENT
Start: 2020-11-10 | End: 2021-01-04

## 2020-11-11 RX ORDER — CEPHALEXIN 500 MG/1
500 CAPSULE ORAL EVERY 6 HOURS SCHEDULED
Qty: 28 CAPSULE | Refills: 0 | Status: SHIPPED | OUTPATIENT
Start: 2020-11-11 | End: 2020-11-13 | Stop reason: SDUPTHER

## 2020-11-13 RX ORDER — CEPHALEXIN 500 MG/1
500 CAPSULE ORAL EVERY 6 HOURS SCHEDULED
Qty: 28 CAPSULE | Refills: 0 | Status: SHIPPED | OUTPATIENT
Start: 2020-11-13 | End: 2020-11-20

## 2020-12-04 DIAGNOSIS — E11.9 TYPE 2 DIABETES MELLITUS WITHOUT COMPLICATION, WITHOUT LONG-TERM CURRENT USE OF INSULIN (HCC): ICD-10-CM

## 2020-12-04 RX ORDER — SITAGLIPTIN 100 MG/1
TABLET, FILM COATED ORAL
Qty: 90 TABLET | Refills: 1 | Status: SHIPPED | OUTPATIENT
Start: 2020-12-04 | End: 2020-12-07

## 2020-12-07 DIAGNOSIS — E11.610 CHARCOT FOOT DUE TO DIABETES MELLITUS (HCC): ICD-10-CM

## 2020-12-07 DIAGNOSIS — Z79.4 TYPE 2 DIABETES MELLITUS WITH DIABETIC POLYNEUROPATHY, WITH LONG-TERM CURRENT USE OF INSULIN (HCC): ICD-10-CM

## 2020-12-07 DIAGNOSIS — I10 BENIGN ESSENTIAL HYPERTENSION: ICD-10-CM

## 2020-12-07 DIAGNOSIS — J45.909 UNCOMPLICATED ASTHMA, UNSPECIFIED ASTHMA SEVERITY, UNSPECIFIED WHETHER PERSISTENT: ICD-10-CM

## 2020-12-07 DIAGNOSIS — E11.42 TYPE 2 DIABETES MELLITUS WITH DIABETIC POLYNEUROPATHY, WITH LONG-TERM CURRENT USE OF INSULIN (HCC): ICD-10-CM

## 2020-12-07 DIAGNOSIS — E11.9 TYPE 2 DIABETES MELLITUS WITHOUT COMPLICATION, WITHOUT LONG-TERM CURRENT USE OF INSULIN (HCC): ICD-10-CM

## 2020-12-07 RX ORDER — CYCLOBENZAPRINE HCL 10 MG
TABLET ORAL
Qty: 90 TABLET | Refills: 3 | Status: SHIPPED | OUTPATIENT
Start: 2020-12-07 | End: 2021-03-29

## 2020-12-07 RX ORDER — LANCETS
EACH MISCELLANEOUS
Qty: 102 EACH | Refills: 3 | Status: SHIPPED | OUTPATIENT
Start: 2020-12-07 | End: 2021-03-29

## 2020-12-07 RX ORDER — ALBUTEROL SULFATE 90 UG/1
2 AEROSOL, METERED RESPIRATORY (INHALATION) EVERY 4 HOURS PRN
Qty: 18 G | Refills: 1 | Status: SHIPPED | OUTPATIENT
Start: 2020-12-07 | End: 2021-02-04

## 2020-12-07 RX ORDER — BLOOD SUGAR DIAGNOSTIC
STRIP MISCELLANEOUS
Qty: 100 EACH | Refills: 3 | Status: SHIPPED | OUTPATIENT
Start: 2020-12-07 | End: 2021-03-29

## 2020-12-07 RX ORDER — SITAGLIPTIN 100 MG/1
TABLET, FILM COATED ORAL
Qty: 90 TABLET | Refills: 1 | Status: ON HOLD | OUTPATIENT
Start: 2020-12-07 | End: 2021-05-26

## 2020-12-11 ENCOUNTER — TELEPHONE (OUTPATIENT)
Dept: ADMINISTRATIVE | Facility: OTHER | Age: 55
End: 2020-12-11

## 2021-01-04 DIAGNOSIS — E11.9 TYPE 2 DIABETES MELLITUS WITHOUT COMPLICATION, WITHOUT LONG-TERM CURRENT USE OF INSULIN (HCC): ICD-10-CM

## 2021-01-04 DIAGNOSIS — E13.9 DIABETES MELLITUS OF OTHER TYPE WITHOUT COMPLICATION, UNSPECIFIED WHETHER LONG TERM INSULIN USE (HCC): ICD-10-CM

## 2021-01-04 RX ORDER — INSULIN GLARGINE 100 [IU]/ML
INJECTION, SOLUTION SUBCUTANEOUS
Qty: 30 ML | Refills: 1 | Status: SHIPPED | OUTPATIENT
Start: 2021-01-04 | End: 2021-03-01

## 2021-01-04 RX ORDER — FLUTICASONE PROPIONATE 50 MCG
SPRAY, SUSPENSION (ML) NASAL
Qty: 16 G | Refills: 11 | Status: SHIPPED | OUTPATIENT
Start: 2021-01-04 | End: 2021-12-15

## 2021-01-15 DIAGNOSIS — K25.3 ACUTE GASTRIC ULCER WITHOUT HEMORRHAGE OR PERFORATION: ICD-10-CM

## 2021-01-15 RX ORDER — PANTOPRAZOLE SODIUM 40 MG/1
40 TABLET, DELAYED RELEASE ORAL DAILY
Qty: 30 TABLET | Refills: 11 | Status: SHIPPED | OUTPATIENT
Start: 2021-01-15 | End: 2021-10-12

## 2021-02-03 DIAGNOSIS — E11.9 TYPE 2 DIABETES MELLITUS WITHOUT COMPLICATION, WITHOUT LONG-TERM CURRENT USE OF INSULIN (HCC): ICD-10-CM

## 2021-02-03 DIAGNOSIS — Z79.4 TYPE 2 DIABETES MELLITUS WITH DIABETIC POLYNEUROPATHY, WITH LONG-TERM CURRENT USE OF INSULIN (HCC): ICD-10-CM

## 2021-02-03 DIAGNOSIS — E11.42 TYPE 2 DIABETES MELLITUS WITH DIABETIC POLYNEUROPATHY, WITH LONG-TERM CURRENT USE OF INSULIN (HCC): ICD-10-CM

## 2021-02-03 DIAGNOSIS — J45.909 UNCOMPLICATED ASTHMA, UNSPECIFIED ASTHMA SEVERITY, UNSPECIFIED WHETHER PERSISTENT: ICD-10-CM

## 2021-02-03 DIAGNOSIS — E13.9 DIABETES MELLITUS OF OTHER TYPE WITHOUT COMPLICATION, UNSPECIFIED WHETHER LONG TERM INSULIN USE (HCC): ICD-10-CM

## 2021-02-04 DIAGNOSIS — E78.2 MIXED HYPERLIPIDEMIA: ICD-10-CM

## 2021-02-04 RX ORDER — PEN NEEDLE, DIABETIC 32GX 5/32"
NEEDLE, DISPOSABLE MISCELLANEOUS
Qty: 100 EACH | Refills: 3 | Status: SHIPPED | OUTPATIENT
Start: 2021-02-04 | End: 2021-06-25

## 2021-02-04 RX ORDER — ALBUTEROL SULFATE 90 UG/1
2 AEROSOL, METERED RESPIRATORY (INHALATION) EVERY 4 HOURS PRN
Qty: 18 G | Refills: 1 | Status: SHIPPED | OUTPATIENT
Start: 2021-02-04 | End: 2021-03-29

## 2021-02-04 RX ORDER — ATORVASTATIN CALCIUM 40 MG/1
TABLET, FILM COATED ORAL
Qty: 90 TABLET | Refills: 1 | Status: SHIPPED | OUTPATIENT
Start: 2021-02-04 | End: 2021-08-03

## 2021-02-04 RX ORDER — LIRAGLUTIDE 6 MG/ML
INJECTION SUBCUTANEOUS
Qty: 9 ML | Refills: 4 | Status: SHIPPED | OUTPATIENT
Start: 2021-02-04 | End: 2021-06-25

## 2021-03-01 DIAGNOSIS — K25.3 ACUTE GASTRIC ULCER WITHOUT HEMORRHAGE OR PERFORATION: ICD-10-CM

## 2021-03-01 DIAGNOSIS — E11.9 TYPE 2 DIABETES MELLITUS WITHOUT COMPLICATION, WITHOUT LONG-TERM CURRENT USE OF INSULIN (HCC): ICD-10-CM

## 2021-03-01 RX ORDER — INSULIN GLARGINE 100 [IU]/ML
INJECTION, SOLUTION SUBCUTANEOUS
Qty: 30 ML | Refills: 1 | Status: SHIPPED | OUTPATIENT
Start: 2021-03-01 | End: 2021-04-23

## 2021-03-01 RX ORDER — SUCRALFATE 1 G/1
1 TABLET ORAL 4 TIMES DAILY
Qty: 120 TABLET | Refills: 4 | Status: SHIPPED | OUTPATIENT
Start: 2021-03-01 | End: 2021-07-21

## 2021-03-05 ENCOUNTER — LAB (OUTPATIENT)
Dept: LAB | Facility: CLINIC | Age: 56
End: 2021-03-05
Payer: COMMERCIAL

## 2021-03-05 ENCOUNTER — TRANSCRIBE ORDERS (OUTPATIENT)
Dept: LAB | Facility: CLINIC | Age: 56
End: 2021-03-05

## 2021-03-05 DIAGNOSIS — G89.29 CHRONIC HAND PAIN, RIGHT: ICD-10-CM

## 2021-03-05 DIAGNOSIS — M79.641 CHRONIC HAND PAIN, RIGHT: ICD-10-CM

## 2021-03-05 DIAGNOSIS — Z79.899 ENCOUNTER FOR LONG-TERM (CURRENT) USE OF OTHER MEDICATIONS: ICD-10-CM

## 2021-03-05 DIAGNOSIS — I10 ESSENTIAL HYPERTENSION: ICD-10-CM

## 2021-03-05 DIAGNOSIS — E55.9 VITAMIN D DEFICIENCY: ICD-10-CM

## 2021-03-05 DIAGNOSIS — E11.610 TYPE 2 DIABETES MELLITUS WITH DIABETIC NEUROPATHIC ARTHROPATHY, WITH LONG-TERM CURRENT USE OF INSULIN (HCC): ICD-10-CM

## 2021-03-05 DIAGNOSIS — R53.83 FATIGUE, UNSPECIFIED TYPE: ICD-10-CM

## 2021-03-05 DIAGNOSIS — M05.79 RHEUMATOID ARTHRITIS INVOLVING MULTIPLE SITES WITH POSITIVE RHEUMATOID FACTOR (HCC): Primary | ICD-10-CM

## 2021-03-05 DIAGNOSIS — G89.29 CHRONIC HAND PAIN, LEFT: ICD-10-CM

## 2021-03-05 DIAGNOSIS — Z79.4 TYPE 2 DIABETES MELLITUS WITH DIABETIC NEUROPATHIC ARTHROPATHY, WITH LONG-TERM CURRENT USE OF INSULIN (HCC): ICD-10-CM

## 2021-03-05 DIAGNOSIS — E11.9 TYPE 2 DIABETES MELLITUS WITHOUT COMPLICATION, WITHOUT LONG-TERM CURRENT USE OF INSULIN (HCC): ICD-10-CM

## 2021-03-05 DIAGNOSIS — M79.642 CHRONIC HAND PAIN, LEFT: ICD-10-CM

## 2021-03-05 DIAGNOSIS — E66.9 OBESITY (BMI 30.0-34.9): ICD-10-CM

## 2021-03-05 DIAGNOSIS — Z23 FLU VACCINE NEED: ICD-10-CM

## 2021-03-05 DIAGNOSIS — I10 BENIGN ESSENTIAL HYPERTENSION: ICD-10-CM

## 2021-03-05 DIAGNOSIS — E78.2 MIXED HYPERLIPIDEMIA: ICD-10-CM

## 2021-03-05 DIAGNOSIS — M54.2 NECK PAIN: ICD-10-CM

## 2021-03-05 LAB
ALBUMIN SERPL BCP-MCNC: 4.1 G/DL (ref 3.5–5)
ALP SERPL-CCNC: 50 U/L (ref 46–116)
ALT SERPL W P-5'-P-CCNC: 45 U/L (ref 12–78)
ANION GAP SERPL CALCULATED.3IONS-SCNC: 6 MMOL/L (ref 4–13)
AST SERPL W P-5'-P-CCNC: 23 U/L (ref 5–45)
BASOPHILS # BLD AUTO: 0.09 THOUSANDS/ΜL (ref 0–0.1)
BASOPHILS NFR BLD AUTO: 1 % (ref 0–1)
BILIRUB SERPL-MCNC: 0.53 MG/DL (ref 0.2–1)
BUN SERPL-MCNC: 25 MG/DL (ref 5–25)
CALCIUM SERPL-MCNC: 9.3 MG/DL (ref 8.3–10.1)
CHLORIDE SERPL-SCNC: 105 MMOL/L (ref 100–108)
CHOLEST SERPL-MCNC: 134 MG/DL (ref 50–200)
CO2 SERPL-SCNC: 28 MMOL/L (ref 21–32)
CREAT SERPL-MCNC: 1.07 MG/DL (ref 0.6–1.3)
CREAT UR-MCNC: 265 MG/DL
EOSINOPHIL # BLD AUTO: 0.21 THOUSAND/ΜL (ref 0–0.61)
EOSINOPHIL NFR BLD AUTO: 3 % (ref 0–6)
ERYTHROCYTE [DISTWIDTH] IN BLOOD BY AUTOMATED COUNT: 13.3 % (ref 11.6–15.1)
EST. AVERAGE GLUCOSE BLD GHB EST-MCNC: 166 MG/DL
GFR SERPL CREATININE-BSD FRML MDRD: 79 ML/MIN/1.73SQ M
GLUCOSE P FAST SERPL-MCNC: 174 MG/DL (ref 65–99)
HBA1C MFR BLD: 7.4 %
HCT VFR BLD AUTO: 50 % (ref 36.5–49.3)
HDLC SERPL-MCNC: 40 MG/DL
HGB BLD-MCNC: 16.8 G/DL (ref 12–17)
IMM GRANULOCYTES # BLD AUTO: 0.03 THOUSAND/UL (ref 0–0.2)
IMM GRANULOCYTES NFR BLD AUTO: 0 % (ref 0–2)
LDLC SERPL CALC-MCNC: 49 MG/DL (ref 0–100)
LYMPHOCYTES # BLD AUTO: 1.68 THOUSANDS/ΜL (ref 0.6–4.47)
LYMPHOCYTES NFR BLD AUTO: 24 % (ref 14–44)
MCH RBC QN AUTO: 30.1 PG (ref 26.8–34.3)
MCHC RBC AUTO-ENTMCNC: 33.6 G/DL (ref 31.4–37.4)
MCV RBC AUTO: 89 FL (ref 82–98)
MICROALBUMIN UR-MCNC: 21 MG/L (ref 0–20)
MICROALBUMIN/CREAT 24H UR: 8 MG/G CREATININE (ref 0–30)
MONOCYTES # BLD AUTO: 0.95 THOUSAND/ΜL (ref 0.17–1.22)
MONOCYTES NFR BLD AUTO: 14 % (ref 4–12)
NEUTROPHILS # BLD AUTO: 4.08 THOUSANDS/ΜL (ref 1.85–7.62)
NEUTS SEG NFR BLD AUTO: 58 % (ref 43–75)
NONHDLC SERPL-MCNC: 94 MG/DL
NRBC BLD AUTO-RTO: 0 /100 WBCS
PLATELET # BLD AUTO: 212 THOUSANDS/UL (ref 149–390)
PMV BLD AUTO: 10.7 FL (ref 8.9–12.7)
POTASSIUM SERPL-SCNC: 3.9 MMOL/L (ref 3.5–5.3)
PROT SERPL-MCNC: 7.4 G/DL (ref 6.4–8.2)
RBC # BLD AUTO: 5.59 MILLION/UL (ref 3.88–5.62)
SODIUM SERPL-SCNC: 139 MMOL/L (ref 136–145)
TRIGL SERPL-MCNC: 225 MG/DL
TSH SERPL DL<=0.05 MIU/L-ACNC: 1.83 UIU/ML (ref 0.36–3.74)
WBC # BLD AUTO: 7.04 THOUSAND/UL (ref 4.31–10.16)

## 2021-03-05 PROCEDURE — 84443 ASSAY THYROID STIM HORMONE: CPT

## 2021-03-05 PROCEDURE — 83036 HEMOGLOBIN GLYCOSYLATED A1C: CPT

## 2021-03-05 PROCEDURE — 80053 COMPREHEN METABOLIC PANEL: CPT

## 2021-03-05 PROCEDURE — 36415 COLL VENOUS BLD VENIPUNCTURE: CPT

## 2021-03-05 PROCEDURE — 3051F HG A1C>EQUAL 7.0%<8.0%: CPT | Performed by: FAMILY MEDICINE

## 2021-03-05 PROCEDURE — 82570 ASSAY OF URINE CREATININE: CPT

## 2021-03-05 PROCEDURE — 3061F NEG MICROALBUMINURIA REV: CPT | Performed by: FAMILY MEDICINE

## 2021-03-05 PROCEDURE — 80061 LIPID PANEL: CPT

## 2021-03-05 PROCEDURE — 85025 COMPLETE CBC W/AUTO DIFF WBC: CPT

## 2021-03-05 PROCEDURE — 82043 UR ALBUMIN QUANTITATIVE: CPT

## 2021-03-09 ENCOUNTER — OFFICE VISIT (OUTPATIENT)
Dept: FAMILY MEDICINE CLINIC | Facility: CLINIC | Age: 56
End: 2021-03-09
Payer: COMMERCIAL

## 2021-03-09 VITALS
BODY MASS INDEX: 33.86 KG/M2 | OXYGEN SATURATION: 99 % | HEART RATE: 80 BPM | WEIGHT: 250 LBS | RESPIRATION RATE: 18 BRPM | SYSTOLIC BLOOD PRESSURE: 126 MMHG | DIASTOLIC BLOOD PRESSURE: 78 MMHG | HEIGHT: 72 IN

## 2021-03-09 DIAGNOSIS — I65.29 CAROTID ARTERY CALCIFICATION, UNSPECIFIED LATERALITY: ICD-10-CM

## 2021-03-09 DIAGNOSIS — E11.610 TYPE 2 DIABETES MELLITUS WITH DIABETIC NEUROPATHIC ARTHROPATHY, WITH LONG-TERM CURRENT USE OF INSULIN (HCC): ICD-10-CM

## 2021-03-09 DIAGNOSIS — I10 BENIGN ESSENTIAL HYPERTENSION: ICD-10-CM

## 2021-03-09 DIAGNOSIS — E55.9 VITAMIN D DEFICIENCY: Primary | ICD-10-CM

## 2021-03-09 DIAGNOSIS — Z79.4 TYPE 2 DIABETES MELLITUS WITH DIABETIC NEUROPATHIC ARTHROPATHY, WITH LONG-TERM CURRENT USE OF INSULIN (HCC): ICD-10-CM

## 2021-03-09 DIAGNOSIS — R42 DIZZINESS AFTER EXTENSION OF NECK: ICD-10-CM

## 2021-03-09 DIAGNOSIS — Z12.5 PROSTATE CANCER SCREENING: ICD-10-CM

## 2021-03-09 DIAGNOSIS — E78.2 MIXED HYPERLIPIDEMIA: ICD-10-CM

## 2021-03-09 DIAGNOSIS — E11.610 CHARCOT FOOT DUE TO DIABETES MELLITUS (HCC): ICD-10-CM

## 2021-03-09 PROCEDURE — 3725F SCREEN DEPRESSION PERFORMED: CPT | Performed by: FAMILY MEDICINE

## 2021-03-09 PROCEDURE — 3078F DIAST BP <80 MM HG: CPT | Performed by: FAMILY MEDICINE

## 2021-03-09 PROCEDURE — 3074F SYST BP LT 130 MM HG: CPT | Performed by: FAMILY MEDICINE

## 2021-03-09 PROCEDURE — 99215 OFFICE O/P EST HI 40 MIN: CPT | Performed by: FAMILY MEDICINE

## 2021-03-09 PROCEDURE — 4004F PT TOBACCO SCREEN RCVD TLK: CPT | Performed by: FAMILY MEDICINE

## 2021-03-09 PROCEDURE — 3008F BODY MASS INDEX DOCD: CPT | Performed by: FAMILY MEDICINE

## 2021-03-10 DIAGNOSIS — Z23 ENCOUNTER FOR IMMUNIZATION: ICD-10-CM

## 2021-03-12 NOTE — ASSESSMENT & PLAN NOTE
Concern for vertebrobasilar insufficiency  Patient will be sent for Doppler    May need CT angiogram pending findings

## 2021-03-12 NOTE — ASSESSMENT & PLAN NOTE
Cholesterol remains adequately controlled on atorvastatin 40 mg once daily  Watch dietary intake of fat, cholesterol, carbohydrate

## 2021-03-12 NOTE — ASSESSMENT & PLAN NOTE
Lab Results   Component Value Date    HGBA1C 7 4 (H) 03/05/2021     Significant improvement in hemoglobin A1c  Patient remains on Basaglar 50 units twice daily, Victoza and Januvia  Numbers could be better    Repeat diabetic parameters in 4 months

## 2021-03-12 NOTE — ASSESSMENT & PLAN NOTE
Patient is due for repeat carotid Doppler    Will also need vertebrobasilar evaluation given the dizziness he X is experiencing when extending his neck

## 2021-03-12 NOTE — ASSESSMENT & PLAN NOTE
Lab Results   Component Value Date    HGBA1C 7 4 (H) 03/05/2021     Patient does have improved glycemic control  He does follow up with podiatrist on a monthly or even every other month basis

## 2021-03-26 ENCOUNTER — IMMUNIZATIONS (OUTPATIENT)
Dept: FAMILY MEDICINE CLINIC | Facility: HOSPITAL | Age: 56
End: 2021-03-26

## 2021-03-26 DIAGNOSIS — Z23 ENCOUNTER FOR IMMUNIZATION: Primary | ICD-10-CM

## 2021-03-26 PROCEDURE — 91300 SARS-COV-2 / COVID-19 MRNA VACCINE (PFIZER-BIONTECH) 30 MCG: CPT

## 2021-03-26 PROCEDURE — 0001A SARS-COV-2 / COVID-19 MRNA VACCINE (PFIZER-BIONTECH) 30 MCG: CPT

## 2021-03-29 DIAGNOSIS — E11.610 CHARCOT FOOT DUE TO DIABETES MELLITUS (HCC): ICD-10-CM

## 2021-03-29 DIAGNOSIS — Z79.4 TYPE 2 DIABETES MELLITUS WITH DIABETIC POLYNEUROPATHY, WITH LONG-TERM CURRENT USE OF INSULIN (HCC): ICD-10-CM

## 2021-03-29 DIAGNOSIS — E11.42 TYPE 2 DIABETES MELLITUS WITH DIABETIC POLYNEUROPATHY, WITH LONG-TERM CURRENT USE OF INSULIN (HCC): ICD-10-CM

## 2021-03-29 DIAGNOSIS — I10 BENIGN ESSENTIAL HYPERTENSION: ICD-10-CM

## 2021-03-29 DIAGNOSIS — J45.909 UNCOMPLICATED ASTHMA, UNSPECIFIED ASTHMA SEVERITY, UNSPECIFIED WHETHER PERSISTENT: ICD-10-CM

## 2021-03-29 RX ORDER — BLOOD SUGAR DIAGNOSTIC
STRIP MISCELLANEOUS
Qty: 100 EACH | Refills: 3 | Status: SHIPPED | OUTPATIENT
Start: 2021-03-29 | End: 2021-07-22

## 2021-03-29 RX ORDER — LANCETS
EACH MISCELLANEOUS
Qty: 102 EACH | Refills: 3 | Status: SHIPPED | OUTPATIENT
Start: 2021-03-29 | End: 2021-07-22

## 2021-03-29 RX ORDER — ALBUTEROL SULFATE 90 UG/1
2 AEROSOL, METERED RESPIRATORY (INHALATION) EVERY 4 HOURS PRN
Qty: 18 G | Refills: 1 | Status: ON HOLD | OUTPATIENT
Start: 2021-03-29 | End: 2021-05-26

## 2021-03-29 RX ORDER — CYCLOBENZAPRINE HCL 10 MG
TABLET ORAL
Qty: 90 TABLET | Refills: 3 | Status: SHIPPED | OUTPATIENT
Start: 2021-03-29 | End: 2021-05-01 | Stop reason: HOSPADM

## 2021-04-01 ENCOUNTER — LAB (OUTPATIENT)
Dept: LAB | Facility: CLINIC | Age: 56
End: 2021-04-01
Payer: COMMERCIAL

## 2021-04-01 ENCOUNTER — HOSPITAL ENCOUNTER (OUTPATIENT)
Dept: VASCULAR ULTRASOUND | Facility: HOSPITAL | Age: 56
Discharge: HOME | End: 2021-04-01
Payer: COMMERCIAL

## 2021-04-01 DIAGNOSIS — M79.642 CHRONIC HAND PAIN, LEFT: ICD-10-CM

## 2021-04-01 DIAGNOSIS — M05.79 RHEUMATOID ARTHRITIS INVOLVING MULTIPLE SITES WITH POSITIVE RHEUMATOID FACTOR (HCC): ICD-10-CM

## 2021-04-01 DIAGNOSIS — Z12.5 PROSTATE CANCER SCREENING: ICD-10-CM

## 2021-04-01 DIAGNOSIS — G89.29 CHRONIC HAND PAIN, LEFT: ICD-10-CM

## 2021-04-01 DIAGNOSIS — E55.9 VITAMIN D DEFICIENCY: ICD-10-CM

## 2021-04-01 DIAGNOSIS — M79.641 CHRONIC HAND PAIN, RIGHT: ICD-10-CM

## 2021-04-01 DIAGNOSIS — R42 DIZZINESS AFTER EXTENSION OF NECK: ICD-10-CM

## 2021-04-01 DIAGNOSIS — M54.2 NECK PAIN: ICD-10-CM

## 2021-04-01 DIAGNOSIS — G89.29 CHRONIC HAND PAIN, RIGHT: ICD-10-CM

## 2021-04-01 DIAGNOSIS — I65.29 CAROTID ARTERY CALCIFICATION, UNSPECIFIED LATERALITY: ICD-10-CM

## 2021-04-01 DIAGNOSIS — Z79.899 ENCOUNTER FOR LONG-TERM (CURRENT) USE OF OTHER MEDICATIONS: ICD-10-CM

## 2021-04-01 DIAGNOSIS — R53.83 FATIGUE, UNSPECIFIED TYPE: ICD-10-CM

## 2021-04-01 LAB
ALBUMIN SERPL BCP-MCNC: 3.7 G/DL (ref 3.5–5)
ALP SERPL-CCNC: 46 U/L (ref 46–116)
ALT SERPL W P-5'-P-CCNC: 41 U/L (ref 12–78)
ANION GAP SERPL CALCULATED.3IONS-SCNC: 6 MMOL/L (ref 4–13)
AST SERPL W P-5'-P-CCNC: 31 U/L (ref 5–45)
BASOPHILS # BLD AUTO: 0.07 THOUSANDS/ΜL (ref 0–0.1)
BASOPHILS NFR BLD AUTO: 1 % (ref 0–1)
BILIRUB SERPL-MCNC: 0.58 MG/DL (ref 0.2–1)
BUN SERPL-MCNC: 19 MG/DL (ref 5–25)
CALCIUM SERPL-MCNC: 9.2 MG/DL (ref 8.3–10.1)
CHLORIDE SERPL-SCNC: 108 MMOL/L (ref 100–108)
CO2 SERPL-SCNC: 23 MMOL/L (ref 21–32)
CREAT SERPL-MCNC: 1.08 MG/DL (ref 0.6–1.3)
CRP SERPL QL: <3 MG/L
EOSINOPHIL # BLD AUTO: 0.22 THOUSAND/ΜL (ref 0–0.61)
EOSINOPHIL NFR BLD AUTO: 3 % (ref 0–6)
ERYTHROCYTE [DISTWIDTH] IN BLOOD BY AUTOMATED COUNT: 13.8 % (ref 11.6–15.1)
ERYTHROCYTE [SEDIMENTATION RATE] IN BLOOD: 15 MM/HOUR (ref 0–19)
GFR SERPL CREATININE-BSD FRML MDRD: 78 ML/MIN/1.73SQ M
GLUCOSE P FAST SERPL-MCNC: 160 MG/DL (ref 65–99)
HCT VFR BLD AUTO: 47.5 % (ref 36.5–49.3)
HGB BLD-MCNC: 15.8 G/DL (ref 12–17)
IMM GRANULOCYTES # BLD AUTO: 0.03 THOUSAND/UL (ref 0–0.2)
IMM GRANULOCYTES NFR BLD AUTO: 0 % (ref 0–2)
LYMPHOCYTES # BLD AUTO: 1.41 THOUSANDS/ΜL (ref 0.6–4.47)
LYMPHOCYTES NFR BLD AUTO: 20 % (ref 14–44)
MCH RBC QN AUTO: 29.8 PG (ref 26.8–34.3)
MCHC RBC AUTO-ENTMCNC: 33.3 G/DL (ref 31.4–37.4)
MCV RBC AUTO: 90 FL (ref 82–98)
MONOCYTES # BLD AUTO: 1.06 THOUSAND/ΜL (ref 0.17–1.22)
MONOCYTES NFR BLD AUTO: 15 % (ref 4–12)
NEUTROPHILS # BLD AUTO: 4.37 THOUSANDS/ΜL (ref 1.85–7.62)
NEUTS SEG NFR BLD AUTO: 61 % (ref 43–75)
NRBC BLD AUTO-RTO: 0 /100 WBCS
PLATELET # BLD AUTO: 199 THOUSANDS/UL (ref 149–390)
PMV BLD AUTO: 11.8 FL (ref 8.9–12.7)
POTASSIUM SERPL-SCNC: 4.2 MMOL/L (ref 3.5–5.3)
PROT SERPL-MCNC: 7.2 G/DL (ref 6.4–8.2)
PSA SERPL-MCNC: 0.2 NG/ML (ref 0–4)
RBC # BLD AUTO: 5.3 MILLION/UL (ref 3.88–5.62)
SODIUM SERPL-SCNC: 137 MMOL/L (ref 136–145)
WBC # BLD AUTO: 7.16 THOUSAND/UL (ref 4.31–10.16)

## 2021-04-01 PROCEDURE — 80053 COMPREHEN METABOLIC PANEL: CPT

## 2021-04-01 PROCEDURE — 36415 COLL VENOUS BLD VENIPUNCTURE: CPT

## 2021-04-01 PROCEDURE — 85025 COMPLETE CBC W/AUTO DIFF WBC: CPT

## 2021-04-01 PROCEDURE — 85652 RBC SED RATE AUTOMATED: CPT

## 2021-04-01 PROCEDURE — 86140 C-REACTIVE PROTEIN: CPT

## 2021-04-01 PROCEDURE — 93880 EXTRACRANIAL BILAT STUDY: CPT

## 2021-04-01 PROCEDURE — G0103 PSA SCREENING: HCPCS

## 2021-04-02 PROCEDURE — 93880 EXTRACRANIAL BILAT STUDY: CPT | Performed by: SURGERY

## 2021-04-02 NOTE — RESULT ENCOUNTER NOTE
Please call patient and notify that results are normal  No changes in medications   Please keep follow-up appointment as scheduled

## 2021-04-14 DIAGNOSIS — Z20.822 EXPOSURE TO COVID-19 VIRUS: Primary | ICD-10-CM

## 2021-04-15 ENCOUNTER — OFFICE VISIT (OUTPATIENT)
Dept: FAMILY MEDICINE CLINIC | Facility: CLINIC | Age: 56
End: 2021-04-15
Payer: COMMERCIAL

## 2021-04-15 VITALS — BODY MASS INDEX: 33.86 KG/M2 | TEMPERATURE: 97.3 F | WEIGHT: 250 LBS | HEIGHT: 72 IN

## 2021-04-15 DIAGNOSIS — M70.42 PREPATELLAR BURSITIS OF LEFT KNEE: Primary | ICD-10-CM

## 2021-04-15 PROCEDURE — 99213 OFFICE O/P EST LOW 20 MIN: CPT | Performed by: FAMILY MEDICINE

## 2021-04-15 PROCEDURE — 20610 DRAIN/INJ JOINT/BURSA W/O US: CPT | Performed by: FAMILY MEDICINE

## 2021-04-15 PROCEDURE — 96372 THER/PROPH/DIAG INJ SC/IM: CPT | Performed by: FAMILY MEDICINE

## 2021-04-16 ENCOUNTER — IMMUNIZATIONS (OUTPATIENT)
Dept: FAMILY MEDICINE CLINIC | Facility: HOSPITAL | Age: 56
End: 2021-04-16
Payer: COMMERCIAL

## 2021-04-16 DIAGNOSIS — Z23 ENCOUNTER FOR IMMUNIZATION: Primary | ICD-10-CM

## 2021-04-16 DIAGNOSIS — Z20.822 EXPOSURE TO COVID-19 VIRUS: ICD-10-CM

## 2021-04-16 LAB — SARS-COV-2 RNA RESP QL NAA+PROBE: NEGATIVE

## 2021-04-16 PROCEDURE — U0005 INFEC AGEN DETEC AMPLI PROBE: HCPCS | Performed by: FAMILY MEDICINE

## 2021-04-16 PROCEDURE — 0002A SARS-COV-2 / COVID-19 MRNA VACCINE (PFIZER-BIONTECH) 30 MCG: CPT

## 2021-04-16 PROCEDURE — 91300 SARS-COV-2 / COVID-19 MRNA VACCINE (PFIZER-BIONTECH) 30 MCG: CPT

## 2021-04-16 PROCEDURE — U0003 INFECTIOUS AGENT DETECTION BY NUCLEIC ACID (DNA OR RNA); SEVERE ACUTE RESPIRATORY SYNDROME CORONAVIRUS 2 (SARS-COV-2) (CORONAVIRUS DISEASE [COVID-19]), AMPLIFIED PROBE TECHNIQUE, MAKING USE OF HIGH THROUGHPUT TECHNOLOGIES AS DESCRIBED BY CMS-2020-01-R: HCPCS | Performed by: FAMILY MEDICINE

## 2021-04-16 RX ORDER — LIDOCAINE HYDROCHLORIDE 10 MG/ML
1 INJECTION, SOLUTION INFILTRATION; PERINEURAL
Status: COMPLETED | OUTPATIENT
Start: 2021-04-16 | End: 2021-04-16

## 2021-04-16 RX ORDER — METHYLPREDNISOLONE ACETATE 40 MG/ML
1 INJECTION, SUSPENSION INTRA-ARTICULAR; INTRALESIONAL; INTRAMUSCULAR; SOFT TISSUE
Status: COMPLETED | OUTPATIENT
Start: 2021-04-16 | End: 2021-04-16

## 2021-04-16 RX ADMIN — LIDOCAINE HYDROCHLORIDE 1 ML: 10 INJECTION, SOLUTION INFILTRATION; PERINEURAL at 08:02

## 2021-04-16 RX ADMIN — METHYLPREDNISOLONE ACETATE 1 ML: 40 INJECTION, SUSPENSION INTRA-ARTICULAR; INTRALESIONAL; INTRAMUSCULAR; SOFT TISSUE at 08:02

## 2021-04-16 NOTE — PROGRESS NOTES
Subjective:      Patient ID: Smiley Schilling  is a 50 y o  male  80-year-old male with past medical history of rheumatoid arthritis presents complaining of 4 day history of left knee swelling  No inciting injuries or trauma  Patient does work installing drywall  He is frequently on his knees and does not wear knee protectors  No open wound or laceration  No fevers or chills  Left knee is warm and swollen  He is able to ambulate but it is painful while kneeling  Patient remains on Enbrel for rheumatoid arthritis    He has been taking ibuprofen without any significant      Past Medical History:   Diagnosis Date    Arthritis     At risk for falls     Broken foot     right    Cataract     giacomo    [facility] [address] [facility]     [facility]     Hyperlipidemia     Hypertension     Kidney stone     Neuropathy     Rheumatoid arthritis (HCC)     Seasonal allergies     Snores     Uses wheelchair     and crutches- NWB RLE    Wears glasses        Family History   Problem Relation Age of Onset    Diabetes Mother     Stroke Mother     Mental illness Mother     Diabetes Father     Stroke Father     Alcohol abuse Brother     Coronary artery disease Family         Age 51-55    Diabetes type II Family     Heart disease Family        Past Surgical History:   Procedure Laterality Date    APPENDECTOMY      CLOSED REDUCTION FOOT DISLOCATION Right 2/12/2019    Procedure: C/R FRACTURE;  Surgeon: Isela Somers DPM;  [facility]: [facility];  Service: Podiatry    COLONOSCOPY      FOOT SURGERY Right 07/2019    Removal of the bone graft and cleaned out infection, and placed new bone graft    UT EXC SKIN MALIG <0 5 CM REMAINDER BODY N/A 3/28/2018    Procedure: EXCISION WIDE LESION HEAD/FACIAL/NECK;  Surgeon: Yeyo Askew MD;  [facility];  Service: Surgical Oncology    UT GASTROCNEMIUS RECESSION Right 2/12/2019    Procedure: ENDO GASTROC RECESSION, APPLICATION OF EXTERNAL FIXATOR;  Surgeon: Ozzy Gary DPM;  [facility];  Service: Podiatry    OR REMOVE EXTERN BONE FIX DEV W ANESTH Right 4/18/2019    Procedure: FRAME REMOVAL HARDWARE FOOT WITH APPLICATION OF GRAFT;  Surgeon: Ozzy Gary DPM;  [facility];  Service: Podiatry    SKIN BIOPSY      scalp   [address]        reports that he has been smoking cigarettes  He has a 40 00 pack-year smoking history  He has never used smokeless tobacco  He reports current alcohol use of about 1 0 standard drinks of alcohol per week  He reports current drug use  Drug: Marijuana        Current Outpatient Medications:     Accu-Chek FastClix Lancets MISC, TEST BLOOD SUGAR FOUR TIMES A DAY BEFORE MEALS AND BEDTIME, Disp: 102 each, Rfl: 3    Accu-Chek Guide test strip, TEST BEFORE MEALS AND AT BEDTIME, Disp: 100 each, Rfl: 3    Adalimumab (Humira Pen) 40 MG/0 4ML PNKT, Humira(CF) Pen 40 mg/0 4 mL subcutaneous kit, Disp: , Rfl:     albuterol (PROVENTIL HFA,VENTOLIN HFA) 90 mcg/act inhaler, INHALE 2 PUFFS EVERY 4 (FOUR) HOURS AS NEEDED FOR WHEEZING, Disp: 18 g, Rfl: 1    atorvastatin (LIPITOR) 40 mg tablet, TAKE 1 TABLET AT BEDTIME , Disp: 90 tablet, Rfl: 1    Basaglar KwikPen 100 units/mL injection pen, INJECT 50 UNITS UNDER THE SKIN EVERY 12 (TWELVE) HOURS, Disp: 30 mL, Rfl: 1    BD Pen Needle Maureen U/F 32G X 4 MM MISC, ONE PEN NEEDLE THREE TIMES A DAY 2-BASAGLAR 1- VICTOZA, Disp: 100 each, Rfl: 3    calcium carbonate (OS-NERY) 600 MG tablet, Take 600 mg by mouth 2 (two) times a day with meals, Disp: , Rfl:     clindamycin (CLEOCIN T) 1 % external solution, Apply topically to scalp and beard twice a day, Disp: 60 mL, Rfl: 11    cyclobenzaprine (FLEXERIL) 10 mg tablet, TAKE 1 TABLET (10 MG) BY MOUTH 3 (THREE) TIMES A DAY AS NEEDED FOR MUSCLE SPASMS, Disp: 90 tablet, Rfl: 3    ergocalciferol (VITAMIN D2) 50,000 units, Take 50,000 Units by mouth once a week , Disp: , Rfl:    fluticasone (FLONASE) 50 mcg/act nasal spray, USE 2 SPRAYS IN EACH NOSTRIL ONCE DAILY, Disp: 16 g, Rfl: 11    hydroxychloroquine (PLAQUENIL) 200 mg tablet, Take 200 mg by mouth 2 (two) times a day  , Disp: , Rfl:     Januvia 100 MG tablet, TAKE ONE TABLET EVERY DAY, Disp: 90 tablet, Rfl: 1    leflunomide (ARAVA) 20 MG tablet, Take 1 tablet by mouth daily with dinner On hold for surgery, Disp: , Rfl:     losartan-hydrochlorothiazide (HYZAAR) 100-12 5 MG per tablet, TAKE 1 TABLET BY MOUTH DAILY, Disp: 90 tablet, Rfl: 1    naproxen (NAPROSYN) 500 mg tablet, Take 1 tablet by mouth every 12 (twelve) hours, Disp: , Rfl:     pantoprazole (PROTONIX) 40 mg tablet, Take 1 tablet (40 mg total) by mouth daily, Disp: 30 tablet, Rfl: 11    sucralfate (CARAFATE) 1 g tablet, TAKE 1 TABLET (1 G TOTAL) BY MOUTH 4 (FOUR) TIMES A DAY, Disp: 120 tablet, Rfl: 4    ULTICARE MICRO PEN NEEDLES 32G X 4 MM MISC, INJECT UNDER THE SKIN 3 (THREE) TIMES A DAY, Disp: 100 each, Rfl: 3    Victoza injection, INJECT 1 8 MG DAILY, Disp: 9 mL, Rfl: 4    The following portions of the patient's history were reviewed and updated as appropriate: allergies, current medications, past family history, past medical history, past social history, past surgical history and problem list     Review of Systems   Constitutional: Negative  Negative for chills and fever  Musculoskeletal: Positive for arthralgias ( left knee) and joint swelling (Left knee)  Negative for gait problem  Objective:    Temp (!) 97 3 °F (36 3 °C) (Tympanic)   Ht 6' (1 829 m)   Wt 113 kg (250 lb)   BMI 33 91 kg/m²      Physical Exam  Vitals signs and nursing note reviewed  Constitutional:       General: He is not in acute distress  Appearance: Normal appearance  He is obese  He is not ill-appearing or toxic-appearing  HENT:      Head: Normocephalic and atraumatic  Musculoskeletal:         General: Swelling and tenderness present   No deformity or signs of injury  Left knee: He exhibits swelling, effusion (Prepatellar) and erythema  He exhibits normal range of motion, no laceration, normal patellar mobility and normal meniscus  No medial joint line, no lateral joint line, no MCL and no LCL tenderness noted  Legs:    Neurological:      Mental Status: He is alert             Recent Results (from the past 1008 hour(s))   CBC and differential    Collection Time: 03/05/21  8:19 AM   Result Value Ref Range    WBC 7 04 4 31 - 10 16 Thousand/uL    RBC 5 59 3 88 - 5 62 Million/uL    Hemoglobin 16 8 12 0 - 17 0 g/dL    Hematocrit 50 0 (H) 36 5 - 49 3 %    MCV 89 82 - 98 fL    MCH 30 1 26 8 - 34 3 pg    MCHC 33 6 31 4 - 37 4 g/dL    RDW 13 3 11 6 - 15 1 %    MPV 10 7 8 9 - 12 7 fL    Platelets 177 631 - 419 Thousands/uL    nRBC 0 /100 WBCs    Neutrophils Relative 58 43 - 75 %    Immat GRANS % 0 0 - 2 %    Lymphocytes Relative 24 14 - 44 %    Monocytes Relative 14 (H) 4 - 12 %    Eosinophils Relative 3 0 - 6 %    Basophils Relative 1 0 - 1 %    Neutrophils Absolute 4 08 1 85 - 7 62 Thousands/µL    Immature Grans Absolute 0 03 0 00 - 0 20 Thousand/uL    Lymphocytes Absolute 1 68 0 60 - 4 47 Thousands/µL    Monocytes Absolute 0 95 0 17 - 1 22 Thousand/µL    Eosinophils Absolute 0 21 0 00 - 0 61 Thousand/µL    Basophils Absolute 0 09 0 00 - 0 10 Thousands/µL   Comprehensive metabolic panel    Collection Time: 03/05/21  8:19 AM   Result Value Ref Range    Sodium 139 136 - 145 mmol/L    Potassium 3 9 3 5 - 5 3 mmol/L    Chloride 105 100 - 108 mmol/L    CO2 28 21 - 32 mmol/L    ANION GAP 6 4 - 13 mmol/L    BUN 25 5 - 25 mg/dL    Creatinine 1 07 0 60 - 1 30 mg/dL    Glucose, Fasting 174 (H) 65 - 99 mg/dL    Calcium 9 3 8 3 - 10 1 mg/dL    AST 23 5 - 45 U/L    ALT 45 12 - 78 U/L    Alkaline Phosphatase 50 46 - 116 U/L    Total Protein 7 4 6 4 - 8 2 g/dL    Albumin 4 1 3 5 - 5 0 g/dL    Total Bilirubin 0 53 0 20 - 1 00 mg/dL    eGFR 79 ml/min/1 73sq m   Hemoglobin A1C Collection Time: 03/05/21  8:19 AM   Result Value Ref Range    Hemoglobin A1C 7 4 (H) Normal 3 8-5 6%; PreDiabetic 5 7-6 4%; Diabetic >=6 5%; Glycemic control for adults with diabetes <7 0% %     mg/dl   Lipid panel    Collection Time: 03/05/21  8:19 AM   Result Value Ref Range    Cholesterol 134 50 - 200 mg/dL    Triglycerides 225 (H) <=150 mg/dL    HDL, Direct 40 >=40 mg/dL    LDL Calculated 49 0 - 100 mg/dL    Non-HDL-Chol (CHOL-HDL) 94 mg/dl   TSH, 3rd generation with Free T4 reflex    Collection Time: 03/05/21  8:19 AM   Result Value Ref Range    TSH 3RD GENERATON 1 830 0 358 - 3 740 uIU/mL   Microalbumin / creatinine urine ratio    Collection Time: 03/05/21  8:19 AM   Result Value Ref Range    Creatinine, Ur 265 0 mg/dL    Microalbum  ,U,Random 21 0 (H) 0 0 - 20 0 mg/L    Microalb Creat Ratio 8 0 - 30 mg/g creatinine   PSA, Total Screen    Collection Time: 04/01/21  9:02 AM   Result Value Ref Range    PSA 0 2 0 0 - 4 0 ng/mL   C-reactive protein    Collection Time: 04/01/21  9:02 AM   Result Value Ref Range    CRP <3 0 <3 0 mg/L   Sedimentation rate, automated    Collection Time: 04/01/21  9:02 AM   Result Value Ref Range    Sed Rate 15 0 - 19 mm/hour   Comprehensive metabolic panel    Collection Time: 04/01/21  9:02 AM   Result Value Ref Range    Sodium 137 136 - 145 mmol/L    Potassium 4 2 3 5 - 5 3 mmol/L    Chloride 108 100 - 108 mmol/L    CO2 23 21 - 32 mmol/L    ANION GAP 6 4 - 13 mmol/L    BUN 19 5 - 25 mg/dL    Creatinine 1 08 0 60 - 1 30 mg/dL    Glucose, Fasting 160 (H) 65 - 99 mg/dL    Calcium 9 2 8 3 - 10 1 mg/dL    AST 31 5 - 45 U/L    ALT 41 12 - 78 U/L    Alkaline Phosphatase 46 46 - 116 U/L    Total Protein 7 2 6 4 - 8 2 g/dL    Albumin 3 7 3 5 - 5 0 g/dL    Total Bilirubin 0 58 0 20 - 1 00 mg/dL    eGFR 78 ml/min/1 73sq m   CBC and differential    Collection Time: 04/01/21  9:02 AM   Result Value Ref Range    WBC 7 16 4 31 - 10 16 Thousand/uL    RBC 5 30 3 88 - 5 62 Million/uL Hemoglobin 15 8 12 0 - 17 0 g/dL    Hematocrit 47 5 36 5 - 49 3 %    MCV 90 82 - 98 fL    MCH 29 8 26 8 - 34 3 pg    MCHC 33 3 31 4 - 37 4 g/dL    RDW 13 8 11 6 - 15 1 %    MPV 11 8 8 9 - 12 7 fL    Platelets 631 382 - 307 Thousands/uL    nRBC 0 /100 WBCs    Neutrophils Relative 61 43 - 75 %    Immat GRANS % 0 0 - 2 %    Lymphocytes Relative 20 14 - 44 %    Monocytes Relative 15 (H) 4 - 12 %    Eosinophils Relative 3 0 - 6 %    Basophils Relative 1 0 - 1 %    Neutrophils Absolute 4 37 1 85 - 7 62 Thousands/µL    Immature Grans Absolute 0 03 0 00 - 0 20 Thousand/uL    Lymphocytes Absolute 1 41 0 60 - 4 47 Thousands/µL    Monocytes Absolute 1 06 0 17 - 1 22 Thousand/µL    Eosinophils Absolute 0 22 0 00 - 0 61 Thousand/µL    Basophils Absolute 0 07 0 00 - 0 10 Thousands/µL       Assessment/Plan:    Large joint arthrocentesis: L knee  Universal Protocol:  Consent: Verbal consent obtained  Consent given by: patient  Patient understanding: patient states understanding of the procedure being performed  Patient identity confirmed: verbally with patient    Supporting Documentation  Indications: pain   Procedure Details  Location: knee - L knee  Preparation: Patient was prepped and draped in the usual sterile fashion  Needle size: 18 G  Ultrasound guidance: no  Approach: lateral  Medications administered: 1 mL lidocaine 1 %; 1 mL methylPREDNISolone acetate 40 mg/mL    Aspirate: blood-tinged  Analysis: fluid sample sent for laboratory analysis    Patient tolerance: patient tolerated the procedure well with no immediate complications  Dressing:  Sterile dressing applied        Prepatellar bursitis of left knee  - aspiration performed as per procedure note  10 cc of blood-tinged fluid removed which will be sent for synovial fluid analysis    -doubtful that this is infectious etiology    Patient was given injection of dexamethasone with lidocaine    -patient will contact the office to notify of improvement and otherwise will await results of fluid analysis          Problem List Items Addressed This Visit        Musculoskeletal and Integument    Prepatellar bursitis of left knee - Primary     - aspiration performed as per procedure note  10 cc of blood-tinged fluid removed which will be sent for synovial fluid analysis    -doubtful that this is infectious etiology    Patient was given injection of dexamethasone with lidocaine    -patient will contact the office to notify of improvement and otherwise will await results of fluid analysis         Relevant Orders    Synovial fluid white cell count w/ diff    Synovial fluid, crystal    Body fluid culture and Gram stain

## 2021-04-16 NOTE — ASSESSMENT & PLAN NOTE
- aspiration performed as per procedure note  10 cc of blood-tinged fluid removed which will be sent for synovial fluid analysis    -doubtful that this is infectious etiology    Patient was given injection of dexamethasone with lidocaine    -patient will contact the office to notify of improvement and otherwise will await results of fluid analysis

## 2021-04-18 LAB
APPEARANCE SNV: ABNORMAL
BASOPHILS NFR SNV MANUAL: 0 %
COLOR SNV: ABNORMAL
CRYSTALS SNV MICRO: ABNORMAL /HPF
EOSINOPHIL NFR SNV MANUAL: 0 % (ref 0–2)
LYMPHOCYTES NFR SNV MANUAL: 7 % (ref 0–74)
MONOS+MACROS NFR SNV MANUAL: 5 % (ref 0–69)
MUCIN CLOT SNV QL: ABNORMAL
NEUTROPHILS NFR SNV MANUAL: 88 % (ref 0–24)
SPECIMEN SOURCE: ABNORMAL
SYNOVIOCYTES NFR SNV: 0 % (ref 0–15)
WBC # SNV MANUAL: 1185 CELLS/UL

## 2021-04-19 ENCOUNTER — TELEPHONE (OUTPATIENT)
Dept: FAMILY MEDICINE CLINIC | Facility: CLINIC | Age: 56
End: 2021-04-19

## 2021-04-19 NOTE — TELEPHONE ENCOUNTER
PER PT STATES SWELLING HAS DECREASED A TON! JUST WALKING HSI PAIN FALLS AT LIKE A 1-2 BUT IF ON IT 8-9

## 2021-04-19 NOTE — TELEPHONE ENCOUNTER
----- Message from Kamran Figueroa DO sent at 4/19/2021  7:47 AM EDT -----  Please call the patient regarding his abnormal result  No crystals are seen  Increased number of white blood cells which can be consistent with his rheumatoid  Find out how his knee is doing after steroid injection    Thank you

## 2021-04-22 ENCOUNTER — APPOINTMENT (OUTPATIENT)
Dept: RADIOLOGY | Facility: CLINIC | Age: 56
End: 2021-04-22
Payer: COMMERCIAL

## 2021-04-22 ENCOUNTER — APPOINTMENT (OUTPATIENT)
Dept: LAB | Facility: CLINIC | Age: 56
End: 2021-04-22
Payer: COMMERCIAL

## 2021-04-22 ENCOUNTER — TRANSCRIBE ORDERS (OUTPATIENT)
Dept: LAB | Facility: CLINIC | Age: 56
End: 2021-04-22

## 2021-04-22 ENCOUNTER — OFFICE VISIT (OUTPATIENT)
Dept: FAMILY MEDICINE CLINIC | Facility: CLINIC | Age: 56
End: 2021-04-22
Payer: COMMERCIAL

## 2021-04-22 VITALS
SYSTOLIC BLOOD PRESSURE: 128 MMHG | TEMPERATURE: 98.1 F | OXYGEN SATURATION: 99 % | WEIGHT: 249 LBS | BODY MASS INDEX: 33.72 KG/M2 | RESPIRATION RATE: 16 BRPM | DIASTOLIC BLOOD PRESSURE: 76 MMHG | HEIGHT: 72 IN | HEART RATE: 98 BPM

## 2021-04-22 DIAGNOSIS — M70.42 PREPATELLAR BURSITIS OF LEFT KNEE: Primary | ICD-10-CM

## 2021-04-22 DIAGNOSIS — M70.42 PREPATELLAR BURSITIS OF LEFT KNEE: ICD-10-CM

## 2021-04-22 DIAGNOSIS — M25.462 SWELLING OF JOINT, KNEE, LEFT: ICD-10-CM

## 2021-04-22 LAB
BASOPHILS # BLD AUTO: 0.1 THOUSANDS/ΜL (ref 0–0.1)
BASOPHILS NFR BLD AUTO: 1 % (ref 0–1)
CRP SERPL QL: 33.7 MG/L
EOSINOPHIL # BLD AUTO: 0.17 THOUSAND/ΜL (ref 0–0.61)
EOSINOPHIL NFR BLD AUTO: 1 % (ref 0–6)
ERYTHROCYTE [DISTWIDTH] IN BLOOD BY AUTOMATED COUNT: 13.2 % (ref 11.6–15.1)
ERYTHROCYTE [SEDIMENTATION RATE] IN BLOOD: 39 MM/HOUR (ref 0–19)
HCT VFR BLD AUTO: 47.3 % (ref 36.5–49.3)
HGB BLD-MCNC: 15.8 G/DL (ref 12–17)
IMM GRANULOCYTES # BLD AUTO: 0.08 THOUSAND/UL (ref 0–0.2)
IMM GRANULOCYTES NFR BLD AUTO: 1 % (ref 0–2)
LYMPHOCYTES # BLD AUTO: 1.19 THOUSANDS/ΜL (ref 0.6–4.47)
LYMPHOCYTES NFR BLD AUTO: 9 % (ref 14–44)
MCH RBC QN AUTO: 29.7 PG (ref 26.8–34.3)
MCHC RBC AUTO-ENTMCNC: 33.4 G/DL (ref 31.4–37.4)
MCV RBC AUTO: 89 FL (ref 82–98)
MONOCYTES # BLD AUTO: 1.32 THOUSAND/ΜL (ref 0.17–1.22)
MONOCYTES NFR BLD AUTO: 10 % (ref 4–12)
NEUTROPHILS # BLD AUTO: 10.27 THOUSANDS/ΜL (ref 1.85–7.62)
NEUTS SEG NFR BLD AUTO: 78 % (ref 43–75)
NRBC BLD AUTO-RTO: 0 /100 WBCS
PLATELET # BLD AUTO: 251 THOUSANDS/UL (ref 149–390)
PMV BLD AUTO: 11.3 FL (ref 8.9–12.7)
RBC # BLD AUTO: 5.32 MILLION/UL (ref 3.88–5.62)
URATE SERPL-MCNC: 2.8 MG/DL (ref 4.2–8)
WBC # BLD AUTO: 13.13 THOUSAND/UL (ref 4.31–10.16)

## 2021-04-22 PROCEDURE — 36415 COLL VENOUS BLD VENIPUNCTURE: CPT

## 2021-04-22 PROCEDURE — 3078F DIAST BP <80 MM HG: CPT | Performed by: FAMILY MEDICINE

## 2021-04-22 PROCEDURE — 84550 ASSAY OF BLOOD/URIC ACID: CPT

## 2021-04-22 PROCEDURE — 3074F SYST BP LT 130 MM HG: CPT | Performed by: FAMILY MEDICINE

## 2021-04-22 PROCEDURE — 73562 X-RAY EXAM OF KNEE 3: CPT

## 2021-04-22 PROCEDURE — 85025 COMPLETE CBC W/AUTO DIFF WBC: CPT

## 2021-04-22 PROCEDURE — 3008F BODY MASS INDEX DOCD: CPT | Performed by: FAMILY MEDICINE

## 2021-04-22 PROCEDURE — 86140 C-REACTIVE PROTEIN: CPT

## 2021-04-22 PROCEDURE — 99214 OFFICE O/P EST MOD 30 MIN: CPT | Performed by: FAMILY MEDICINE

## 2021-04-22 PROCEDURE — 4004F PT TOBACCO SCREEN RCVD TLK: CPT | Performed by: FAMILY MEDICINE

## 2021-04-22 PROCEDURE — 85652 RBC SED RATE AUTOMATED: CPT

## 2021-04-22 PROCEDURE — 86618 LYME DISEASE ANTIBODY: CPT

## 2021-04-22 RX ORDER — HYDROCODONE BITARTRATE AND ACETAMINOPHEN 5; 325 MG/1; MG/1
1 TABLET ORAL EVERY 6 HOURS PRN
Qty: 20 TABLET | Refills: 0 | Status: ON HOLD | OUTPATIENT
Start: 2021-04-22 | End: 2021-05-01 | Stop reason: SDUPTHER

## 2021-04-22 RX ORDER — PREDNISONE 20 MG/1
40 TABLET ORAL DAILY
Qty: 10 TABLET | Refills: 0 | Status: SHIPPED | OUTPATIENT
Start: 2021-04-22 | End: 2021-05-01 | Stop reason: HOSPADM

## 2021-04-22 NOTE — PROGRESS NOTES
Subjective:      Patient ID: Shana Kidd  is a 50 y o  male  30-year-old male presents for re-evaluation of pain and swelling of his left knee  Patient does have history of rheumatoid arthritis  On April 15th I had performed arthrocentesis and intra-articular corticosteroid injection  He did have improvement in his pain for 2-3 days but now the swelling is worse  Aspiration showed 10 cc of hazy fluid  There was a predominance of white cells  No bacteria  No crystals    Patient states that the pain has recurred and now has swelling into his left calf      Past Medical History:   Diagnosis Date    Arthritis     At risk for falls     Broken foot     right    Cataract     giacomo    [facility]ronic obstructive [facility] disease) [facility] [address] [facility]     [facility]     Hyperlipidemia     Hypertension     Kidney stone     Neuropathy     Rheumatoid arthritis (HCC)     Seasonal allergies     Snores     Uses wheelchair     and crutches- NWB RLE    Wears glasses        Family History   Problem Relation Age of Onset    Diabetes Mother     Stroke Mother     Mental illness Mother     Diabetes Father     Stroke Father     Alcohol abuse Brother     Coronary artery disease Family         Age 51-55    Diabetes type II Family     Heart disease Family        Past Surgical History:   Procedure Laterality Date    APPENDECTOMY      CLOSED REDUCTION FOOT DISLOCATION Right 2/12/2019    Procedure: C/R FRACTURE;  Surgeon: Andrea Harper DPM;  [facility];  Service: Podiatry    COLONOSCOPY      FOOT SURGERY Right 07/2019    Removal of the bone graft and cleaned out infection, and placed new bone graft    CA EXC SKIN MALIG <0 5 CM REMAINDER BODY N/A 3/28/2018    Procedure: EXCISION WIDE LESION HEAD/FACIAL/NECK;  Surgeon: Alesia Logan MD;  [facility];  Service: Surgical Oncology    CA GASTROCNEMIUS RECESSION Right 2/12/2019    Procedure: ENDO GASTROC RECESSION, APPLICATION OF EXTERNAL FIXATOR;  Surgeon: Issac Lin DPM;  [facility];  Service: Podiatry    WY REMOVE EXTERN BONE FIX DEV W ANESTH Right 4/18/2019    Procedure: FRAME REMOVAL HARDWARE FOOT WITH APPLICATION OF GRAFT;  Surgeon: Issac Lin DPM;  [facility];  Service: Podiatry    SKIN BIOPSY      scalp   [address]        reports that he has been smoking cigarettes  He has a 40 00 pack-year smoking history  He has never used smokeless tobacco  He reports current alcohol use of about 1 0 standard drinks of alcohol per week  He reports current drug use  Drug: Marijuana        Current Outpatient Medications:     Accu-Chek FastClix Lancets MISC, TEST BLOOD SUGAR FOUR TIMES A DAY BEFORE MEALS AND BEDTIME, Disp: 102 each, Rfl: 3    Accu-Chek Guide test strip, TEST BEFORE MEALS AND AT BEDTIME, Disp: 100 each, Rfl: 3    Adalimumab (Humira Pen) 40 MG/0 4ML PNKT, Humira(CF) Pen 40 mg/0 4 mL subcutaneous kit, Disp: , Rfl:     albuterol (PROVENTIL HFA,VENTOLIN HFA) 90 mcg/act inhaler, INHALE 2 PUFFS EVERY 4 (FOUR) HOURS AS NEEDED FOR WHEEZING, Disp: 18 g, Rfl: 1    atorvastatin (LIPITOR) 40 mg tablet, TAKE 1 TABLET AT BEDTIME , Disp: 90 tablet, Rfl: 1    Basaglar KwikPen 100 units/mL injection pen, INJECT 50 UNITS UNDER THE SKIN EVERY 12 (TWELVE) HOURS, Disp: 30 mL, Rfl: 1    BD Pen Needle Maureen U/F 32G X 4 MM MISC, ONE PEN NEEDLE THREE TIMES A DAY 2-BASAGLAR 1- VICTOZA, Disp: 100 each, Rfl: 3    calcium carbonate (OS-NERY) 600 MG tablet, Take 600 mg by mouth 2 (two) times a day with meals, Disp: , Rfl:     clindamycin (CLEOCIN T) 1 % external solution, Apply topically to scalp and beard twice a day, Disp: 60 mL, Rfl: 11    cyclobenzaprine (FLEXERIL) 10 mg tablet, TAKE 1 TABLET (10 MG) BY MOUTH 3 (THREE) TIMES A DAY AS NEEDED FOR MUSCLE SPASMS, Disp: 90 tablet, Rfl: 3    ergocalciferol (VITAMIN D2) 50,000 units, Take 50,000 Units by mouth once a week , Disp: , Rfl:     fluticasone (FLONASE) 50 mcg/act nasal spray, USE 2 SPRAYS IN EACH NOSTRIL ONCE DAILY, Disp: 16 g, Rfl: 11    HYDROcodone-acetaminophen (NORCO) 5-325 mg per tablet, Take 1 tablet by mouth every 6 (six) hours as needed for painMax Daily Amount: 4 tablets, Disp: 20 tablet, Rfl: 0    hydroxychloroquine (PLAQUENIL) 200 mg tablet, Take 200 mg by mouth 2 (two) times a day  , Disp: , Rfl:     Januvia 100 MG tablet, TAKE ONE TABLET EVERY DAY, Disp: 90 tablet, Rfl: 1    leflunomide (ARAVA) 20 MG tablet, Take 1 tablet by mouth daily with dinner On hold for surgery, Disp: , Rfl:     losartan-hydrochlorothiazide (HYZAAR) 100-12 5 MG per tablet, TAKE 1 TABLET BY MOUTH DAILY, Disp: 90 tablet, Rfl: 1    naproxen (NAPROSYN) 500 mg tablet, Take 1 tablet by mouth every 12 (twelve) hours, Disp: , Rfl:     pantoprazole (PROTONIX) 40 mg tablet, Take 1 tablet (40 mg total) by mouth daily, Disp: 30 tablet, Rfl: 11    predniSONE 20 mg tablet, Take 2 tablets (40 mg total) by mouth daily for 5 days, Disp: 10 tablet, Rfl: 0    sucralfate (CARAFATE) 1 g tablet, TAKE 1 TABLET (1 G TOTAL) BY MOUTH 4 (FOUR) TIMES A DAY, Disp: 120 tablet, Rfl: 4    ULTICARE MICRO PEN NEEDLES 32G X 4 MM MISC, INJECT UNDER THE SKIN 3 (THREE) TIMES A DAY, Disp: 100 each, Rfl: 3    Victoza injection, INJECT 1 8 MG DAILY, Disp: 9 mL, Rfl: 4    The following portions of the patient's history were reviewed and updated as appropriate: allergies, current medications, past family history, past medical history, past social history, past surgical history and problem list     Review of Systems   Constitutional: Negative for chills and fever  Cardiovascular: Positive for leg swelling ( left lower extremity swelling)  Musculoskeletal: Positive for arthralgias (Left knee) and joint swelling ( left knee)  Negative for gait problem             Objective:    /76   Pulse 98   Temp 98 1 °F (36 7 °C) (Tympanic)   Resp 16   Ht 6' (1 829 m)   Wt 113 kg (249 lb)   SpO2 99% BMI 33 77 kg/m²      Physical Exam  Vitals signs and nursing note reviewed  Constitutional:       General: He is not in acute distress  Appearance: Normal appearance  He is obese  He is not ill-appearing, toxic-appearing or diaphoretic  Musculoskeletal:         General: Swelling ( left knee, picture in media) and tenderness ( mildly tender  No overt erythema  Able to bear weight) present  Right lower leg: No edema  Left lower leg: Edema (3+ pitting) present  Neurological:      Mental Status: He is alert             Recent Results (from the past 1008 hour(s))   PSA, Total Screen    Collection Time: 04/01/21  9:02 AM   Result Value Ref Range    PSA 0 2 0 0 - 4 0 ng/mL   C-reactive protein    Collection Time: 04/01/21  9:02 AM   Result Value Ref Range    CRP <3 0 <3 0 mg/L   Sedimentation rate, automated    Collection Time: 04/01/21  9:02 AM   Result Value Ref Range    Sed Rate 15 0 - 19 mm/hour   Comprehensive metabolic panel    Collection Time: 04/01/21  9:02 AM   Result Value Ref Range    Sodium 137 136 - 145 mmol/L    Potassium 4 2 3 5 - 5 3 mmol/L    Chloride 108 100 - 108 mmol/L    CO2 23 21 - 32 mmol/L    ANION GAP 6 4 - 13 mmol/L    BUN 19 5 - 25 mg/dL    Creatinine 1 08 0 60 - 1 30 mg/dL    Glucose, Fasting 160 (H) 65 - 99 mg/dL    Calcium 9 2 8 3 - 10 1 mg/dL    AST 31 5 - 45 U/L    ALT 41 12 - 78 U/L    Alkaline Phosphatase 46 46 - 116 U/L    Total Protein 7 2 6 4 - 8 2 g/dL    Albumin 3 7 3 5 - 5 0 g/dL    Total Bilirubin 0 58 0 20 - 1 00 mg/dL    eGFR 78 ml/min/1 73sq m   CBC and differential    Collection Time: 04/01/21  9:02 AM   Result Value Ref Range    WBC 7 16 4 31 - 10 16 Thousand/uL    RBC 5 30 3 88 - 5 62 Million/uL    Hemoglobin 15 8 12 0 - 17 0 g/dL    Hematocrit 47 5 36 5 - 49 3 %    MCV 90 82 - 98 fL    MCH 29 8 26 8 - 34 3 pg    MCHC 33 3 31 4 - 37 4 g/dL    RDW 13 8 11 6 - 15 1 %    MPV 11 8 8 9 - 12 7 fL    Platelets 852 788 - 517 Thousands/uL    nRBC 0 /100 WBCs Neutrophils Relative 61 43 - 75 %    Immat GRANS % 0 0 - 2 %    Lymphocytes Relative 20 14 - 44 %    Monocytes Relative 15 (H) 4 - 12 %    Eosinophils Relative 3 0 - 6 %    Basophils Relative 1 0 - 1 %    Neutrophils Absolute 4 37 1 85 - 7 62 Thousands/µL    Immature Grans Absolute 0 03 0 00 - 0 20 Thousand/uL    Lymphocytes Absolute 1 41 0 60 - 4 47 Thousands/µL    Monocytes Absolute 1 06 0 17 - 1 22 Thousand/µL    Eosinophils Absolute 0 22 0 00 - 0 61 Thousand/µL    Basophils Absolute 0 07 0 00 - 0 10 Thousands/µL   Novel Coronavirus (Covid-19),PCR [facility] - Collected at   [facility] [address] or [facility]    Collection Time: 04/16/21  8:49 AM    Specimen: Nose; Nares   Result Value Ref Range    SARS-CoV-2 Negative Negative   Complete Synovial Fluid Analysis    Collection Time: 04/16/21  9:14 AM   Result Value Ref Range    Site LEFT KNEE     Color UA BRYCE (A) STRAW/YELLOW    Appearance HAZY CLEAR/HAZY    Total Nucleated Cell Count 1,185 (H) <150 cells/uL    Neutrophils % 88 (H) 0 - 24 %    Lymphocytes, % 7 0 - 74 %    Monocyte/Macrophage, % 5 0 - 69 %    Eosinophils, % 0 0 - 2 %    Basophils, % 0 0 %    Synoviocyte, % 0 0 - 15 %    Crystals, Synovial Fluid NONE SEEN NONE SEEN /HPF    Mucin Clot POOR (A) GOOD       Assessment/Plan:    Prepatellar bursitis of left knee  -once again I suspect inflammatory arthropathy  Patient does have history of rheumatoid arthritis  -patient will be placed on pulse course prednisone 40 mg daily  Will be sent for x-ray of the left knee  Will send for labs including CBC, sed rate, C-reactive protein, Lyme serology as well as uric acid    -may need to contact Rheumatology for further evaluation  Patient is not febrile  There is no break in his skin  Doubtful for infectious etiology    Swelling of joint, knee, left  -once again I suspect inflammatory arthropathy  Patient does have history of rheumatoid arthritis  -patient will be placed on pulse course prednisone 40 mg daily    Will be sent for x-ray of the left knee  Will send for labs including CBC, sed rate, C-reactive protein, Lyme serology as well as uric acid    -may need to contact Rheumatology for further evaluation  Patient is not febrile  There is no break in his skin  Doubtful for infectious etiology          Problem List Items Addressed This Visit        Musculoskeletal and Integument    Prepatellar bursitis of left knee - Primary     -once again I suspect inflammatory arthropathy  Patient does have history of rheumatoid arthritis  -patient will be placed on pulse course prednisone 40 mg daily  Will be sent for x-ray of the left knee  Will send for labs including CBC, sed rate, C-reactive protein, Lyme serology as well as uric acid    -may need to contact Rheumatology for further evaluation  Patient is not febrile  There is no break in his skin  Doubtful for infectious etiology         Relevant Orders    XR knee 3 vw left non injury       Other    Swelling of joint, knee, left     -once again I suspect inflammatory arthropathy  Patient does have history of rheumatoid arthritis  -patient will be placed on pulse course prednisone 40 mg daily  Will be sent for x-ray of the left knee  Will send for labs including CBC, sed rate, C-reactive protein, Lyme serology as well as uric acid    -may need to contact Rheumatology for further evaluation  Patient is not febrile  There is no break in his skin    Doubtful for infectious etiology         Relevant Medications    predniSONE 20 mg tablet    HYDROcodone-acetaminophen (NORCO) 5-325 mg per tablet    Other Relevant Orders    CBC and differential    C-reactive protein    Sedimentation rate, automated    Lyme Antibody Profile with reflex to WB    Uric acid

## 2021-04-22 NOTE — ASSESSMENT & PLAN NOTE
-once again I suspect inflammatory arthropathy  Patient does have history of rheumatoid arthritis  -patient will be placed on pulse course prednisone 40 mg daily  Will be sent for x-ray of the left knee  Will send for labs including CBC, sed rate, C-reactive protein, Lyme serology as well as uric acid    -may need to contact Rheumatology for further evaluation  Patient is not febrile  There is no break in his skin    Doubtful for infectious etiology

## 2021-04-23 DIAGNOSIS — E11.9 TYPE 2 DIABETES MELLITUS WITHOUT COMPLICATION, WITHOUT LONG-TERM CURRENT USE OF INSULIN (HCC): ICD-10-CM

## 2021-04-23 DIAGNOSIS — I10 ESSENTIAL HYPERTENSION: ICD-10-CM

## 2021-04-23 LAB — B BURGDOR IGG+IGM SER-ACNC: 15

## 2021-04-23 RX ORDER — LOSARTAN POTASSIUM AND HYDROCHLOROTHIAZIDE 12.5; 1 MG/1; MG/1
1 TABLET ORAL DAILY
Qty: 90 TABLET | Refills: 1 | Status: SHIPPED | OUTPATIENT
Start: 2021-04-23 | End: 2021-09-27 | Stop reason: SDUPTHER

## 2021-04-23 RX ORDER — INSULIN GLARGINE 100 [IU]/ML
INJECTION, SOLUTION SUBCUTANEOUS
Qty: 30 ML | Refills: 1 | Status: SHIPPED | OUTPATIENT
Start: 2021-04-23 | End: 2021-06-25

## 2021-04-26 ENCOUNTER — HOSPITAL ENCOUNTER (INPATIENT)
Facility: HOSPITAL | Age: 56
LOS: 5 days | Discharge: HOME | DRG: 710 | End: 2021-05-01
Attending: EMERGENCY MEDICINE | Admitting: INTERNAL MEDICINE
Payer: COMMERCIAL

## 2021-04-26 ENCOUNTER — APPOINTMENT (EMERGENCY)
Dept: RADIOLOGY | Facility: HOSPITAL | Age: 56
DRG: 710 | End: 2021-04-26
Payer: COMMERCIAL

## 2021-04-26 ENCOUNTER — APPOINTMENT (EMERGENCY)
Dept: ULTRASOUND IMAGING | Facility: HOSPITAL | Age: 56
DRG: 710 | End: 2021-04-26
Payer: COMMERCIAL

## 2021-04-26 DIAGNOSIS — M70.42 PREPATELLAR BURSITIS OF LEFT KNEE: ICD-10-CM

## 2021-04-26 DIAGNOSIS — M25.562 ACUTE PAIN OF LEFT KNEE: Primary | ICD-10-CM

## 2021-04-26 DIAGNOSIS — M25.462 SWELLING OF JOINT, KNEE, LEFT: ICD-10-CM

## 2021-04-26 DIAGNOSIS — L03.116 CELLULITIS OF LEFT LOWER EXTREMITY: ICD-10-CM

## 2021-04-26 PROBLEM — A41.9 SEPSIS (HCC): Status: ACTIVE | Noted: 2021-04-26

## 2021-04-26 LAB
ALBUMIN SERPL BCP-MCNC: 3.3 G/DL (ref 3.5–5)
ALP SERPL-CCNC: 76 U/L (ref 46–116)
ALT SERPL W P-5'-P-CCNC: 37 U/L (ref 12–78)
ANION GAP SERPL CALCULATED.3IONS-SCNC: 11 MMOL/L (ref 4–13)
APPEARANCE FLD: CLEAR
AST SERPL W P-5'-P-CCNC: 17 U/L (ref 5–45)
BASOPHILS # BLD AUTO: 0.04 THOUSANDS/ΜL (ref 0–0.1)
BASOPHILS NFR BLD AUTO: 0 % (ref 0–1)
BILIRUB SERPL-MCNC: 0.62 MG/DL (ref 0.2–1)
BUN SERPL-MCNC: 24 MG/DL (ref 5–25)
CALCIUM ALBUM COR SERPL-MCNC: 9.5 MG/DL (ref 8.3–10.1)
CALCIUM SERPL-MCNC: 8.9 MG/DL (ref 8.3–10.1)
CHLORIDE SERPL-SCNC: 96 MMOL/L (ref 100–108)
CO2 SERPL-SCNC: 26 MMOL/L (ref 21–32)
COLOR FLD: NORMAL
CREAT SERPL-MCNC: 1.16 MG/DL (ref 0.6–1.3)
CRP SERPL QL: 87.2 MG/L
CRYSTALS SNV QL MICRO: NORMAL
D DIMER PPP FEU-MCNC: 1.58 UG/ML FEU
EOSINOPHIL # BLD AUTO: 0 THOUSAND/ΜL (ref 0–0.61)
EOSINOPHIL NFR BLD AUTO: 0 % (ref 0–6)
ERYTHROCYTE [DISTWIDTH] IN BLOOD BY AUTOMATED COUNT: 13 % (ref 11.6–15.1)
ERYTHROCYTE [SEDIMENTATION RATE] IN BLOOD: 68 MM/HOUR (ref 0–19)
GFR SERPL CREATININE-BSD FRML MDRD: 71 ML/MIN/1.73SQ M
GLUCOSE SERPL-MCNC: 186 MG/DL (ref 65–140)
GLUCOSE SERPL-MCNC: 344 MG/DL (ref 65–140)
HCT VFR BLD AUTO: 45 % (ref 36.5–49.3)
HGB BLD-MCNC: 15.7 G/DL (ref 12–17)
IMM GRANULOCYTES # BLD AUTO: 0.29 THOUSAND/UL (ref 0–0.2)
IMM GRANULOCYTES NFR BLD AUTO: 1 % (ref 0–2)
LACTATE SERPL-SCNC: 1.7 MMOL/L (ref 0.5–2)
LYMPHOCYTES # BLD AUTO: 0.82 THOUSANDS/ΜL (ref 0.6–4.47)
LYMPHOCYTES # SNV MANUAL: 100 %
LYMPHOCYTES NFR BLD AUTO: 4 % (ref 14–44)
MCH RBC QN AUTO: 30.4 PG (ref 26.8–34.3)
MCHC RBC AUTO-ENTMCNC: 34.9 G/DL (ref 31.4–37.4)
MCV RBC AUTO: 87 FL (ref 82–98)
MONOCYTES # BLD AUTO: 1.04 THOUSAND/ΜL (ref 0.17–1.22)
MONOCYTES NFR BLD AUTO: 5 % (ref 4–12)
NEUTROPHILS # BLD AUTO: 19.47 THOUSANDS/ΜL (ref 1.85–7.62)
NEUTS SEG NFR BLD AUTO: 90 % (ref 43–75)
NRBC BLD AUTO-RTO: 0 /100 WBCS
PLATELET # BLD AUTO: 246 THOUSANDS/UL (ref 149–390)
PLATELET # BLD AUTO: 253 THOUSANDS/UL (ref 149–390)
PMV BLD AUTO: 10.2 FL (ref 8.9–12.7)
PMV BLD AUTO: 10.4 FL (ref 8.9–12.7)
POTASSIUM SERPL-SCNC: 3.9 MMOL/L (ref 3.5–5.3)
PROCALCITONIN SERPL-MCNC: 0.06 NG/ML
PROT SERPL-MCNC: 7.9 G/DL (ref 6.4–8.2)
RBC # BLD AUTO: 5.16 MILLION/UL (ref 3.88–5.62)
SITE: NORMAL
SODIUM SERPL-SCNC: 133 MMOL/L (ref 136–145)
TOTAL CELLS COUNTED SPEC: 5
WBC # BLD AUTO: 21.66 THOUSAND/UL (ref 4.31–10.16)
WBC # FLD MANUAL: 15 /UL (ref 0–200)

## 2021-04-26 PROCEDURE — 85025 COMPLETE CBC W/AUTO DIFF WBC: CPT | Performed by: EMERGENCY MEDICINE

## 2021-04-26 PROCEDURE — 87070 CULTURE OTHR SPECIMN AEROBIC: CPT | Performed by: EMERGENCY MEDICINE

## 2021-04-26 PROCEDURE — 99285 EMERGENCY DEPT VISIT HI MDM: CPT

## 2021-04-26 PROCEDURE — 84145 PROCALCITONIN (PCT): CPT | Performed by: EMERGENCY MEDICINE

## 2021-04-26 PROCEDURE — 89060 EXAM SYNOVIAL FLUID CRYSTALS: CPT | Performed by: EMERGENCY MEDICINE

## 2021-04-26 PROCEDURE — 85049 AUTOMATED PLATELET COUNT: CPT | Performed by: PHYSICIAN ASSISTANT

## 2021-04-26 PROCEDURE — 80053 COMPREHEN METABOLIC PANEL: CPT | Performed by: EMERGENCY MEDICINE

## 2021-04-26 PROCEDURE — 82948 REAGENT STRIP/BLOOD GLUCOSE: CPT

## 2021-04-26 PROCEDURE — 76882 US LMTD JT/FCL EVL NVASC XTR: CPT

## 2021-04-26 PROCEDURE — 83605 ASSAY OF LACTIC ACID: CPT | Performed by: INTERNAL MEDICINE

## 2021-04-26 PROCEDURE — 20610 DRAIN/INJ JOINT/BURSA W/O US: CPT | Performed by: EMERGENCY MEDICINE

## 2021-04-26 PROCEDURE — 96374 THER/PROPH/DIAG INJ IV PUSH: CPT

## 2021-04-26 PROCEDURE — 96375 TX/PRO/DX INJ NEW DRUG ADDON: CPT

## 2021-04-26 PROCEDURE — 36415 COLL VENOUS BLD VENIPUNCTURE: CPT

## 2021-04-26 PROCEDURE — 73560 X-RAY EXAM OF KNEE 1 OR 2: CPT

## 2021-04-26 PROCEDURE — 99223 1ST HOSP IP/OBS HIGH 75: CPT | Performed by: INTERNAL MEDICINE

## 2021-04-26 PROCEDURE — 85652 RBC SED RATE AUTOMATED: CPT | Performed by: EMERGENCY MEDICINE

## 2021-04-26 PROCEDURE — 0S9D3ZZ DRAINAGE OF LEFT KNEE JOINT, PERCUTANEOUS APPROACH: ICD-10-PCS | Performed by: EMERGENCY MEDICINE

## 2021-04-26 PROCEDURE — 87205 SMEAR GRAM STAIN: CPT | Performed by: EMERGENCY MEDICINE

## 2021-04-26 PROCEDURE — 99285 EMERGENCY DEPT VISIT HI MDM: CPT | Performed by: EMERGENCY MEDICINE

## 2021-04-26 PROCEDURE — 87040 BLOOD CULTURE FOR BACTERIA: CPT | Performed by: INTERNAL MEDICINE

## 2021-04-26 PROCEDURE — 85379 FIBRIN DEGRADATION QUANT: CPT | Performed by: PHYSICIAN ASSISTANT

## 2021-04-26 PROCEDURE — 86140 C-REACTIVE PROTEIN: CPT | Performed by: EMERGENCY MEDICINE

## 2021-04-26 PROCEDURE — 89051 BODY FLUID CELL COUNT: CPT | Performed by: EMERGENCY MEDICINE

## 2021-04-26 RX ORDER — PANTOPRAZOLE SODIUM 40 MG/1
40 TABLET, DELAYED RELEASE ORAL
Status: DISCONTINUED | OUTPATIENT
Start: 2021-04-27 | End: 2021-05-01 | Stop reason: HOSPADM

## 2021-04-26 RX ORDER — HYDROMORPHONE HCL/PF 1 MG/ML
0.5 SYRINGE (ML) INJECTION ONCE
Status: COMPLETED | OUTPATIENT
Start: 2021-04-26 | End: 2021-04-26

## 2021-04-26 RX ORDER — CYCLOBENZAPRINE HCL 10 MG
10 TABLET ORAL 3 TIMES DAILY PRN
Status: DISCONTINUED | OUTPATIENT
Start: 2021-04-26 | End: 2021-05-01 | Stop reason: HOSPADM

## 2021-04-26 RX ORDER — OXYCODONE HYDROCHLORIDE 5 MG/1
5 TABLET ORAL EVERY 6 HOURS PRN
Status: DISCONTINUED | OUTPATIENT
Start: 2021-04-26 | End: 2021-04-28

## 2021-04-26 RX ORDER — ATORVASTATIN CALCIUM 40 MG/1
40 TABLET, FILM COATED ORAL
Status: DISCONTINUED | OUTPATIENT
Start: 2021-04-26 | End: 2021-05-01 | Stop reason: HOSPADM

## 2021-04-26 RX ORDER — INSULIN GLARGINE 100 [IU]/ML
50 INJECTION, SOLUTION SUBCUTANEOUS EVERY 12 HOURS SCHEDULED
Status: DISCONTINUED | OUTPATIENT
Start: 2021-04-26 | End: 2021-05-01 | Stop reason: HOSPADM

## 2021-04-26 RX ORDER — ACETAMINOPHEN 325 MG/1
650 TABLET ORAL EVERY 6 HOURS PRN
Status: DISCONTINUED | OUTPATIENT
Start: 2021-04-26 | End: 2021-04-28

## 2021-04-26 RX ORDER — ALBUTEROL SULFATE 90 UG/1
2 AEROSOL, METERED RESPIRATORY (INHALATION) EVERY 4 HOURS PRN
Status: DISCONTINUED | OUTPATIENT
Start: 2021-04-26 | End: 2021-05-01 | Stop reason: HOSPADM

## 2021-04-26 RX ORDER — CALCIUM CARBONATE 500(1250)
1 TABLET ORAL
Status: DISCONTINUED | OUTPATIENT
Start: 2021-04-27 | End: 2021-05-01 | Stop reason: HOSPADM

## 2021-04-26 RX ORDER — OXYCODONE HYDROCHLORIDE 10 MG/1
10 TABLET ORAL EVERY 6 HOURS PRN
Status: DISCONTINUED | OUTPATIENT
Start: 2021-04-26 | End: 2021-05-01 | Stop reason: HOSPADM

## 2021-04-26 RX ORDER — HYDROXYCHLOROQUINE SULFATE 200 MG/1
200 TABLET, FILM COATED ORAL 2 TIMES DAILY
Status: DISCONTINUED | OUTPATIENT
Start: 2021-04-26 | End: 2021-05-01 | Stop reason: HOSPADM

## 2021-04-26 RX ORDER — ASCORBIC ACID 500 MG
1000 TABLET ORAL DAILY
Status: DISCONTINUED | OUTPATIENT
Start: 2021-04-27 | End: 2021-05-01 | Stop reason: HOSPADM

## 2021-04-26 RX ORDER — LIDOCAINE HYDROCHLORIDE 10 MG/ML
5 INJECTION, SOLUTION EPIDURAL; INFILTRATION; INTRACAUDAL; PERINEURAL ONCE
Status: COMPLETED | OUTPATIENT
Start: 2021-04-26 | End: 2021-04-26

## 2021-04-26 RX ORDER — MORPHINE SULFATE 4 MG/ML
4 INJECTION, SOLUTION INTRAMUSCULAR; INTRAVENOUS ONCE
Status: DISCONTINUED | OUTPATIENT
Start: 2021-04-26 | End: 2021-04-26

## 2021-04-26 RX ORDER — NICOTINE 21 MG/24HR
21 PATCH, TRANSDERMAL 24 HOURS TRANSDERMAL ONCE
Status: COMPLETED | OUTPATIENT
Start: 2021-04-26 | End: 2021-04-27

## 2021-04-26 RX ORDER — ONDANSETRON 2 MG/ML
4 INJECTION INTRAMUSCULAR; INTRAVENOUS EVERY 6 HOURS PRN
Status: DISCONTINUED | OUTPATIENT
Start: 2021-04-26 | End: 2021-05-01 | Stop reason: HOSPADM

## 2021-04-26 RX ORDER — MULTIVIT WITH MINERALS/LUTEIN
1000 TABLET ORAL DAILY
COMMUNITY

## 2021-04-26 RX ORDER — FLUTICASONE PROPIONATE 50 MCG
2 SPRAY, SUSPENSION (ML) NASAL DAILY
Status: DISCONTINUED | OUTPATIENT
Start: 2021-04-27 | End: 2021-05-01 | Stop reason: HOSPADM

## 2021-04-26 RX ORDER — CALCIUM CARBONATE 200(500)MG
1000 TABLET,CHEWABLE ORAL DAILY PRN
Status: DISCONTINUED | OUTPATIENT
Start: 2021-04-26 | End: 2021-05-01 | Stop reason: HOSPADM

## 2021-04-26 RX ORDER — SUCRALFATE 1 G/1
1 TABLET ORAL 4 TIMES DAILY
Status: DISCONTINUED | OUTPATIENT
Start: 2021-04-26 | End: 2021-05-01 | Stop reason: HOSPADM

## 2021-04-26 RX ORDER — LEFLUNOMIDE 20 MG/1
20 TABLET ORAL
Status: DISCONTINUED | OUTPATIENT
Start: 2021-04-27 | End: 2021-04-29

## 2021-04-26 RX ORDER — NICOTINE 21 MG/24HR
1 PATCH, TRANSDERMAL 24 HOURS TRANSDERMAL DAILY
Status: DISCONTINUED | OUTPATIENT
Start: 2021-04-27 | End: 2021-04-26 | Stop reason: SDUPTHER

## 2021-04-26 RX ADMIN — VANCOMYCIN HYDROCHLORIDE 2000 MG: 1 INJECTION, POWDER, LYOPHILIZED, FOR SOLUTION INTRAVENOUS at 19:20

## 2021-04-26 RX ADMIN — CEFTRIAXONE SODIUM 1000 MG: 10 INJECTION, POWDER, FOR SOLUTION INTRAVENOUS at 18:17

## 2021-04-26 RX ADMIN — OXYCODONE HYDROCHLORIDE 10 MG: 10 TABLET ORAL at 21:39

## 2021-04-26 RX ADMIN — NICOTINE 21 MG: 21 PATCH, EXTENDED RELEASE TRANSDERMAL at 18:17

## 2021-04-26 RX ADMIN — ATORVASTATIN CALCIUM 40 MG: 40 TABLET, FILM COATED ORAL at 21:39

## 2021-04-26 RX ADMIN — INSULIN GLARGINE 50 UNITS: 100 INJECTION, SOLUTION SUBCUTANEOUS at 21:39

## 2021-04-26 RX ADMIN — HYDROMORPHONE HYDROCHLORIDE 0.5 MG: 1 INJECTION, SOLUTION INTRAMUSCULAR; INTRAVENOUS; SUBCUTANEOUS at 17:19

## 2021-04-26 RX ADMIN — HYDROXYCHLOROQUINE SULFATE 200 MG: 200 TABLET, FILM COATED ORAL at 21:44

## 2021-04-26 RX ADMIN — LIDOCAINE HYDROCHLORIDE 5 ML: 10 INJECTION, SOLUTION EPIDURAL; INFILTRATION; INTRACAUDAL at 17:58

## 2021-04-26 RX ADMIN — SUCRALFATE 1 G: 1 TABLET ORAL at 21:39

## 2021-04-26 NOTE — ED PROVIDER NOTES
History  Chief Complaint   Patient presents with    Knee Swelling     Pt states that he had his knee drained and he states it is now swollen down his leg and up into his hip area  Patient is a 49 y/o male comes to the Emergency Department for evalution of knee pain  Patient has a past medical history of DM, RA, HTN,Hyperlipidemia, right Charcot foot, 2 weeks ago he started to experience pain in his left knee, initially he thought it was a flare up of his arthritis, received 1x steroids IV without significant improvement, subsequently he had an arthrocentesis , aspiration showed 10 cc hazy fluid w/ wbc predominance , no bacteria or crystals, ESR and CRP were elevated at that time uric acid was low, patient was initiated on oral steroid therapy however pain was noted to be worsening described as sharp 10/10 intensity w/ Erythema, effusion, tenderness, calor in the knee as well as edema in the rest of the extremity, the pain seems to radiate to the midthigh were  induration is also noted  patient is capable to bear weight and ambulate however there is sensation of the knee giving up,  denied any locking, popping, h/o trauma, new onset of rashes/ tick bites, mentioned some elevated body temperatures however this was not quantified, he also mentioned multiple staph infections in the past and uncontrolled blood glucose levels  On admission patient is noted w/ elevated bp, afebrile, tachycardic, leukocytosis,  , there is presence of warmth, exquisite tenderness, painful effusion and marked pain with range of motion( for visual reference please refer to media)  Possible etiologies include sepsis in the setting of septic arthritis,cellulitis,  Acute inflammatory arthropathy  Leukocytosis can be due to his underlying RA as well as steroid therapy    Patients case was discussed informarly via TT with ortho, arthrocentesis was performed, 10cc clear fluid extracted wbc 15 u which was reassuring, US soft tissues:Subcutaneous edema without discrete fluid collection to suggest the presence of abscess  patient has remained stable, empiric antibiotic therapy w/ vancomycin and rocephin initiated, patient will be admitted for further management           Prior to Admission Medications   Prescriptions Last Dose Informant Patient Reported? Taking?    Accu-Chek FastClix Lancets MISC Past Month at Unknown time  No Yes   Sig: TEST BLOOD SUGAR FOUR TIMES A DAY BEFORE MEALS AND BEDTIME   Accu-Chek Guide test strip Past Month at Unknown time  No Yes   Sig: TEST BEFORE MEALS AND AT BEDTIME   Adalimumab (Humira Pen) 40 MG/0 4ML PNKT Not Taking at Unknown time Self Yes No   Sig: Humira(CF) Pen 40 mg/0 4 mL subcutaneous kit   Ascorbic Acid (vitamin C) 1000 MG tablet 4/26/2021 at Unknown time  Yes Yes   Sig: Take 1,000 mg by mouth daily   BD Pen Needle Maureen U/F 32G X 4 MM MISC 4/26/2021 at Unknown time  No Yes   Sig: ONE PEN NEEDLE THREE TIMES A DAY 2-BASAGLAR 1- VICTOZA   Basaglar KwikPen 100 units/mL injection pen 4/26/2021 at Unknown time  No Yes   Sig: INJECT 50 UNITS UNDER THE SKIN EVERY 12 (TWELVE) HOURS   HYDROcodone-acetaminophen (NORCO) 5-325 mg per tablet 4/26/2021 at Unknown time  No Yes   Sig: Take 1 tablet by mouth every 6 (six) hours as needed for painMax Daily Amount: 4 tablets   Januvia 100 MG tablet 4/25/2021 at Unknown time  No Yes   Sig: TAKE ONE TABLET EVERY DAY   ULTICARE MICRO PEN NEEDLES 32G X 4 MM MISC 4/26/2021 at Unknown time Self No Yes   Sig: INJECT UNDER THE SKIN 3 (THREE) TIMES A DAY   Victoza injection 4/25/2021 at Unknown time  No Yes   Sig: INJECT 1 8 MG DAILY   albuterol (PROVENTIL HFA,VENTOLIN HFA) 90 mcg/act inhaler 4/26/2021 at Unknown time  No Yes   Sig: INHALE 2 PUFFS EVERY 4 (FOUR) HOURS AS NEEDED FOR WHEEZING   atorvastatin (LIPITOR) 40 mg tablet 4/25/2021 at Unknown time  No Yes   Sig: TAKE 1 TABLET AT BEDTIME    calcium carbonate (OS-NERY) 600 MG tablet 4/26/2021 at Unknown time  Yes Yes Sig: Take 600 mg by mouth daily Dinnertime   clindamycin (CLEOCIN T) 1 % external solution Not Taking at Unknown time  No No   Sig: Apply topically to scalp and beard twice a day   Patient not taking: Reported on 4/26/2021   cyclobenzaprine (FLEXERIL) 10 mg tablet Past Month at Unknown time  No Yes   Sig: TAKE 1 TABLET (10 MG) BY MOUTH 3 (THREE) TIMES A DAY AS NEEDED FOR MUSCLE SPASMS   ergocalciferol (VITAMIN D2) 50,000 units Past Week at Unknown time Self Yes Yes   Sig: Take 50,000 Units by mouth once a week    fluticasone (FLONASE) 50 mcg/act nasal spray 4/25/2021 at Unknown time  No Yes   Sig: USE 2 SPRAYS IN EACH NOSTRIL ONCE DAILY   hydroxychloroquine (PLAQUENIL) 200 mg tablet 4/26/2021 at Unknown time Self Yes Yes   Sig: Take 200 mg by mouth 2 (two) times a day     leflunomide (ARAVA) 20 MG tablet 4/25/2021 at Unknown time Self Yes Yes   Sig: Take 1 tablet by mouth daily with dinner On hold for surgery   losartan-hydrochlorothiazide (HYZAAR) 100-12 5 MG per tablet 4/26/2021 at Unknown time  No Yes   Sig: TAKE 1 TABLET BY MOUTH DAILY   naproxen (NAPROSYN) 500 mg tablet 4/26/2021 at Unknown time Self Yes Yes   Sig: Take 1 tablet by mouth every 12 (twelve) hours   pantoprazole (PROTONIX) 40 mg tablet 4/26/2021 at Unknown time  No Yes   Sig: Take 1 tablet (40 mg total) by mouth daily   predniSONE 20 mg tablet Not Taking at Unknown time  No No   Sig: Take 2 tablets (40 mg total) by mouth daily for 5 days   Patient not taking: Reported on 4/26/2021   sucralfate (CARAFATE) 1 g tablet 4/26/2021 at Unknown time  No Yes   Sig: TAKE 1 TABLET (1 G TOTAL) BY MOUTH 4 (FOUR) TIMES A DAY      Facility-Administered Medications: None       Past Medical History:   Diagnosis Date    Arthritis     At risk for falls     Broken foot     right    Cataract     giacomo    [facility]     Diabetes mellitus ([facility]     Hyperlipidemia     Hypertension     Kidney stone     Neuropathy     [facility]     Seasonal allergies     Snores     Uses wheelchair     and crutches- NWB RLE    Wears glasses        Past Surgical History:   Procedure Laterality Date    APPENDECTOMY      CLOSED REDUCTION FOOT DISLOCATION Right 2/12/2019    Procedure: C/R FRACTURE;  Surgeon: Tamra Homans, DPM;  Location: [facility];  Service: Podiatry    COLONOSCOPY      FOOT SURGERY Right 07/2019    Removal of the bone graft and cleaned out infection, and placed new bone graft    DC EXC SKIN MALIG <0 5 CM REMAINDER BODY N/A 3/28/2018    Procedure: EXCISION WIDE LESION HEAD/FACIAL/NECK;  Surgeon: Jia Fonseca MD;  [facility];  Service: Surgical Oncology    DC GASTROCNEMIUS RECESSION Right 2/12/2019    Procedure: ENDO GASTROC RECESSION, APPLICATION OF EXTERNAL FIXATOR;  Surgeon: Tamra Homans, DPM;  [facility];  Service: Podiatry    DC REMOVE EXTERN BONE FIX DEV W ANESTH Right 4/18/2019    Procedure: FRAME REMOVAL HARDWARE FOOT WITH APPLICATION OF GRAFT;  Surgeon: Tamra Homans, DPM;  [facility];  Service: Podiatry    SKIN BIOPSY      scalp    WISDOM TOOTH EXTRACTION  1998       Family History   Problem Relation Age of Onset    Diabetes Mother     Stroke Mother     Mental illness Mother     Diabetes Father     Stroke Father     Alcohol abuse Brother     Coronary artery disease Family         Age 51-55    Diabetes type II Family     Heart disease Family      I have reviewed and agree with the history as documented  E-Cigarette/Vaping    E-Cigarette Use Current Every Day User      E-Cigarette/Vaping Substances    THC Yes      Social History     Tobacco Use    Smoking status: Current Every Day Smoker     Packs/day: 1 00     Years: 40 00     Pack years: 40 00     Types: Cigarettes    Smokeless tobacco: Never Used   Substance Use Topics    Alcohol use:  Yes     Alcohol/week: 1 0 standard drinks     Types: 1 Cans of beer per week     Frequency: Monthly or less Drinks per session: 1 or 2     Binge frequency: Never     Comment: beer    Drug use: Yes     Types: Marijuana        Review of Systems   Constitutional: Positive for activity change and fever (not quantified)  Musculoskeletal: Positive for arthralgias (left knee pain) and gait problem  Skin:        Erythema, effusion   All other systems reviewed and are negative  Physical Exam  ED Triage Vitals   Temperature Pulse Respirations Blood Pressure SpO2   04/26/21 1432 04/26/21 1432 04/26/21 1432 04/26/21 1432 04/26/21 1432   98 1 °F (36 7 °C) (!) 118 18 (!) 193/94 100 %      Temp src Heart Rate Source Patient Position - Orthostatic VS BP Location FiO2 (%)   -- 04/26/21 1432 04/26/21 1816 04/26/21 1816 --    Monitor Lying Right arm       Pain Score       04/26/21 1432       9             Orthostatic Vital Signs  Vitals:    04/26/21 1432 04/26/21 1816   BP: (!) 193/94 138/83   Pulse: (!) 118 100   Patient Position - Orthostatic VS:  Lying       Physical Exam  Vitals signs and nursing note reviewed  Constitutional:       General: He is in acute distress (mild distress due to pain)  Appearance: Normal appearance  He is normal weight  He is not toxic-appearing  HENT:      Head: Normocephalic and atraumatic  Right Ear: Tympanic membrane normal       Left Ear: Tympanic membrane normal       Nose: Nose normal  No congestion or rhinorrhea  Mouth/Throat:      Mouth: Mucous membranes are moist       Pharynx: Oropharynx is clear  No oropharyngeal exudate or posterior oropharyngeal erythema  Eyes:      Extraocular Movements: Extraocular movements intact  Conjunctiva/sclera: Conjunctivae normal       Pupils: Pupils are equal, round, and reactive to light  Neck:      Musculoskeletal: Normal range of motion and neck supple  Cardiovascular:      Rate and Rhythm: Tachycardia present  Pulses: Normal pulses  Heart sounds: No murmur  No gallop      Pulmonary:      Effort: Pulmonary effort is normal  No respiratory distress  Breath sounds: Normal breath sounds  No wheezing or rales  Chest:      Chest wall: No tenderness  Abdominal:      General: Bowel sounds are normal  There is no distension  Palpations: Abdomen is soft  Tenderness: There is no abdominal tenderness  Musculoskeletal:      Comments: Extremities are symmetric, evidence of erythema compromising the left knee, painful effusion, warmth, rom ( flexion) limited due to pain, no crepitus noted, induration compromising lateral aspect of the thigh,  patient capable to bear wt and ambulate, distal pulses are preserved  Skin:     General: Skin is warm  Capillary Refill: Capillary refill takes 2 to 3 seconds  Neurological:      Mental Status: He is oriented to person, place, and time  Psychiatric:         Mood and Affect: Mood normal          Behavior: Behavior normal          Thought Content: Thought content normal          Judgment: Judgment normal          ED Medications  Medications   nicotine (NICODERM CQ) 21 mg/24 hr TD 24 hr patch 21 mg (21 mg Transdermal Medication Applied 4/26/21 1817)   ceftriaxone (ROCEPHIN) 1 g/50 mL in dextrose IVPB (1,000 mg Intravenous New Bag 4/26/21 1817)   vancomycin (VANCOCIN) 2,000 mg in sodium chloride 0 9 % 500 mL IVPB (has no administration in time range)   HYDROmorphone (DILAUDID) injection 0 5 mg (0 5 mg Intravenous Given 4/26/21 1719)   lidocaine (PF) (XYLOCAINE-MPF) 1 % injection 5 mL (5 mL Infiltration Given by Other 4/26/21 1758)       Diagnostic Studies  Results Reviewed     Procedure Component Value Units Date/Time    Synovial fluid white cell count w/ diff [[id number]] Collected: 04/26/21 1820    Lab Status: No result Specimen: Synovial Fluid from Joint, Left Knee     Body fluid culture and Gram stain [[id number]] Collected: 04/26/21 1820    Lab Status: No result Specimen:  Body Fluid from Joint, Left Knee     Synovial fluid, crystal [[id number]] Collected: 04/26/21 1820 Lab Status: No result Specimen: Synovial Fluid from Joint, Left Knee     Lactic acid [[id number]]  (Normal) Collected: 04/26/21 1704    Lab Status: Final result Specimen: Blood from Arm, Left Updated: 04/26/21 1815     LACTIC ACID 1 7 mmol/L     Narrative:      Result may be elevated if tourniquet was used during collection  Sedimentation rate, automated [[id number]]  (Abnormal) Collected: 04/26/21 1440    Lab Status: Final result Specimen: Blood from Arm, Right Updated: 04/26/21 1746     Sed Rate 68 mm/hour     C-reactive protein [[id number]] Collected: 04/26/21 1440    Lab Status: In process Specimen: Blood from Arm, Right Updated: 04/26/21 1739    Blood culture #1 [[id number]] Collected: 04/26/21 1704    Lab Status: In process Specimen: Blood from Arm, Right Updated: 04/26/21 1707    Blood culture #2 [[id number]] Collected: 04/26/21 1704    Lab Status: In process Specimen: Blood from Arm, Left Updated: 04/26/21 1707    CBC and differential [[id number]]  (Abnormal) Collected: 04/26/21 1440    Lab Status: Final result Specimen: Blood from Arm, Right Updated: 04/26/21 1531     WBC 21 66 Thousand/uL      RBC 5 16 Million/uL      Hemoglobin 15 7 g/dL      Hematocrit 45 0 %      MCV 87 fL      MCH 30 4 pg      MCHC 34 9 g/dL      RDW 13 0 %      MPV 10 2 fL      Platelets 738 Thousands/uL      nRBC 0 /100 WBCs      Neutrophils Relative 90 %      Immat GRANS % 1 %      Lymphocytes Relative 4 %      Monocytes Relative 5 %      Eosinophils Relative 0 %      Basophils Relative 0 %      Neutrophils Absolute 19 47 Thousands/µL      Immature Grans Absolute 0 29 Thousand/uL      Lymphocytes Absolute 0 82 Thousands/µL      Monocytes Absolute 1 04 Thousand/µL      Eosinophils Absolute 0 00 Thousand/µL      Basophils Absolute 0 04 Thousands/µL     Narrative: This is an appended report  These results have been appended to a previously verified report      Comprehensive metabolic panel [[id number]]  (Abnormal) Collected: 04/26/21 1440    Lab Status: Final result Specimen: Blood from Arm, Right Updated: 04/26/21 1511     Sodium 133 mmol/L      Potassium 3 9 mmol/L      Chloride 96 mmol/L      CO2 26 mmol/L      ANION GAP 11 mmol/L      BUN 24 mg/dL      Creatinine 1 16 mg/dL      Glucose 344 mg/dL      Calcium 8 9 mg/dL      Corrected Calcium 9 5 mg/dL      AST 17 U/L      ALT 37 U/L      Alkaline Phosphatase 76 U/L      Total Protein 7 9 g/dL      Albumin 3 3 g/dL      Total Bilirubin 0 62 mg/dL      eGFR 71 ml/min/1 73sq m     Narrative:      [place] guidelines for Chronic Kidney Disease (CKD):     Stage 1 with normal or high GFR (GFR > 90 mL/min/1 73 square meters)    Stage 2 Mild CKD (GFR = 60-89 mL/min/1 73 square meters)    Stage 3A Moderate CKD (GFR = 45-59 mL/min/1 73 square meters)    Stage 3B Moderate CKD (GFR = 30-44 mL/min/1 73 square meters)    Stage 4 Severe CKD (GFR = 15-29 mL/min/1 73 square meters)    Stage 5 End Stage CKD (GFR <15 mL/min/1 73 square meters)  Note: GFR calculation is accurate only with a steady state creatinine    Procalcitonin with AM Reflex [[id number]] Collected: 04/26/21 1440    Lab Status:  In process Specimen: Blood from Arm, Right Updated: 04/26/21 1442                 XR knee 1 or 2 views left    (Results Pending)   US extremity soft tissue    (Results Pending)         Procedures  Procedures      ED Course                                       MDM    Disposition  Final diagnoses:   Acute pain of left knee     Time reflects when diagnosis was documented in both MDM as applicable and the Disposition within this note     Time User Action Codes Description Comment    4/26/2021  6:21 PM Madhavi Godinez Add [[id number]] Acute pain of left knee       ED Disposition     ED Disposition Condition Date/Time Comment    Admit Stable Mon Apr 26, 2021  6:22 PM Case was discussed with Dr Nia Vang and the patient's admission status was agreed to be Admission to the service of Dr Mendoza [facility]        Follow-up Information    None         Patient's Medications   Discharge Prescriptions    No medications on file     No discharge procedures on file  PDMP Review     None           ED Provider  Attending physically available and evaluated Erika Cobb I managed the patient along with the ED Attending      Electronically Signed by         Tonya Adhikari MD  04/26/21 0183       Tonya Adhikari MD  04/26/21 2470 Tammy Zarate MD  04/26/21 2010

## 2021-04-26 NOTE — ED ATTENDING ATTESTATION
4/26/2021  I, Blair Tillman MD, saw and evaluated the patient  I have discussed the patient with the resident/non-physician practitioner and agree with the resident's/non-physician practitioner's findings, Plan of Care, and MDM as documented in the resident's/non-physician practitioner's note, except where noted  All available labs and Radiology studies were reviewed  I was present for key portions of any procedure(s) performed by the resident/non-physician practitioner and I was immediately available to provide assistance  At this point I agree with the current assessment done in the Emergency Department  I have conducted an independent evaluation of this patient a history and physical is as follows:    82-year-old male with history of rheumatoid arthritis and insulin-dependent diabetes presenting for evaluation of left knee redness and pain  Patient was seen by his PCP on April 14th and had an arthrocentesis performed  Fluid removed at that point was more consistent with inflammatory arthritis rather than septic joint  He received a dose of intra-articular cortisone then was started on oral prednisone 4 days ago when his symptoms persisted  He presents here today because of new erythema over the knee joint that has spread up the lateral aspect of his left thigh with associated pain  Reports discomfort with ranging the knee though he is able to bear weight and ambulate  No fever but had some chills last night  Denies nausea, vomiting or systemic symptoms  Please see clinical photo below for picture of patient's knee  There is cellulitis overlying the knee that spares the lateral upper quadrant  I am able to passively range the knee but with significant discomfort  Active range of motion limited by pain  2+ pedal pulses  Intact sensation to light touch in the peripheral nerve distributions of the left lower extremity  There is a moderate knee effusion    Erythema with induration present over the lateral thigh  Compartments of the thigh are soft  Patient has a leukocytosis to 21, ESR of 68 with CRP pending  While elevated inflammatory markers could be secondary to patient's known rheumatoid arthritis and leukocytosis could be secondary to steroid use, given erythema to the joint and increasing pain despite steroid therapy I am concerned for septic joint  Case discussed with Dr Andre Mendez with orthopedic surgery who recommends arthrocentesis  Arthrocentesis performed to the medial aspect of the knee over an area without cellulitis  10 cc of clear yellow fluid obtained  Fluid studies with 15 WBC, not consistent w septic joint  Pt was empirically covered with vancomycin and Rocephin for possible septic joint prior to results of fluid studies  Soft tissue ultrasound of the left lateral thigh without drainable fluid collection and consistent with cellulitis  Patient meets criteria for sepsis but not severe sepsis  He was admitted to Medicine for further management      ED Course         Critical Care Time  Arthrocentesis    Date/Time: 4/26/2021 9:57 PM  Performed by: Yanely Sanabria MD  Authorized by: Yanely Sanabria MD     Location:  Bedside  Verbal consent obtained?: Yes    Risks and benefits: Risks, benefits and alternatives were discussed    Consent given by:  Patient  Indications:  Possible septic joint and diagnostic evaluation  Body area:  Knee  Joint:  Left knee  Local anesthesia used?: Yes    Anesthesia:  Local infiltration  Local anesthetic:  Lidocaine 1% without epinephrine  Anesthetic total (ml):  2  Preparation: Patient was prepped and draped in usual sterile fashion    Needle size:  18 G  Ultrasound guidance: No    Approach:  Medial  Aspirate amount (ml):  10  Aspirate:  Yellow and clear  Patient tolerance:  Patient tolerated the procedure well with no immediate complications

## 2021-04-27 LAB
ANION GAP SERPL CALCULATED.3IONS-SCNC: 9 MMOL/L (ref 4–13)
APPEARANCE FLD: ABNORMAL
BUN SERPL-MCNC: 17 MG/DL (ref 5–25)
CALCIUM SERPL-MCNC: 8.4 MG/DL (ref 8.3–10.1)
CHLORIDE SERPL-SCNC: 100 MMOL/L (ref 100–108)
CO2 SERPL-SCNC: 29 MMOL/L (ref 21–32)
COLOR FLD: ABNORMAL
CREAT SERPL-MCNC: 1.03 MG/DL (ref 0.6–1.3)
CRYSTALS SNV QL MICRO: NORMAL
ERYTHROCYTE [DISTWIDTH] IN BLOOD BY AUTOMATED COUNT: 13 % (ref 11.6–15.1)
GFR SERPL CREATININE-BSD FRML MDRD: 83 ML/MIN/1.73SQ M
GLUCOSE SERPL-MCNC: 141 MG/DL (ref 65–140)
GLUCOSE SERPL-MCNC: 285 MG/DL (ref 65–140)
HCT VFR BLD AUTO: 41.5 % (ref 36.5–49.3)
HGB BLD-MCNC: 14.2 G/DL (ref 12–17)
LYMPHOCYTES # SNV MANUAL: 15 %
MCH RBC QN AUTO: 29.7 PG (ref 26.8–34.3)
MCHC RBC AUTO-ENTMCNC: 34.2 G/DL (ref 31.4–37.4)
MCV RBC AUTO: 87 FL (ref 82–98)
MONOCYTES NFR SNV MANUAL: 20 %
NEUTROPHILS NFR SNV MANUAL: 65 %
PLATELET # BLD AUTO: 238 THOUSANDS/UL (ref 149–390)
PMV BLD AUTO: 10.2 FL (ref 8.9–12.7)
POTASSIUM SERPL-SCNC: 3.5 MMOL/L (ref 3.5–5.3)
PROCALCITONIN SERPL-MCNC: 0.05 NG/ML
RBC # BLD AUTO: 4.78 MILLION/UL (ref 3.88–5.62)
SODIUM SERPL-SCNC: 138 MMOL/L (ref 136–145)
TOTAL CELLS COUNTED SPEC: 100
WBC # BLD AUTO: 19.05 THOUSAND/UL (ref 4.31–10.16)
WBC # FLD MANUAL: ABNORMAL /UL (ref 0–200)

## 2021-04-27 PROCEDURE — 97163 PT EVAL HIGH COMPLEX 45 MIN: CPT

## 2021-04-27 PROCEDURE — 89060 EXAM SYNOVIAL FLUID CRYSTALS: CPT | Performed by: PHYSICIAN ASSISTANT

## 2021-04-27 PROCEDURE — 80048 BASIC METABOLIC PNL TOTAL CA: CPT | Performed by: PHYSICIAN ASSISTANT

## 2021-04-27 PROCEDURE — 85027 COMPLETE CBC AUTOMATED: CPT | Performed by: PHYSICIAN ASSISTANT

## 2021-04-27 PROCEDURE — 89051 BODY FLUID CELL COUNT: CPT | Performed by: PHYSICIAN ASSISTANT

## 2021-04-27 PROCEDURE — 87075 CULTR BACTERIA EXCEPT BLOOD: CPT | Performed by: PHYSICIAN ASSISTANT

## 2021-04-27 PROCEDURE — 36415 COLL VENOUS BLD VENIPUNCTURE: CPT | Performed by: PHYSICIAN ASSISTANT

## 2021-04-27 PROCEDURE — 0M9P0ZZ DRAINAGE OF LEFT KNEE BURSA AND LIGAMENT, OPEN APPROACH: ICD-10-PCS | Performed by: ORTHOPAEDIC SURGERY

## 2021-04-27 PROCEDURE — 27301 DRAIN THIGH/KNEE LESION: CPT | Performed by: PHYSICIAN ASSISTANT

## 2021-04-27 PROCEDURE — 87186 SC STD MICRODIL/AGAR DIL: CPT | Performed by: PHYSICIAN ASSISTANT

## 2021-04-27 PROCEDURE — 20610 DRAIN/INJ JOINT/BURSA W/O US: CPT | Performed by: PHYSICIAN ASSISTANT

## 2021-04-27 PROCEDURE — 87070 CULTURE OTHR SPECIMN AEROBIC: CPT | Performed by: PHYSICIAN ASSISTANT

## 2021-04-27 PROCEDURE — 87205 SMEAR GRAM STAIN: CPT | Performed by: PHYSICIAN ASSISTANT

## 2021-04-27 PROCEDURE — 82948 REAGENT STRIP/BLOOD GLUCOSE: CPT

## 2021-04-27 PROCEDURE — 84145 PROCALCITONIN (PCT): CPT | Performed by: EMERGENCY MEDICINE

## 2021-04-27 PROCEDURE — 99255 IP/OBS CONSLTJ NEW/EST HI 80: CPT | Performed by: PHYSICIAN ASSISTANT

## 2021-04-27 PROCEDURE — 99232 SBSQ HOSP IP/OBS MODERATE 35: CPT | Performed by: PHYSICIAN ASSISTANT

## 2021-04-27 PROCEDURE — 97110 THERAPEUTIC EXERCISES: CPT

## 2021-04-27 PROCEDURE — 0M9P3ZZ DRAINAGE OF LEFT KNEE BURSA AND LIGAMENT, PERCUTANEOUS APPROACH: ICD-10-PCS | Performed by: ORTHOPAEDIC SURGERY

## 2021-04-27 RX ORDER — HYDROMORPHONE HCL/PF 1 MG/ML
0.5 SYRINGE (ML) INJECTION ONCE AS NEEDED
Status: COMPLETED | OUTPATIENT
Start: 2021-04-27 | End: 2021-04-27

## 2021-04-27 RX ORDER — LIDOCAINE HYDROCHLORIDE 10 MG/ML
20 INJECTION, SOLUTION EPIDURAL; INFILTRATION; INTRACAUDAL; PERINEURAL ONCE
Status: DISCONTINUED | OUTPATIENT
Start: 2021-04-27 | End: 2021-05-01 | Stop reason: HOSPADM

## 2021-04-27 RX ADMIN — INSULIN GLARGINE 50 UNITS: 100 INJECTION, SOLUTION SUBCUTANEOUS at 08:51

## 2021-04-27 RX ADMIN — INSULIN GLARGINE 25 UNITS: 100 INJECTION, SOLUTION SUBCUTANEOUS at 23:19

## 2021-04-27 RX ADMIN — VANCOMYCIN HYDROCHLORIDE 1750 MG: 1 INJECTION, POWDER, LYOPHILIZED, FOR SOLUTION INTRAVENOUS at 08:41

## 2021-04-27 RX ADMIN — PANTOPRAZOLE SODIUM 40 MG: 40 TABLET, DELAYED RELEASE ORAL at 05:12

## 2021-04-27 RX ADMIN — CALCIUM 1 TABLET: 500 TABLET ORAL at 17:28

## 2021-04-27 RX ADMIN — HYDROMORPHONE HYDROCHLORIDE 0.5 MG: 1 INJECTION, SOLUTION INTRAMUSCULAR; INTRAVENOUS; SUBCUTANEOUS at 10:36

## 2021-04-27 RX ADMIN — ATORVASTATIN CALCIUM 40 MG: 40 TABLET, FILM COATED ORAL at 22:42

## 2021-04-27 RX ADMIN — LOSARTAN POTASSIUM: 50 TABLET, FILM COATED ORAL at 08:52

## 2021-04-27 RX ADMIN — HYDROXYCHLOROQUINE SULFATE 200 MG: 200 TABLET, FILM COATED ORAL at 17:29

## 2021-04-27 RX ADMIN — OXYCODONE HYDROCHLORIDE 10 MG: 10 TABLET ORAL at 04:58

## 2021-04-27 RX ADMIN — SUCRALFATE 1 G: 1 TABLET ORAL at 17:28

## 2021-04-27 RX ADMIN — HYDROXYCHLOROQUINE SULFATE 200 MG: 200 TABLET, FILM COATED ORAL at 08:52

## 2021-04-27 RX ADMIN — INSULIN LISPRO 4 UNITS: 100 INJECTION, SOLUTION INTRAVENOUS; SUBCUTANEOUS at 22:48

## 2021-04-27 RX ADMIN — OXYCODONE HYDROCHLORIDE AND ACETAMINOPHEN 1000 MG: 500 TABLET ORAL at 08:52

## 2021-04-27 RX ADMIN — SUCRALFATE 1 G: 1 TABLET ORAL at 22:42

## 2021-04-27 RX ADMIN — CEFTRIAXONE SODIUM 1000 MG: 10 INJECTION, POWDER, FOR SOLUTION INTRAVENOUS at 18:27

## 2021-04-27 RX ADMIN — OXYCODONE HYDROCHLORIDE 10 MG: 10 TABLET ORAL at 20:37

## 2021-04-27 RX ADMIN — SUCRALFATE 1 G: 1 TABLET ORAL at 11:36

## 2021-04-27 RX ADMIN — VANCOMYCIN HYDROCHLORIDE 1750 MG: 1 INJECTION, POWDER, LYOPHILIZED, FOR SOLUTION INTRAVENOUS at 20:32

## 2021-04-27 RX ADMIN — SUCRALFATE 1 G: 1 TABLET ORAL at 08:51

## 2021-04-27 NOTE — ASSESSMENT & PLAN NOTE
Lab Results   Component Value Date    HGBA1C 7 4 (H) 03/05/2021       Recent Labs     04/26/21 2137   POCGLU 186*       Blood Sugar Average: Last 72 hrs:  · (P) 186   · A1c near goal; continue to aim for euglycemia to aid in resolution of infection  · Hold PO hypoglycemics: Januvia, Victoza  · Continue home long-acting regimen: lantus 50U SQ BID  · SSI for correction  · QID accuchecks

## 2021-04-27 NOTE — ASSESSMENT & PLAN NOTE
· Patient follows w/ Rheumatology @ [facility]   · Currently on Humira-- has been on this for at least 1 year-- last dose was about 2 weeks ago prior to onset of sx   · Last Quant Gold >3 years ago-- recently on steroids, will update Quantiferon gold for completeness

## 2021-04-27 NOTE — ASSESSMENT & PLAN NOTE
· Ongoing x2 weeks  S/p aspiration x1 on 4/16 w/ improvement in sx for about 2 days  Fluid culture negative for infection, received IM SoluMedrol x1 followed by prednisone 40mg PO QD x5 days w/ no improvement in sx   · S/p aspiration of 10 cc of clear synovial fluid in the ED-- f/u fluid culture   · Underwent incision and drainage 4/27 of prepatellar bursa with purulent fluid, follow-up on cultures  · ESR/CRP elevated-- has known RA-- L knee has previously been affected by flare  · US of soft tissue: no abscess, soft tissue edema consistent w/ cellulitis   · Continue w/ IV abx as above  · Pain control regimen to be altered-Tylenol 975 t i d , Toradol 30 mg IV q 6 hours p r n  Moderate pain, oxycodone 10 mg p r n  Severe pain, Dilaudid 0 5 mg IV for breakthrough pain  · Orthopedics following  They suspect prepatellar bursitis  They are recommending operative washout tomorrow if patient is not improved  · My colleague discussed with on-call rheumatologist, Dr Luma Hopson  · No further inpatient rheumatologic workup indicated  Plan to finish antibiotics and follow-up with outpatient rheumatologist, Dr Hortencia Gary

## 2021-04-27 NOTE — CONSULTS
[address]  50 y o  male MRN: [id number]  Unit/Bed#: S -25      Chief Complaint:   left knee pain and erythema    HPI:   50 y o male complaining of left knee pain  Patient has a history significant for rheumatoid arthritis and diabetes mellitus  Patient reports about 10 days ago he began having increased pain and swelling in the anterior knee  He was seen by his family doctor performed arthrocentesis in the office and give him a cortisone injection into the knee joint  He reports about 2 days relief of this but then returned to the worsening symptoms  He was diagnosis of prepatellar bursitis and started on oral steroids  He noted that his pain and redness was worsening and starting to extend into the lateral thigh with redness and pain  He was told to proceed to the emergency department  He had joint aspiration performed in the emergency department given 10 cc of clear fluid  He has been receiving IV antibiotics  He notes pain is still anterior knee as well as laterally he feels little bit pain radiating into the calf as well and some mild swelling there but not much erythema in the leg  He does have some pain with weight-bearing but is able to bear weight  He notes pain terminal flexion but is able to flex and extend the knee in about 90 degree arc  Notes minor improvement since receiving IV antibiotics  He reports feeling of chills over the past 2 weeks but denies any recorded fevers  Denies any numbness or tingling      Review Of Systems:   · Skin:  See HPI  · Neuro: See HPI  · Musculoskeletal: See HPI  · 14 point review of systems negative except as stated above     Past Medical History:   Past Medical History:   Diagnosis Date    Arthritis     At risk for falls     Broken foot     right    Cataract     giacomo    [facility]     [facility] mellitus [facility]     Hyperlipidemia     Hypertension     Kidney stone     Neuropathy     Rheumatoid arthritis (HCC)     Seasonal allergies     Snores     Uses wheelchair     and crutches- [facility] RLE    Wears glasses        Past Surgical History:   Past Surgical History:   Procedure Laterality Date    APPENDECTOMY      CLOSED REDUCTION FOOT DISLOCATION Right 2/12/2019    Procedure: C/R FRACTURE;  Surgeon: Josh Joshi DPM;  [facility];  Service: Podiatry    COLONOSCOPY      FOOT SURGERY Right 07/2019    Removal of the bone graft and cleaned out infection, and placed new bone graft    KY EXC SKIN MALIG <0 5 CM REMAINDER BODY N/A 3/28/2018    Procedure: EXCISION WIDE LESION HEAD/FACIAL/NECK;  Surgeon: Jacque Zimmer MD;  [facility];  Service: Surgical Oncology    KY GASTROCNEMIUS RECESSION Right 2/12/2019    Procedure: ENDO GASTROC RECESSION, APPLICATION OF EXTERNAL FIXATOR;  Surgeon: Josh Joshi DPM;  [facility];  Service: Podiatry    [place] REMOVE EXTERN BONE FIX DEV W ANESTH Right 4/18/2019    Procedure: FRAME REMOVAL HARDWARE FOOT WITH APPLICATION OF GRAFT;  Surgeon: Josh Joshi DPM;  [facility];  Service: Podiatry    SKIN BIOPSY      scalp    WISDOM TOOTH EXTRACTION  1998       Family History:  Family history reviewed and non-contributory  Family History   Problem Relation Age of Onset    Diabetes Mother     Stroke Mother     Mental illness Mother     Diabetes Father     Stroke Father     Alcohol abuse Brother     Coronary artery disease Family         Age 51-55    Diabetes type II Family     Heart disease Family        Social History:  Social History     Socioeconomic History    Marital status: /Civil Union     Spouse name: None    Number of children: None    Years of education: None    Highest education level: None   Occupational History    Occupation: [employer]    Social Needs    Financial resource strain: None    Food insecurity     Worry: None     Inability: None    Transportation needs     Medical: None Non-medical: None   Tobacco Use    Smoking status: Current Every Day Smoker     Packs/day: 1 00     Years: 40 00     Pack years: 40 00     Types: Cigarettes    Smokeless tobacco: Never Used   Substance and Sexual Activity    Alcohol use:  Yes     Alcohol/week: 1 0 standard drinks     Types: 1 Cans of beer per week     Frequency: Monthly or less     Drinks per session: 1 or 2     Binge frequency: Never     Comment: beer    Drug use: Yes     Types: Marijuana    Sexual activity: None   Lifestyle    Physical activity     Days per week: None     Minutes per session: None    Stress: None   Relationships    Social connections     Talks on phone: None     Gets together: None     Attends [employer] service: None     Active member of club or organization: None     Attends meetings of clubs or organizations: None     Relationship status: None    Intimate partner violence     Fear of current or ex partner: None     Emotionally abused: None     Physically abused: None     Forced sexual activity: None   Other Topics Concern    None   Social History Narrative    Caffeine Use- Coffee and Tea        Allergies:   No Known Allergies        Labs:  0   Lab Value Date/Time    HCT 41 5 04/27/2021 0412    HCT 45 0 04/26/2021 1440    HCT 47 3 04/22/2021 1011    HCT 47 1 01/23/2017 1300    HGB 14 2 04/27/2021 0412    HGB 15 7 04/26/2021 1440    HGB 15 8 04/22/2021 1011    HGB 16 0 01/23/2017 1300    INR 0 94 04/11/2019 1116    WBC 19 05 (H) 04/27/2021 0412    WBC 21 66 (H) 04/26/2021 1440    WBC 13 13 (H) 04/22/2021 1011    WBC 6 0 01/23/2017 1300    ESR 68 (H) 04/26/2021 1440    CRP 87 2 (H) 04/26/2021 1440       Meds:    Current Facility-Administered Medications:     acetaminophen (TYLENOL) tablet 650 mg, 650 mg, Oral, Q6H PRN, Joy Terrell PA-C    albuterol (PROVENTIL HFA,VENTOLIN HFA) inhaler 2 puff, 2 puff, Inhalation, Q4H PRN, Joy Terrell PA-C    ascorbic acid (VITAMIN C) tablet 1,000 mg, 1,000 mg, Oral, Daily, Joy Terrell PA-C    atorvastatin (LIPITOR) tablet 40 mg, 40 mg, Oral, HS, Joy Terrell PA-C, 40 mg at 04/26/21 2139    calcium carbonate (OYSTER SHELL,OSCAL) 500 mg tablet 1 tablet, 1 tablet, Oral, Daily With Dinner, Joy Terrell PA-C    calcium carbonate (TUMS) chewable tablet 1,000 mg, 1,000 mg, Oral, Daily PRN, Joy Terrell PA-C    ceftriaxone (ROCEPHIN) 1 g/50 mL in dextrose IVPB, 1,000 mg, Intravenous, Q24H, Joy Terrell PA-C    cyclobenzaprine (FLEXERIL) tablet 10 mg, 10 mg, Oral, TID PRN, Joy Terrell PA-C    enoxaparin (LOVENOX) subcutaneous injection 40 mg, 40 mg, Subcutaneous, Daily, Joy Terrell PA-C    fluticasone (FLONASE) 50 mcg/act nasal spray 2 spray, 2 spray, Each Nare, Daily, Joy Terrell PA-C    hydroxychloroquine (PLAQUENIL) tablet 200 mg, 200 mg, Oral, BID, Joy Terrell PA-C, 200 mg at 04/26/21 2144    insulin glargine (LANTUS) subcutaneous injection 50 Units 0 5 mL, 50 Units, Subcutaneous, Q12H DESTINEE, Joy Terrell PA-C, 50 Units at 04/26/21 2139    leflunomide (ARAVA) tablet 20 mg, 20 mg, Oral, Daily With Dinner, Joy Terrell PA-C    losartan potassium-hydrochlorothiazide (HYZAAR 100/12  5) combo dose, , Oral, Daily, Joy Terrell PA-C    morphine injection 2 mg, 2 mg, Intravenous, Q4H PRN, Joy Terrell PA-C    nicotine (NICODERM CQ) 21 mg/24 hr TD 24 hr patch 21 mg, 21 mg, Transdermal, Once, Roya Padilla MD, 21 mg at 04/26/21 1817    ondansetron (ZOFRAN) injection 4 mg, 4 mg, Intravenous, Q6H PRN, Joy Terrell PA-C    oxyCODONE (ROXICODONE) immediate release tablet 10 mg, 10 mg, Oral, Q6H PRN, Joy Terrell PA-C, 10 mg at 04/27/21 0458    oxyCODONE (ROXICODONE) IR tablet 5 mg, 5 mg, Oral, Q6H PRN, Joy Terrell PA-C    pantoprazole (PROTONIX) EC tablet 40 mg, 40 mg, Oral, Early Morning, Joy Terrell PA-C, 40 mg at 04/27/21 0560   sucralfate (CARAFATE) tablet 1 g, 1 g, Oral, 4x Daily, Joy Terrell PA-C, 1 g at 04/26/21 2139    vancomycin (VANCOCIN) 1,750 mg in sodium chloride 0 9 % 500 mL IVPB, 20 mg/kg (Adjusted), Intravenous, Q12H, Joy Terrell PA-C    Blood Culture:   Lab Results   Component Value Date    BLOODCX Received in Microbiology Lab  Culture in Progress  04/26/2021    BLOODCX Received in Microbiology Lab  Culture in Progress  04/26/2021       Wound Culture:   Lab Results   Component Value Date    WOUNDCULT 3+ Growth of Staphylococcus aureus (A) 10/27/2020       Ins and Outs:  I/O last 24 hours: In: 550 [IV Piggyback:550]  Out: 675 [Urine:675]          Physical Exam:   /77 (BP Location: Right arm)   Pulse 95   Temp 99 1 °F (37 3 °C) (Oral)   Resp 20   Ht 6' (1 829 m)   Wt 117 kg (257 lb 4 4 oz)   SpO2 97%   BMI 34 89 kg/m²   Gen: Alert and oriented to person, place, time  HEENT: EOMI, eyes clear, moist mucus membranes, hearing intact  Respiratory: Bilateral chest rise  No audible wheezing found  Cardiovascular: Regular Rate and Rhythm  Abdomen: soft nontender/nondistended  Musculoskeletal: left lower extremity  · Skin intact, there is erythema and warmth over the anterior knee in the prepatellar area, this tracks laterally to the mid lateral thigh no erythema in the anterior thigh    Skin markings outlying previous area of erythema or present erythema and has appeared to somewhat have improved since these initial markings were placed  · There is a soft fluctuant fluid collection overlying the patella  · Tender to palpation over Medial, lateral and anterior knee as well as the IT band  · No effusion  · Can perform straight leg raise  · Passive range of motion from 0-90 degrees without any severe pain only minimal discomfort  · Stable to varus/valgus stress  · Sensation intact to light touch L3-S1  · Motor intact strength to hip flexion/extension, knee flexion/extension, ankle dorsi/plantar flexion, EHL/FHL  · 2+ DP pulse    Radiology:   I personally reviewed the films  X-rays left knee shows no acute fracture, significant prepatellar soft tissue swelling    _*_*_*_*_*_*_*_*_*_*_*_*_*_*_*_*_*_*_*_*_*_*_*_*_*_*_*_*_*_*_*_*_*_*_*_*_*_*_*_*_*    Assessment:  50 y o  male with left knee cellulitis and likely septic prepatellar bursitis    Plan:   · Weight bearing as tolerated  left lower extremity  · PT  · Pain control per primary team  · Antibiotics per primary team  · Aspiration of prepatellar bursa was performed please see separate procedure note  Per in the blood tinged fluid was encountered and sent for Gram stain synovial white count culture  · Will perform bedside stab incision, irrigation and drainage, please see separate procedure note for this as well  · Body mass index is 34 89 kg/m²  mildly obese  Recommend behavior modifications, nutrition and physical activity    · Dispo: Ortho will follow      Angie Harper PA-C

## 2021-04-27 NOTE — ASSESSMENT & PLAN NOTE
Lab Results   Component Value Date    HGBA1C 7 4 (H) 03/05/2021       No results for input(s): POCGLU in the last 72 hours      Blood Sugar Average: Last 72 hrs:  · glucose is not at goal   · Hold PO hypoglycemics: Januvia, Victoza  · Continue home long-acting regimen: lantus 50U SQ BID  · Add SSI for correction  · QID accuchecks

## 2021-04-27 NOTE — ASSESSMENT & PLAN NOTE
· POA  Evidenced by tachycardia and leukocytosis  Lactate/temp normal   White count still elevated, however patient was recently on a course of steroids as well  · Source: LLE cellulitis, left patellar bursitis  · No evidence of septic joint  · Abx: ancef + Vanco -- will continue at this time  · Follow up blood and bursa fluid cultures    · Trend labs/vitals

## 2021-04-27 NOTE — PLAN OF CARE
Problem: Potential for Falls  Goal: Patient will remain free of falls  Description: INTERVENTIONS:  - Assess patient frequently for physical needs  -  Identify cognitive and physical deficits and behaviors that affect risk of falls    -  [place] fall precautions as indicated by assessment   - Educate patient/family on patient safety including physical limitations  - Instruct patient to call for assistance with activity based on assessment  - Modify environment to reduce risk of injury  - Consider OT/PT consult to assist with strengthening/mobility  Outcome: Progressing     Problem: PAIN - ADULT  Goal: Verbalizes/displays adequate comfort level or baseline comfort level  Description: Interventions:  - Encourage patient to monitor pain and request assistance  - Assess pain using appropriate pain scale  - Administer analgesics based on type and severity of pain and evaluate response  - Implement non-pharmacological measures as appropriate and evaluate response  - Consider cultural and social influences on pain and pain management  - Notify physician/advanced practitioner if interventions unsuccessful or patient reports new pain  Outcome: Progressing     Problem: INFECTION - ADULT  Goal: Absence or prevention of progression during hospitalization  Description: INTERVENTIONS:  - Assess and monitor for signs and symptoms of infection  - Monitor lab/diagnostic results  - Monitor all insertion sites, i e  indwelling lines, tubes, and drains  - Monitor endotracheal if appropriate and nasal secretions for changes in amount and color  - [place] appropriate cooling/warming therapies per order  - Administer medications as ordered  - Instruct and encourage patient and family to use good hand hygiene technique  - Identify and instruct in appropriate isolation precautions for identified infection/condition  Outcome: Progressing  Goal: Absence of fever/infection during neutropenic period  Description: INTERVENTIONS:  - Monitor WBC    Outcome: Progressing     Problem: SAFETY ADULT  Goal: Patient will remain free of falls  Description: INTERVENTIONS:  - Assess patient frequently for physical needs  -  Identify cognitive and physical deficits and behaviors that affect risk of falls    -  [place] fall precautions as indicated by assessment   - Educate patient/family on patient safety including physical limitations  - Instruct patient to call for assistance with activity based on assessment  - Modify environment to reduce risk of injury  - Consider OT/PT consult to assist with strengthening/mobility  Outcome: Progressing  Goal: Maintain or return to baseline ADL function  Description: INTERVENTIONS:  -  Assess patient's ability to carry out ADLs; assess patient's baseline for ADL function and identify physical deficits which impact ability to perform ADLs (bathing, care of mouth/teeth, toileting, grooming, dressing, etc )  - Assess/evaluate cause of self-care deficits   - Assess range of motion  - Assess patient's mobility; develop plan if impaired  - Assess patient's need for assistive devices and provide as appropriate  - Encourage maximum independence but intervene and supervise when necessary  - Involve family in performance of ADLs  - Assess for home care needs following discharge   - Consider OT consult to assist with ADL evaluation and planning for discharge  - Provide patient education as appropriate  Outcome: Progressing  Goal: Maintain or return mobility status to optimal level  Description: INTERVENTIONS:  - Assess patient's baseline mobility status (ambulation, transfers, stairs, etc )    - Identify cognitive and physical deficits and behaviors that affect mobility  - Identify mobility aids required to assist with transfers and/or ambulation (gait belt, sit-to-stand, lift, walker, cane, etc )  - [place] fall precautions as indicated by assessment  - Record patient progress and toleration of activity level on Mobility SBAR; progress patient to next Phase/Stage  - Instruct patient to call for assistance with activity based on assessment  - Consider rehabilitation consult to assist with strengthening/weightbearing, etc   Outcome: Progressing     Problem: DISCHARGE PLANNING  Goal: Discharge to home or other facility with appropriate resources  Description: INTERVENTIONS:  - Identify barriers to discharge w/patient and caregiver  - Arrange for needed discharge resources and transportation as appropriate  - Identify discharge learning needs (meds, wound care, etc )  - Arrange for interpretive services to assist at discharge as needed  - Refer to Case Management Department for coordinating discharge planning if the patient needs post-hospital services based on physician/advanced practitioner order or complex needs related to functional status, cognitive ability, or social support system  Outcome: Progressing     Problem: Knowledge Deficit  Goal: Patient/family/caregiver demonstrates understanding of disease process, treatment plan, medications, and discharge instructions  Description: Complete learning assessment and assess knowledge base    Interventions:  - Provide teaching at level of understanding  - Provide teaching via preferred learning methods  Outcome: Progressing     Problem: MUSCULOSKELETAL - ADULT  Goal: Maintain or return mobility to safest level of function  Description: INTERVENTIONS:  - Assess patient's ability to carry out ADLs; assess patient's baseline for ADL function and identify physical deficits which impact ability to perform ADLs (bathing, care of mouth/teeth, toileting, grooming, dressing, etc )  - Assess/evaluate cause of self-care deficits   - Assess range of motion  - Assess patient's mobility  - Assess patient's need for assistive devices and provide as appropriate  - Encourage maximum independence but intervene and supervise when necessary  - Involve family in performance of ADLs  - Assess for home care needs following discharge   - Consider OT consult to assist with ADL evaluation and planning for discharge  - Provide patient education as appropriate  Outcome: Progressing  Goal: Maintain proper alignment of affected body part  Description: INTERVENTIONS:  - Support, maintain and protect limb and body alignment  - Provide patient/ family with appropriate education  Outcome: Progressing

## 2021-04-27 NOTE — ASSESSMENT & PLAN NOTE
· Patient follows w/ Rheumatology @ [facility]   · Currently on Humira-- has been on this for at least 1 year-- last dose was about 2 weeks ago prior to onset of sx   · Also on plaquenil and Idania

## 2021-04-27 NOTE — PROCEDURES
Procedure- Orthopedics   Josep Quiroz  50 y o  male MRN: [id number]  Unit/Bed#: S -01    Procedure:  Left knee prepatellar bursa bedside incision and drainage    Verbal consent was obtained from the patient  After sterile preparation of the skin overlying the left knee prepatellar bursa local anesthetic was provided with 10cc of 1% lidocaine  Using an 11 blade, a vertical approximately stab incision, slightly under 2 cm long was made over the prepatellar bursa  Copious amount of bloody and purulent fluid was encountered immediately and further fluid was expressed  Using 60 cc of sterile normal saline the prepatellar bursa was irrigated  Bursal space was probed with a sterile cotton-tipped applicator and any loculations were broken up  Final irrigation with additional 20 cc of sterile saline fluid was performed  Half-inch iodoform packing gauze was introduced into the prepatellar bursal space through the incision  Sterile gauze was placed over the wound  The wound was wrapped with sterile dressing  Patient tolerated the procedure and was neurovascularly intact before and after procedure    Plan is for dressing change tomorrow and removal of packing and revaluation  As long as patient continues to improve with this bedside procedure and IV antibiotics he should not require any further orthopedic intervention  If no further intervention is indicated, recommend daily Betadine followed by saline irrigation and daily dressing changes      Jatin Asher PA-C

## 2021-04-27 NOTE — PROGRESS NOTES
Vancomycin Assessment    Cee Hernandez  is a 50 y o  male who is currently receiving vancomycin vancomycin 2000mg x1 bolus for bacteremia     Relevant clinical data and objective history reviewed:  Creatinine   Date Value Ref Range Status   04/26/2021 1 16 0 60 - 1 30 mg/dL Final     Comment:     Standardized to IDMS reference method   04/01/2021 1 08 0 60 - 1 30 mg/dL Final     Comment:     Standardized to IDMS reference method   03/05/2021 1 07 0 60 - 1 30 mg/dL Final     Comment:     Standardized to IDMS reference method   01/23/2017 0 91 0 60 - 1 35 mg/dL Final     /86 (BP Location: Right arm)   Pulse 92   Temp 99 1 °F (37 3 °C) (Oral)   Resp 18   Ht 6' (1 829 m)   Wt 117 kg (257 lb 4 4 oz)   SpO2 98%   BMI 34 89 kg/m²   I/O last 3 completed shifts: In: 48 [IV Piggyback:50]  Out: -   Lab Results   Component Value Date/Time    BUN 24 04/26/2021 02:40 PM    BUN 15 01/23/2017 01:00 PM    WBC 21 66 (H) 04/26/2021 02:40 PM    WBC 6 0 01/23/2017 01:00 PM    HGB 15 7 04/26/2021 02:40 PM    HGB 16 0 01/23/2017 01:00 PM    HCT 45 0 04/26/2021 02:40 PM    HCT 47 1 01/23/2017 01:00 PM    MCV 87 04/26/2021 02:40 PM    MCV 86 7 01/23/2017 01:00 PM     04/26/2021 09:49 PM     01/23/2017 01:00 PM     Temp Readings from Last 3 Encounters:   04/26/21 99 1 °F (37 3 °C) (Oral)   04/22/21 98 1 °F (36 7 °C) (Tympanic)   04/15/21 (!) 97 3 °F (36 3 °C) (Tympanic)     Vancomycin Days of Therapy: 1    Assessment/Plan  The patient is currently on vancomycin utilizing scheduled dosing based on adjusted body weight (due to obesity)  The patient is currently receiving vancomycin 2000mg x1 bolus and after clinical evaluation will be changed to vancomycin 1750mg q12h  Pharmacy will also follow closely for s/sx of nephrotoxicity, infusion reactions, and appropriateness of therapy  BMP and CBC will be ordered per protocol    Plan for trough as patient approaches steady state, prior to the 4th  dose at approximately 0700 4/28  Due to infection severity, will target a trough of 15-20 (appropriate for most indications)   Pharmacy will continue to follow the patients culture results and clinical progress daily      Carolyn Briones, Pharmacist

## 2021-04-27 NOTE — ASSESSMENT & PLAN NOTE
· Patient follows w/ Rheumatology @ [facility]   · Currently on Humira-- has been on this for at least 1 year-- last dose was about 2 weeks ago prior to onset of sx

## 2021-04-27 NOTE — PROGRESS NOTES
[facility]  Progress Note - Colonel Picking  1965, 50 y o  male MRN: [id number]  Unit/Bed#: S -01 Encounter: [id number]  Primary Care Provider: Evi Pickering DO   Date and time admitted to hospital: 4/26/2021  3:52 PM    * Sepsis [facility])  Assessment & Plan  · POA  Evidenced by tachycardia and leukocytosis  Lactate is WNL  afebrile  · Tachycardia improved  · Source: LLE cellulitis, left patellar bursitis  · No evidence of septic joint  · Abx: Rocephin + Vanco -- will continue at this time  · Follow up blood and bursa fluid cultures  · Trend labs/vitals    Pain and swelling of left knee  Assessment & Plan  · Ongoing x2 weeks  S/p aspiration x1 on 4/16 w/ improvement in sx for about 2 days  Fluid culture negative for infection, received IM SoluMedrol x1 followed by prednisone 40mg PO QD x5 days w/ no improvement in sx   · S/p aspiration of 10 cc of clear synovial fluid in the ED-- f/u fluid culture   · Underwent incision and drainage 4/27 of prepatellar bursa with purulent fluid  , follow-up on cultures  · ESR/CRP elevated-- has known RA-- L knee has previously been affected by flare  · US of soft tissue: no abscess, soft tissue edema consistent w/ cellulitis   · Continue w/ IV abx as above  · Pain control   · Orthopedics following  · Discussed with on-call rheumatologist, Dr Les Prado  · No further inpatient rheumatologic workup indicated  Plan to finish antibiotics and follow-up with outpatient rheumatologist, Dr Mimi Cartagena      Cellulitis of left lower extremity  Assessment & Plan  · Likely 2/2 recent aspiration of L knee  · Indurated areas over lateral aspect of knee extending into thigh: US negative for abscess, but consistent w/ cellulitis   · Continue w/ IV abx: rocephin + Vanco   · Serial skin exams     Rheumatoid arthritis [facility])  Assessment & Plan  · Patient follows w/ Rheumatology @ [facility]   · Currently on Humira-- has been on this for at least 1 year-- last dose was about 2 weeks ago prior to onset of sx     Type 2 diabetes mellitus with diabetic neuropathic arthropathy, with long-term current use of insulin [facility])  Assessment & Plan  Lab Results   Component Value Date    HGBA1C 7 4 (H) 2021       Recent Labs     217   POCGLU 186*       Blood Sugar Average: Last 72 hrs:  · (P) 186   · glucose is not at goal   · Hold PO hypoglycemics: Januvia, Victoza  · Continue home long-acting regimen: lantus 50U SQ BID  · Add SSI for correction  · QID accuchecks    Benign essential hypertension  Assessment & Plan  · BP elevated on admission likely 2/2 pain; now improved   · Continue home BP medication regimen    VTE Pharmacologic Prophylaxis:   Pharmacologic: Enoxaparin (Lovenox)  Mechanical VTE Prophylaxis in Place: Yes    Patient Centered Rounds: I have performed bedside rounds with nursing staff today  Discussions with Specialists or Other Care Team Provider: CM, nursing     Education and Discussions with Family / Patient: patient at bedside, declined family update    Time Spent for Care: 30 minutes  More than 50% of total time spent on counseling and coordination of care as described above  Current Length of Stay: 1 day(s)    Current Patient Status: Inpatient   Certification Statement: The patient will continue to require additional inpatient hospital stay due to IV abx for left knee cellulitis/patellar bursitis    Discharge Plan: 48 hours pending culture results  Code Status: Level 1 - Full Code      Subjective:   Patient reports his pain is improved  Status post incision and drainage this morning  Overall no events per nursing  Objective:     Vitals:   Temp (24hrs), Av 4 °F (36 9 °C), Min:98 °F (36 7 °C), Max:99 1 °F (37 3 °C)    Temp:  [98 °F (36 7 °C)-99 1 °F (37 3 °C)] 98 °F (36 7 °C)  HR:  [] 72  Resp:  [18-20] 18  BP: (138-193)/(68-94) 142/68  SpO2:  [97 %-100 %] 97 %  Body mass index is 34 89 kg/m²       Input and Output Summary (last 24 hours): Intake/Output Summary (Last 24 hours) at 4/27/2021 1100  Last data filed at 4/27/2021 1041  Gross per 24 hour   Intake 1030 ml   Output 1675 ml   Net -645 ml       Physical Exam:     Physical Exam  Vitals signs and nursing note reviewed  Constitutional:       General: He is not in acute distress  Appearance: Normal appearance  HENT:      Head: Normocephalic  Mouth/Throat:      Mouth: Mucous membranes are moist    Eyes:      Pupils: Pupils are equal, round, and reactive to light  Neck:      Musculoskeletal: Normal range of motion  Cardiovascular:      Rate and Rhythm: Normal rate and regular rhythm  Heart sounds: No murmur  Pulmonary:      Effort: Pulmonary effort is normal  No respiratory distress  Breath sounds: Normal breath sounds  No wheezing, rhonchi or rales  Abdominal:      General: Bowel sounds are normal  There is no distension  Palpations: Abdomen is soft  Tenderness: There is no abdominal tenderness  There is no guarding  Musculoskeletal:         General: Swelling (left knee) present  No deformity  Right lower leg: No edema  Left lower leg: No edema  Skin:     Capillary Refill: Capillary refill takes less than 2 seconds  Findings: Erythema (Erythema of left knee overall appearing improved) present  Neurological:      General: No focal deficit present  Mental Status: He is alert and oriented to person, place, and time  Mental status is at baseline  Additional Data:     Labs:    Results from last 7 days   Lab Units 04/27/21 0412 04/26/21  1440   WBC Thousand/uL 19 05*  --  21 66*   HEMOGLOBIN g/dL 14 2  --  15 7   HEMATOCRIT % 41 5  --  45 0   PLATELETS Thousands/uL 238   < > 253   NEUTROS PCT %  --   --  90*   LYMPHS PCT %  --   --  4*   MONOS PCT %  --   --  5   EOS PCT %  --   --  0    < > = values in this interval not displayed       Results from last 7 days   Lab Units 04/27/21  0413 04/26/21  1440   SODIUM mmol/L 138 133*   POTASSIUM mmol/L 3 5 3 9   CHLORIDE mmol/L 100 96*   CO2 mmol/L 29 26   BUN mg/dL 17 24   CREATININE mg/dL 1 03 1 16   ANION GAP mmol/L 9 11   CALCIUM mg/dL 8 4 8 9   ALBUMIN g/dL  --  3 3*   TOTAL BILIRUBIN mg/dL  --  0 62   ALK PHOS U/L  --  76   ALT U/L  --  37   AST U/L  --  17   GLUCOSE RANDOM mg/dL 141* 344*         Results from last 7 days   Lab Units 04/26/21  2137   POC GLUCOSE mg/dl 186*         Results from last 7 days   Lab Units 04/27/21  0413 04/26/21  1704 04/26/21  1440   LACTIC ACID mmol/L  --  1 7  --    PROCALCITONIN ng/ml 0 05  --  0 06           * I Have Reviewed All Lab Data Listed Above  * Additional Pertinent Lab Tests Reviewed: All Labs Within Last 24 Hours Reviewed    Imaging:    Imaging Reports Reviewed Today Include: none  Imaging Personally Reviewed by Myself Includes:  none    Recent Cultures (last 7 days):     Results from last 7 days   Lab Units 04/26/21  1704   BLOOD CULTURE  Received in Microbiology Lab  Culture in Progress  Received in Microbiology Lab  Culture in Progress         Last 24 Hours Medication List:   Current Facility-Administered Medications   Medication Dose Route Frequency Provider Last Rate    acetaminophen  650 mg Oral Q6H PRN Joy Terrell PA-C      albuterol  2 puff Inhalation Q4H PRN Joy Terrell PA-C      vitamin C  1,000 mg Oral Daily Joy Terrell PA-C      atorvastatin  40 mg Oral HS Joy Terrell PA-C      calcium carbonate  1 tablet Oral Daily With Dinner Joy Terrell PA-C      calcium carbonate  1,000 mg Oral Daily PRN Joy Terrell PA-C      cefTRIAXone  1,000 mg Intravenous Q24H Joy Terrell PA-C      cyclobenzaprine  10 mg Oral TID PRN Joy Terrell PA-C      enoxaparin  40 mg Subcutaneous Daily Joy Terrell PA-C      fluticasone  2 spray Each Nare Daily Joy Terrell PA-C      hydroxychloroquine  200 mg Oral BID Joy Terrell PA-C      insulin glargine  50 Units Subcutaneous Q12H [address] Joy Terrell PA-C      leflunomide  20 mg Oral Daily With [employer] Terrell PA-C      lidocaine (PF)  20 mL Infiltration Once Jacob Velasquez PA-C      losartan potassium-hydrochlorothiazide (HYZAAR 100/12  5) combo dose   Oral Daily Joy Terrell PA-C      morphine injection  2 mg Intravenous Q4H PRN Joy Terrell PA-C      nicotine  21 mg Transdermal Once Mo Mora MD      ondansetron  4 mg Intravenous Q6H PRN Joy Terrell PA-C      oxyCODONE  10 mg Oral Q6H PRN Joy Terrell PA-C      oxyCODONE  5 mg Oral Q6H PRN Joy Terrell PA-C      pantoprazole  40 mg Oral Early Morning Joy Terrell PA-C      sucralfate  1 g Oral 4x Daily Joy Terrell PA-C      vancomycin  20 mg/kg (Adjusted) Intravenous Q12H Joy Terrell PA-C 1,750 mg (04/27/21 0841)        Today, Patient Was Seen By: Jolie James PA-C    ** Please Note: Dictation voice to text software may have been used in the creation of this document   **

## 2021-04-27 NOTE — ASSESSMENT & PLAN NOTE
Lab Results   Component Value Date    HGBA1C 7 4 (H) 03/05/2021       Recent Labs     04/26/21 2137   POCGLU 186*       Blood Sugar Average: Last 72 hrs:  · (P) 186   · glucose is not at goal   · Hold PO hypoglycemics: Januvia Victoza  · Continue home long-acting regimen: lantus 50U SQ BID  · Add SSI for correction  · QID accuchecks

## 2021-04-27 NOTE — ASSESSMENT & PLAN NOTE
· Ongoing x2 weeks  S/p aspiration x1 on 4/16 w/ improvement in sx for about 2 days  Fluid culture negative for infection, received IM SoluMedrol x1 followed by prednisone 40mg PO QD x5 days w/ no improvement in sx   · S/p aspiration of 10 cc of clear synovial fluid in the ED-- f/u fluid culture   · Underwent incision and drainage 4/27 of prepatellar bursa with purulent fluid  , follow-up on cultures  · ESR/CRP elevated-- has known RA-- L knee has previously been affected by flare  · US of soft tissue: no abscess, soft tissue edema consistent w/ cellulitis   · Continue w/ IV abx as above  · Pain control   · Orthopedics following    · Will discuss with on-call rheumatologist

## 2021-04-27 NOTE — ASSESSMENT & PLAN NOTE
· POA  Evidenced by tachycardia and leukocytosis  Lactate is WNL  afebrile  · Tachycardia improved  · Source: LLE cellulitis, left patellar bursitis  · No evidence of septic joint  · Abx: Rocephin + Vanco -- will continue at this time  · Follow up blood and bursa fluid cultures    · Trend labs/vitals

## 2021-04-27 NOTE — UTILIZATION REVIEW
Initial Clinical Review    Admission: Date/Time/Statement:   Admission Orders (From admission, onward)     Ordered        04/26/21 1822  Inpatient Admission  Once                   Orders Placed This Encounter   Procedures    Inpatient Admission     Standing Status:   Standing     Number of Occurrences:   1     Order Specific Question:   Level of Care     Answer:   Med Surg [16]     Order Specific Question:   Estimated length of stay     Answer:   Not Applicable     ED Arrival Information     Expected Arrival Acuity Means of Arrival Escorted By Service Admission Type    - 4/26/2021 14:18 Urgent Walk-In Self Hospitalist Urgent    Arrival Complaint    Knee Swelling        Chief Complaint   Patient presents with    Knee Swelling     Pt states that he had his knee drained and he states it is now swollen down his leg and up into his hip area  Initial Presentation:  50 y o  male with PMHx RA, DM 2 on insulin, HTN, prior MRSA infections presents to the ED for eval of L knee swelling/pain x2 weeks  Pt reports sudden onset of L Knee pain/swelling w/o trauma  He was concerned that he may be having an RA flare, so he went to his PCP  He had aspiration of fluid, and when it was negative for large WBC and he was started on steroids  He reports that after his 1st aspiration, his pain improved  This only lasted for about 2 days, and did not have improvement w/ steroids  Over the last few days, patient has had significant worsening of pain, swelling, and erythema  He has had swelling extending distally and proximally  He also reports that the side of his L thigh is tender and hard  Patient reports that his pain is severe, 8-9/10  He has pain w/ ambulation/flexion/extension, but has still been walking around  Patient notes that he has not had a RA flare in a long time  He is managed by rheumatology, is on weekly Humira (last dose 2 weeks ago)  In ED pt tachycardic, WBC 13 13   Admit inpatient to M/S unit with sepsis, cellulitis of LLE r/o septic joint  IV abx  F/u on blood and synovial fluid cxs  Trend labs/vitals  Ortho consult  Pain control  Monitor blood sugars w/ ssi  · Ortho consult 4/27 -- A: left knee cellulitis and likely septic prepatellar bursitis  Plan:  WBAT LLE  PT eval  Pain control  Continue IV abx  Aspiration of prepatellar bursa was performed  Per in the blood tinged fluid was encountered and sent for Gram stain synovial white count culture  Will perform bedside stab incision, irrigation and drainage    Date: 4/27   Day 2: s/p I&D, pt notes improvement in pain  Per Rheumatology, no further w/u indicated  Continue abx  WBC trending down      ED Triage Vitals   Temperature Pulse Respirations Blood Pressure SpO2   04/26/21 1432 04/26/21 1432 04/26/21 1432 04/26/21 1432 04/26/21 1432   98 1 °F (36 7 °C) (!) 118 18 (!) 193/94 100 %      Temp Source Heart Rate Source Patient Position - Orthostatic VS BP Location FiO2 (%)   04/26/21 2022 04/26/21 1432 04/26/21 1816 04/26/21 1816 --   Oral Monitor Lying Right arm       Pain Score       04/26/21 1432       9          Wt Readings from Last 1 Encounters:   04/26/21 117 kg (257 lb 4 4 oz)     Additional Vital Signs:   Date/Time  Temp  Pulse  Resp  BP  MAP (mmHg)  SpO2  O2 Device  Patient Position - Orthostatic VS   04/27/21 0904  --  --  --  --  --  --  None (Room air)  --   04/27/21 0700  98 °F (36 7 °C)  72  18  142/68  --  97 %  None (Room air)  Lying   04/27/21 0415  --  95  20  139/77  --  97 %  None (Room air)  Sitting   04/27/21 0125  --  98  20  149/74  --  98 %  None (Room air)  Sitting   04/26/21 2022  99 1 °F (37 3 °C)  92  18  140/86  --  98 %  None (Room air)  Lying   04/26/21 2000  --  94  18  140/86  109  98 %  None (Room air)  Lying   04/26/21 1915  --  94  18  146/75  104  97 %  None (Room air)  Lying   04/26/21 1816  --  100  18  138/83  --  98 %  None (Room air)  Lying   04/26/21 1600  --  --  --  --  --  --  None (Room air)  --   04/26/21 1432  98 1 °F (36 7 °C)  118Abnormal   18  193/94Abnormal   --  100 %  None (Room air)  --       Pertinent Labs/Diagnostic Test Results:   US L lat thigh 4/26 -- Subcutaneous edema without discrete fluid collection to suggest the presence of abscess      Findings are suggestive of cellulitis in the appropriate clinical setting  XR L knee 4/27 -- Severe prepatellar soft tissue swelling  No acute osseous deformity  Probable small suprapatellar joint effusion      Results from last 7 days   Lab Units 04/27/21  0412 04/26/21  2149 04/26/21  1440 04/22/21  1011   WBC Thousand/uL 19 05*  --  21 66* 13 13*   HEMOGLOBIN g/dL 14 2  --  15 7 15 8   HEMATOCRIT % 41 5  --  45 0 47 3   PLATELETS Thousands/uL 238 246 253 251   NEUTROS ABS Thousands/µL  --   --  19 47* 10 27*     Results from last 7 days   Lab Units 04/27/21  0413 04/26/21  1440   SODIUM mmol/L 138 133*   POTASSIUM mmol/L 3 5 3 9   CHLORIDE mmol/L 100 96*   CO2 mmol/L 29 26   ANION GAP mmol/L 9 11   BUN mg/dL 17 24   CREATININE mg/dL 1 03 1 16   EGFR ml/min/1 73sq m 83 71   CALCIUM mg/dL 8 4 8 9     Results from last 7 days   Lab Units 04/26/21  1440   AST U/L 17   ALT U/L 37   ALK PHOS U/L 76   TOTAL PROTEIN g/dL 7 9   ALBUMIN g/dL 3 3*   TOTAL BILIRUBIN mg/dL 0 62     Results from last 7 days   Lab Units 04/26/21  2137   POC GLUCOSE mg/dl 186*     Results from last 7 days   Lab Units 04/27/21  0413 04/26/21  1440   GLUCOSE RANDOM mg/dL 141* 344*     Results from last 7 days   Lab Units 04/26/21  2149   D-DIMER QUANTITATIVE ug/ml FEU 1 58*     Results from last 7 days   Lab Units 04/27/21  0413 04/26/21  1440   PROCALCITONIN ng/ml 0 05 0 06     Results from last 7 days   Lab Units 04/26/21  1704   LACTIC ACID mmol/L 1 7     Results from last 7 days   Lab Units 04/26/21  1440 04/22/21  1011   CRP mg/L 87 2* 33 7*   SED RATE mm/hour 68* 39*     Results from last 7 days   Lab Units 04/26/21  1820 04/26/21  1704   BLOOD CULTURE   --  Received in Microbiology Lab   Culture in Progress  Received in Microbiology Lab  Culture in Progress     GRAM STAIN RESULT  No Polys or Bacteria seen  --      Results from last 7 days   Lab Units 04/26/21  1820   TOTAL COUNTED  5   WBC FLUID /ul 15   CRYSTALS, SYNOVIAL FLUID  No Crystals Seen     ED Treatment:   Medication Administration from 04/26/2021 1418 to 04/27/2021 0425       Date/Time Order Dose Route Action     04/26/2021 1719 HYDROmorphone (DILAUDID) injection 0 5 mg 0 5 mg Intravenous Given     04/26/2021 1758 lidocaine (PF) (XYLOCAINE-MPF) 1 % injection 5 mL 5 mL Infiltration Given by Other     04/26/2021 1817 nicotine (NICODERM CQ) 21 mg/24 hr TD 24 hr patch 21 mg 21 mg Transdermal Medication Applied     04/26/2021 1817 ceftriaxone (ROCEPHIN) 1 g/50 mL in dextrose IVPB 1,000 mg Intravenous New Bag     04/26/2021 1920 vancomycin (VANCOCIN) 2,000 mg in sodium chloride 0 9 % 500 mL IVPB 2,000 mg Intravenous New Bag     04/26/2021 2139 atorvastatin (LIPITOR) tablet 40 mg 40 mg Oral Given     04/26/2021 2139 insulin glargine (LANTUS) subcutaneous injection 50 Units 0 5 mL 50 Units Subcutaneous Given     04/26/2021 2144 hydroxychloroquine (PLAQUENIL) tablet 200 mg 200 mg Oral Given     04/26/2021 2139 sucralfate (CARAFATE) tablet 1 g 1 g Oral Given     04/26/2021 2139 oxyCODONE (ROXICODONE) immediate release tablet 10 mg 10 mg Oral Given     Past Medical History:   Diagnosis Date    Arthritis     At risk for falls     Broken foot     right    Cataract     giacomo    [facility] [address] [facility]     Diabetes mellitus ([facility] [address] [facility]     Hyperlipidemia     Hypertension     Kidney stone     Neuropathy     Rheumatoid arthritis (HCC)     Seasonal allergies     Snores     Uses wheelchair     and crutches- NWB RLE    Wears glasses      Present on Admission:   Benign essential hypertension   Rheumatoid arthritis ([facility] [address] )      Admitting Diagnosis: Knee swelling [M25 469]  Swelling of joint, knee, left [M25 462]  Acute pain of left knee [D24 232]  Age/Sex: 50 y o  male  Admission Orders:  Scheduled Medications:  vitamin C, 1,000 mg, Oral, Daily  atorvastatin, 40 mg, Oral, HS  calcium carbonate, 1 tablet, Oral, Daily With Dinner  cefTRIAXone, 1,000 mg, Intravenous, Q24H  enoxaparin, 40 mg, Subcutaneous, Daily  fluticasone, 2 spray, Each Nare, Daily  hydroxychloroquine, 200 mg, Oral, BID  insulin glargine, 50 Units, Subcutaneous, Q12H [place]  leflunomide, 20 mg, Oral, Daily With Dinner  lidocaine (PF), 20 mL, Infiltration, Once  losartan potassium-hydrochlorothiazide (HYZAAR 100/12  5) combo dose, , Oral, Daily  nicotine, 21 mg, Transdermal, Once  pantoprazole, 40 mg, Oral, Early Morning  sucralfate, 1 g, Oral, 4x Daily  vancomycin, 20 mg/kg (Adjusted), Intravenous, Q12H    PRN Meds:  acetaminophen, 650 mg, Oral, Q6H PRN  albuterol, 2 puff, Inhalation, Q4H PRN  calcium carbonate, 1,000 mg, Oral, Daily PRN  cyclobenzaprine, 10 mg, Oral, TID PRN  morphine injection, 2 mg, Intravenous, Q4H PRN  ondansetron, 4 mg, Intravenous, Q6H PRN  oxyCODONE, 10 mg, Oral, Q6H PRN 4/26 x1, 4/27 x1  oxyCODONE, 5 mg, Oral, Q6H PRN  Hydromorphone 0 5 IV PRN 5/27 x1        Network Utilization Review Department  ATTENTION: Please call with any questions or concerns to [phone] and carefully listen to the prompts so that you are directed to the right person  All voicemails are confidential   [facility] all requests for admission clinical reviews, approved or denied determinations and any other requests to dedicated fax number below belonging to the campus where the patient is receiving treatment   List of dedicated fax numbers for the Facilities:  [address] DENIALS (Administrative/Medical Necessity) [phone]   [address] [place] [address] (Maternity/NICU/Pediatrics) 261 [facility] [address] Isaias Gaona [address] [place] [address] [phone] [place] [address] Carla Trinidad [phone] [place] [address] [phone]

## 2021-04-27 NOTE — ASSESSMENT & PLAN NOTE
· POA  Evidenced by tachycardia and leukocytosis  Lactate is WNL   afebrile  · Tachycardia improving  · Source: LLE cellulitis, R/O Septic joint; consider non-infectious source such as RA flare  · Abx: Rocephin + Vanco -- will continue at this time  · Follow up blood and synovial fluid cultures  · Trend labs/vitals  · Eating/drinking well---can hold off on aggressive IVF

## 2021-04-27 NOTE — PROGRESS NOTES
Vancomycin IV Pharmacy-to-Dose Consultation    Lowell Gomes  is a 50 y o  male who is currently receiving Vancomycin IV with management by the Pharmacy Consult service  Assessment/Plan:  The patient was reviewed  Renal function is stable and no signs or symptoms of nephrotoxicity and/or infusion reactions were documented in the chart  Based on todays assessment, continue current vancomycin (day # 2) dosing of 1750 mg every 12 hours, with a plan for trough to be drawn at 0700 on 4/28  We will continue to follow the patients culture results and clinical progress daily      Jayden Dave, Pharmacist

## 2021-04-27 NOTE — ASSESSMENT & PLAN NOTE
· Likely 2/2 recent aspiration of L knee  · Indurated areas over lateral aspect of knee extending into thigh: US negative for abscess, but consistent w/ cellulitis   · Continue w/ IV abx--patient receiving ceftriaxone and IV Vanco   Note that he has previously grown MSSA; would change ceftriaxone to Ancef 2 g Q 8 hours   · Serial skin exams; re-capo area as there has been some extension noted of erythema beyond initial line of demarcation    If patient not improved by tomorrow, we may need Infectious Disease involvement

## 2021-04-27 NOTE — PROCEDURES
Procedure- Orthopedics   [facility] Bernabe  50 y o  male MRN: [id number]  Unit/Bed#: S -28    Procedure: left knee prepatellar bursa aspiration    After sterile preparation of the skin overlying the knee prepatellar bursa An 18 gauge needle was then  inserted from an inferior approach through an area of skin without erythema into the prepatellar bursa  Approx 6cc of purulent blood-tinged fluid was aspirated and sent for gram stain, culture, synovial WBC/RBC, and crystals  Sterile dressing was then applied  Pt tolerated the procedure well and was neurovascularly intact both pre and post procedure      Hilary Negron PA-C

## 2021-04-27 NOTE — ASSESSMENT & PLAN NOTE
· Likely 2/2 recent aspiration of L knee  · Indurated areas over lateral aspect of knee extending into thigh: US negative for abscess, but consistent w/ cellulitis   · Continue w/ IV abx: rocephin + Vanco   · Serial skin exams

## 2021-04-27 NOTE — ASSESSMENT & PLAN NOTE
· Ongoing x2 weeks  S/p aspiration x1 on 4/16 w/ improvement in sx for about 2 days   Fluid culture negative for infection, received IM SoluMedrol x1 followed by prednisone 40mg PO QD x5 days w/ no improvement in sx   · S/p aspiration of 10 cc of clear synovial fluid in the ED-- f/u fluid culture   · ESR/CRP elevated-- has known RA-- L knee has previously been affected by flare  · US of soft tissue: no abscess, soft tissue edema consistent w/ cellulitis   · Continue w/ IV abx  · Pain control   · Ortho consult in AM   · Consider discussion w/ rheumatology

## 2021-04-27 NOTE — PHYSICAL THERAPY NOTE
Name: Yvone Bence  : 1965  AGE:   50 y o  Mrn:   [id number]  ADMIT DX:  Knee swelling [M25 469]  Swelling of joint, knee, left [M25 462]  Acute pain of left knee [M25 562]    Past Medical History:   Diagnosis Date    Arthritis     At risk for falls     Broken foot     right    Cataract     giacomo    COPD (chronic obstructive pulmonary disease) ([facility] [address]     Diabetes mellitus ([facility] )     Hyperlipidemia     Hypertension     Kidney stone     Neuropathy     Rheumatoid arthritis (HCC)     Seasonal allergies     Snores     Uses wheelchair     and crutches- NWB RLE    Wears glasses          Length Of Stay: 1  PHYSICAL THERAPY EVALUATION :   Patient's identity confirmed via 2 patient identifiers (full name and ) at start of session     21 1300   PT Last Visit   PT Visit Date 21   Note Type   Note type Evaluation   Pain Assessment   Pain Assessment Tool 0-10   Pain Score 6   Pain Location/Orientation Orientation: Left; Location: Knee  (+ anterior thigh)   Home Living   Type of [address]   Additional Comments has 1st floor set up, 6 REY w/ HR    Prior Function   Level of [place] Independent with ADLs and functional mobility   Lives With Spouse;Son;Other (Comment)  (son's friend)   ADL Assistance Independent   IADLs Independent   Vocational Self employed   Comments self employed contractor; pt states, "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 "   Restrictions/Precautions   Weight Bearing Precautions Per Order Yes   LLE Weight Bearing Per Order WBAT   Braces or Orthoses Other (Comment)   General   Additional Pertinent History pt states he has R CROW boot he only wears when he has pain, states his R foot is "rebroken" and will need future surgery   Cognition   Overall Cognitive Status WFL   Arousal/Participation Alert   Orientation Level Oriented X4   Following Commands Follows all commands and directions without difficulty   Strength RLE   R Hip Flexion 5/5 R Knee Flexion 5/5   R Knee Extension 5/5   R Ankle Dorsiflexion 4/5   R Ankle Plantar Flexion 3-/5  (unable to perform heel raise 2/2 R foot pain)   LLE Overall AROM   L Knee Flexion (10)   L Knee Extension 55  (increased pain)   Strength LLE   L Hip Flexion 2+/5   L Knee Flexion 2+/5   L Knee Extension 3-/5   L Ankle Dorsiflexion 4/5   L Ankle Plantar Flexion 3-/5  (unable to perform heel raise 2/2 R foot pain)   Coordination   Sensation   (pt reports impaired sensation in feet 2/2 diabetic neuropath)   Light Touch   RLE Light Touch Grossly intact   LLE Light Touch Grossly intact   Bed Mobility   Supine to Sit 6  Modified independent   Additional items HOB elevated; Bedrails; Increased time required   Sit to Supine 3  Moderate assistance   Additional items HOB elevated; Bedrails;LE management; Increased time required   Transfers   Sit to Stand 5  Supervision   Additional items Verbal cues; Increased time required  (L knee held in extension)   Stand to Sit 5  Supervision   Additional items Verbal cues; Increased time required  (L knee held in extension)   Toilet transfer 5  Supervision   Additional items Standard toilet  (grab bars)   Ambulation/Elevation   Gait pattern Antalgic; Short stride  (reduced L knee flexion in swing phase)   Gait Assistance 5  Supervision   Additional items Verbal cues   Assistive Device Standard walker   Distance 25 ft   Stair Management Assistance Not tested   Balance   Static Sitting Good   Static Standing Fair   Ambulatory Fair   Endurance Deficit   Endurance Deficit Yes   Endurance Deficit Description pt only able to tolerate ambulating 25 ft 2/2 L knee and R foot pain    Activity Tolerance   Activity Tolerance Patient limited by pain   Nurse Made Aware BHUMI Abel made aware   Assessment   Prognosis Good   Problem List Decreased strength;Decreased range of motion;Decreased endurance; Impaired balance;Decreased mobility; Impaired sensation;Obesity;Pain   Assessment Lowell Gomes  is a 50 y o  Male who [facility] from home w/ c/o left knee pain and swelling and diagnosis of left knee cellulitis  Pt has 2 procedures this morning including L knee prepatellar bursa aspiration as well as I and D  Orders for PT eval and treat received, w/ activity orders of up w/ A and WBAT L LE  Pt presents w/ comorbidities of RA, T2DM, neuropathy, COPD, and "broken right foot" (however, no documentation noted in Epic limiting WB status, but reports inconsistently using his CROW boot prior to admission)  At baseline, pt mobilizes without an assistive device and denies falls in the past 6 months, however, reports unsteadiness  Upon evaluation, pt presents w/ the following deficits: weakness, decreased ROM, altered sensation, decreased endurance and pain limiting functional mobility  Pt requires mod A for sit to supine transfer to manage LLE and supervision for transfers and gait training with RW   Pt's clinical presentation is evolving due to tolerance to only 25 feet of ambulation, pain impacting overall mobility status and h/o broken right foot impacting overall mobility status  Given the above findings, discharge recommendation is for Home w/ HHPT, however, pt will likely decline further PT services  During this admission, pt would benefit from skilled acute inpatient PT in order to address the abovementioned deficits to maximize function and mobility before DC from acute care  Focus on gait training, balance training, and therex  Goals   Patient Goals "walk out of here Friday and go home"   STG Expiration Date 05/07/21   Short Term Goal #1 Patient will:  Increase L LE strength 1 grade to facilitate independent mobility, Ambulate at least 50 ft  with least restrictive assistive device independently w/o LOB, Complete exercise program independently, Tolerate standing 5 minutes independently to facilitate functional task performance, Pt will be able to ascend/descend 6 stairs with HR and min A, Increase L knee AROM from 0-90 degrees to improve transfers and gait   Plan   Treatment/Interventions Functional transfer training;LE strengthening/ROM; Elevations; Therapeutic exercise; Endurance training;Gait training   PT Frequency   (3-5x/week)   Recommendation   PT Discharge Recommendation No rehabilitation needs   Equipment Recommended 2022 Mobility Inpatient   Turning in Bed Without Bedrails 3   Lying on Back to Sitting on Edge of Flat Bed 3   Moving Bed to Chair 4   Standing Up From Chair 4   Walk in Room 4   Climb 3-5 Stairs 3   Basic Mobility Inpatient Raw Score 21   Basic Mobility Standardized Score 45 55         The patient's AM-PAC Basic Mobility Inpatient Short Form Raw Score is 21, Standardized Score is 45 55  A standardized score greater than 42 9 suggests the patient may benefit from discharge to home  Please also refer to the recommendation of the Physical Therapist for safe discharge planning  PHYSICAL THERAPY TREATMENT NOTE    Name: Sandra Torres  : 1965  Time in: 1235  Time out: 1245  Total treat time: 10 minutes    S: Pt was agreeable to participate in PT intervention  O: Pt was able to transfer from toilet with supervision and instruction for sequencing transfer by utilizing grab bars  He ambulated 25 ft with RW back to bed with supervision and he required mod A for LLE management with sit to supine transfers due to L knee pain and weakness  While pt was supine in bed with HOB elevated, PT instructed pt on therex including ankle pumps, quad sets, and glute sets, 10 reps of each in order to prevent muscle atrophy and improve ROM, as able without increasing pain levels  He required max A to prop LLE on pillow due to increased pain levels and limited mobility  A: Pt required minimal verbal cues for sequencing transfers and instruction for placement of hands; pt able to implement and verbalized understanding   Pt's pain levels were elevated following ambulation  He was able to perform quad sets with minimal verbal and tactile cues without increasing pain levels; however, poor L quad activation observed, which is likely attributed to pain and swelling       P: Continue per evaluation above    Skilled inpatient PT is recommended during this admission in order to progress patient toward goals so patient is able to maximize independence    Marciano Santo, PT

## 2021-04-28 LAB
ANION GAP SERPL CALCULATED.3IONS-SCNC: 7 MMOL/L (ref 4–13)
BASOPHILS # BLD AUTO: 0.09 THOUSANDS/ΜL (ref 0–0.1)
BASOPHILS NFR BLD AUTO: 1 % (ref 0–1)
BUN SERPL-MCNC: 17 MG/DL (ref 5–25)
CALCIUM SERPL-MCNC: 8.3 MG/DL (ref 8.3–10.1)
CHLORIDE SERPL-SCNC: 103 MMOL/L (ref 100–108)
CO2 SERPL-SCNC: 29 MMOL/L (ref 21–32)
CREAT SERPL-MCNC: 1 MG/DL (ref 0.6–1.3)
CRP SERPL QL: 140.6 MG/L
EOSINOPHIL # BLD AUTO: 0.1 THOUSAND/ΜL (ref 0–0.61)
EOSINOPHIL NFR BLD AUTO: 1 % (ref 0–6)
ERYTHROCYTE [DISTWIDTH] IN BLOOD BY AUTOMATED COUNT: 13.2 % (ref 11.6–15.1)
GFR SERPL CREATININE-BSD FRML MDRD: 86 ML/MIN/1.73SQ M
GLUCOSE SERPL-MCNC: 113 MG/DL (ref 65–140)
GLUCOSE SERPL-MCNC: 119 MG/DL (ref 65–140)
GLUCOSE SERPL-MCNC: 238 MG/DL (ref 65–140)
GLUCOSE SERPL-MCNC: 317 MG/DL (ref 65–140)
GLUCOSE SERPL-MCNC: 332 MG/DL (ref 65–140)
HCT VFR BLD AUTO: 44.7 % (ref 36.5–49.3)
HGB BLD-MCNC: 14.8 G/DL (ref 12–17)
IMM GRANULOCYTES # BLD AUTO: 0.2 THOUSAND/UL (ref 0–0.2)
IMM GRANULOCYTES NFR BLD AUTO: 1 % (ref 0–2)
LYMPHOCYTES # BLD AUTO: 2.14 THOUSANDS/ΜL (ref 0.6–4.47)
LYMPHOCYTES NFR BLD AUTO: 12 % (ref 14–44)
MCH RBC QN AUTO: 29.2 PG (ref 26.8–34.3)
MCHC RBC AUTO-ENTMCNC: 33.1 G/DL (ref 31.4–37.4)
MCV RBC AUTO: 88 FL (ref 82–98)
MONOCYTES # BLD AUTO: 1.96 THOUSAND/ΜL (ref 0.17–1.22)
MONOCYTES NFR BLD AUTO: 11 % (ref 4–12)
NEUTROPHILS # BLD AUTO: 13.15 THOUSANDS/ΜL (ref 1.85–7.62)
NEUTS SEG NFR BLD AUTO: 74 % (ref 43–75)
NRBC BLD AUTO-RTO: 0 /100 WBCS
PLATELET # BLD AUTO: 236 THOUSANDS/UL (ref 149–390)
PMV BLD AUTO: 10.2 FL (ref 8.9–12.7)
POTASSIUM SERPL-SCNC: 3.6 MMOL/L (ref 3.5–5.3)
RBC # BLD AUTO: 5.06 MILLION/UL (ref 3.88–5.62)
SODIUM SERPL-SCNC: 139 MMOL/L (ref 136–145)
VANCOMYCIN TROUGH SERPL-MCNC: 8.9 UG/ML (ref 10–20)
WBC # BLD AUTO: 17.64 THOUSAND/UL (ref 4.31–10.16)

## 2021-04-28 PROCEDURE — 82948 REAGENT STRIP/BLOOD GLUCOSE: CPT

## 2021-04-28 PROCEDURE — 99024 POSTOP FOLLOW-UP VISIT: CPT | Performed by: PHYSICIAN ASSISTANT

## 2021-04-28 PROCEDURE — 80048 BASIC METABOLIC PNL TOTAL CA: CPT | Performed by: PHYSICIAN ASSISTANT

## 2021-04-28 PROCEDURE — 86140 C-REACTIVE PROTEIN: CPT | Performed by: PHYSICIAN ASSISTANT

## 2021-04-28 PROCEDURE — 0MDP0ZZ EXTRACTION OF LEFT KNEE BURSA AND LIGAMENT, OPEN APPROACH: ICD-10-PCS | Performed by: INTERNAL MEDICINE

## 2021-04-28 PROCEDURE — NC001 PR NO CHARGE: Performed by: PHYSICIAN ASSISTANT

## 2021-04-28 PROCEDURE — 99232 SBSQ HOSP IP/OBS MODERATE 35: CPT | Performed by: PHYSICIAN ASSISTANT

## 2021-04-28 PROCEDURE — 85025 COMPLETE CBC W/AUTO DIFF WBC: CPT | Performed by: PHYSICIAN ASSISTANT

## 2021-04-28 PROCEDURE — 80202 ASSAY OF VANCOMYCIN: CPT | Performed by: PHYSICIAN ASSISTANT

## 2021-04-28 RX ORDER — KETOROLAC TROMETHAMINE 30 MG/ML
30 INJECTION, SOLUTION INTRAMUSCULAR; INTRAVENOUS EVERY 6 HOURS PRN
Status: DISPENSED | OUTPATIENT
Start: 2021-04-28 | End: 2021-04-30

## 2021-04-28 RX ORDER — CEFAZOLIN SODIUM 2 G/50ML
2000 SOLUTION INTRAVENOUS EVERY 8 HOURS
Status: DISCONTINUED | OUTPATIENT
Start: 2021-04-28 | End: 2021-05-01 | Stop reason: HOSPADM

## 2021-04-28 RX ORDER — ACETAMINOPHEN 325 MG/1
975 TABLET ORAL EVERY 8 HOURS SCHEDULED
Status: DISCONTINUED | OUTPATIENT
Start: 2021-04-28 | End: 2021-05-01 | Stop reason: HOSPADM

## 2021-04-28 RX ORDER — HYDROMORPHONE HCL/PF 1 MG/ML
0.5 SYRINGE (ML) INJECTION EVERY 4 HOURS PRN
Status: DISCONTINUED | OUTPATIENT
Start: 2021-04-28 | End: 2021-05-01 | Stop reason: HOSPADM

## 2021-04-28 RX ORDER — LANOLIN ALCOHOL/MO/W.PET/CERES
3 CREAM (GRAM) TOPICAL
Status: DISCONTINUED | OUTPATIENT
Start: 2021-04-28 | End: 2021-05-01 | Stop reason: HOSPADM

## 2021-04-28 RX ADMIN — MELATONIN TAB 3 MG 3 MG: 3 TAB at 22:01

## 2021-04-28 RX ADMIN — ACETAMINOPHEN 975 MG: 325 TABLET, FILM COATED ORAL at 10:19

## 2021-04-28 RX ADMIN — CALCIUM 1 TABLET: 500 TABLET ORAL at 17:02

## 2021-04-28 RX ADMIN — SUCRALFATE 1 G: 1 TABLET ORAL at 22:01

## 2021-04-28 RX ADMIN — SUCRALFATE 1 G: 1 TABLET ORAL at 08:38

## 2021-04-28 RX ADMIN — KETOROLAC TROMETHAMINE 30 MG: 30 INJECTION, SOLUTION INTRAMUSCULAR at 19:42

## 2021-04-28 RX ADMIN — OXYCODONE HYDROCHLORIDE AND ACETAMINOPHEN 1000 MG: 500 TABLET ORAL at 08:37

## 2021-04-28 RX ADMIN — VANCOMYCIN HYDROCHLORIDE 1500 MG: 5 INJECTION, POWDER, LYOPHILIZED, FOR SOLUTION INTRAVENOUS at 18:36

## 2021-04-28 RX ADMIN — VANCOMYCIN HYDROCHLORIDE 1500 MG: 5 INJECTION, POWDER, LYOPHILIZED, FOR SOLUTION INTRAVENOUS at 11:35

## 2021-04-28 RX ADMIN — ALBUTEROL SULFATE 2 PUFF: 90 AEROSOL, METERED RESPIRATORY (INHALATION) at 19:47

## 2021-04-28 RX ADMIN — INSULIN LISPRO 8 UNITS: 100 INJECTION, SOLUTION INTRAVENOUS; SUBCUTANEOUS at 12:04

## 2021-04-28 RX ADMIN — INSULIN GLARGINE 50 UNITS: 100 INJECTION, SOLUTION SUBCUTANEOUS at 22:00

## 2021-04-28 RX ADMIN — ENOXAPARIN SODIUM 40 MG: 40 INJECTION SUBCUTANEOUS at 08:36

## 2021-04-28 RX ADMIN — HYDROXYCHLOROQUINE SULFATE 200 MG: 200 TABLET, FILM COATED ORAL at 08:38

## 2021-04-28 RX ADMIN — CEFAZOLIN SODIUM 2000 MG: 2 SOLUTION INTRAVENOUS at 17:03

## 2021-04-28 RX ADMIN — INSULIN LISPRO 3 UNITS: 100 INJECTION, SOLUTION INTRAVENOUS; SUBCUTANEOUS at 22:03

## 2021-04-28 RX ADMIN — INSULIN LISPRO 8 UNITS: 100 INJECTION, SOLUTION INTRAVENOUS; SUBCUTANEOUS at 17:14

## 2021-04-28 RX ADMIN — ACETAMINOPHEN 975 MG: 325 TABLET, FILM COATED ORAL at 22:01

## 2021-04-28 RX ADMIN — LOSARTAN POTASSIUM: 50 TABLET, FILM COATED ORAL at 08:37

## 2021-04-28 RX ADMIN — HYDROXYCHLOROQUINE SULFATE 200 MG: 200 TABLET, FILM COATED ORAL at 17:03

## 2021-04-28 RX ADMIN — ATORVASTATIN CALCIUM 40 MG: 40 TABLET, FILM COATED ORAL at 22:01

## 2021-04-28 RX ADMIN — SUCRALFATE 1 G: 1 TABLET ORAL at 11:35

## 2021-04-28 RX ADMIN — SUCRALFATE 1 G: 1 TABLET ORAL at 17:02

## 2021-04-28 RX ADMIN — INSULIN GLARGINE 50 UNITS: 100 INJECTION, SOLUTION SUBCUTANEOUS at 08:36

## 2021-04-28 RX ADMIN — PANTOPRAZOLE SODIUM 40 MG: 40 TABLET, DELAYED RELEASE ORAL at 05:36

## 2021-04-28 RX ADMIN — CEFAZOLIN SODIUM 2000 MG: 2 SOLUTION INTRAVENOUS at 10:31

## 2021-04-28 RX ADMIN — FLUTICASONE PROPIONATE 2 SPRAY: 50 SPRAY, METERED NASAL at 10:26

## 2021-04-28 RX ADMIN — OXYCODONE HYDROCHLORIDE 10 MG: 10 TABLET ORAL at 08:37

## 2021-04-28 NOTE — PROGRESS NOTES
[facility]  Progress Note - Valerio Ch  1965, 50 y o  male MRN: [id number]  Unit/Bed#: S -01 Encounter: [id number]  Primary Care Provider: Jareth Velasquez DO   Date and time admitted to hospital: 4/26/2021  3:52 PM    * Sepsis [facility])  Assessment & Plan  · POA  Evidenced by tachycardia and leukocytosis  Lactate/temp normal   White count still elevated, however patient was recently on a course of steroids as well  · Source: LLE cellulitis, left patellar bursitis  · No evidence of septic joint  · Abx: ancef + Vanco -- will continue at this time  · Follow up blood and bursa fluid cultures  · Trend labs/vitals    Pain and swelling of left knee  Assessment & Plan  · Ongoing x2 weeks  S/p aspiration x1 on 4/16 w/ improvement in sx for about 2 days  Fluid culture negative for infection, received IM SoluMedrol x1 followed by prednisone 40mg PO QD x5 days w/ no improvement in sx   · S/p aspiration of 10 cc of clear synovial fluid in the ED-- f/u fluid culture   · Underwent incision and drainage 4/27 of prepatellar bursa with purulent fluid, follow-up on cultures  · ESR/CRP elevated-- has known RA-- L knee has previously been affected by flare  · US of soft tissue: no abscess, soft tissue edema consistent w/ cellulitis   · Continue w/ IV abx as above  · Pain control regimen to be altered-Tylenol 975 t i d , Toradol 30 mg IV q 6 hours p r n  Moderate pain, oxycodone 10 mg p r n  Severe pain, Dilaudid 0 5 mg IV for breakthrough pain  · Orthopedics following  They suspect prepatellar bursitis  They are recommending operative washout tomorrow if patient is not improved  · My colleague discussed with on-call rheumatologist, Dr Jazz Bhatia  · No further inpatient rheumatologic workup indicated  Plan to finish antibiotics and follow-up with outpatient rheumatologist, Dr Gavin Plascencia      Cellulitis of left lower extremity  Assessment & Plan  · Likely 2/2 recent aspiration of L knee  · Indurated areas over lateral aspect of knee extending into thigh: US negative for abscess, but consistent w/ cellulitis   · Continue w/ IV abx--patient receiving ceftriaxone and IV Vanco   Note that he has previously grown MSSA; would change ceftriaxone to Ancef 2 g Q 8 hours   · Serial skin exams; re-capo area as there has been some extension noted of erythema beyond initial line of demarcation  If patient not improved by tomorrow, we may need Infectious Disease involvement     Type 2 diabetes mellitus with diabetic neuropathic arthropathy, with long-term current use of insulin [facility])  Assessment & Plan  Lab Results   Component Value Date    HGBA1C 7 4 (H) 03/05/2021       Recent Labs     04/26/21 2137   POCGLU 186*       Blood Sugar Average: Last 72 hrs:  · (P) 186   · A1c near goal; continue to aim for euglycemia to aid in resolution of infection  · Hold PO hypoglycemics: Januvia, Victoza  · Continue home long-acting regimen: lantus 50U SQ BID  · SSI for correction  · QID accuchecks    Rheumatoid arthritis ([facility] [address] )  Assessment & Plan  · Patient follows w/ Rheumatology @ [facility]   · Currently on Humira-- has been on this for at least 1 year-- last dose was about 2 weeks ago prior to onset of sx   · Also on plaquenil and Arava    Benign essential hypertension  Assessment & Plan  · BP elevated on admission likely 2/2 pain; now improved   · Continue home BP medication regimen    VTE Pharmacologic Prophylaxis:   Pharmacologic: Enoxaparin (Lovenox)  Mechanical VTE Prophylaxis in Place: No    Patient Centered Rounds: spoke with RN    Discussions with Specialists or Other Care Team Provider: case mgt, ortho    Education and Discussions with Family / Patient: wife over phone    Time Spent for Care: 30 minutes  More than 50% of total time spent on counseling and coordination of care as described above      Current Length of Stay: 2 day(s)    Current Patient Status: Inpatient   Certification Statement: The patient will continue to require additional inpatient hospital stay due to Need for ongoing IV antibiotics, noted that cellulitis has slightly extended beyond demarcation line  Possible need for operative intervention tomorrow    Discharge Plan:  Home when stable per Orthopedics    Code Status: Level 1 - Full Code      Subjective:   Patient states he has been having a lot of pain and that he is typically very tolerant to pain since he has chronically had right foot pain from Charcot foot and extensive surgeries and also due to his history of rheumatoid arthritis  However, he states that when he gets oxycodone it typically does not work well and just give him a headache and he has not been sleeping well  In the past he states when he has had severe pain he has needed fentanyl in combination with Vicodin and tramadol and Dilaudid  However he also reports that he is trying not to over utilize medication    Objective:     Vitals:   Temp (24hrs), Av 7 °F (37 1 °C), Min:98 5 °F (36 9 °C), Max:98 9 °F (37 2 °C)    Temp:  [98 5 °F (36 9 °C)-98 9 °F (37 2 °C)] 98 5 °F (36 9 °C)  HR:  [] 97  Resp:  [18-20] 20  BP: (137-162)/(67-91) 141/67  SpO2:  [93 %-99 %] 99 %  Body mass index is 34 89 kg/m²  Input and Output Summary (last 24 hours): Intake/Output Summary (Last 24 hours) at 2021 1009  Last data filed at 2021 0319  Gross per 24 hour   Intake 240 ml   Output 2425 ml   Net -2185 ml       Physical Exam:     Physical Exam  Vitals signs reviewed  Constitutional:       General: He is not in acute distress  Appearance: He is obese  He is not toxic-appearing or diaphoretic  Eyes:      General: No scleral icterus  Right eye: No discharge  Left eye: No discharge  Conjunctiva/sclera: Conjunctivae normal    Cardiovascular:      Rate and Rhythm: Normal rate and regular rhythm  Heart sounds: No murmur  Pulmonary:      Effort: No respiratory distress  Breath sounds: No stridor  No wheezing or rhonchi  Abdominal:      General: There is no distension  Palpations: Abdomen is soft  Tenderness: There is no guarding  Musculoskeletal:      Right lower leg: No edema  Left lower leg: Edema present  Comments: Ace wrap in place on left knee   Skin:     General: Skin is warm and dry  Coloration: Skin is not jaundiced or pale  Findings: Erythema (Noted faint pink skin discoloration consistent with cellulitis extending approximately 3 in above the demarcation line) present  No bruising or rash  Neurological:      General: No focal deficit present  Mental Status: He is alert  Comments: Awake alert interactive, pleasant and cooperative   Psychiatric:         Mood and Affect: Mood normal          Thought Content: Thought content normal          Judgment: Judgment normal            Additional Data:     Labs:    Results from last 7 days   Lab Units 04/28/21  0648   WBC Thousand/uL 17 64*   HEMOGLOBIN g/dL 14 8   HEMATOCRIT % 44 7   PLATELETS Thousands/uL 236   NEUTROS PCT % 74   LYMPHS PCT % 12*   MONOS PCT % 11   EOS PCT % 1     Results from last 7 days   Lab Units 04/28/21  0648  04/26/21  1440   SODIUM mmol/L 139   < > 133*   POTASSIUM mmol/L 3 6   < > 3 9   CHLORIDE mmol/L 103   < > 96*   CO2 mmol/L 29   < > 26   BUN mg/dL 17   < > 24   CREATININE mg/dL 1 00   < > 1 16   ANION GAP mmol/L 7   < > 11   CALCIUM mg/dL 8 3   < > 8 9   ALBUMIN g/dL  --   --  3 3*   TOTAL BILIRUBIN mg/dL  --   --  0 62   ALK PHOS U/L  --   --  76   ALT U/L  --   --  37   AST U/L  --   --  17   GLUCOSE RANDOM mg/dL 113   < > 344*    < > = values in this interval not displayed           Results from last 7 days   Lab Units 04/28/21  0826 04/27/21  2122 04/26/21  2137   POC GLUCOSE mg/dl 119 285* 186*         Results from last 7 days   Lab Units 04/27/21  0413 04/26/21  1704 04/26/21  1440   LACTIC ACID mmol/L  --  1 7  --    PROCALCITONIN ng/ml 0 05  --  0 06       * I Have Reviewed All Lab Data Listed Above  * Additional Pertinent Lab Tests Reviewed: [address] Admission Reviewed    Imaging:    Imaging Reports Reviewed Today Include:   Imaging Personally Reviewed by Myself Includes:      Recent Cultures (last 7 days):     Results from last 7 days   Lab Units 04/26/21  1820 04/26/21  1704   BLOOD CULTURE   --  No Growth at 24 hrs  No Growth at 24 hrs  GRAM STAIN RESULT  No Polys or Bacteria seen  --        Last 24 Hours Medication List:   Current Facility-Administered Medications   Medication Dose Route Frequency Provider Last Rate    acetaminophen  975 mg Oral Q8H [address] Virginia Appiah PA-C      albuterol  2 puff Inhalation Q4H PRN Joy Terrell PA-C      vitamin C  1,000 mg Oral Daily Joy Terrell PA-C      atorvastatin  40 mg Oral HS Joy Terrell PA-C      calcium carbonate  1 tablet Oral Daily With Dinner Joy Terrell PA-C      calcium carbonate  1,000 mg Oral Daily PRN Joy Terrell PA-C      cefazolin  2,000 mg Intravenous Q8H Virginia Appiah PA-C      cyclobenzaprine  10 mg Oral TID PRN Joy Terrell PA-C      enoxaparin  40 mg Subcutaneous Daily Joy Terrell PA-C      fluticasone  2 spray Each Nare Daily Joy Terrell PA-C      HYDROmorphone  0 5 mg Intravenous Q4H PRN Virginia Appiah PA-C      hydroxychloroquine  200 mg Oral BID Joy Terrell PA-C      insulin glargine  50 Units Subcutaneous Q12H [address] Joy Terrell PA-C      insulin lispro  1-6 Units Subcutaneous HS Joy Terrell PA-C      insulin lispro  2-12 Units Subcutaneous TID AC Joy Terrell PA-C      ketorolac  30 mg Intravenous Q6H PRN Virginia Appiah PA-C      leflunomide  20 mg Oral Daily With [employer]avino Terrell PA-C      lidocaine (PF)  20 mL Infiltration Once Ambika Sifuentes PA-C      losartan potassium-hydrochlorothiazide (HYZAAR 100/12  5) combo dose   Oral Daily Joy Terrell, MARK      melatonin  3 mg Oral HS Virginia Appiah PA-C      ondansetron  4 mg Intravenous Q6H PRN Joy Terrell PA-C      oxyCODONE  10 mg Oral Q6H PRN Joy Terrell PA-C      pantoprazole  40 mg Oral Early Morning Joy Terrell PA-C      sucralfate  1 g Oral 4x Daily Joy Terrell PA-C      vancomycin  20 mg/kg (Adjusted) Intravenous Q12H Joy Terrell PA-C 1,750 mg (04/27/21 2032)        Today, Patient Was Seen By: Isidro Manjarrez PA-C    ** Please Note: Dictation voice to text software may have been used in the creation of this document   **

## 2021-04-28 NOTE — PROGRESS NOTES
Progress Note - Orthopedics   Rose Cespedes  50 y o  male MRN: [id number]  Unit/Bed#: S -01      Subjective:    50 y o male seen and evaluated  No acute events overnight  Patient reports his pain in the knee is improving  He has continued pain along the lateral distal thigh  Pain controlled  Denies fevers or chills      Labs:  0   Lab Value Date/Time    HCT 41 5 04/27/2021 0412    HCT 45 0 04/26/2021 1440    HCT 47 3 04/22/2021 1011    HCT 47 1 01/23/2017 1300    HGB 14 2 04/27/2021 0412    HGB 15 7 04/26/2021 1440    HGB 15 8 04/22/2021 1011    HGB 16 0 01/23/2017 1300    INR 0 94 04/11/2019 1116    WBC 19 05 (H) 04/27/2021 0412    WBC 21 66 (H) 04/26/2021 1440    WBC 13 13 (H) 04/22/2021 1011    WBC 6 0 01/23/2017 1300    ESR 68 (H) 04/26/2021 1440    CRP 87 2 (H) 04/26/2021 1440       Meds:    Current Facility-Administered Medications:     acetaminophen (TYLENOL) tablet 650 mg, 650 mg, Oral, Q6H PRN, Joy Terrell PA-C    albuterol (PROVENTIL HFA,VENTOLIN HFA) inhaler 2 puff, 2 puff, Inhalation, Q4H PRN, ALLEGRA Resendez-C    ascorbic acid (VITAMIN C) tablet 1,000 mg, 1,000 mg, Oral, Daily, ALLEGRA Resendez-EDILMA, 1,000 mg at 04/27/21 3581    atorvastatin (LIPITOR) tablet 40 mg, 40 mg, Oral, HS, ALLEGRA Resendez-C, 40 mg at 04/27/21 2242    calcium carbonate (OYSTER SHELL,OSCAL) 500 mg tablet 1 tablet, 1 tablet, Oral, Daily With Dinner, ALLEGRA Resendez-EDILMA, 1 tablet at 04/27/21 1728    calcium carbonate (TUMS) chewable tablet 1,000 mg, 1,000 mg, Oral, Daily PRN, Joy Terrell PA-C    ceftriaxone (ROCEPHIN) 1 g/50 mL in dextrose IVPB, 1,000 mg, Intravenous, Q24H, Joy Terrell PA-C, Last Rate: 100 mL/hr at 04/27/21 1827, 1,000 mg at 04/27/21 1827    cyclobenzaprine (FLEXERIL) tablet 10 mg, 10 mg, Oral, TID PRN, Joy Terrell PA-C    enoxaparin (LOVENOX) subcutaneous injection 40 mg, 40 mg, Subcutaneous, Daily, Joy Terrell PA-C   fluticasone (FLONASE) 50 mcg/act nasal spray 2 spray, 2 spray, Each Nare, Daily, Joy Terrell PA-C    hydroxychloroquine (PLAQUENIL) tablet 200 mg, 200 mg, Oral, BID, Joy Terrell PA-C, 200 mg at 04/27/21 1729    insulin glargine (LANTUS) subcutaneous injection 50 Units 0 5 mL, 50 Units, Subcutaneous, Q12H [address], Joy Terrell PA-C, 25 Units at 04/27/21 2319    insulin lispro (HumaLOG) 100 units/mL subcutaneous injection 1-6 Units, 1-6 Units, Subcutaneous, HS, Joy Terrell PA-C, 4 Units at 04/27/21 2248    insulin lispro (HumaLOG) 100 units/mL subcutaneous injection 2-12 Units, 2-12 Units, Subcutaneous, TID AC **AND** Fingerstick Glucose (POCT), , , TID AC, Joy Terrell PA-C    leflunomide (ARAVA) tablet 20 mg, 20 mg, Oral, Daily With Dinner, Joy Terrell PA-C    lidocaine (PF) (XYLOCAINE-MPF) 1 % injection 20 mL, 20 mL, Infiltration, Once, Kameronashu Alarcon, MARK, Stopped at 04/27/21 1041    losartan potassium-hydrochlorothiazide (HYZAAR 100/12  5) combo dose, , Oral, Daily, Joy Terrell PA-C, Given at 04/27/21 0852    morphine injection 2 mg, 2 mg, Intravenous, Q4H PRN, Joy Terrell PA-C    ondansetron (ZOFRAN) injection 4 mg, 4 mg, Intravenous, Q6H PRN, Joy Terrell PA-C    oxyCODONE (ROXICODONE) immediate release tablet 10 mg, 10 mg, Oral, Q6H PRN, Joy Terrell PA-C, 10 mg at 04/27/21 2037    oxyCODONE (ROXICODONE) IR tablet 5 mg, 5 mg, Oral, Q6H PRN, Joy Terrell PA-C    pantoprazole (PROTONIX) EC tablet 40 mg, 40 mg, Oral, Early Morning, Joy Terrell PA-C, 40 mg at 04/28/21 0536    sucralfate (CARAFATE) tablet 1 g, 1 g, Oral, 4x Daily, Joy Terrell PA-C, 1 g at 04/27/21 2242    vancomycin (VANCOCIN) 1,750 mg in sodium chloride 0 9 % 500 mL IVPB, 20 mg/kg (Adjusted), Intravenous, Q12H, Joy Terrell PA-C, Last Rate: 250 mL/hr at 04/27/21 2032, 1,750 mg at 04/27/21 2032    Blood Culture:   Lab Results   Component Value Date    BLOODCX No Growth at 24 hrs  04/26/2021    BLOODCX No Growth at 24 hrs  04/26/2021       Wound Culture:   Lab Results   Component Value Date    WOUNDCULT 3+ Growth of Staphylococcus aureus (A) 10/27/2020       Ins and Outs:  I/O last 24 hours: In: 1220 [P O :720; IV Piggyback:500]  Out: 3500 [Urine:3500]          Physical:  Vitals:    04/27/21 2225   BP: 162/91   Pulse: 101   Resp: 18   Temp: 98 9 °F (37 2 °C)   SpO2: 97%     Musculoskeletal: left Lower Extremity (knee)  · Skin with 1 cm incision over the prepatellar bursa  Skin surrounding the incision is white and macerated  Minimal erythema over the anterior knee  Erythema noted over the lateral thigh, which is marked with minimal improvement compared to markings  · No knee effusion present  · Dressing with moderate amount of bloody and purulent drainage  Packing removed  Able to express approximately 2 cc of cloudy bloody drainage from the wound  · Range of motion 0-90 with minimal pain  · TTP over lateral distal thigh  · SILT s/s/sp/dp/t  · Motor intact fhl/ehl, ankle dorsi/plantar flexion  · Intact DP pulse    Assessment:    55 y o male with left septic knee prepatellar bursitis and cellulitis  Clinically improving with antibiotics and bedside I&D    Plan:  · WBAT LLE  · Will perform repeat bedside irrigation and debridement today and repacking of the wound  See separate procedure note  · No plan for surgical intervention today     · Continue antibiotics per primary team - currently on Rocephin and vanco    · Pain control  · Dispo: Ortho will follow    Chau Acosta PA-C

## 2021-04-28 NOTE — PROCEDURES
Procedure- Orthopedics   Gris Burgos  50 y o  male MRN: [id number]  Unit/Bed#: S -30    Procedure: left knee prepatellar bursa irrigation and debridement    After sterile preparation of the skin overlying the knee with betadine, the incisional wound was irrigated with 500 cc of sterile normal saline  The bursal space was probed with a sterile cotton-tipped applicator soaked in betadine to break up any loculations  Minimal bloody drainage was expressed from the wound  The wound was irrigated and washed with betadine and then irrigated a second time with 500 cc of sterile normal saline  Iodoform packing gauze was used to pack the prepatellar bursa space through the incision  Sterile gauze, ABD, kerlix, and ACE wrap was applied to the knee  Patient tolerated the procedure and was neurovascularly intact before and after the procedure  Plan for reevaluation and wound check tomorrow  If patient continues to improve with bedside I&D and IV antibiotics, no further surgical intervention will be indicated       Addie Thomas PA-C

## 2021-04-28 NOTE — PROGRESS NOTES
Vancomycin Assessment    Aron Camp  is a 50 y o  male who is currently receiving vancomycin 1750 mg every 12 hours for bacteremia     Relevant clinical data and objective history reviewed:  Creatinine   Date Value Ref Range Status   04/28/2021 1 00 0 60 - 1 30 mg/dL Final     Comment:     Standardized to IDMS reference method   04/27/2021 1 03 0 60 - 1 30 mg/dL Final     Comment:     Standardized to IDMS reference method   04/26/2021 1 16 0 60 - 1 30 mg/dL Final     Comment:     Standardized to IDMS reference method   01/23/2017 0 91 0 60 - 1 35 mg/dL Final     /67 (BP Location: Left arm)   Pulse 97   Temp 98 5 °F (36 9 °C) (Oral)   Resp 20   Ht 6' (1 829 m)   Wt 117 kg (257 lb 4 4 oz)   SpO2 99%   BMI 34 89 kg/m²   I/O last 3 completed shifts: In: 1220 [P O :720; IV Piggyback:500]  Out: 3500 [Urine:3500]  Lab Results   Component Value Date/Time    BUN 17 04/28/2021 06:48 AM    BUN 15 01/23/2017 01:00 PM    WBC 17 64 (H) 04/28/2021 06:48 AM    WBC 6 0 01/23/2017 01:00 PM    HGB 14 8 04/28/2021 06:48 AM    HGB 16 0 01/23/2017 01:00 PM    HCT 44 7 04/28/2021 06:48 AM    HCT 47 1 01/23/2017 01:00 PM    MCV 88 04/28/2021 06:48 AM    MCV 86 7 01/23/2017 01:00 PM     04/28/2021 06:48 AM     01/23/2017 01:00 PM     Temp Readings from Last 3 Encounters:   04/28/21 98 5 °F (36 9 °C) (Oral)   04/22/21 98 1 °F (36 7 °C) (Tympanic)   04/15/21 (!) 97 3 °F (36 3 °C) (Tympanic)     Vancomycin Days of Therapy: 3    Assessment/Plan  The patient is currently on vancomycin utilizing scheduled dosing  The patient is receiving 1750 mg every 12 hours with the most recent vancomycin level being at steady-state and sub-therapeutic based on a goal of 15-20 (appropriate for most indications) ; therefore, after clinical evaluation will be changed to 1500 mg every 8 hours   Pharmacy will continue to follow closely for s/sx of nephrotoxicity, infusion reactions, and appropriateness of therapy    BMP and CBC will be ordered per protocol  Plan for trough as patient approaches steady state, prior to the 4th  dose at approximately 1000 on 4/29  Pharmacy will continue to follow the patients culture results and clinical progress daily      Didi Diop, Pharmacist

## 2021-04-29 ENCOUNTER — ANESTHESIA EVENT (INPATIENT)
Dept: PERIOP | Facility: HOSPITAL | Age: 56
DRG: 710 | End: 2021-04-29
Payer: COMMERCIAL

## 2021-04-29 ENCOUNTER — ANESTHESIA (INPATIENT)
Dept: PERIOP | Facility: HOSPITAL | Age: 56
DRG: 710 | End: 2021-04-29
Payer: COMMERCIAL

## 2021-04-29 LAB
ANION GAP SERPL CALCULATED.3IONS-SCNC: 9 MMOL/L (ref 4–13)
BACTERIA SPEC ANAEROBE CULT: NORMAL
BASOPHILS # BLD AUTO: 0.1 THOUSANDS/ΜL (ref 0–0.1)
BASOPHILS NFR BLD AUTO: 1 % (ref 0–1)
BUN SERPL-MCNC: 18 MG/DL (ref 5–25)
CALCIUM SERPL-MCNC: 8.1 MG/DL (ref 8.3–10.1)
CHLORIDE SERPL-SCNC: 102 MMOL/L (ref 100–108)
CO2 SERPL-SCNC: 26 MMOL/L (ref 21–32)
CREAT SERPL-MCNC: 1.09 MG/DL (ref 0.6–1.3)
EOSINOPHIL # BLD AUTO: 0.17 THOUSAND/ΜL (ref 0–0.61)
EOSINOPHIL NFR BLD AUTO: 1 % (ref 0–6)
ERYTHROCYTE [DISTWIDTH] IN BLOOD BY AUTOMATED COUNT: 13 % (ref 11.6–15.1)
GFR SERPL CREATININE-BSD FRML MDRD: 77 ML/MIN/1.73SQ M
GLUCOSE SERPL-MCNC: 139 MG/DL (ref 65–140)
GLUCOSE SERPL-MCNC: 185 MG/DL (ref 65–140)
GLUCOSE SERPL-MCNC: 193 MG/DL (ref 65–140)
GLUCOSE SERPL-MCNC: 205 MG/DL (ref 65–140)
GLUCOSE SERPL-MCNC: 232 MG/DL (ref 65–140)
GLUCOSE SERPL-MCNC: 399 MG/DL (ref 65–140)
HCT VFR BLD AUTO: 44.1 % (ref 36.5–49.3)
HGB BLD-MCNC: 14.6 G/DL (ref 12–17)
IMM GRANULOCYTES # BLD AUTO: 0.24 THOUSAND/UL (ref 0–0.2)
IMM GRANULOCYTES NFR BLD AUTO: 2 % (ref 0–2)
LYMPHOCYTES # BLD AUTO: 1.86 THOUSANDS/ΜL (ref 0.6–4.47)
LYMPHOCYTES NFR BLD AUTO: 14 % (ref 14–44)
MCH RBC QN AUTO: 29.3 PG (ref 26.8–34.3)
MCHC RBC AUTO-ENTMCNC: 33.1 G/DL (ref 31.4–37.4)
MCV RBC AUTO: 88 FL (ref 82–98)
MONOCYTES # BLD AUTO: 1.55 THOUSAND/ΜL (ref 0.17–1.22)
MONOCYTES NFR BLD AUTO: 11 % (ref 4–12)
NEUTROPHILS # BLD AUTO: 9.84 THOUSANDS/ΜL (ref 1.85–7.62)
NEUTS SEG NFR BLD AUTO: 71 % (ref 43–75)
NRBC BLD AUTO-RTO: 0 /100 WBCS
PLATELET # BLD AUTO: 237 THOUSANDS/UL (ref 149–390)
PMV BLD AUTO: 10.1 FL (ref 8.9–12.7)
POTASSIUM SERPL-SCNC: 4.1 MMOL/L (ref 3.5–5.3)
RBC # BLD AUTO: 4.99 MILLION/UL (ref 3.88–5.62)
SODIUM SERPL-SCNC: 137 MMOL/L (ref 136–145)
VANCOMYCIN TROUGH SERPL-MCNC: 11.3 UG/ML (ref 10–20)
WBC # BLD AUTO: 13.76 THOUSAND/UL (ref 4.31–10.16)

## 2021-04-29 PROCEDURE — 99232 SBSQ HOSP IP/OBS MODERATE 35: CPT | Performed by: PHYSICIAN ASSISTANT

## 2021-04-29 PROCEDURE — 27301 DRAIN THIGH/KNEE LESION: CPT | Performed by: ORTHOPAEDIC SURGERY

## 2021-04-29 PROCEDURE — 80048 BASIC METABOLIC PNL TOTAL CA: CPT | Performed by: PHYSICIAN ASSISTANT

## 2021-04-29 PROCEDURE — 82948 REAGENT STRIP/BLOOD GLUCOSE: CPT

## 2021-04-29 PROCEDURE — 87176 TISSUE HOMOGENIZATION CULTR: CPT | Performed by: ORTHOPAEDIC SURGERY

## 2021-04-29 PROCEDURE — 87205 SMEAR GRAM STAIN: CPT | Performed by: ORTHOPAEDIC SURGERY

## 2021-04-29 PROCEDURE — 99024 POSTOP FOLLOW-UP VISIT: CPT | Performed by: ORTHOPAEDIC SURGERY

## 2021-04-29 PROCEDURE — 0MBP0ZZ EXCISION OF LEFT KNEE BURSA AND LIGAMENT, OPEN APPROACH: ICD-10-PCS | Performed by: ORTHOPAEDIC SURGERY

## 2021-04-29 PROCEDURE — 87070 CULTURE OTHR SPECIMN AEROBIC: CPT | Performed by: ORTHOPAEDIC SURGERY

## 2021-04-29 PROCEDURE — 87186 SC STD MICRODIL/AGAR DIL: CPT | Performed by: ORTHOPAEDIC SURGERY

## 2021-04-29 PROCEDURE — 80202 ASSAY OF VANCOMYCIN: CPT | Performed by: PHYSICIAN ASSISTANT

## 2021-04-29 PROCEDURE — 85025 COMPLETE CBC W/AUTO DIFF WBC: CPT | Performed by: PHYSICIAN ASSISTANT

## 2021-04-29 PROCEDURE — 87075 CULTR BACTERIA EXCEPT BLOOD: CPT | Performed by: ORTHOPAEDIC SURGERY

## 2021-04-29 RX ORDER — MIDAZOLAM HYDROCHLORIDE 2 MG/2ML
INJECTION, SOLUTION INTRAMUSCULAR; INTRAVENOUS AS NEEDED
Status: DISCONTINUED | OUTPATIENT
Start: 2021-04-29 | End: 2021-04-29

## 2021-04-29 RX ORDER — FENTANYL CITRATE 50 UG/ML
INJECTION, SOLUTION INTRAMUSCULAR; INTRAVENOUS AS NEEDED
Status: DISCONTINUED | OUTPATIENT
Start: 2021-04-29 | End: 2021-04-29

## 2021-04-29 RX ORDER — FENTANYL CITRATE/PF 50 MCG/ML
50 SYRINGE (ML) INJECTION
Status: COMPLETED | OUTPATIENT
Start: 2021-04-29 | End: 2021-04-29

## 2021-04-29 RX ORDER — NICOTINE 21 MG/24HR
21 PATCH, TRANSDERMAL 24 HOURS TRANSDERMAL DAILY
Status: DISCONTINUED | OUTPATIENT
Start: 2021-04-29 | End: 2021-05-01 | Stop reason: HOSPADM

## 2021-04-29 RX ORDER — OXYCODONE HYDROCHLORIDE 5 MG/1
5 TABLET ORAL EVERY 4 HOURS PRN
Status: DISCONTINUED | OUTPATIENT
Start: 2021-04-29 | End: 2021-05-01 | Stop reason: HOSPADM

## 2021-04-29 RX ORDER — PROPOFOL 10 MG/ML
INJECTION, EMULSION INTRAVENOUS AS NEEDED
Status: DISCONTINUED | OUTPATIENT
Start: 2021-04-29 | End: 2021-04-29

## 2021-04-29 RX ORDER — SODIUM CHLORIDE 9 MG/ML
INJECTION, SOLUTION INTRAVENOUS CONTINUOUS PRN
Status: DISCONTINUED | OUTPATIENT
Start: 2021-04-29 | End: 2021-04-29

## 2021-04-29 RX ORDER — ONDANSETRON 2 MG/ML
4 INJECTION INTRAMUSCULAR; INTRAVENOUS ONCE AS NEEDED
Status: DISCONTINUED | OUTPATIENT
Start: 2021-04-29 | End: 2021-04-29 | Stop reason: HOSPADM

## 2021-04-29 RX ORDER — LIDOCAINE HYDROCHLORIDE 10 MG/ML
INJECTION, SOLUTION EPIDURAL; INFILTRATION; INTRACAUDAL; PERINEURAL AS NEEDED
Status: DISCONTINUED | OUTPATIENT
Start: 2021-04-29 | End: 2021-04-29

## 2021-04-29 RX ORDER — MAGNESIUM HYDROXIDE 1200 MG/15ML
LIQUID ORAL AS NEEDED
Status: DISCONTINUED | OUTPATIENT
Start: 2021-04-29 | End: 2021-04-29 | Stop reason: HOSPADM

## 2021-04-29 RX ORDER — SODIUM CHLORIDE 9 MG/ML
75 INJECTION, SOLUTION INTRAVENOUS ONCE
Status: COMPLETED | OUTPATIENT
Start: 2021-04-29 | End: 2021-04-29

## 2021-04-29 RX ORDER — HYDROMORPHONE HCL/PF 1 MG/ML
SYRINGE (ML) INJECTION AS NEEDED
Status: DISCONTINUED | OUTPATIENT
Start: 2021-04-29 | End: 2021-04-29

## 2021-04-29 RX ORDER — HYDROMORPHONE HCL/PF 1 MG/ML
0.4 SYRINGE (ML) INJECTION
Status: DISCONTINUED | OUTPATIENT
Start: 2021-04-29 | End: 2021-04-29 | Stop reason: HOSPADM

## 2021-04-29 RX ORDER — SODIUM CHLORIDE 9 MG/ML
100 INJECTION, SOLUTION INTRAVENOUS CONTINUOUS
Status: DISCONTINUED | OUTPATIENT
Start: 2021-04-29 | End: 2021-04-30

## 2021-04-29 RX ADMIN — INSULIN LISPRO 6 UNITS: 100 INJECTION, SOLUTION INTRAVENOUS; SUBCUTANEOUS at 21:58

## 2021-04-29 RX ADMIN — VANCOMYCIN HYDROCHLORIDE 1500 MG: 5 INJECTION, POWDER, LYOPHILIZED, FOR SOLUTION INTRAVENOUS at 03:29

## 2021-04-29 RX ADMIN — CEFAZOLIN SODIUM 2000 MG: 2 SOLUTION INTRAVENOUS at 18:29

## 2021-04-29 RX ADMIN — HYDROMORPHONE HYDROCHLORIDE 0.4 MG: 1 INJECTION, SOLUTION INTRAMUSCULAR; INTRAVENOUS; SUBCUTANEOUS at 15:13

## 2021-04-29 RX ADMIN — ATORVASTATIN CALCIUM 40 MG: 40 TABLET, FILM COATED ORAL at 21:49

## 2021-04-29 RX ADMIN — SODIUM CHLORIDE 75 ML/HR: 0.9 INJECTION, SOLUTION INTRAVENOUS at 11:29

## 2021-04-29 RX ADMIN — FENTANYL CITRATE 50 MCG: 50 INJECTION, SOLUTION INTRAMUSCULAR; INTRAVENOUS at 14:13

## 2021-04-29 RX ADMIN — ACETAMINOPHEN 975 MG: 325 TABLET, FILM COATED ORAL at 06:18

## 2021-04-29 RX ADMIN — PROPOFOL 300 MG: 10 INJECTION, EMULSION INTRAVENOUS at 14:01

## 2021-04-29 RX ADMIN — LIDOCAINE HYDROCHLORIDE 50 MG: 10 INJECTION, SOLUTION EPIDURAL; INFILTRATION; INTRACAUDAL at 14:01

## 2021-04-29 RX ADMIN — HYDROMORPHONE HYDROCHLORIDE 0.5 MG: 1 INJECTION, SOLUTION INTRAMUSCULAR; INTRAVENOUS; SUBCUTANEOUS at 14:18

## 2021-04-29 RX ADMIN — HYDROMORPHONE HYDROCHLORIDE 0.4 MG: 1 INJECTION, SOLUTION INTRAMUSCULAR; INTRAVENOUS; SUBCUTANEOUS at 15:23

## 2021-04-29 RX ADMIN — FENTANYL CITRATE 25 MCG: 50 INJECTION, SOLUTION INTRAMUSCULAR; INTRAVENOUS at 14:16

## 2021-04-29 RX ADMIN — FENTANYL CITRATE 50 MCG: 50 INJECTION INTRAMUSCULAR; INTRAVENOUS at 14:54

## 2021-04-29 RX ADMIN — SUCRALFATE 1 G: 1 TABLET ORAL at 18:32

## 2021-04-29 RX ADMIN — CEFAZOLIN SODIUM 2000 MG: 2 SOLUTION INTRAVENOUS at 10:29

## 2021-04-29 RX ADMIN — ONDANSETRON 4 MG: 2 INJECTION INTRAMUSCULAR; INTRAVENOUS at 14:16

## 2021-04-29 RX ADMIN — SUCRALFATE 1 G: 1 TABLET ORAL at 21:48

## 2021-04-29 RX ADMIN — HYDROMORPHONE HYDROCHLORIDE 0.4 MG: 1 INJECTION, SOLUTION INTRAMUSCULAR; INTRAVENOUS; SUBCUTANEOUS at 15:04

## 2021-04-29 RX ADMIN — FENTANYL CITRATE 25 MCG: 50 INJECTION, SOLUTION INTRAMUSCULAR; INTRAVENOUS at 14:10

## 2021-04-29 RX ADMIN — CALCIUM 1 TABLET: 500 TABLET ORAL at 18:32

## 2021-04-29 RX ADMIN — NICOTINE 21 MG: 21 PATCH, EXTENDED RELEASE TRANSDERMAL at 22:21

## 2021-04-29 RX ADMIN — INSULIN GLARGINE 50 UNITS: 100 INJECTION, SOLUTION SUBCUTANEOUS at 21:49

## 2021-04-29 RX ADMIN — VANCOMYCIN HYDROCHLORIDE 1500 MG: 5 INJECTION, POWDER, LYOPHILIZED, FOR SOLUTION INTRAVENOUS at 11:54

## 2021-04-29 RX ADMIN — CEFAZOLIN SODIUM 2000 MG: 2 SOLUTION INTRAVENOUS at 02:35

## 2021-04-29 RX ADMIN — PANTOPRAZOLE SODIUM 40 MG: 40 TABLET, DELAYED RELEASE ORAL at 06:18

## 2021-04-29 RX ADMIN — FENTANYL CITRATE 50 MCG: 50 INJECTION INTRAMUSCULAR; INTRAVENOUS at 14:49

## 2021-04-29 RX ADMIN — HYDROXYCHLOROQUINE SULFATE 200 MG: 200 TABLET, FILM COATED ORAL at 18:32

## 2021-04-29 RX ADMIN — MIDAZOLAM HYDROCHLORIDE 2 MG: 1 INJECTION, SOLUTION INTRAMUSCULAR; INTRAVENOUS at 13:57

## 2021-04-29 RX ADMIN — ACETAMINOPHEN 975 MG: 325 TABLET, FILM COATED ORAL at 21:48

## 2021-04-29 RX ADMIN — MELATONIN TAB 3 MG 3 MG: 3 TAB at 21:48

## 2021-04-29 RX ADMIN — SODIUM CHLORIDE: 0.9 INJECTION, SOLUTION INTRAVENOUS at 13:57

## 2021-04-29 NOTE — ASSESSMENT & PLAN NOTE
· Likely 2/2 recent aspiration of L knee  · Indurated areas over lateral aspect of knee extending into thigh: US negative for abscess, but consistent w/ cellulitis   · Continue w/ IV abx--patient was receiving ceftriaxone and IV Vanco   Note that he has previously grown MSSA; changed ceftriaxone to Ancef 2 g Q 8 hours; noted improvement

## 2021-04-29 NOTE — OP NOTE
OPERATIVE REPORT  PATIENT NAME: Karla Soto  :  1965  MRN: [id number]  Pt Location: AN OR ROOM 03    SURGERY DATE: 2021    Surgeon(s) and Role:     * Judy Estes MD - Primary     * Marya Doyle PA-C - Assisting     * Abdi Gayle MD - Assisting    Preop Diagnosis:  Infected prepatellar bursitis of left knee [M70 42]    Post-Op Diagnosis Codes:     * Abscess of left knee [L02 416]    Procedure(s) (LRB):  KNEE INCISION AND DRAINAGE, EXCISIONAL DEBRIDEMENT OF PREPATELLAR BURSA (Left)    Specimen(s):  ID Type Source Tests Collected by Time Destination   A :  Tissue Soft Tissue, Other ANAEROBIC CULTURE AND GRAM STAIN, CULTURE, TISSUE AND GRAM STAIN Judy Estes MD 2021 1406        Estimated Blood Loss:   Minimal    Drains:  Closed/Suction Drain Left Knee Accordion 10 Fr  (Active)   Number of days: 0       Anesthesia Type:   General    Complications:   None    Procedure and Technique:  The patient was identified in the preoperative holding area, and the surgical site was marked  The patient was transported to the operating room and transferred to the OR table in the supine position  General anesthesia was administered  The left lower extremity was prepped and draped in the usual sterile fashion  The patient was receiving intravenous antibiotics before surgery, and no additional antibiotic doses were indicated prior to incision  Following a surgical timeout, a 5 cm incision was made over the prepatellar bursa, excising the draining sinus tract with the 10-blade scalpel  Moderate purulence was observed in the prepatellar bursa tracking proximally along the subcutaneous tissue of the lateral thigh corresponding to the erythematous region  Aerobic and anaerobic culture swabs were obtained  A second 4 cm incision was made at the proximal extent of the lateral thigh subcutaneous abscess  Excisional debridement of the prepatellar bursa was performed using a curette and rongeur   A total of 6 cm3 of skin, subcutaneous tissue, and prepatellar bursa was excised  The prepatellar bursa and subcutaneous tissue of the lateral thigh were irrigated with 6 L of sterile saline solution using the pulse   A medium Hemovac was inserted, exiting from the anteromedial aspect of the knee  The drain tubing extended proximally into the lateral thigh abscess  The wounds were closed with simple buried 2-0 PDS suture in the subcuticular layer and 2-0 Prolene suture for the skin  The wounds were dressed with Adaptic, 4 x 4 gauze, ABD pads, cotton padding, and elastic bandages  Anesthesia was reversed, and the patient was extubated  The patient was transferred to the stretcher and transported to recovery in stable condition  The assistance of an advanced practitioner was necessary for sterile draping, soft tissue retraction, and wound closure  I was present for the entire procedure      Patient Disposition:  PACU  and extubated and stable    SIGNATURE: Gamaliel King MD  DATE: April 29, 2021  TIME: 2:35 PM

## 2021-04-29 NOTE — PROGRESS NOTES
Orthopedics         Subjective:  Patient seen and evaluated  Notes persistent left knee pain specifically with range of motion and ambulating  No numbness or tingling  No fevers or chills      Labs:  0   Lab Value Date/Time    HCT 44 1 04/29/2021 0646    HCT 44 7 04/28/2021 0648    HCT 41 5 04/27/2021 0412    HCT 47 1 01/23/2017 1300    HGB 14 6 04/29/2021 0646    HGB 14 8 04/28/2021 0648    HGB 14 2 04/27/2021 0412    HGB 16 0 01/23/2017 1300    INR 0 94 04/11/2019 1116    WBC 13 76 (H) 04/29/2021 0646    WBC 17 64 (H) 04/28/2021 0648    WBC 19 05 (H) 04/27/2021 0412    WBC 6 0 01/23/2017 1300    ESR 68 (H) 04/26/2021 1440     6 (H) 04/28/2021 0648       Meds:    Current Facility-Administered Medications:     acetaminophen (TYLENOL) tablet 975 mg, 975 mg, Oral, Q8H [address], Virginia Appiah PA-C, 975 mg at 04/29/21 0618    albuterol (PROVENTIL HFA,VENTOLIN HFA) inhaler 2 puff, 2 puff, Inhalation, Q4H PRN, Joy Terrell PA-C, 2 puff at 04/28/21 1947    ascorbic acid (VITAMIN C) tablet 1,000 mg, 1,000 mg, Oral, Daily, Joy Terrell PA-C, 1,000 mg at 04/28/21 0837    atorvastatin (LIPITOR) tablet 40 mg, 40 mg, Oral, HS, ALLEGRA Resendez-C, 40 mg at 04/28/21 2201    calcium carbonate (OYSTER SHELL,OSCAL) 500 mg tablet 1 tablet, 1 tablet, Oral, Daily With Dinner, Joy Terrell PA-C, 1 tablet at 04/28/21 1702    calcium carbonate (TUMS) chewable tablet 1,000 mg, 1,000 mg, Oral, Daily PRN, Joy Terrell PA-C    ceFAZolin (ANCEF) IVPB (premix in dextrose) 2,000 mg 50 mL, 2,000 mg, Intravenous, Q8H, Virginia Appiah PA-C, Last Rate: 100 mL/hr at 04/29/21 0235, 2,000 mg at 04/29/21 0235    cyclobenzaprine (FLEXERIL) tablet 10 mg, 10 mg, Oral, TID PRN, Joy Terrell PA-C    enoxaparin (LOVENOX) subcutaneous injection 40 mg, 40 mg, Subcutaneous, Daily, Joy Terrell PA-C, 40 mg at 04/28/21 0836    fluticasone (FLONASE) 50 mcg/act nasal spray 2 spray, 2 spray, Each Nare, Daily, Joy Terrell PA-C, 2 spray at 04/28/21 1026    HYDROmorphone (DILAUDID) injection 0 5 mg, 0 5 mg, Intravenous, Q4H PRN, Virginia Appiah PA-C    hydroxychloroquine (PLAQUENIL) tablet 200 mg, 200 mg, Oral, BID, Joy Terrell PA-C, 200 mg at 04/28/21 1703    insulin glargine (LANTUS) subcutaneous injection 50 Units 0 5 mL, 50 Units, Subcutaneous, Q12H [address], Joy Terrell PA-C, 50 Units at 04/28/21 2200    insulin lispro (HumaLOG) 100 units/mL subcutaneous injection 1-6 Units, 1-6 Units, Subcutaneous, HS, Joy Terrell PA-C, 3 Units at 04/28/21 2203    insulin lispro (HumaLOG) 100 units/mL subcutaneous injection 2-12 Units, 2-12 Units, Subcutaneous, TID AC, 8 Units at 04/28/21 1714 **AND** Fingerstick Glucose (POCT), , , TID AC, Joy Terrell PA-C    ketorolac (TORADOL) injection 30 mg, 30 mg, Intravenous, Q6H PRN, Virginia Appiah PA-C, 30 mg at 04/28/21 1942    leflunomide (ARAVA) tablet 20 mg, 20 mg, Oral, Daily With Dinner, Joy Terrell PA-C    lidocaine (PF) (XYLOCAINE-MPF) 1 % injection 20 mL, 20 mL, Infiltration, Once, Lexi Montelongo PA-C, Stopped at 04/27/21 1041    losartan potassium-hydrochlorothiazide (HYZAAR 100/12  5) combo dose, , Oral, Daily, Joy Terrell PA-C, Given at 04/28/21 0837    melatonin tablet 3 mg, 3 mg, Oral, HS, Virginia Appiah PA-C, 3 mg at 04/28/21 2201    ondansetron (ZOFRAN) injection 4 mg, 4 mg, Intravenous, Q6H PRN, Joy Terrell PA-C    oxyCODONE (ROXICODONE) immediate release tablet 10 mg, 10 mg, Oral, Q6H PRN, Joy Terrell PA-C, 10 mg at 04/28/21 0837    pantoprazole (PROTONIX) EC tablet 40 mg, 40 mg, Oral, Early Morning, Joy Terrell PA-C, 40 mg at 04/29/21 0618    sucralfate (CARAFATE) tablet 1 g, 1 g, Oral, 4x Daily, Joy Terrell PA-C, 1 g at 04/28/21 2201    vancomycin (VANCOCIN) 1500 mg in sodium chloride 0 9% 250 mL IVPB, 1,500 mg, Intravenous, Q8H, Joy Terrell, MARK, Last Rate: 166 mL/hr at 04/29/21 0329, 1,500 mg at 04/29/21 0329    Physical exam:  Vitals:    04/28/21 2201   BP: 150/70   Pulse: 97   Resp: 18   Temp: 98 1 °F (36 7 °C)   SpO2: 97%     Left knee:   · Packing removed from small stab incision over the prepatellar bursa  Purlent fluid expressed from wound  · Minimal surrounding erythema over the anterior aspect of the knee  · Knee range of motion is intact and nearly full without pain  · No knee effusion  · Stable to varus and valgus stress  · Motor/sensation intact left lower extremity  · 2+ DP pulse    Assessment: 55 y o male with left knee septic prepatellar bursitis status post bedside I&D with persistent purulent drainage  Plan:  · Antibiotics per primary team  · Pain Control  · Due to persistent purulent drainage recommend formal I&D in the operating room today  · NPO      Maria Guadalupe Cohn PA-C

## 2021-04-29 NOTE — ASSESSMENT & PLAN NOTE
· Ongoing x2 weeks  S/p aspiration x1 on 4/16 w/ improvement in sx for about 2 days  Fluid culture negative for infection, received IM SoluMedrol x1 followed by prednisone 40mg PO QD x5 days w/ no improvement in sx   · S/p aspiration of 10 cc of clear synovial fluid in the ED-- f/u fluid culture  · Underwent incision and drainage 4/27 of prepatellar bursa with purulent fluid, follow-up on cultures--thus far Staph aureus pending sensitivity, we will streamline antibiotics when available  · ESR/CRP elevated-- has known RA-- L knee has previously been affected by flare  · US of soft tissue: no abscess, soft tissue edema consistent w/ cellulitis   · Continue w/ IV abx as above  · Pain control regimenTylenol 975 t i d , Toradol 30 mg IV q 6 hours p r n  Moderate pain, oxycodone 10 mg p r n  Severe pain, Dilaudid 0 5 mg IV for breakthrough pain  · Orthopedics following  They suspect prepatellar bursitis  They are recommending operative washout today   · My colleague discussed with on-call rheumatologist, Dr Keshav Barrett  · No further inpatient rheumatologic workup indicated  Plan to finish antibiotics and follow-up with outpatient rheumatologist, Dr Valreio Light

## 2021-04-29 NOTE — DISCHARGE INSTRUCTIONS
Care after your surgery:  · Ice and elevate the surgical site 3-4 times a day for 15 minutes   · Keep the bandages dry at all times  Remove 3 days after surgery and resume showering if the incision is dry  · Apply a clean bandage daily after showering until the sutures are removed  · Ambulate with an assistive device until cleared by the physical therapist   · Weight bearing as tolerated on the operative leg  · Do not drive until cleared by the surgeon  Call the office at [phone] if you have any of the following:  · Excessive redness or drainage at the surgical site  · Fever above 101 5° F   · Pain unrelieved by medication  · Any numbness, tingling, or excessive swelling of the operative extremity  Follow-Up Appointment:  · 10-14 days after surgery with Dr Taylor Moran

## 2021-04-29 NOTE — PLAN OF CARE
Problem: Potential for Falls  Goal: Patient will remain free of falls  Description: INTERVENTIONS:  - Assess patient frequently for physical needs  -  Identify cognitive and physical deficits and behaviors that affect risk of falls    -  [place] fall precautions as indicated by assessment   - Educate patient/family on patient safety including physical limitations  - Instruct patient to call for assistance with activity based on assessment  - Modify environment to reduce risk of injury  - Consider OT/PT consult to assist with strengthening/mobility  Outcome: Progressing     Problem: PAIN - ADULT  Goal: Verbalizes/displays adequate comfort level or baseline comfort level  Description: Interventions:  - Encourage patient to monitor pain and request assistance  - Assess pain using appropriate pain scale  - Administer analgesics based on type and severity of pain and evaluate response  - Implement non-pharmacological measures as appropriate and evaluate response  - Consider cultural and social influences on pain and pain management  - Notify physician/advanced practitioner if interventions unsuccessful or patient reports new pain  Outcome: Progressing     Problem: INFECTION - ADULT  Goal: Absence or prevention of progression during hospitalization  Description: INTERVENTIONS:  - Assess and monitor for signs and symptoms of infection  - Monitor lab/diagnostic results  - Monitor all insertion sites, i e  indwelling lines, tubes, and drains  - Monitor endotracheal if appropriate and nasal secretions for changes in amount and color  - [place] appropriate cooling/warming therapies per order  - Administer medications as ordered  - Instruct and encourage patient and family to use good hand hygiene technique  - Identify and instruct in appropriate isolation precautions for identified infection/condition  Outcome: Progressing  Goal: Absence of fever/infection during neutropenic period  Description: INTERVENTIONS:  - Monitor WBC    Outcome: Progressing     Problem: SAFETY ADULT  Goal: Patient will remain free of falls  Description: INTERVENTIONS:  - Assess patient frequently for physical needs  -  Identify cognitive and physical deficits and behaviors that affect risk of falls    -  [place] fall precautions as indicated by assessment   - Educate patient/family on patient safety including physical limitations  - Instruct patient to call for assistance with activity based on assessment  - Modify environment to reduce risk of injury  - Consider OT/PT consult to assist with strengthening/mobility  Outcome: Progressing  Goal: Maintain or return to baseline ADL function  Description: INTERVENTIONS:  -  Assess patient's ability to carry out ADLs; assess patient's baseline for ADL function and identify physical deficits which impact ability to perform ADLs (bathing, care of mouth/teeth, toileting, grooming, dressing, etc )  - Assess/evaluate cause of self-care deficits   - Assess range of motion  - Assess patient's mobility; develop plan if impaired  - Assess patient's need for assistive devices and provide as appropriate  - Encourage maximum independence but intervene and supervise when necessary  - Involve family in performance of ADLs  - Assess for home care needs following discharge   - Consider OT consult to assist with ADL evaluation and planning for discharge  - Provide patient education as appropriate  Outcome: Progressing  Goal: Maintain or return mobility status to optimal level  Description: INTERVENTIONS:  - Assess patient's baseline mobility status (ambulation, transfers, stairs, etc )    - Identify cognitive and physical deficits and behaviors that affect mobility  - Identify mobility aids required to assist with transfers and/or ambulation (gait belt, sit-to-stand, lift, walker, cane, etc )  - [place] fall precautions as indicated by assessment  - Record patient progress and toleration of activity level on Mobility SBAR; progress patient to next Phase/Stage  - Instruct patient to call for assistance with activity based on assessment  - Consider rehabilitation consult to assist with strengthening/weightbearing, etc   Outcome: Progressing     Problem: DISCHARGE PLANNING  Goal: Discharge to home or other facility with appropriate resources  Description: INTERVENTIONS:  - Identify barriers to discharge w/patient and caregiver  - Arrange for needed discharge resources and transportation as appropriate  - Identify discharge learning needs (meds, wound care, etc )  - Arrange for interpretive services to assist at discharge as needed  - Refer to Case Management Department for coordinating discharge planning if the patient needs post-hospital services based on physician/advanced practitioner order or complex needs related to functional status, cognitive ability, or social support system  Outcome: Progressing     Problem: Knowledge Deficit  Goal: Patient/family/caregiver demonstrates understanding of disease process, treatment plan, medications, and discharge instructions  Description: Complete learning assessment and assess knowledge base    Interventions:  - Provide teaching at level of understanding  - Provide teaching via preferred learning methods  Outcome: Progressing     Problem: MUSCULOSKELETAL - ADULT  Goal: Maintain or return mobility to safest level of function  Description: INTERVENTIONS:  - Assess patient's ability to carry out ADLs; assess patient's baseline for ADL function and identify physical deficits which impact ability to perform ADLs (bathing, care of mouth/teeth, toileting, grooming, dressing, etc )  - Assess/evaluate cause of self-care deficits   - Assess range of motion  - Assess patient's mobility  - Assess patient's need for assistive devices and provide as appropriate  - Encourage maximum independence but intervene and supervise when necessary  - Involve family in performance of ADLs  - Assess for home care needs following discharge   - Consider OT consult to assist with ADL evaluation and planning for discharge  - Provide patient education as appropriate  Outcome: Progressing  Goal: Maintain proper alignment of affected body part  Description: INTERVENTIONS:  - Support, maintain and protect limb and body alignment  - Provide patient/ family with appropriate education  Outcome: Progressing

## 2021-04-29 NOTE — ASSESSMENT & PLAN NOTE
· Patient follows w/ Rheumatology @ [facility]   · Currently on Humira-- has been on this for at least 1 year-- last dose was about 2 weeks ago prior to onset of sx   · Also on plaquenil and Arava (hold during stay--nonform) (2) assistive person

## 2021-04-29 NOTE — ANESTHESIA PREPROCEDURE EVALUATION
Procedure:  Left Knee I&D/washout, pre-patellar bursectomy (Left Leg Lower)    Relevant Problems   CARDIO   (+) Benign essential hypertension   (+) Mixed hyperlipidemia      ENDO   (+) Type 2 diabetes mellitus with diabetic neuropathic arthropathy, with long-term current use of insulin (HCC)      GI/HEPATIC   (+) Gastroesophageal reflux disease without esophagitis      MUSCULOSKELETAL   (+) Charcot foot due to diabetes mellitus (HCC)   (+) Prepatellar bursitis of left knee   (+) Rheumatoid arthritis (HCC)      NEURO/PSYCH   (+) Type 2 diabetes mellitus with diabetic neuropathic arthropathy, with long-term current use of insulin (HCC)      PULMONARY  marijuana use      Other   (+) Cellulitis of left lower extremity   (+) Obesity (BMI 30 0-34 9)   (+) Pain and swelling of left knee due to prepatellar bursitis   (+) Tobacco use        Physical Exam    Airway    Mallampati score: II  TM Distance: >3 FB  Neck ROM: full     Dental   No notable dental hx     Cardiovascular      Pulmonary      Other Findings       Lab Results   Component Value Date    WBC 13 76 (H) 04/29/2021    HGB 14 6 04/29/2021     04/29/2021     Lab Results   Component Value Date    SODIUM 137 04/29/2021    K 4 1 04/29/2021    BUN 18 04/29/2021    CREATININE 1 09 04/29/2021    EGFR 77 04/29/2021     Lab Results   Component Value Date    PTT 22 (L) 03/19/2018      Lab Results   Component Value Date    INR 0 94 04/11/2019     Lab Results   Component Value Date    HGBA1C 7 4 (H) 03/05/2021     Ancef 2g q8h at 1030  Vanco 1 5 g q8h at 1200    Anesthesia Plan  ASA Score- 3     Anesthesia Type- general with ASA Monitors  Additional Monitors:   Airway Plan: LMA  Plan Factors-Exercise tolerance (METS): >4 METS  Chart reviewed  Existing labs reviewed  Patient summary reviewed  Patient is a current smoker  Patient did not smoke on day of surgery  Induction- intravenous      Postoperative Plan-     Informed Consent- Anesthetic plan and risks discussed with patient  I personally reviewed this patient with the CRNA  Discussed and agreed on the Anesthesia Plan with the CRNA  Michelle Garcia

## 2021-04-29 NOTE — PROGRESS NOTES
[facility]  Progress Note - Nahomy Job  1965, 50 y o  male MRN: [id number]  Unit/Bed#: S -01 Encounter: [id number]  Primary Care Provider: Loida Plasencia DO   Date and time admitted to hospital: 4/26/2021  3:52 PM    * Sepsis [facility])  Assessment & Plan  · POA  Evidenced by tachycardia and leukocytosis  Lactate/temp normal   White count still elevated, however patient was recently on a course of steroids as well  · Source: LLE cellulitis, left patellar bursitis  · No evidence of septic joint  · Abx: ancef + Vanco -- will continue at this time  · Follow up blood and bursa fluid cultures  · Trend labs/vitals    Pain and swelling of left knee due to prepatellar bursitis  Assessment & Plan  · Ongoing x2 weeks  S/p aspiration x1 on 4/16 w/ improvement in sx for about 2 days  Fluid culture negative for infection, received IM SoluMedrol x1 followed by prednisone 40mg PO QD x5 days w/ no improvement in sx   · S/p aspiration of 10 cc of clear synovial fluid in the ED-- f/u fluid culture  · Underwent incision and drainage 4/27 of prepatellar bursa with purulent fluid, follow-up on cultures--thus far Staph aureus pending sensitivity, we will streamline antibiotics when available  · ESR/CRP elevated-- has known RA-- L knee has previously been affected by flare  · US of soft tissue: no abscess, soft tissue edema consistent w/ cellulitis   · Continue w/ IV abx as above  · Pain control regimenTylenol 975 t i d , Toradol 30 mg IV q 6 hours p r n  Moderate pain, oxycodone 10 mg p r n  Severe pain, Dilaudid 0 5 mg IV for breakthrough pain  · Orthopedics following  They suspect prepatellar bursitis  They are recommending operative washout today   · My colleague discussed with on-call rheumatologist, Dr Joe Bradshaw  · No further inpatient rheumatologic workup indicated  Plan to finish antibiotics and follow-up with outpatient rheumatologist, Dr Candi Manzanares      Cellulitis of left lower extremity  Assessment & Plan  · Likely 2/2 recent aspiration of L knee  · Indurated areas over lateral aspect of knee extending into thigh: US negative for abscess, but consistent w/ cellulitis   · Continue w/ IV abx--patient was receiving ceftriaxone and IV Vanco   Note that he has previously grown MSSA; changed ceftriaxone to Ancef 2 g Q 8 hours; noted improvement     Type 2 diabetes mellitus with diabetic neuropathic arthropathy, with long-term current use of insulin [facility])  Assessment & Plan  Lab Results   Component Value Date    HGBA1C 7 4 (H) 03/05/2021       Recent Labs     04/28/21  1129 04/28/21  1550 04/28/21  2109 04/29/21  0844   POCGLU 332* 317* 238* 205*       Blood Sugar Average: Last 72 hrs:  · (P) 240 [id number]   · A1c near goal the patient reports significant dietary noncompliance and frequently eating fast food in the past week; continue to aim for euglycemia to aid in resolution of infection  · Holding PO hypoglycemics: Januvia, Victoza  · Continue home long-acting regimen: lantus 50U SQ BID  · SSI for correction  · QID accuchecks  ·     Rheumatoid arthritis ([address] )  Assessment & Plan  · Patient follows w/ Rheumatology @ [facility]   · Currently on Humira-- has been on this for at least 1 year-- last dose was about 2 weeks ago prior to onset of sx   · Also on plaquenil and Arava (hold during stay--nonform)    Tobacco use  Assessment & Plan  · Patient reports he has tried multiple means of quitting in the past which have been unsuccessful and he has no intention of quitting at this point  Made him aware of the consequences of inadequate wound healing related with tobacco abuse    VTE Pharmacologic Prophylaxis:   Pharmacologic: Enoxaparin (Lovenox)  Mechanical VTE Prophylaxis in Place: No    Patient Centered Rounds: I have performed bedside rounds with nursing staff today      Discussions with Specialists or Other Care Team Provider:  Orthopedics, case management    Education and Discussions with Family / Patient:  Wife at bedside     Time Spent for Care: 25 min  More than 50% of total time spent on counseling and coordination of care as described above  Current Length of Stay: 3 day(s)    Current Patient Status: Inpatient   Certification Statement: The patient will continue to require additional inpatient hospital stay due to surgery    Discharge Plan: when stable per ortho  However patient himself states that he is leaving tomorrow (seemingly with or without approval)    Code Status: Level 1 - Full Code    Subjective:   Nursing reports that patient stated his pain level was decreased to 3/10  Patient confirms that his pain is under better control currently and offers no concerns regarding bowel or bladder  States he is out of bed walking to the bathroom  Reports that his blood sugars have been weird  States that he has been successfully losing weight lately but that he has been unable to quit smoking    Objective:     Vitals:   Temp (24hrs), Av 6 °F (37 °C), Min:98 1 °F (36 7 °C), Max:98 8 °F (37 1 °C)    Temp:  [98 1 °F (36 7 °C)-98 8 °F (37 1 °C)] 98 8 °F (37 1 °C)  HR:  [86-97] 86  Resp:  [18] 18  BP: (137-150)/(70-81) 137/76  SpO2:  [93 %-97 %] 96 %  Body mass index is 34 89 kg/m²  Input and Output Summary (last 24 hours): Intake/Output Summary (Last 24 hours) at 2021 1041  Last data filed at 2021 0620  Gross per 24 hour   Intake --   Output 950 ml   Net -950 ml       Physical Exam:     Physical Exam  Vitals signs reviewed  Constitutional:       General: He is not in acute distress  Appearance: He is obese  He is not ill-appearing, toxic-appearing or diaphoretic  Eyes:      General: No scleral icterus  Right eye: No discharge  Left eye: No discharge  Conjunctiva/sclera: Conjunctivae normal    Cardiovascular:      Rate and Rhythm: Normal rate and regular rhythm  Heart sounds: No murmur     Pulmonary:      Effort: Pulmonary effort is normal  No respiratory distress  Breath sounds: No stridor  No wheezing, rhonchi or rales  Abdominal:      General: Bowel sounds are normal  There is no distension  Palpations: Abdomen is soft  Tenderness: There is no abdominal tenderness  There is no guarding  Musculoskeletal:         General: No tenderness  Right lower leg: No edema  Comments: Improved left lower extremity edema and erythema   Skin:     General: Skin is warm and dry  Coloration: Skin is not jaundiced or pale  Findings: No bruising, erythema, lesion or rash  Neurological:      General: No focal deficit present  Mental Status: He is alert  Additional Data:     Labs:    Results from last 7 days   Lab Units 04/29/21  0646   WBC Thousand/uL 13 76*   HEMOGLOBIN g/dL 14 6   HEMATOCRIT % 44 1   PLATELETS Thousands/uL 237   NEUTROS PCT % 71   LYMPHS PCT % 14   MONOS PCT % 11   EOS PCT % 1     Results from last 7 days   Lab Units 04/29/21  0646  04/26/21  1440   SODIUM mmol/L 137   < > 133*   POTASSIUM mmol/L 4 1   < > 3 9   CHLORIDE mmol/L 102   < > 96*   CO2 mmol/L 26   < > 26   BUN mg/dL 18   < > 24   CREATININE mg/dL 1 09   < > 1 16   ANION GAP mmol/L 9   < > 11   CALCIUM mg/dL 8 1*   < > 8 9   ALBUMIN g/dL  --   --  3 3*   TOTAL BILIRUBIN mg/dL  --   --  0 62   ALK PHOS U/L  --   --  76   ALT U/L  --   --  37   AST U/L  --   --  17   GLUCOSE RANDOM mg/dL 232*   < > 344*    < > = values in this interval not displayed  Results from last 7 days   Lab Units 04/29/21  0844 04/28/21  2109 04/28/21  1550 04/28/21  1129 04/28/21  0826 04/27/21  2122 04/26/21  2137   POC GLUCOSE mg/dl 205* 238* 317* 332* 119 285* 186*         Results from last 7 days   Lab Units 04/27/21  0413 04/26/21  1704 04/26/21  1440   LACTIC ACID mmol/L  --  1 7  --    PROCALCITONIN ng/ml 0 05  --  0 06       * I Have Reviewed All Lab Data Listed Above  * Additional Pertinent Lab Tests Reviewed:  All Labs For Current Hospital Admission Reviewed    Imaging:    Imaging Reports Reviewed Today Include:   Imaging Personally Reviewed by Myself Includes:      Recent Cultures (last 7 days):     Results from last 7 days   Lab Units 04/27/21  1924 04/26/21  1820 04/26/21  1704   BLOOD CULTURE   --   --  No Growth at 48 hrs  No Growth at 48 hrs  GRAM STAIN RESULT  2+ Polys*  3+ Gram positive cocci in clusters* No Polys or Bacteria seen  --    BODY FLUID CULTURE, STERILE  4+ Growth of Staphylococcus aureus* No growth  --        Last 24 Hours Medication List:   Current Facility-Administered Medications   Medication Dose Route Frequency Provider Last Rate    acetaminophen  975 mg Oral Q8H [address] Virginia Appiah PA-C      albuterol  2 puff Inhalation Q4H PRN Joy Terrell PA-C      vitamin C  1,000 mg Oral Daily Joy Terrell PA-C      atorvastatin  40 mg Oral HS Joy Terrell PA-C      calcium carbonate  1 tablet Oral Daily With Dinner Joy Terrell PA-C      calcium carbonate  1,000 mg Oral Daily PRN Joy Terrell PA-C      cefazolin  2,000 mg Intravenous Q8H Virginia Appiah PA-C 2,000 mg (04/29/21 1029)    cyclobenzaprine  10 mg Oral TID PRN Joy Terrell PA-C      enoxaparin  40 mg Subcutaneous Daily Joy Terrell PA-C      fluticasone  2 spray Each Nare Daily Joy Terrell PA-C      HYDROmorphone  0 5 mg Intravenous Q4H PRN Virginia Appiah PA-C      hydroxychloroquine  200 mg Oral BID Joy Terrell PA-C      insulin glargine  50 Units Subcutaneous Q12H [address] Joy Terrell PA-C      insulin lispro  1-6 Units Subcutaneous HS Joy Terrell PA-C      insulin lispro  2-12 Units Subcutaneous TID AC Joy Terrell PA-C      ketorolac  30 mg Intravenous Q6H PRN Virginia Appiah PA-C      lidocaine (PF)  20 mL Infiltration Once Nya Duran PA-C      losartan potassium-hydrochlorothiazide (HYZAAR 100/12  5) combo dose   Oral Daily Claudia NOLAND MARK Terrell      melatonin  3 mg Oral HS Virginia Appiah PA-C      ondansetron  4 mg Intravenous Q6H PRN Joy Terrell PA-C      oxyCODONE  10 mg Oral Q6H PRN Joy Terrell PA-C      pantoprazole  40 mg Oral Early Morning Joy Terrell PA-C      sodium chloride  75 mL/hr Intravenous Once Virginia Appiah PA-C      sucralfate  1 g Oral 4x Daily Joy Terrell PA-C      vancomycin  1,500 mg Intravenous Q8H Joy Terrell PA-C 1,500 mg (04/29/21 0329)        Today, Patient Was Seen By: Obie Prader, PA-C    ** Please Note: Dictation voice to text software may have been used in the creation of this document   **

## 2021-04-29 NOTE — ASSESSMENT & PLAN NOTE
Lab Results   Component Value Date    HGBA1C 7 4 (H) 03/05/2021       Recent Labs     04/28/21  1129 04/28/21  1550 04/28/21  2109 04/29/21  0844   POCGLU 332* 317* 238* 205*       Blood Sugar Average: Last 72 hrs:  · (P) 240 [id number]   · A1c near goal the patient reports significant dietary noncompliance and frequently eating fast food in the past week; continue to aim for euglycemia to aid in resolution of infection  · Holding PO hypoglycemics: Januvia, Victoza  · Continue home long-acting regimen: lantus 50U SQ BID  · SSI for correction  · QID accuchecks  ·

## 2021-04-29 NOTE — PHYSICAL THERAPY NOTE
PHYSICAL THERAPY NOTE  Patient Name: Nevaeh Chatterjee  Today's Date: 4/29/2021 04/29/21 1333   PT Last Visit   PT Visit Date 04/29/21   Note Type   Note Type Treatment   Cancel Reasons Patient to operating room  · (Per ortho:Due to persistent purulent drainage recommend formal I&D in the operating room today   Will follow Collette Marshal, PT

## 2021-04-29 NOTE — PROGRESS NOTES
Vancomycin Assessment    Valerio Ch  is a 50 y o  male who is currently receiving vancomycin 1500 mg every 8 hours for bacteremia     Relevant clinical data and objective history reviewed:  Creatinine   Date Value Ref Range Status   04/29/2021 1 09 0 60 - 1 30 mg/dL Final     Comment:     Standardized to IDMS reference method   04/28/2021 1 00 0 60 - 1 30 mg/dL Final     Comment:     Standardized to IDMS reference method   04/27/2021 1 03 0 60 - 1 30 mg/dL Final     Comment:     Standardized to IDMS reference method   01/23/2017 0 91 0 60 - 1 35 mg/dL Final     /76 (BP Location: Right arm)   Pulse 86   Temp 98 8 °F (37 1 °C) (Oral)   Resp 18   Ht 6' (1 829 m)   Wt 117 kg (257 lb 4 4 oz)   SpO2 96%   BMI 34 89 kg/m²   I/O last 3 completed shifts: In: 240 [P O :240]  Out: 2775 [Urine:2775]  Lab Results   Component Value Date/Time    BUN 18 04/29/2021 06:46 AM    BUN 15 01/23/2017 01:00 PM    WBC 13 76 (H) 04/29/2021 06:46 AM    WBC 6 0 01/23/2017 01:00 PM    HGB 14 6 04/29/2021 06:46 AM    HGB 16 0 01/23/2017 01:00 PM    HCT 44 1 04/29/2021 06:46 AM    HCT 47 1 01/23/2017 01:00 PM    MCV 88 04/29/2021 06:46 AM    MCV 86 7 01/23/2017 01:00 PM     04/29/2021 06:46 AM     01/23/2017 01:00 PM     Temp Readings from Last 3 Encounters:   04/29/21 98 8 °F (37 1 °C) (Oral)   04/22/21 98 1 °F (36 7 °C) (Tympanic)   04/15/21 (!) 97 3 °F (36 3 °C) (Tympanic)     Vancomycin Days of Therapy: 4    Assessment/Plan  The patient is currently on vancomycin utilizing scheduled dosing  The patient is receiving 1500 mg every 8 hours with the most recent vancomycin level being not at steady-state (drawn ~ 1 hour late) and sub-therapeutic based on a goal of 15-20 (appropriate for most indications) ; therefore, after clinical evaluation will be changed to 2000 mg every 8 hours   Pharmacy will continue to follow closely for s/sx of nephrotoxicity, infusion reactions, and appropriateness of therapy  BMP and CBC will be ordered per protocol  Plan for trough as patient approaches steady state, prior to the 4th  dose at approximately 1730 on 4/30  Pharmacy will continue to follow the patients culture results and clinical progress daily      Curtis Shields

## 2021-04-29 NOTE — ANESTHESIA POSTPROCEDURE EVALUATION
Post-Op Assessment Note    CV Status:  Stable  Pain Score: 0    Pain management: adequate     Mental Status:  Alert and awake   Hydration Status:  Euvolemic   PONV Controlled:  Controlled   Airway Patency:  Patent      Post Op Vitals Reviewed: Yes      Staff: CRNA         No complications documented      BP   175/95   Temp  98 1   Pulse  93   Resp   18   SpO2   98%

## 2021-04-29 NOTE — ASSESSMENT & PLAN NOTE
· Patient reports he has tried multiple means of quitting in the past which have been unsuccessful and he has no intention of quitting at this point    Made him aware of the consequences of inadequate wound healing related with tobacco abuse

## 2021-04-30 LAB
ANION GAP SERPL CALCULATED.3IONS-SCNC: 8 MMOL/L (ref 4–13)
BACTERIA SPEC BFLD CULT: ABNORMAL
BACTERIA SPEC BFLD CULT: NO GROWTH
BASOPHILS # BLD AUTO: 0.03 THOUSANDS/ΜL (ref 0–0.1)
BASOPHILS NFR BLD AUTO: 0 % (ref 0–1)
BUN SERPL-MCNC: 15 MG/DL (ref 5–25)
CALCIUM SERPL-MCNC: 8.4 MG/DL (ref 8.3–10.1)
CHLORIDE SERPL-SCNC: 100 MMOL/L (ref 100–108)
CO2 SERPL-SCNC: 28 MMOL/L (ref 21–32)
CREAT SERPL-MCNC: 1.03 MG/DL (ref 0.6–1.3)
EOSINOPHIL # BLD AUTO: 0.02 THOUSAND/ΜL (ref 0–0.61)
EOSINOPHIL NFR BLD AUTO: 0 % (ref 0–6)
ERYTHROCYTE [DISTWIDTH] IN BLOOD BY AUTOMATED COUNT: 12.7 % (ref 11.6–15.1)
GFR SERPL CREATININE-BSD FRML MDRD: 83 ML/MIN/1.73SQ M
GLUCOSE SERPL-MCNC: 281 MG/DL (ref 65–140)
GLUCOSE SERPL-MCNC: 334 MG/DL (ref 65–140)
GLUCOSE SERPL-MCNC: 363 MG/DL (ref 65–140)
GLUCOSE SERPL-MCNC: 371 MG/DL (ref 65–140)
GLUCOSE SERPL-MCNC: 386 MG/DL (ref 65–140)
GRAM STN SPEC: ABNORMAL
GRAM STN SPEC: ABNORMAL
GRAM STN SPEC: NORMAL
HCT VFR BLD AUTO: 41.1 % (ref 36.5–49.3)
HGB BLD-MCNC: 13.9 G/DL (ref 12–17)
IMM GRANULOCYTES # BLD AUTO: 0.17 THOUSAND/UL (ref 0–0.2)
IMM GRANULOCYTES NFR BLD AUTO: 1 % (ref 0–2)
LYMPHOCYTES # BLD AUTO: 1.37 THOUSANDS/ΜL (ref 0.6–4.47)
LYMPHOCYTES NFR BLD AUTO: 8 % (ref 14–44)
MCH RBC QN AUTO: 29.6 PG (ref 26.8–34.3)
MCHC RBC AUTO-ENTMCNC: 33.8 G/DL (ref 31.4–37.4)
MCV RBC AUTO: 88 FL (ref 82–98)
MONOCYTES # BLD AUTO: 1.37 THOUSAND/ΜL (ref 0.17–1.22)
MONOCYTES NFR BLD AUTO: 8 % (ref 4–12)
NEUTROPHILS # BLD AUTO: 13.41 THOUSANDS/ΜL (ref 1.85–7.62)
NEUTS SEG NFR BLD AUTO: 83 % (ref 43–75)
NRBC BLD AUTO-RTO: 0 /100 WBCS
PLATELET # BLD AUTO: 264 THOUSANDS/UL (ref 149–390)
PMV BLD AUTO: 10.2 FL (ref 8.9–12.7)
POTASSIUM SERPL-SCNC: 4.2 MMOL/L (ref 3.5–5.3)
RBC # BLD AUTO: 4.69 MILLION/UL (ref 3.88–5.62)
SODIUM SERPL-SCNC: 136 MMOL/L (ref 136–145)
WBC # BLD AUTO: 16.37 THOUSAND/UL (ref 4.31–10.16)

## 2021-04-30 PROCEDURE — 85025 COMPLETE CBC W/AUTO DIFF WBC: CPT | Performed by: PHYSICIAN ASSISTANT

## 2021-04-30 PROCEDURE — 99024 POSTOP FOLLOW-UP VISIT: CPT | Performed by: PHYSICIAN ASSISTANT

## 2021-04-30 PROCEDURE — 80048 BASIC METABOLIC PNL TOTAL CA: CPT | Performed by: PHYSICIAN ASSISTANT

## 2021-04-30 PROCEDURE — 99232 SBSQ HOSP IP/OBS MODERATE 35: CPT | Performed by: PHYSICIAN ASSISTANT

## 2021-04-30 PROCEDURE — 82948 REAGENT STRIP/BLOOD GLUCOSE: CPT

## 2021-04-30 RX ADMIN — INSULIN GLARGINE 50 UNITS: 100 INJECTION, SOLUTION SUBCUTANEOUS at 22:50

## 2021-04-30 RX ADMIN — MELATONIN TAB 3 MG 3 MG: 3 TAB at 22:50

## 2021-04-30 RX ADMIN — HYDROXYCHLOROQUINE SULFATE 200 MG: 200 TABLET, FILM COATED ORAL at 09:18

## 2021-04-30 RX ADMIN — VANCOMYCIN HYDROCHLORIDE 2000 MG: 1 INJECTION, POWDER, LYOPHILIZED, FOR SOLUTION INTRAVENOUS at 02:51

## 2021-04-30 RX ADMIN — PANTOPRAZOLE SODIUM 40 MG: 40 TABLET, DELAYED RELEASE ORAL at 05:00

## 2021-04-30 RX ADMIN — SUCRALFATE 1 G: 1 TABLET ORAL at 18:18

## 2021-04-30 RX ADMIN — INSULIN LISPRO 8 UNITS: 100 INJECTION, SOLUTION INTRAVENOUS; SUBCUTANEOUS at 12:14

## 2021-04-30 RX ADMIN — OXYCODONE HYDROCHLORIDE 10 MG: 10 TABLET ORAL at 18:18

## 2021-04-30 RX ADMIN — CEFAZOLIN SODIUM 2000 MG: 2 SOLUTION INTRAVENOUS at 09:15

## 2021-04-30 RX ADMIN — FLUTICASONE PROPIONATE 2 SPRAY: 50 SPRAY, METERED NASAL at 09:17

## 2021-04-30 RX ADMIN — HYDROXYCHLOROQUINE SULFATE 200 MG: 200 TABLET, FILM COATED ORAL at 18:20

## 2021-04-30 RX ADMIN — ACETAMINOPHEN 975 MG: 325 TABLET, FILM COATED ORAL at 22:51

## 2021-04-30 RX ADMIN — ACETAMINOPHEN 975 MG: 325 TABLET, FILM COATED ORAL at 05:00

## 2021-04-30 RX ADMIN — INSULIN LISPRO 6 UNITS: 100 INJECTION, SOLUTION INTRAVENOUS; SUBCUTANEOUS at 09:17

## 2021-04-30 RX ADMIN — NICOTINE 21 MG: 21 PATCH, EXTENDED RELEASE TRANSDERMAL at 09:16

## 2021-04-30 RX ADMIN — LOSARTAN POTASSIUM: 50 TABLET, FILM COATED ORAL at 09:16

## 2021-04-30 RX ADMIN — SUCRALFATE 1 G: 1 TABLET ORAL at 12:13

## 2021-04-30 RX ADMIN — ACETAMINOPHEN 975 MG: 325 TABLET, FILM COATED ORAL at 13:46

## 2021-04-30 RX ADMIN — CEFAZOLIN SODIUM 2000 MG: 2 SOLUTION INTRAVENOUS at 18:18

## 2021-04-30 RX ADMIN — INSULIN GLARGINE 50 UNITS: 100 INJECTION, SOLUTION SUBCUTANEOUS at 09:15

## 2021-04-30 RX ADMIN — INSULIN LISPRO 10 UNITS: 100 INJECTION, SOLUTION INTRAVENOUS; SUBCUTANEOUS at 18:19

## 2021-04-30 RX ADMIN — SUCRALFATE 1 G: 1 TABLET ORAL at 22:50

## 2021-04-30 RX ADMIN — CEFAZOLIN SODIUM 2000 MG: 2 SOLUTION INTRAVENOUS at 01:57

## 2021-04-30 RX ADMIN — OXYCODONE HYDROCHLORIDE AND ACETAMINOPHEN 1000 MG: 500 TABLET ORAL at 09:16

## 2021-04-30 RX ADMIN — SUCRALFATE 1 G: 1 TABLET ORAL at 09:16

## 2021-04-30 RX ADMIN — ATORVASTATIN CALCIUM 40 MG: 40 TABLET, FILM COATED ORAL at 22:51

## 2021-04-30 RX ADMIN — CALCIUM 1 TABLET: 500 TABLET ORAL at 18:18

## 2021-04-30 RX ADMIN — ENOXAPARIN SODIUM 40 MG: 40 INJECTION SUBCUTANEOUS at 09:15

## 2021-04-30 NOTE — PLAN OF CARE
Problem: Potential for Falls  Goal: Patient will remain free of falls  Description: INTERVENTIONS:  - Assess patient frequently for physical needs  -  Identify cognitive and physical deficits and behaviors that affect risk of falls    -  Lucas fall precautions as indicated by assessment   - Educate patient/family on patient safety including physical limitations  - Instruct patient to call for assistance with activity based on assessment  - Modify environment to reduce risk of injury  - Consider OT/PT consult to assist with strengthening/mobility  Outcome: Progressing     Problem: PAIN - ADULT  Goal: Verbalizes/displays adequate comfort level or baseline comfort level  Description: Interventions:  - Encourage patient to monitor pain and request assistance  - Assess pain using appropriate pain scale  - Administer analgesics based on type and severity of pain and evaluate response  - Implement non-pharmacological measures as appropriate and evaluate response  - Consider cultural and social influences on pain and pain management  - Notify physician/advanced practitioner if interventions unsuccessful or patient reports new pain  Outcome: Progressing     Problem: INFECTION - ADULT  Goal: Absence or prevention of progression during hospitalization  Description: INTERVENTIONS:  - Assess and monitor for signs and symptoms of infection  - Monitor lab/diagnostic results  - Monitor all insertion sites, i e  indwelling lines, tubes, and drains  - Monitor endotracheal if appropriate and nasal secretions for changes in amount and color  - Lucas appropriate cooling/warming therapies per order  - Administer medications as ordered  - Instruct and encourage patient and family to use good hand hygiene technique  - Identify and instruct in appropriate isolation precautions for identified infection/condition  Outcome: Progressing  Goal: Absence of fever/infection during neutropenic period  Description: INTERVENTIONS:  - Monitor WBC    Outcome: Progressing     Problem: SAFETY ADULT  Goal: Patient will remain free of falls  Description: INTERVENTIONS:  - Assess patient frequently for physical needs  -  Identify cognitive and physical deficits and behaviors that affect risk of falls    -  [place] fall precautions as indicated by assessment   - Educate patient/family on patient safety including physical limitations  - Instruct patient to call for assistance with activity based on assessment  - Modify environment to reduce risk of injury  - Consider OT/PT consult to assist with strengthening/mobility  Outcome: Progressing  Goal: Maintain or return to baseline ADL function  Description: INTERVENTIONS:  -  Assess patient's ability to carry out ADLs; assess patient's baseline for ADL function and identify physical deficits which impact ability to perform ADLs (bathing, care of mouth/teeth, toileting, grooming, dressing, etc )  - Assess/evaluate cause of self-care deficits   - Assess range of motion  - Assess patient's mobility; develop plan if impaired  - Assess patient's need for assistive devices and provide as appropriate  - Encourage maximum independence but intervene and supervise when necessary  - Involve family in performance of ADLs  - Assess for home care needs following discharge   - Consider OT consult to assist with ADL evaluation and planning for discharge  - Provide patient education as appropriate  Outcome: Progressing  Goal: Maintain or return mobility status to optimal level  Description: INTERVENTIONS:  - Assess patient's baseline mobility status (ambulation, transfers, stairs, etc )    - Identify cognitive and physical deficits and behaviors that affect mobility  - Identify mobility aids required to assist with transfers and/or ambulation (gait belt, sit-to-stand, lift, walker, cane, etc )  - [place] fall precautions as indicated by assessment  - Record patient progress and toleration of activity level on Mobility SBAR; progress patient to next Phase/Stage  - Instruct patient to call for assistance with activity based on assessment  - Consider rehabilitation consult to assist with strengthening/weightbearing, etc   Outcome: Progressing     Problem: DISCHARGE PLANNING  Goal: Discharge to home or other facility with appropriate resources  Description: INTERVENTIONS:  - Identify barriers to discharge w/patient and caregiver  - Arrange for needed discharge resources and transportation as appropriate  - Identify discharge learning needs (meds, wound care, etc )  - Arrange for interpretive services to assist at discharge as needed  - Refer to Case Management Department for coordinating discharge planning if the patient needs post-hospital services based on physician/advanced practitioner order or complex needs related to functional status, cognitive ability, or social support system  Outcome: Progressing     Problem: Knowledge Deficit  Goal: Patient/family/caregiver demonstrates understanding of disease process, treatment plan, medications, and discharge instructions  Description: Complete learning assessment and assess knowledge base    Interventions:  - Provide teaching at level of understanding  - Provide teaching via preferred learning methods  Outcome: Progressing     Problem: MUSCULOSKELETAL - ADULT  Goal: Maintain or return mobility to safest level of function  Description: INTERVENTIONS:  - Assess patient's ability to carry out ADLs; assess patient's baseline for ADL function and identify physical deficits which impact ability to perform ADLs (bathing, care of mouth/teeth, toileting, grooming, dressing, etc )  - Assess/evaluate cause of self-care deficits   - Assess range of motion  - Assess patient's mobility  - Assess patient's need for assistive devices and provide as appropriate  - Encourage maximum independence but intervene and supervise when necessary  - Involve family in performance of ADLs  - Assess for home care needs following discharge   - Consider OT consult to assist with ADL evaluation and planning for discharge  - Provide patient education as appropriate  Outcome: Progressing  Goal: Maintain proper alignment of affected body part  Description: INTERVENTIONS:  - Support, maintain and protect limb and body alignment  - Provide patient/ family with appropriate education  Outcome: Progressing

## 2021-04-30 NOTE — ASSESSMENT & PLAN NOTE
· Patient follows w/ Rheumatology @ [facility]   · Currently on Humira-- has been on this for at least 1 year-- last dose was about 2 weeks ago prior to onset of sx   · Also on plaquenil and Arava (hold during stay--nonform)

## 2021-04-30 NOTE — PROGRESS NOTES
[address]  50 y o  male MRN: [id number]  Unit/Bed#: S -01      Subjective:  Seen and evaluated this morning  He notes improvement in his left leg pain and swelling as compared to yesterday morning  He denies any numbness or tingling in left lower extremity  Denies any fevers or chills      Labs:  0   Lab Value Date/Time    HCT 41 1 04/30/2021 0626    HCT 44 1 04/29/2021 0646    HCT 44 7 04/28/2021 0648    HCT 47 1 01/23/2017 1300    HGB 13 9 04/30/2021 0626    HGB 14 6 04/29/2021 0646    HGB 14 8 04/28/2021 0648    HGB 16 0 01/23/2017 1300    INR 0 94 04/11/2019 1116    WBC 16 37 (H) 04/30/2021 0626    WBC 13 76 (H) 04/29/2021 0646    WBC 17 64 (H) 04/28/2021 0648    WBC 6 0 01/23/2017 1300    ESR 68 (H) 04/26/2021 1440     6 (H) 04/28/2021 0648       Meds:    Current Facility-Administered Medications:     acetaminophen (TYLENOL) tablet 975 mg, 975 mg, Oral, Q8H [facility], Eddie Machado PA-C, 975 mg at 04/30/21 0500    albuterol (PROVENTIL HFA,VENTOLIN HFA) inhaler 2 puff, 2 puff, Inhalation, Q4H PRN, Eddie Machado PA-C, 2 puff at 04/28/21 1947    ascorbic acid (VITAMIN C) tablet 1,000 mg, 1,000 mg, Oral, Daily, Eddie Machado PA-C, Stopped at 04/29/21 0912    atorvastatin (LIPITOR) tablet 40 mg, 40 mg, Oral, HS, Eddie Machado PA-C, 40 mg at 04/29/21 2149    calcium carbonate (OYSTER SHELL,OSCAL) 500 mg tablet 1 tablet, 1 tablet, Oral, Daily With Dinner, Eddie Machado PA-C, 1 tablet at 04/29/21 1832    calcium carbonate (TUMS) chewable tablet 1,000 mg, 1,000 mg, Oral, Daily PRN, Eddie Machado PA-C    ceFAZolin (ANCEF) IVPB (premix in dextrose) 2,000 mg 50 mL, 2,000 mg, Intravenous, Q8H, Eddie Machado PA-C, Last Rate: 100 mL/hr at 04/30/21 0157, 2,000 mg at 04/30/21 0157    cyclobenzaprine (FLEXERIL) tablet 10 mg, 10 mg, Oral, TID PRN, Eddie Machado PA-C    enoxaparin (LOVENOX) subcutaneous injection 40 mg, 40 mg, Subcutaneous, Daily, Eddie Machado PA-C, Stopped at 04/29/21 9595   fluticasone (FLONASE) 50 mcg/act nasal spray 2 spray, 2 spray, Each Nare, Daily, ALLEGRA Valles-C, 2 spray at 04/28/21 1026    HYDROmorphone (DILAUDID) injection 0 5 mg, 0 5 mg, Intravenous, Q4H PRN, Janelle Ruiz PA-C    hydroxychloroquine (PLAQUENIL) tablet 200 mg, 200 mg, Oral, BID, ALLEGRA Valles-C, 200 mg at 04/29/21 1832    insulin glargine (LANTUS) subcutaneous injection 50 Units 0 5 mL, 50 Units, Subcutaneous, Q12H [address], ALLEGRA Valles-C, 50 Units at 04/29/21 2149    insulin lispro (HumaLOG) 100 units/mL subcutaneous injection 1-6 Units, 1-6 Units, Subcutaneous, HS, Janelle Ruiz PA-C, 6 Units at 04/29/21 2158    insulin lispro (HumaLOG) 100 units/mL subcutaneous injection 2-12 Units, 2-12 Units, Subcutaneous, TID AC, Stopped at 04/29/21 0914 **AND** Fingerstick Glucose (POCT), , , TID AC, Janelle Ruiz PA-C    ketorolac (TORADOL) injection 30 mg, 30 mg, Intravenous, Q6H PRN, ALLEGRA Valles-C, 30 mg at 04/28/21 1942    lidocaine (PF) (XYLOCAINE-MPF) 1 % injection 20 mL, 20 mL, Infiltration, Once, SANIYA VallesC, Stopped at 04/27/21 1041    losartan potassium-hydrochlorothiazide (HYZAAR 100/12  5) combo dose, , Oral, Daily, SANIYA VallesC, Stopped at 04/29/21 0914    melatonin tablet 3 mg, 3 mg, Oral, HS, ALLEGRA Valles-C, 3 mg at 04/29/21 2148    nicotine (NICODERM CQ) 21 mg/24 hr TD 24 hr patch 21 mg, 21 mg, Transdermal, Daily, Krystina PARKER Norris, 21 mg at 04/29/21 2221    ondansetron (ZOFRAN) injection 4 mg, 4 mg, Intravenous, Q6H PRN, Janelle Ruiz PA-C, 4 mg at 04/29/21 1416    oxyCODONE (ROXICODONE) immediate release tablet 10 mg, 10 mg, Oral, Q6H PRN, Janelle Ruiz PA-C, 10 mg at 04/28/21 0837    oxyCODONE (ROXICODONE) IR tablet 5 mg, 5 mg, Oral, Q4H PRN, Janelle Ruiz PA-C    pantoprazole (PROTONIX) EC tablet 40 mg, 40 mg, Oral, Early Morning, Janelle Ruiz PA-C, 40 mg at 04/30/21 0500    sodium chloride 0 9 % infusion, 100 mL/hr, Intravenous, Continuous, Martin Barnes, CRNA    sucralfate (CARAFATE) tablet 1 g, 1 g, Oral, 4x Daily, Muna Hughes PA-C, 1 g at 04/29/21 2148    vancomycin (VANCOCIN) 2,000 mg in sodium chloride 0 9 % 500 mL IVPB, 2,000 mg, Intravenous, Q8H, Muna Hughes PA-C, Last Rate: 250 mL/hr at 04/30/21 0251, 2,000 mg at 04/30/21 0251    Blood Culture:   Lab Results   Component Value Date    BLOODCX No Growth at 72 hrs  04/26/2021    BLOODCX No Growth at 72 hrs  04/26/2021       Wound Culture:   Lab Results   Component Value Date    WOUNDCULT 3+ Growth of Staphylococcus aureus (A) 10/27/2020       Ins and Outs:  I/O last 24 hours:   In: 250 [I V :250]  Out: 4150 [Urine:4150]          Physical Exam:  Vitals:    04/30/21 0341   BP: 134/78   Pulse: 87   Resp: 16   Temp: 98 3 °F (36 8 °C)   SpO2: 94%     left Lower Extremity extremity:  · Dressings are clean dry and intact  · Drain output with slightly less than 50 mL of serosanguineous fluid overnight  · Sensation motor grossly intact throughout left lower extremity  · Palpable DP pulse    Assessment: 55 y o male postop day 1 left knee incision and drainage and prepatellar bursal excisional debridement    Plan:  · Weight-bearing as tolerated left lower extremity  · Monitor drain output, if drainage is less than 30 mL within the past 8 hours and less than 100 mL within the past 24 hours will plan to pull drain  · DVT prophylaxis  · Analgesics  · Antibiotics per primary team, prepatellar bursal aspiration cultures from 4/27/21 with growth of Staph aureus  · PT/OT  · Dispo: Ortho will follow  · Will continue to assess for acute blood loss anemia, no evidence of anemia on this morning CBC    Franklin Lei PA-C

## 2021-04-30 NOTE — ASSESSMENT & PLAN NOTE
Lab Results   Component Value Date    HGBA1C 7 4 (H) 03/05/2021       Recent Labs     04/29/21  1620 04/29/21  2042 04/30/21  0742 04/30/21  1100   POCGLU 193* 399* 281* 334*       Blood Sugar Average: Last 72 hrs:  · (P) 252 25   · A1c near goal the patient reports significant dietary noncompliance and frequently eating fast food in the past week; blood sugars have been significantly out of control especially in the past 24 hours-- continue to aim for euglycemia to aid in resolution of infection  · Holding PO hypoglycemics: Januvia, Victoza  · Continue home long-acting regimen: lantus 50U SQ BID; add mealtime insulin 10 units t i d  A c   · SSI for correction  · QID accuchecks  ·

## 2021-04-30 NOTE — PLAN OF CARE
Problem: Potential for Falls  Goal: Patient will remain free of falls  Description: INTERVENTIONS:  - Assess patient frequently for physical needs  -  Identify cognitive and physical deficits and behaviors that affect risk of falls    -  Cornelia fall precautions as indicated by assessment   - Educate patient/family on patient safety including physical limitations  - Instruct patient to call for assistance with activity based on assessment  - Modify environment to reduce risk of injury  - Consider OT/PT consult to assist with strengthening/mobility  Outcome: Not Progressing     Problem: PAIN - ADULT  Goal: Verbalizes/displays adequate comfort level or baseline comfort level  Description: Interventions:  - Encourage patient to monitor pain and request assistance  - Assess pain using appropriate pain scale  - Administer analgesics based on type and severity of pain and evaluate response  - Implement non-pharmacological measures as appropriate and evaluate response  - Consider cultural and social influences on pain and pain management  - Notify physician/advanced practitioner if interventions unsuccessful or patient reports new pain  Outcome: Not Progressing     Problem: INFECTION - ADULT  Goal: Absence or prevention of progression during hospitalization  Description: INTERVENTIONS:  - Assess and monitor for signs and symptoms of infection  - Monitor lab/diagnostic results  - Monitor all insertion sites, i e  indwelling lines, tubes, and drains  - Monitor endotracheal if appropriate and nasal secretions for changes in amount and color  - Cornelia appropriate cooling/warming therapies per order  - Administer medications as ordered  - Instruct and encourage patient and family to use good hand hygiene technique  - Identify and instruct in appropriate isolation precautions for identified infection/condition  Outcome: Not Progressing  Goal: Absence of fever/infection during neutropenic period  Description: INTERVENTIONS:  - Monitor WBC    Outcome: Not Progressing     Problem: SAFETY ADULT  Goal: Patient will remain free of falls  Description: INTERVENTIONS:  - Assess patient frequently for physical needs  -  Identify cognitive and physical deficits and behaviors that affect risk of falls    -  Blanco fall precautions as indicated by assessment   - Educate patient/family on patient safety including physical limitations  - Instruct patient to call for assistance with activity based on assessment  - Modify environment to reduce risk of injury  - Consider OT/PT consult to assist with strengthening/mobility  Outcome: Not Progressing  Goal: Maintain or return to baseline ADL function  Description: INTERVENTIONS:  -  Assess patient's ability to carry out ADLs; assess patient's baseline for ADL function and identify physical deficits which impact ability to perform ADLs (bathing, care of mouth/teeth, toileting, grooming, dressing, etc )  - Assess/evaluate cause of self-care deficits   - Assess range of motion  - Assess patient's mobility; develop plan if impaired  - Assess patient's need for assistive devices and provide as appropriate  - Encourage maximum independence but intervene and supervise when necessary  - Involve family in performance of ADLs  - Assess for home care needs following discharge   - Consider OT consult to assist with ADL evaluation and planning for discharge  - Provide patient education as appropriate  Outcome: Not Progressing  Goal: Maintain or return mobility status to optimal level  Description: INTERVENTIONS:  - Assess patient's baseline mobility status (ambulation, transfers, stairs, etc )    - Identify cognitive and physical deficits and behaviors that affect mobility  - Identify mobility aids required to assist with transfers and/or ambulation (gait belt, sit-to-stand, lift, walker, cane, etc )  - Blanco fall precautions as indicated by assessment  - Record patient progress and toleration of activity level on Mobility SBAR; progress patient to next Phase/Stage  - Instruct patient to call for assistance with activity based on assessment  - Consider rehabilitation consult to assist with strengthening/weightbearing, etc   Outcome: Not Progressing     Problem: DISCHARGE PLANNING  Goal: Discharge to home or other facility with appropriate resources  Description: INTERVENTIONS:  - Identify barriers to discharge w/patient and caregiver  - Arrange for needed discharge resources and transportation as appropriate  - Identify discharge learning needs (meds, wound care, etc )  - Arrange for interpretive services to assist at discharge as needed  - Refer to Case Management Department for coordinating discharge planning if the patient needs post-hospital services based on physician/advanced practitioner order or complex needs related to functional status, cognitive ability, or social support system  Outcome: Not Progressing     Problem: Knowledge Deficit  Goal: Patient/family/caregiver demonstrates understanding of disease process, treatment plan, medications, and discharge instructions  Description: Complete learning assessment and assess knowledge base    Interventions:  - Provide teaching at level of understanding  - Provide teaching via preferred learning methods  Outcome: Not Progressing     Problem: MUSCULOSKELETAL - ADULT  Goal: Maintain or return mobility to safest level of function  Description: INTERVENTIONS:  - Assess patient's ability to carry out ADLs; assess patient's baseline for ADL function and identify physical deficits which impact ability to perform ADLs (bathing, care of mouth/teeth, toileting, grooming, dressing, etc )  - Assess/evaluate cause of self-care deficits   - Assess range of motion  - Assess patient's mobility  - Assess patient's need for assistive devices and provide as appropriate  - Encourage maximum independence but intervene and supervise when necessary  - Involve family in performance of ADLs  - Assess for home care needs following discharge   - Consider OT consult to assist with ADL evaluation and planning for discharge  - Provide patient education as appropriate  Outcome: Not Progressing  Goal: Maintain proper alignment of affected body part  Description: INTERVENTIONS:  - Support, maintain and protect limb and body alignment  - Provide patient/ family with appropriate education  Outcome: Not Progressing

## 2021-04-30 NOTE — PROGRESS NOTES
[facility]  Progress Note - Elfegotrini Childresssussy  1965, 50 y o  male MRN: [id number]  Unit/Bed#: S -01 Encounter: [id number]  Primary Care Provider: Digna Martinez DO   Date and time admitted to hospital: 4/26/2021  3:52 PM    * Sepsis [facility])  Assessment & Plan  · POA  Evidenced by tachycardia and leukocytosis  Lactate/temp normal   White count still elevated, however patient was recently on a course of steroids as well  · Source: LLE cellulitis, left patellar bursitis  · No evidence of septic joint  · Abx: ancef   · Follow up blood and bursa fluid cultures  · Trend labs/vitals    Pain and swelling of left knee due to prepatellar bursitis  Assessment & Plan  · Ongoing x2 weeks  S/p aspiration x1 on 4/16 w/ improvement in sx for about 2 days  Fluid culture negative for infection, received IM SoluMedrol x1 followed by prednisone 40mg PO QD x5 days w/ no improvement in sx   · S/p aspiration of 10 cc of clear synovial fluid in the ED-- f/u fluid culture  · Underwent incision and drainage 4/27 of prepatellar bursa with purulent fluid, finalized culture now MSSA, therefore we can streamline antibiotics to IV Ancef alone and convert to Keflex at discharge  · ESR/CRP elevated-- has known RA-- L knee has previously been affected by flare  · US of soft tissue: no abscess, soft tissue edema consistent w/ cellulitis   · Pain control regimenTylenol 975 t i d , Toradol 30 mg IV q 6 hours p r n  Moderate pain, oxycodone 10 mg p r n  Severe pain, Dilaudid 0 5 mg IV for breakthrough pain  · Orthopedics following  They suspect prepatellar bursitis  Patient underwent operative washout on April 29th with placement of a FRED drain which hopefully can be removed within 24 hours  · My colleague discussed with on-call rheumatologist, Dr Mcdaniel Sensor  · No further inpatient rheumatologic workup indicated  Plan to finish antibiotics and follow-up with outpatient rheumatologist, Dr Deepika Santoro      Cellulitis of left lower extremity  Assessment & Plan  · Likely 2/2 recent aspiration of L knee  · Indurated areas over lateral aspect of knee extending into thigh: US negative for abscess, but consistent w/ cellulitis   · Continue w/ IV abx--based on cultures, can proceed with Ancef alone    Type 2 diabetes mellitus with diabetic neuropathic arthropathy, with long-term current use of insulin [facility])  Assessment & Plan  Lab Results   Component Value Date    HGBA1C 7 4 (H) 03/05/2021       Recent Labs     04/29/21  1620 04/29/21  2042 04/30/21  0742 04/30/21  1100   POCGLU 193* 399* 281* 334*       Blood Sugar Average: Last 72 hrs:  · (P) 252 25   · A1c near goal the patient reports significant dietary noncompliance and frequently eating fast food in the past week; blood sugars have been significantly out of control especially in the past 24 hours-- continue to aim for euglycemia to aid in resolution of infection  · Holding PO hypoglycemics: Januvia, Victoza  · Continue home long-acting regimen: lantus 50U SQ BID; add mealtime insulin 10 units t i d  A c   · SSI for correction  · QID accuchecks  ·     Rheumatoid arthritis ([address] )  Assessment & Plan  · Patient follows w/ Rheumatology @ [facility]   · Currently on Humira-- has been on this for at least 1 year-- last dose was about 2 weeks ago prior to onset of sx   · Also on plaquenil and Arava (hold during stay--nonform)    Tobacco use  Assessment & Plan  · Patient reports he has tried multiple means of quitting in the past which have been unsuccessful and he has no intention of quitting at this point    Made him aware of the consequences of inadequate wound healing related with tobacco abuse    Benign essential hypertension  Assessment & Plan  · BP elevated on admission likely 2/2 pain; now improved   · Continue home BP medication regimen    VTE Pharmacologic Prophylaxis:   Pharmacologic: Enoxaparin (Lovenox)  Mechanical VTE Prophylaxis in Place: No    Patient Centered Rounds: Spoke with nursing    Discussions with Specialists or Other Care Team Provider:  Case management    Education and Discussions with Family / Patient:  Wife over the phone    Time Spent for Care: 25 min  More than 50% of total time spent on counseling and coordination of care as described above  Current Length of Stay: 4 day(s)    Current Patient Status: Inpatient   Certification Statement: The patient will continue to require additional inpatient hospital stay due to Postoperative care, FRED drain in place, IV antibiotics    Discharge Plan:  Hopefully home tomorrow if clinically improved    Code Status: Level 1 - Full Code    Subjective:   Patient reports pain is improved and he is ambulating and doing well overall, better than he thought he would be  He wants to go home tomorrow  Objective:     Vitals:   Temp (24hrs), Av 5 °F (36 9 °C), Min:97 4 °F (36 3 °C), Max:99 3 °F (37 4 °C)    Temp:  [97 4 °F (36 3 °C)-99 3 °F (37 4 °C)] 98 2 °F (36 8 °C)  HR:  [] 78  Resp:  [16-18] 16  BP: (134-192)/(75-98) 158/94  SpO2:  [94 %-100 %] 99 %  Body mass index is 34 89 kg/m²  Input and Output Summary (last 24 hours): Intake/Output Summary (Last 24 hours) at 2021 1416  Last data filed at 2021 1118  Gross per 24 hour   Intake 250 ml   Output 4550 ml   Net -4300 ml       Physical Exam:     Physical Exam  Vitals signs reviewed  Constitutional:       General: He is not in acute distress  Appearance: He is obese  He is not ill-appearing, toxic-appearing or diaphoretic  HENT:      Head: Normocephalic  Eyes:      General:         Right eye: No discharge  Left eye: No discharge  Conjunctiva/sclera: Conjunctivae normal    Cardiovascular:      Rate and Rhythm: Normal rate and regular rhythm  Heart sounds: No murmur  Pulmonary:      Effort: No respiratory distress  Breath sounds: No stridor  No wheezing, rhonchi or rales  Abdominal:      Palpations: Abdomen is soft  Tenderness: There is no guarding  Musculoskeletal:      Comments: Left knee dressing in place with FRED drain  No significant swelling   Skin:     General: Skin is warm and dry  Coloration: Skin is not jaundiced or pale  Findings: No bruising, lesion or rash  Neurological:      Mental Status: He is alert  Comments: Awake alert interactive          Additional Data:     Labs:    Results from last 7 days   Lab Units 04/30/21  0626   WBC Thousand/uL 16 37*   HEMOGLOBIN g/dL 13 9   HEMATOCRIT % 41 1   PLATELETS Thousands/uL 264   NEUTROS PCT % 83*   LYMPHS PCT % 8*   MONOS PCT % 8   EOS PCT % 0     Results from last 7 days   Lab Units 04/30/21  0626  04/26/21  1440   SODIUM mmol/L 136   < > 133*   POTASSIUM mmol/L 4 2   < > 3 9   CHLORIDE mmol/L 100   < > 96*   CO2 mmol/L 28   < > 26   BUN mg/dL 15   < > 24   CREATININE mg/dL 1 03   < > 1 16   ANION GAP mmol/L 8   < > 11   CALCIUM mg/dL 8 4   < > 8 9   ALBUMIN g/dL  --   --  3 3*   TOTAL BILIRUBIN mg/dL  --   --  0 62   ALK PHOS U/L  --   --  76   ALT U/L  --   --  37   AST U/L  --   --  17   GLUCOSE RANDOM mg/dL 363*   < > 344*    < > = values in this interval not displayed  Results from last 7 days   Lab Units 04/30/21  1100 04/30/21  0742 04/29/21  2042 04/29/21  1620 04/29/21  1501 04/29/21  1144 04/29/21  0844 04/28/21  2109 04/28/21  1550 04/28/21  1129 04/28/21  0826 04/27/21  2122   POC GLUCOSE mg/dl 334* 281* 399* 193* 139 185* 205* 238* 317* 332* 119 285*         Results from last 7 days   Lab Units 04/27/21  0413 04/26/21  1704 04/26/21  1440   LACTIC ACID mmol/L  --  1 7  --    PROCALCITONIN ng/ml 0 05  --  0 06       * I Have Reviewed All Lab Data Listed Above  * Additional Pertinent Lab Tests Reviewed:  [address] 68 Admission Reviewed    Imaging:    Imaging Reports Reviewed Today Include:   Imaging Personally Reviewed by Myself Includes:      Recent Cultures (last 7 days):     Results from last 7 days   Lab Units 04/27/21  1924 04/26/21  1820 04/26/21  1704   BLOOD CULTURE   --   --  No Growth at 72 hrs  No Growth at 72 hrs  GRAM STAIN RESULT  2+ Polys*  3+ Gram positive cocci in clusters* No Polys or Bacteria seen  --    BODY FLUID CULTURE, STERILE  4+ Growth of Staphylococcus aureus* No growth  --        Last 24 Hours Medication List:   Current Facility-Administered Medications   Medication Dose Route Frequency Provider Last Rate    acetaminophen  975 mg Oral [facility] Ольга Bearded, PA-C      albuterol  2 puff Inhalation Q4H PRN Ольга Bearded, PA-C      vitamin C  1,000 mg Oral Daily [place] Bearded, Mónica May      atorvastatin  40 mg Oral HS [place] Bearded, PA-C      calcium carbonate  1 tablet Oral Daily With [address], Mónica May      calcium carbonate  1,000 mg Oral Daily PRN Ольга Bearded, PA-C      cefazolin  2,000 mg Intravenous Q8H [place] Bearded, PA-C 2,000 mg (04/30/21 0915)    cyclobenzaprine  10 mg Oral TID PRN Ольга Bearded, PA-C      enoxaparin  40 mg Subcutaneous Daily Ольга Bearded, PA-C      fluticasone  2 spray Each Nare Daily Ольга Bearded, Mónica May      HYDROmorphone  0 5 mg Intravenous Q4H PRN [place] Bearded, PA-C      hydroxychloroquine  200 mg Oral BID [place] Bearded, PA-C      insulin glargine  50 Units Subcutaneous Q12H [facility] [place] Bearded, Mónica May      insulin lispro  1-6 Units Subcutaneous HS [place] Bearded, PA-C      insulin lispro  10 Units Subcutaneous TID With Meals Virginia Appiah, PA-C      insulin lispro  2-12 Units Subcutaneous TID AC Ольга Bearded, PA-C      lidocaine (PF)  20 mL Infiltration Once Ольга Bearded, PA-C      losartan potassium-hydrochlorothiazide (HYZAAR 100/12  5) combo dose   Oral Daily [place] Bearded, PA-C      melatonin  3 mg Oral HS [place] Bearded, PA-C      nicotine  21 mg Transdermal Daily PARKER Shea      ondansetron  4 mg Intravenous Q6H PRN [place] Bearded, PA-C      oxyCODONE  10 mg Oral Q6H PRN [place] Bearded, PA-C      oxyCODONE  5 mg Oral Q4H PRN [place] Bearded, PA-C      pantoprazole 40 mg Oral Early Morning Olivia Hudson PA-C      sucralfate  1 g Oral 4x Daily Olivia Hudson PA-C          Today, Patient Was Seen By: Shanel Woodard PA-C    ** Please Note: Dictation voice to text software may have been used in the creation of this document   **

## 2021-04-30 NOTE — ASSESSMENT & PLAN NOTE
· POA  Evidenced by tachycardia and leukocytosis  Lactate/temp normal   White count still elevated, however patient was recently on a course of steroids as well  · Source: LLE cellulitis, left patellar bursitis  · No evidence of septic joint  · Abx: ancef   · Follow up blood and bursa fluid cultures    · Trend labs/vitals

## 2021-04-30 NOTE — ASSESSMENT & PLAN NOTE
· Likely 2/2 recent aspiration of L knee  · Indurated areas over lateral aspect of knee extending into thigh: US negative for abscess, but consistent w/ cellulitis   · Continue w/ IV abx--based on cultures, can proceed with Ancef alone

## 2021-04-30 NOTE — QUICK NOTE
Patient was seen began 12:30 p m  today  There is no increased fluid in the drain canister as compared to this morning  With less than 50 cc in total drainage  With serosanguineous fluid,  Drain was removed  The patient tolerated the drain normal   Dressings are intact overlying the drain site  Plan for dressing change tomorrow

## 2021-04-30 NOTE — ASSESSMENT & PLAN NOTE
· Ongoing x2 weeks  S/p aspiration x1 on 4/16 w/ improvement in sx for about 2 days  Fluid culture negative for infection, received IM SoluMedrol x1 followed by prednisone 40mg PO QD x5 days w/ no improvement in sx   · S/p aspiration of 10 cc of clear synovial fluid in the ED-- f/u fluid culture  · Underwent incision and drainage 4/27 of prepatellar bursa with purulent fluid, finalized culture now MSSA, therefore we can streamline antibiotics to IV Ancef alone and convert to Keflex at discharge  · ESR/CRP elevated-- has known RA-- L knee has previously been affected by flare  · US of soft tissue: no abscess, soft tissue edema consistent w/ cellulitis   · Pain control regimenTylenol 975 t i d , Toradol 30 mg IV q 6 hours p r n  Moderate pain, oxycodone 10 mg p r n  Severe pain, Dilaudid 0 5 mg IV for breakthrough pain  · Orthopedics following  They suspect prepatellar bursitis  Patient underwent operative washout on April 29th with placement of a FRED drain which hopefully can be removed within 24 hours  · My colleague discussed with on-call rheumatologist, Dr Justina Hastings  · No further inpatient rheumatologic workup indicated  Plan to finish antibiotics and follow-up with outpatient rheumatologist, Dr Abelardo Barlow

## 2021-05-01 VITALS
OXYGEN SATURATION: 98 % | HEART RATE: 82 BPM | HEIGHT: 72 IN | RESPIRATION RATE: 16 BRPM | DIASTOLIC BLOOD PRESSURE: 75 MMHG | BODY MASS INDEX: 34.85 KG/M2 | TEMPERATURE: 98.6 F | SYSTOLIC BLOOD PRESSURE: 132 MMHG | WEIGHT: 257.28 LBS

## 2021-05-01 LAB
ANION GAP SERPL CALCULATED.3IONS-SCNC: 6 MMOL/L (ref 4–13)
BACTERIA BLD CULT: NORMAL
BACTERIA BLD CULT: NORMAL
BACTERIA SPEC ANAEROBE CULT: NORMAL
BASOPHILS # BLD AUTO: 0.06 THOUSANDS/ΜL (ref 0–0.1)
BASOPHILS NFR BLD AUTO: 1 % (ref 0–1)
BUN SERPL-MCNC: 16 MG/DL (ref 5–25)
CALCIUM SERPL-MCNC: 7.9 MG/DL (ref 8.3–10.1)
CHLORIDE SERPL-SCNC: 99 MMOL/L (ref 100–108)
CO2 SERPL-SCNC: 27 MMOL/L (ref 21–32)
CREAT SERPL-MCNC: 1.06 MG/DL (ref 0.6–1.3)
EOSINOPHIL # BLD AUTO: 0.16 THOUSAND/ΜL (ref 0–0.61)
EOSINOPHIL NFR BLD AUTO: 1 % (ref 0–6)
ERYTHROCYTE [DISTWIDTH] IN BLOOD BY AUTOMATED COUNT: 12.8 % (ref 11.6–15.1)
GFR SERPL CREATININE-BSD FRML MDRD: 80 ML/MIN/1.73SQ M
GLUCOSE SERPL-MCNC: 268 MG/DL (ref 65–140)
GLUCOSE SERPL-MCNC: 283 MG/DL (ref 65–140)
HCT VFR BLD AUTO: 40.5 % (ref 36.5–49.3)
HGB BLD-MCNC: 13.3 G/DL (ref 12–17)
IMM GRANULOCYTES # BLD AUTO: 0.17 THOUSAND/UL (ref 0–0.2)
IMM GRANULOCYTES NFR BLD AUTO: 1 % (ref 0–2)
LYMPHOCYTES # BLD AUTO: 2.46 THOUSANDS/ΜL (ref 0.6–4.47)
LYMPHOCYTES NFR BLD AUTO: 21 % (ref 14–44)
MCH RBC QN AUTO: 29 PG (ref 26.8–34.3)
MCHC RBC AUTO-ENTMCNC: 32.8 G/DL (ref 31.4–37.4)
MCV RBC AUTO: 88 FL (ref 82–98)
MONOCYTES # BLD AUTO: 1.33 THOUSAND/ΜL (ref 0.17–1.22)
MONOCYTES NFR BLD AUTO: 11 % (ref 4–12)
NEUTROPHILS # BLD AUTO: 7.73 THOUSANDS/ΜL (ref 1.85–7.62)
NEUTS SEG NFR BLD AUTO: 65 % (ref 43–75)
NRBC BLD AUTO-RTO: 0 /100 WBCS
PLATELET # BLD AUTO: 239 THOUSANDS/UL (ref 149–390)
PMV BLD AUTO: 10.6 FL (ref 8.9–12.7)
POTASSIUM SERPL-SCNC: 3.8 MMOL/L (ref 3.5–5.3)
RBC # BLD AUTO: 4.59 MILLION/UL (ref 3.88–5.62)
SODIUM SERPL-SCNC: 132 MMOL/L (ref 136–145)
WBC # BLD AUTO: 11.91 THOUSAND/UL (ref 4.31–10.16)

## 2021-05-01 PROCEDURE — 99024 POSTOP FOLLOW-UP VISIT: CPT | Performed by: PHYSICIAN ASSISTANT

## 2021-05-01 PROCEDURE — 87081 CULTURE SCREEN ONLY: CPT | Performed by: INTERNAL MEDICINE

## 2021-05-01 PROCEDURE — 80048 BASIC METABOLIC PNL TOTAL CA: CPT | Performed by: PHYSICIAN ASSISTANT

## 2021-05-01 PROCEDURE — 85025 COMPLETE CBC W/AUTO DIFF WBC: CPT | Performed by: PHYSICIAN ASSISTANT

## 2021-05-01 PROCEDURE — 82948 REAGENT STRIP/BLOOD GLUCOSE: CPT

## 2021-05-01 PROCEDURE — 99239 HOSP IP/OBS DSCHRG MGMT >30: CPT | Performed by: INTERNAL MEDICINE

## 2021-05-01 RX ORDER — HYDROCODONE BITARTRATE AND ACETAMINOPHEN 5; 325 MG/1; MG/1
1 TABLET ORAL EVERY 6 HOURS PRN
Qty: 20 TABLET | Refills: 0 | Status: SHIPPED | OUTPATIENT
Start: 2021-05-01 | End: 2021-05-07 | Stop reason: SDUPTHER

## 2021-05-01 RX ORDER — CEPHALEXIN 500 MG/1
500 CAPSULE ORAL EVERY 6 HOURS SCHEDULED
Qty: 20 CAPSULE | Refills: 0 | Status: SHIPPED | OUTPATIENT
Start: 2021-05-01 | End: 2021-05-06

## 2021-05-01 RX ADMIN — LOSARTAN POTASSIUM: 50 TABLET, FILM COATED ORAL at 08:46

## 2021-05-01 RX ADMIN — PANTOPRAZOLE SODIUM 40 MG: 40 TABLET, DELAYED RELEASE ORAL at 06:48

## 2021-05-01 RX ADMIN — NICOTINE 21 MG: 21 PATCH, EXTENDED RELEASE TRANSDERMAL at 08:49

## 2021-05-01 RX ADMIN — INSULIN LISPRO 10 UNITS: 100 INJECTION, SOLUTION INTRAVENOUS; SUBCUTANEOUS at 08:55

## 2021-05-01 RX ADMIN — CEFAZOLIN SODIUM 2000 MG: 2 SOLUTION INTRAVENOUS at 02:32

## 2021-05-01 RX ADMIN — ENOXAPARIN SODIUM 40 MG: 40 INJECTION SUBCUTANEOUS at 08:46

## 2021-05-01 RX ADMIN — OXYCODONE HYDROCHLORIDE AND ACETAMINOPHEN 1000 MG: 500 TABLET ORAL at 08:46

## 2021-05-01 RX ADMIN — OXYCODONE HYDROCHLORIDE 10 MG: 10 TABLET ORAL at 06:51

## 2021-05-01 RX ADMIN — OXYCODONE HYDROCHLORIDE 5 MG: 5 TABLET ORAL at 02:41

## 2021-05-01 RX ADMIN — INSULIN LISPRO 6 UNITS: 100 INJECTION, SOLUTION INTRAVENOUS; SUBCUTANEOUS at 08:55

## 2021-05-01 RX ADMIN — ACETAMINOPHEN 975 MG: 325 TABLET, FILM COATED ORAL at 06:48

## 2021-05-01 RX ADMIN — INSULIN GLARGINE 50 UNITS: 100 INJECTION, SOLUTION SUBCUTANEOUS at 08:46

## 2021-05-01 RX ADMIN — FLUTICASONE PROPIONATE 2 SPRAY: 50 SPRAY, METERED NASAL at 08:52

## 2021-05-01 RX ADMIN — SUCRALFATE 1 G: 1 TABLET ORAL at 08:46

## 2021-05-01 RX ADMIN — HYDROXYCHLOROQUINE SULFATE 200 MG: 200 TABLET, FILM COATED ORAL at 08:51

## 2021-05-01 NOTE — DISCHARGE SUMMARY
Discharge Summary - [address] [facility]    Patient Information: Jovanni Stephens  50 y o  male MRN: [id number]  Unit/Bed#: S -01 Encounter: [id number]    Discharging Physician / Practitioner: Fabricio Julian MD  PCP: Ryne Castillo DO  Admission Date: 4/26/2021  Discharge Date: 05/01/21    Disposition:     Home    Reason for Admission:  Knee pain and erythema    Discharge Diagnoses:     Principal Problem:    Sepsis ([facility] )  Active Problems:    Benign essential hypertension    Type 2 diabetes mellitus with diabetic neuropathic arthropathy, with long-term current use of insulin ([facility])    Rheumatoid arthritis ([facility] )    Tobacco use    Cellulitis of left lower extremity    Pain and swelling of left knee due to prepatellar bursitis  Resolved Problems:    * No resolved hospital problems  *      Consultations During Hospital Stay:  · Orthopedics    Procedures Performed:     · X-ray knee no acute radiographic evidence of any osteomyelitis  · Ultrasound soft tissue knee subcutaneous edema noted  · Incision and drainage left knee prepatellar bursa    Significant Findings / Test Results:     · As above    Incidental Findings:   · None    Test Results Pending at Discharge (will require follow up): · None     Outpatient Tests Requested:  · None    Complications:  None    Hospital Course:     Jovanni Stephens  is a 50 y o  male with past medical significant for diabetes mellitus, rheumatoid arthritis , benign essential hypertension patient who originally presented to the hospital on 4/26/2021 due to left knee pain, swelling and erythema  Patient was subsequently admitted and was seen by Orthopedics  He underwent incision and drainage of the prepatellar bursa  Wound cultures grew MSSA sensitive to Ancef  Patient is being discharged with p o  Keflex to complete course  He will follow with Orthopedics      Condition at Discharge: good     Discharge Day Visit / Exam:     Subjective:  Patient anxious to go home today  Vitals: Blood Pressure: 132/75 (05/01/21 0628)  Pulse: 82 (05/01/21 0628)  Temperature: 98 6 °F (37 °C) (05/01/21 0628)  Temp Source: Oral (05/01/21 1498)  Respirations: 16 (05/01/21 0628)  Height: 6' (182 9 cm) (04/26/21 2022)  Weight - Scale: 117 kg (257 lb 4 4 oz) (04/26/21 2024)  SpO2: 98 % (05/01/21 2825)  Exam:   Physical Exam     Gen -Patient comfortable   Neck- Supple  No thyromegaly or lymphadenopathy  Lungs-Clear bilaterally without any wheeze or rales   Heart S1-S2, regular rate and rhythm, no murmurs  Abdomen-soft nontender, no organomegaly  Bowel sounds present  Extremities-no cyanosi,  clubbing or edema  Left knee Ace bandage intact  Skin- no rash  Neuro-nonfocal       Discussion with Family:     Discharge instructions/Information to patient and family:   See after visit summary for information provided to patient and family  Provisions for Follow-Up Care:  See after visit summary for information related to follow-up care and any pertinent home health orders  Planned Readmission:      Discharge Statement:  I spent 35 minutes discharging the patient  This time was spent on the day of discharge  I had direct contact with the patient on the day of discharge  Greater than 50% of the total time was spent examining patient, answering all patient questions, arranging and discussing plan of care with patient as well as directly providing post-discharge instructions  Additional time then spent on discharge activities  Discharge Medications:  See after visit summary for reconciled discharge medications provided to patient and family        ** Please Note: This note has been constructed using a voice recognition system ** No

## 2021-05-01 NOTE — PROGRESS NOTES
[address]  50 y o  male MRN: [id number]  Unit/Bed#: S -01      Subjective:  Patient was seen and evaluated this morning at bedside  He notes pain soreness in the left knee and left distal thigh  He notes significant improvement as compared to prior to his I and D on this past Thursday  He denies any fevers or chills  No new or increased numbness or tingling      Labs:  0   Lab Value Date/Time    HCT 40 5 05/01/2021 0438    HCT 41 1 04/30/2021 0626    HCT 44 1 04/29/2021 0646    HCT 47 1 01/23/2017 1300    HGB 13 3 05/01/2021 0438    HGB 13 9 04/30/2021 0626    HGB 14 6 04/29/2021 0646    HGB 16 0 01/23/2017 1300    INR 0 94 04/11/2019 1116    WBC 11 91 (H) 05/01/2021 0438    WBC 16 37 (H) 04/30/2021 0626    WBC 13 76 (H) 04/29/2021 0646    WBC 6 0 01/23/2017 1300    ESR 68 (H) 04/26/2021 1440     6 (H) 04/28/2021 0648       Meds:    Current Facility-Administered Medications:     acetaminophen (TYLENOL) tablet 975 mg, 975 mg, Oral, Q8H [address], Jenny Alford PA-C, 975 mg at 05/01/21 0648    albuterol (PROVENTIL HFA,VENTOLIN HFA) inhaler 2 puff, 2 puff, Inhalation, Q4H PRN, Jenny Alford PA-C, 2 puff at 04/28/21 1947    ascorbic acid (VITAMIN C) tablet 1,000 mg, 1,000 mg, Oral, Daily, Jenny Alford PA-C, 1,000 mg at 04/30/21 0916    atorvastatin (LIPITOR) tablet 40 mg, 40 mg, Oral, HS, Jenny Alford PA-C, 40 mg at 04/30/21 2251    calcium carbonate (OYSTER SHELL,OSCAL) 500 mg tablet 1 tablet, 1 tablet, Oral, Daily With Thedequan Castaneda PA-C, 1 tablet at 04/30/21 1818    calcium carbonate (TUMS) chewable tablet 1,000 mg, 1,000 mg, Oral, Daily PRN, Jenny Alford PA-C    ceFAZolin (ANCEF) IVPB (premix in dextrose) 2,000 mg 50 mL, 2,000 mg, Intravenous, Q8H, Jenny Alford PA-C, Last Rate: 100 mL/hr at 05/01/21 0232, 2,000 mg at 05/01/21 0232    cyclobenzaprine (FLEXERIL) tablet 10 mg, 10 mg, Oral, TID PRN, Jenny Alford PA-C    enoxaparin (LOVENOX) subcutaneous injection 40 mg, 40 mg, Subcutaneous, Daily, Sada Mir PA-C, 40 mg at 04/30/21 0915    fluticasone (FLONASE) 50 mcg/act nasal spray 2 spray, 2 spray, Each Nare, Daily, Sada Mir PA-C, 2 spray at 04/30/21 0917    HYDROmorphone (DILAUDID) injection 0 5 mg, 0 5 mg, Intravenous, Q4H PRN, Sada Mir PA-C    hydroxychloroquine (PLAQUENIL) tablet 200 mg, 200 mg, Oral, BID, Sada Mir PA-C, 200 mg at 04/30/21 1820    insulin glargine (LANTUS) subcutaneous injection 50 Units 0 5 mL, 50 Units, Subcutaneous, Q12H [address], Sada Mir PA-C, 50 Units at 04/30/21 2250    insulin lispro (HumaLOG) 100 units/mL subcutaneous injection 1-6 Units, 1-6 Units, Subcutaneous, HS, Sada Mir PA-C, 6 Units at 04/29/21 2158    insulin lispro (HumaLOG) 100 units/mL subcutaneous injection 10 Units, 10 Units, Subcutaneous, TID With Meals, Virginia Appiah PA-C, 10 Units at 04/30/21 1819    insulin lispro (HumaLOG) 100 units/mL subcutaneous injection 2-12 Units, 2-12 Units, Subcutaneous, TID AC, 10 Units at 04/30/21 1819 **AND** Fingerstick Glucose (POCT), , , TID AC, Sada Mir PA-C    lidocaine (PF) (XYLOCAINE-MPF) 1 % injection 20 mL, 20 mL, Infiltration, Once, Sada Mir PA-C, Stopped at 04/27/21 1041    losartan potassium-hydrochlorothiazide (HYZAAR 100/12  5) combo dose, , Oral, Daily, Sada Mir PA-C, Given at 04/30/21 0916    melatonin tablet 3 mg, 3 mg, Oral, HS, Sada Mir PA-C, 3 mg at 04/30/21 2250    nicotine (NICODERM CQ) 21 mg/24 hr TD 24 hr patch 21 mg, 21 mg, Transdermal, Daily, Krystina Jr, CRNP, 21 mg at 04/30/21 0916    ondansetron (ZOFRAN) injection 4 mg, 4 mg, Intravenous, Q6H PRN, Sada Mir PA-C, 4 mg at 04/29/21 1416    oxyCODONE (ROXICODONE) immediate release tablet 10 mg, 10 mg, Oral, Q6H PRN, aSda Mir PA-C, 10 mg at 05/01/21 0651    oxyCODONE (ROXICODONE) IR tablet 5 mg, 5 mg, Oral, Q4H PRN, Sada Mir PA-C, 5 mg at 05/01/21 0241    pantoprazole (PROTONIX) EC tablet 40 mg, 40 mg, Oral, Early Morning, Dominick Lagunas PA-C, 40 mg at 05/01/21 3120    sucralfate (CARAFATE) tablet 1 g, 1 g, Oral, 4x Daily, Dominick Lagunas PA-C, 1 g at 04/30/21 2250    Blood Culture:   Lab Results   Component Value Date    BLOODCX No Growth After 4 Days  04/26/2021    BLOODCX No Growth After 4 Days  04/26/2021       Wound Culture:   Lab Results   Component Value Date    WOUNDCULT 3+ Growth of Staphylococcus aureus (A) 10/27/2020       Ins and Outs:  I/O last 24 hours: In: -   Out: 2625 [Urine:2625]          Physical Exam:  Vitals:    05/01/21 0628   BP: 132/75   Pulse: 82   Resp: 16   Temp: 98 6 °F (37 °C)   SpO2: 98%     left Lower Extremity extremity:  · Minimal amount of dry serosanguineous drainage on overlying ABD dressing  · Prepatellar and lateral distal thigh incisions are clean dry and intact no greta-incisional erythema  · No palpable recurrent prepatellar bursal fluid collection, no palpable subcutaneous fluid collection in the distal lateral thigh  · Sensation motor grossly intact throughout left lower extremity  · Palpable DP pulse    Assessment: 55 y o male postop day 2 left knee incision and drainage and prepatellar bursal excisional debridement for septic prepatellar bursitis and associated left distal thigh abscess    Plan:  · Weight-bearing as tolerated left lower extremity  · DVT prophylaxis - none required upon discharge  · Analgesics - per primary team  · Antibiotics per primary team, prepatellar bursal aspiration cultures from 4/27/21 with growth of Staph aureus, intraoperative tissue culture with growth of Staph aureus as well, MRSA culture is pending  · Patient is in improving clinically as well as from laboratory standpoint his white blood cell count is trending down to 11 91 this morning  · No plan for repeat I and D at this time  · PT/OT  · Dispo: Ortho will follow  · Will continue to assess for acute blood loss anemia, no evidence of anemia on this morning CBC      Kishan Gonzalez PA-C

## 2021-05-02 LAB
BACTERIA TISS AEROBE CULT: ABNORMAL
GRAM STN SPEC: ABNORMAL
GRAM STN SPEC: ABNORMAL
MRSA NOSE QL CULT: NORMAL

## 2021-05-03 ENCOUNTER — TRANSITIONAL CARE MANAGEMENT (OUTPATIENT)
Dept: FAMILY MEDICINE CLINIC | Facility: CLINIC | Age: 56
End: 2021-05-03

## 2021-05-03 NOTE — UTILIZATION REVIEW
Notification of Discharge   This is a Notification of Discharge from our facility [address]  Please be advised that this patient has been discharge from our facility  Below you will find the admission and discharge date and time including the patients disposition  UTILIZATION REVIEW CONTACT:  Vicki Cabello  Utilization   Network Utilization Review Department  Phone: [phone] x carefully listen to the prompts  All voicemails are confidential   Email: [email or web address]     PHYSICIAN ADVISORY SERVICES:  FOR [id number] REVIEW - MEDICAL NECESSITY DENIAL  Phone: [phone]  Fax: [phone]  Email: [email or web address]     PRESENTATION DATE: 4/26/2021  3:52 PM  OBERVATION ADMISSION DATE:   INPATIENT ADMISSION DATE: 4/26/21 1823   DISCHARGE DATE: 5/1/2021 10:30 AM  DISPOSITION: Home/Self Care Home/Self Care      IMPORTANT INFORMATION:  Send all requests for admission clinical reviews, approved or denied determinations and any other requests to dedicated fax number below belonging to the campus where the patient is receiving treatment   List of dedicated fax numbers:  [address] DENIALS (Administrative/Medical Necessity) [phone]   [address]  (Maternity/NICU/Pediatrics) [phone]   Katarina Fines [phone]   [facility] [phone]   Tiny Arabic [phone]   [address]el[address] Ch[address]  [phone]   [facility]  [phone]   [address] [facility]  [address] [facility] [address] [facility] [phone]

## 2021-05-07 ENCOUNTER — TELEPHONE (OUTPATIENT)
Dept: OBGYN CLINIC | Facility: HOSPITAL | Age: 56
End: 2021-05-07

## 2021-05-07 DIAGNOSIS — M25.462 SWELLING OF JOINT, KNEE, LEFT: ICD-10-CM

## 2021-05-07 DIAGNOSIS — M70.42 PREPATELLAR BURSITIS OF LEFT KNEE: Primary | ICD-10-CM

## 2021-05-07 RX ORDER — HYDROCODONE BITARTRATE AND ACETAMINOPHEN 5; 325 MG/1; MG/1
1 TABLET ORAL EVERY 6 HOURS PRN
Qty: 20 TABLET | Refills: 0 | Status: SHIPPED | OUTPATIENT
Start: 2021-05-07 | End: 2021-05-17

## 2021-05-07 RX ORDER — CEFADROXIL 500 MG/1
500 CAPSULE ORAL EVERY 12 HOURS SCHEDULED
Qty: 20 CAPSULE | Refills: 0 | Status: SHIPPED | OUTPATIENT
Start: 2021-05-07 | End: 2021-05-11

## 2021-05-07 NOTE — TELEPHONE ENCOUNTER
Patient sees Dr Lorenzo Sy  Patients wife is calling stating he is prone to infections and he's out of antibiotics,  She states he gets staph often and had drainage a few days ago which is gone now but would like to take another round of the antibiotics        He is also requesting a refill on: HYDROcodone-acetaminophen (NORCO) 5-325 mg per tablet      Patient uses [address] on file        CB: [phone] to leave message

## 2021-05-07 NOTE — TELEPHONE ENCOUNTER
Refill of hydrocodone sent electronically      Also prescribed a 10 day course of duricef(antibiotic)

## 2021-05-11 ENCOUNTER — OFFICE VISIT (OUTPATIENT)
Dept: OBGYN CLINIC | Facility: CLINIC | Age: 56
End: 2021-05-11

## 2021-05-11 VITALS
SYSTOLIC BLOOD PRESSURE: 122 MMHG | BODY MASS INDEX: 34.81 KG/M2 | DIASTOLIC BLOOD PRESSURE: 79 MMHG | HEIGHT: 72 IN | WEIGHT: 257 LBS | HEART RATE: 79 BPM

## 2021-05-11 DIAGNOSIS — L02.416 ABSCESS OF LEFT THIGH: Primary | ICD-10-CM

## 2021-05-11 PROCEDURE — 99024 POSTOP FOLLOW-UP VISIT: CPT | Performed by: ORTHOPAEDIC SURGERY

## 2021-05-11 RX ORDER — CHLORHEXIDINE GLUCONATE 4 G/100ML
SOLUTION TOPICAL DAILY PRN
Status: CANCELLED | OUTPATIENT
Start: 2021-05-11

## 2021-05-11 RX ORDER — LEVOFLOXACIN 750 MG/1
750 TABLET ORAL EVERY 24 HOURS
Qty: 14 TABLET | Refills: 0 | Status: SHIPPED | OUTPATIENT
Start: 2021-05-11 | End: 2021-05-26 | Stop reason: HOSPADM

## 2021-05-11 RX ORDER — CHLORHEXIDINE GLUCONATE 0.12 MG/ML
15 RINSE ORAL ONCE
Status: CANCELLED | OUTPATIENT
Start: 2021-05-11 | End: 2021-05-11

## 2021-05-11 NOTE — PROGRESS NOTES
Patient Name:  Juan Jose Martines  MRN:  [id number]    Assessment & Plan     Left knee incision and drainage, excisional debridement of prepatellar bursa 4/29/21 with persistent drainage from the lateral thigh incision  1  Recommend repeat incision and drainage and washout of the left distal lateral thigh  2  Patient is leaving for vacation at the end of this week  Plan for surgery once he returns  Patient will prescribe prescribed oral Levaquin to suppress the infection until he returns  3  Plan for admission to the hospital after surgery for IV antibiotics  Plan to consult Infectious Disease at that time for recommendations  History of the Present Illness     72-year-old male returns to the office today for follow-up regarding his left knee  He returns to the office today noting persistent drainage over the distal lateral thigh incision  Drainage is yellow and cloudy in nature  He has been taking Duricef and notes mild improvement  He denies any fevers or chills  He does note associated swelling along the lateral thigh  General ROS:  Negative for fever or chills  Neurological ROS:  Negative for numbness or tingling  Physical Exam     /79   Pulse 79   Ht 6' (1 829 m)   Wt 117 kg (257 lb)   BMI 34 86 kg/m²     Left knee:  Incision over the prepatellar bursa as well healed without erythema or drainage  Sutures removed  Incision over the distal lateral thigh exhibits cloudy yellow drainage with associated fluctuance  Sutures remain in place  Knee range of motion is intact and full without pain  Sensation intact distally  Skin warm and well perfused  Eyes:  Anicteric sclerae  ENT:  Trachea midline  Lungs:  Normal respiratory effort  Cardiovascular:  Capillary refill is less than 2 seconds  Lymphatic:  No palpable lymphadenopathy  Skin:  Intact without erythema  Neurologic:  Sensation grossly intact to light touch  Psychiatric:  Mood and affect are appropriate        Social History     Tobacco Use    Smoking status: Current Every Day Smoker     Packs/day: 1 00     Years: 40 00     Pack years: 40 00     Types: Cigarettes    Smokeless tobacco: Never Used   Substance Use Topics    Alcohol use:  Yes     Alcohol/week: 1 0 standard drinks     Types: 1 Cans of beer per week     Frequency: Monthly or less     Drinks per session: 1 or 2     Binge frequency: Never     Comment: beer    Drug use: Yes     Types: Marijuana         Scribe Attestation    I,:  Muna Hughes PA-C am acting as a scribe while in the presence of the attending physician :       I,:  Kimberly Palacios MD personally performed the services described in this documentation    as scribed in my presence :

## 2021-05-13 ENCOUNTER — OFFICE VISIT (OUTPATIENT)
Dept: FAMILY MEDICINE CLINIC | Facility: CLINIC | Age: 56
End: 2021-05-13
Payer: COMMERCIAL

## 2021-05-13 VITALS
HEIGHT: 72 IN | SYSTOLIC BLOOD PRESSURE: 124 MMHG | DIASTOLIC BLOOD PRESSURE: 72 MMHG | OXYGEN SATURATION: 98 % | BODY MASS INDEX: 33.18 KG/M2 | RESPIRATION RATE: 16 BRPM | HEART RATE: 112 BPM | WEIGHT: 245 LBS

## 2021-05-13 DIAGNOSIS — M25.462 PAIN AND SWELLING OF LEFT KNEE: Primary | ICD-10-CM

## 2021-05-13 DIAGNOSIS — L02.416 ABSCESS OF LEFT THIGH: ICD-10-CM

## 2021-05-13 DIAGNOSIS — M25.562 PAIN AND SWELLING OF LEFT KNEE: Primary | ICD-10-CM

## 2021-05-13 PROCEDURE — 1111F DSCHRG MED/CURRENT MED MERGE: CPT | Performed by: FAMILY MEDICINE

## 2021-05-13 PROCEDURE — 99495 TRANSJ CARE MGMT MOD F2F 14D: CPT | Performed by: FAMILY MEDICINE

## 2021-05-13 NOTE — TELEPHONE ENCOUNTER
Patient's wife Arabella Vasquez states there's a pending prior auth on this medication and they're going out of town and need to pick it up  She's checking on the status        Callback #[phone]

## 2021-05-13 NOTE — PROGRESS NOTES
Subjective:      Patient ID: Jovanni Stephens  is a 50 y o  male  TCM Call (since 4/12/2021)     Date and time call was made  5/3/2021 10:11 AM    Hospital care reviewed  Records reviewed    Patient was hospitialized at  [address] [place] [address]        Date of Admission  04/26/21    Date of discharge  05/01/21    Diagnosis  Sepsis [facility])    Disposition  Home    Were the patients medications reviewed and updated  Yes    Current Symptoms  None      TCM Call (since 4/12/2021)     Post hospital issues  None    Should patient be enrolled in anticoag monitoring? No    Scheduled for follow up? Yes    Patients specialists  Other (comment)    Other specialists names  Macarena Robertson ([address])    Did you obtain your prescribed medications  Yes    Do you need help managing your prescriptions or medications  No    Is transportation to your appointment needed  No    I have advised the patient to call PCP with any new or worsening symptoms    GAIL SMITH MA    Support System  Family    Do you have social support  Yes, as much as I need    Are you recieving any outpatient services  No    Are you recieving home care services  No    Are you using any community resources  No    Current waiver services  No    Have you fallen in the last 12 months  No    Interperter language line needed  No     Patient presents with his wife for TCM visit following hospitalization at [facility] for prepatellar cellulitis of the left knee  Below is the discharge summary as per Dr Renata Ayoub:    "Hospital Course:      Jovanni Stephens  is a 50 y o  male with past medical significant for diabetes mellitus, rheumatoid arthritis , benign essential hypertension patient who originally presented to the hospital on 4/26/2021 due to left knee pain, swelling and erythema  Patient was subsequently admitted and was seen by Orthopedics  He underwent incision and drainage of the prepatellar bursa    Wound cultures grew MSSA sensitive to Ancef  Patient is being discharged with p o  Keflex to complete course  He will follow with Orthopedics "    Patient has seen orthopedic surgeon and follow-up  He will be going on vacation to [place] but surgeon wanted to perform a repeat washout where the drain was in his left lateral thigh  Still having some redness, drainage  He will be placed on course of Levaquin for 14 days and when he returns from vacation will have washout performed  No fevers at this time    He is able to ambulate but it is sensitive if anything presses against the left lateral thigh      Past Medical History:   Diagnosis Date    Arthritis     At risk for falls     Broken foot     right    Cataract     giacomo    [facility]     Diabetes mellitus ([place] [address] )     Hyperlipidemia     Kidney stone     Neuropathy     Rheumatoid arthritis (HCC)     Seasonal allergies     Sepsis ([place] [address] ) 4/26/2021    Snores     Uses wheelchair     and crutches- NWB RLE    Wears glasses        Family History   Problem Relation Age of Onset    Diabetes Mother     Stroke Mother     Mental illness Mother     Diabetes Father     Stroke Father     Alcohol abuse Brother     Coronary artery disease Family         Age 51-55    Diabetes type II Family     Heart disease Family        Past Surgical History:   Procedure Laterality Date    APPENDECTOMY      CLOSED REDUCTION FOOT DISLOCATION Right 2/12/2019    Procedure: C/R FRACTURE;  Surgeon: Sandra Bradley DPM;  [facility];  Service: Podiatry    COLONOSCOPY      FOOT SURGERY Right 07/2019    Removal of the bone graft and cleaned out infection, and placed new bone graft    IR PICC PLACEMENT SINGLE LUMEN  8/5/2019    TX EXC SKIN MALIG <0 5 CM REMAINDER BODY N/A 3/28/2018    Procedure: EXCISION WIDE LESION HEAD/FACIAL/NECK;  Surgeon: Driss Watts MD;  [facility];  Service: Surgical Oncology    TX GASTROCNEMIUS RECESSION Right 2/12/2019    Procedure: ENDO GASTROC RECESSION, APPLICATION OF EXTERNAL FIXATOR;  Surgeon: Andrea Harper DPM;  [facility];  Service: Podiatry    WY REMOVE EXTERN BONE FIX DEV W ANESTH Right 4/18/2019    Procedure: FRAME REMOVAL HARDWARE FOOT WITH APPLICATION OF GRAFT;  Surgeon: Andrea Harper DPM;  [facility];  Service: Podiatry    SKIN BIOPSY      scalp    WISDOM TOOTH EXTRACTION  1998    WOUND DEBRIDEMENT Left 4/29/2021    Procedure: KNEE INCISION AND DRAINAGE, EXCISIONAL DEBRIDEMENT OF PREPATELLAR BURSA; Surgeon: Kevin Moscoso MD;  [facility];  Service: Orthopedics        reports that he has been smoking cigarettes  He has a 40 00 pack-year smoking history  He has never used smokeless tobacco  He reports current alcohol use of about 1 0 standard drinks of alcohol per week  He reports current drug use  Drug: Marijuana        Current Outpatient Medications:     Accu-Chek FastClix Lancets MISC, TEST BLOOD SUGAR FOUR TIMES A DAY BEFORE MEALS AND BEDTIME, Disp: 102 each, Rfl: 3    Accu-Chek Guide test strip, TEST BEFORE MEALS AND AT BEDTIME, Disp: 100 each, Rfl: 3    albuterol (PROVENTIL HFA,VENTOLIN HFA) 90 mcg/act inhaler, INHALE 2 PUFFS EVERY 4 (FOUR) HOURS AS NEEDED FOR WHEEZING, Disp: 18 g, Rfl: 1    Ascorbic Acid (vitamin C) 1000 MG tablet, Take 1,000 mg by mouth daily, Disp: , Rfl:     atorvastatin (LIPITOR) 40 mg tablet, TAKE 1 TABLET AT BEDTIME , Disp: 90 tablet, Rfl: 1    Basaglar KwikPen 100 units/mL injection pen, INJECT 50 UNITS UNDER THE SKIN EVERY 12 (TWELVE) HOURS, Disp: 30 mL, Rfl: 1    BD Pen Needle Maureen U/F 32G X 4 MM MISC, ONE PEN NEEDLE THREE TIMES A DAY 2-BASAGLAR 1- VICTOZA, Disp: 100 each, Rfl: 3    calcium carbonate (OS-NERY) 600 MG tablet, Take 600 mg by mouth daily Dinnertime, Disp: , Rfl:     ergocalciferol (VITAMIN D2) 50,000 units, Take 50,000 Units by mouth once a week , Disp: , Rfl:     fluticasone (FLONASE) 50 mcg/act nasal spray, USE 2 SPRAYS IN EACH NOSTRIL ONCE DAILY, Disp: 16 g, Rfl: 11    HYDROcodone-acetaminophen (NORCO) 5-325 mg per tablet, Take 1 tablet by mouth every 6 (six) hours as needed for pain for up to 10 daysMax Daily Amount: 4 tablets, Disp: 20 tablet, Rfl: 0    hydroxychloroquine (PLAQUENIL) 200 mg tablet, Take 200 mg by mouth 2 (two) times a day  , Disp: , Rfl:     Januvia 100 MG tablet, TAKE ONE TABLET EVERY DAY, Disp: 90 tablet, Rfl: 1    leflunomide (ARAVA) 20 MG tablet, Take 1 tablet by mouth daily with dinner On hold for surgery, Disp: , Rfl:     levofloxacin (LEVAQUIN) 750 mg tablet, Take 1 tablet (750 mg total) by mouth every 24 hours for 14 days, Disp: 14 tablet, Rfl: 0    losartan-hydrochlorothiazide (HYZAAR) 100-12 5 MG per tablet, TAKE 1 TABLET BY MOUTH DAILY, Disp: 90 tablet, Rfl: 1    naproxen (NAPROSYN) 500 mg tablet, Take 1 tablet by mouth every 12 (twelve) hours, Disp: , Rfl:     pantoprazole (PROTONIX) 40 mg tablet, Take 1 tablet (40 mg total) by mouth daily, Disp: 30 tablet, Rfl: 11    sucralfate (CARAFATE) 1 g tablet, TAKE 1 TABLET (1 G TOTAL) BY MOUTH 4 (FOUR) TIMES A DAY, Disp: 120 tablet, Rfl: 4    ULTICARE MICRO PEN NEEDLES 32G X 4 MM MISC, INJECT UNDER THE SKIN 3 (THREE) TIMES A DAY, Disp: 100 each, Rfl: 3    Victoza injection, INJECT 1 8 MG DAILY, Disp: 9 mL, Rfl: 4    The following portions of the patient's history were reviewed and updated as appropriate: allergies, current medications, past family history, past medical history, past social history, past surgical history and problem list     Review of Systems   Constitutional: Negative  Musculoskeletal: Positive for joint swelling and myalgias  Negative for gait problem  Skin: Positive for color change (Redness at the left lateral thigh) and wound             Objective:    /72   Pulse (!) 112   Resp 16   Ht 6' (1 829 m)   Wt 111 kg (245 lb)   SpO2 98%   BMI 33 23 kg/m²      Physical Exam  Vitals signs and nursing note reviewed  Constitutional:       General: He is not in acute distress  Appearance: Normal appearance  He is obese  He is not ill-appearing, toxic-appearing or diaphoretic  Cardiovascular:      Rate and Rhythm: Normal rate and regular rhythm  Skin:     Comments: Picture of the area of drain site at the left lateral thigh  There is some surrounding tissue erythema  No drainage  Dressing was changed  Neurological:      Mental Status: He is alert             Recent Results (from the past 1008 hour(s))   Novel Coronavirus (Covid-19),PCR [facility] - Collected at   [facility]    Collection Time: 04/16/21  8:49 AM    Specimen: Nose; Nares   Result Value Ref Range    SARS-CoV-2 Negative Negative   Complete Synovial Fluid Analysis    Collection Time: 04/16/21  9:14 AM   Result Value Ref Range    Site LEFT KNEE     Color UA BRYCE (A) STRAW/YELLOW    Appearance HAZY CLEAR/HAZY    Total Nucleated Cell Count 1,185 (H) <150 cells/uL    Neutrophils % 88 (H) 0 - 24 %    Lymphocytes, % 7 0 - 74 %    Monocyte/Macrophage, % 5 0 - 69 %    Eosinophils, % 0 0 - 2 %    Basophils, % 0 0 %    Synoviocyte, % 0 0 - 15 %    Crystals, Synovial Fluid NONE SEEN NONE SEEN /HPF    Mucin Clot POOR (A) GOOD   CBC and differential    Collection Time: 04/22/21 10:11 AM   Result Value Ref Range    WBC 13 13 (H) 4 31 - 10 16 Thousand/uL    RBC 5 32 3 88 - 5 62 Million/uL    Hemoglobin 15 8 12 0 - 17 0 g/dL    Hematocrit 47 3 36 5 - 49 3 %    MCV 89 82 - 98 fL    MCH 29 7 26 8 - 34 3 pg    MCHC 33 4 31 4 - 37 4 g/dL    RDW 13 2 11 6 - 15 1 %    MPV 11 3 8 9 - 12 7 fL    Platelets 129 203 - 948 Thousands/uL    nRBC 0 /100 WBCs    Neutrophils Relative 78 (H) 43 - 75 %    Immat GRANS % 1 0 - 2 %    Lymphocytes Relative 9 (L) 14 - 44 %    Monocytes Relative 10 4 - 12 %    Eosinophils Relative 1 0 - 6 %    Basophils Relative 1 0 - 1 %    Neutrophils Absolute 10 27 (H) 1 85 - 7 62 Thousands/µL    Immature Grans Absolute 0 08 0 00 - 0 20 Thousand/uL    Lymphocytes Absolute 1 19 0 60 - 4 47 Thousands/µL    Monocytes Absolute 1 32 (H) 0 17 - 1 22 Thousand/µL    Eosinophils Absolute 0 17 0 00 - 0 61 Thousand/µL    Basophils Absolute 0 10 0 00 - 0 10 Thousands/µL   C-reactive protein    Collection Time: 04/22/21 10:11 AM   Result Value Ref Range    CRP 33 7 (H) <3 0 mg/L   Sedimentation rate, automated    Collection Time: 04/22/21 10:11 AM   Result Value Ref Range    Sed Rate 39 (H) 0 - 19 mm/hour   Lyme Antibody Profile with reflex to WB    Collection Time: 04/22/21 10:11 AM   Result Value Ref Range    Lyme total antibody 15 0 00 - 119   Uric acid    Collection Time: 04/22/21 10:11 AM   Result Value Ref Range    Uric Acid 2 8 (L) 4 2 - 8 0 mg/dL   CBC and differential    Collection Time: 04/26/21  2:40 PM   Result Value Ref Range    WBC 21 66 (H) 4 31 - 10 16 Thousand/uL    RBC 5 16 3 88 - 5 62 Million/uL    Hemoglobin 15 7 12 0 - 17 0 g/dL    Hematocrit 45 0 36 5 - 49 3 %    MCV 87 82 - 98 fL    MCH 30 4 26 8 - 34 3 pg    MCHC 34 9 31 4 - 37 4 g/dL    RDW 13 0 11 6 - 15 1 %    MPV 10 2 8 9 - 12 7 fL    Platelets 675 821 - 866 Thousands/uL    nRBC 0 /100 WBCs    Neutrophils Relative 90 (H) 43 - 75 %    Immat GRANS % 1 0 - 2 %    Lymphocytes Relative 4 (L) 14 - 44 %    Monocytes Relative 5 4 - 12 %    Eosinophils Relative 0 0 - 6 %    Basophils Relative 0 0 - 1 %    Neutrophils Absolute 19 47 (H) 1 85 - 7 62 Thousands/µL    Immature Grans Absolute 0 29 (H) 0 00 - 0 20 Thousand/uL    Lymphocytes Absolute 0 82 0 60 - 4 47 Thousands/µL    Monocytes Absolute 1 04 0 17 - 1 22 Thousand/µL    Eosinophils Absolute 0 00 0 00 - 0 61 Thousand/µL    Basophils Absolute 0 04 0 00 - 0 10 Thousands/µL   Comprehensive metabolic panel    Collection Time: 04/26/21  2:40 PM   Result Value Ref Range    Sodium 133 (L) 136 - 145 mmol/L    Potassium 3 9 3 5 - 5 3 mmol/L    Chloride 96 (L) 100 - 108 mmol/L    CO2 26 21 - 32 mmol/L    ANION GAP 11 4 - 13 mmol/L    BUN 24 5 - 25 mg/dL    Creatinine 1 16 0 60 - 1 30 mg/dL    Glucose 344 (H) 65 - 140 mg/dL    Calcium 8 9 8 3 - 10 1 mg/dL    Corrected Calcium 9 5 8 3 - 10 1 mg/dL    AST 17 5 - 45 U/L    ALT 37 12 - 78 U/L    Alkaline Phosphatase 76 46 - 116 U/L    Total Protein 7 9 6 4 - 8 2 g/dL    Albumin 3 3 (L) 3 5 - 5 0 g/dL    Total Bilirubin 0 62 0 20 - 1 00 mg/dL    eGFR 71 ml/min/1 73sq m   Procalcitonin with AM Reflex    Collection Time: 04/26/21  2:40 PM   Result Value Ref Range    Procalcitonin 0 06 <=0 25 ng/ml   Sedimentation rate, automated    Collection Time: 04/26/21  2:40 PM   Result Value Ref Range    Sed Rate 68 (H) 0 - 19 mm/hour   C-reactive protein    Collection Time: 04/26/21  2:40 PM   Result Value Ref Range    CRP 87 2 (H) <3 0 mg/L   Lactic acid    Collection Time: 04/26/21  5:04 PM   Result Value Ref Range    LACTIC ACID 1 7 0 5 - 2 0 mmol/L   Blood culture #1    Collection Time: 04/26/21  5:04 PM    Specimen: Arm, Right; Blood   Result Value Ref Range    Blood Culture No Growth After 5 Days  Blood culture #2    Collection Time: 04/26/21  5:04 PM    Specimen: Arm, Left; Blood   Result Value Ref Range    Blood Culture No Growth After 5 Days  Synovial fluid white cell count w/ diff    Collection Time: 04/26/21  6:20 PM   Result Value Ref Range    Site Synovial, Left Knee     Color, Fluid Light Yellow Clear, Colorless,Yellow    Clarity, Fluid Clear Clear    WBC, Fluid 15 0 - 200 /ul   Body fluid culture and Gram stain    Collection Time: 04/26/21  6:20 PM    Specimen: Joint, Left Knee;  Body Fluid   Result Value Ref Range    Body Fluid Culture, Sterile No growth     Gram Stain Result No Polys or Bacteria seen    Synovial fluid, crystal    Collection Time: 04/26/21  6:20 PM   Result Value Ref Range    Crystals, Synovial Fluid No Crystals Seen No Crystals Seen   Synovial Fluid Diff    Collection Time: 04/26/21  6:20 PM   Result Value Ref Range    Total Counted 5     Lymph % Synovial 100 %   Fingerstick Glucose (POCT)    Collection Time: 04/26/21  9:37 PM   Result Value Ref Range    POC Glucose 186 (H) 65 - 140 mg/dl   D-dimer, quantitative    Collection Time: 04/26/21  9:49 PM   Result Value Ref Range    D-Dimer, Quant 1 58 (H) <0 50 ug/ml FEU   Platelet count    Collection Time: 04/26/21  9:49 PM   Result Value Ref Range    Platelets 008 407 - 564 Thousands/uL    MPV 10 4 8 9 - 12 7 fL   CBC (With Platelets)    Collection Time: 04/27/21  4:12 AM   Result Value Ref Range    WBC 19 05 (H) 4 31 - 10 16 Thousand/uL    RBC 4 78 3 88 - 5 62 Million/uL    Hemoglobin 14 2 12 0 - 17 0 g/dL    Hematocrit 41 5 36 5 - 49 3 %    MCV 87 82 - 98 fL    MCH 29 7 26 8 - 34 3 pg    MCHC 34 2 31 4 - 37 4 g/dL    RDW 13 0 11 6 - 15 1 %    Platelets 382 841 - 156 Thousands/uL    MPV 10 2 8 9 - 12 7 fL   Procalcitonin Reflex    Collection Time: 04/27/21  4:13 AM   Result Value Ref Range    Procalcitonin 0 05 <=0 25 ng/ml   Basic metabolic panel    Collection Time: 04/27/21  4:13 AM   Result Value Ref Range    Sodium 138 136 - 145 mmol/L    Potassium 3 5 3 5 - 5 3 mmol/L    Chloride 100 100 - 108 mmol/L    CO2 29 21 - 32 mmol/L    ANION GAP 9 4 - 13 mmol/L    BUN 17 5 - 25 mg/dL    Creatinine 1 03 0 60 - 1 30 mg/dL    Glucose 141 (H) 65 - 140 mg/dL    Calcium 8 4 8 3 - 10 1 mg/dL    eGFR 83 ml/min/1 73sq m   Synovial fluid white cell count w/ diff    Collection Time: 04/27/21  9:22 AM   Result Value Ref Range    Color, Fluid Yudy Clear, Colorless,Yellow    Clarity, Fluid Cloudy (A) Clear    WBC, Fluid 585,000 (H) 0 - 200 /ul   Synovial fluid, crystal    Collection Time: 04/27/21  9:22 AM   Result Value Ref Range    Crystals, Synovial Fluid No Crystals Seen No Crystals Seen   Synovial Fluid Diff    Collection Time: 04/27/21  9:22 AM   Result Value Ref Range    Total Counted 100     Neutrophil % Synovial 65 %    Lymph % Synovial 15 %    Monocyte % Synovial 20 %   Body fluid culture and Gram stain    Collection Time: 04/27/21  7:24 PM Specimen: Joint, Left Knee; Synovial Fluid   Result Value Ref Range    Body Fluid Culture, Sterile 4+ Growth of Staphylococcus aureus (A)     Gram Stain Result 2+ Polys (A)     Gram Stain Result 3+ Gram positive cocci in clusters (A)        Susceptibility    Staphylococcus aureus - TRIXIE     Ampicillin ($$) <=2 00 Resistant ug/ml     Cefazolin ($) <=4 00 Susceptible ug/ml     Clindamycin ($) <=0 25 Susceptible ug/ml     Erythromycin ($$$$) <=0 25 Susceptible ug/ml     Gentamicin ($$) <=1 Susceptible ug/ml     Oxacillin <=0 25 Susceptible ug/ml     Tetracycline <=2 Susceptible ug/ml     Trimethoprim + Sulfamethoxazole ($$$) <=0 5/9 5 Susceptible ug/ml     Vancomycin ($) 1 00 Susceptible ug/ml   Anaerobic culture and Gram stain    Collection Time: 04/27/21  7:24 PM    Specimen: Joint, Left Knee; Synovial Fluid   Result Value Ref Range    Anaerobic Culture No anaerobes isolated    Fingerstick Glucose (POCT)    Collection Time: 04/27/21  9:22 PM   Result Value Ref Range    POC Glucose 285 (H) 65 - 140 mg/dl   C-reactive protein    Collection Time: 04/28/21  6:48 AM   Result Value Ref Range     6 (H) <3 0 mg/L   CBC and differential    Collection Time: 04/28/21  6:48 AM   Result Value Ref Range    WBC 17 64 (H) 4 31 - 10 16 Thousand/uL    RBC 5 06 3 88 - 5 62 Million/uL    Hemoglobin 14 8 12 0 - 17 0 g/dL    Hematocrit 44 7 36 5 - 49 3 %    MCV 88 82 - 98 fL    MCH 29 2 26 8 - 34 3 pg    MCHC 33 1 31 4 - 37 4 g/dL    RDW 13 2 11 6 - 15 1 %    MPV 10 2 8 9 - 12 7 fL    Platelets 081 129 - 963 Thousands/uL    nRBC 0 /100 WBCs    Neutrophils Relative 74 43 - 75 %    Immat GRANS % 1 0 - 2 %    Lymphocytes Relative 12 (L) 14 - 44 %    Monocytes Relative 11 4 - 12 %    Eosinophils Relative 1 0 - 6 %    Basophils Relative 1 0 - 1 %    Neutrophils Absolute 13 15 (H) 1 85 - 7 62 Thousands/µL    Immature Grans Absolute 0 20 0 00 - 0 20 Thousand/uL    Lymphocytes Absolute 2 14 0 60 - 4 47 Thousands/µL    Monocytes Absolute 1 96 (H) 0 17 - 1 22 Thousand/µL    Eosinophils Absolute 0 10 0 00 - 0 61 Thousand/µL    Basophils Absolute 0 09 0 00 - 0 10 Thousands/µL   Basic metabolic panel    Collection Time: 04/28/21  6:48 AM   Result Value Ref Range    Sodium 139 136 - 145 mmol/L    Potassium 3 6 3 5 - 5 3 mmol/L    Chloride 103 100 - 108 mmol/L    CO2 29 21 - 32 mmol/L    ANION GAP 7 4 - 13 mmol/L    BUN 17 5 - 25 mg/dL    Creatinine 1 00 0 60 - 1 30 mg/dL    Glucose 113 65 - 140 mg/dL    Calcium 8 3 8 3 - 10 1 mg/dL    eGFR 86 ml/min/1 73sq m   Fingerstick Glucose (POCT)    Collection Time: 04/28/21  8:26 AM   Result Value Ref Range    POC Glucose 119 65 - 140 mg/dl   Vancomycin, trough Please draw trough prior to 0730 dose; Call pharmacy with results  Thanks!     Collection Time: 04/28/21  9:10 AM   Result Value Ref Range    Vancomycin Tr 8 9 (L) 10 0 - 20 0 ug/mL   Fingerstick Glucose (POCT)    Collection Time: 04/28/21 11:29 AM   Result Value Ref Range    POC Glucose 332 (H) 65 - 140 mg/dl   Fingerstick Glucose (POCT)    Collection Time: 04/28/21  3:50 PM   Result Value Ref Range    POC Glucose 317 (H) 65 - 140 mg/dl   Fingerstick Glucose (POCT)    Collection Time: 04/28/21  9:09 PM   Result Value Ref Range    POC Glucose 238 (H) 65 - 140 mg/dl   CBC and differential    Collection Time: 04/29/21  6:46 AM   Result Value Ref Range    WBC 13 76 (H) 4 31 - 10 16 Thousand/uL    RBC 4 99 3 88 - 5 62 Million/uL    Hemoglobin 14 6 12 0 - 17 0 g/dL    Hematocrit 44 1 36 5 - 49 3 %    MCV 88 82 - 98 fL    MCH 29 3 26 8 - 34 3 pg    MCHC 33 1 31 4 - 37 4 g/dL    RDW 13 0 11 6 - 15 1 %    MPV 10 1 8 9 - 12 7 fL    Platelets 722 119 - 089 Thousands/uL    nRBC 0 /100 WBCs    Neutrophils Relative 71 43 - 75 %    Immat GRANS % 2 0 - 2 %    Lymphocytes Relative 14 14 - 44 %    Monocytes Relative 11 4 - 12 %    Eosinophils Relative 1 0 - 6 %    Basophils Relative 1 0 - 1 %    Neutrophils Absolute 9 84 (H) 1 85 - 7 62 Thousands/µL    Immature Grans Absolute 0 24 (H) 0 00 - 0 20 Thousand/uL    Lymphocytes Absolute 1 86 0 60 - 4 47 Thousands/µL    Monocytes Absolute 1 55 (H) 0 17 - 1 22 Thousand/µL    Eosinophils Absolute 0 17 0 00 - 0 61 Thousand/µL    Basophils Absolute 0 10 0 00 - 0 10 Thousands/µL   Basic metabolic panel    Collection Time: 04/29/21  6:46 AM   Result Value Ref Range    Sodium 137 136 - 145 mmol/L    Potassium 4 1 3 5 - 5 3 mmol/L    Chloride 102 100 - 108 mmol/L    CO2 26 21 - 32 mmol/L    ANION GAP 9 4 - 13 mmol/L    BUN 18 5 - 25 mg/dL    Creatinine 1 09 0 60 - 1 30 mg/dL    Glucose 232 (H) 65 - 140 mg/dL    Calcium 8 1 (L) 8 3 - 10 1 mg/dL    eGFR 77 ml/min/1 73sq m   Fingerstick Glucose (POCT)    Collection Time: 04/29/21  8:44 AM   Result Value Ref Range    POC Glucose 205 (H) 65 - 140 mg/dl   Vancomycin, trough Please draw peripherally prior to hanging the scheduled dose  Call the pharmacy with any questions  Thanks!     Collection Time: 04/29/21 11:39 AM   Result Value Ref Range    Vancomycin Tr 11 3 10 0 - 20 0 ug/mL   Fingerstick Glucose (POCT)    Collection Time: 04/29/21 11:44 AM   Result Value Ref Range    POC Glucose 185 (H) 65 - 140 mg/dl   Anaerobic culture and Gram stain    Collection Time: 04/29/21  2:06 PM    Specimen: Soft Tissue, Other   Result Value Ref Range    Anaerobic Culture No anaerobes isolated    Culture, tissue and Gram stain    Collection Time: 04/29/21  2:06 PM    Specimen: Soft Tissue, Other   Result Value Ref Range    Tissue Culture 3+ Growth of Staphylococcus aureus (A)     Gram Stain Result 1+ Polys (A)     Gram Stain Result 2+ Gram positive cocci in pairs (A)        Susceptibility    Staphylococcus aureus - TRIXIE     Ampicillin ($$) <=2 00 Resistant ug/ml     Cefazolin ($) <=4 00 Susceptible ug/ml     Clindamycin ($) 0 50 Susceptible ug/ml     Erythromycin ($$$$) 0 50 Susceptible ug/ml     Gentamicin ($$) <=1 Susceptible ug/ml     Oxacillin <=0 25 Susceptible ug/ml     Tetracycline <=2 Susceptible ug/ml Trimethoprim + Sulfamethoxazole ($$$) <=0 5/9 5 Susceptible ug/ml     Vancomycin ($) 1 00 Susceptible ug/ml   Fingerstick Glucose (POCT)    Collection Time: 04/29/21  3:01 PM   Result Value Ref Range    POC Glucose 139 65 - 140 mg/dl   Fingerstick Glucose (POCT)    Collection Time: 04/29/21  4:20 PM   Result Value Ref Range    POC Glucose 193 (H) 65 - 140 mg/dl   Fingerstick Glucose (POCT)    Collection Time: 04/29/21  8:42 PM   Result Value Ref Range    POC Glucose 399 (H) 65 - 140 mg/dl   CBC and differential    Collection Time: 04/30/21  6:26 AM   Result Value Ref Range    WBC 16 37 (H) 4 31 - 10 16 Thousand/uL    RBC 4 69 3 88 - 5 62 Million/uL    Hemoglobin 13 9 12 0 - 17 0 g/dL    Hematocrit 41 1 36 5 - 49 3 %    MCV 88 82 - 98 fL    MCH 29 6 26 8 - 34 3 pg    MCHC 33 8 31 4 - 37 4 g/dL    RDW 12 7 11 6 - 15 1 %    MPV 10 2 8 9 - 12 7 fL    Platelets 199 339 - 698 Thousands/uL    nRBC 0 /100 WBCs    Neutrophils Relative 83 (H) 43 - 75 %    Immat GRANS % 1 0 - 2 %    Lymphocytes Relative 8 (L) 14 - 44 %    Monocytes Relative 8 4 - 12 %    Eosinophils Relative 0 0 - 6 %    Basophils Relative 0 0 - 1 %    Neutrophils Absolute 13 41 (H) 1 85 - 7 62 Thousands/µL    Immature Grans Absolute 0 17 0 00 - 0 20 Thousand/uL    Lymphocytes Absolute 1 37 0 60 - 4 47 Thousands/µL    Monocytes Absolute 1 37 (H) 0 17 - 1 22 Thousand/µL    Eosinophils Absolute 0 02 0 00 - 0 61 Thousand/µL    Basophils Absolute 0 03 0 00 - 0 10 Thousands/µL   Basic metabolic panel    Collection Time: 04/30/21  6:26 AM   Result Value Ref Range    Sodium 136 136 - 145 mmol/L    Potassium 4 2 3 5 - 5 3 mmol/L    Chloride 100 100 - 108 mmol/L    CO2 28 21 - 32 mmol/L    ANION GAP 8 4 - 13 mmol/L    BUN 15 5 - 25 mg/dL    Creatinine 1 03 0 60 - 1 30 mg/dL    Glucose 363 (H) 65 - 140 mg/dL    Calcium 8 4 8 3 - 10 1 mg/dL    eGFR 83 ml/min/1 73sq m   Fingerstick Glucose (POCT)    Collection Time: 04/30/21  7:42 AM   Result Value Ref Range    POC Glucose 281 (H) 65 - 140 mg/dl   Fingerstick Glucose (POCT)    Collection Time: 04/30/21 11:00 AM   Result Value Ref Range    POC Glucose 334 (H) 65 - 140 mg/dl   Fingerstick Glucose (POCT)    Collection Time: 04/30/21  4:30 PM   Result Value Ref Range    POC Glucose 371 (H) 65 - 140 mg/dl   Fingerstick Glucose (POCT)    Collection Time: 04/30/21  9:05 PM   Result Value Ref Range    POC Glucose 386 (H) 65 - 140 mg/dl   MRSA culture    Collection Time: 05/01/21  2:50 AM    Specimen: Nose; Nares   Result Value Ref Range    [facility]SA Culture Only       No Methicillin Resistant Staphlyococcus aureus (MRSA) isolated   CBC and differential    Collection Time: 05/01/21  4:38 AM   Result Value Ref Range    WBC 11 91 (H) 4 31 - 10 16 Thousand/uL    RBC 4 59 3 88 - 5 62 Million/uL    Hemoglobin 13 3 12 0 - 17 0 g/dL    Hematocrit 40 5 36 5 - 49 3 %    MCV 88 82 - 98 fL    MCH 29 0 26 8 - 34 3 pg    MCHC 32 8 31 4 - 37 4 g/dL    RDW 12 8 11 6 - 15 1 %    MPV 10 6 8 9 - 12 7 fL    Platelets 703 033 - 507 Thousands/uL    nRBC 0 /100 WBCs    Neutrophils Relative 65 43 - 75 %    Immat GRANS % 1 0 - 2 %    Lymphocytes Relative 21 14 - 44 %    Monocytes Relative 11 4 - 12 %    Eosinophils Relative 1 0 - 6 %    Basophils Relative 1 0 - 1 %    Neutrophils Absolute 7 73 (H) 1 85 - 7 62 Thousands/µL    Immature Grans Absolute 0 17 0 00 - 0 20 Thousand/uL    Lymphocytes Absolute 2 46 0 60 - 4 47 Thousands/µL    Monocytes Absolute 1 33 (H) 0 17 - 1 22 Thousand/µL    Eosinophils Absolute 0 16 0 00 - 0 61 Thousand/µL    Basophils Absolute 0 06 0 00 - 0 10 Thousands/µL   Fingerstick Glucose (POCT)    Collection Time: 05/01/21  6:27 AM   Result Value Ref Range    POC Glucose 268 (H) 65 - 140 mg/dl   Basic metabolic panel    Collection Time: 05/01/21  7:42 AM   Result Value Ref Range    Sodium 132 (L) 136 - 145 mmol/L    Potassium 3 8 3 5 - 5 3 mmol/L    Chloride 99 (L) 100 - 108 mmol/L    CO2 27 21 - 32 mmol/L    ANION GAP 6 4 - 13 mmol/L    BUN 16 5 - 25 mg/dL    Creatinine 1 06 0 60 - 1 30 mg/dL    Glucose 283 (H) 65 - 140 mg/dL    Calcium 7 9 (L) 8 3 - 10 1 mg/dL    eGFR 80 ml/min/1 73sq m       Assessment/Plan:    Pain and swelling of left knee due to prepatellar bursitis  -patient was seen by orthopedic surgery  He was given a prescription for 14 day course of Levaquin  I advised patient start taking this starting today  He has not to last him through vacation for 2 weeks and when he returns he will be hospitalized for washout as per orthopedic instructions    Abscess of left thigh  -patient was seen by orthopedic surgery  He was given a prescription for 14 day course of Levaquin  I advised patient start taking this starting today  He has not to last him through vacation for 2 weeks and when he returns he will be hospitalized for washout as per orthopedic instructions    Right now the patient is waiting for pre approval of Vicodin  I advised that if that pre approval did not come through on Friday then he should contact the office and I will try to prescribe a limited supply of Percocet for him to take on vacation and I will notify orthopedic surgeon of this  Problem List Items Addressed This Visit        Other    Abscess of left thigh     -patient was seen by orthopedic surgery  He was given a prescription for 14 day course of Levaquin  I advised patient start taking this starting today  He has not to last him through vacation for 2 weeks and when he returns he will be hospitalized for washout as per orthopedic instructions    Right now the patient is waiting for pre approval of Vicodin  I advised that if that pre approval did not come through on Friday then he should contact the office and I will try to prescribe a limited supply of Percocet for him to take on vacation and I will notify orthopedic surgeon of this           Pain and swelling of left knee due to prepatellar bursitis - Primary     -patient was seen by orthopedic surgery  He was given a prescription for 14 day course of Levaquin  I advised patient start taking this starting today    He has not to last him through vacation for 2 weeks and when he returns he will be hospitalized for washout as per orthopedic instructions

## 2021-05-14 ENCOUNTER — TELEPHONE (OUTPATIENT)
Dept: OBGYN CLINIC | Facility: HOSPITAL | Age: 56
End: 2021-05-14

## 2021-05-14 PROBLEM — A41.9 SEPSIS (HCC): Status: RESOLVED | Noted: 2021-04-26 | Resolved: 2021-05-14

## 2021-05-14 NOTE — TELEPHONE ENCOUNTER
Patient and wife advised that they are working on prior auth for pain medications with insurance company  They always have the option to purchase the medication out of pocket if this does not go thru in time for out of town travel  Wife verbalized understanding and will check into that option

## 2021-05-14 NOTE — TELEPHONE ENCOUNTER
I spoke to the patient's wife and did let her know that we sent the prior auth paperwork to their insurance company  She may give them a call to check on the status

## 2021-05-14 NOTE — TELEPHONE ENCOUNTER
Patients wife is calling to check on the status of this  She stated the pharmacy is closing at 3:45 today and will be closed all weekend and they will be out of town starting Monday  She stated this is an emergency that he needs his pain medication        CB: [phone]

## 2021-05-14 NOTE — ASSESSMENT & PLAN NOTE
-patient was seen by orthopedic surgery  He was given a prescription for 14 day course of Levaquin  I advised patient start taking this starting today  He has not to last him through vacation for 2 weeks and when he returns he will be hospitalized for washout as per orthopedic instructions    Right now the patient is waiting for pre approval of Vicodin  I advised that if that pre approval did not come through on Friday then he should contact the office and I will try to prescribe a limited supply of Percocet for him to take on vacation and I will notify orthopedic surgeon of this

## 2021-05-14 NOTE — ASSESSMENT & PLAN NOTE
-patient was seen by orthopedic surgery  He was given a prescription for 14 day course of Levaquin  I advised patient start taking this starting today    He has not to last him through vacation for 2 weeks and when he returns he will be hospitalized for washout as per orthopedic instructions

## 2021-05-19 RX ORDER — HYDROCODONE BITARTRATE AND ACETAMINOPHEN 5; 325 MG/1; MG/1
1 TABLET ORAL EVERY 6 HOURS PRN
COMMUNITY
End: 2021-05-26 | Stop reason: HOSPADM

## 2021-05-19 NOTE — PRE-PROCEDURE INSTRUCTIONS
Pre-Surgery Instructions:   Medication Instructions    albuterol (PROVENTIL HFA,VENTOLIN HFA) 90 mcg/act inhaler Instructed to take as needed including DOS-Instructed to bring DOS    Ascorbic Acid (vitamin C) 1000 MG tablet Patient was instructed by Physician and understands   atorvastatin (LIPITOR) 40 mg tablet Instructed to take per normal schedule After [employer] KwikPen 100 units/mL injection pen Instructed to take per normal schedule including  DOS    calcium carbonate (OS-NERY) 600 MG tablet Patient was instructed by Physician and understands   ergocalciferol (VITAMIN D2) 50,000 units Patient was instructed by Physician and understands   fluticasone (FLONASE) 50 mcg/act nasal spray Instructed to take per normal schedule @ Bedtime    HYDROcodone-acetaminophen (Norco) 5-325 mg per tablet Instructed to take as needed including DOS with sips water    hydroxychloroquine (PLAQUENIL) 200 mg tablet Patient was instructed by Physician and understands   Januvia 100 MG tablet Instructed to take per normal schedule after dinner    leflunomide (ARAVA) 20 MG tablet Patient was instructed by Physician and understands   levofloxacin (LEVAQUIN) 750 mg tablet Instructed to take per normal schedule except DOS    losartan-hydrochlorothiazide (HYZAAR) 100-12 5 MG per tablet Instructed to take per normal schedule except DOS    naproxen (NAPROSYN) 500 mg tablet Patient was instructed by Physician and understands   pantoprazole (PROTONIX) 40 mg tablet Instructed to take per normal schedule including DOS with sips water    sucralfate (CARAFATE) 1 g tablet Instructed to take per normal schedule except DOS    Victoza injection Instructed to take per normal schedule @ Bedtime    Pre op instructions per My Surgical Experience booklet,medications per anesthesia guidelines and showering instructions per [facility] protocol reviewed-Patient has CHG  Pt   Verbalized understanding of current visitor restrictions  Instructed to avoid all ASA/NSAIDs and OTC Vit/Supp from now until after surgery  Tylenol ok prn  Pt  Verbalized an understanding of all instructions reviewed and offers no concerns at this time

## 2021-05-24 ENCOUNTER — ANESTHESIA EVENT (OUTPATIENT)
Dept: PERIOP | Facility: HOSPITAL | Age: 56
DRG: 351 | End: 2021-05-24
Payer: COMMERCIAL

## 2021-05-24 ENCOUNTER — ANESTHESIA (OUTPATIENT)
Dept: PERIOP | Facility: HOSPITAL | Age: 56
DRG: 351 | End: 2021-05-24
Payer: COMMERCIAL

## 2021-05-24 ENCOUNTER — HOSPITAL ENCOUNTER (INPATIENT)
Facility: HOSPITAL | Age: 56
LOS: 2 days | Discharge: HOME | DRG: 351 | End: 2021-05-26
Attending: ORTHOPAEDIC SURGERY | Admitting: ORTHOPAEDIC SURGERY
Payer: COMMERCIAL

## 2021-05-24 DIAGNOSIS — Z79.4 TYPE 2 DIABETES MELLITUS WITH DIABETIC NEUROPATHIC ARTHROPATHY, WITH LONG-TERM CURRENT USE OF INSULIN (HCC): ICD-10-CM

## 2021-05-24 DIAGNOSIS — I10 BENIGN ESSENTIAL HYPERTENSION: ICD-10-CM

## 2021-05-24 DIAGNOSIS — E78.2 MIXED HYPERLIPIDEMIA: ICD-10-CM

## 2021-05-24 DIAGNOSIS — M06.9 RHEUMATOID ARTHRITIS, INVOLVING UNSPECIFIED SITE, UNSPECIFIED WHETHER RHEUMATOID FACTOR PRESENT (HCC): ICD-10-CM

## 2021-05-24 DIAGNOSIS — K21.9 GASTROESOPHAGEAL REFLUX DISEASE WITHOUT ESOPHAGITIS: ICD-10-CM

## 2021-05-24 DIAGNOSIS — E11.610 TYPE 2 DIABETES MELLITUS WITH DIABETIC NEUROPATHIC ARTHROPATHY, WITH LONG-TERM CURRENT USE OF INSULIN (HCC): ICD-10-CM

## 2021-05-24 DIAGNOSIS — L02.416 ABSCESS OF LEFT THIGH: Primary | ICD-10-CM

## 2021-05-24 LAB
GLUCOSE SERPL-MCNC: 178 MG/DL (ref 65–140)
GLUCOSE SERPL-MCNC: 323 MG/DL (ref 65–140)

## 2021-05-24 PROCEDURE — 99024 POSTOP FOLLOW-UP VISIT: CPT | Performed by: PHYSICIAN ASSISTANT

## 2021-05-24 PROCEDURE — 87075 CULTR BACTERIA EXCEPT BLOOD: CPT | Performed by: ORTHOPAEDIC SURGERY

## 2021-05-24 PROCEDURE — 82948 REAGENT STRIP/BLOOD GLUCOSE: CPT

## 2021-05-24 PROCEDURE — 87176 TISSUE HOMOGENIZATION CULTR: CPT | Performed by: ORTHOPAEDIC SURGERY

## 2021-05-24 PROCEDURE — 87186 SC STD MICRODIL/AGAR DIL: CPT | Performed by: ORTHOPAEDIC SURGERY

## 2021-05-24 PROCEDURE — 87070 CULTURE OTHR SPECIMN AEROBIC: CPT | Performed by: ORTHOPAEDIC SURGERY

## 2021-05-24 PROCEDURE — 99253 IP/OBS CNSLTJ NEW/EST LOW 45: CPT | Performed by: PHYSICIAN ASSISTANT

## 2021-05-24 PROCEDURE — 87205 SMEAR GRAM STAIN: CPT | Performed by: ORTHOPAEDIC SURGERY

## 2021-05-24 PROCEDURE — 0M9N0ZX DRAINAGE OF RIGHT KNEE BURSA AND LIGAMENT, OPEN APPROACH, DIAGNOSTIC: ICD-10-PCS | Performed by: ORTHOPAEDIC SURGERY

## 2021-05-24 PROCEDURE — 99255 IP/OBS CONSLTJ NEW/EST HI 80: CPT | Performed by: INTERNAL MEDICINE

## 2021-05-24 PROCEDURE — 27301 DRAIN THIGH/KNEE LESION: CPT | Performed by: ORTHOPAEDIC SURGERY

## 2021-05-24 RX ORDER — ONDANSETRON 2 MG/ML
INJECTION INTRAMUSCULAR; INTRAVENOUS AS NEEDED
Status: DISCONTINUED | OUTPATIENT
Start: 2021-05-24 | End: 2021-05-24

## 2021-05-24 RX ORDER — PANTOPRAZOLE SODIUM 40 MG/1
40 TABLET, DELAYED RELEASE ORAL DAILY
Status: DISCONTINUED | OUTPATIENT
Start: 2021-05-25 | End: 2021-05-26 | Stop reason: HOSPADM

## 2021-05-24 RX ORDER — ERGOCALCIFEROL 1.25 MG/1
50000 CAPSULE ORAL WEEKLY
Status: DISCONTINUED | OUTPATIENT
Start: 2021-05-29 | End: 2021-05-26 | Stop reason: HOSPADM

## 2021-05-24 RX ORDER — CEFAZOLIN SODIUM 2 G/50ML
2000 SOLUTION INTRAVENOUS ONCE
Status: DISCONTINUED | OUTPATIENT
Start: 2021-05-24 | End: 2021-05-24 | Stop reason: HOSPADM

## 2021-05-24 RX ORDER — SUCRALFATE 1 G/1
1 TABLET ORAL 4 TIMES DAILY
Status: DISCONTINUED | OUTPATIENT
Start: 2021-05-24 | End: 2021-05-26 | Stop reason: HOSPADM

## 2021-05-24 RX ORDER — PROPOFOL 10 MG/ML
INJECTION, EMULSION INTRAVENOUS AS NEEDED
Status: DISCONTINUED | OUTPATIENT
Start: 2021-05-24 | End: 2021-05-24

## 2021-05-24 RX ORDER — FENTANYL CITRATE 50 UG/ML
INJECTION, SOLUTION INTRAMUSCULAR; INTRAVENOUS AS NEEDED
Status: DISCONTINUED | OUTPATIENT
Start: 2021-05-24 | End: 2021-05-24

## 2021-05-24 RX ORDER — HYDROMORPHONE HCL/PF 1 MG/ML
0.5 SYRINGE (ML) INJECTION
Status: DISCONTINUED | OUTPATIENT
Start: 2021-05-24 | End: 2021-05-24 | Stop reason: HOSPADM

## 2021-05-24 RX ORDER — FLUTICASONE PROPIONATE 50 MCG
2 SPRAY, SUSPENSION (ML) NASAL DAILY
Status: DISCONTINUED | OUTPATIENT
Start: 2021-05-25 | End: 2021-05-26 | Stop reason: HOSPADM

## 2021-05-24 RX ORDER — ALBUTEROL SULFATE 90 UG/1
2 AEROSOL, METERED RESPIRATORY (INHALATION) EVERY 4 HOURS PRN
Status: DISCONTINUED | OUTPATIENT
Start: 2021-05-24 | End: 2021-05-26 | Stop reason: HOSPADM

## 2021-05-24 RX ORDER — GLYCOPYRROLATE 0.2 MG/ML
INJECTION INTRAMUSCULAR; INTRAVENOUS AS NEEDED
Status: DISCONTINUED | OUTPATIENT
Start: 2021-05-24 | End: 2021-05-24

## 2021-05-24 RX ORDER — CHLORHEXIDINE GLUCONATE 0.12 MG/ML
15 RINSE ORAL ONCE
Status: COMPLETED | OUTPATIENT
Start: 2021-05-24 | End: 2021-05-24

## 2021-05-24 RX ORDER — ASCORBIC ACID 500 MG
1000 TABLET ORAL DAILY
Status: DISCONTINUED | OUTPATIENT
Start: 2021-05-25 | End: 2021-05-26 | Stop reason: HOSPADM

## 2021-05-24 RX ORDER — KETAMINE HCL IN NACL, ISO-OSM 100MG/10ML
SYRINGE (ML) INJECTION AS NEEDED
Status: DISCONTINUED | OUTPATIENT
Start: 2021-05-24 | End: 2021-05-24

## 2021-05-24 RX ORDER — DOCUSATE SODIUM 100 MG/1
100 CAPSULE, LIQUID FILLED ORAL 2 TIMES DAILY
Status: DISCONTINUED | OUTPATIENT
Start: 2021-05-24 | End: 2021-05-26 | Stop reason: HOSPADM

## 2021-05-24 RX ORDER — HYDROMORPHONE HCL 110MG/55ML
PATIENT CONTROLLED ANALGESIA SYRINGE INTRAVENOUS AS NEEDED
Status: DISCONTINUED | OUTPATIENT
Start: 2021-05-24 | End: 2021-05-24

## 2021-05-24 RX ORDER — OXYCODONE HYDROCHLORIDE 5 MG/1
5 TABLET ORAL EVERY 4 HOURS PRN
Status: DISCONTINUED | OUTPATIENT
Start: 2021-05-24 | End: 2021-05-26 | Stop reason: HOSPADM

## 2021-05-24 RX ORDER — ACETAMINOPHEN 325 MG/1
650 TABLET ORAL EVERY 4 HOURS PRN
Status: DISCONTINUED | OUTPATIENT
Start: 2021-05-24 | End: 2021-05-26 | Stop reason: HOSPADM

## 2021-05-24 RX ORDER — NICOTINE 21 MG/24HR
1 PATCH, TRANSDERMAL 24 HOURS TRANSDERMAL DAILY
Status: DISCONTINUED | OUTPATIENT
Start: 2021-05-24 | End: 2021-05-26 | Stop reason: HOSPADM

## 2021-05-24 RX ORDER — CALCIUM CARBONATE 200(500)MG
1000 TABLET,CHEWABLE ORAL DAILY PRN
Status: DISCONTINUED | OUTPATIENT
Start: 2021-05-24 | End: 2021-05-26 | Stop reason: HOSPADM

## 2021-05-24 RX ORDER — SACCHAROMYCES BOULARDII 250 MG
250 CAPSULE ORAL 2 TIMES DAILY
Status: DISCONTINUED | OUTPATIENT
Start: 2021-05-24 | End: 2021-05-26 | Stop reason: HOSPADM

## 2021-05-24 RX ORDER — ONDANSETRON 2 MG/ML
4 INJECTION INTRAMUSCULAR; INTRAVENOUS EVERY 6 HOURS PRN
Status: DISCONTINUED | OUTPATIENT
Start: 2021-05-24 | End: 2021-05-26 | Stop reason: HOSPADM

## 2021-05-24 RX ORDER — CALCIUM CARBONATE 500(1250)
1 TABLET ORAL
Status: DISCONTINUED | OUTPATIENT
Start: 2021-05-25 | End: 2021-05-26 | Stop reason: HOSPADM

## 2021-05-24 RX ORDER — FENTANYL CITRATE/PF 50 MCG/ML
25 SYRINGE (ML) INJECTION
Status: DISCONTINUED | OUTPATIENT
Start: 2021-05-24 | End: 2021-05-24 | Stop reason: HOSPADM

## 2021-05-24 RX ORDER — ATORVASTATIN CALCIUM 40 MG/1
40 TABLET, FILM COATED ORAL
Status: DISCONTINUED | OUTPATIENT
Start: 2021-05-24 | End: 2021-05-26 | Stop reason: HOSPADM

## 2021-05-24 RX ORDER — MAGNESIUM HYDROXIDE 1200 MG/15ML
LIQUID ORAL AS NEEDED
Status: DISCONTINUED | OUTPATIENT
Start: 2021-05-24 | End: 2021-05-24 | Stop reason: HOSPADM

## 2021-05-24 RX ORDER — HYDROMORPHONE HCL/PF 1 MG/ML
0.5 SYRINGE (ML) INJECTION
Status: DISCONTINUED | OUTPATIENT
Start: 2021-05-24 | End: 2021-05-26 | Stop reason: HOSPADM

## 2021-05-24 RX ORDER — SENNOSIDES 8.6 MG
1 TABLET ORAL DAILY
Status: DISCONTINUED | OUTPATIENT
Start: 2021-05-25 | End: 2021-05-26 | Stop reason: HOSPADM

## 2021-05-24 RX ORDER — SODIUM CHLORIDE, SODIUM LACTATE, POTASSIUM CHLORIDE, CALCIUM CHLORIDE 600; 310; 30; 20 MG/100ML; MG/100ML; MG/100ML; MG/100ML
INJECTION, SOLUTION INTRAVENOUS CONTINUOUS PRN
Status: DISCONTINUED | OUTPATIENT
Start: 2021-05-24 | End: 2021-05-24

## 2021-05-24 RX ORDER — CHLORHEXIDINE GLUCONATE 4 G/100ML
SOLUTION TOPICAL DAILY PRN
Status: DISCONTINUED | OUTPATIENT
Start: 2021-05-24 | End: 2021-05-24 | Stop reason: HOSPADM

## 2021-05-24 RX ORDER — CEFAZOLIN SODIUM 2 G/50ML
2000 SOLUTION INTRAVENOUS EVERY 8 HOURS
Status: DISCONTINUED | OUTPATIENT
Start: 2021-05-24 | End: 2021-05-24

## 2021-05-24 RX ORDER — LEFLUNOMIDE 20 MG/1
20 TABLET ORAL
Status: DISCONTINUED | OUTPATIENT
Start: 2021-05-25 | End: 2021-05-26 | Stop reason: HOSPADM

## 2021-05-24 RX ORDER — SODIUM CHLORIDE, SODIUM LACTATE, POTASSIUM CHLORIDE, CALCIUM CHLORIDE 600; 310; 30; 20 MG/100ML; MG/100ML; MG/100ML; MG/100ML
125 INJECTION, SOLUTION INTRAVENOUS CONTINUOUS
Status: DISCONTINUED | OUTPATIENT
Start: 2021-05-24 | End: 2021-05-26

## 2021-05-24 RX ORDER — ONDANSETRON 2 MG/ML
4 INJECTION INTRAMUSCULAR; INTRAVENOUS ONCE AS NEEDED
Status: COMPLETED | OUTPATIENT
Start: 2021-05-24 | End: 2021-05-24

## 2021-05-24 RX ORDER — OXYCODONE HYDROCHLORIDE 10 MG/1
10 TABLET ORAL EVERY 4 HOURS PRN
Status: DISCONTINUED | OUTPATIENT
Start: 2021-05-24 | End: 2021-05-26 | Stop reason: HOSPADM

## 2021-05-24 RX ORDER — INSULIN GLARGINE 100 [IU]/ML
50 INJECTION, SOLUTION SUBCUTANEOUS EVERY 12 HOURS SCHEDULED
Status: DISCONTINUED | OUTPATIENT
Start: 2021-05-24 | End: 2021-05-26 | Stop reason: HOSPADM

## 2021-05-24 RX ORDER — SODIUM CHLORIDE 9 MG/ML
100 INJECTION, SOLUTION INTRAVENOUS CONTINUOUS
Status: DISCONTINUED | OUTPATIENT
Start: 2021-05-24 | End: 2021-05-26

## 2021-05-24 RX ORDER — OXYCODONE HYDROCHLORIDE 5 MG/1
5 TABLET ORAL EVERY 4 HOURS PRN
Qty: 30 TABLET | Refills: 0 | Status: SHIPPED | OUTPATIENT
Start: 2021-05-24 | End: 2021-05-27 | Stop reason: SDUPTHER

## 2021-05-24 RX ORDER — HYDROXYCHLOROQUINE SULFATE 200 MG/1
200 TABLET, FILM COATED ORAL 2 TIMES DAILY
Status: DISCONTINUED | OUTPATIENT
Start: 2021-05-24 | End: 2021-05-26 | Stop reason: HOSPADM

## 2021-05-24 RX ORDER — DEXAMETHASONE SODIUM PHOSPHATE 10 MG/ML
INJECTION, SOLUTION INTRAMUSCULAR; INTRAVENOUS AS NEEDED
Status: DISCONTINUED | OUTPATIENT
Start: 2021-05-24 | End: 2021-05-24

## 2021-05-24 RX ORDER — CEFAZOLIN SODIUM 2 G/50ML
2000 SOLUTION INTRAVENOUS EVERY 8 HOURS
Status: DISCONTINUED | OUTPATIENT
Start: 2021-05-24 | End: 2021-05-26 | Stop reason: HOSPADM

## 2021-05-24 RX ORDER — MIDAZOLAM HYDROCHLORIDE 2 MG/2ML
INJECTION, SOLUTION INTRAMUSCULAR; INTRAVENOUS AS NEEDED
Status: DISCONTINUED | OUTPATIENT
Start: 2021-05-24 | End: 2021-05-24

## 2021-05-24 RX ORDER — LIDOCAINE HYDROCHLORIDE 10 MG/ML
INJECTION, SOLUTION EPIDURAL; INFILTRATION; INTRACAUDAL; PERINEURAL AS NEEDED
Status: DISCONTINUED | OUTPATIENT
Start: 2021-05-24 | End: 2021-05-24

## 2021-05-24 RX ADMIN — GLYCOPYRROLATE 0.1 MG: 0.2 INJECTION, SOLUTION INTRAMUSCULAR; INTRAVENOUS at 11:12

## 2021-05-24 RX ADMIN — HYDROMORPHONE HYDROCHLORIDE 0.5 MG: 1 INJECTION, SOLUTION INTRAMUSCULAR; INTRAVENOUS; SUBCUTANEOUS at 12:31

## 2021-05-24 RX ADMIN — SUCRALFATE 1 G: 1 TABLET ORAL at 21:10

## 2021-05-24 RX ADMIN — FENTANYL CITRATE 25 MCG: 50 INJECTION INTRAMUSCULAR; INTRAVENOUS at 12:20

## 2021-05-24 RX ADMIN — DEXAMETHASONE SODIUM PHOSPHATE 4 MG: 10 INJECTION, SOLUTION INTRAMUSCULAR; INTRAVENOUS at 11:12

## 2021-05-24 RX ADMIN — FENTANYL CITRATE 25 MCG: 50 INJECTION INTRAMUSCULAR; INTRAVENOUS at 12:26

## 2021-05-24 RX ADMIN — Medication 20 MG: at 11:46

## 2021-05-24 RX ADMIN — NICOTINE 1 PATCH: 14 PATCH, EXTENDED RELEASE TRANSDERMAL at 13:56

## 2021-05-24 RX ADMIN — OXYCODONE HYDROCHLORIDE 5 MG: 5 TABLET ORAL at 18:41

## 2021-05-24 RX ADMIN — ONDANSETRON 4 MG: 2 INJECTION INTRAMUSCULAR; INTRAVENOUS at 11:12

## 2021-05-24 RX ADMIN — FENTANYL CITRATE 50 MCG: 50 INJECTION, SOLUTION INTRAMUSCULAR; INTRAVENOUS at 11:21

## 2021-05-24 RX ADMIN — ATORVASTATIN CALCIUM 40 MG: 40 TABLET, FILM COATED ORAL at 21:10

## 2021-05-24 RX ADMIN — PROPOFOL 200 MG: 10 INJECTION, EMULSION INTRAVENOUS at 11:08

## 2021-05-24 RX ADMIN — MIDAZOLAM HYDROCHLORIDE 2 MG: 1 INJECTION, SOLUTION INTRAMUSCULAR; INTRAVENOUS at 11:06

## 2021-05-24 RX ADMIN — SODIUM CHLORIDE, SODIUM LACTATE, POTASSIUM CHLORIDE, AND CALCIUM CHLORIDE: .6; .31; .03; .02 INJECTION, SOLUTION INTRAVENOUS at 11:08

## 2021-05-24 RX ADMIN — Medication 20 MG: at 11:23

## 2021-05-24 RX ADMIN — LIDOCAINE HYDROCHLORIDE 100 MG: 10 INJECTION, SOLUTION EPIDURAL; INFILTRATION; INTRACAUDAL at 11:08

## 2021-05-24 RX ADMIN — CEFAZOLIN SODIUM 2000 MG: 2 SOLUTION INTRAVENOUS at 16:58

## 2021-05-24 RX ADMIN — FENTANYL CITRATE 25 MCG: 50 INJECTION INTRAMUSCULAR; INTRAVENOUS at 12:15

## 2021-05-24 RX ADMIN — Medication 250 MG: at 21:10

## 2021-05-24 RX ADMIN — CHLORHEXIDINE GLUCONATE 0.12% ORAL RINSE 15 ML: 1.2 LIQUID ORAL at 10:14

## 2021-05-24 RX ADMIN — OXYCODONE HYDROCHLORIDE 5 MG: 5 TABLET ORAL at 13:56

## 2021-05-24 RX ADMIN — HYDROMORPHONE HYDROCHLORIDE 0.5 MG: 2 INJECTION, SOLUTION INTRAMUSCULAR; INTRAVENOUS; SUBCUTANEOUS at 11:23

## 2021-05-24 RX ADMIN — INSULIN LISPRO 3 UNITS: 100 INJECTION, SOLUTION INTRAVENOUS; SUBCUTANEOUS at 17:01

## 2021-05-24 RX ADMIN — FENTANYL CITRATE 50 MCG: 50 INJECTION, SOLUTION INTRAMUSCULAR; INTRAVENOUS at 11:08

## 2021-05-24 RX ADMIN — ONDANSETRON 4 MG: 2 INJECTION INTRAMUSCULAR; INTRAVENOUS at 12:38

## 2021-05-24 RX ADMIN — Medication 10 MG: at 11:39

## 2021-05-24 NOTE — OP NOTE
OPERATIVE REPORT  PATIENT NAME: Kay Topete  :  1965  MRN: [id number]  Pt Location: [facility] 01    SURGERY DATE: 2021    Surgeon(s) and Role:     * Kimberly Palacios MD - Primary     * Dianne Engle MD - Assisting     * Muna Hughes PA-C - Assisting    Preop Diagnosis:  Abscess of left thigh [L02 416]    Post-Op Diagnosis Codes:     * Abscess of left thigh [L02 416]    Procedure(s) (LRB):  INCISION AND DRAINAGE THIGH (Left)    Specimen(s):  ID Type Source Tests Collected by Time Destination   A : left leg abscess  Tissue Leg, Left ANAEROBIC CULTURE AND GRAM STAIN, CULTURE, TISSUE AND GRAM STAIN Kimberly Palacios MD 2021 1126        Estimated Blood Loss:   Minimal    Drains:  * No LDAs found *    Anesthesia Type:   General    Complications:   None    Procedure and Technique:  The patient was identified in the preoperative holding area, and the surgical site was marked  The patient was transported to the operating room and transferred to the OR table in the supine position  General anesthesia was induced and the patient was intubated  The left lower extremity was prepped and draped in usual sterile fashion  Following surgical time-out a 12 cm incision was made over the lateral thigh abscess  A large collection of purulent fluid was present in the subcutaneous tissue communicating with the prepatellar bursa  Aerobic and anaerobic cultures were obtained  Intravenous antibiotics were administered after obtaining cultures  The left thigh was irrigated with 9 L of sterile saline solution using the pulse   Small longitudinal incision was made in the iliotibial band  No fluid collection was present deep to the iliotibial band  Hemostasis was obtained with the electrocautery device  The iliotibial band incision was closed with 2-0 PDS suture    The subcuticular layer was closed with simple   -0 PDS suture, and the skin was closed with 0 Prolene suture in horizontal mattress fashion  The wound was dressed with sterile Adaptic, 4 x 4 gauze, cotton padding, and an elastic bandage  General anesthesia reversed, and the patient was extubated  The patient was transferred to the stretcher and transported to recovery in stable condition  The assistance of an advanced practitioner was necessary for sterile draping, positioning of the operative leg, and wound closure  I was present for the entire procedure      Patient Disposition:  PACU  and extubated and stable    SIGNATURE: John Manjarrez MD  DATE: May 24, 2021  TIME: 11:50 AM

## 2021-05-24 NOTE — ANESTHESIA PREPROCEDURE EVALUATION
Procedure: Incision and drainage with washout left distal lateral thigh abscess (Left Leg Lower)    Relevant Problems   CARDIO   (+) Benign essential hypertension   (+) Mixed hyperlipidemia      ENDO   (+) Type 2 diabetes mellitus with diabetic neuropathic arthropathy, with long-term current use of insulin (HCC)      GI/HEPATIC   (+) Gastroesophageal reflux disease without esophagitis      MUSCULOSKELETAL   (+) Charcot foot due to diabetes mellitus (HCC)   (+) Prepatellar bursitis of left knee   (+) Rheumatoid arthritis (HCC)      NEURO/PSYCH   (+) Type 2 diabetes mellitus with diabetic neuropathic arthropathy, with long-term current use of insulin (HCC)        Physical Exam    Airway    Mallampati score: II  TM Distance: >3 FB  Neck ROM: full     Dental   No notable dental hx     Cardiovascular  Cardiovascular exam normal    Pulmonary  Pulmonary exam normal     Other Findings        Anesthesia Plan  ASA Score- 2     Anesthesia Type- general with ASA Monitors  Additional Monitors:   Airway Plan: LMA  Plan Factors-Exercise tolerance (METS): >4 METS  Chart reviewed  EKG reviewed  Imaging results reviewed  Existing labs reviewed  Patient summary reviewed  Induction- intravenous  Postoperative Plan- Plan for postoperative opioid use  Informed Consent- Anesthetic plan and risks discussed with patient  I personally reviewed this patient with the CRNA  Discussed and agreed on the Anesthesia Plan with the CRNA  Keyona Gracia

## 2021-05-24 NOTE — ANESTHESIA POSTPROCEDURE EVALUATION
Post-Op Assessment Note    CV Status:  Stable  Pain Score: 0    Pain management: adequate     Mental Status:  Alert and awake   Hydration Status:  Euvolemic and stable   PONV Controlled:  Controlled   Airway Patency:  Patent and adequate      Post Op Vitals Reviewed: Yes      Staff: CRNA         No complications documented      BP   143   Temp  97 1   Pulse  110   Resp   12   SpO2   98 6L

## 2021-05-24 NOTE — H&P
Patient Name:  Kay Topete  MRN:  [id number]      Assessment & Plan     Left knee incision and drainage, excisional debridement of prepatellar bursa 4/29/21 with persistent drainage from the lateral thigh incision    Plan for repeat incision and drainage with washout of the left distal lateral thigh  Plan for admission after procedure for IV antibiotics and consultation to Infectious Disease at that time    Chief Complaint     Left thigh wound drainage    History of the Present Illness     Kay Topete  is a 50 y o  male presents today for a repeat incision and drainage and washout of the left distal lateral thigh  He is status post incision and drainage and excisional debridement of prepatellar bursa on 4/29/21  He notes persistent drainage to the lateral thigh incision which is purulent in nature  He has been on oral Duricef and notes mild improvement  No fevers or chills  He notes associated swelling  Physical Exam     /83   Pulse 104   Temp (!) 97 °F (36 1 °C) (Temporal)   Resp 18   Ht 6' (1 829 m)   Wt 109 kg (240 lb)   SpO2 99%   BMI 32 55 kg/m²     Left knee:  Incision over the prepatellar bursa as well healed without erythema or drainage  Sutures removed  Incision over the distal lateral thigh exhibits cloudy yellow drainage with associated fluctuance  Sutures remain in place  Knee range of motion is intact and full without pain  Sensation intact distally  Skin warm and well perfused  Eyes:  Anicteric sclerae  ENT:  Trachea midline  Lungs:  Clear to auscultation bilaterally  Cardiovascular:  Regular rate and rhythm with audible S1 and S2  Abdomen:  Soft and nontender  Lymphatic:  No palpable lymphadenopathy  Skin:  Intact without erythema  Neurologic:  Sensation grossly intact to light touch  Psychiatric:  Mood and affect are appropriate  I have personally reviewed pertinent lab results      Lab Results   Component Value Date     (L) 01/23/2017    K 3 8 05/01/2021    K 4 2 01/23/2017    CL 99 (L) 05/01/2021     01/23/2017    CO2 27 05/01/2021    CO2 22 01/23/2017    BUN 16 05/01/2021    BUN 15 01/23/2017    CREATININE 1 06 05/01/2021    CREATININE 0 91 01/23/2017     Lab Results   Component Value Date    WBC 11 91 (H) 05/01/2021    WBC 6 0 01/23/2017    HGB 13 3 05/01/2021    HGB 16 0 01/23/2017     05/01/2021     01/23/2017     Lab Results   Component Value Date    INR 0 94 04/11/2019    PTT 22 (L) 03/19/2018       Past Medical History:   Diagnosis Date    Arthritis     At risk for falls     Broken foot     right    Cataract     giacomo    [facility] obstructive [facility] [address] [facility]     Diabetes mellitus [facility] [address] [facility]     Hx MRSA infection     Per wife patient hadf MRSa in a wound awhile ago    Hyperlipidemia     Kidney stone     Neuropathy     RA [facility]heumatoid arthritis) [facility] [address] [facility]     Rheumatoid arthritis [facility] [address] [facility]     Seasonal allergies     Sepsis ([facility] [address] [facility] 4/26/2021    Snores     Uses wheelchair     and crutches- NWB RLE    Wears glasses        Past Surgical History:   Procedure Laterality Date    APPENDECTOMY      CLOSED REDUCTION FOOT DISLOCATION Right 2/12/2019    Procedure: C/R FRACTURE;  Surgeon: Avelina Mckeon DPM;  [facility];  Service: Podiatry    COLONOSCOPY      FOOT SURGERY Right 07/2019    Removal of the bone graft and cleaned out infection, and placed new bone graft    IR PICC PLACEMENT SINGLE LUMEN  8/5/2019    PA EXC SKIN MALIG <0 5 CM REMAINDER BODY N/A 3/28/2018    Procedure: EXCISION WIDE LESION HEAD/FACIAL/NECK;  Surgeon: Rustam Kuo MD;  [facility];  Service: Surgical Oncology    PA GASTROCNEMIUS RECESSION Right 2/12/2019    Procedure: ENDO GASTROC RECESSION, APPLICATION OF EXTERNAL FIXATOR;  Surgeon: Avelina Mckeon DPM;  [facility];  Service: Podiatry    PA REMOVE EXTERN BONE FIX DEV W ANESTH Right 4/18/2019    Procedure: FRAME REMOVAL HARDWARE FOOT WITH APPLICATION OF GRAFT;  Surgeon: Shahid Lee DPM;  [facility];  Service: Podiatry    SKIN BIOPSY      scalp    WISDOM TOOTH EXTRACTION  1998    WOUND DEBRIDEMENT Left 4/29/2021    Procedure: KNEE INCISION AND DRAINAGE, EXCISIONAL DEBRIDEMENT OF PREPATELLAR BURSA; Surgeon: Karlos Ayala MD;  [facility];  Service: Orthopedics       No Known Allergies    No current facility-administered medications on file prior to encounter        Current Outpatient Medications on File Prior to Encounter   Medication Sig Dispense Refill    albuterol (PROVENTIL HFA,VENTOLIN HFA) 90 mcg/act inhaler INHALE 2 PUFFS EVERY 4 (FOUR) HOURS AS NEEDED FOR WHEEZING 18 g 1    Ascorbic Acid (vitamin C) 1000 MG tablet Take 1,000 mg by mouth daily      atorvastatin (LIPITOR) 40 mg tablet TAKE 1 TABLET AT BEDTIME  (Patient taking differently: Take 40 mg by mouth daily after dinner ) 90 tablet 1    [employer] KwikPen 100 units/mL injection pen INJECT 50 UNITS UNDER THE SKIN EVERY 12 (TWELVE) HOURS (Patient taking differently: Inject 50 Units under the skin every 12 (twelve) hours ) 30 mL 1    calcium carbonate (OS-NERY) 600 MG tablet Take 600 mg by mouth daily Dinnertime      ergocalciferol (VITAMIN D2) 50,000 units Take 50,000 Units by mouth once a week Takes on Saturday      fluticasone (FLONASE) 50 mcg/act nasal spray USE 2 SPRAYS IN EACH NOSTRIL ONCE DAILY (Patient taking differently: 2 sprays into each nostril daily at bedtime ) 16 g 11    HYDROcodone-acetaminophen (Norco) 5-325 mg per tablet Take 1 tablet by mouth every 6 (six) hours as needed for pain      hydroxychloroquine (PLAQUENIL) 200 mg tablet Take 200 mg by mouth 2 (two) times a day        Januvia 100 MG tablet TAKE ONE TABLET EVERY DAY (Patient taking differently: Take 100 mg by mouth daily after dinner ) 90 tablet 1    leflunomide (ARAVA) 20 MG tablet Take 1 tablet by mouth daily with dinner       levofloxacin (LEVAQUIN) 750 mg tablet Take 1 tablet (750 mg total) by mouth every 24 hours for 14 days 14 tablet 0    losartan-hydrochlorothiazide (HYZAAR) 100-12 5 MG per tablet TAKE 1 TABLET BY MOUTH DAILY (Patient taking differently: Take 1 tablet by mouth daily in the early morning ) 90 tablet 1    naproxen (NAPROSYN) 500 mg tablet Take 1 tablet by mouth every 12 (twelve) hours      pantoprazole (PROTONIX) 40 mg tablet Take 1 tablet (40 mg total) by mouth daily (Patient taking differently: Take 40 mg by mouth daily in the early morning ) 30 tablet 11    sucralfate (CARAFATE) 1 g tablet TAKE 1 TABLET (1 G TOTAL) BY MOUTH 4 (FOUR) TIMES A  tablet 4    Victoza injection INJECT 1 8 MG DAILY (Patient taking differently: Inject 1 8 mg under the skin daily at bedtime ) 9 mL 4    Accu-Chek FastClix Lancets MISC TEST BLOOD SUGAR FOUR TIMES A DAY BEFORE MEALS AND BEDTIME 102 each 3    Accu-Chek Guide test strip TEST BEFORE MEALS AND AT BEDTIME 100 each 3    BD Pen Needle Maureen U/F 32G X 4 MM MISC ONE PEN NEEDLE THREE TIMES A DAY 2-BASAGLAR 1- VICTOZA 100 each 3    ULTICARE MICRO PEN NEEDLES 32G X 4 MM MISC INJECT UNDER THE SKIN 3 (THREE) TIMES A  each 3       Social History     Tobacco Use    Smoking status: Current Every Day Smoker     Packs/day: 1 00     Years: 40 00     Pack years: 40 00     Types: Cigarettes    Smokeless tobacco: Never Used   Substance Use Topics    Alcohol use: Yes     Frequency: Monthly or less     Drinks per session: 1 or 2     Binge frequency: Never     Comment: Socially    Drug use: Yes     Frequency: 7 0 times per week     Types: Marijuana     Comment: Daily for Pain       Family History   Problem Relation Age of Onset    Diabetes Mother     Stroke Mother     Mental illness Mother     Diabetes Father     Stroke Father     Alcohol abuse Brother     Coronary artery disease Family         Age 51-55    Diabetes type II Family     Heart disease Family        Review of Systems     As stated in the HPI   All other systems were reviewed and are negative

## 2021-05-24 NOTE — CONSULTS
Consultation - Infectious Disease   Lowell Gomes  50 y o  male MRN: [id number]  Unit/Bed#: OR POOL Encounter: [id number]      IMPRESSION & RECOMMENDATIONS:   Impression/Recommendations: This is a 50 y o  male, with history of RA on Humira and DM, had a recent admission for MSSA left septic prepatellar bursitis, status post I&D  Patient now has recurrent left thigh abscess, status post I&D ER today  1  Left distal thigh abscess, status post I&D with findings of communication of abscess with left prepatellar bursa  I suspect this is recurrent prepatellar bursitis with drainage to subcutaneous thigh  With MSSA infection in recent admission, MSSA is the likely pathogen again  Etiology of recurrence is most likely a combination of poor immunological status from Humira and possible low bioavailability of p o  Antibiotic given patient's weight  This time around, will treat patient with a more prolonged IV antibiotic course and higher dose p o  Antibiotic at discharge  Continue high-dose IV cefazolin  Follow-up on operative cultures  Serial left thigh/knee exams  Monitor temperature/WBC  2  Septic left prepatellar bursitis  Patient is status post I&D during recent admission with operative culture growing MSSA  As in above, I suspect patient has recurrent prepatellar bursitis with tracking to thigh  Etiology of prepatellar bursitis is most likely trauma from kneeling  Patient does not use knee pads  He is counseled regarding the need to use knee pads when he kneels  3  RA, on chronic TNF inhibitors  Patient likely has significant immunosuppression from this  This contributes to infection above  4  DM  Recent hospitalization records reviewed in detail  Discussed with patient in detail regarding the above plan  Thank you for this consultation  We will follow along with you  HISTORY OF PRESENT ILLNESS:  Reason for Consult:  Current left thigh abscess  HPI: Lowell Gomes  is a 50 y o  male, with history of RA, on Humira, was admitted here in late April this year with left septic prepatellar bursitis  He underwent I&D with operative culture growing MSSA  He was initially treated with IV vancomycin and discharged home on Duricef  Patient states that at home, wound slowly worsened, with persistent drainage from incision  On follow-up with orthopedic surgeon on 05/11, patient was recommended for readmission for ID  However, he had vacation schedule  Therefore, patient was given Levaquin  He is now back from vacation  Thigh wound has not improved  Patient was admitted  He underwent I&D of for left thigh abscess earlier today  We are asked to evaluate the patient  At present, patient has some pain in the/thigh, controlled  He denies any fever or chills at home  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  He does not wear knee pad  Patient has RA for many years  He has been on Humira for the last 6 months  Prior to that, he was on Enbrel for many years  He states that the biologic use to help his RA symptoms but not any more  REVIEW OF SYSTEMS:  A complete system-based review was done  Except for what is noted in HPI above, ROS of systems is otherwise negative      PAST MEDICAL HISTORY:  Past Medical History:   Diagnosis Date    Arthritis     At risk for falls     Broken foot     right    Cataract     giacomo    [facility] [address] [facility]     Diabetes mellitus [facility] [address] [facility]     Hx MRSA infection     Per wife patient hadf MRSa in a wound awhile ago    Hyperlipidemia     Kidney stone     Neuropathy     RA [facility]heumatoid arthritis) [facility] [address] [facility]     Rheumatoid arthritis ([facility] [address] [facility]     Seasonal allergies     Sepsis ([facility] [address] [facility] 4/26/2021    Snores     Uses wheelchair     and crutches- NWB RLE    Wears glasses      Past Surgical History:   Procedure Laterality Date    APPENDECTOMY      CLOSED REDUCTION FOOT DISLOCATION Right 2/12/2019 Procedure: C/R FRACTURE;  Surgeon: Main Hu DPM;  [facility];  Service: Podiatry    COLONOSCOPY      FOOT SURGERY Right 07/2019    Removal of the bone graft and cleaned out infection, and placed new bone graft    IR PICC PLACEMENT SINGLE LUMEN  8/5/2019    NV EXC SKIN MALIG <0 5 CM REMAINDER BODY N/A 3/28/2018    Procedure: EXCISION WIDE LESION HEAD/FACIAL/NECK;  Surgeon: Jeronimo Grijalva MD;  [facility];  Service: Surgical Oncology    NV GASTROCNEMIUS RECESSION Right 2/12/2019    Procedure: ENDO GASTROC RECESSION, APPLICATION OF EXTERNAL FIXATOR;  Surgeon: Main Hu DPM;  [facility];  Service: Podiatry    NV REMOVE EXTERN BONE FIX DEV W ANESTH Right 4/18/2019    Procedure: FRAME REMOVAL HARDWARE FOOT WITH APPLICATION OF GRAFT;  Surgeon: Main Hu DPM;  [facility];  Service: Podiatry    SKIN BIOPSY      scalp    WISDOM TOOTH EXTRACTION  1998    WOUND DEBRIDEMENT Left 4/29/2021    Procedure: KNEE INCISION AND DRAINAGE, EXCISIONAL DEBRIDEMENT OF PREPATELLAR BURSA; Surgeon: Tatiana Castillo MD;  [facility];  Service: Orthopedics     Problem list reviewed  FAMILY HISTORY:  Non-contributory    SOCIAL HISTORY:  Social History     Substance and Sexual Activity   Alcohol Use Yes    Frequency: Monthly or less    Drinks per session: 1 or 2    Binge frequency: Never    Comment: Socially     Social History     Substance and Sexual Activity   Drug Use Yes    Frequency: 7 0 times per week    Types: Marijuana    Comment: Daily for Pain     Social History     Tobacco Use   Smoking Status Current Every Day Smoker    Packs/day: 1 00    Years: 40 00    Pack years: 40 00    Types: Cigarettes   Smokeless Tobacco Never Used       ALLERGIES:  No Known Allergies    MEDICATIONS:  All current active medications have been reviewed  Patient is currently on IV cefazolin      PHYSICAL EXAM:  Vitals:  Temp:  [97 °F (36 1 °C)-97 6 °F (36 4 °C)] 97 6 °F (36 4 °C)  HR:  [] 92  Resp:  [11-20] 19  BP: (115-151)/(67-83) 116/70  SpO2:  [92 %-99 %] 96 %  Temp (24hrs), Av 2 °F (36 2 °C), Min:97 °F (36 1 °C), Max:97 6 °F (36 4 °C)  Current: Temperature: 97 6 °F (36 4 °C)     Physical Exam:  General:  Obese, comfortable, nontoxic, in no acute distress  Awake, alert and oriented x 3  Eyes:  Conjunctive clear with no hemorrhages or effusions  Oropharynx:  No ulcers, no lesions, pharynx benign, no tonsillitis  Neck:  Supple, no lymphadenopathy, no mass, nontender  Lungs:  Expansion symmetric, no rales, no wheezing, no accessory muscle use  Cardiac:  Regular rate and rhythm, normal S1, normal S2, no murmurs  Abdomen:  Soft, nondistended, non-tender, no HSM  Extremities:  No edema  Left knee/thigh wound with dressing in place  Dressing is dry  No erythema beyond dressing  Mild tenderness  Skin:  No rashes, no ulcers  Neurological:  Moves all four extremities spontaneously, sensation grossly intact    LABS, IMAGING, & OTHER STUDIES:  Lab Results:  I have personally reviewed pertinent labs  Imaging Studies:   I have personally reviewed pertinent imaging study reports and images in PACS  EKG, Pathology, and Other Studies:   I have personally reviewed pertinent reports

## 2021-05-25 DIAGNOSIS — J45.909 UNCOMPLICATED ASTHMA, UNSPECIFIED ASTHMA SEVERITY, UNSPECIFIED WHETHER PERSISTENT: ICD-10-CM

## 2021-05-25 DIAGNOSIS — E11.9 TYPE 2 DIABETES MELLITUS WITHOUT COMPLICATION, WITHOUT LONG-TERM CURRENT USE OF INSULIN (HCC): ICD-10-CM

## 2021-05-25 LAB
ANION GAP SERPL CALCULATED.3IONS-SCNC: 10 MMOL/L (ref 4–13)
BASOPHILS # BLD AUTO: 0.03 THOUSANDS/ΜL (ref 0–0.1)
BASOPHILS NFR BLD AUTO: 0 % (ref 0–1)
BUN SERPL-MCNC: 14 MG/DL (ref 5–25)
CALCIUM SERPL-MCNC: 8.4 MG/DL (ref 8.3–10.1)
CHLORIDE SERPL-SCNC: 104 MMOL/L (ref 100–108)
CO2 SERPL-SCNC: 24 MMOL/L (ref 21–32)
CREAT SERPL-MCNC: 1 MG/DL (ref 0.6–1.3)
EOSINOPHIL # BLD AUTO: 0.01 THOUSAND/ΜL (ref 0–0.61)
EOSINOPHIL NFR BLD AUTO: 0 % (ref 0–6)
ERYTHROCYTE [DISTWIDTH] IN BLOOD BY AUTOMATED COUNT: 13.2 % (ref 11.6–15.1)
GFR SERPL CREATININE-BSD FRML MDRD: 86 ML/MIN/1.73SQ M
GLUCOSE SERPL-MCNC: 246 MG/DL (ref 65–140)
GLUCOSE SERPL-MCNC: 246 MG/DL (ref 65–140)
GLUCOSE SERPL-MCNC: 259 MG/DL (ref 65–140)
GLUCOSE SERPL-MCNC: 263 MG/DL (ref 65–140)
GLUCOSE SERPL-MCNC: 279 MG/DL (ref 65–140)
GLUCOSE SERPL-MCNC: 280 MG/DL (ref 65–140)
HCT VFR BLD AUTO: 36.7 % (ref 36.5–49.3)
HGB BLD-MCNC: 11.8 G/DL (ref 12–17)
IMM GRANULOCYTES # BLD AUTO: 0.08 THOUSAND/UL (ref 0–0.2)
IMM GRANULOCYTES NFR BLD AUTO: 1 % (ref 0–2)
LYMPHOCYTES # BLD AUTO: 1.12 THOUSANDS/ΜL (ref 0.6–4.47)
LYMPHOCYTES NFR BLD AUTO: 9 % (ref 14–44)
MCH RBC QN AUTO: 28.4 PG (ref 26.8–34.3)
MCHC RBC AUTO-ENTMCNC: 32.2 G/DL (ref 31.4–37.4)
MCV RBC AUTO: 88 FL (ref 82–98)
MONOCYTES # BLD AUTO: 1.26 THOUSAND/ΜL (ref 0.17–1.22)
MONOCYTES NFR BLD AUTO: 10 % (ref 4–12)
NEUTROPHILS # BLD AUTO: 10.7 THOUSANDS/ΜL (ref 1.85–7.62)
NEUTS SEG NFR BLD AUTO: 80 % (ref 43–75)
NRBC BLD AUTO-RTO: 0 /100 WBCS
PLATELET # BLD AUTO: 226 THOUSANDS/UL (ref 149–390)
PMV BLD AUTO: 10.4 FL (ref 8.9–12.7)
POTASSIUM SERPL-SCNC: 4.2 MMOL/L (ref 3.5–5.3)
RBC # BLD AUTO: 4.16 MILLION/UL (ref 3.88–5.62)
SODIUM SERPL-SCNC: 138 MMOL/L (ref 136–145)
WBC # BLD AUTO: 13.2 THOUSAND/UL (ref 4.31–10.16)

## 2021-05-25 PROCEDURE — 86140 C-REACTIVE PROTEIN: CPT | Performed by: PHYSICIAN ASSISTANT

## 2021-05-25 PROCEDURE — 80048 BASIC METABOLIC PNL TOTAL CA: CPT | Performed by: INTERNAL MEDICINE

## 2021-05-25 PROCEDURE — 82948 REAGENT STRIP/BLOOD GLUCOSE: CPT

## 2021-05-25 PROCEDURE — 97163 PT EVAL HIGH COMPLEX 45 MIN: CPT

## 2021-05-25 PROCEDURE — 99233 SBSQ HOSP IP/OBS HIGH 50: CPT | Performed by: INTERNAL MEDICINE

## 2021-05-25 PROCEDURE — 99024 POSTOP FOLLOW-UP VISIT: CPT | Performed by: PHYSICIAN ASSISTANT

## 2021-05-25 PROCEDURE — 85025 COMPLETE CBC W/AUTO DIFF WBC: CPT | Performed by: INTERNAL MEDICINE

## 2021-05-25 PROCEDURE — 97110 THERAPEUTIC EXERCISES: CPT

## 2021-05-25 PROCEDURE — 99232 SBSQ HOSP IP/OBS MODERATE 35: CPT | Performed by: INTERNAL MEDICINE

## 2021-05-25 RX ADMIN — Medication 250 MG: at 16:51

## 2021-05-25 RX ADMIN — Medication 250 MG: at 08:18

## 2021-05-25 RX ADMIN — HYDROXYCHLOROQUINE SULFATE 200 MG: 200 TABLET, FILM COATED ORAL at 08:25

## 2021-05-25 RX ADMIN — INSULIN LISPRO 4 UNITS: 100 INJECTION, SOLUTION INTRAVENOUS; SUBCUTANEOUS at 16:56

## 2021-05-25 RX ADMIN — NICOTINE 1 PATCH: 14 PATCH, EXTENDED RELEASE TRANSDERMAL at 13:38

## 2021-05-25 RX ADMIN — SUCRALFATE 1 G: 1 TABLET ORAL at 21:19

## 2021-05-25 RX ADMIN — ATORVASTATIN CALCIUM 40 MG: 40 TABLET, FILM COATED ORAL at 21:19

## 2021-05-25 RX ADMIN — INSULIN LISPRO 3 UNITS: 100 INJECTION, SOLUTION INTRAVENOUS; SUBCUTANEOUS at 00:19

## 2021-05-25 RX ADMIN — OXYCODONE HYDROCHLORIDE AND ACETAMINOPHEN 1000 MG: 500 TABLET ORAL at 08:24

## 2021-05-25 RX ADMIN — SUCRALFATE 1 G: 1 TABLET ORAL at 11:31

## 2021-05-25 RX ADMIN — INSULIN LISPRO 4 UNITS: 100 INJECTION, SOLUTION INTRAVENOUS; SUBCUTANEOUS at 12:28

## 2021-05-25 RX ADMIN — INSULIN LISPRO 8 UNITS: 100 INJECTION, SOLUTION INTRAVENOUS; SUBCUTANEOUS at 12:28

## 2021-05-25 RX ADMIN — OXYCODONE HYDROCHLORIDE 10 MG: 10 TABLET ORAL at 21:35

## 2021-05-25 RX ADMIN — ALBUTEROL SULFATE 2 PUFF: 90 AEROSOL, METERED RESPIRATORY (INHALATION) at 08:30

## 2021-05-25 RX ADMIN — SUCRALFATE 1 G: 1 TABLET ORAL at 08:18

## 2021-05-25 RX ADMIN — INSULIN GLARGINE 50 UNITS: 100 INJECTION, SOLUTION SUBCUTANEOUS at 00:18

## 2021-05-25 RX ADMIN — CEFAZOLIN SODIUM 2000 MG: 2 SOLUTION INTRAVENOUS at 08:26

## 2021-05-25 RX ADMIN — SUCRALFATE 1 G: 1 TABLET ORAL at 16:51

## 2021-05-25 RX ADMIN — OXYCODONE HYDROCHLORIDE 5 MG: 5 TABLET ORAL at 00:30

## 2021-05-25 RX ADMIN — INSULIN LISPRO 8 UNITS: 100 INJECTION, SOLUTION INTRAVENOUS; SUBCUTANEOUS at 16:56

## 2021-05-25 RX ADMIN — OXYCODONE HYDROCHLORIDE 5 MG: 5 TABLET ORAL at 14:28

## 2021-05-25 RX ADMIN — SODIUM CHLORIDE, SODIUM LACTATE, POTASSIUM CHLORIDE, AND CALCIUM CHLORIDE 125 ML/HR: .6; .31; .03; .02 INJECTION, SOLUTION INTRAVENOUS at 13:38

## 2021-05-25 RX ADMIN — SODIUM CHLORIDE 100 ML/HR: 0.9 INJECTION, SOLUTION INTRAVENOUS at 00:32

## 2021-05-25 RX ADMIN — PANTOPRAZOLE SODIUM 40 MG: 40 TABLET, DELAYED RELEASE ORAL at 08:19

## 2021-05-25 RX ADMIN — INSULIN GLARGINE 50 UNITS: 100 INJECTION, SOLUTION SUBCUTANEOUS at 21:35

## 2021-05-25 RX ADMIN — INSULIN LISPRO 2 UNITS: 100 INJECTION, SOLUTION INTRAVENOUS; SUBCUTANEOUS at 21:37

## 2021-05-25 RX ADMIN — CEFAZOLIN SODIUM 2000 MG: 2 SOLUTION INTRAVENOUS at 16:48

## 2021-05-25 RX ADMIN — INSULIN LISPRO 3 UNITS: 100 INJECTION, SOLUTION INTRAVENOUS; SUBCUTANEOUS at 08:16

## 2021-05-25 RX ADMIN — FLUTICASONE PROPIONATE 2 SPRAY: 50 SPRAY, METERED NASAL at 21:19

## 2021-05-25 RX ADMIN — SODIUM CHLORIDE, SODIUM LACTATE, POTASSIUM CHLORIDE, AND CALCIUM CHLORIDE 125 ML/HR: .6; .31; .03; .02 INJECTION, SOLUTION INTRAVENOUS at 02:56

## 2021-05-25 RX ADMIN — HYDROXYCHLOROQUINE SULFATE 200 MG: 200 TABLET, FILM COATED ORAL at 16:52

## 2021-05-25 RX ADMIN — SODIUM CHLORIDE, SODIUM LACTATE, POTASSIUM CHLORIDE, AND CALCIUM CHLORIDE 125 ML/HR: .6; .31; .03; .02 INJECTION, SOLUTION INTRAVENOUS at 22:26

## 2021-05-25 RX ADMIN — INSULIN GLARGINE 50 UNITS: 100 INJECTION, SOLUTION SUBCUTANEOUS at 08:14

## 2021-05-25 RX ADMIN — CEFAZOLIN SODIUM 2000 MG: 2 SOLUTION INTRAVENOUS at 00:18

## 2021-05-25 RX ADMIN — LOSARTAN POTASSIUM: 50 TABLET, FILM COATED ORAL at 08:17

## 2021-05-25 RX ADMIN — CALCIUM 1 TABLET: 500 TABLET ORAL at 08:24

## 2021-05-25 RX ADMIN — ENOXAPARIN SODIUM 40 MG: 40 INJECTION SUBCUTANEOUS at 08:20

## 2021-05-25 RX ADMIN — HYDROXYCHLOROQUINE SULFATE 200 MG: 200 TABLET, FILM COATED ORAL at 00:17

## 2021-05-25 NOTE — PROGRESS NOTES
[address]  50 y o  male MRN: [id number]  Unit/Bed#: S -01      Subjective:  50 y o male post operative day 1 left prepatellar bursa lateral thigh incision and drainage  Patient reports some soreness in the left knee this does feel better as compared to prior to surgery  He denies any numbness or tingling  No fevers or chills  Pain is well controlled      Labs:  0   Lab Value Date/Time    HCT 36 7 05/25/2021 0515    HCT 40 5 05/01/2021 0438    HCT 41 1 04/30/2021 0626    HCT 47 1 01/23/2017 1300    HGB 11 8 (L) 05/25/2021 0515    HGB 13 3 05/01/2021 0438    HGB 13 9 04/30/2021 0626    HGB 16 0 01/23/2017 1300    INR 0 94 04/11/2019 1116    WBC 13 20 (H) 05/25/2021 0515    WBC 11 91 (H) 05/01/2021 0438    WBC 16 37 (H) 04/30/2021 0626    WBC 6 0 01/23/2017 1300    ESR 68 (H) 04/26/2021 1440     6 (H) 04/28/2021 0648       Meds:    Current Facility-Administered Medications:     acetaminophen (TYLENOL) tablet 650 mg, 650 mg, Oral, Q4H PRN, Ashley Kempton, PA-C    albuterol (PROVENTIL HFA,VENTOLIN HFA) inhaler 2 puff, 2 puff, Inhalation, Q4H PRN, Ashley Kempton, PA-C, 2 puff at 05/25/21 0830    ascorbic acid (VITAMIN C) tablet 1,000 mg, 1,000 mg, Oral, Daily, Ashley Geena, PA-C, 1,000 mg at 05/25/21 8582    atorvastatin (LIPITOR) tablet 40 mg, 40 mg, Oral, HS, Ashley Kempton, PA-C, 40 mg at 05/24/21 2110    calcium carbonate (OYSTER SHELL,OSCAL) 500 mg tablet 1 tablet, 1 tablet, Oral, Daily With Breakfast, Ashley Kempton, PA-C, 1 tablet at 05/25/21 5816    calcium carbonate (TUMS) chewable tablet 1,000 mg, 1,000 mg, Oral, Daily PRN, Ashley Seay PA-C    ceFAZolin (ANCEF) IVPB (premix in dextrose) 2,000 mg 50 mL, 2,000 mg, Intravenous, Q8H, Berta Burciaga MD, Last Rate: 100 mL/hr at 05/25/21 0826, 2,000 mg at 05/25/21 1867    docusate sodium (COLACE) capsule 100 mg, 100 mg, Oral, BID, Ashley Seay PA-C    enoxaparin (LOVENOX) subcutaneous injection 40 mg, 40 mg, Subcutaneous, Daily, Ever Browning MARK Lucas, 40 mg at 05/25/21 0820    [START ON 5/29/2021] ergocalciferol (VITAMIN D2) capsule 50,000 Units, 50,000 Units, Oral, Weekly, Bren Gomez PA-C    fluticasone (FLONASE) 50 mcg/act nasal spray 2 spray, 2 spray, Each Nare, Daily, Bren Gomez PA-C    HYDROmorphone (DILAUDID) injection 0 5 mg, 0 5 mg, Intravenous, Q3H PRN, Opal Bustamante PA-C    hydroxychloroquine (PLAQUENIL) tablet 200 mg, 200 mg, Oral, BID, Bren Gomez PA-C, 200 mg at 05/25/21 0825    insulin glargine (LANTUS) subcutaneous injection 50 Units 0 5 mL, 50 Units, Subcutaneous, Q12H [address], Ivanna Adkins PA-C, 50 Units at 05/25/21 0814    insulin lispro (HumaLOG) 100 units/mL subcutaneous injection 1-5 Units, 1-5 Units, Subcutaneous, HS, Ivanna Akdins PA-C, 3 Units at 05/25/21 0019    insulin lispro (HumaLOG) 100 units/mL subcutaneous injection 1-6 Units, 1-6 Units, Subcutaneous, TID AC, 3 Units at 05/25/21 0816 **AND** Fingerstick Glucose (POCT), , , TID AC, Bren Gomez PA-C    lactated ringers infusion, 125 mL/hr, Intravenous, Continuous, Nolia Marco A, CRNA, Last Rate: 125 mL/hr at 05/25/21 0256, 125 mL/hr at 05/25/21 0256    leflunomide (ARAVA) tablet 20 mg, 20 mg, Oral, Daily With Dinner, Bren Gomez PA-C    losartan potassium-hydrochlorothiazide (HYZAAR 100/12  5) combo dose, , Oral, Daily, Bren Gomez PA-C, Given at 05/25/21 0817    nicotine (NICODERM CQ) 14 mg/24hr TD 24 hr patch 1 patch, 1 patch, Transdermal, Daily, Bren Gomez PA-C, 1 patch at 05/24/21 1356    ondansetron (ZOFRAN) injection 4 mg, 4 mg, Intravenous, Q6H PRN, Bren Gomez PA-C    oxyCODONE (ROXICODONE) IR tablet 10 mg, 10 mg, Oral, Q4H PRN, Bren Gomez, PA-C    oxyCODONE (ROXICODONE) IR tablet 5 mg, 5 mg, Oral, Q4H PRN, Bren Gomez, PA-C, 5 mg at 05/25/21 0030    pantoprazole (PROTONIX) EC tablet 40 mg, 40 mg, Oral, Daily, Bren Gomez, PA-C, 40 mg at 05/25/21 8303    saccharomyces boulardii (FLORASTOR) capsule 250 mg, 250 mg, Oral, BID, Jarrett L Oliva Kang PA-C, 250 mg at 05/25/21 0818    senna (SENOKOT) tablet 8 6 mg, 1 tablet, Oral, Daily, Michael Tay PA-C    sodium chloride 0 9 % infusion, 100 mL/hr, Intravenous, Continuous, Michael Tay PA-C, Last Rate: 100 mL/hr at 05/25/21 0032, 100 mL/hr at 05/25/21 0032    sucralfate (CARAFATE) tablet 1 g, 1 g, Oral, 4x Daily, Michael Tay PA-C, 1 g at 05/25/21 0818    Blood Culture:   Lab Results   Component Value Date    BLOODCX No Growth After 5 Days  04/26/2021    BLOODCX No Growth After 5 Days  04/26/2021       Wound Culture:   Lab Results   Component Value Date    WOUNDCULT 3+ Growth of Staphylococcus aureus (A) 10/27/2020       Ins and Outs:  I/O last 24 hours: In: 18 [P O :540; I V :400; IV Piggyback:50]  Out: 2700 [Urine:2700]          Physical Exam:  Vitals:    05/25/21 0700   BP: 122/69   Pulse: 94   Resp: 18   Temp: 98 1 °F (36 7 °C)   SpO2: 95%     left lower extremity  · Dressings are clean dry and intact  · No palpable fluid collection in the prepatellar region or the distal lateral thigh  · Sensation intact to light touch L2-S1  · Motor intact with knee flexion/extension, EHL/FHL  · 2+ dorsalis pedis pulse    Assessment: 55 y o male post operative day 1 left prepatellar bursa and distal lateral thigh incision and drainage  Patient doing well    Plan:  · Up and out of bed  · Weight-bearing as tolerated to the left lower extremity  · PT/OT   · Antibiotics per Infectious Disease recommendations  · DVT prophylaxis SCDs Lovenox  · Analgesics p r n   · D/C planning pending clinical improvement and response to antibiotics, patient has minimal leukocytosis of 13 20 which is likely in part due to surgery yesterday  · Will continue to assess for acute blood loss anemia, patient has not had a greater than 2 g drop in his hemoglobin levels from his preoperative hemoglobin  Vital signs have remained stable          Mickie Galindo PA-C

## 2021-05-25 NOTE — PLAN OF CARE
Problem: PHYSICAL THERAPY ADULT  Goal: Performs mobility at highest level of function for planned discharge setting  See evaluation for individualized goals  Description:            See flowsheet documentation for full assessment, interventions and recommendations  Note: Prognosis: Good  Problem List: Decreased strength, Decreased range of motion, Impaired balance, Obesity, Pain  Assessment: Smiley Schilling  is a 50 y o  male who presents [facility] on 05/24/2021 from home for a repeat incision and drainage of the left thigh  Pt previously underwent excisional debridement of prepatellar bursa on 04/29/2021  Orders for PT eval and treat received, w/ activity orders of up w/ A and WBAT L LE  Pt presents w/ comorbidities of RA, T2DM, neuropathy, COPD, and "broken right foot" (however, no documentation noted in Epic limiting WB status, but reports inconsistently using his CROW boot prior to admission)  At baseline, pt mobilizes without an assistive device and denies falls in the past 6 months, however, reports unsteadiness  Upon evaluation, pt demonstrates he is independent with bed mobility, transfers, and ambulation without an AD; although pt declined stair negotiation he verbalized he has no difficulty at this time  Pt's gait speed of 0 5 m/s indicates he is a limited community ambulator  Pt's clinical presentation is evolving due to pain impacting functional mobility, neuropathy, and significant past medical history  Given the above findings, discharge recommendation is for home w/ no skilled PT needs w/o DME  During this admission, pt would benefit from 1-2 more sessions of acute inpatient PT in order to address aforementioned deficits to maximize function and mobility  PT Discharge Recommendation: No rehabilitation needs          See flowsheet documentation for full assessment

## 2021-05-25 NOTE — PLAN OF CARE
Problem: Potential for Falls  Goal: Patient will remain free of falls  Description: INTERVENTIONS:  - Assess patient frequently for physical needs  -  Identify cognitive and physical deficits and behaviors that affect risk of falls    -  [place] fall precautions as indicated by assessment   - Educate patient/family on patient safety including physical limitations  - Instruct patient to call for assistance with activity based on assessment  - Modify environment to reduce risk of injury  - Consider OT/PT consult to assist with strengthening/mobility  Outcome: Progressing     Problem: PAIN - ADULT  Goal: Verbalizes/displays adequate comfort level or baseline comfort level  Description: Interventions:  - Encourage patient to monitor pain and request assistance  - Assess pain using appropriate pain scale  - Administer analgesics based on type and severity of pain and evaluate response  - Implement non-pharmacological measures as appropriate and evaluate response  - Consider cultural and social influences on pain and pain management  - Notify physician/advanced practitioner if interventions unsuccessful or patient reports new pain  Outcome: Progressing     Problem: INFECTION - ADULT  Goal: Absence or prevention of progression during hospitalization  Description: INTERVENTIONS:  - Assess and monitor for signs and symptoms of infection  - Monitor lab/diagnostic results  - Monitor all insertion sites, i e  indwelling lines, tubes, and drains  - Monitor endotracheal if appropriate and nasal secretions for changes in amount and color  - [place] appropriate cooling/warming therapies per order  - Administer medications as ordered  - Instruct and encourage patient and family to use good hand hygiene technique  - Identify and instruct in appropriate isolation precautions for identified infection/condition  Outcome: Progressing     Problem: SAFETY ADULT  Goal: Patient will remain free of falls  Description: INTERVENTIONS:  - Assess patient frequently for physical needs  -  Identify cognitive and physical deficits and behaviors that affect risk of falls  -  [place] fall precautions as indicated by assessment   - Educate patient/family on patient safety including physical limitations  - Instruct patient to call for assistance with activity based on assessment  - Modify environment to reduce risk of injury  - Consider OT/PT consult to assist with strengthening/mobility  Outcome: Progressing     Problem: DISCHARGE PLANNING  Goal: Discharge to home or other facility with appropriate resources  Description: INTERVENTIONS:  - Identify barriers to discharge w/patient and caregiver  - Arrange for needed discharge resources and transportation as appropriate  - Identify discharge learning needs (meds, wound care, etc )  - Arrange for interpretive services to assist at discharge as needed  - Refer to Case Management Department for coordinating discharge planning if the patient needs post-hospital services based on physician/advanced practitioner order or complex needs related to functional status, cognitive ability, or social support system  Outcome: Progressing     Problem: Knowledge Deficit  Goal: Patient/family/caregiver demonstrates understanding of disease process, treatment plan, medications, and discharge instructions  Description: Complete learning assessment and assess knowledge base    Interventions:  - Provide teaching at level of understanding  - Provide teaching via preferred learning methods  Outcome: Progressing

## 2021-05-25 NOTE — ASSESSMENT & PLAN NOTE
· Follows with rheumatology at [facility]  · Previously was on Enbrel for many years but this was discontinued after he had recurrent staph infections and was changed to Humira about 6 months ago  · Reports that he is going to stop Humira as well given this recent infection and also because he has not noticed much improvement since starting it  · On Plaquenil and Arava

## 2021-05-25 NOTE — PROGRESS NOTES
[facility]  Progress Note - Karla Soto  1965, 50 y o  male MRN: [id number]  Unit/Bed#: S -01 Encounter: [id number]  Primary Care Provider: Jeevan Reyez DO   Date and time admitted to hospital: 5/24/2021  9:51 AM    * Abscess of left thigh  Assessment & Plan  · Patient was recently admitted at the end of April for MSSA left prepatellar bursitis and under I&D at that time but conitnued to have persistent drainage from the   · POD #1 s/p I&D  · Management per primary service  · Infectious disease following  · On high-dose IV Ancef  · OR cultures pending    Type 2 diabetes mellitus with diabetic neuropathic arthropathy, with long-term current use of insulin [facility])  Assessment & Plan  Lab Results   Component Value Date    HGBA1C 7 4 (H) 03/05/2021       Recent Labs     05/24/21  1641 05/24/21  2223 05/25/21  0715 05/25/21  1128   POCGLU 246* 323* 246* 280*       Blood Sugar Average: Last 72 hrs:  (P) 254 6     · Hold Januvia and Victoza while inpatient; resume on discharge  · Monitor accu-checks AC and HS  · Continue home long acting insulin regimen with Lantus 50 units BID  SSI for additional coverage TID AC and HS  · Blood sugars somewhat elevated  · Add insulin lispro 8 units t i d  A c  Benign essential hypertension  Assessment & Plan  · BP acceptable with -150s  · Continue to monitor  · Continue losartan-HCTZ  Tobacco use  Assessment & Plan  · Reports smoking a pack and a half per day and notes that he has been smoking since the age of 7  He admits that he does not want to quit smoking but states that he knows he needs to cut back on the amount he smokes  · Nicotine patch in place, continue  Mixed hyperlipidemia  Assessment & Plan  · Continue statin  Obesity (BMI 30 0-34  9)  Assessment & Plan  · BMI noted  · Patient reports trying to exercise and diet prior to his recent admission        Rheumatoid arthritis ([facility] [address] )  Assessment & Plan  · Follows with rheumatology at [facility]  · Previously was on Enbrel for many years but this was discontinued after he had recurrent staph infections and was changed to Humira about 6 months ago  · Reports that he is going to stop Humira as well given this recent infection and also because he has not noticed much improvement since starting it  · On Plaquenil and Arava  VTE Pharmacologic Prophylaxis:   Pharmacologic: Enoxaparin (Lovenox)  Mechanical VTE Prophylaxis in Place: Yes    Patient Centered Rounds: I have performed bedside rounds with nursing staff today  Discussions with Specialists or Other Care Team Provider:     Education and Discussions with Family / Patient:  Patient    Time Spent for Care: 30 minutes  More than 50% of total time spent on counseling and coordination of care as described above  Current Length of Stay: 1 day(s)    Current Patient Status: Inpatient     Discharge Plan:  Per ortho    Code Status: Level 1 - Full Code      Subjective:   Patient feels okay today  He is complaining of some burning pain in left knee  Objective:     Vitals:   Temp (24hrs), Av 9 °F (36 6 °C), Min:97 °F (36 1 °C), Max:98 6 °F (37 °C)    Temp:  [97 °F (36 1 °C)-98 6 °F (37 °C)] 98 1 °F (36 7 °C)  HR:  [] 94  Resp:  [11-20] 18  BP: (115-150)/(66-83) 122/69  SpO2:  [92 %-98 %] 95 %  Body mass index is 32 55 kg/m²  Input and Output Summary (last 24 hours): Intake/Output Summary (Last 24 hours) at 2021 1158  Last data filed at 2021 1129  Gross per 24 hour   Intake 830 ml   Output 3900 ml   Net -3070 ml       Physical Exam:     Physical Exam     Gen -Patient comfortable at rest  Neck- Supple  No thyromegaly or lymphadenopathy  Lungs-Clear bilaterally without any wheeze or rales   Heart S1-S2, regular rate and rhythm, no murmurs  Abdomen-soft nontender, no organomegaly   Bowel sounds present  Extremities-no cyanosi,  clubbing or edema   Left knee Ace bandage in place  Skin- no rash  Neuro-nonfocal       Additional Data:     Labs:    Results from last 7 days   Lab Units 05/25/21  0515   WBC Thousand/uL 13 20*   HEMOGLOBIN g/dL 11 8*   HEMATOCRIT % 36 7   PLATELETS Thousands/uL 226   NEUTROS PCT % 80*   LYMPHS PCT % 9*   MONOS PCT % 10   EOS PCT % 0     Results from last 7 days   Lab Units 05/25/21  0515   SODIUM mmol/L 138   POTASSIUM mmol/L 4 2   CHLORIDE mmol/L 104   CO2 mmol/L 24   BUN mg/dL 14   CREATININE mg/dL 1 00   ANION GAP mmol/L 10   CALCIUM mg/dL 8 4   GLUCOSE RANDOM mg/dL 263*         Results from last 7 days   Lab Units 05/25/21  1128 05/25/21  0715 05/24/21  2223 05/24/21  1641 05/24/21  1009   POC GLUCOSE mg/dl 280* 246* 323* 246* 178*                   * I Have Reviewed All Lab Data Listed Above  * Additional Pertinent Lab Tests Reviewed:  All Labs Within Last 24 Hours Reviewed    Imaging:    Imaging Reports Reviewed Today Include:   Imaging Personally Reviewed by Myself Includes:      Recent Cultures (last 7 days):     Results from last 7 days   Lab Units 05/24/21  1126   GRAM STAIN RESULT  3+ Polys  No bacteria seen       Last 24 Hours Medication List:   Current Facility-Administered Medications   Medication Dose Route Frequency Provider Last Rate    acetaminophen  650 mg Oral Q4H PRN Emanuel Bradshaw PA-C      albuterol  2 puff Inhalation Q4H PRN Emanuel Bradshaw PA-C      vitamin C  1,000 mg Oral Daily Emanuel Bradshaw PA-C      atorvastatin  40 mg Oral HS Emanuel Bradshaw PA-C      calcium carbonate  1 tablet Oral Daily With Breakfast Emanuel rBadshaw PA-C      calcium carbonate  1,000 mg Oral Daily PRN Emanuel Bradshaw PA-C      cefazolin  2,000 mg Intravenous Q8H Mariajose Crews MD 2,000 mg (05/25/21 0826)    docusate sodium  100 mg Oral BID Emanuel Bradshaw PA-C      enoxaparin  40 mg Subcutaneous Daily Indra Tomlinson      [START ON 5/29/2021] ergocalciferol  50,000 Units Oral Weekly Emanuel Bradshaw PA-C      fluticasone  2 spray Each Nare Daily Emanuel Bradshaw PA-C  HYDROmorphone  0 5 mg Intravenous Q3H PRN Najma Herrera, MARK      hydroxychloroquine  200 mg Oral BID Olivia Gelineau, MARK      insulin glargine  50 Units Subcutaneous Q12H [address] [place], [place]      insulin lispro  1-5 Units Subcutaneous HS Waldo, MARK      insulin lispro  1-6 Units Subcutaneous TID AC Olivia Hudson, MARK      insulin lispro  8 Units Subcutaneous TID With Meals Luis Miguel Santana MD      lactated ringers  125 mL/hr Intravenous Continuous Bessie Soldjennifer, CRNA 125 mL/hr (05/25/21 0256)    leflunomide  20 mg Oral Daily With [employer], MARK      losartan potassium-hydrochlorothiazide (HYZAAR 100/12  5) combo dose   Oral Daily Olivia Gelineau, MARK      nicotine  1 patch Transdermal Daily Olivia Gelineau, MARK      ondansetron  4 mg Intravenous Q6H PRN Olivia Gelineau, MARK      oxyCODONE  10 mg Oral Q4H PRN Olivia Gelineau, PA-EDILMA      oxyCODONE  5 mg Oral Q4H PRN Olivia Gelineau, PA-EDILMA      pantoprazole  40 mg Oral Daily Olivia [facility], MARK      saccharomyces boulardii  250 mg Oral BID Najma Herrera PA-C      senna  1 tablet Oral Daily [facility] [place]      sodium chloride  100 mL/hr Intravenous Continuous Olivia Geljanyu, PA-C 100 mL/hr (05/25/21 0032)    sucralfate  1 g Oral 4x Daily Olivia Gelmehreen, MARK          Today, Patient Was Seen By: Reyna Henderson MD    ** Please Note: Dictation voice to text software may have been used in the creation of this document   **

## 2021-05-25 NOTE — ASSESSMENT & PLAN NOTE
Lab Results   Component Value Date    HGBA1C 7 4 (H) 03/05/2021       Recent Labs     05/24/21  1009 05/24/21  2223   POCGLU 178* 323*       Blood Sugar Average: Last 72 hrs:  (P) 250 5     · Hold Januvia and Victoza while inpatient; resume on discharge  · Monitor accu-checks AC and HS  · Continue home long acting insulin regimen with Lantus 50 units BID  SSI for additional coverage TID AC and HS  · Will consider adding scheduled meal time Humalog if BG remains elevated tomorrow

## 2021-05-25 NOTE — PLAN OF CARE
Problem: PHYSICAL THERAPY ADULT  Goal: Performs mobility at highest level of function for planned discharge setting  See evaluation for individualized goals  Description:            See flowsheet documentation for full assessment, interventions and recommendations  5/25/2021 1638 by Tramaine Dang PT  Outcome: Completed  Note: Prognosis: Good  Problem List: Decreased strength, Decreased range of motion, Impaired balance, Obesity, Pain  Assessment:  Pt's score on the 30-Second Chair Stand Test is below that of age matched peers (14 repetitions), however, pt reported he wasn't giving maximum effort  Pt was able to properly perform therapeutic exercises without adverse effects, however, pt reported discomfort along the lateral aspect of his left thigh with quadriceps activation when performing LAQs, but it did not limit him from completing the activity  He verbalized understanding in regards to frequency and duration of adherence to the independent exercise program as well as purpose of exercises  P: Discharge from PT services at this time as he has met the goals of independence with HEP  He demonstrates he is able to perform all functional mobility independently, per initial evaluation  PT Discharge Recommendation: No rehabilitation needs          See flowsheet documentation for full assessment

## 2021-05-25 NOTE — ASSESSMENT & PLAN NOTE
· Reports smoking a pack and a half per day and notes that he has been smoking since the age of 7  He admits that he does not want to quit smoking but states that he knows he needs to cut back on the amount he smokes  · Nicotine patch in place, continue

## 2021-05-25 NOTE — ASSESSMENT & PLAN NOTE
· Patient was recently admitted at the end of April for MSSA left prepatellar bursitis and under I&D at that time but conitnued to have persistent drainage from the   · POD #0 s/p I&D  · Management per primary service  · Infectious disease following  · On IV Ancef  · Follow-up on OR cultures

## 2021-05-25 NOTE — ASSESSMENT & PLAN NOTE
· Follows with rheumatology at [facility]  · Previously was on Enbrel for many years but this was discontinued after he had recurrent staph infections and was changed to Humira about 6 months ago  · Reports that he is going to stop Humira as well given this recent infection and also because he has not noticed much improvement since starting it  · On Plaquenil and Arava

## 2021-05-25 NOTE — UTILIZATION REVIEW
Initial Clinical Review    Elective inpatient  surgical procedure    Age/Sex: 50 y o  male     Surgery Date: 5/24/2021     Procedure: INCISION AND DRAINAGE THIGH (Left)    Anesthesia: general     Operative Findings:  12 cm incision was made over the lateral thigh abscess  A large collection of purulent fluid was present in the subcutaneous tissue communicating with the prepatellar bursa  Aerobic and anaerobic cultures were obtained    Consult 5/24/2021  Medicine :  Patient has history of DM type 2 with diabetic neuropathy on insulin  Plan is hold Januvia and Victoza, continue home lantus and add SSI with accuchecks qid  Has history of RA on humiria,  To continue home Plaquenil and Arava,   History of HPT and to continue losartan - HCTZ  Consult Per ID 5/24/2021; patient has left distal thigh abscess status post I&D with findings of communication of abscess with left prepatellar bursa  Etiology suspect due to recurrent prepatellar bursitis with drainage to sc thigh and MSSA pathogen  To continue high dose Ancef and follow cultures  POD#1 Progress Note:  5/25/2021:  Patient is post operative day one status post I&D left prepatellar bursa lateral left  thigh  Patient has soreness of left knee  Wbc 13 20  H&H 11 8/36 7 with pre operative value of 13 3/40 5 on 5/1/2021  On exam LLE:  Dressing clean dry and intact, sensation and motor intact  Plan is OOB, WBAT LLE, PT, antibiotics as per ID  DVT prophylaxis SCDs Lovenox    Continue pain control     Admission Orders: Date/Time/Statement:   Admission Orders (From admission, onward)     Ordered        05/24/21 1153  Inpatient Admission  Once                   Orders Placed This Encounter   Procedures    Inpatient Admission     Standing Status:   Standing     Number of Occurrences:   1     Order Specific Question:   Level of Care     Answer:   Med Surg [16]     Order Specific Question:   Estimated length of stay     Answer:   More than 2 Midnights Order Specific Question:   Certification     Answer:   I certify that inpatient services are medically necessary for this patient for a duration of greater than two midnights  See H&P and MD Progress Notes for additional information about the patient's course of treatment       Vital Signs: /69 (BP Location: Left arm)   Pulse 94   Temp 98 1 °F (36 7 °C) (Oral)   Resp 18   Ht 6' (1 829 m)   Wt 109 kg (240 lb)   SpO2 95%   BMI 32 55 kg/m²   05/25/21 0700  98 1 °F (36 7 °C)  94  18  122/69  87  95 %  --  None (Room air)  --  Lying   05/24/21 2219  98 6 °F (37 °C)  108Abnormal   18  150/75  106  93 %  --  None (Room air)  --  Lying   05/24/21 1945  98 4 °F (36 9 °C)  100  18  137/72  97  93 %  --  None (Room air)  --  Lying   05/24/21 1846  98 1 °F (36 7 °C)  94  18  136/69  96  97 %  --  None (Room air)  --  Lying   05/24/21 1812  97 9 °F (36 6 °C)  97  18  137/81  103  97 %  --  None (Room air)  --  Lying   05/24/21 1745  97 °F (36 1 °C)Abnormal   90  18  126/66  --  96 %  --  None (Room air)  --  --   05/24/21 1716  97 1 °F (36 2 °C)Abnormal   88  18  136/67  --  95 %  --  None (Room air           Pertinent Labs/Diagnostic Test Results:  No imaging     Results from last 7 days   Lab Units 05/25/21  0515   WBC Thousand/uL 13 20*   HEMOGLOBIN g/dL 11 8*   HEMATOCRIT % 36 7   PLATELETS Thousands/uL 226   NEUTROS ABS Thousands/µL 10 70*     Results from last 7 days   Lab Units 05/25/21  0515   SODIUM mmol/L 138   POTASSIUM mmol/L 4 2   CHLORIDE mmol/L 104   CO2 mmol/L 24   ANION GAP mmol/L 10   BUN mg/dL 14   CREATININE mg/dL 1 00   EGFR ml/min/1 73sq m 86   CALCIUM mg/dL 8 4     Results from last 7 days   Lab Units 05/25/21  0715 05/24/21  2223 05/24/21  1641 05/24/21  1009   POC GLUCOSE mg/dl 246* 323* 246* 178*     Results from last 7 days   Lab Units 05/25/21  0515   GLUCOSE RANDOM mg/dL 263*     Results from last 7 days   Lab Units 05/24/21  1126   GRAM STAIN RESULT  3+ Polys  No bacteria seen Diet:  Regular     Mobility: WBAT LLE  PT/OT    DVT Prophylaxis:  Lovenox  Bilateral SCDs  Medications/Pain Control:   Scheduled Medications:  vitamin C, 1,000 mg, Oral, Daily  atorvastatin, 40 mg, Oral, HS  calcium carbonate, 1 tablet, Oral, Daily With Breakfast  cefazolin, 2,000 mg, Intravenous, Q8H  docusate sodium, 100 mg, Oral, BID  enoxaparin, 40 mg, Subcutaneous, Daily  [START ON 5/29/2021] ergocalciferol, 50,000 Units, Oral, Weekly  fluticasone, 2 spray, Each Nare, Daily  hydroxychloroquine, 200 mg, Oral, BID  insulin glargine, 50 Units, Subcutaneous, Q12H [place]  insulin lispro, 1-5 Units, Subcutaneous, HS  insulin lispro, 1-6 Units, Subcutaneous, TID AC  leflunomide, 20 mg, Oral, Daily With Dinner  losartan potassium-hydrochlorothiazide (HYZAAR 100/12  5) combo dose, , Oral, Daily  nicotine, 1 patch, Transdermal, Daily  pantoprazole, 40 mg, Oral, Daily  saccharomyces boulardii, 250 mg, Oral, BID  senna, 1 tablet, Oral, Daily  sucralfate, 1 g, Oral, 4x Daily      Continuous IV Infusions:  lactated ringers, 125 mL/hr, Intravenous, Continuous - started 5/25/2021   sodium chloride, 100 mL/hr, Intravenous, Continuous      PRN Meds:  acetaminophen, 650 mg, Oral, Q4H PRN  albuterol, 2 puff, Inhalation, Q4H PRN - used x 1 5/25   calcium carbonate, 1,000 mg, Oral, Daily PRN  HYDROmorphone, 0 5 mg, Intravenous, Q3H PRN  ondansetron, 4 mg, Intravenous, Q6H PRN - used x 1 5/24/2021   oxyCODONE, 10 mg, Oral, Q4H PRN  oxyCODONE, 5 mg, Oral, Q4H PRN - used x  2 5/24, x 1 5/25/2021         Network Utilization Review Department  ATTENTION: Please call with any questions or concerns to [phone] and carefully listen to the prompts so that you are directed to the right person  All voicemails are confidential   Kirsten Villalpando all requests for admission clinical reviews, approved or denied determinations and any other requests to dedicated fax number below belonging to the campus where the patient is receiving treatment   List of dedicated fax numbers for the Facilities:  [address] [place] [address] DENIALS (Administrative/Medical Necessity) [phone]   [address] (Maternity/NICU/Pediatrics) [phone] [address] Jayesh Kumar [address]75 [address] [place] [address] Carla Frank [address] [address] [facility] [phone]

## 2021-05-25 NOTE — PLAN OF CARE
Problem: Potential for Falls  Goal: Patient will remain free of falls  Description: INTERVENTIONS:  - Assess patient frequently for physical needs  -  Identify cognitive and physical deficits and behaviors that affect risk of falls    -  [place] fall precautions as indicated by assessment   - Educate patient/family on patient safety including physical limitations  - Instruct patient to call for assistance with activity based on assessment  - Modify environment to reduce risk of injury  - Consider OT/PT consult to assist with strengthening/mobility  Outcome: Progressing     Problem: PAIN - ADULT  Goal: Verbalizes/displays adequate comfort level or baseline comfort level  Description: Interventions:  - Encourage patient to monitor pain and request assistance  - Assess pain using appropriate pain scale  - Administer analgesics based on type and severity of pain and evaluate response  - Implement non-pharmacological measures as appropriate and evaluate response  - Consider cultural and social influences on pain and pain management  - Notify physician/advanced practitioner if interventions unsuccessful or patient reports new pain  Outcome: Progressing     Problem: INFECTION - ADULT  Goal: Absence or prevention of progression during hospitalization  Description: INTERVENTIONS:  - Assess and monitor for signs and symptoms of infection  - Monitor lab/diagnostic results  - Monitor all insertion sites, i e  indwelling lines, tubes, and drains  - Monitor endotracheal if appropriate and nasal secretions for changes in amount and color  - [place] appropriate cooling/warming therapies per order  - Administer medications as ordered  - Instruct and encourage patient and family to use good hand hygiene technique  - Identify and instruct in appropriate isolation precautions for identified infection/condition  Outcome: Progressing     Problem: SAFETY ADULT  Goal: Patient will remain free of falls  Description: INTERVENTIONS:  - Assess patient frequently for physical needs  -  Identify cognitive and physical deficits and behaviors that affect risk of falls  -  [place] fall precautions as indicated by assessment   - Educate patient/family on patient safety including physical limitations  - Instruct patient to call for assistance with activity based on assessment  - Modify environment to reduce risk of injury  - Consider OT/PT consult to assist with strengthening/mobility  Outcome: Progressing     Problem: DISCHARGE PLANNING  Goal: Discharge to home or other facility with appropriate resources  Description: INTERVENTIONS:  - Identify barriers to discharge w/patient and caregiver  - Arrange for needed discharge resources and transportation as appropriate  - Identify discharge learning needs (meds, wound care, etc )  - Arrange for interpretive services to assist at discharge as needed  - Refer to Case Management Department for coordinating discharge planning if the patient needs post-hospital services based on physician/advanced practitioner order or complex needs related to functional status, cognitive ability, or social support system  Outcome: Progressing     Problem: Knowledge Deficit  Goal: Patient/family/caregiver demonstrates understanding of disease process, treatment plan, medications, and discharge instructions  Description: Complete learning assessment and assess knowledge base    Interventions:  - Provide teaching at level of understanding  - Provide teaching via preferred learning methods  Outcome: Progressing

## 2021-05-25 NOTE — PROGRESS NOTES
Progress Note - Infectious Disease   Lowell Gomes  50 y o  male MRN: [id number]  Unit/Bed#: S -01 Encounter: [id number]      Impression/Plan:  1  Left distal thigh abscess, status post I&D with findings of communication of abscess with left prepatellar bursa  Recurrent prepatellar bursitis with drainage to subcutaneous thigh suspected  With MSSA infection in recent admission, MSSA is the likely pathogen again  Etiology of recurrence is most likely a combination of poor immunological status from Humira and possible low bioavailability of p o  Antibiotic given patient's weight  This time around, will treat patient with a more prolonged IV antibiotic course and higher dose p o  Antibiotic at discharge  Continue high-dose IV cefazolin  Follow-up on operative cultures  Serial left thigh/knee exams  Monitor temperature/WBC      2  Septic left prepatellar bursitis  Patient is status post I&D during recent admission with operative culture growing MSSA  As in above, recurrent prepatellar bursitis with tracking to thigh suspected  Etiology of prepatellar bursitis is most likely trauma from kneeling  Patient does not use knee pads  He was counseled regarding the need to use knee pads when he kneels      3  RA, on chronic TNF inhibitors  Patient likely has significant immunosuppression from this  This contributes to infection above      4  DM  With hyperglycemia  Risk factor for infection  Above impression and plan discussed in detail with patient, RN, and primary care team     Antibiotics:  Cefazolin D2    Subjective:  Patient has no fever, chills, sweats overnight; no nausea, vomiting, diarrhea on probiotic; no cough, shortness of breath; + post op LLE pain tolerable  No new symptoms      Objective:  Vitals:  Temp:  [97 °F (36 1 °C)-98 6 °F (37 °C)] 98 2 °F (36 8 °C)  HR:  [] 88  Resp:  [18] 18  BP: (122-150)/(61-81) 125/61  SpO2:  [93 %-97 %] 96 %  Temp (24hrs), Av 9 °F (36 6 °C), Min:97 °F (36 1 °C), Max:98 6 °F (37 °C)  Current: Temperature: 98 2 °F (36 8 °C)    Physical Exam:   General Appearance:  Alert, interactive, nontoxic, no acute distress  Throat: Oropharynx moist without lesions  Lungs:   Clear to auscultation bilaterally; no wheezes, rhonchi or rales; respirations unlabored   Heart:  RRR; no murmur   Abdomen:   Soft, non-tender, non-distended, positive bowel sounds  Extremities: No clubbing, cyanosis, + LLE mild edema, left knee/thigh ace wrapped without strike through drainage or erythema spreading outside wrap  Skin: No new rashes or lesions  IV site nontender         Labs, Imaging, & Other studies:   All pertinent labs and imaging studies were personally reviewed  Results from last 7 days   Lab Units 05/25/21  0515   WBC Thousand/uL 13 20*   HEMOGLOBIN g/dL 11 8*   PLATELETS Thousands/uL 226     Results from last 7 days   Lab Units 05/25/21  0515   SODIUM mmol/L 138   POTASSIUM mmol/L 4 2   CHLORIDE mmol/L 104   CO2 mmol/L 24   BUN mg/dL 14   CREATININE mg/dL 1 00   EGFR ml/min/1 73sq m 86   CALCIUM mg/dL 8 4     Results from last 7 days   Lab Units 05/24/21  1126   GRAM STAIN RESULT  3+ Polys  No bacteria seen

## 2021-05-25 NOTE — PHYSICAL THERAPY NOTE
PHYSICAL THERAPY NOTE          Patient Name: Carla Thomas  Today's Date: 5/25/2021     AGE:   50 y o  Mrn:   [id number]  ADMIT DX:  Abscess of left thigh [L02 [id number]]    Past Medical History:   Diagnosis Date    Arthritis     At risk for falls     Broken foot     right    Cataract     giacomo    COPD (chronic obstructive pulmonary disease) ([facility] )     Diabetes mellitus ([place] [address] )     Hx MRSA infection     Per wife patient hadf MRSa in a wound awhile ago    Hyperlipidemia     Kidney stone     Neuropathy     RA (rheumatoid arthritis) ([place] [address] )     Rheumatoid arthritis ([place] [address] )     Seasonal allergies     Sepsis ([place] [address] ) 4/26/2021    Snores     Uses wheelchair     and crutches- NWB RLE    Wears glasses         05/25/21 1200   PT Last Visit   PT Visit Date 05/25/21   Note Type   Note type Evaluation   Pain Assessment   Pain Assessment Tool 0-10   Pain Score 3   Pain Location/Orientation Orientation: Left;Orientation: Upper; Location: Leg   Pain Onset/Description Frequency: Intermittent; Descriptor: Burning  (pain with moving knee or weightbearing, no pain with rest)   Home Living   Type of [address]   Additional Comments pt has 1st floor set up, 6 REY w/ HR; has crutches but prior to admission he was ambulating without an AD   Prior Function   Level of [place] Independent with ADLs and functional mobility   Lives With Spouse;Son;Other (Comment)  (+ son's friend)   ADL Assistance Independent   IADLs Independent   Falls in the last 6 months 0   Vocational On disability  (self employed contractor)   Comments pt denies any falls, but reports "I lose my balance easily but always catch myself "   Restrictions/Precautions   Weight Bearing Precautions Per Order Yes   LLE Weight Bearing Per Order WBAT   General   Additional Pertinent History pt reports he hasn't followed up with podiatry regarding "rebroken" right foot - he states he has a CROW boot but doesn't wear it, he wears orthotic sneakers when walking   Cognition   Overall Cognitive Status WFL   Arousal/Participation Alert   Orientation Level Oriented X4   Memory Within functional limits   Following Commands Follows all commands and directions without difficulty   Strength RLE   R Hip Flexion 5/5   R Knee Flexion 5/5   R Knee Extension 5/5   R Ankle Dorsiflexion 4/5   Strength LLE   L Hip Flexion 4+/5   L Knee Flexion 3/5  (pt deferred manual resistance d/t fear of pain)   L Knee Extension 3/5  (pt deferred manual resistance d/t fear of pain)   L Ankle Dorsiflexion 4/5   Light Touch   RLE Light Touch Grossly intact   LLE Light Touch Grossly intact   Bed Mobility   Rolling R 7  Independent   Supine to Sit 7  Independent   Transfers   Sit to Stand 7  Independent   Stand to Sit 7  Independent   Additional Comments pt reports he has been walking to the bathroom independently    Ambulation/Elevation   Gait pattern Wide DESTIN; Short stride  (excessive toeing out B/L)   Gait Assistance 7  Independent   Additional items   (able to ambulate w/ independent management of IV pole )   Assistive Device None   Distance 190ft; gait speed: 0 5 m/s   Stair Management Assistance Not tested  (pt verbally reports that he is able to perform stairs, declined performance)   Balance   Static Sitting Normal   Dynamic Sitting Normal   Static Standing Good   Dynamic Standing Fair +   Ambulatory Good   Endurance Deficit   Endurance Deficit No   Activity Tolerance   Activity Tolerance Patient tolerated treatment well   Assessment   Prognosis Good   Problem List Decreased strength;Decreased range of motion; Impaired balance;Obesity;Pain   Assessment Gris Burgos  is a 50 y o  male who [facility] on 05/24/2021 from home for a repeat incision and drainage of the left thigh  Pt previously underwent excisional debridement of prepatellar bursa on 04/29/2021   Orders for PT eval and treat received, w/ activity orders of up w/ A and WBAT L LE  Pt presents w/ comorbidities of RA, T2DM, neuropathy, COPD, and "broken right foot" (however, no documentation noted in Epic limiting WB status, but reports inconsistently using his CROW boot prior to admission)  At baseline, pt mobilizes without an assistive device and denies falls in the past 6 months, however, reports unsteadiness  Upon evaluation, pt demonstrates he is independent with bed mobility, transfers, and ambulation without an AD; although pt declined stair negotiation he verbalized he has no difficulty at this time  Pt's gait speed of 0 5 m/s indicates he is a limited community ambulator  Pt's clinical presentation is evolving due to pain impacting functional mobility, neuropathy, and significant past medical history  Given the above findings, discharge recommendation is for home w/ no skilled PT needs w/o DME  During this admission, pt would benefit from 1-2 more sessions of acute inpatient PT in order to address aforementioned deficits to maximize function and mobility  Goals   Patient Goals "I want to go camping this weekend "   STG Expiration Date 21   Short Term Goal #1 Patient will be independent with home exercise program in order to prevent muscle atrophy and optimize functional mobility     Plan   PT Frequency 1-2x/wk   Recommendation   PT Discharge Recommendation No rehabilitation needs   AM-PAC Basic Mobility Inpatient   Turning in Bed Without Bedrails 4   Lying on Back to Sitting on Edge of Flat Bed 4   Moving Bed to Chair 4   Standing Up From Chair 4   Walk in Room 4   Climb 3-5 Stairs 4   Basic Mobility Inpatient Raw Score 24   Basic Mobility Standardized Score 57 68       Length Of Stay: 1  PHYSICAL THERAPY EVALUATION :   Patient's identity confirmed via 2 patient identifiers (full name and ) at start of session    The patient's AM-PAC Basic Mobility Inpatient Short Form Raw Score is 24, Standardized Score is 57 68  A standardized score greater than 42 9 suggests the patient may benefit from discharge to home  Please also refer to the recommendation of the Physical Therapist for safe discharge planning  Name: Rose Cespedes  : 1965  Time in: 1150  Time out: 1250  Total treat time: 10 minutes    S: Pt was receptive and agreeable to participate in PT intervention  O: Pt was able to perform 12 repetitions when performing the 30-Second Chair Stand Test  Pt was instructed on performance of therapeutic exercises sitting EOB consisting of seated heel raises (1x10), seated toe raises (1x10), LAQ (3 s hold, 1x10), and seated marching (1x10)  He was instructed on performing 3x10 of each activity once every hour in order to prevent DVT and reduce muscle atrophy  A:  Pt's score on the 30-Second Chair Stand Test is below that of age matched peers (14 repetitions), however, pt reported he wasn't giving maximum effort  Pt was able to properly perform therapeutic exercises without adverse effects, however, pt reported discomfort along the lateral aspect of his left thigh with quadriceps activation when performing LAQs, but it did not limit him from completing the activity  He verbalized understanding in regards to frequency and duration of adherence to the independent exercise program as well as purpose of exercises  P: Discharge from PT services at this time as he has met the goals of independence with HEP  He demonstrates he is able to perform all functional mobility independently, per initial evaluation         Kaci Banda, PT

## 2021-05-25 NOTE — CONSULTS
[address]  1965, 50 y o  male MRN: [id number]  Unit/Bed#: S -01 Encounter: [id number]  Primary Care Provider: Karri Whiting DO   Date and time admitted to hospital: 5/24/2021  9:51 AM    Inpatient consult to Internal Medicine  Consult performed by: Adams Cates PA-C  Consult ordered by: Stephanie Majano PA-C          * Abscess of left thigh  Assessment & Plan  · Patient was recently admitted at the end of April for MSSA left prepatellar bursitis and under I&D at that time but conitnued to have persistent drainage from the   · POD #0 s/p I&D  · Management per primary service  · Infectious disease following  · On IV Ancef  · Follow-up on OR cultures  Type 2 diabetes mellitus with diabetic neuropathic arthropathy, with long-term current use of insulin [facility])  Assessment & Plan  Lab Results   Component Value Date    HGBA1C 7 4 (H) 03/05/2021       Recent Labs     05/24/21  1009 05/24/21  2223   POCGLU 178* 323*       Blood Sugar Average: Last 72 hrs:  (P) 250 5     · Hold Januvia and Victoza while inpatient; resume on discharge  · Monitor accu-checks AC and HS  · Continue home long acting insulin regimen with Lantus 50 units BID  SSI for additional coverage TID AC and HS  · Will consider adding scheduled meal time Humalog if BG remains elevated tomorrow  Tobacco use  Assessment & Plan  · Reports smoking a pack and a half per day and notes that he has been smoking since the age of 7  He admits that he does not want to quit smoking but states that he knows he needs to cut back on the amount he smokes  · Nicotine patch in place, continue  Rheumatoid arthritis ([facility] )  Assessment & Plan  · Follows with [facility]ology at [facility]  · Previously was on Enbrel for many years but this was discontinued after he had recurrent staph infections and was changed to Humira about 6 months ago     · Reports that he is going to stop Humira as well given this recent infection and also because he has not noticed much improvement since starting it  · On Plaquenil and Arava  Obesity (BMI 30 0-34  9)  Assessment & Plan  · BMI noted  · Patient reports trying to exercise and diet prior to his recent admission  Mixed hyperlipidemia  Assessment & Plan  · Continue statin  Benign essential hypertension  Assessment & Plan  · BP acceptable with -150s  · Continue to monitor  · Continue losartan-HCTZ  VTE Prophylaxis: Enoxaparin (Lovenox)  / sequential compression device     Recommendations for Discharge:  · Pending hospital course    Counseling / Coordination of Care Time: 1 hour  Greater than 50% of total time spent on patient counseling and coordination of care  Collaboration of Care: Were Recommendations Directly Discussed with Primary Treatment Team? - Yes     History of Present Illness:    Abel Sanchez  is a 50 y o  male who is originally admitted to the orthopedic surgery service for a repeat incision and drainage of his left distal lateral thigh abscess  Patient was recently admitted here at the end of April for left prepatellar bursitis and underwent I&D at that time  OR cultures grew MSSA  He completed a course of antibiotics but notes that since his discharge his wound has been persistently draining pus  He had repeat FU with orthopedics on 5/11 and it was recommended that the patient undergo repeat I&D but patient had a vacation scheduled to [facility] so wanted to defer this until after and he was placed on Levaquin in the interim  We are consulted for medical management  Patient has a history of type 2 diabetes with neuropathy and Charcot right foot as well as a history of tobacco abuse, HTN, HLD, RA and GERD  In regards to his DM, he reports that his blood sugars are usually well controlled at home on his current medication regimen but were elevated during his recent admission   He reports that prior to his recent admission he had been walking to exercise and watching his diet and was losing weight  For his RA, he follows with rheumatology at [facility] and reports having been on Enbrel for many years but this was discontinued after he had recurrent staph infections and was changed to Humira about 6 months ago but patient reports that he his stopping the Humira as well in the setting of his recent infection and also notes that he hasn't seen much improvement in his RA since starting it  Review of Systems:    Review of Systems   Constitutional: Negative for appetite change, chills and fever  HENT: Positive for congestion  Respiratory: Negative for cough and shortness of breath  Cardiovascular: Negative for chest pain  Gastrointestinal: Negative for abdominal pain, diarrhea, nausea and vomiting  Genitourinary: Negative for difficulty urinating  Musculoskeletal: Positive for arthralgias (mild pain in left knee)  Neurological: Negative for dizziness and light-headedness  All other systems reviewed and are negative        Past Medical and Surgical History:     Past Medical History:   Diagnosis Date    Arthritis     At risk for falls     Broken foot     right    Cataract     giacomo    COPD [facility]chronic obstructive [facility] disease) ([facility] )     Diabetes mellitus ([facility] )     Hx MRSA infection     Per wife patient hadf MRSa in a wound awhile ago    Hyperlipidemia     Kidney stone     Neuropathy     RA (rheumatoid arthritis) ([facility] )     Rheumatoid arthritis ([facility] )     Seasonal allergies     Sepsis ([facility] ) 4/26/2021    Snores     Uses wheelchair     and crutches- NWB RLE    Wears glasses        Past Surgical History:   Procedure Laterality Date    APPENDECTOMY      CLOSED REDUCTION FOOT DISLOCATION Right 2/12/2019    Procedure: C/R FRACTURE;  Surgeon: Tamra Homans, DPM;  [facility];  Service: Podiatry    COLONOSCOPY      FOOT SURGERY Right 07/2019    Removal of the bone graft and cleaned out infection, and placed new bone graft    IR PICC PLACEMENT SINGLE LUMEN  8/5/2019    MD EXC SKIN MALIG <0 5 CM REMAINDER BODY N/A 3/28/2018    Procedure: EXCISION WIDE LESION HEAD/FACIAL/NECK;  Surgeon: Driss Watts MD;  [facility];  Service: Surgical Oncology    MD GASTROCNEMIUS RECESSION Right 2/12/2019    Procedure: ENDO GASTROC RECESSION, APPLICATION OF EXTERNAL FIXATOR;  Surgeon: Sandra Bradley DPM;  [facility];  Service: Podiatry    MD REMOVE EXTERN BONE FIX DEV W ANESTH Right 4/18/2019    Procedure: FRAME REMOVAL HARDWARE FOOT WITH APPLICATION OF GRAFT;  Surgeon: Sandra Bradley DPM;  [facility];  Service: Podiatry    SKIN BIOPSY      scalp    [address] Left 4/29/2021    Procedure: KNEE INCISION AND DRAINAGE, EXCISIONAL DEBRIDEMENT OF PREPATELLAR BURSA;   Surgeon: Jose Rodriguez MD;  [facility];  Service: Orthopedics       Meds/Allergies:    all medications and allergies reviewed    Allergies: No Known Allergies    Social History:     Marital Status: /Civil Union    Substance Use History:   Social History     Substance and Sexual Activity   Alcohol Use Yes    Frequency: Monthly or less    Drinks per session: 1 or 2    Binge frequency: Never    Comment: Socially     Social History     Tobacco Use   Smoking Status Current Every Day Smoker    Packs/day: 1 00    Years: 40 00    Pack years: 40 00    Types: Cigarettes   Smokeless Tobacco Never Used     Social History     Substance and Sexual Activity   Drug Use Yes    Frequency: 7 0 times per week    Types: Marijuana    Comment: Daily for Pain       Family History:    Family History   Problem Relation Age of Onset    Diabetes Mother     Stroke Mother     Mental illness Mother     Diabetes Father     Stroke Father     Alcohol abuse Brother     Coronary artery disease Family         Age 51-55    Diabetes type II Family     Heart disease Family        Physical Exam:     Vitals:   Blood Pressure: 150/75 (05/24/21 2219)  Pulse: (!) 108 (05/24/21 2219)  Temperature: 98 6 °F (37 °C) (05/24/21 2219)  Temp Source: Oral (05/24/21 2219)  Respirations: 18 (05/24/21 2219)  Height: 6' (182 9 cm) (05/19/21 0825)  Weight - Scale: 109 kg (240 lb) (05/19/21 0825)  SpO2: 93 % (05/24/21 2219)    Physical Exam  Vitals signs and nursing note reviewed  Constitutional:       General: He is not in acute distress  Appearance: He is not ill-appearing or diaphoretic  HENT:      Head: Normocephalic and atraumatic  Eyes:      Conjunctiva/sclera: Conjunctivae normal    Cardiovascular:      Rate and Rhythm: Normal rate and regular rhythm  Pulmonary:      Effort: Pulmonary effort is normal  No respiratory distress  Breath sounds: Normal breath sounds  Abdominal:      General: Bowel sounds are normal       Palpations: Abdomen is soft  Tenderness: There is no abdominal tenderness  Musculoskeletal:      Right lower leg: No edema  Left lower leg: Edema present  Comments: LLE with ACE wrap in place   Skin:     General: Skin is warm and dry  Coloration: Skin is not pale  Neurological:      Mental Status: He is alert and oriented to person, place, and time  Psychiatric:         Mood and Affect: Mood normal          Behavior: Behavior normal          Additional Data:     Lab Results: I have personally reviewed pertinent reports  Lab Results   Component Value Date/Time    HGBA1C 7 4 (H) 03/05/2021 08:19 AM    HGBA1C 8 6 (H) 11/02/2020 08:52 AM    HGBA1C 6 5 (H) 06/16/2020 08:30 AM    HGBA1C 9 8 (H) 01/23/2017 01:00 PM    HGBA1C 12 7 (H) 11/05/2015 09:30 AM     Results from last 7 days   Lab Units 05/24/21  2223 05/24/21  1641 05/24/21  1009   POC GLUCOSE mg/dl 323* 246* 178*           Imaging: I have personally reviewed pertinent reports        No orders to display       ** Please Note: This note has been constructed using a voice recognition system   **

## 2021-05-25 NOTE — ASSESSMENT & PLAN NOTE
Lab Results   Component Value Date    HGBA1C 7 4 (H) 03/05/2021       Recent Labs     05/24/21  1641 05/24/21  2223 05/25/21  0715 05/25/21  1128   POCGLU 246* 323* 246* 280*       Blood Sugar Average: Last 72 hrs:  (P) 254 6     · Hold Januvia and Victoza while inpatient; resume on discharge  · Monitor accu-checks AC and HS  · Continue home long acting insulin regimen with Lantus 50 units BID  SSI for additional coverage TID AC and HS  · Blood sugars somewhat elevated  · Add insulin lispro 8 units t i d  A c

## 2021-05-25 NOTE — PLAN OF CARE
Problem: Potential for Falls  Goal: Patient will remain free of falls  Description: INTERVENTIONS:  - Assess patient frequently for physical needs  -  Identify cognitive and physical deficits and behaviors that affect risk of falls    -  [place] fall precautions as indicated by assessment   - Educate patient/family on patient safety including physical limitations  - Instruct patient to call for assistance with activity based on assessment  - Modify environment to reduce risk of injury  - Consider OT/PT consult to assist with strengthening/mobility  Outcome: Progressing     Problem: PAIN - ADULT  Goal: Verbalizes/displays adequate comfort level or baseline comfort level  Description: Interventions:  - Encourage patient to monitor pain and request assistance  - Assess pain using appropriate pain scale  - Administer analgesics based on type and severity of pain and evaluate response  - Implement non-pharmacological measures as appropriate and evaluate response  - Consider cultural and social influences on pain and pain management  - Notify physician/advanced practitioner if interventions unsuccessful or patient reports new pain  Outcome: Progressing     Problem: INFECTION - ADULT  Goal: Absence or prevention of progression during hospitalization  Description: INTERVENTIONS:  - Assess and monitor for signs and symptoms of infection  - Monitor lab/diagnostic results  - Monitor all insertion sites, i e  indwelling lines, tubes, and drains  - Monitor endotracheal if appropriate and nasal secretions for changes in amount and color  - [place] appropriate cooling/warming therapies per order  - Administer medications as ordered  - Instruct and encourage patient and family to use good hand hygiene technique  - Identify and instruct in appropriate isolation precautions for identified infection/condition  Outcome: Progressing     Problem: SAFETY ADULT  Goal: Patient will remain free of falls  Description: INTERVENTIONS:  - Assess patient frequently for physical needs  -  Identify cognitive and physical deficits and behaviors that affect risk of falls  -  [place] fall precautions as indicated by assessment   - Educate patient/family on patient safety including physical limitations  - Instruct patient to call for assistance with activity based on assessment  - Modify environment to reduce risk of injury  - Consider OT/PT consult to assist with strengthening/mobility  Outcome: Progressing     Problem: DISCHARGE PLANNING  Goal: Discharge to home or other facility with appropriate resources  Description: INTERVENTIONS:  - Identify barriers to discharge w/patient and caregiver  - Arrange for needed discharge resources and transportation as appropriate  - Identify discharge learning needs (meds, wound care, etc )  - Arrange for interpretive services to assist at discharge as needed  - Refer to Case Management Department for coordinating discharge planning if the patient needs post-hospital services based on physician/advanced practitioner order or complex needs related to functional status, cognitive ability, or social support system  Outcome: Progressing     Problem: Knowledge Deficit  Goal: Patient/family/caregiver demonstrates understanding of disease process, treatment plan, medications, and discharge instructions  Description: Complete learning assessment and assess knowledge base    Interventions:  - Provide teaching at level of understanding  - Provide teaching via preferred learning methods  Outcome: Progressing

## 2021-05-26 VITALS
HEIGHT: 72 IN | SYSTOLIC BLOOD PRESSURE: 141 MMHG | TEMPERATURE: 98.5 F | OXYGEN SATURATION: 96 % | RESPIRATION RATE: 18 BRPM | HEART RATE: 83 BPM | WEIGHT: 240 LBS | DIASTOLIC BLOOD PRESSURE: 76 MMHG | BODY MASS INDEX: 32.51 KG/M2

## 2021-05-26 DIAGNOSIS — L02.416 ABSCESS OF LEFT THIGH: Primary | ICD-10-CM

## 2021-05-26 LAB
BASOPHILS # BLD AUTO: 0.1 THOUSANDS/ΜL (ref 0–0.1)
BASOPHILS NFR BLD AUTO: 1 % (ref 0–1)
CRP SERPL QL: 5.2 MG/L
EOSINOPHIL # BLD AUTO: 0.15 THOUSAND/ΜL (ref 0–0.61)
EOSINOPHIL NFR BLD AUTO: 2 % (ref 0–6)
ERYTHROCYTE [DISTWIDTH] IN BLOOD BY AUTOMATED COUNT: 13.3 % (ref 11.6–15.1)
ERYTHROCYTE [SEDIMENTATION RATE] IN BLOOD: 31 MM/HOUR (ref 0–19)
GLUCOSE SERPL-MCNC: 119 MG/DL (ref 65–140)
GLUCOSE SERPL-MCNC: 167 MG/DL (ref 65–140)
GLUCOSE SERPL-MCNC: 237 MG/DL (ref 65–140)
HCT VFR BLD AUTO: 37.9 % (ref 36.5–49.3)
HGB BLD-MCNC: 12.4 G/DL (ref 12–17)
IMM GRANULOCYTES # BLD AUTO: 0.1 THOUSAND/UL (ref 0–0.2)
IMM GRANULOCYTES NFR BLD AUTO: 1 % (ref 0–2)
LYMPHOCYTES # BLD AUTO: 2.24 THOUSANDS/ΜL (ref 0.6–4.47)
LYMPHOCYTES NFR BLD AUTO: 26 % (ref 14–44)
MCH RBC QN AUTO: 29.5 PG (ref 26.8–34.3)
MCHC RBC AUTO-ENTMCNC: 32.7 G/DL (ref 31.4–37.4)
MCV RBC AUTO: 90 FL (ref 82–98)
MONOCYTES # BLD AUTO: 0.96 THOUSAND/ΜL (ref 0.17–1.22)
MONOCYTES NFR BLD AUTO: 11 % (ref 4–12)
NEUTROPHILS # BLD AUTO: 5.1 THOUSANDS/ΜL (ref 1.85–7.62)
NEUTS SEG NFR BLD AUTO: 59 % (ref 43–75)
NRBC BLD AUTO-RTO: 0 /100 WBCS
PLATELET # BLD AUTO: 212 THOUSANDS/UL (ref 149–390)
PMV BLD AUTO: 10.3 FL (ref 8.9–12.7)
RBC # BLD AUTO: 4.2 MILLION/UL (ref 3.88–5.62)
WBC # BLD AUTO: 8.65 THOUSAND/UL (ref 4.31–10.16)

## 2021-05-26 PROCEDURE — 99233 SBSQ HOSP IP/OBS HIGH 50: CPT | Performed by: INTERNAL MEDICINE

## 2021-05-26 PROCEDURE — 99232 SBSQ HOSP IP/OBS MODERATE 35: CPT | Performed by: PHYSICIAN ASSISTANT

## 2021-05-26 PROCEDURE — 85025 COMPLETE CBC W/AUTO DIFF WBC: CPT | Performed by: PHYSICIAN ASSISTANT

## 2021-05-26 PROCEDURE — 99024 POSTOP FOLLOW-UP VISIT: CPT | Performed by: PHYSICIAN ASSISTANT

## 2021-05-26 PROCEDURE — 82948 REAGENT STRIP/BLOOD GLUCOSE: CPT

## 2021-05-26 PROCEDURE — 85652 RBC SED RATE AUTOMATED: CPT | Performed by: PHYSICIAN ASSISTANT

## 2021-05-26 RX ORDER — LANOLIN ALCOHOL/MO/W.PET/CERES
3 CREAM (GRAM) TOPICAL
Status: DISCONTINUED | OUTPATIENT
Start: 2021-05-26 | End: 2021-05-26 | Stop reason: HOSPADM

## 2021-05-26 RX ORDER — CEFADROXIL 500 MG/1
500 CAPSULE ORAL EVERY 12 HOURS SCHEDULED
Qty: 28 CAPSULE | Refills: 0 | Status: SHIPPED | OUTPATIENT
Start: 2021-05-26 | End: 2021-06-07

## 2021-05-26 RX ORDER — ALBUTEROL SULFATE 90 UG/1
2 AEROSOL, METERED RESPIRATORY (INHALATION) EVERY 4 HOURS PRN
Qty: 18 G | Refills: 1 | Status: SHIPPED | OUTPATIENT
Start: 2021-05-26 | End: 2021-07-22

## 2021-05-26 RX ADMIN — LOSARTAN POTASSIUM: 50 TABLET, FILM COATED ORAL at 09:11

## 2021-05-26 RX ADMIN — Medication 3 MG: at 03:23

## 2021-05-26 RX ADMIN — HYDROXYCHLOROQUINE SULFATE 200 MG: 200 TABLET, FILM COATED ORAL at 09:52

## 2021-05-26 RX ADMIN — NICOTINE 1 PATCH: 14 PATCH, EXTENDED RELEASE TRANSDERMAL at 13:43

## 2021-05-26 RX ADMIN — INSULIN LISPRO 8 UNITS: 100 INJECTION, SOLUTION INTRAVENOUS; SUBCUTANEOUS at 09:10

## 2021-05-26 RX ADMIN — INSULIN LISPRO 3 UNITS: 100 INJECTION, SOLUTION INTRAVENOUS; SUBCUTANEOUS at 13:44

## 2021-05-26 RX ADMIN — INSULIN LISPRO 8 UNITS: 100 INJECTION, SOLUTION INTRAVENOUS; SUBCUTANEOUS at 13:44

## 2021-05-26 RX ADMIN — CEFAZOLIN SODIUM 2000 MG: 2 SOLUTION INTRAVENOUS at 09:08

## 2021-05-26 RX ADMIN — OXYCODONE HYDROCHLORIDE 10 MG: 10 TABLET ORAL at 02:48

## 2021-05-26 RX ADMIN — SUCRALFATE 1 G: 1 TABLET ORAL at 12:32

## 2021-05-26 RX ADMIN — SUCRALFATE 1 G: 1 TABLET ORAL at 09:11

## 2021-05-26 RX ADMIN — INSULIN GLARGINE 50 UNITS: 100 INJECTION, SOLUTION SUBCUTANEOUS at 09:11

## 2021-05-26 RX ADMIN — CALCIUM 1 TABLET: 500 TABLET ORAL at 09:11

## 2021-05-26 RX ADMIN — ENOXAPARIN SODIUM 40 MG: 40 INJECTION SUBCUTANEOUS at 09:11

## 2021-05-26 RX ADMIN — OXYCODONE HYDROCHLORIDE AND ACETAMINOPHEN 1000 MG: 500 TABLET ORAL at 09:12

## 2021-05-26 RX ADMIN — SODIUM CHLORIDE, SODIUM LACTATE, POTASSIUM CHLORIDE, AND CALCIUM CHLORIDE 125 ML/HR: .6; .31; .03; .02 INJECTION, SOLUTION INTRAVENOUS at 07:29

## 2021-05-26 RX ADMIN — CEFAZOLIN SODIUM 2000 MG: 2 SOLUTION INTRAVENOUS at 00:30

## 2021-05-26 RX ADMIN — Medication 250 MG: at 09:12

## 2021-05-26 RX ADMIN — PANTOPRAZOLE SODIUM 40 MG: 40 TABLET, DELAYED RELEASE ORAL at 09:12

## 2021-05-26 NOTE — PLAN OF CARE
Problem: Potential for Falls  Goal: Patient will remain free of falls  Description: INTERVENTIONS:  - Assess patient frequently for physical needs  -  Identify cognitive and physical deficits and behaviors that affect risk of falls    -  Silver fall precautions as indicated by assessment   - Educate patient/family on patient safety including physical limitations  - Instruct patient to call for assistance with activity based on assessment  - Modify environment to reduce risk of injury  - Consider OT/PT consult to assist with strengthening/mobility  Outcome: Progressing     Problem: PAIN - ADULT  Goal: Verbalizes/displays adequate comfort level or baseline comfort level  Description: Interventions:  - Encourage patient to monitor pain and request assistance  - Assess pain using appropriate pain scale  - Administer analgesics based on type and severity of pain and evaluate response  - Implement non-pharmacological measures as appropriate and evaluate response  - Consider cultural and social influences on pain and pain management  - Notify physician/advanced practitioner if interventions unsuccessful or patient reports new pain  Outcome: Progressing     Problem: INFECTION - ADULT  Goal: Absence or prevention of progression during hospitalization  Description: INTERVENTIONS:  - Assess and monitor for signs and symptoms of infection  - Monitor lab/diagnostic results  - Monitor all insertion sites, i e  indwelling lines, tubes, and drains  - Monitor endotracheal if appropriate and nasal secretions for changes in amount and color  - Silver appropriate cooling/warming therapies per order  - Administer medications as ordered  - Instruct and encourage patient and family to use good hand hygiene technique  - Identify and instruct in appropriate isolation precautions for identified infection/condition  Outcome: Progressing     Problem: SAFETY ADULT  Goal: Patient will remain free of falls  Description: INTERVENTIONS:  - Assess patient frequently for physical needs  -  Identify cognitive and physical deficits and behaviors that affect risk of falls  -  Superior fall precautions as indicated by assessment   - Educate patient/family on patient safety including physical limitations  - Instruct patient to call for assistance with activity based on assessment  - Modify environment to reduce risk of injury  - Consider OT/PT consult to assist with strengthening/mobility  Outcome: Progressing     Problem: DISCHARGE PLANNING  Goal: Discharge to home or other facility with appropriate resources  Description: INTERVENTIONS:  - Identify barriers to discharge w/patient and caregiver  - Arrange for needed discharge resources and transportation as appropriate  - Identify discharge learning needs (meds, wound care, etc )  - Arrange for interpretive services to assist at discharge as needed  - Refer to Case Management Department for coordinating discharge planning if the patient needs post-hospital services based on physician/advanced practitioner order or complex needs related to functional status, cognitive ability, or social support system  Outcome: Progressing     Problem: Knowledge Deficit  Goal: Patient/family/caregiver demonstrates understanding of disease process, treatment plan, medications, and discharge instructions  Description: Complete learning assessment and assess knowledge base    Interventions:  - Provide teaching at level of understanding  - Provide teaching via preferred learning methods  Outcome: Progressing

## 2021-05-26 NOTE — ASSESSMENT & PLAN NOTE
· Reports smoking a pack and a half per day and notes that he has been smoking since the age of 7  He admits that he does not want to quit smoking but states that he knows he needs to cut back on the amount he smokes  · Nicotine patch in place, continue    · Again reiterated to patient this will be an ongoing cause for poor infection healing

## 2021-05-26 NOTE — ASSESSMENT & PLAN NOTE
· Patient was recently admitted at the end of April for MSSA left prepatellar bursitis and under I&D at that time but conitnued to have persistent drainage   · POD #2 s/p I&D  · Management per primary service  · Infectious disease following  · On high-dose IV Ancef    · OR cultures pending  · Stop IVF

## 2021-05-26 NOTE — PROGRESS NOTES
Progress Note - Infectious Disease   Nahomy Job  50 y o  male MRN: [id number]  Unit/Bed#: S -01 Encounter: [id number]      Impression/Plan:  1  Left distal thigh abscess, status post I&D with findings of communication of abscess with left prepatellar bursa   Recurrent prepatellar bursitis with drainage to subcutaneous thigh suspected   With MSSA infection in recent admission, MSSA is the likely pathogen again   Etiology of recurrence is most likely a combination of poor immunological status from Humira and possible low bioavailability of p o  Antibiotic given patient's weight   This time around, will treat patient with a higher dose p o  Antibiotic at discharge  Continues high-dose IV cefazolin inpatient  Upon discharge, transition to Cephalexin 1g PO QID through at least 6/7/21 to complete 2 weeks post op course  Follow-up final operative cultures  Serial left thigh/knee exams  Check CBC with diff, Creatinine, ESR, CRP 6/1/21  Follow up in office 6/3/21 at 2 pm     Continue probiotic and monitor stool output on antibiotic      2  Septic left prepatellar bursitis   Patient is status post I&D during recent admission with operative culture growing MSSA   As in above, recurrent prepatellar bursitis with tracking to thigh suspected   Etiology of prepatellar bursitis is most likely trauma from kneeling   Patient does not use knee pads  Migdalia Garcia was counseled regarding the need to use knee pads when he kneels      3  RA, on chronic TNF inhibitors   Patient likely has significant immunosuppression from this   This contributes to infection above  Last Humira dose end of April 2021      4  DM  With hyperglycemia  Risk factor for infection      Above impression and plan discussed in detail with patient, RN, and Anup DINH  All of questions discussed and reassurance given    I personally spent over half of a total 40 minutes in counseling and discussion with the patient and coordination of care as described above       Antibiotics:  Cefazolin D3     Subjective:  Patient has no fever, chills, sweats overnight; no nausea, vomiting, + 1 bout of diarrhea this am, on probiotic and yogurt; no cough, shortness of breath; + post op LLE pain improving  No new symptoms  Objective:  Vitals:  Temp:  [97 5 °F (36 4 °C)-98 5 °F (36 9 °C)] 97 5 °F (36 4 °C)  HR:  [82-95] 82  Resp:  [18] 18  BP: (125-145)/(61-80) 145/80  SpO2:  [93 %-96 %] 93 %  Temp (24hrs), Av 1 °F (36 7 °C), Min:97 5 °F (36 4 °C), Max:98 5 °F (36 9 °C)  Current: Temperature: 97 5 °F (36 4 °C)    Physical Exam:   General Appearance:  Alert, interactive, nontoxic, no acute distress  Throat: Oropharynx moist without lesions  Lungs:   Clear to auscultation bilaterally; no wheezes, rhonchi or rales; respirations unlabored   Heart:  RRR; no murmur   Abdomen:   Soft, non-tender, non-distended, positive bowel sounds  Extremities: No clubbing, cyanosis, + LLE mild edema, left knee thigh Ace wrapped without strike through drainage or spreading erythema outside of wrap  Photograph of today's dressing change with intact incision, mild incisional pinkness and desquamating surrounding skin noted   : No muñoz, no SPT   Skin: No new rashes or lesions  IV site nontender          Labs, Imaging, & Other studies:   All pertinent labs and imaging studies were personally reviewed  Results from last 7 days   Lab Units 21  1002 21  0515   WBC Thousand/uL 8 65 13 20*   HEMOGLOBIN g/dL 12 4 11 8*   PLATELETS Thousands/uL 212 226     Results from last 7 days   Lab Units 21  0515   SODIUM mmol/L 138   POTASSIUM mmol/L 4 2   CHLORIDE mmol/L 104   CO2 mmol/L 24   BUN mg/dL 14   CREATININE mg/dL 1 00   EGFR ml/min/1 73sq m 86   CALCIUM mg/dL 8 4     Results from last 7 days   Lab Units 21  1126   GRAM STAIN RESULT  3+ Polys  No bacteria seen

## 2021-05-26 NOTE — OCCUPATIONAL THERAPY NOTE
Occupational Therapy Screen     Patient Name: Smiley Schilling  Today's Date: 5/26/2021  Problem List  Principal Problem:    Abscess of left thigh  Active Problems:    Benign essential hypertension    Type 2 diabetes mellitus with diabetic neuropathic arthropathy, with long-term current use of insulin (HCC)    Mixed hyperlipidemia    Obesity (BMI 30 0-34  9)    Rheumatoid arthritis ([facility] )    Tobacco use    Past Medical History  Past Medical History:   Diagnosis Date    Arthritis     At risk for falls     Broken foot     right    Cataract     giacomo    [facility]ch[facility] obstructive [facility] disease) [facility] )     Diabetes mellitus ([facility] )     Hx MRSA infection     Per wife patient hadf MRSa in a wound awhile ago    Hyperlipidemia     Kidney stone     Neuropathy     RA (rheumatoid arthritis) ([facility] )     [facility]     Seasonal allergies     Sepsis ([place] [id number] ) 4/26/2021    Snores     Uses wheelchair     and crutches- NWB RLE    Wears glasses      Past Surgical History  Past Surgical History:   Procedure Laterality Date    APPENDECTOMY      CLOSED REDUCTION FOOT DISLOCATION Right 2/12/2019    Procedure: C/R FRACTURE;  Surgeon: Isela Somers DPM;  [facility];  Service: Podiatry    COLONOSCOPY      FOOT SURGERY Right 07/2019    Removal of the bone graft and cleaned out infection, and placed new bone graft    IR PICC PLACEMENT SINGLE LUMEN  8/5/2019    ID DEBRIDEMENT, SKIN, SUB-Q TISSUE,=<20 SQ CM Left 5/24/2021    Procedure: INCISION AND DRAINAGE THIGH;  Surgeon: Edwin Good MD;  [facility];  Service: Orthopedics    ID [address] [place] [address] Dr <0 5 CM REMAINDER BODY N/A 3/28/2018    Procedure: EXCISION WIDE LESION HEAD/FACIAL/NECK;  Surgeon: Yeyo Askew MD;  [facility];  Service: Surgical Oncology    ID GASTROCNEMIUS RECESSION Right 2/12/2019    Procedure: ENDO GASTROC RECESSION, APPLICATION OF EXTERNAL FIXATOR;  Surgeon: Isela Somers DPM;  [facility]; Service: Podiatry    DE REMOVE EXTERN BONE FIX DEV W ANESTH Right 4/18/2019    Procedure: [place] REMOVAL HARDWARE FOOT WITH APPLICATION OF GRAFT;  Surgeon: Leana Al DPM;  [facility];  Service: Podiatry    SKIN BIOPSY      scalp    WISDOM TOOTH EXTRACTION  1998    WOUND DEBRIDEMENT Left 4/29/2021    Procedure: KNEE INCISION AND DRAINAGE, EXCISIONAL DEBRIDEMENT OF PREPATELLAR BURSA; Surgeon: Neel Tyler MD;  [facility];  Service: Orthopedics        05/26/21 1505   OT Last Visit   OT Visit Date 05/26/21  (Wednesday)   Note Type   Note type Screen   Restrictions/Precautions   Weight Bearing Precautions Per Order Yes   LLE Weight Bearing Per Order WBAT   Assessment   Assessment OT orders received and chart review completed  Contact made w/ Pt  Pt reports no concerns completing ADL in acute or upon DC  Per RN, pt has been completing ADL w/ out nursing staff assistance  No acute OT needs at this time  Will screen from OT caseload  Please re consult if acute needs arise   DC OT      [facility], OTR/L

## 2021-05-26 NOTE — PROGRESS NOTES
[address]  50 y o  male MRN: [id number]  Unit/Bed#: S -01      Subjective:  50 y o male post operative day 2 left prepatellar bursa lateral thigh incision and drainage  Patient reports mild soreness in left thigh  Overall feels improved as compared to prior to this most recent I and D  He denies any numbness or tingling left lower extremity  No fevers chills       Labs:  0   Lab Value Date/Time    HCT 36 7 05/25/2021 0515    HCT 40 5 05/01/2021 0438    HCT 41 1 04/30/2021 0626    HCT 47 1 01/23/2017 1300    HGB 11 8 (L) 05/25/2021 0515    HGB 13 3 05/01/2021 0438    HGB 13 9 04/30/2021 0626    HGB 16 0 01/23/2017 1300    INR 0 94 04/11/2019 1116    WBC 13 20 (H) 05/25/2021 0515    WBC 11 91 (H) 05/01/2021 0438    WBC 16 37 (H) 04/30/2021 0626    WBC 6 0 01/23/2017 1300    ESR 68 (H) 04/26/2021 1440     6 (H) 04/28/2021 0648       Meds:    Current Facility-Administered Medications:     acetaminophen (TYLENOL) tablet 650 mg, 650 mg, Oral, Q4H PRN, Kerney Lips, PA-C    albuterol (PROVENTIL HFA,VENTOLIN HFA) inhaler 2 puff, 2 puff, Inhalation, Q4H PRN, Kerney Lips, PA-C, 2 puff at 05/25/21 0830    ascorbic acid (VITAMIN C) tablet 1,000 mg, 1,000 mg, Oral, Daily, Kerney Lips, PA-C, 1,000 mg at 05/25/21 9159    atorvastatin (LIPITOR) tablet 40 mg, 40 mg, Oral, HS, Kerney Lips, PA-C, 40 mg at 05/25/21 2119    calcium carbonate (OYSTER SHELL,OSCAL) 500 mg tablet 1 tablet, 1 tablet, Oral, Daily With Breakfast, Cari Lips, PA-C, 1 tablet at 05/25/21 8841    calcium carbonate (TUMS) chewable tablet 1,000 mg, 1,000 mg, Oral, Daily PRN, Cari Kelly PA-C    ceFAZolin (ANCEF) IVPB (premix in dextrose) 2,000 mg 50 mL, 2,000 mg, Intravenous, Q8H, Ashleigh Melendez MD, Last Rate: 100 mL/hr at 05/26/21 0030, 2,000 mg at 05/26/21 0030    docusate sodium (COLACE) capsule 100 mg, 100 mg, Oral, BID, Cari Kelly PA-C    enoxaparin (LOVENOX) subcutaneous injection 40 mg, 40 mg, Subcutaneous, Daily, Dellr Meth, PA-C, 40 mg at 05/25/21 0820    [START ON 5/29/2021] ergocalciferol (VITAMIN D2) capsule 50,000 Units, 50,000 Units, Oral, Weekly, Dellr Meth, PA-C    fluticasone [facility]) 50 mcg/act nasal spray 2 spray, 2 spray, Each Nare, Daily, Dellr Meth, PA-C, 2 spray at 05/25/21 2119    HYDROmorphone (DILAUDID) injection 0 5 mg, 0 5 mg, Intravenous, Q3H PRN, Kelli Dorantes PA-C    hydroxychloroquine (PLAQUENIL) tablet 200 mg, 200 mg, Oral, BID, Dellr Meth, PA-C, 200 mg at 05/25/21 1652    insulin glargine (LANTUS) subcutaneous injection 50 Units 0 5 mL, 50 Units, Subcutaneous, Q12H [address] [place] ALLE[place] Alvarez-C, 50 Units at 05/25/21 2135    insulin lispro (HumaLOG) 100 units/mL subcutaneous injection 1-5 Units, 1-5 Units, Subcutaneous, HS, [place] Alvarez-C, 2 Units at 05/25/21 2137    insulin lispro (HumaLOG) 100 units/mL subcutaneous injection 1-6 Units, 1-6 Units, Subcutaneous, TID AC, 4 Units at 05/25/21 1656 **AND** Fingerstick Glucose (POCT), , , TID AC, Evans Dumas, PA-C    insulin lispro (HumaLOG) 100 units/mL subcutaneous injection 8 Units, 8 Units, Subcutaneous, TID With Meals, Luis Miguel Santana MD, 8 Units at 05/25/21 1656    lactated ringers infusion, 125 mL/hr, Intravenous, Continuous, Tono Coughlin CRNA, Last Rate: 125 mL/hr at 05/26/21 0729, 125 mL/hr at 05/26/21 0729    leflunomide (ARAVA) tablet 20 mg, 20 mg, Oral, Daily With Dinner, Evans Meth, PA-C    losartan potassium-hydrochlorothiazide (HYZAAR 100/12  5) combo dose, , Oral, Daily, Dellr Meth, PA-C, Given at 05/25/21 0817    melatonin tablet 3 mg, 3 mg, Oral, HS PRN, Glorious Arbour, PA-C, 3 mg at 05/26/21 0323    nicotine (NICODERM CQ) 14 mg/24hr TD 24 hr patch 1 patch, 1 patch, Transdermal, Daily, Edger Meth, PA-C, 1 patch at 05/25/21 1338    ondansetron (ZOFRAN) injection 4 mg, 4 mg, Intravenous, Q6H PRN, Edger Meth, PA-C    oxyCODONE (ROXICODONE) IR tablet 10 mg, 10 mg, Oral, Q4H PRN, Edger Meth, PA-C, 10 mg at 05/26/21 0248    oxyCODONE (ROXICODONE) IR tablet 5 mg, 5 mg, Oral, Q4H PRN, Kerney Lips, PA-C, 5 mg at 05/25/21 1428    pantoprazole (PROTONIX) EC tablet 40 mg, 40 mg, Oral, Daily, Kerney Lips, PA-C, 40 mg at 05/25/21 9463    saccharomyces boulardii (FLORASTOR) capsule 250 mg, 250 mg, Oral, BID, Juaquin Miranda, PA-C, 250 mg at 05/25/21 1651    senna (SENOKOT) tablet 8 6 mg, 1 tablet, Oral, Daily, Kerney Lips, PA-C    sodium chloride 0 9 % infusion, 100 mL/hr, Intravenous, Continuous, Kerney Lips, PA-C, Last Rate: 100 mL/hr at 05/25/21 0032, 100 mL/hr at 05/25/21 0032    sucralfate (CARAFATE) tablet 1 g, 1 g, Oral, 4x Daily, Kerney Lips, PA-C, 1 g at 05/25/21 2119    Blood Culture:   Lab Results   Component Value Date    BLOODCX No Growth After 5 Days  04/26/2021    BLOODCX No Growth After 5 Days  04/26/2021       Wound Culture:   Lab Results   Component Value Date    WOUNDCULT 3+ Growth of Staphylococcus aureus (A) 10/27/2020       Ins and Outs:  I/O last 24 hours: In: 3971 3 [P O :840; I V :3131 3]  Out: 3700 [Urine:3700]          Physical Exam:  Vitals:    05/26/21 0700   BP: 145/80   Pulse: 82   Resp: 18   Temp: 97 5 °F (36 4 °C)   SpO2: 93%     left lower extremity  · Incision is clean dry and intact there is no greta-incisional erythema, no warmth, no purulence or drainage  · Dressing change performed there is minimal serosanguineous drainage on the overlying dressings  · Trace amount palpable fluid in the distal lateral thigh  · Sensation intact to light touch L2-S1  · Motor intact with knee flexion/extension, EHL/FHL  · 2+ dorsalis pedis pulse    Assessment: 55 y o male post operative day 2 left prepatellar bursa and distal lateral thigh incision and drainage       Plan:  · Up and out of bed  · Weight-bearing as tolerated to the left lower extremity  · PT/OT   · Antibiotics per Infectious Disease recommendations  · DVT prophylaxis SCDs Lovenox  · Analgesics as needed for pain  · D/C planning pending clinical improvement and response to antibiotics  · Continue follow up on intraoperative cultures from 05/24/2021 currently g stain was negative and there is no growth in cultures  · Will continue to assess for acute blood loss anemia, patient has not had a greater than 2 g drop in his hemoglobin levels from his preoperative hemoglobin  Vital signs have remained stable    Will recheck CBC today to check his WBC as well        Jesus Lara PA-C

## 2021-05-26 NOTE — ASSESSMENT & PLAN NOTE
· Follows with rheumatology at [facility]  · Previously was on Enbrel for many years but this was discontinued after he had recurrent staph infections and was changed to Humira about 6 months ago  · Reports that he is going to stop Humira as well given this recent infection and also because he has not noticed much improvement since starting it  · On Plaquenil and Arava

## 2021-05-26 NOTE — DISCHARGE SUMMARY
Discharge Summary - Orthopedics   Payal Small  50 y o  male MRN: [id number]  Unit/Bed#: S -01    Attending Physician:  Neel yTler MD    Admitting diagnosis: Abscess of left thigh [L02 416]    Discharge diagnosis: Abscess of left thigh [L02 416]    Date of admission: 5/24/2021    Date of discharge: 05/26/21    Procedure: Incision and drainage of left thigh abscess     HPI: 50 y o  male with a history of left thigh abscess who has been seen by Dr Norma Plasencia in clinic  Pt had I and D of his left distal thigh as well as prepatellar bursa on 04/29/2021  He follow-up in the office on 05/11/2021 found to have worsening swelling and redness and discharge in the left distal thigh and concern for recurrence of abscess  Was recommended the patient have repeat incision and drainage, the patient elected to go on vacation was placed on antibiotics during that period of time  He presented to hospital on 05/24/2021 for repeat I and D of left distal thigh  Prior to surgery the risks and benefits of surgery were explained and informed consent was obtained  Hospital course: Pt was taken to the OR on 05/24/2021  Surgery went without complications and pt was discharged to the PACU in a stable condition and was transferred to the floor  Patient was seen by internal medicine for blood glucose control it was diabetes, was also seen by infectious diseases recommended that based on his greatly improved white blood cell count, CRP and ESR, and clinical exam the patient may be discharged on 2 weeks of oral cefadroxil  Patient was found to be healing well without evidence of worsening infection or recurrence of abscess on discharge date from a a orthopedic standpoint  On discharge date pt was cleared by PT and the medicine team and determined to be safe for discharge  Daily discussion was had with the patient, nursing staff, orthopaedic team, and family members if present    All questions were answered to the patients satisifaction  0   Lab Value Date/Time    HGB 12 4 05/26/2021 1002    HGB 11 8 (L) 05/25/2021 0515    HGB 13 3 05/01/2021 0438    HGB 13 9 04/30/2021 0626    HGB 14 6 04/29/2021 0646    HGB 14 8 04/28/2021 0648    HGB 14 2 04/27/2021 0412    HGB 15 7 04/26/2021 1440    HGB 15 8 04/22/2021 1011    HGB 15 8 04/01/2021 0902    HGB 16 8 03/05/2021 0819    HGB 16 8 11/02/2020 0852    HGB 16 5 06/16/2020 0830    HGB 16 3 02/10/2020 0834    HGB 16 4 10/07/2019 0837    HGB 15 3 05/24/2019 1018    HGB 15 1 04/11/2019 1116    HGB 12 6 02/13/2019 0428    HGB 15 6 01/25/2019 0847    HGB 14 9 11/02/2018 0811    HGB 15 5 09/10/2018 0915    HGB 15 2 07/06/2018 0749    HGB 15 6 05/07/2018 0833    HGB 16 1 03/19/2018 1202    HGB 16 7 08/28/2017 0829    HGB 15 9 07/03/2017 0811    HGB 16 7 04/24/2017 0756    HGB 16 0 01/23/2017 1300    HGB 15 6 09/19/2016 1103       Greater than 2 gram decrease in Hb qualifies for diagnosis of acute blood loss anemia  Vital signs remained stable and pt was resuscitated with IVF as needed  Discharge Instructions:   · Weight-bearing as tolerated left lower extremity  · Keep dressings clean and dry at all times, dry dressings changes daily and as needed  · Complete antibiotics as prescribed, 500 mg cefadroxil every 12 hours for 2 weeks  · Physical therapy  · Body mass index is 32 55 kg/m²  mildly obese  Recommend behavior modifications, nutrition and physical activity  · Follow-up as instructed with Dr Jennifer Tanner in the office  · Follow-up with Infectious Diseases next week    Discharge Medications: For the complete list of discharge medications, please refer to the patient's medication reconciliation

## 2021-05-26 NOTE — ASSESSMENT & PLAN NOTE
Lab Results   Component Value Date    HGBA1C 7 4 (H) 03/05/2021       Recent Labs     05/25/21  1128 05/25/21  1612 05/25/21  2110 05/26/21  0821   POCGLU 280* 279* 259* 119       Blood Sugar Average: Last 72 hrs:  (P) 241 25   Will be due for repeat A1c early June  · Hold Januvia and Victoza while inpatient; resume on discharge  · Monitor accu-checks AC and HS  · Continue home long acting insulin regimen with Lantus 50 units BID  SSI for additional coverage TID AC and HS  · Patient reports when asked that he has no glucometer or testing supplies at home and has not been checking his sugars for months  Reiterated that good blood sugar control is important to his overall wound healing but patient continues to be complacent with management of his diabetes  · Add insulin lispro 8 units t i d  A c

## 2021-05-26 NOTE — PROGRESS NOTES
[facility]  Progress Note - Doron Shown  1965, 50 y o  male MRN: [id number]  Unit/Bed#: S -01 Encounter: [id number]  Primary Care Provider: Gabbie Taylor DO   Date and time admitted to hospital: 5/24/2021  9:51 AM    * Abscess of left thigh  Assessment & Plan  · Patient was recently admitted at the end of April for MSSA left prepatellar bursitis and under I&D at that time but conitnued to have persistent drainage   · POD #2 s/p I&D  · Management per primary service  · Infectious disease following  · On high-dose IV Ancef  · OR cultures pending  · Stop IVF    Type 2 diabetes mellitus with diabetic neuropathic arthropathy, with long-term current use of insulin [facility])  Assessment & Plan  Lab Results   Component Value Date    HGBA1C 7 4 (H) 03/05/2021       Recent Labs     05/25/21  1128 05/25/21  1612 05/25/21  2110 05/26/21  0821   POCGLU 280* 279* 259* 119       Blood Sugar Average: Last 72 hrs:  (P) 241 25   Will be due for repeat A1c early June  · Hold Januvia and Victoza while inpatient; resume on discharge  · Monitor accu-checks AC and HS  · Continue home long acting insulin regimen with Lantus 50 units BID  SSI for additional coverage TID AC and HS  · Patient reports when asked that he has no glucometer or testing supplies at home and has not been checking his sugars for months  Reiterated that good blood sugar control is important to his overall wound healing but patient continues to be complacent with management of his diabetes  · Add insulin lispro 8 units t i d  A c  Rheumatoid arthritis ([facility] [address] )  Assessment & Plan  · Follows with rheumatology at [facility]  · Previously was on Enbrel for many years but this was discontinued after he had recurrent staph infections and was changed to Humira about 6 months ago     · Reports that he is going to stop Humira as well given this recent infection and also because he has not noticed much improvement since starting it  · On Plaquenil and Arava  Tobacco use  Assessment & Plan  · Reports smoking a pack and a half per day and notes that he has been smoking since the age of 7  He admits that he does not want to quit smoking but states that he knows he needs to cut back on the amount he smokes  · Nicotine patch in place, continue  · Again reiterated to patient this will be an ongoing cause for poor infection healing    Benign essential hypertension  Assessment & Plan  · BP acceptable with -150s  · Continue to monitor  · Continue losartan-HCTZ  Obesity (BMI 30 0-34  9)  Assessment & Plan  · BMI noted  · Patient reports trying to exercise and diet prior to his recent admission  VTE Pharmacologic Prophylaxis:   Pharmacologic: Enoxaparin (Lovenox)  Mechanical VTE Prophylaxis in Place: No    Patient Centered Rounds: I have performed bedside rounds with nursing staff today  Discussions with Specialists or Other Care Team Provider:     Education and Discussions with Family / Patient:     Time Spent for Care: 20 minutes  More than 50% of total time spent on counseling and coordination of care as described above  Current Length of Stay: 2 day(s)    Current Patient Status: Inpatient   Certification Statement: The patient will continue to require additional inpatient hospital stay due to Management per primary service    Discharge Plan:  Per primary service; prior to patient's discharge would suggest that slim call in appropriate diabetic testing supplies so they are available to patient when he arrives home    Code Status: Level 1 - Full Code    Subjective:   No new events or concerns reported and patient is asking when he will be discharged       Objective:     Vitals:   Temp (24hrs), Av 1 °F (36 7 °C), Min:97 5 °F (36 4 °C), Max:98 5 °F (36 9 °C)    Temp:  [97 5 °F (36 4 °C)-98 5 °F (36 9 °C)] 97 5 °F (36 4 °C)  HR:  [82-95] 82  Resp:  [18] 18  BP: (125-145)/(61-80) 145/80  SpO2:  [93 %-96 %] 93 %  Body mass index is 32 55 kg/m²  Input and Output Summary (last 24 hours): Intake/Output Summary (Last 24 hours) at 5/26/2021 1128  Last data filed at 5/26/2021 0729  Gross per 24 hour   Intake 3731 25 ml   Output 3050 ml   Net 681 25 ml       Physical Exam:     Physical Exam  Vitals signs reviewed  Constitutional:       General: He is not in acute distress  Appearance: He is obese  He is not ill-appearing, toxic-appearing or diaphoretic  HENT:      Head: Normocephalic  Eyes:      General: No scleral icterus  Right eye: No discharge  Left eye: No discharge  Conjunctiva/sclera: Conjunctivae normal    Cardiovascular:      Rate and Rhythm: Normal rate and regular rhythm  Heart sounds: No murmur  Pulmonary:      Effort: No respiratory distress  Breath sounds: No stridor  No wheezing, rhonchi or rales  Abdominal:      General: There is no distension  Tenderness: There is no guarding  Musculoskeletal:      Right lower leg: No edema  Left lower leg: Edema (pitting edema) present  Skin:     General: Skin is warm and dry  Coloration: Skin is not jaundiced or pale  Findings: No bruising, erythema, lesion or rash  Neurological:      General: No focal deficit present  Mental Status: He is alert        Comments: Awake alert interactive no confusion           Additional Data:     Labs:    Results from last 7 days   Lab Units 05/26/21  1002   WBC Thousand/uL 8 65   HEMOGLOBIN g/dL 12 4   HEMATOCRIT % 37 9   PLATELETS Thousands/uL 212   NEUTROS PCT % 59   LYMPHS PCT % 26   MONOS PCT % 11   EOS PCT % 2     Results from last 7 days   Lab Units 05/25/21  0515   SODIUM mmol/L 138   POTASSIUM mmol/L 4 2   CHLORIDE mmol/L 104   CO2 mmol/L 24   BUN mg/dL 14   CREATININE mg/dL 1 00   ANION GAP mmol/L 10   CALCIUM mg/dL 8 4   GLUCOSE RANDOM mg/dL 263*         Results from last 7 days   Lab Units 05/26/21  1116 05/26/21  5234 05/25/21  2110 05/25/21  1612 05/25/21  1128 05/25/21  0715 05/24/21  2223 05/24/21  1641 05/24/21  1009   POC GLUCOSE mg/dl 237* 119 259* 279* 280* 246* 323* 246* 178*               * I Have Reviewed All Lab Data Listed Above  * Additional Pertinent Lab Tests Reviewed:  Venus Triana Admission Reviewed    Imaging:    Imaging Reports Reviewed Today Include:   Imaging Personally Reviewed by Myself Includes:      Recent Cultures (last 7 days):     Results from last 7 days   Lab Units 05/24/21  1126   GRAM STAIN RESULT  3+ Polys  No bacteria seen       Last 24 Hours Medication List:   Current Facility-Administered Medications   Medication Dose Route Frequency Provider Last Rate    acetaminophen  650 mg Oral Q4H PRN Taylor Maddox PA-C      albuterol  2 puff Inhalation Q4H PRN Taylor Maddox PA-C      vitamin C  1,000 mg Oral Daily Taylor Maddox PA-C      atorvastatin  40 mg Oral HS Taylor Maddox PA-C      calcium carbonate  1 tablet Oral Daily With Breakfast Taylor Maddox PA-C      calcium carbonate  1,000 mg Oral Daily PRN Taylor Maddox PA-C      cefazolin  2,000 mg Intravenous Q8H Neelima Ward MD 2,000 mg (05/26/21 0908)    docusate sodium  100 mg Oral BID Taylor Maddox PA-C      enoxaparin  40 mg Subcutaneous Daily [facility] Varsha [place]      [START ON 5/29/2021] ergocalciferol  50,000 Units Oral Weekly Taylor Maddox PA-C      fluticasone  2 spray Each Nare Daily Taylor Maddox PA-C      HYDROmorphone  0 5 mg Intravenous Q3H PRN Nicola Lofton PA-C      hydroxychloroquine  200 mg Oral BID Taylor Maddox PA-C      insulin glargine  50 Units Subcutaneous Q12H [facility] [place]      insulin lispro  1-5 Units Subcutaneous HS Israel Rachel PA-C      insulin lispro  1-6 Units Subcutaneous TID AC Taylor Maddox PA-C      insulin lispro  8 Units Subcutaneous TID With Meals Luis Miguel Santana MD      leflunomide  20 mg Oral Daily With Guardian Life InsuranceMARK      losartan potassium-hydrochlorothiazide (HYZAAR 100/12  5) combo dose   Oral Daily Taylor Maddox PA-C      melatonin  3 mg Oral HS PRN Israel Rachel PA-C      nicotine  1 patch Transdermal Daily Taylor Maddox PA-C      ondansetron  4 mg Intravenous Q6H PRN Taylor Maddox PA-C      oxyCODONE  10 mg Oral Q4H PRN Taylor Maddox PA-C      oxyCODONE  5 mg Oral Q4H PRN Taylor Maddox PA-C      pantoprazole  40 mg Oral Daily Taylor Maddox PA-C      saccharomyces boulardii  250 mg Oral BID Nicola Lofton PA-C      senna  1 tablet Oral Daily Taylor Maddox PA-C      sucralfate  1 g Oral 4x Daily Taylor Maddox PA-C          Today, Patient Was Seen By: Bashir Arellano PA-C    ** Please Note: Dictation voice to text software may have been used in the creation of this document   **

## 2021-05-27 ENCOUNTER — TELEPHONE (OUTPATIENT)
Dept: INFECTIOUS DISEASES | Facility: CLINIC | Age: 56
End: 2021-05-27

## 2021-05-27 ENCOUNTER — TELEPHONE (OUTPATIENT)
Dept: OBGYN CLINIC | Facility: OTHER | Age: 56
End: 2021-05-27

## 2021-05-27 ENCOUNTER — TRANSITIONAL CARE MANAGEMENT (OUTPATIENT)
Dept: FAMILY MEDICINE CLINIC | Facility: CLINIC | Age: 56
End: 2021-05-27

## 2021-05-27 DIAGNOSIS — L02.416 ABSCESS OF LEFT THIGH: ICD-10-CM

## 2021-05-27 LAB
BACTERIA TISS AEROBE CULT: NO GROWTH
GRAM STN SPEC: NORMAL
GRAM STN SPEC: NORMAL

## 2021-05-27 RX ORDER — OXYCODONE HYDROCHLORIDE 5 MG/1
5 TABLET ORAL EVERY 4 HOURS PRN
Qty: 20 TABLET | Refills: 0 | Status: SHIPPED | OUTPATIENT
Start: 2021-05-27 | End: 2021-06-06

## 2021-05-27 NOTE — TELEPHONE ENCOUNTER
Patients wife Radha called in stating that you had sent the Antibiotics but no Pain Medication  Are you able to resend the pain medication? [id number] [facility]      Please call    C/b # [phone] , Radha can leave a message     Thank you

## 2021-05-29 LAB
BACTERIA SPEC ANAEROBE CULT: ABNORMAL
BACTERIA SPEC ANAEROBE CULT: ABNORMAL

## 2021-06-03 ENCOUNTER — TRANSCRIBE ORDERS (OUTPATIENT)
Dept: LAB | Facility: CLINIC | Age: 56
End: 2021-06-03

## 2021-06-03 ENCOUNTER — APPOINTMENT (OUTPATIENT)
Dept: LAB | Facility: CLINIC | Age: 56
End: 2021-06-03
Payer: COMMERCIAL

## 2021-06-03 DIAGNOSIS — L02.416 ABSCESS OF LEFT THIGH: ICD-10-CM

## 2021-06-03 LAB
BASOPHILS # BLD AUTO: 0.1 THOUSANDS/ΜL (ref 0–0.1)
BASOPHILS NFR BLD AUTO: 1 % (ref 0–1)
CREAT SERPL-MCNC: 0.93 MG/DL (ref 0.6–1.3)
CRP SERPL QL: 12 MG/L
EOSINOPHIL # BLD AUTO: 0.24 THOUSAND/ΜL (ref 0–0.61)
EOSINOPHIL NFR BLD AUTO: 3 % (ref 0–6)
ERYTHROCYTE [DISTWIDTH] IN BLOOD BY AUTOMATED COUNT: 14.3 % (ref 11.6–15.1)
ERYTHROCYTE [SEDIMENTATION RATE] IN BLOOD: 50 MM/HOUR (ref 0–19)
GFR SERPL CREATININE-BSD FRML MDRD: 93 ML/MIN/1.73SQ M
HCT VFR BLD AUTO: 46 % (ref 36.5–49.3)
HGB BLD-MCNC: 14.9 G/DL (ref 12–17)
IMM GRANULOCYTES # BLD AUTO: 0.11 THOUSAND/UL (ref 0–0.2)
IMM GRANULOCYTES NFR BLD AUTO: 2 % (ref 0–2)
LYMPHOCYTES # BLD AUTO: 1.63 THOUSANDS/ΜL (ref 0.6–4.47)
LYMPHOCYTES NFR BLD AUTO: 22 % (ref 14–44)
MCH RBC QN AUTO: 29.1 PG (ref 26.8–34.3)
MCHC RBC AUTO-ENTMCNC: 32.4 G/DL (ref 31.4–37.4)
MCV RBC AUTO: 90 FL (ref 82–98)
MONOCYTES # BLD AUTO: 1.03 THOUSAND/ΜL (ref 0.17–1.22)
MONOCYTES NFR BLD AUTO: 14 % (ref 4–12)
NEUTROPHILS # BLD AUTO: 4.34 THOUSANDS/ΜL (ref 1.85–7.62)
NEUTS SEG NFR BLD AUTO: 58 % (ref 43–75)
NRBC BLD AUTO-RTO: 0 /100 WBCS
PLATELET # BLD AUTO: 249 THOUSANDS/UL (ref 149–390)
PMV BLD AUTO: 10.9 FL (ref 8.9–12.7)
RBC # BLD AUTO: 5.12 MILLION/UL (ref 3.88–5.62)
WBC # BLD AUTO: 7.45 THOUSAND/UL (ref 4.31–10.16)

## 2021-06-03 PROCEDURE — 85652 RBC SED RATE AUTOMATED: CPT

## 2021-06-03 PROCEDURE — 36415 COLL VENOUS BLD VENIPUNCTURE: CPT

## 2021-06-03 PROCEDURE — 85025 COMPLETE CBC W/AUTO DIFF WBC: CPT

## 2021-06-03 PROCEDURE — 86140 C-REACTIVE PROTEIN: CPT

## 2021-06-03 PROCEDURE — 82565 ASSAY OF CREATININE: CPT

## 2021-06-04 ENCOUNTER — TELEPHONE (OUTPATIENT)
Dept: INFECTIOUS DISEASES | Facility: CLINIC | Age: 56
End: 2021-06-04

## 2021-06-04 ENCOUNTER — TELEPHONE (OUTPATIENT)
Dept: OBGYN CLINIC | Facility: HOSPITAL | Age: 56
End: 2021-06-04

## 2021-06-04 NOTE — TELEPHONE ENCOUNTER
Minimal, bloody drainage noted  Patient was discharge on Cefadroxil 500 mg BID  He has f/u appt in office Monday and with Orthopedics now Tuesday  Continue Cefadroxil until appt

## 2021-06-04 NOTE — TELEPHONE ENCOUNTER
The appt on 6/7 is with the infectious disease doctors  Not orthopedics  We are in the OR on 6/7   If he misses his appt today he needs to be seen on tuesday

## 2021-06-04 NOTE — TELEPHONE ENCOUNTER
Called to confirm pt's appt for Monday  Pt mentioned that he is still having minimal drainage, but it is just clear/blood mixed       Notified Anuel Brooke PA-C  No

## 2021-06-04 NOTE — TELEPHONE ENCOUNTER
PO DR Kathy Thornton  RE: told about appt for 06 07    PO Dr Kathy Thornton has appt today @ 5    Wife states she spoke to Dr Mary Cruz clinical team the other day who advised patients appt would be moved to 06 07 In order for patient to finish antibiotics    I do not see a schedule for Dr Kathy Thornton or Angie Wesley on 06 07   Wife states she is having sx on 06 08, and was told a clinical member will evaluate patient on 06 07    Please advise

## 2021-06-07 ENCOUNTER — OFFICE VISIT (OUTPATIENT)
Dept: INFECTIOUS DISEASES | Facility: CLINIC | Age: 56
End: 2021-06-07
Payer: COMMERCIAL

## 2021-06-07 VITALS
HEIGHT: 72 IN | HEART RATE: 105 BPM | TEMPERATURE: 97.7 F | RESPIRATION RATE: 18 BRPM | SYSTOLIC BLOOD PRESSURE: 160 MMHG | BODY MASS INDEX: 32.29 KG/M2 | DIASTOLIC BLOOD PRESSURE: 88 MMHG | WEIGHT: 238.4 LBS

## 2021-06-07 DIAGNOSIS — M06.9 RHEUMATOID ARTHRITIS, INVOLVING UNSPECIFIED SITE, UNSPECIFIED WHETHER RHEUMATOID FACTOR PRESENT (HCC): ICD-10-CM

## 2021-06-07 DIAGNOSIS — Z79.4 TYPE 2 DIABETES MELLITUS WITH DIABETIC NEUROPATHIC ARTHROPATHY, WITH LONG-TERM CURRENT USE OF INSULIN (HCC): ICD-10-CM

## 2021-06-07 DIAGNOSIS — E11.610 TYPE 2 DIABETES MELLITUS WITH DIABETIC NEUROPATHIC ARTHROPATHY, WITH LONG-TERM CURRENT USE OF INSULIN (HCC): ICD-10-CM

## 2021-06-07 DIAGNOSIS — M70.42 PREPATELLAR BURSITIS OF LEFT KNEE: ICD-10-CM

## 2021-06-07 DIAGNOSIS — L02.416 ABSCESS OF LEFT THIGH: Primary | ICD-10-CM

## 2021-06-07 PROCEDURE — 4004F PT TOBACCO SCREEN RCVD TLK: CPT | Performed by: PHYSICIAN ASSISTANT

## 2021-06-07 PROCEDURE — 99213 OFFICE O/P EST LOW 20 MIN: CPT | Performed by: PHYSICIAN ASSISTANT

## 2021-06-07 PROCEDURE — 1111F DSCHRG MED/CURRENT MED MERGE: CPT | Performed by: PHYSICIAN ASSISTANT

## 2021-06-07 RX ORDER — SACCHAROMYCES BOULARDII 250 MG
250 CAPSULE ORAL 2 TIMES DAILY
Qty: 60 CAPSULE | Refills: 0 | Status: SHIPPED | OUTPATIENT
Start: 2021-06-07

## 2021-06-07 RX ORDER — CEFADROXIL 500 MG/1
1000 CAPSULE ORAL EVERY 12 HOURS SCHEDULED
Qty: 120 CAPSULE | Refills: 0 | Status: SHIPPED | OUTPATIENT
Start: 2021-06-07 | End: 2021-07-07

## 2021-06-07 NOTE — PATIENT INSTRUCTIONS
Continue to take your duricef twice daily as ordered  We have sent a refill to your pharmacy  Please have labs drawn in two weeks, then follow up with us

## 2021-06-07 NOTE — PROGRESS NOTES
Assessment/Plan:    Prepatellar bursitis of left knee  Recurrent prepatellar bursitis with tracking to lateral thigh abscess  Patient had been on antibiotics prior to his admission  Recent growth of MSSA, now likely the same  S/p I&D  Patient was discharged on duricef 500 mg po bid  Plan was to give patient a higher dose of antibiotic for a more prolonged time due to recurrence  I do note that he was sent on duricef 500 mg po bid  Today, patient presents with ongoing serous drainage from his incision  He is tolerating the antibiotic  He did miss his ortho appt  Inflammatory markers are fluctuating - may be difficult to interpret due to RA  Plan:   -  For now will continue duricef but increase to 1000 mg po bid  At this time will start with an additional 2 weeks to see if he improves (total of one month prescribed in case we need to go longer)  - Recheck labs in 2 weeks including CBC, Cr, ESR, CRP  - Patient to see ortho 6/11  - Probiotics prescribed per patient's request  - RTO in 2 weeks  Rheumatoid arthritis ([facility] [address] )  Remains off humira       Diagnoses and all orders for this visit:    Abscess of left thigh  -     cefadroxil (DURICEF) 500 mg capsule; Take 2 capsules (1,000 mg total) by mouth every 12 (twelve) hours  -     saccharomyces boulardii (FLORASTOR) 250 mg capsule; Take 1 capsule (250 mg total) by mouth 2 (two) times a day  -     CBC and differential; Future  -     Creatinine, serum; Future  -     Sedimentation rate, automated; Future  -     C-reactive protein; Future    Type 2 diabetes mellitus with diabetic neuropathic arthropathy, with long-term current use of insulin (HCC)    Rheumatoid arthritis, involving unspecified site, unspecified whether rheumatoid factor present (HCC)    Prepatellar bursitis of left knee          Subjective:      Patient ID: Erika Cobb  is a 50 y o  male      HPI  51 y/o male presents for office follow up today regarding recurrent prepatellar bursitis with tracking to lateral thigh abscess  Patient had been on antibiotics prior to his admission  Recent growth of MSSA, now likely the same  S/p I&D  Patient was discharged on duricef 500 mg po bid  He is having some diarrhea, probiotics were helping but he ran out  Still with serous drainage from the distal portion of the incision  Denies any fever or chills  Now off Humira  Patient missed his ortho follow up, now sees them 6/11  The following portions of the patient's history were reviewed and updated as appropriate: allergies, current medications, past family history, past medical history, past social history, past surgical history and problem list     Review of Systems   Constitutional: Negative for chills and fever  Respiratory: Positive for cough  Negative for shortness of breath  Gastrointestinal: Positive for diarrhea  Negative for abdominal pain, nausea and vomiting  Skin: Positive for wound  Negative for rash  Psychiatric/Behavioral: Negative for behavioral problems and confusion  Objective:      /88 (BP Location: Right arm, Patient Position: Sitting, Cuff Size: Adult)   Pulse 105   Temp 97 7 °F (36 5 °C) (Temporal)   Resp 18   Ht 6' (1 829 m)   Wt 108 kg (238 lb 6 4 oz)   BMI 32 33 kg/m²          Physical Exam  Vitals signs reviewed  Constitutional:       General: He is not in acute distress  Appearance: Normal appearance  He is not ill-appearing, toxic-appearing or diaphoretic  HENT:      Head: Normocephalic and atraumatic  Eyes:      General: No scleral icterus  Right eye: No discharge  Left eye: No discharge  Conjunctiva/sclera: Conjunctivae normal    Cardiovascular:      Rate and Rhythm: Normal rate and regular rhythm  Pulmonary:      Effort: Pulmonary effort is normal  No respiratory distress  Breath sounds: Normal breath sounds  No stridor  No wheezing, rhonchi or rales  Chest:      Chest wall: No tenderness  Abdominal:      General: Bowel sounds are normal  There is no distension  Palpations: Abdomen is soft  Tenderness: There is no abdominal tenderness  Musculoskeletal:      Comments: Left lateral thigh incision still with sutures in place  Patient with serous drainage from the distal portion of the incision  No erythema or induration noted  Skin:     General: Skin is warm and dry  Coloration: Skin is not jaundiced or pale  Findings: No erythema or rash  Neurological:      General: No focal deficit present  Mental Status: He is alert and oriented to person, place, and time     Psychiatric:         Mood and Affect: Mood normal          Behavior: Behavior normal          Labs:   6/3/2021  Wbc: 7 45  Hgb: 14 9  Plt: 249  Cr: 0 93  ESR: 50  CRP: 12 0

## 2021-06-07 NOTE — ASSESSMENT & PLAN NOTE
Recurrent prepatellar bursitis with tracking to lateral thigh abscess  Patient had been on antibiotics prior to his admission  Recent growth of MSSA, now likely the same  S/p I&D  Patient was discharged on duricef 500 mg po bid  Plan was to give patient a higher dose of antibiotic for a more prolonged time due to recurrence  I do note that he was sent on duricef 500 mg po bid  Today, patient presents with ongoing serous drainage from his incision  He is tolerating the antibiotic  He did miss his ortho appt  Inflammatory markers are fluctuating - may be difficult to interpret due to RA  Plan:   -  For now will continue duricef but increase to 1000 mg po bid  At this time will start with an additional 2 weeks to see if he improves (total of one month prescribed in case we need to go longer)  - Recheck labs in 2 weeks including CBC, Cr, ESR, CRP  - Patient to see ortho 6/11  - Probiotics prescribed per patient's request  - RTO in 2 weeks

## 2021-06-11 ENCOUNTER — OFFICE VISIT (OUTPATIENT)
Dept: OBGYN CLINIC | Facility: CLINIC | Age: 56
End: 2021-06-11

## 2021-06-11 VITALS
HEIGHT: 72 IN | DIASTOLIC BLOOD PRESSURE: 82 MMHG | SYSTOLIC BLOOD PRESSURE: 127 MMHG | WEIGHT: 244 LBS | BODY MASS INDEX: 33.05 KG/M2 | HEART RATE: 99 BPM

## 2021-06-11 DIAGNOSIS — L02.416 ABSCESS OF LEFT THIGH: Primary | ICD-10-CM

## 2021-06-11 PROCEDURE — 3008F BODY MASS INDEX DOCD: CPT | Performed by: PHYSICIAN ASSISTANT

## 2021-06-11 PROCEDURE — 99024 POSTOP FOLLOW-UP VISIT: CPT | Performed by: ORTHOPAEDIC SURGERY

## 2021-06-11 NOTE — PROGRESS NOTES
Patient Name:  Erika Cobb  MRN:  [id number]    Assessment & Plan     Incision and drainage left thigh abscess 5/24/21  1  Continue antibiotics as prescribed by Infectious Disease  2  Sutures removed from proximal 2/3 incision and Steri-Strips applied  3  Continue local wound care  4  Patient is seeing ID in 2 weeks  Discussed follow-up with me in 3-4 weeks  Briefly discussed repeat incision and drainage if drainage does not resolve  History of the Present Illness     82-year-old male returns to the office today status post Incision and drainage left thigh abscess 5/24/21  He notes an area on the distal aspect of the incision that has been draining clear yellow fluid  He denies any significant pain  He denies any redness around the incision  He denies fevers or chills  He saw the Infectious Disease physician yesterday who doubled his dose of cefadroxil to 1000 mg twice a day and recommended an additional 2 weeks of antibiotics  General ROS:  Negative for fever or chills  Neurological ROS:  Negative for numbness or tingling  Physical Exam     /82   Pulse 99   Ht 6' (1 829 m)   Wt 111 kg (244 lb)   BMI 33 09 kg/m²     Left thigh:  Proximal 2/3 of incision is healed, with persistent moderate serous drainage from the distal 1/3 incision  No gross purulence  No erythema  Full knee range of motion without pain  No palpable fluctuance or collection noted over the thigh  Sensation intact distally  Skin warm and well perfused        Social History     Tobacco Use    Smoking status: Current Every Day Smoker     Packs/day: 1 00     Years: 40 00     Pack years: 40 00     Types: Cigarettes    Smokeless tobacco: Never Used   Substance Use Topics    Alcohol use: Yes     Frequency: Monthly or less     Drinks per session: 1 or 2     Binge frequency: Never     Comment: Socially    Drug use: Yes     Frequency: 7 0 times per week     Types: Marijuana     Comment: Daily for Pain Scribe Attestation    I,:  Cari Kelly PA-C am acting as a scribe while in the presence of the attending physician :       I,:  Saintclair Erie, MD personally performed the services described in this documentation    as scribed in my presence :

## 2021-06-22 ENCOUNTER — APPOINTMENT (OUTPATIENT)
Dept: LAB | Facility: CLINIC | Age: 56
End: 2021-06-22
Payer: COMMERCIAL

## 2021-06-22 DIAGNOSIS — L02.416 ABSCESS OF LEFT THIGH: ICD-10-CM

## 2021-06-22 LAB
BASOPHILS # BLD AUTO: 0.09 THOUSANDS/ΜL (ref 0–0.1)
BASOPHILS NFR BLD AUTO: 1 % (ref 0–1)
CREAT SERPL-MCNC: 1.02 MG/DL (ref 0.6–1.3)
CRP SERPL QL: <3 MG/L
EOSINOPHIL # BLD AUTO: 0.2 THOUSAND/ΜL (ref 0–0.61)
EOSINOPHIL NFR BLD AUTO: 3 % (ref 0–6)
ERYTHROCYTE [DISTWIDTH] IN BLOOD BY AUTOMATED COUNT: 14.7 % (ref 11.6–15.1)
ERYTHROCYTE [SEDIMENTATION RATE] IN BLOOD: 27 MM/HOUR (ref 0–19)
GFR SERPL CREATININE-BSD FRML MDRD: 84 ML/MIN/1.73SQ M
HCT VFR BLD AUTO: 48.3 % (ref 36.5–49.3)
HGB BLD-MCNC: 15.6 G/DL (ref 12–17)
IMM GRANULOCYTES # BLD AUTO: 0.06 THOUSAND/UL (ref 0–0.2)
IMM GRANULOCYTES NFR BLD AUTO: 1 % (ref 0–2)
LYMPHOCYTES # BLD AUTO: 1.41 THOUSANDS/ΜL (ref 0.6–4.47)
LYMPHOCYTES NFR BLD AUTO: 18 % (ref 14–44)
MCH RBC QN AUTO: 28.8 PG (ref 26.8–34.3)
MCHC RBC AUTO-ENTMCNC: 32.3 G/DL (ref 31.4–37.4)
MCV RBC AUTO: 89 FL (ref 82–98)
MONOCYTES # BLD AUTO: 1.19 THOUSAND/ΜL (ref 0.17–1.22)
MONOCYTES NFR BLD AUTO: 15 % (ref 4–12)
NEUTROPHILS # BLD AUTO: 4.91 THOUSANDS/ΜL (ref 1.85–7.62)
NEUTS SEG NFR BLD AUTO: 62 % (ref 43–75)
NRBC BLD AUTO-RTO: 0 /100 WBCS
PLATELET # BLD AUTO: 230 THOUSANDS/UL (ref 149–390)
PMV BLD AUTO: 11.4 FL (ref 8.9–12.7)
RBC # BLD AUTO: 5.41 MILLION/UL (ref 3.88–5.62)
WBC # BLD AUTO: 7.86 THOUSAND/UL (ref 4.31–10.16)

## 2021-06-22 PROCEDURE — 82565 ASSAY OF CREATININE: CPT

## 2021-06-22 PROCEDURE — 86140 C-REACTIVE PROTEIN: CPT

## 2021-06-22 PROCEDURE — 36415 COLL VENOUS BLD VENIPUNCTURE: CPT

## 2021-06-22 PROCEDURE — 85652 RBC SED RATE AUTOMATED: CPT

## 2021-06-22 PROCEDURE — 85025 COMPLETE CBC W/AUTO DIFF WBC: CPT

## 2021-06-23 ENCOUNTER — OFFICE VISIT (OUTPATIENT)
Dept: INFECTIOUS DISEASES | Facility: CLINIC | Age: 56
End: 2021-06-23
Payer: COMMERCIAL

## 2021-06-23 VITALS
BODY MASS INDEX: 33.04 KG/M2 | HEART RATE: 89 BPM | DIASTOLIC BLOOD PRESSURE: 80 MMHG | SYSTOLIC BLOOD PRESSURE: 154 MMHG | WEIGHT: 243.6 LBS | RESPIRATION RATE: 16 BRPM | TEMPERATURE: 97.6 F | OXYGEN SATURATION: 98 %

## 2021-06-23 DIAGNOSIS — L02.416 ABSCESS OF LEFT THIGH: Primary | ICD-10-CM

## 2021-06-23 DIAGNOSIS — M70.42 PREPATELLAR BURSITIS OF LEFT KNEE: ICD-10-CM

## 2021-06-23 DIAGNOSIS — M06.9 RHEUMATOID ARTHRITIS, INVOLVING UNSPECIFIED SITE, UNSPECIFIED WHETHER RHEUMATOID FACTOR PRESENT (HCC): ICD-10-CM

## 2021-06-23 PROCEDURE — 99213 OFFICE O/P EST LOW 20 MIN: CPT | Performed by: PHYSICIAN ASSISTANT

## 2021-06-23 RX ORDER — DOXYCYCLINE HYCLATE 100 MG/1
100 CAPSULE ORAL EVERY 12 HOURS SCHEDULED
Qty: 20 CAPSULE | Refills: 0 | Status: SHIPPED | OUTPATIENT
Start: 2021-06-23 | End: 2021-07-03

## 2021-06-23 NOTE — PROGRESS NOTES
Assessment/Plan:    Abscess of left thigh  Recurrent prepatellar bursitis with tracking to lateral thigh abscess  Patient had been on antibiotics prior to his admission  Recent growth of MSSA, now likely the same  S/p I&D  Patient was discharged on duricef 500 mg po bid, on last visit his dose was increased to 1000 mg po bid  He is currently tolerating the duricef  He has less drainage from the incision - it is serous  He did follow up with ortho  Upon an additional review of culture data he did grow MRSA from broth only on 5/24/2021  He is doing better than the last visit  He has now completed one month of duricef  Inflammatory markers have been fluctuating - difficult to interpret as he also has RA  Patient has improved despite not treating the MRSA but will add on additional 10 days of doxycycline    Plan:   - discontinue duricef  - doxycycline 100 mg po bid x 10 days - patient notified via phone of late growth of MRSA in broth only  He is agreeable to the doxycycline  Discussed with patient that doxycycline can cause photosensitivity and to avoid prolonged sun exposure  Also to avoid dairy/vitamins/supplements for one to hours before and after the doxycycline    - follow up with ortho as scheduled  - patient to call for follow up if he has any issues              Diagnoses and all orders for this visit:    Abscess of left thigh  -     doxycycline hyclate (VIBRAMYCIN) 100 mg capsule; Take 1 capsule (100 mg total) by mouth every 12 (twelve) hours for 10 days    Prepatellar bursitis of left knee    Rheumatoid arthritis, involving unspecified site, unspecified whether rheumatoid factor present (HCC)          Subjective:      Patient ID: Kay Topete  is a 50 y o  male  HPI  51 y/o male presents for office follow up today regarding recurrent prepatellar bursitis with tracking to lateral thigh abscess  Patient has been on duricef for about one month now  He is tolerating the antibiotic  He was having GI upset but this has improved with a probiotic  He still has some serous drainage from the thigh incision but overall appears improved  The following portions of the patient's history were reviewed and updated as appropriate: allergies, current medications, past family history, past medical history, past social history, past surgical history and problem list     Review of Systems   Constitutional: Negative for chills and fever  Respiratory: Negative for cough and shortness of breath  Gastrointestinal: Negative for abdominal pain, diarrhea, nausea and vomiting  Skin: Negative for rash  Psychiatric/Behavioral: Negative for behavioral problems and confusion  Objective:      /80 (BP Location: Right arm, Cuff Size: Standard)   Pulse 89   Temp 97 6 °F (36 4 °C) (Temporal)   Resp 16   Wt 110 kg (243 lb 9 6 oz)   SpO2 98%   BMI 33 04 kg/m²          Physical Exam  Vitals reviewed  Constitutional:       General: He is not in acute distress  Appearance: Normal appearance  He is not ill-appearing, toxic-appearing or diaphoretic  HENT:      Head: Normocephalic and atraumatic  Eyes:      General: No scleral icterus  Right eye: No discharge  Left eye: No discharge  Conjunctiva/sclera: Conjunctivae normal    Cardiovascular:      Rate and Rhythm: Normal rate and regular rhythm  Pulmonary:      Effort: Pulmonary effort is normal  No respiratory distress  Breath sounds: Normal breath sounds  No stridor  No wheezing, rhonchi or rales  Chest:      Chest wall: No tenderness  Abdominal:      General: Bowel sounds are normal  There is no distension  Palpations: Abdomen is soft  Tenderness: There is no abdominal tenderness  Neurological:      General: No focal deficit present  Mental Status: He is alert and oriented to person, place, and time     Psychiatric:         Mood and Affect: Mood normal          Behavior: Behavior normal  Labs:   6/22/21  7 86  Hgb: 15 6  Plt: 230  Cr: 1 02  ESRL 27  CRP: <3

## 2021-06-23 NOTE — ASSESSMENT & PLAN NOTE
Recurrent prepatellar bursitis with tracking to lateral thigh abscess  Patient had been on antibiotics prior to his admission  Recent growth of MSSA, now likely the same  S/p I&D  Patient was discharged on duricef 500 mg po bid, on last visit his dose was increased to 1000 mg po bid  He is currently tolerating the duricef  He has less drainage from the incision - it is serous  He did follow up with ortho  Upon an additional review of culture data he did grow MRSA from broth only on 5/24/2021  He is doing better than the last visit  He has now completed one month of duricef  Inflammatory markers have been fluctuating - difficult to interpret as he also has RA  Patient has improved despite not treating the MRSA but will add on additional 10 days of doxycycline    Plan:   - discontinue duricef  - doxycycline 100 mg po bid x 10 days - patient notified via phone of late growth of MRSA in broth only  He is agreeable to the doxycycline  Discussed with patient that doxycycline can cause photosensitivity and to avoid prolonged sun exposure    Also to avoid dairy/vitamins/supplements for one to hours before and after the doxycycline    - follow up with ortho as scheduled  - patient to call for follow up if he has any issues

## 2021-06-25 ENCOUNTER — OFFICE VISIT (OUTPATIENT)
Dept: OBGYN CLINIC | Facility: CLINIC | Age: 56
End: 2021-06-25

## 2021-06-25 VITALS
HEIGHT: 72 IN | WEIGHT: 243 LBS | SYSTOLIC BLOOD PRESSURE: 149 MMHG | HEART RATE: 90 BPM | DIASTOLIC BLOOD PRESSURE: 89 MMHG | BODY MASS INDEX: 32.91 KG/M2

## 2021-06-25 DIAGNOSIS — E11.42 TYPE 2 DIABETES MELLITUS WITH DIABETIC POLYNEUROPATHY, WITH LONG-TERM CURRENT USE OF INSULIN (HCC): ICD-10-CM

## 2021-06-25 DIAGNOSIS — Z79.4 TYPE 2 DIABETES MELLITUS WITH DIABETIC POLYNEUROPATHY, WITH LONG-TERM CURRENT USE OF INSULIN (HCC): ICD-10-CM

## 2021-06-25 DIAGNOSIS — E11.9 TYPE 2 DIABETES MELLITUS WITHOUT COMPLICATION, WITHOUT LONG-TERM CURRENT USE OF INSULIN (HCC): ICD-10-CM

## 2021-06-25 DIAGNOSIS — E13.9 DIABETES MELLITUS OF OTHER TYPE WITHOUT COMPLICATION, UNSPECIFIED WHETHER LONG TERM INSULIN USE (HCC): ICD-10-CM

## 2021-06-25 DIAGNOSIS — L02.416 ABSCESS OF LEFT THIGH: Primary | ICD-10-CM

## 2021-06-25 PROCEDURE — 99024 POSTOP FOLLOW-UP VISIT: CPT | Performed by: ORTHOPAEDIC SURGERY

## 2021-06-25 RX ORDER — PEN NEEDLE, DIABETIC 32GX 5/32"
NEEDLE, DISPOSABLE MISCELLANEOUS
Qty: 100 EACH | Refills: 3 | Status: SHIPPED | OUTPATIENT
Start: 2021-06-25 | End: 2021-10-12

## 2021-06-25 RX ORDER — LIRAGLUTIDE 6 MG/ML
INJECTION SUBCUTANEOUS
Qty: 9 ML | Refills: 4 | Status: SHIPPED | OUTPATIENT
Start: 2021-06-25 | End: 2021-09-20 | Stop reason: SDUPTHER

## 2021-06-25 RX ORDER — INSULIN GLARGINE 100 [IU]/ML
50 INJECTION, SOLUTION SUBCUTANEOUS EVERY 12 HOURS SCHEDULED
Qty: 30 ML | Refills: 1 | Status: SHIPPED | OUTPATIENT
Start: 2021-06-25 | End: 2021-09-16

## 2021-06-25 NOTE — PROGRESS NOTES
Patient Name:  Colonel Hawkins  MRN:  [id number]    Assessment & Plan     Incision and drainage left thigh abscess 5/24/21  1  Complete course of doxycycline as prescribed by Infectious Disease  2  Follow-up in two weeks for repeat wound check  History of the Present Illness     65-year-old male returns to the office today status post Incision and drainage left thigh abscess 5/24/21  He notes overall significant improvement  He still notes intermittent drainage from the distal aspect of the incision  He denies any significant pain  He denies fevers or chills  He did recently see Infectious Disease who has transition him to doxycycline for an additional 10 days  General ROS:  Negative for fever or chills  Neurological ROS:  Negative for numbness or tingling  Physical Exam     /89   Pulse 90   Ht 6' (1 829 m)   Wt 110 kg (243 lb)   BMI 32 96 kg/m²     Left thigh:  Small pinpoint opening distal aspect of the incision without active drainage  No drainage expressed from the wound  Full knee range of motion without pain  No palpable fluctuance is present  Sensation intact distally  Skin warm and well perfused      Social History     Tobacco Use    Smoking status: Current Every Day Smoker     Packs/day: 1 00     Years: 40 00     Pack years: 40 00     Types: Cigarettes    Smokeless tobacco: Never Used   Vaping Use    Vaping Use: Never used   Substance Use Topics    Alcohol use: Yes     Comment: Socially    Drug use: Yes     Frequency: 7 0 times per week     Types: Marijuana     Comment: Daily for Pain         Scribe Attestation    I,:  Lisbeth Galaviz PA-C am acting as a scribe while in the presence of the attending physician :       I,:  Alex De Paz MD personally performed the services described in this documentation    as scribed in my presence :

## 2021-07-09 ENCOUNTER — OFFICE VISIT (OUTPATIENT)
Dept: FAMILY MEDICINE CLINIC | Facility: CLINIC | Age: 56
End: 2021-07-09
Payer: COMMERCIAL

## 2021-07-09 ENCOUNTER — OFFICE VISIT (OUTPATIENT)
Dept: OBGYN CLINIC | Facility: CLINIC | Age: 56
End: 2021-07-09

## 2021-07-09 VITALS
BODY MASS INDEX: 33.18 KG/M2 | WEIGHT: 245 LBS | OXYGEN SATURATION: 98 % | DIASTOLIC BLOOD PRESSURE: 82 MMHG | SYSTOLIC BLOOD PRESSURE: 148 MMHG | RESPIRATION RATE: 16 BRPM | HEART RATE: 80 BPM | HEIGHT: 72 IN

## 2021-07-09 VITALS
SYSTOLIC BLOOD PRESSURE: 134 MMHG | HEART RATE: 96 BPM | BODY MASS INDEX: 33.18 KG/M2 | HEIGHT: 72 IN | DIASTOLIC BLOOD PRESSURE: 80 MMHG | WEIGHT: 245 LBS

## 2021-07-09 DIAGNOSIS — L02.416 ABSCESS OF LEFT THIGH: Primary | ICD-10-CM

## 2021-07-09 DIAGNOSIS — M25.462 SWELLING OF JOINT, KNEE, LEFT: ICD-10-CM

## 2021-07-09 DIAGNOSIS — M45.9 RHEUMATOID ARTHRITIS INVOLVING VERTEBRA WITH POSITIVE RHEUMATOID FACTOR (HCC): ICD-10-CM

## 2021-07-09 PROBLEM — L03.116 CELLULITIS OF LEFT LOWER EXTREMITY: Status: RESOLVED | Noted: 2021-04-26 | Resolved: 2021-07-09

## 2021-07-09 PROCEDURE — 99214 OFFICE O/P EST MOD 30 MIN: CPT | Performed by: FAMILY MEDICINE

## 2021-07-09 PROCEDURE — 3725F SCREEN DEPRESSION PERFORMED: CPT | Performed by: FAMILY MEDICINE

## 2021-07-09 PROCEDURE — 99024 POSTOP FOLLOW-UP VISIT: CPT | Performed by: ORTHOPAEDIC SURGERY

## 2021-07-09 PROCEDURE — 4004F PT TOBACCO SCREEN RCVD TLK: CPT | Performed by: FAMILY MEDICINE

## 2021-07-09 PROCEDURE — 3008F BODY MASS INDEX DOCD: CPT | Performed by: FAMILY MEDICINE

## 2021-07-09 NOTE — ASSESSMENT & PLAN NOTE
Patient had infection in the left lateral thigh that required washout  He did have incision and drainage performed  Eventually broth grew out MRSA and he was placed on 10 day course of doxycycline by Infectious Disease  He no longer has follow-up with Infectious Disease    -scant amount of serous fluid draining from the incision site at the inferior aspect which is otherwise healing very well  Wound culture obtained    I doubt that he will need to be placed on antibiotics once again    -patient will follow-up with orthopedic surgeon as scheduled later on this morning    -advised patient to use Hibiclens for bathing on a periodic basis

## 2021-07-09 NOTE — ASSESSMENT & PLAN NOTE
Lab Results   Component Value Date    HGBA1C 7 4 (H) 03/05/2021     Patient is not due for repeat hemoglobin A1c  Patient does check his sugars on a regular basis  Patient has seen Endocrinology    He does see Podiatry on a regular basis

## 2021-07-09 NOTE — PROGRESS NOTES
Patient Name:  Jo-Ann Flynn  MRN:  [id number]    Assessment & Plan     Incision and drainage left thigh abscess 5/24/2021, improving  1  Continue dressing changes twice a day  2  Will follow wound culture sent by Dr Nicky Abreu this morning, with additional antibiotics as indicated  3  Briefly discussed possible wound care consultation  4  Follow up in 3-4 weeks for wound check, or sooner as indicated  History of the Present Illness     Postop evaluation approximately 6 weeks after I&D left thigh abscess on 5/24/2021  Patient completed the course of doxycycline with continued gradual improvement  He reports GI upset with the doxycycline  He has ongoing moderate drainage from one small area of the lateral thigh wound  No redness, tenderness, or fevers  He has some greta-incisional numbness over the lateral thigh  He notes very gradual reduction in size of the wound  More recently he has had sharp pain intermittently over the anterior knee at the site of the previous incision  He is tolerating full activities  He changes the dressing twice a day with an ABD and elastic bandage  Physical Exam     /80   Pulse 96   Ht 6' (1 829 m)   Wt 111 kg (245 lb)   BMI 33 23 kg/m²     Left thigh and knee:  4 mm diameter wound distal one-third of lateral thigh incision with moderate serous drainage  No palpable effusion, soft tissue swelling, or fluctuance  No erythema  No significant tenderness to palpation  Full ROM  No lower extremity edema        Social History     Tobacco Use    Smoking status: Current Every Day Smoker     Packs/day: 1 00     Years: 40 00     Pack years: 40 00     Types: Cigarettes    Smokeless tobacco: Never Used   Vaping Use    Vaping Use: Never used   Substance Use Topics    Alcohol use: Yes     Comment: Socially    Drug use: Yes     Frequency: 7 0 times per week     Types: Marijuana     Comment: Daily for Pain       Scribe Attestation    I,:  Sofia Leni am acting as a scribe while in the presence of the attending physician :       I,:  Bola Silva MD personally performed the services described in this documentation    as scribed in my presence :

## 2021-07-09 NOTE — PROGRESS NOTES
Subjective:      Patient ID: March Alt  is a 50 y o  male  61-year-old male presents to the office for follow-up of chronic conditions unfortunately he did not have labs done for this office visit and actually is not due for repeat labs in regards to his diabetes, hyperlipidemia until September  Patient has had extensive testing as well as office visits with Infectious Disease, Orthopedic surgery as well as Rheumatology in regards to abscess of his left thigh and a MRSA bursitis  His culture did eventually grow back MRSA and he was placed on 10 day course of doxycycline  He did have washout with orthopedic surgeon and will be seeing orthopedic surgeon later on today  He is still getting some serous drainage from the inferior aspect of his surgical incision  He is no longer on antibiotics, thankfully  Antibiotics did cause significant gastrointestinal upset as well as diarrhea  No fevers or chills  No purulence drainage  Patient notes more drainage when he is up on his feet        Past Medical History:   Diagnosis Date    Arthritis     At risk for falls     Broken foot     right    Cataract     giacomo    Cellulitis of left lower extremity 4/26/2021    [facility]ch[facility]     Diabetes mellitus [facility]     Hx MRSA infection     Per wife patient hadf MRSa in a wound awhile ago    Hyperlipidemia     Kidney stone     Neuropathy     RA (rheumatoid arthritis) ([address]     Rheumatoid arthritis ([address]     Seasonal allergies     Sepsis ([address] ) 4/26/2021    Snores     Uses wheelchair     and crutches- [facility] RLE    Wears glasses        Family History   Problem Relation Age of Onset    Diabetes Mother     Stroke Mother     Mental illness Mother     Diabetes Father     Stroke Father     Alcohol abuse Brother     Coronary artery disease Family         Age 51-55    Diabetes type II Family     Heart disease Family        Past Surgical History:   Procedure Laterality Date    APPENDECTOMY      CLOSED REDUCTION FOOT DISLOCATION Right 2/12/2019    Procedure: C/R FRACTURE;  Surgeon: Zeina Cevallos DPM;  [facility];  Service: Podiatry    COLONOSCOPY      FOOT SURGERY Right 07/2019    Removal of the bone graft and cleaned out infection, and placed new bone graft    IR PICC PLACEMENT SINGLE LUMEN  8/5/2019    NM DEBRIDEMENT, SKIN, SUB-Q TISSUE,=<20 SQ CM Left 5/24/2021    Procedure: INCISION AND DRAINAGE THIGH;  Surgeon: Ashlyn Valencia MD;  [facility];  Service: Orthopedics    NM [address] [place] [address] Dr <0 5 CM REMAINDER BODY N/A 3/28/2018    Procedure: EXCISION WIDE LESION HEAD/FACIAL/NECK;  Surgeon: Reema Hdez MD;  [facility];  Service: Surgical Oncology    NM GASTROCNEMIUS RECESSION Right 2/12/2019    Procedure: ENDO GASTROC RECESSION, APPLICATION OF EXTERNAL FIXATOR;  Surgeon: Zeina Cevallos DPM;  [facility];  Service: Podiatry    NM REMOVE EXTERN BONE FIX DEV W ANESTH Right 4/18/2019    Procedure: FRAME REMOVAL HARDWARE FOOT WITH APPLICATION OF GRAFT;  Surgeon: Zeina Cevallos DPM;  [facility];  Service: Podiatry    SKIN BIOPSY      scalp    WISDOM TOOTH EXTRACTION  1998    WOUND DEBRIDEMENT Left 4/29/2021    Procedure: KNEE INCISION AND DRAINAGE, EXCISIONAL DEBRIDEMENT OF PREPATELLAR BURSA; Surgeon: Ashlyn Valencia MD;  [facility];  Service: Orthopedics        reports that he has been smoking cigarettes  He has a 40 00 pack-year smoking history  He has never used smokeless tobacco  He reports current alcohol use  He reports current drug use  Frequency: 7 00 times per week  Drug: Marijuana        Current Outpatient Medications:     Accu-Chek FastClix Lancets MISC, TEST BLOOD SUGAR FOUR TIMES A DAY BEFORE MEALS AND BEDTIME, Disp: 102 each, Rfl: 3    Accu-Chek Guide test strip, TEST BEFORE MEALS AND AT BEDTIME, Disp: 100 each, Rfl: 3    albuterol (PROVENTIL HFA,VENTOLIN HFA) 90 mcg/act inhaler, INHALE 2 PUFFS EVERY 4 (FOUR) HOURS AS NEEDED FOR WHEEZING, Disp: 18 g, Rfl: 1    Ascorbic Acid (vitamin C) 1000 MG tablet, Take 1,000 mg by mouth daily, Disp: , Rfl:     atorvastatin (LIPITOR) 40 mg tablet, TAKE 1 TABLET AT BEDTIME  (Patient taking differently: Take 40 mg by mouth daily after dinner ), Disp: 90 tablet, Rfl: 1    BD Pen Needle Maureen U/F 32G X 4 MM MISC, ONE PEN NEEDLE THREE TIMES A DAY 2-BASAGLAR 1- VICTOZA, Disp: 100 each, Rfl: 3    calcium carbonate (OS-NERY) 600 MG tablet, Take 600 mg by mouth daily Dinnertime, Disp: , Rfl:     ergocalciferol (VITAMIN D2) 50,000 units, Take 50,000 Units by mouth once a week Takes on Saturday, Disp: , Rfl:     fluticasone (FLONASE) 50 mcg/act nasal spray, USE 2 SPRAYS IN EACH NOSTRIL ONCE DAILY (Patient taking differently: 2 sprays into each nostril daily at bedtime ), Disp: 16 g, Rfl: 11    hydroxychloroquine (PLAQUENIL) 200 mg tablet, Take 200 mg by mouth 2 (two) times a day  , Disp: , Rfl:     insulin glargine (Basaglar KwikPen) 100 units/mL injection pen, Inject 50 Units under the skin every 12 (twelve) hours, Disp: 30 mL, Rfl: 1    leflunomide (ARAVA) 20 MG tablet, Take 1 tablet by mouth daily with dinner , Disp: , Rfl:     losartan-hydrochlorothiazide (HYZAAR) 100-12 5 MG per tablet, TAKE 1 TABLET BY MOUTH DAILY (Patient taking differently: Take 1 tablet by mouth daily in the early morning ), Disp: 90 tablet, Rfl: 1    naproxen (NAPROSYN) 500 mg tablet, Take 1 tablet by mouth every 12 (twelve) hours, Disp: , Rfl:     pantoprazole (PROTONIX) 40 mg tablet, Take 1 tablet (40 mg total) by mouth daily (Patient taking differently: Take 40 mg by mouth daily in the early morning ), Disp: 30 tablet, Rfl: 11    saccharomyces boulardii (FLORASTOR) 250 mg capsule, Take 1 capsule (250 mg total) by mouth 2 (two) times a day, Disp: 60 capsule, Rfl: 0    sitaGLIPtin (Januvia) 100 mg tablet, Take 1 tablet (100 mg total) by mouth daily after dinner, Disp: 90 tablet, Rfl: 1   sucralfate (CARAFATE) 1 g tablet, TAKE 1 TABLET (1 G TOTAL) BY MOUTH 4 (FOUR) TIMES A DAY, Disp: 120 tablet, Rfl: 4    ULTICARE MICRO PEN NEEDLES 32G X 4 MM MISC, INJECT UNDER THE SKIN 3 (THREE) TIMES A DAY, Disp: 100 each, Rfl: 3    Victoza injection, INJECT 1 8 MG DAILY, Disp: 9 mL, Rfl: 4    The following portions of the patient's history were reviewed and updated as appropriate: allergies, current medications, past family history, past medical history, past social history, past surgical history and problem list     Review of Systems   Constitutional: Negative  Negative for chills and fever  Musculoskeletal: Negative  Skin: Positive for wound (Left lateral thigh)  Neurological: Positive for numbness ( both feet, no history of diabetic neuropathy with Charcot foot)  Hematological: Negative  Psychiatric/Behavioral: Negative  Objective:    /82   Pulse 80   Resp 16   Ht 6' (1 829 m)   Wt 111 kg (245 lb)   SpO2 98%   BMI 33 23 kg/m²      Physical Exam  Vitals and nursing note reviewed  Constitutional:       General: He is not in acute distress  Appearance: Normal appearance  He is obese  He is not ill-appearing  Cardiovascular:      Rate and Rhythm: Normal rate and regular rhythm  Pulses: Normal pulses  Heart sounds: Normal heart sounds  No murmur heard  Musculoskeletal:      Comments: Lateral aspect of the left thigh the patient does have a very well-healed surgical incision  At the inferior aspect is the smallest of openings that is draining a clear serous fluid which is a very scant amount   Neurological:      General: No focal deficit present  Mental Status: He is alert and oriented to person, place, and time  Mental status is at baseline  Psychiatric:         Mood and Affect: Mood normal          Behavior: Behavior normal          Thought Content:  Thought content normal          Judgment: Judgment normal            Recent Results (from the past 1008 hour(s))   CBC and differential    Collection Time: 06/03/21 12:43 PM   Result Value Ref Range    WBC 7 45 4 31 - 10 16 Thousand/uL    RBC 5 12 3 88 - 5 62 Million/uL    Hemoglobin 14 9 12 0 - 17 0 g/dL    Hematocrit 46 0 36 5 - 49 3 %    MCV 90 82 - 98 fL    MCH 29 1 26 8 - 34 3 pg    MCHC 32 4 31 4 - 37 4 g/dL    RDW 14 3 11 6 - 15 1 %    MPV 10 9 8 9 - 12 7 fL    Platelets 571 001 - 318 Thousands/uL    nRBC 0 /100 WBCs    Neutrophils Relative 58 43 - 75 %    Immat GRANS % 2 0 - 2 %    Lymphocytes Relative 22 14 - 44 %    Monocytes Relative 14 (H) 4 - 12 %    Eosinophils Relative 3 0 - 6 %    Basophils Relative 1 0 - 1 %    Neutrophils Absolute 4 34 1 85 - 7 62 Thousands/µL    Immature Grans Absolute 0 11 0 00 - 0 20 Thousand/uL    Lymphocytes Absolute 1 63 0 60 - 4 47 Thousands/µL    Monocytes Absolute 1 03 0 17 - 1 22 Thousand/µL    Eosinophils Absolute 0 24 0 00 - 0 61 Thousand/µL    Basophils Absolute 0 10 0 00 - 0 10 Thousands/µL   Creatinine, serum    Collection Time: 06/03/21 12:43 PM   Result Value Ref Range    Creatinine 0 93 0 60 - 1 30 mg/dL    eGFR 93 ml/min/1 73sq m   Sedimentation rate, automated    Collection Time: 06/03/21 12:43 PM   Result Value Ref Range    Sed Rate 50 (H) 0 - 19 mm/hour   C-reactive protein    Collection Time: 06/03/21 12:43 PM   Result Value Ref Range    CRP 12 0 (H) <3 0 mg/L   CBC and differential    Collection Time: 06/22/21  8:45 AM   Result Value Ref Range    WBC 7 86 4 31 - 10 16 Thousand/uL    RBC 5 41 3 88 - 5 62 Million/uL    Hemoglobin 15 6 12 0 - 17 0 g/dL    Hematocrit 48 3 36 5 - 49 3 %    MCV 89 82 - 98 fL    MCH 28 8 26 8 - 34 3 pg    MCHC 32 3 31 4 - 37 4 g/dL    RDW 14 7 11 6 - 15 1 %    MPV 11 4 8 9 - 12 7 fL    Platelets 605 904 - 013 Thousands/uL    nRBC 0 /100 WBCs    Neutrophils Relative 62 43 - 75 %    Immat GRANS % 1 0 - 2 %    Lymphocytes Relative 18 14 - 44 %    Monocytes Relative 15 (H) 4 - 12 %    Eosinophils Relative 3 0 - 6 %    Basophils Relative 1 0 - 1 %    Neutrophils Absolute 4 91 1 85 - 7 62 Thousands/µL    Immature Grans Absolute 0 06 0 00 - 0 20 Thousand/uL    Lymphocytes Absolute 1 41 0 60 - 4 47 Thousands/µL    Monocytes Absolute 1 19 0 17 - 1 22 Thousand/µL    Eosinophils Absolute 0 20 0 00 - 0 61 Thousand/µL    Basophils Absolute 0 09 0 00 - 0 10 Thousands/µL   Creatinine, serum    Collection Time: 06/22/21  8:45 AM   Result Value Ref Range    Creatinine 1 02 0 60 - 1 30 mg/dL    eGFR 84 ml/min/1 73sq m   Sedimentation rate, automated    Collection Time: 06/22/21  8:45 AM   Result Value Ref Range    Sed Rate 27 (H) 0 - 19 mm/hour   C-reactive protein    Collection Time: 06/22/21  8:45 AM   Result Value Ref Range    CRP <3 0 <3 0 mg/L       Assessment/Plan:    Charcot foot due to diabetes mellitus ([facility] )    Lab Results   Component Value Date    HGBA1C 7 4 (H) 03/05/2021     Patient is not due for repeat hemoglobin A1c  Patient does check his sugars on a regular basis  Patient has seen Endocrinology  He does see Podiatry on a regular basis    Rheumatoid arthritis ([facility] )  Patient remains off immunosuppressants  He does have follow-up scheduled with rheumatologist    Abscess of left thigh  Patient had infection in the left lateral thigh that required washout  He did have incision and drainage performed  Eventually broth grew out MRSA and he was placed on 10 day course of doxycycline by Infectious Disease  He no longer has follow-up with Infectious Disease    -scant amount of serous fluid draining from the incision site at the inferior aspect which is otherwise healing very well  Wound culture obtained    I doubt that he will need to be placed on antibiotics once again    -patient will follow-up with orthopedic surgeon as scheduled later on this morning    -advised patient to use Hibiclens for bathing on a periodic basis          Problem List Items Addressed This Visit        Musculoskeletal and Integument    Rheumatoid arthritis ([facility] ) Patient remains off immunosuppressants  He does have follow-up scheduled with rheumatologist            Other    Abscess of left thigh - Primary     Patient had infection in the left lateral thigh that required washout  He did have incision and drainage performed  Eventually broth grew out MRSA and he was placed on 10 day course of doxycycline by Infectious Disease  He no longer has follow-up with Infectious Disease    -scant amount of serous fluid draining from the incision site at the inferior aspect which is otherwise healing very well  Wound culture obtained    I doubt that he will need to be placed on antibiotics once again    -patient will follow-up with orthopedic surgeon as scheduled later on this morning    -advised patient to use Hibiclens for bathing on a periodic basis         Relevant Orders    Wound culture and Gram stain    Swelling of joint, knee, left

## 2021-07-12 DIAGNOSIS — L02.416 ABSCESS OF LEFT THIGH: ICD-10-CM

## 2021-07-12 DIAGNOSIS — L02.416 ABSCESS OF LEFT THIGH: Primary | ICD-10-CM

## 2021-07-12 RX ORDER — AMOXICILLIN AND CLAVULANATE POTASSIUM 875; 125 MG/1; MG/1
1 TABLET, FILM COATED ORAL EVERY 12 HOURS SCHEDULED
Qty: 28 TABLET | Refills: 0 | Status: SHIPPED | OUTPATIENT
Start: 2021-07-12 | End: 2021-07-26

## 2021-07-12 RX ORDER — AMOXICILLIN AND CLAVULANATE POTASSIUM 875; 125 MG/1; MG/1
1 TABLET, FILM COATED ORAL EVERY 12 HOURS SCHEDULED
Qty: 28 TABLET | Refills: 0 | Status: SHIPPED | OUTPATIENT
Start: 2021-07-12 | End: 2021-07-12 | Stop reason: SDUPTHER

## 2021-07-15 ENCOUNTER — TELEPHONE (OUTPATIENT)
Dept: FAMILY MEDICINE CLINIC | Facility: CLINIC | Age: 56
End: 2021-07-15

## 2021-07-15 ENCOUNTER — TELEPHONE (OUTPATIENT)
Dept: PSYCHIATRY | Facility: CLINIC | Age: 56
End: 2021-07-15

## 2021-07-15 PROBLEM — Z63.0 MARITAL PROBLEM: Status: ACTIVE | Noted: 2021-07-15

## 2021-07-15 NOTE — TELEPHONE ENCOUNTER
Pt called looking to set up w/ a therapist  I informed pt  call [facility] to put in a referral in the system then will be placed on the wait list  Also looking for marriage therapist

## 2021-07-16 ENCOUNTER — TELEPHONE (OUTPATIENT)
Dept: PSYCHIATRY | Facility: CLINIC | Age: 56
End: 2021-07-16

## 2021-07-21 DIAGNOSIS — Z79.4 TYPE 2 DIABETES MELLITUS WITH DIABETIC POLYNEUROPATHY, WITH LONG-TERM CURRENT USE OF INSULIN (HCC): ICD-10-CM

## 2021-07-21 DIAGNOSIS — K25.3 ACUTE GASTRIC ULCER WITHOUT HEMORRHAGE OR PERFORATION: ICD-10-CM

## 2021-07-21 DIAGNOSIS — J45.909 UNCOMPLICATED ASTHMA, UNSPECIFIED ASTHMA SEVERITY, UNSPECIFIED WHETHER PERSISTENT: ICD-10-CM

## 2021-07-21 DIAGNOSIS — E11.42 TYPE 2 DIABETES MELLITUS WITH DIABETIC POLYNEUROPATHY, WITH LONG-TERM CURRENT USE OF INSULIN (HCC): ICD-10-CM

## 2021-07-21 RX ORDER — SUCRALFATE 1 G/1
1 TABLET ORAL 4 TIMES DAILY
Qty: 120 TABLET | Refills: 4 | Status: SHIPPED | OUTPATIENT
Start: 2021-07-21 | End: 2021-12-15

## 2021-07-22 RX ORDER — LANCETS
EACH MISCELLANEOUS
Qty: 102 EACH | Refills: 3 | Status: SHIPPED | OUTPATIENT
Start: 2021-07-22 | End: 2021-11-16

## 2021-07-22 RX ORDER — BLOOD SUGAR DIAGNOSTIC
STRIP MISCELLANEOUS
Qty: 100 STRIP | Refills: 3 | Status: SHIPPED | OUTPATIENT
Start: 2021-07-22 | End: 2021-11-16

## 2021-07-22 RX ORDER — ALBUTEROL SULFATE 90 UG/1
2 AEROSOL, METERED RESPIRATORY (INHALATION) EVERY 4 HOURS PRN
Qty: 18 G | Refills: 1 | Status: SHIPPED | OUTPATIENT
Start: 2021-07-22 | End: 2021-09-16

## 2021-08-03 DIAGNOSIS — E78.2 MIXED HYPERLIPIDEMIA: ICD-10-CM

## 2021-08-03 RX ORDER — ATORVASTATIN CALCIUM 40 MG/1
TABLET, FILM COATED ORAL
Qty: 90 TABLET | Refills: 1 | Status: SHIPPED | OUTPATIENT
Start: 2021-08-03 | End: 2021-12-27

## 2021-09-15 ENCOUNTER — APPOINTMENT (OUTPATIENT)
Dept: LAB | Facility: CLINIC | Age: 56
End: 2021-09-15
Payer: COMMERCIAL

## 2021-09-15 DIAGNOSIS — Z79.4 TYPE 2 DIABETES MELLITUS WITH DIABETIC NEUROPATHIC ARTHROPATHY, WITH LONG-TERM CURRENT USE OF INSULIN (HCC): ICD-10-CM

## 2021-09-15 DIAGNOSIS — I65.29 CAROTID ARTERY CALCIFICATION, UNSPECIFIED LATERALITY: ICD-10-CM

## 2021-09-15 DIAGNOSIS — I10 BENIGN ESSENTIAL HYPERTENSION: ICD-10-CM

## 2021-09-15 DIAGNOSIS — E78.2 MIXED HYPERLIPIDEMIA: ICD-10-CM

## 2021-09-15 DIAGNOSIS — E55.9 VITAMIN D DEFICIENCY: ICD-10-CM

## 2021-09-15 DIAGNOSIS — E11.610 TYPE 2 DIABETES MELLITUS WITH DIABETIC NEUROPATHIC ARTHROPATHY, WITH LONG-TERM CURRENT USE OF INSULIN (HCC): ICD-10-CM

## 2021-09-15 LAB
ALBUMIN SERPL BCP-MCNC: 3.6 G/DL (ref 3.5–5)
ALP SERPL-CCNC: 55 U/L (ref 46–116)
ALT SERPL W P-5'-P-CCNC: 33 U/L (ref 12–78)
ANION GAP SERPL CALCULATED.3IONS-SCNC: 2 MMOL/L (ref 4–13)
AST SERPL W P-5'-P-CCNC: 21 U/L (ref 5–45)
BASOPHILS # BLD AUTO: 0.07 THOUSANDS/ΜL (ref 0–0.1)
BASOPHILS NFR BLD AUTO: 1 % (ref 0–1)
BILIRUB SERPL-MCNC: 0.48 MG/DL (ref 0.2–1)
BUN SERPL-MCNC: 18 MG/DL (ref 5–25)
CALCIUM SERPL-MCNC: 8.8 MG/DL (ref 8.3–10.1)
CHLORIDE SERPL-SCNC: 108 MMOL/L (ref 100–108)
CHOLEST SERPL-MCNC: 144 MG/DL (ref 50–200)
CO2 SERPL-SCNC: 27 MMOL/L (ref 21–32)
CREAT SERPL-MCNC: 1.05 MG/DL (ref 0.6–1.3)
CREAT UR-MCNC: 357 MG/DL
EOSINOPHIL # BLD AUTO: 0.15 THOUSAND/ΜL (ref 0–0.61)
EOSINOPHIL NFR BLD AUTO: 2 % (ref 0–6)
ERYTHROCYTE [DISTWIDTH] IN BLOOD BY AUTOMATED COUNT: 14.2 % (ref 11.6–15.1)
EST. AVERAGE GLUCOSE BLD GHB EST-MCNC: 137 MG/DL
GFR SERPL CREATININE-BSD FRML MDRD: 81 ML/MIN/1.73SQ M
GLUCOSE P FAST SERPL-MCNC: 133 MG/DL (ref 65–99)
HBA1C MFR BLD: 6.4 %
HCT VFR BLD AUTO: 49.8 % (ref 36.5–49.3)
HDLC SERPL-MCNC: 38 MG/DL
HGB BLD-MCNC: 16.4 G/DL (ref 12–17)
IMM GRANULOCYTES # BLD AUTO: 0.05 THOUSAND/UL (ref 0–0.2)
IMM GRANULOCYTES NFR BLD AUTO: 1 % (ref 0–2)
LDLC SERPL CALC-MCNC: 85 MG/DL (ref 0–100)
LYMPHOCYTES # BLD AUTO: 1.41 THOUSANDS/ΜL (ref 0.6–4.47)
LYMPHOCYTES NFR BLD AUTO: 20 % (ref 14–44)
MCH RBC QN AUTO: 29.1 PG (ref 26.8–34.3)
MCHC RBC AUTO-ENTMCNC: 32.9 G/DL (ref 31.4–37.4)
MCV RBC AUTO: 89 FL (ref 82–98)
MICROALBUMIN UR-MCNC: 23.1 MG/L (ref 0–20)
MICROALBUMIN/CREAT 24H UR: 6 MG/G CREATININE (ref 0–30)
MONOCYTES # BLD AUTO: 0.96 THOUSAND/ΜL (ref 0.17–1.22)
MONOCYTES NFR BLD AUTO: 13 % (ref 4–12)
NEUTROPHILS # BLD AUTO: 4.53 THOUSANDS/ΜL (ref 1.85–7.62)
NEUTS SEG NFR BLD AUTO: 63 % (ref 43–75)
NONHDLC SERPL-MCNC: 106 MG/DL
NRBC BLD AUTO-RTO: 0 /100 WBCS
PLATELET # BLD AUTO: 207 THOUSANDS/UL (ref 149–390)
PMV BLD AUTO: 11.5 FL (ref 8.9–12.7)
POTASSIUM SERPL-SCNC: 4.1 MMOL/L (ref 3.5–5.3)
PROT SERPL-MCNC: 7.3 G/DL (ref 6.4–8.2)
RBC # BLD AUTO: 5.63 MILLION/UL (ref 3.88–5.62)
SODIUM SERPL-SCNC: 137 MMOL/L (ref 136–145)
TRIGL SERPL-MCNC: 107 MG/DL
TSH SERPL DL<=0.05 MIU/L-ACNC: 1.64 UIU/ML (ref 0.36–3.74)
WBC # BLD AUTO: 7.17 THOUSAND/UL (ref 4.31–10.16)

## 2021-09-15 PROCEDURE — 80061 LIPID PANEL: CPT

## 2021-09-15 PROCEDURE — 36415 COLL VENOUS BLD VENIPUNCTURE: CPT

## 2021-09-15 PROCEDURE — 3044F HG A1C LEVEL LT 7.0%: CPT | Performed by: FAMILY MEDICINE

## 2021-09-15 PROCEDURE — 84443 ASSAY THYROID STIM HORMONE: CPT

## 2021-09-15 PROCEDURE — 83036 HEMOGLOBIN GLYCOSYLATED A1C: CPT

## 2021-09-15 PROCEDURE — 85025 COMPLETE CBC W/AUTO DIFF WBC: CPT

## 2021-09-15 PROCEDURE — 82652 VIT D 1 25-DIHYDROXY: CPT

## 2021-09-15 PROCEDURE — 80053 COMPREHEN METABOLIC PANEL: CPT

## 2021-09-15 PROCEDURE — 82043 UR ALBUMIN QUANTITATIVE: CPT

## 2021-09-15 PROCEDURE — 82570 ASSAY OF URINE CREATININE: CPT

## 2021-09-16 DIAGNOSIS — E11.9 TYPE 2 DIABETES MELLITUS WITHOUT COMPLICATION, WITHOUT LONG-TERM CURRENT USE OF INSULIN (HCC): ICD-10-CM

## 2021-09-16 DIAGNOSIS — J45.909 UNCOMPLICATED ASTHMA, UNSPECIFIED ASTHMA SEVERITY, UNSPECIFIED WHETHER PERSISTENT: ICD-10-CM

## 2021-09-16 RX ORDER — ALBUTEROL SULFATE 90 UG/1
2 AEROSOL, METERED RESPIRATORY (INHALATION) EVERY 4 HOURS PRN
Qty: 18 G | Refills: 1 | Status: SHIPPED | OUTPATIENT
Start: 2021-09-16 | End: 2021-11-16

## 2021-09-16 RX ORDER — INSULIN GLARGINE 100 [IU]/ML
50 INJECTION, SOLUTION SUBCUTANEOUS EVERY 12 HOURS SCHEDULED
Qty: 30 ML | Refills: 1 | Status: SHIPPED | OUTPATIENT
Start: 2021-09-16 | End: 2021-10-12

## 2021-09-17 LAB — 1,25(OH)2D3 SERPL-MCNC: 69.1 PG/ML (ref 19.9–79.3)

## 2021-09-20 DIAGNOSIS — E13.9 DIABETES MELLITUS OF OTHER TYPE WITHOUT COMPLICATION, UNSPECIFIED WHETHER LONG TERM INSULIN USE (HCC): ICD-10-CM

## 2021-09-20 DIAGNOSIS — E11.9 TYPE 2 DIABETES MELLITUS WITHOUT COMPLICATION, WITHOUT LONG-TERM CURRENT USE OF INSULIN (HCC): ICD-10-CM

## 2021-09-27 ENCOUNTER — OFFICE VISIT (OUTPATIENT)
Dept: FAMILY MEDICINE CLINIC | Facility: CLINIC | Age: 56
End: 2021-09-27
Payer: COMMERCIAL

## 2021-09-27 ENCOUNTER — TELEPHONE (OUTPATIENT)
Dept: FAMILY MEDICINE CLINIC | Facility: CLINIC | Age: 56
End: 2021-09-27

## 2021-09-27 ENCOUNTER — TELEPHONE (OUTPATIENT)
Dept: OBGYN CLINIC | Facility: HOSPITAL | Age: 56
End: 2021-09-27

## 2021-09-27 VITALS
BODY MASS INDEX: 32.78 KG/M2 | HEIGHT: 72 IN | WEIGHT: 242 LBS | HEART RATE: 86 BPM | OXYGEN SATURATION: 97 % | RESPIRATION RATE: 16 BRPM | DIASTOLIC BLOOD PRESSURE: 72 MMHG | SYSTOLIC BLOOD PRESSURE: 124 MMHG

## 2021-09-27 DIAGNOSIS — E11.610 TYPE 2 DIABETES MELLITUS WITH DIABETIC NEUROPATHIC ARTHROPATHY, WITH LONG-TERM CURRENT USE OF INSULIN (HCC): Primary | ICD-10-CM

## 2021-09-27 DIAGNOSIS — E55.9 VITAMIN D DEFICIENCY: ICD-10-CM

## 2021-09-27 DIAGNOSIS — E66.9 OBESITY (BMI 30.0-34.9): ICD-10-CM

## 2021-09-27 DIAGNOSIS — I10 ESSENTIAL HYPERTENSION: ICD-10-CM

## 2021-09-27 DIAGNOSIS — I10 BENIGN ESSENTIAL HYPERTENSION: ICD-10-CM

## 2021-09-27 DIAGNOSIS — E78.2 MIXED HYPERLIPIDEMIA: ICD-10-CM

## 2021-09-27 DIAGNOSIS — Z79.4 TYPE 2 DIABETES MELLITUS WITH DIABETIC NEUROPATHIC ARTHROPATHY, WITH LONG-TERM CURRENT USE OF INSULIN (HCC): Primary | ICD-10-CM

## 2021-09-27 DIAGNOSIS — Z23 FLU VACCINE NEED: ICD-10-CM

## 2021-09-27 DIAGNOSIS — M05.79 RHEUMATOID ARTHRITIS INVOLVING MULTIPLE SITES WITH POSITIVE RHEUMATOID FACTOR (HCC): ICD-10-CM

## 2021-09-27 DIAGNOSIS — S81.002D OPEN KNEE WOUND, LEFT, SUBSEQUENT ENCOUNTER: ICD-10-CM

## 2021-09-27 DIAGNOSIS — Z72.0 TOBACCO USE: ICD-10-CM

## 2021-09-27 DIAGNOSIS — E11.610 CHARCOT FOOT DUE TO DIABETES MELLITUS (HCC): ICD-10-CM

## 2021-09-27 PROBLEM — R42 DIZZINESS AFTER EXTENSION OF NECK: Status: RESOLVED | Noted: 2021-03-09 | Resolved: 2021-09-27

## 2021-09-27 PROBLEM — L02.416 ABSCESS OF LEFT THIGH: Status: RESOLVED | Noted: 2021-05-11 | Resolved: 2021-09-27

## 2021-09-27 PROCEDURE — 99215 OFFICE O/P EST HI 40 MIN: CPT | Performed by: FAMILY MEDICINE

## 2021-09-27 PROCEDURE — 3074F SYST BP LT 130 MM HG: CPT | Performed by: FAMILY MEDICINE

## 2021-09-27 PROCEDURE — 90682 RIV4 VACC RECOMBINANT DNA IM: CPT | Performed by: FAMILY MEDICINE

## 2021-09-27 PROCEDURE — 90471 IMMUNIZATION ADMIN: CPT | Performed by: FAMILY MEDICINE

## 2021-09-27 PROCEDURE — 3078F DIAST BP <80 MM HG: CPT | Performed by: FAMILY MEDICINE

## 2021-09-27 RX ORDER — LOSARTAN POTASSIUM AND HYDROCHLOROTHIAZIDE 12.5; 1 MG/1; MG/1
1 TABLET ORAL DAILY
Qty: 90 TABLET | Refills: 1 | Status: SHIPPED | OUTPATIENT
Start: 2021-09-27 | End: 2021-11-16

## 2021-09-27 RX ORDER — LIRAGLUTIDE 6 MG/ML
1.8 INJECTION SUBCUTANEOUS DAILY
Qty: 9 ML | Refills: 0 | Status: SHIPPED | OUTPATIENT
Start: 2021-09-27 | End: 2022-01-20

## 2021-09-27 NOTE — TELEPHONE ENCOUNTER
Received msg from PA to get patient in for appt to be evaluated for L Thigh wound  I called patient and left msg for him to return my call if 10:30 or 2pm for tomorrow works  I ask him to let us know what time works    Awaiting call back

## 2021-09-27 NOTE — TELEPHONE ENCOUNTER
Pt called regarding his visit he had with Dr Spenser Bryan today, his wife is very upset that in the note it states that he no longer lives at home  His wife would like that removed

## 2021-09-27 NOTE — ASSESSMENT & PLAN NOTE
Being managed by rheumatologist   Aidan Costa with rheumatology as scheduled    He is immunosuppressed and does qualify for COVID-19 booster

## 2021-09-27 NOTE — PROGRESS NOTES
Subjective:      Patient ID: Jackie Dinh  is a 50 y o  male  53-year-old male with past medical history of diabetes mellitus type 2 that is insulin requiring, Charcot foot, chronic diabetic neuropathy, hyperlipidemia, tobacco abuse, rheumatoid arthritis presents for follow-up of chronic conditions  Since he was last seen the patient was caught having an affair  Had an affair for 2 years on his wife of 30 years  He is living at a friend's house  They are going to marital counseling in he is going to individual counseling  Patient has not been eating as much since his wife is no longer cooking for him  Some of his labs are the best that he has had in years  Patient does continue to complain of deformity of his right foot  Has not seen podiatrist in follow-up  Patient also has drainage from wound on the left lateral thigh, watery drainage  Has not seen orthopedic surgeon in follow-up as he had missed appointment after having a fair  Patient is going to counseling    Vitamin-D level 69 1, normal CBC, CMP with the exception of impaired fasting glucose of 133, hemoglobin A1c significantly improved at 6 4%, total cholesterol 144, triglycerides 107, HDL 38, LDL 85, urine is negative for microalbumin      Past Medical History:   Diagnosis Date    Abscess of left thigh 5/11/2021    Arthritis     At risk for falls     Broken foot     right    Cataract     giacomo    Cellulitis of left lower extremity 4/26/2021    [facility]     Diabetes mellitus [facility]     Hx MRSA infection     Per wife patient hadf MRSa in a wound awhile ago    Hyperlipidemia     Kidney stone     Neuropathy     RA (rheumatoid arthritis) [facility]     Rheumatoid arthritis (HCC)     Seasonal allergies     Sepsis ([facility] ) 4/26/2021    Snores     Uses wheelchair     and crutches- NWB RLE    Wears glasses        Family History   Problem Relation Age of Onset    Diabetes Mother     Stroke Mother     Mental illness Mother     Diabetes Father     Stroke Father     Alcohol abuse Brother     Coronary artery disease Family         Age 51-55    Diabetes type II Family     Heart disease Family        Past Surgical History:   Procedure Laterality Date    APPENDECTOMY      CLOSED REDUCTION FOOT DISLOCATION Right 2/12/2019    Procedure: C/R FRACTURE;  Surgeon: Cale Cruz DPM;  [facility];  Service: Podiatry    COLONOSCOPY      FOOT SURGERY Right 07/2019    Removal of the bone graft and cleaned out infection, and placed new bone graft    IR PICC PLACEMENT SINGLE LUMEN  8/5/2019    OR DEBRIDEMENT, SKIN, SUB-Q TISSUE,=<20 SQ CM Left 5/24/2021    Procedure: INCISION AND DRAINAGE THIGH;  Surgeon: Mikki Domínguez MD;  [facility];  Service: Orthopedics    OR [address] [place] [address] Dr <0 5 CM REMAINDER BODY N/A 3/28/2018    Procedure: EXCISION WIDE LESION HEAD/FACIAL/NECK;  Surgeon: Hui Chow MD;  [facility];  Service: Surgical Oncology    OR GASTROCNEMIUS RECESSION Right 2/12/2019    Procedure: ENDO GASTROC RECESSION, APPLICATION OF EXTERNAL FIXATOR;  Surgeon: Cale Cruz DPM;  [facility];  Service: Podiatry    OR REMOVE EXTERN BONE FIX DEV W ANESTH Right 4/18/2019    Procedure: FRAME REMOVAL HARDWARE FOOT WITH APPLICATION OF GRAFT;  Surgeon: Cale Cruz DPM;  [facility];  Service: Podiatry    SKIN BIOPSY      scalp    WISDOM TOOTH EXTRACTION  1998    WOUND DEBRIDEMENT Left 4/29/2021    Procedure: KNEE INCISION AND DRAINAGE, EXCISIONAL DEBRIDEMENT OF PREPATELLAR BURSA; Surgeon: Mikki Domínguez MD;  [facility];  Service: Orthopedics        reports that he has been smoking cigarettes  He has a 40 00 pack-year smoking history  He has never used smokeless tobacco  He reports current alcohol use  He reports current drug use  Frequency: 7 00 times per week  Drug: Marijuana        Current Outpatient Medications:     Accu-Chek FastClix Lancets MISC, TEST BLOOD SUGAR FOUR TIMES A DAY BEFORE MEALS AND BEDTIME, Disp: 102 each, Rfl: 3    Accu-Chek Guide test strip, TEST BEFORE MEALS AND AT BEDTIME, Disp: 100 strip, Rfl: 3    albuterol (PROVENTIL HFA,VENTOLIN HFA) 90 mcg/act inhaler, INHALE 2 PUFFS EVERY 4 (FOUR) HOURS AS NEEDED FOR WHEEZING, Disp: 18 g, Rfl: 1    Ascorbic Acid (vitamin C) 1000 MG tablet, Take 1,000 mg by mouth daily, Disp: , Rfl:     atorvastatin (LIPITOR) 40 mg tablet, TAKE 1 TABLET AT BEDTIME , Disp: 90 tablet, Rfl: 1    Basaglar KwikPen 100 units/mL injection pen, INJECT 50 UNITS UNDER THE SKIN EVERY 12 (TWELVE) HOURS, Disp: 30 mL, Rfl: 1    BD Pen Needle Maureen U/F 32G X 4 MM MISC, ONE PEN NEEDLE THREE TIMES A DAY 2-BASAGLAR 1- VICTOZA, Disp: 100 each, Rfl: 3    calcium carbonate (OS-NERY) 600 MG tablet, Take 600 mg by mouth daily Dinnertime, Disp: , Rfl:     ergocalciferol (VITAMIN D2) 50,000 units, Take 50,000 Units by mouth once a week Takes on Saturday, Disp: , Rfl:     fluticasone (FLONASE) 50 mcg/act nasal spray, USE 2 SPRAYS IN EACH NOSTRIL ONCE DAILY (Patient taking differently: 2 sprays into each nostril daily at bedtime ), Disp: 16 g, Rfl: 11    hydroxychloroquine (PLAQUENIL) 200 mg tablet, Take 200 mg by mouth 2 (two) times a day  , Disp: , Rfl:     leflunomide (ARAVA) 20 MG tablet, Take 1 tablet by mouth daily with dinner , Disp: , Rfl:     losartan-hydrochlorothiazide (HYZAAR) 100-12 5 MG per tablet, Take 1 tablet by mouth daily, Disp: 90 tablet, Rfl: 1    naproxen (NAPROSYN) 500 mg tablet, Take 1 tablet by mouth every 12 (twelve) hours, Disp: , Rfl:     pantoprazole (PROTONIX) 40 mg tablet, Take 1 tablet (40 mg total) by mouth daily (Patient taking differently: Take 40 mg by mouth daily in the early morning ), Disp: 30 tablet, Rfl: 11    saccharomyces boulardii (FLORASTOR) 250 mg capsule, Take 1 capsule (250 mg total) by mouth 2 (two) times a day, Disp: 60 capsule, Rfl: 0    sitaGLIPtin (Januvia) 100 mg tablet, Take 1 tablet (100 mg total) by mouth daily after dinner, Disp: 90 tablet, Rfl: 1    sucralfate (CARAFATE) 1 g tablet, TAKE 1 TABLET (1 G TOTAL) BY MOUTH 4 (FOUR) TIMES A DAY, Disp: 120 tablet, Rfl: 4    ULTICARE MICRO PEN NEEDLES 32G X 4 MM MISC, INJECT UNDER THE SKIN 3 (THREE) TIMES A DAY, Disp: 100 each, Rfl: 3    Victoza injection, INJECT 1 8 MG DAILY, Disp: 9 mL, Rfl: 4    The following portions of the patient's history were reviewed and updated as appropriate: allergies, current medications, past family history, past medical history, past social history, past surgical history and problem list     Review of Systems   Constitutional: Negative  HENT: Negative  Eyes: Negative  Respiratory: Negative  Cardiovascular: Negative  Negative for leg swelling  Gastrointestinal: Negative  Endocrine: Negative  Genitourinary: Negative  Musculoskeletal: Negative  Skin: Positive for wound ( left lateral thigh)  Allergic/Immunologic: Negative  Neurological: Positive for numbness (Bilateral feet)  Hematological: Negative  Psychiatric/Behavioral: Negative  Negative for dysphoric mood and suicidal ideas  The patient is not nervous/anxious  All other systems reviewed and are negative  Objective:    /72   Pulse 86   Resp 16   Ht 6' (1 829 m)   Wt 110 kg (242 lb)   SpO2 97%   BMI 32 82 kg/m²      Physical Exam  Vitals and nursing note reviewed  Constitutional:       General: He is not in acute distress  Appearance: Normal appearance  He is well-developed  He is obese  He is not ill-appearing  HENT:      Head: Normocephalic and atraumatic  Right Ear: Tympanic membrane, ear canal and external ear normal       Left Ear: Tympanic membrane, ear canal and external ear normal    Eyes:      Extraocular Movements: Extraocular movements intact  Conjunctiva/sclera: Conjunctivae normal       Pupils: Pupils are equal, round, and reactive to light     Cardiovascular: Rate and Rhythm: Normal rate and regular rhythm  Pulses: Normal pulses  Heart sounds: Normal heart sounds  No murmur heard  Pulmonary:      Effort: Pulmonary effort is normal       Breath sounds: Normal breath sounds  Abdominal:      General: Abdomen is flat  Bowel sounds are normal       Palpations: Abdomen is soft  Musculoskeletal:         General: Normal range of motion  Cervical back: Normal range of motion and neck supple  Right lower leg: No edema  Left lower leg: No edema  Skin:     General: Skin is warm and dry  Comments: Small pinpoint wound at the left lateral thigh with scant amount of serous drainage  No surrounding erythema   Neurological:      General: No focal deficit present  Mental Status: He is alert and oriented to person, place, and time  Psychiatric:         Mood and Affect: Mood normal          Behavior: Behavior normal          Thought Content: Thought content normal          Judgment: Judgment normal          Media Information     Document Information    Clinical Image - Mobile Device   Left knee wound   09/27/2021 8:17 AM   Attached To:    Office Visit on 9/27/21 with Erwin Reyna DO   Source Information    Erwin Reyna DO  [facility]         Recent Results (from the past 1008 hour(s))   CBC and differential    Collection Time: 09/15/21  8:57 AM   Result Value Ref Range    WBC 7 17 4 31 - 10 16 Thousand/uL    RBC 5 63 (H) 3 88 - 5 62 Million/uL    Hemoglobin 16 4 12 0 - 17 0 g/dL    Hematocrit 49 8 (H) 36 5 - 49 3 %    MCV 89 82 - 98 fL    MCH 29 1 26 8 - 34 3 pg    MCHC 32 9 31 4 - 37 4 g/dL    RDW 14 2 11 6 - 15 1 %    MPV 11 5 8 9 - 12 7 fL    Platelets 222 087 - 215 Thousands/uL    nRBC 0 /100 WBCs    Neutrophils Relative 63 43 - 75 %    Immat GRANS % 1 0 - 2 %    Lymphocytes Relative 20 14 - 44 %    Monocytes Relative 13 (H) 4 - 12 %    Eosinophils Relative 2 0 - 6 %    Basophils Relative 1 0 - 1 %    Neutrophils Absolute 4 53 1 85 - 7 62 Thousands/µL    Immature Grans Absolute 0 05 0 00 - 0 20 Thousand/uL    Lymphocytes Absolute 1 41 0 60 - 4 47 Thousands/µL    Monocytes Absolute 0 96 0 17 - 1 22 Thousand/µL    Eosinophils Absolute 0 15 0 00 - 0 61 Thousand/µL    Basophils Absolute 0 07 0 00 - 0 10 Thousands/µL   Comprehensive metabolic panel    Collection Time: 09/15/21  8:57 AM   Result Value Ref Range    Sodium 137 136 - 145 mmol/L    Potassium 4 1 3 5 - 5 3 mmol/L    Chloride 108 100 - 108 mmol/L    CO2 27 21 - 32 mmol/L    ANION GAP 2 (L) 4 - 13 mmol/L    BUN 18 5 - 25 mg/dL    Creatinine 1 05 0 60 - 1 30 mg/dL    Glucose, Fasting 133 (H) 65 - 99 mg/dL    Calcium 8 8 8 3 - 10 1 mg/dL    AST 21 5 - 45 U/L    ALT 33 12 - 78 U/L    Alkaline Phosphatase 55 46 - 116 U/L    Total Protein 7 3 6 4 - 8 2 g/dL    Albumin 3 6 3 5 - 5 0 g/dL    Total Bilirubin 0 48 0 20 - 1 00 mg/dL    eGFR 81 ml/min/1 73sq m   Hemoglobin A1C    Collection Time: 09/15/21  8:57 AM   Result Value Ref Range    Hemoglobin A1C 6 4 (H) Normal 3 8-5 6%; PreDiabetic 5 7-6 4%; Diabetic >=6 5%; Glycemic control for adults with diabetes <7 0% %     mg/dl   Lipid panel    Collection Time: 09/15/21  8:57 AM   Result Value Ref Range    Cholesterol 144 50 - 200 mg/dL    Triglycerides 107 <=150 mg/dL    HDL, Direct 38 (L) >=40 mg/dL    LDL Calculated 85 0 - 100 mg/dL    Non-HDL-Chol (CHOL-HDL) 106 mg/dl   Microalbumin / creatinine urine ratio    Collection Time: 09/15/21  8:57 AM   Result Value Ref Range    Creatinine, Ur 357 0 mg/dL    Microalbum  ,U,Random 23 1 (H) 0 0 - 20 0 mg/L    Microalb Creat Ratio 6 0 - 30 mg/g creatinine   TSH, 3rd generation with Free T4 reflex    Collection Time: 09/15/21  8:57 AM   Result Value Ref Range    TSH 3RD GENERATON 1 640 0 358 - 3 740 uIU/mL   Vitamin D 1,25 dihydroxy    Collection Time: 09/15/21  8:57 AM   Result Value Ref Range    Vit D, 1,25-Dihydroxy 69 1 19 9 - 79 3 pg/mL       Assessment/Plan:    Type 2 diabetes mellitus with diabetic neuropathic arthropathy, with long-term current use of insulin ([facility] )    Lab Results   Component Value Date    HGBA1C 6 4 (H) 09/15/2021     Follow-up with endocrinology  Hemoglobin A1c at 6 4% which is the best he has had in his entire timed seeing me  Continue on same insulin regimen  Refills not necessary at this time  Repeat diabetic parameters in 4 months    Charcot foot due to diabetes mellitus ([facility] )    Lab Results   Component Value Date    HGBA1C 6 4 (H) 09/15/2021     Better glycemic control  Patient does need to follow up with podiatrist   He has diabetic neuropathy  No need for my examination  Eventually he needs to have reconstruction done on his foot    Benign essential hypertension  Hypertension is well controlled on current dose of losartan/hydrochlorothiazide  Refill provided  Re-evaluate in 4 months    Rheumatoid arthritis involving multiple sites with positive rheumatoid factor ([place] [address] )  Being managed by rheumatologist   Follow-up with rheumatology as scheduled  He is immunosuppressed and does qualify for COVID-19 booster    Mixed hyperlipidemia  Adequately controlled on atorvastatin 40 mg once daily  Continue watch dietary intake of carbohydrates, fats and cholesterol    Obesity (BMI 30 0-34  9)  He is in the process of losing weight    Vitamin D deficiency  Check vitamin-D level in 6 months    Tobacco use  Counseled on the benefits of smoking cessation  Patient is aware  Not a good time for him to try to quit smoking due to stress of infidelity    Open knee wound, left, subsequent encounter  Small opening with scant amount of serous drainage  Culture obtained  Message sent to orthopedic surgeon  Very small area          Problem List Items Addressed This Visit        Endocrine    Charcot foot due to diabetes mellitus ([facility] )       Lab Results   Component Value Date    HGBA1C 6 4 (H) 09/15/2021     Better glycemic control    Patient does need to follow up with podiatrist   He has diabetic neuropathy  No need for my examination  Eventually he needs to have reconstruction done on his foot         Type 2 diabetes mellitus with diabetic neuropathic arthropathy, with long-term current use of insulin ([facility] ) - Primary       Lab Results   Component Value Date    HGBA1C 6 4 (H) 09/15/2021     Follow-up with endocrinology  Hemoglobin A1c at 6 4% which is the best he has had in his entire timed seeing me  Continue on same insulin regimen  Refills not necessary at this time  Repeat diabetic parameters in 4 months         Relevant Orders    Comprehensive metabolic panel    Hemoglobin A1C    Lipid panel    Microalbumin / creatinine urine ratio       Cardiovascular and Mediastinum    Benign essential hypertension     Hypertension is well controlled on current dose of losartan/hydrochlorothiazide  Refill provided  Re-evaluate in 4 months         Relevant Medications    losartan-hydrochlorothiazide (HYZAAR) 100-12 5 MG per tablet    Other Relevant Orders    TSH, 3rd generation with Free T4 reflex       Musculoskeletal and Integument    Rheumatoid arthritis involving multiple sites with positive rheumatoid factor ([facility] )     Being managed by rheumatologist   Follow-up with rheumatology as scheduled  He is immunosuppressed and does qualify for COVID-19 booster         Relevant Orders    CBC and differential    TSH, 3rd generation with Free T4 reflex       Other    Mixed hyperlipidemia     Adequately controlled on atorvastatin 40 mg once daily  Continue watch dietary intake of carbohydrates, fats and cholesterol         Obesity (BMI 30 0-34  9)     He is in the process of losing weight         Open knee wound, left, subsequent encounter     Small opening with scant amount of serous drainage  Culture obtained  Message sent to orthopedic surgeon  Very small area         Relevant Orders    Wound culture and Gram stain    Tobacco use     Counseled on the benefits of smoking cessation  Patient is aware  Not a good time for him to try to quit smoking due to stress of infidelity         Vitamin D deficiency     Check vitamin-D level in 6 months         Relevant Orders    Vitamin D 1,25 dihydroxy      Other Visit Diagnoses     Essential hypertension        Relevant Medications    losartan-hydrochlorothiazide (HYZAAR) 100-12 5 MG per tablet    Flu vaccine need        Relevant Orders    influenza vaccine, quadrivalent, recombinant, PF, 0 5 mL, for patients 18 yr+ (FLUBLOK) (Completed)          BMI Counseling: Body mass index is 32 82 kg/m²  The BMI is above normal  Nutrition recommendations include moderation in carbohydrate intake, increasing intake of lean protein, reducing intake of saturated fat and trans fat and reducing intake of cholesterol  I have spent 44 minutes with Patient  today in which greater than 50% of this time was spent in counseling/coordination of care regarding Diagnostic results, Prognosis, Risks and benefits of tx options, Intructions for management, Patient and family education, Importance of tx compliance, Risk factor reductions and Impressions

## 2021-09-27 NOTE — ASSESSMENT & PLAN NOTE
Small opening with scant amount of serous drainage  Culture obtained  Message sent to orthopedic surgeon    Very small area

## 2021-09-27 NOTE — TELEPHONE ENCOUNTER
Unable to get ahold of patient to set up appt  I have tried 3 times to get him  Left msgs to call office to schedule an appt

## 2021-09-27 NOTE — ASSESSMENT & PLAN NOTE
Lab Results   Component Value Date    HGBA1C 6 4 (H) 09/15/2021     Follow-up with endocrinology  Hemoglobin A1c at 6 4% which is the best he has had in his entire timed seeing me  Continue on same insulin regimen  Refills not necessary at this time    Repeat diabetic parameters in 4 months

## 2021-09-27 NOTE — ASSESSMENT & PLAN NOTE
Lab Results   Component Value Date    HGBA1C 6 4 (H) 09/15/2021     Better glycemic control  Patient does need to follow up with podiatrist   He has diabetic neuropathy  No need for my examination    Eventually he needs to have reconstruction done on his foot

## 2021-09-27 NOTE — ASSESSMENT & PLAN NOTE
Adequately controlled on atorvastatin 40 mg once daily    Continue watch dietary intake of carbohydrates, fats and cholesterol

## 2021-09-27 NOTE — ASSESSMENT & PLAN NOTE
Hypertension is well controlled on current dose of losartan/hydrochlorothiazide  Refill provided    Re-evaluate in 4 months

## 2021-09-27 NOTE — ASSESSMENT & PLAN NOTE
Counseled on the benefits of smoking cessation  Patient is aware    Not a good time for him to try to quit smoking due to stress of infidelity

## 2021-09-28 NOTE — TELEPHONE ENCOUNTER
That would probably be because he is not at his house    May be someone else would be able to get a hold of him and let him know that Dr Andreia Rainey office is trying to reach him to schedule an appointment

## 2021-09-28 NOTE — TELEPHONE ENCOUNTER
Still unable to get ahold of patient for followup with Dr Yakov Rashid  Multiple msg left on number provided

## 2021-09-30 NOTE — RESULT ENCOUNTER NOTE
Please call patient and notify that results of culture showed growth of normal skin bacteria  No need for antibiotics    I am sending a copy of this report to orthopedic surgeon

## 2021-10-01 ENCOUNTER — OFFICE VISIT (OUTPATIENT)
Dept: OBGYN CLINIC | Facility: CLINIC | Age: 56
End: 2021-10-01
Payer: COMMERCIAL

## 2021-10-01 VITALS
DIASTOLIC BLOOD PRESSURE: 84 MMHG | HEART RATE: 96 BPM | WEIGHT: 242 LBS | BODY MASS INDEX: 32.78 KG/M2 | SYSTOLIC BLOOD PRESSURE: 143 MMHG | HEIGHT: 72 IN

## 2021-10-01 DIAGNOSIS — L02.416 ABSCESS OF LEFT THIGH: Primary | ICD-10-CM

## 2021-10-01 PROCEDURE — 99213 OFFICE O/P EST LOW 20 MIN: CPT | Performed by: ORTHOPAEDIC SURGERY

## 2021-10-01 PROCEDURE — 3077F SYST BP >= 140 MM HG: CPT | Performed by: ORTHOPAEDIC SURGERY

## 2021-10-01 PROCEDURE — 3079F DIAST BP 80-89 MM HG: CPT | Performed by: ORTHOPAEDIC SURGERY

## 2021-10-12 DIAGNOSIS — Z79.4 TYPE 2 DIABETES MELLITUS WITH DIABETIC POLYNEUROPATHY, WITH LONG-TERM CURRENT USE OF INSULIN (HCC): ICD-10-CM

## 2021-10-12 DIAGNOSIS — E11.9 TYPE 2 DIABETES MELLITUS WITHOUT COMPLICATION, WITHOUT LONG-TERM CURRENT USE OF INSULIN (HCC): ICD-10-CM

## 2021-10-12 DIAGNOSIS — E11.42 TYPE 2 DIABETES MELLITUS WITH DIABETIC POLYNEUROPATHY, WITH LONG-TERM CURRENT USE OF INSULIN (HCC): ICD-10-CM

## 2021-10-12 DIAGNOSIS — E13.9 DIABETES MELLITUS OF OTHER TYPE WITHOUT COMPLICATION, UNSPECIFIED WHETHER LONG TERM INSULIN USE (HCC): ICD-10-CM

## 2021-10-12 DIAGNOSIS — K25.3 ACUTE GASTRIC ULCER WITHOUT HEMORRHAGE OR PERFORATION: ICD-10-CM

## 2021-10-12 RX ORDER — PANTOPRAZOLE SODIUM 40 MG/1
40 TABLET, DELAYED RELEASE ORAL DAILY
Qty: 90 TABLET | Refills: 11 | Status: SHIPPED | OUTPATIENT
Start: 2021-10-12 | End: 2021-12-15

## 2021-10-12 RX ORDER — PEN NEEDLE, DIABETIC 32GX 5/32"
NEEDLE, DISPOSABLE MISCELLANEOUS
Qty: 100 EACH | Refills: 3 | Status: SHIPPED | OUTPATIENT
Start: 2021-10-12 | End: 2021-10-18

## 2021-10-12 RX ORDER — INSULIN GLARGINE 100 [IU]/ML
50 INJECTION, SOLUTION SUBCUTANEOUS EVERY 12 HOURS SCHEDULED
Qty: 30 ML | Refills: 1 | Status: SHIPPED | OUTPATIENT
Start: 2021-10-12 | End: 2021-12-16

## 2021-10-18 DIAGNOSIS — E11.9 TYPE 2 DIABETES MELLITUS WITHOUT COMPLICATION, WITHOUT LONG-TERM CURRENT USE OF INSULIN (HCC): ICD-10-CM

## 2021-10-18 DIAGNOSIS — E11.42 TYPE 2 DIABETES MELLITUS WITH DIABETIC POLYNEUROPATHY, WITH LONG-TERM CURRENT USE OF INSULIN (HCC): ICD-10-CM

## 2021-10-18 DIAGNOSIS — E13.9 DIABETES MELLITUS OF OTHER TYPE WITHOUT COMPLICATION, UNSPECIFIED WHETHER LONG TERM INSULIN USE (HCC): ICD-10-CM

## 2021-10-18 DIAGNOSIS — Z79.4 TYPE 2 DIABETES MELLITUS WITH DIABETIC POLYNEUROPATHY, WITH LONG-TERM CURRENT USE OF INSULIN (HCC): ICD-10-CM

## 2021-10-18 RX ORDER — PEN NEEDLE, DIABETIC 32GX 5/32"
NEEDLE, DISPOSABLE MISCELLANEOUS
Qty: 100 EACH | Refills: 3 | Status: SHIPPED | OUTPATIENT
Start: 2021-10-18 | End: 2022-02-14

## 2021-10-22 ENCOUNTER — TELEPHONE (OUTPATIENT)
Dept: FAMILY MEDICINE CLINIC | Facility: CLINIC | Age: 56
End: 2021-10-22

## 2021-11-16 DIAGNOSIS — E11.42 TYPE 2 DIABETES MELLITUS WITH DIABETIC POLYNEUROPATHY, WITH LONG-TERM CURRENT USE OF INSULIN (HCC): ICD-10-CM

## 2021-11-16 DIAGNOSIS — J45.909 UNCOMPLICATED ASTHMA, UNSPECIFIED ASTHMA SEVERITY, UNSPECIFIED WHETHER PERSISTENT: ICD-10-CM

## 2021-11-16 DIAGNOSIS — Z79.4 TYPE 2 DIABETES MELLITUS WITH DIABETIC POLYNEUROPATHY, WITH LONG-TERM CURRENT USE OF INSULIN (HCC): ICD-10-CM

## 2021-11-16 DIAGNOSIS — I10 ESSENTIAL HYPERTENSION: ICD-10-CM

## 2021-11-16 RX ORDER — LOSARTAN POTASSIUM AND HYDROCHLOROTHIAZIDE 12.5; 1 MG/1; MG/1
1 TABLET ORAL DAILY
Qty: 90 TABLET | Refills: 1 | Status: SHIPPED | OUTPATIENT
Start: 2021-11-16 | End: 2022-07-01

## 2021-11-16 RX ORDER — LANCETS
EACH MISCELLANEOUS
Qty: 102 EACH | Refills: 3 | Status: SHIPPED | OUTPATIENT
Start: 2021-11-16 | End: 2022-03-09

## 2021-11-16 RX ORDER — ALBUTEROL SULFATE 90 UG/1
2 AEROSOL, METERED RESPIRATORY (INHALATION) EVERY 4 HOURS PRN
Qty: 18 G | Refills: 1 | Status: SHIPPED | OUTPATIENT
Start: 2021-11-16 | End: 2022-01-10

## 2021-11-16 RX ORDER — BLOOD SUGAR DIAGNOSTIC
STRIP MISCELLANEOUS
Qty: 100 STRIP | Refills: 3 | Status: SHIPPED | OUTPATIENT
Start: 2021-11-16 | End: 2022-03-09

## 2021-12-02 ENCOUNTER — TELEPHONE (OUTPATIENT)
Dept: FAMILY MEDICINE CLINIC | Facility: CLINIC | Age: 56
End: 2021-12-02

## 2021-12-15 DIAGNOSIS — E13.9 DIABETES MELLITUS OF OTHER TYPE WITHOUT COMPLICATION, UNSPECIFIED WHETHER LONG TERM INSULIN USE (HCC): ICD-10-CM

## 2021-12-15 DIAGNOSIS — E11.9 TYPE 2 DIABETES MELLITUS WITHOUT COMPLICATION, WITHOUT LONG-TERM CURRENT USE OF INSULIN (HCC): ICD-10-CM

## 2021-12-15 DIAGNOSIS — K25.3 ACUTE GASTRIC ULCER WITHOUT HEMORRHAGE OR PERFORATION: ICD-10-CM

## 2021-12-15 RX ORDER — PANTOPRAZOLE SODIUM 40 MG/1
40 TABLET, DELAYED RELEASE ORAL DAILY
Qty: 30 TABLET | Refills: 11 | Status: SHIPPED | OUTPATIENT
Start: 2021-12-15 | End: 2022-01-12

## 2021-12-15 RX ORDER — SUCRALFATE 1 G/1
1 TABLET ORAL 4 TIMES DAILY
Qty: 120 TABLET | Refills: 4 | Status: SHIPPED | OUTPATIENT
Start: 2021-12-15 | End: 2022-08-03

## 2021-12-16 RX ORDER — INSULIN GLARGINE 100 [IU]/ML
50 INJECTION, SOLUTION SUBCUTANEOUS EVERY 12 HOURS SCHEDULED
Qty: 30 ML | Refills: 1 | Status: SHIPPED | OUTPATIENT
Start: 2021-12-16 | End: 2022-03-09

## 2021-12-16 RX ORDER — FLUTICASONE PROPIONATE 50 MCG
SPRAY, SUSPENSION (ML) NASAL
Qty: 16 G | Refills: 11 | Status: SHIPPED | OUTPATIENT
Start: 2021-12-16

## 2021-12-16 RX ORDER — SITAGLIPTIN 100 MG/1
TABLET, FILM COATED ORAL
Qty: 30 TABLET | Refills: 1 | Status: SHIPPED | OUTPATIENT
Start: 2021-12-16 | End: 2022-02-22

## 2021-12-27 DIAGNOSIS — Z20.822 EXPOSURE TO COVID-19 VIRUS: Primary | ICD-10-CM

## 2021-12-27 DIAGNOSIS — E78.2 MIXED HYPERLIPIDEMIA: ICD-10-CM

## 2021-12-27 PROCEDURE — U0003 INFECTIOUS AGENT DETECTION BY NUCLEIC ACID (DNA OR RNA); SEVERE ACUTE RESPIRATORY SYNDROME CORONAVIRUS 2 (SARS-COV-2) (CORONAVIRUS DISEASE [COVID-19]), AMPLIFIED PROBE TECHNIQUE, MAKING USE OF HIGH THROUGHPUT TECHNOLOGIES AS DESCRIBED BY CMS-2020-01-R: HCPCS | Performed by: FAMILY MEDICINE

## 2021-12-27 PROCEDURE — U0005 INFEC AGEN DETEC AMPLI PROBE: HCPCS | Performed by: FAMILY MEDICINE

## 2021-12-27 RX ORDER — ATORVASTATIN CALCIUM 40 MG/1
TABLET, FILM COATED ORAL
Qty: 90 TABLET | Refills: 1 | Status: SHIPPED | OUTPATIENT
Start: 2021-12-27 | End: 2022-07-01

## 2022-01-05 ENCOUNTER — APPOINTMENT (OUTPATIENT)
Dept: LAB | Facility: CLINIC | Age: 57
End: 2022-01-05
Payer: COMMERCIAL

## 2022-01-05 DIAGNOSIS — M05.79 RHEUMATOID ARTHRITIS INVOLVING MULTIPLE SITES WITH POSITIVE RHEUMATOID FACTOR (HCC): ICD-10-CM

## 2022-01-05 DIAGNOSIS — Z79.4 TYPE 2 DIABETES MELLITUS WITH DIABETIC NEUROPATHIC ARTHROPATHY, WITH LONG-TERM CURRENT USE OF INSULIN (HCC): ICD-10-CM

## 2022-01-05 DIAGNOSIS — E55.9 VITAMIN D DEFICIENCY: ICD-10-CM

## 2022-01-05 DIAGNOSIS — E11.610 TYPE 2 DIABETES MELLITUS WITH DIABETIC NEUROPATHIC ARTHROPATHY, WITH LONG-TERM CURRENT USE OF INSULIN (HCC): ICD-10-CM

## 2022-01-05 DIAGNOSIS — I10 BENIGN ESSENTIAL HYPERTENSION: ICD-10-CM

## 2022-01-05 LAB
ALBUMIN SERPL BCP-MCNC: 4.2 G/DL (ref 3.5–5)
ALP SERPL-CCNC: 55 U/L (ref 46–116)
ALT SERPL W P-5'-P-CCNC: 35 U/L (ref 12–78)
ANION GAP SERPL CALCULATED.3IONS-SCNC: 6 MMOL/L (ref 4–13)
AST SERPL W P-5'-P-CCNC: 22 U/L (ref 5–45)
BASOPHILS # BLD AUTO: 0.1 THOUSANDS/ΜL (ref 0–0.1)
BASOPHILS NFR BLD AUTO: 1 % (ref 0–1)
BILIRUB SERPL-MCNC: 1.19 MG/DL (ref 0.2–1)
BUN SERPL-MCNC: 22 MG/DL (ref 5–25)
CALCIUM SERPL-MCNC: 9.4 MG/DL (ref 8.3–10.1)
CHLORIDE SERPL-SCNC: 103 MMOL/L (ref 100–108)
CHOLEST SERPL-MCNC: 151 MG/DL
CO2 SERPL-SCNC: 29 MMOL/L (ref 21–32)
CREAT SERPL-MCNC: 1.14 MG/DL (ref 0.6–1.3)
EOSINOPHIL # BLD AUTO: 0.2 THOUSAND/ΜL (ref 0–0.61)
EOSINOPHIL NFR BLD AUTO: 2 % (ref 0–6)
ERYTHROCYTE [DISTWIDTH] IN BLOOD BY AUTOMATED COUNT: 13.7 % (ref 11.6–15.1)
EST. AVERAGE GLUCOSE BLD GHB EST-MCNC: 151 MG/DL
GFR SERPL CREATININE-BSD FRML MDRD: 72 ML/MIN/1.73SQ M
GLUCOSE P FAST SERPL-MCNC: 126 MG/DL (ref 65–99)
HBA1C MFR BLD: 6.9 %
HCT VFR BLD AUTO: 49 % (ref 36.5–49.3)
HDLC SERPL-MCNC: 49 MG/DL
HGB BLD-MCNC: 16.6 G/DL (ref 12–17)
IMM GRANULOCYTES # BLD AUTO: 0.06 THOUSAND/UL (ref 0–0.2)
IMM GRANULOCYTES NFR BLD AUTO: 1 % (ref 0–2)
LDLC SERPL CALC-MCNC: 75 MG/DL (ref 0–100)
LYMPHOCYTES # BLD AUTO: 1.5 THOUSANDS/ΜL (ref 0.6–4.47)
LYMPHOCYTES NFR BLD AUTO: 17 % (ref 14–44)
MCH RBC QN AUTO: 29.7 PG (ref 26.8–34.3)
MCHC RBC AUTO-ENTMCNC: 33.9 G/DL (ref 31.4–37.4)
MCV RBC AUTO: 88 FL (ref 82–98)
MONOCYTES # BLD AUTO: 1.15 THOUSAND/ΜL (ref 0.17–1.22)
MONOCYTES NFR BLD AUTO: 13 % (ref 4–12)
NEUTROPHILS # BLD AUTO: 6 THOUSANDS/ΜL (ref 1.85–7.62)
NEUTS SEG NFR BLD AUTO: 66 % (ref 43–75)
NONHDLC SERPL-MCNC: 102 MG/DL
NRBC BLD AUTO-RTO: 0 /100 WBCS
PLATELET # BLD AUTO: 219 THOUSANDS/UL (ref 149–390)
PMV BLD AUTO: 11.7 FL (ref 8.9–12.7)
POTASSIUM SERPL-SCNC: 4 MMOL/L (ref 3.5–5.3)
PROT SERPL-MCNC: 7.9 G/DL (ref 6.4–8.2)
RBC # BLD AUTO: 5.59 MILLION/UL (ref 3.88–5.62)
SODIUM SERPL-SCNC: 138 MMOL/L (ref 136–145)
TRIGL SERPL-MCNC: 136 MG/DL
TSH SERPL DL<=0.05 MIU/L-ACNC: 2.65 UIU/ML (ref 0.36–3.74)
WBC # BLD AUTO: 9.01 THOUSAND/UL (ref 4.31–10.16)

## 2022-01-05 PROCEDURE — 82043 UR ALBUMIN QUANTITATIVE: CPT

## 2022-01-05 PROCEDURE — 85025 COMPLETE CBC W/AUTO DIFF WBC: CPT

## 2022-01-05 PROCEDURE — 80053 COMPREHEN METABOLIC PANEL: CPT

## 2022-01-05 PROCEDURE — 83036 HEMOGLOBIN GLYCOSYLATED A1C: CPT

## 2022-01-05 PROCEDURE — 36415 COLL VENOUS BLD VENIPUNCTURE: CPT

## 2022-01-05 PROCEDURE — 82570 ASSAY OF URINE CREATININE: CPT

## 2022-01-05 PROCEDURE — 3044F HG A1C LEVEL LT 7.0%: CPT | Performed by: ORTHOPAEDIC SURGERY

## 2022-01-05 PROCEDURE — 80061 LIPID PANEL: CPT

## 2022-01-05 PROCEDURE — 82652 VIT D 1 25-DIHYDROXY: CPT

## 2022-01-05 PROCEDURE — 84443 ASSAY THYROID STIM HORMONE: CPT

## 2022-01-06 ENCOUNTER — OFFICE VISIT (OUTPATIENT)
Dept: FAMILY MEDICINE CLINIC | Facility: CLINIC | Age: 57
End: 2022-01-06
Payer: COMMERCIAL

## 2022-01-06 VITALS
BODY MASS INDEX: 33.59 KG/M2 | OXYGEN SATURATION: 99 % | HEIGHT: 72 IN | SYSTOLIC BLOOD PRESSURE: 146 MMHG | HEART RATE: 102 BPM | WEIGHT: 248 LBS | RESPIRATION RATE: 16 BRPM | DIASTOLIC BLOOD PRESSURE: 78 MMHG

## 2022-01-06 DIAGNOSIS — E78.2 MIXED HYPERLIPIDEMIA: ICD-10-CM

## 2022-01-06 DIAGNOSIS — S81.002D OPEN KNEE WOUND, LEFT, SUBSEQUENT ENCOUNTER: ICD-10-CM

## 2022-01-06 DIAGNOSIS — I10 BENIGN ESSENTIAL HYPERTENSION: ICD-10-CM

## 2022-01-06 DIAGNOSIS — E11.610 CHARCOT FOOT DUE TO DIABETES MELLITUS (HCC): ICD-10-CM

## 2022-01-06 DIAGNOSIS — M05.79 RHEUMATOID ARTHRITIS INVOLVING MULTIPLE SITES WITH POSITIVE RHEUMATOID FACTOR (HCC): ICD-10-CM

## 2022-01-06 DIAGNOSIS — Z79.4 TYPE 2 DIABETES MELLITUS WITH DIABETIC NEUROPATHIC ARTHROPATHY, WITH LONG-TERM CURRENT USE OF INSULIN (HCC): Primary | ICD-10-CM

## 2022-01-06 DIAGNOSIS — E55.9 VITAMIN D DEFICIENCY: ICD-10-CM

## 2022-01-06 DIAGNOSIS — E11.610 TYPE 2 DIABETES MELLITUS WITH DIABETIC NEUROPATHIC ARTHROPATHY, WITH LONG-TERM CURRENT USE OF INSULIN (HCC): Primary | ICD-10-CM

## 2022-01-06 PROBLEM — M70.42 PREPATELLAR BURSITIS OF LEFT KNEE: Status: RESOLVED | Noted: 2021-04-15 | Resolved: 2022-01-06

## 2022-01-06 LAB
CREAT UR-MCNC: 271 MG/DL
MICROALBUMIN UR-MCNC: 16.7 MG/L (ref 0–20)
MICROALBUMIN/CREAT 24H UR: 6 MG/G CREATININE (ref 0–30)

## 2022-01-06 PROCEDURE — 99215 OFFICE O/P EST HI 40 MIN: CPT | Performed by: FAMILY MEDICINE

## 2022-01-06 RX ORDER — CIPROFLOXACIN 500 MG/1
500 TABLET, FILM COATED ORAL EVERY 12 HOURS SCHEDULED
Qty: 14 TABLET | Refills: 0 | Status: SHIPPED | OUTPATIENT
Start: 2022-01-06 | End: 2022-01-14 | Stop reason: SDUPTHER

## 2022-01-06 NOTE — PROGRESS NOTES
Subjective:      Patient ID: Nevaeh Chatterjee  is a 49 y o  male  61-year-old male with past medical history of diabetes, rheumatoid arthritis, Charcot foot, history of cellulitis of the left knee presents with his wife for follow-up of chronic conditions  Labs reviewed which showed TSH at 2 65, normal CBC, CMP with the exception of impaired fasting glucose of 126, hemoglobin A1c increased slightly from 6 4-6 9%, urine is negative for microalbumin, total cholesterol 151, triglycerides 136, HDL 49, LDL 75  Patient does note that the wound that he had on his left knee after having surgical debridement had eventually closed  Patient states that there was always a small white spot with a small fluid collection underneath  Recently a few days ago that area opened and he states that there was a bloody discharge and a copious amount of fluid  Patient did apply gauze dressing but today his left knee is swollen  He was seen by infectious disease and had cultures that showed MSSA as well as small amount of MRSA  He was placed on a very long course of doxycycline by Infectious Disease  He was last seen by orthopedic surgeon back in October who had him only coming back on a p r n  Basis    No fever or chills      Past Medical History:   Diagnosis Date    Abscess of left thigh 5/11/2021    Arthritis     At risk for falls     Broken foot     right    Cataract     giacomo    Cellulitis of left lower extremity 4/26/2021    [facility] disease[facility]     Diabetes mellitus ([facility] )     Hx MRSA infection     Per wife patient hadf MRSa in a wound awhile ago    Hyperlipidemia     Kidney stone     Neuropathy     RA [facility]oid arthritis) [facility]     Rheumatoid arthritis ([facility])     Seasonal allergies     Sepsis ([facility] [address] ) 4/26/2021    Snores     Uses wheelchair     and crutches- NWB RLE    Wears glasses        Family History   Problem Relation Age of Onset    Diabetes Mother     Stroke Mother  Mental illness Mother     Diabetes Father     Stroke Father     Alcohol abuse Brother     Coronary artery disease Family         Age 51-55    Diabetes type II Family     Heart disease Family        Past Surgical History:   Procedure Laterality Date    APPENDECTOMY      CLOSED REDUCTION FOOT DISLOCATION Right 2/12/2019    Procedure: C/R FRACTURE;  Surgeon: Augie Webb DPM;  [facility];  Service: Podiatry    COLONOSCOPY      FOOT SURGERY Right 07/2019    Removal of the bone graft and cleaned out infection, and placed new bone graft    IR PICC PLACEMENT SINGLE LUMEN  8/5/2019    TN DEBRIDEMENT, SKIN, SUB-Q TISSUE,=<20 SQ CM Left 5/24/2021    Procedure: INCISION AND DRAINAGE THIGH;  Surgeon: Hay Bernstein MD;  [facility];  Service: Orthopedics    TN [address] [place] [address] Dr <0 5 CM REMAINDER BODY N/A 3/28/2018    Procedure: EXCISION WIDE LESION HEAD/FACIAL/NECK;  Surgeon: Dempsey Fabry, MD;  [facility];  Service: Surgical Oncology    TN GASTROCNEMIUS RECESSION Right 2/12/2019    Procedure: ENDO GASTROC RECESSION, APPLICATION OF EXTERNAL FIXATOR;  Surgeon: Augie Webb DPM;  [facility];  Service: Podiatry    TN REMOVE EXTERN BONE FIX DEV W ANESTH Right 4/18/2019    Procedure: FRAME REMOVAL HARDWARE FOOT WITH APPLICATION OF GRAFT;  Surgeon: Augie Webb DPM;  [facility];  Service: Podiatry    SKIN BIOPSY      scalp    WISDOM TOOTH EXTRACTION  1998    WOUND DEBRIDEMENT Left 4/29/2021    Procedure: KNEE INCISION AND DRAINAGE, EXCISIONAL DEBRIDEMENT OF PREPATELLAR BURSA; Surgeon: Hay Bernstein MD;  [facility];  Service: Orthopedics        reports that he has been smoking cigarettes  He has a 40 00 pack-year smoking history  He has never used smokeless tobacco  He reports current alcohol use  He reports current drug use  Frequency: 7 00 times per week  Drug: Marijuana        Current Outpatient Medications:     Accu-Chek FastClix Lancets MISC, TEST BLOOD SUGAR FOUR TIMES A DAY BEFORE MEALS AND BEDTIME, Disp: 102 each, Rfl: 3    Accu-Chek Guide test strip, TEST BEFORE MEALS AND AT BEDTIME, Disp: 100 strip, Rfl: 3    albuterol (PROVENTIL HFA,VENTOLIN HFA) 90 mcg/act inhaler, INHALE 2 PUFFS EVERY 4 (FOUR) HOURS AS NEEDED FOR WHEEZING, Disp: 18 g, Rfl: 1    Ascorbic Acid (vitamin C) 1000 MG tablet, Take 1,000 mg by mouth daily, Disp: , Rfl:     atorvastatin (LIPITOR) 40 mg tablet, TAKE 1 TABLET AT BEDTIME , Disp: 90 tablet, Rfl: 1    Basaglar KwikPen 100 units/mL injection pen, INJECT 50 UNITS UNDER THE SKIN EVERY 12 (TWELVE) HOURS, Disp: 30 mL, Rfl: 1    BD Pen Needle Maureen U/F 32G X 4 MM MISC, ONE PEN NEEDLE THREE TIMES A DAY 2-BASAGLAR 1- VICTOZA, Disp: 100 each, Rfl: 3    calcium carbonate (OS-NERY) 600 MG tablet, Take 600 mg by mouth daily Dinnertime, Disp: , Rfl:     ergocalciferol (VITAMIN D2) 50,000 units, Take 50,000 Units by mouth once a week Takes on Saturday, Disp: , Rfl:     fluticasone (FLONASE) 50 mcg/act nasal spray, USE 2 SPRAYS IN EACH NOSTRIL ONCE DAILY, Disp: 16 g, Rfl: 11    hydroxychloroquine (PLAQUENIL) 200 mg tablet, Take 200 mg by mouth 2 (two) times a day  , Disp: , Rfl:     Januvia 100 MG tablet, TAKE 1 TABLET (100 MG TOTAL) BY MOUTH DAILY AFTER DINNER, Disp: 30 tablet, Rfl: 1    leflunomide (ARAVA) 20 MG tablet, Take 1 tablet by mouth daily with dinner , Disp: , Rfl:     liraglutide (Victoza) injection, Inject 0 3 mL (1 8 mg total) under the skin daily, Disp: 9 mL, Rfl: 0    losartan-hydrochlorothiazide (HYZAAR) 100-12 5 MG per tablet, TAKE 1 TABLET BY MOUTH DAILY, Disp: 90 tablet, Rfl: 1    naproxen (NAPROSYN) 500 mg tablet, Take 1 tablet by mouth every 12 (twelve) hours, Disp: , Rfl:     pantoprazole (PROTONIX) 40 mg tablet, TAKE 1 TABLET (40 MG TOTAL) BY MOUTH DAILY, Disp: 30 tablet, Rfl: 11    saccharomyces boulardii (FLORASTOR) 250 mg capsule, Take 1 capsule (250 mg total) by mouth 2 (two) times a day, Disp: 60 capsule, Rfl: 0    sucralfate (CARAFATE) 1 g tablet, TAKE 1 TABLET (1 G TOTAL) BY MOUTH 4 (FOUR) TIMES A DAY, Disp: 120 tablet, Rfl: 4    ULTICARE MICRO PEN NEEDLES 32G X 4 MM MISC, INJECT UNDER THE SKIN 3 (THREE) TIMES A DAY, Disp: 100 each, Rfl: 3    ciprofloxacin (CIPRO) 500 mg tablet, Take 1 tablet (500 mg total) by mouth every 12 (twelve) hours for 7 days, Disp: 14 tablet, Rfl: 0    The following portions of the patient's history were reviewed and updated as appropriate: allergies, current medications, past family history, past medical history, past social history, past surgical history and problem list     Review of Systems   Constitutional: Negative  HENT: Negative  Eyes: Negative  Respiratory: Negative  Cardiovascular: Negative  Negative for leg swelling  Gastrointestinal: Negative  Endocrine: Negative  Genitourinary: Negative  Musculoskeletal: Negative  Skin: Positive for wound ( left lateral thigh just above the knee)  Allergic/Immunologic: Negative  Neurological: Positive for numbness (Bilateral feet)  Hematological: Negative  Psychiatric/Behavioral: Negative  Negative for dysphoric mood and suicidal ideas  The patient is not nervous/anxious  All other systems reviewed and are negative  Objective:    /78 (BP Location: Left arm, Patient Position: Sitting, Cuff Size: Large)   Pulse 102   Resp 16   Ht 6' (1 829 m)   Wt 112 kg (248 lb)   SpO2 99%   BMI 33 63 kg/m²      Physical Exam  Vitals and nursing note reviewed  Constitutional:       General: He is not in acute distress  Appearance: Normal appearance  He is well-developed  He is obese  He is not ill-appearing  HENT:      Head: Normocephalic and atraumatic  Right Ear: Tympanic membrane, ear canal and external ear normal       Left Ear: Tympanic membrane, ear canal and external ear normal    Eyes:      Extraocular Movements: Extraocular movements intact        Conjunctiva/sclera: Conjunctivae normal       Pupils: Pupils are equal, round, and reactive to light  Cardiovascular:      Rate and Rhythm: Normal rate and regular rhythm  Pulses: Normal pulses  Heart sounds: Normal heart sounds  No murmur heard  Pulmonary:      Effort: Pulmonary effort is normal       Breath sounds: Normal breath sounds  Abdominal:      General: Abdomen is flat  Bowel sounds are normal       Palpations: Abdomen is soft  Musculoskeletal:         General: Swelling and tenderness (Left knee) present  Normal range of motion  Cervical back: Normal range of motion and neck supple  Right lower leg: No edema  Left lower leg: No edema  Skin:     General: Skin is warm and dry  Comments: Small pinpoint wound at the left lateral thigh with small amount of purulence drainage along with greenish discoloration of the drainage on his gauze bandage   Neurological:      General: No focal deficit present  Mental Status: He is alert and oriented to person, place, and time  Psychiatric:         Mood and Affect: Mood normal          Behavior: Behavior normal          Thought Content: Thought content normal          Judgment: Judgment normal            Media Information             Document Information    Clinical Image - Mobile Device   Left knee sinus tract   01/06/2022 9:23 AM   Attached To:    Office Visit on 1/6/22 with Loida Plasencia DO     Source Information    Loida Plasencia DO  [facility]       Recent Results (from the past 1008 hour(s))   COVID Only- Collected at [facility] or [facility]    Collection Time: 12/27/21 11:52 AM    Specimen: Nose; Nares   Result Value Ref Range    SARS-CoV-2 Negative Negative   CBC and differential    Collection Time: 01/05/22  9:42 AM   Result Value Ref Range    WBC 9 01 4 31 - 10 16 Thousand/uL    RBC 5 59 3 88 - 5 62 Million/uL    Hemoglobin 16 6 12 0 - 17 0 g/dL    Hematocrit 49 0 36 5 - 49 3 %    MCV 88 82 - 98 fL    MCH 29 7 26 8 - 34 3 pg    MCHC 33 9 31 4 - 37 4 g/dL    RDW 13 7 11 6 - 15 1 %    MPV 11 7 8 9 - 12 7 fL    Platelets 646 432 - 056 Thousands/uL    nRBC 0 /100 WBCs    Neutrophils Relative 66 43 - 75 %    Immat GRANS % 1 0 - 2 %    Lymphocytes Relative 17 14 - 44 %    Monocytes Relative 13 (H) 4 - 12 %    Eosinophils Relative 2 0 - 6 %    Basophils Relative 1 0 - 1 %    Neutrophils Absolute 6 00 1 85 - 7 62 Thousands/µL    Immature Grans Absolute 0 06 0 00 - 0 20 Thousand/uL    Lymphocytes Absolute 1 50 0 60 - 4 47 Thousands/µL    Monocytes Absolute 1 15 0 17 - 1 22 Thousand/µL    Eosinophils Absolute 0 20 0 00 - 0 61 Thousand/µL    Basophils Absolute 0 10 0 00 - 0 10 Thousands/µL   Comprehensive metabolic panel    Collection Time: 01/05/22  9:42 AM   Result Value Ref Range    Sodium 138 136 - 145 mmol/L    Potassium 4 0 3 5 - 5 3 mmol/L    Chloride 103 100 - 108 mmol/L    CO2 29 21 - 32 mmol/L    ANION GAP 6 4 - 13 mmol/L    BUN 22 5 - 25 mg/dL    Creatinine 1 14 0 60 - 1 30 mg/dL    Glucose, Fasting 126 (H) 65 - 99 mg/dL    Calcium 9 4 8 3 - 10 1 mg/dL    AST 22 5 - 45 U/L    ALT 35 12 - 78 U/L    Alkaline Phosphatase 55 46 - 116 U/L    Total Protein 7 9 6 4 - 8 2 g/dL    Albumin 4 2 3 5 - 5 0 g/dL    Total Bilirubin 1 19 (H) 0 20 - 1 00 mg/dL    eGFR 72 ml/min/1 73sq m   Hemoglobin A1C    Collection Time: 01/05/22  9:42 AM   Result Value Ref Range    Hemoglobin A1C 6 9 (H) Normal 3 8-5 6%; PreDiabetic 5 7-6 4%;  Diabetic >=6 5%; Glycemic control for adults with diabetes <7 0% %     mg/dl   Lipid panel    Collection Time: 01/05/22  9:42 AM   Result Value Ref Range    Cholesterol 151 See Comment mg/dL    Triglycerides 136 See Comment mg/dL    HDL, Direct 49 >=40 mg/dL    LDL Calculated 75 0 - 100 mg/dL    Non-HDL-Chol (CHOL-HDL) 102 mg/dl   TSH, 3rd generation with Free T4 reflex    Collection Time: 01/05/22  9:42 AM   Result Value Ref Range    TSH 3RD GENERATON 2 650 0 358 - 3 740 uIU/mL       Assessment/Plan:    Type 2 diabetes mellitus with diabetic neuropathic arthropathy, with long-term current use of insulin ([facility] [address] )    Lab Results   Component Value Date    HGBA1C 6 9 (H) 01/05/2022     Patient does follow-up with endocrinology  Hemoglobin A1c has increased slightly  Patient remains on same insulin regimen  Repeat diabetic parameters to be done in 4 months    Charcot foot due to diabetes mellitus [facility])    Lab Results   Component Value Date    HGBA1C 6 9 (H) 01/05/2022     He has diabetic neuropathy  He does need follow-up with podiatrist   Patient is aware of this  Eventually he will need reconstruction of his right foot    Benign essential hypertension  Patient remains on losartan/hydrochlorothiazide  Refill not necessary at this time  Re-evaluate in 4 months    Rheumatoid arthritis involving multiple sites with positive rheumatoid factor ([facility] )  Management as per rheumatology    Mixed hyperlipidemia  Cholesterol is adequately controlled on atorvastatin 40 mg once daily  Continue to watch dietary intake of carbohydrates, fats and cholesterol  Repeat lipid panel to be done in 4 months    Open knee wound, left, subsequent encounter  Frustrating  This had reportedly closed  Patient was being seen by infectious disease, wound management, orthopedic surgeon  I did obtain a culture of drainage from that wound  It has greenish discoloration and in a diabetic I would be concerned about pseudomonas  Patient will be placed on Cipro 500 mg twice daily pending wound culture  Will need to have him seen by another orthopedic surgeon as the previous orthopedic surgeon is no longer with [facility]  May need surgical debridement of this area  Problem List Items Addressed This Visit        Endocrine    Charcot foot due to diabetes mellitus [facility])       Lab Results   Component Value Date    HGBA1C 6 9 (H) 01/05/2022     He has diabetic neuropathy  He does need follow-up with podiatrist   Patient is aware of this    Eventually he will need reconstruction of his right foot         Type 2 diabetes mellitus with diabetic neuropathic arthropathy, with long-term current use of insulin ([facility] [address] ) - Primary       Lab Results   Component Value Date    HGBA1C 6 9 (H) 01/05/2022     Patient does follow-up with endocrinology  Hemoglobin A1c has increased slightly  Patient remains on same insulin regimen  Repeat diabetic parameters to be done in 4 months         Relevant Orders    Microalbumin / creatinine urine ratio    Hemoglobin A1C       Cardiovascular and Mediastinum    Benign essential hypertension     Patient remains on losartan/hydrochlorothiazide  Refill not necessary at this time  Re-evaluate in 4 months         Relevant Orders    CBC and differential    TSH, 3rd generation with Free T4 reflex       Musculoskeletal and Integument    Rheumatoid arthritis involving multiple sites with positive rheumatoid factor ([facility] [address] )     Management as per rheumatology         Relevant Orders    TSH, 3rd generation with Free T4 reflex       Other    Mixed hyperlipidemia     Cholesterol is adequately controlled on atorvastatin 40 mg once daily  Continue to watch dietary intake of carbohydrates, fats and cholesterol  Repeat lipid panel to be done in 4 months         Relevant Orders    Comprehensive metabolic panel    Lipid panel    Open knee wound, left, subsequent encounter     Frustrating  This had reportedly closed  Patient was being seen by infectious disease, wound management, orthopedic surgeon  I did obtain a culture of drainage from that wound  It has greenish discoloration and in a diabetic I would be concerned about pseudomonas  Patient will be placed on Cipro 500 mg twice daily pending wound culture  Will need to have him seen by another orthopedic surgeon as the previous orthopedic surgeon is no longer with [facility]  May need surgical debridement of this area           Relevant Medications    ciprofloxacin (CIPRO) 500 mg tablet    Other Relevant Orders    Wound culture and Gram stain    Vitamin D deficiency    Relevant Orders    Vitamin D 1,25 dihydroxy          I have spent 44 minutes with Patient  today in which greater than 50% of this time was spent in counseling/coordination of care regarding Diagnostic results, Prognosis, Risks and benefits of tx options, Intructions for management, Patient and family education, Importance of tx compliance, Risk factor reductions and Impressions

## 2022-01-06 NOTE — ASSESSMENT & PLAN NOTE
Patient remains on losartan/hydrochlorothiazide  Refill not necessary at this time    Re-evaluate in 4 months

## 2022-01-06 NOTE — ASSESSMENT & PLAN NOTE
Frustrating  This had reportedly closed  Patient was being seen by infectious disease, wound management, orthopedic surgeon  I did obtain a culture of drainage from that wound  It has greenish discoloration and in a diabetic I would be concerned about pseudomonas  Patient will be placed on Cipro 500 mg twice daily pending wound culture  Will need to have him seen by another orthopedic surgeon as the previous orthopedic surgeon is no longer with [facility]  May need surgical debridement of this area

## 2022-01-06 NOTE — ASSESSMENT & PLAN NOTE
Lab Results   Component Value Date    HGBA1C 6 9 (H) 01/05/2022     He has diabetic neuropathy  He does need follow-up with podiatrist   Patient is aware of this    Eventually he will need reconstruction of his right foot

## 2022-01-06 NOTE — ASSESSMENT & PLAN NOTE
Cholesterol is adequately controlled on atorvastatin 40 mg once daily  Continue to watch dietary intake of carbohydrates, fats and cholesterol    Repeat lipid panel to be done in 4 months

## 2022-01-06 NOTE — ASSESSMENT & PLAN NOTE
Lab Results   Component Value Date    HGBA1C 6 9 (H) 01/05/2022     Patient does follow-up with endocrinology  Hemoglobin A1c has increased slightly  Patient remains on same insulin regimen    Repeat diabetic parameters to be done in 4 months

## 2022-01-07 LAB — 1,25(OH)2D3 SERPL-MCNC: 39.1 PG/ML (ref 19.9–79.3)

## 2022-01-10 DIAGNOSIS — J45.909 UNCOMPLICATED ASTHMA, UNSPECIFIED ASTHMA SEVERITY, UNSPECIFIED WHETHER PERSISTENT: ICD-10-CM

## 2022-01-10 RX ORDER — ALBUTEROL SULFATE 90 UG/1
2 AEROSOL, METERED RESPIRATORY (INHALATION) EVERY 4 HOURS PRN
Qty: 18 G | Refills: 1 | Status: SHIPPED | OUTPATIENT
Start: 2022-01-10 | End: 2022-03-09

## 2022-01-12 DIAGNOSIS — K25.3 ACUTE GASTRIC ULCER WITHOUT HEMORRHAGE OR PERFORATION: ICD-10-CM

## 2022-01-12 RX ORDER — PANTOPRAZOLE SODIUM 40 MG/1
40 TABLET, DELAYED RELEASE ORAL DAILY
Qty: 29 TABLET | Refills: 11 | Status: SHIPPED | OUTPATIENT
Start: 2022-01-12

## 2022-01-13 ENCOUNTER — TELEPHONE (OUTPATIENT)
Dept: FAMILY MEDICINE CLINIC | Facility: CLINIC | Age: 57
End: 2022-01-13

## 2022-01-13 NOTE — TELEPHONE ENCOUNTER
Patient's wife called and stated that Basil Sherwood is on his last day of antibiotics and he has not yet heard from the surgeon  She wants to know what he should do    Please advise

## 2022-01-13 NOTE — TELEPHONE ENCOUNTER
I sent another message to orthopedic surgeon  Hopefully someone will contact him    That is about all I can do

## 2022-01-14 NOTE — TELEPHONE ENCOUNTER
Patient's wife called back and I gave her the message about the doctor sending message to the ortho but she also wants to know if Danita Snare can sent in an antibiotic for his legs until they are able to see the surgeon  She said it helped the last time she just think it wasn't enough

## 2022-01-18 ENCOUNTER — TELEPHONE (OUTPATIENT)
Dept: FAMILY MEDICINE CLINIC | Facility: CLINIC | Age: 57
End: 2022-01-18

## 2022-01-18 NOTE — TELEPHONE ENCOUNTER
Patient's wife called to say that she still hasn't heard from the surgeon  Also she said she tried to get his Januvia for him but the pharmacy said there was a block/suspension on it and to check with the doctor's office    Please advise

## 2022-01-18 NOTE — TELEPHONE ENCOUNTER
Demarcouvia was provided for number 30 with 1 refill on December 16th  He does have a refill  I do not know what a block or suspension is  Please contact pharmacy to see what this is about  Also please contact [address]  The order is placed    Not certain why they are not responding

## 2022-01-18 NOTE — TELEPHONE ENCOUNTER
Yonny Reeves at [facility] was unaware that Karina Chowdary was approved  He will be filling the prescription

## 2022-01-20 DIAGNOSIS — E11.9 TYPE 2 DIABETES MELLITUS WITHOUT COMPLICATION, WITHOUT LONG-TERM CURRENT USE OF INSULIN (HCC): ICD-10-CM

## 2022-01-20 DIAGNOSIS — E13.9 DIABETES MELLITUS OF OTHER TYPE WITHOUT COMPLICATION, UNSPECIFIED WHETHER LONG TERM INSULIN USE (HCC): ICD-10-CM

## 2022-01-20 RX ORDER — LIRAGLUTIDE 6 MG/ML
1.8 INJECTION SUBCUTANEOUS DAILY
Qty: 9 ML | Refills: 0 | Status: SHIPPED | OUTPATIENT
Start: 2022-01-20 | End: 2022-02-09

## 2022-01-24 ENCOUNTER — TELEPHONE (OUTPATIENT)
Dept: FAMILY MEDICINE CLINIC | Facility: CLINIC | Age: 57
End: 2022-01-24

## 2022-01-24 NOTE — TELEPHONE ENCOUNTER
Claudia Lopez called today to say that Renny's antibiotic runs out today for his leg  He does not see the specialist until Friday  She wants to know if the doctor needs to call in more of the antibiotic to last until Friday or does he need it at all  She says the antibiotic has not really helped  Please advise what to do

## 2022-01-24 NOTE — TELEPHONE ENCOUNTER
No, at this point I do not believe that he would need any additional antibiotics  Has had over 2 weeks of antibiotics

## 2022-01-28 ENCOUNTER — OFFICE VISIT (OUTPATIENT)
Dept: OBGYN CLINIC | Facility: CLINIC | Age: 57
End: 2022-01-28
Payer: COMMERCIAL

## 2022-01-28 VITALS
WEIGHT: 244 LBS | DIASTOLIC BLOOD PRESSURE: 90 MMHG | HEIGHT: 72 IN | SYSTOLIC BLOOD PRESSURE: 157 MMHG | BODY MASS INDEX: 33.05 KG/M2 | HEART RATE: 97 BPM

## 2022-01-28 DIAGNOSIS — L03.116 CELLULITIS OF LEFT KNEE: ICD-10-CM

## 2022-01-28 DIAGNOSIS — S81.002D OPEN KNEE WOUND, LEFT, SUBSEQUENT ENCOUNTER: Primary | ICD-10-CM

## 2022-01-28 PROCEDURE — 3077F SYST BP >= 140 MM HG: CPT | Performed by: ORTHOPAEDIC SURGERY

## 2022-01-28 PROCEDURE — 99213 OFFICE O/P EST LOW 20 MIN: CPT | Performed by: ORTHOPAEDIC SURGERY

## 2022-01-28 PROCEDURE — 3080F DIAST BP >= 90 MM HG: CPT | Performed by: ORTHOPAEDIC SURGERY

## 2022-01-28 NOTE — PROGRESS NOTES
Assessment:   Diagnosis ICD-10-CM Associated Orders   1  Open knee wound, left, subsequent encounter  [id number] Ambulatory Referral to Orthopedic Surgery   2  Cellulitis of left knee  [id number] Ambulatory Referral to Orthopedic Surgery   Draining sinus lateral distal thigh    Plan:    - Explained that when the knee swells up again, he needs to call ASAP for an MRI of the left knee to evaluate a potential sinus tract that could possibly communicate with the joint    - Due to patient being prone to staph infections after surgeries, he doesn't want to have surgery right away  To do next visit:  Return in about 2 months (around 3/28/2022) for Left knee  The above stated was discussed in layman's terms and the patient expressed understanding  All questions were answered to the patient's satisfaction  Scribe Attestation    I,:  Leonardo Robles am acting as a scribe while in the presence of the attending physician :       I,:  Corrie Liu MD personally performed the services described in this documentation    as scribed in my presence :             Subjective:   Juan Jose Martines  is a 49 y o  male who presents today for consultation for his left knee cellulitis-open wound referred by his PCP, Dr Pina Hernández  He states that about 1 month ago, he was bit by a dog, where the 'tooth' got stuck  The knee swelled and was red  He notes that he has been able to express bloody cloudy fluid of 5cc worth  He states that his PCP gave him an ABX  Patient has a sinus tract located on the lateral aspect of the knee at the distal aspect of an incision  Patient has a history of an I&D of left prepatellar bursa by Dr Rios Burgos performed on 04/29/2021, had a subsequent I&D on 05/24/2021  Patient does note that the wound that he had on his left knee after having surgical debridement had eventually closed  Patient states that there was always a small white spot with a small fluid collection underneath    Recently a few days ago that area opened and he states that there was a bloody discharge and a copious amount of fluid  Patient did apply gauze dressing but today his left knee is swollen  He was seen by infectious disease and had cultures that showed MSSA as well as small amount of MRSA  He was placed on a very long course of doxycycline by Infectious Disease  Patient is a diabetic  Review of systems negative unless otherwise specified in HPI  Review of Systems   Constitutional: Negative for chills, fever and unexpected weight change  HENT: Negative for hearing loss, nosebleeds and sore throat  Eyes: Negative for pain, redness and visual disturbance  Respiratory: Negative for cough, shortness of breath and wheezing  Cardiovascular: Negative for chest pain, palpitations and leg swelling  Gastrointestinal: Negative for abdominal pain, nausea and vomiting  Endocrine: Negative for polydipsia and polyuria  Genitourinary: Negative for dysuria and hematuria  Skin: Negative for rash and wound  Neurological: Negative for dizziness, light-headedness and headaches  Psychiatric/Behavioral: Negative for decreased concentration, dysphoric mood and suicidal ideas  The patient is not nervous/anxious          Past Medical History:   Diagnosis Date    Abscess of left thigh 5/11/2021    Arthritis     At risk for falls     Broken foot     right    Cataract     giacomo    Cellulitis of left lower extremity 4/26/2021    [facility]     Diabetes mellitus ([facility]     Hx MRSA infection     Per wife patient hadf MRSa in a wound awhile ago    Hyperlipidemia     Kidney stone     Neuropathy     RA [facility]heumatoid arthritis) [facility]     Rheumatoid arthritis ([facility]     Seasonal allergies     Sepsis ([facility] ) 4/26/2021    Snores     Uses wheelchair     and crutches- NWB RLE    Wears glasses        Past Surgical History:   Procedure Laterality Date    APPENDECTOMY      CLOSED REDUCTION FOOT DISLOCATION Right 2/12/2019    Procedure: C/R FRACTURE;  Surgeon: Main Hu DPM;  [facility];  Service: Podiatry    COLONOSCOPY      FOOT SURGERY Right 07/2019    Removal of the bone graft and cleaned out infection, and placed new bone graft    IR PICC PLACEMENT SINGLE LUMEN  8/5/2019    CA DEBRIDEMENT, SKIN, SUB-Q TISSUE,=<20 SQ CM Left 5/24/2021    Procedure: INCISION AND DRAINAGE THIGH;  Surgeon: Tatiana Castillo MD;  [facility];  Service: Orthopedics    CA [address] [place] [address] Dr <0 5 CM REMAINDER BODY N/A 3/28/2018    Procedure: EXCISION WIDE LESION HEAD/FACIAL/NECK;  Surgeon: Jeronimo Grijalva MD;  [facility];  Service: Surgical Oncology    CA GASTROCNEMIUS RECESSION Right 2/12/2019    Procedure: ENDO GASTROC RECESSION, APPLICATION OF EXTERNAL FIXATOR;  Surgeon: Main Hu DPM;  [facility];  Service: Podiatry    CA REMOVE EXTERN BONE FIX DEV W ANESTH Right 4/18/2019    Procedure: FRAME REMOVAL HARDWARE FOOT WITH APPLICATION OF GRAFT;  Surgeon: Main Hu DPM;  [facility];  Service: Podiatry    SKIN BIOPSY      scalp    WISDOM TOOTH EXTRACTION  1998    WOUND DEBRIDEMENT Left 4/29/2021    Procedure: KNEE INCISION AND DRAINAGE, EXCISIONAL DEBRIDEMENT OF PREPATELLAR BURSA;   Surgeon: Tatiana Castillo MD;  [facility];  Service: Orthopedics       Family History   Problem Relation Age of Onset    Diabetes Mother     Stroke Mother     Mental illness Mother     Diabetes Father     Stroke Father     Alcohol abuse Brother     Coronary artery disease Family         Age 51-55    Diabetes type II Family     Heart disease Family        Social History     Occupational History    Occupation:     Tobacco Use    Smoking status: Current Every Day Smoker     Packs/day: 1 00     Years: 40 00     Pack years: 40 00     Types: Cigarettes    Smokeless tobacco: Never Used   Vaping Use    Vaping Use: Never used   Substance and Sexual Activity    Alcohol use: Yes     Comment: Socially    Drug use: Yes     Frequency: 7 0 times per week     Types: Marijuana     Comment: Daily for Pain    Sexual activity: Not on file         Current Outpatient Medications:     Accu-Chek FastClix Lancets MISC, TEST BLOOD SUGAR FOUR TIMES A DAY BEFORE MEALS AND BEDTIME, Disp: 102 each, Rfl: 3    Accu-Chek Guide test strip, TEST BEFORE MEALS AND AT BEDTIME, Disp: 100 strip, Rfl: 3    albuterol (PROVENTIL HFA,VENTOLIN HFA) 90 mcg/act inhaler, INHALE 2 PUFFS EVERY 4 (FOUR) HOURS AS NEEDED FOR WHEEZING, Disp: 18 g, Rfl: 1    Ascorbic Acid (vitamin C) 1000 MG tablet, Take 1,000 mg by mouth daily, Disp: , Rfl:     atorvastatin (LIPITOR) 40 mg tablet, TAKE 1 TABLET AT BEDTIME , Disp: 90 tablet, Rfl: 1    Basaglar KwikPen 100 units/mL injection pen, INJECT 50 UNITS UNDER THE SKIN EVERY 12 (TWELVE) HOURS, Disp: 30 mL, Rfl: 1    BD Pen Needle Maureen U/F 32G X 4 MM MISC, ONE PEN NEEDLE THREE TIMES A DAY 2-BASAGLAR 1- VICTOZA, Disp: 100 each, Rfl: 3    calcium carbonate (OS-NERY) 600 MG tablet, Take 600 mg by mouth daily Dinnertime, Disp: , Rfl:     ergocalciferol (VITAMIN D2) 50,000 units, Take 50,000 Units by mouth once a week Takes on Saturday, Disp: , Rfl:     fluticasone (FLONASE) 50 mcg/act nasal spray, USE 2 SPRAYS IN EACH NOSTRIL ONCE DAILY, Disp: 16 g, Rfl: 11    hydroxychloroquine (PLAQUENIL) 200 mg tablet, Take 200 mg by mouth 2 (two) times a day  , Disp: , Rfl:     Januvia 100 MG tablet, TAKE 1 TABLET (100 MG TOTAL) BY MOUTH DAILY AFTER DINNER, Disp: 30 tablet, Rfl: 1    leflunomide (ARAVA) 20 MG tablet, Take 1 tablet by mouth daily with dinner , Disp: , Rfl:     losartan-hydrochlorothiazide (HYZAAR) 100-12 5 MG per tablet, TAKE 1 TABLET BY MOUTH DAILY, Disp: 90 tablet, Rfl: 1    naproxen (NAPROSYN) 500 mg tablet, Take 1 tablet by mouth every 12 (twelve) hours, Disp: , Rfl:     pantoprazole (PROTONIX) 40 mg tablet, TAKE 1 TABLET (40 MG TOTAL) BY MOUTH DAILY, Disp: 29 tablet, Rfl: 11    saccharomyces boulardii (FLORASTOR) 250 mg capsule, Take 1 capsule (250 mg total) by mouth 2 (two) times a day, Disp: 60 capsule, Rfl: 0    sucralfate (CARAFATE) 1 g tablet, TAKE 1 TABLET (1 G TOTAL) BY MOUTH 4 (FOUR) TIMES A DAY, Disp: 120 tablet, Rfl: 4    ULTICARE MICRO PEN NEEDLES 32G X 4 MM MISC, INJECT UNDER THE SKIN 3 (THREE) TIMES A DAY, Disp: 100 each, Rfl: 3    Victoza injection, INJECT 0 3 ML (1 8 MG TOTAL) UNDER THE SKIN DAILY, Disp: 9 mL, Rfl: 0    No Known Allergies         Vitals:    01/28/22 1024   BP: 157/90   Pulse: 97       Objective:                    Left Knee Exam     Tenderness   The patient is experiencing no tenderness  Range of Motion   Extension: 0   Flexion: 120     Tests   Varus: negative Valgus: negative    Other   Erythema: absent  Sensation: normal  Pulse: present  Swelling: none  Effusion: no effusion present    Comments:  Small poke hole over the distal aspect of the incision that is scabbed over with no fluctuance  Diagnostics, reviewed and taken today if performed as documented:    None performed      The attending physician has personally reviewed the pertinent films in PACS and interpretation is as follows:      Procedures, if performed today:    Procedures    None performed      Portions of the record may have been created with voice recognition software  Occasional wrong word or "sound a like" substitutions may have occurred due to the inherent limitations of voice recognition software  Read the chart carefully and recognize, using context, where substitutions have occurred

## 2022-02-09 DIAGNOSIS — E13.9 DIABETES MELLITUS OF OTHER TYPE WITHOUT COMPLICATION, UNSPECIFIED WHETHER LONG TERM INSULIN USE (HCC): ICD-10-CM

## 2022-02-09 DIAGNOSIS — E11.9 TYPE 2 DIABETES MELLITUS WITHOUT COMPLICATION, WITHOUT LONG-TERM CURRENT USE OF INSULIN (HCC): ICD-10-CM

## 2022-02-09 RX ORDER — LIRAGLUTIDE 6 MG/ML
1.8 INJECTION SUBCUTANEOUS DAILY
Qty: 9 ML | Refills: 0 | Status: SHIPPED | OUTPATIENT
Start: 2022-02-09 | End: 2022-03-09

## 2022-02-14 DIAGNOSIS — E13.9 DIABETES MELLITUS OF OTHER TYPE WITHOUT COMPLICATION, UNSPECIFIED WHETHER LONG TERM INSULIN USE (HCC): ICD-10-CM

## 2022-02-14 DIAGNOSIS — Z79.4 TYPE 2 DIABETES MELLITUS WITH DIABETIC POLYNEUROPATHY, WITH LONG-TERM CURRENT USE OF INSULIN (HCC): ICD-10-CM

## 2022-02-14 DIAGNOSIS — E11.42 TYPE 2 DIABETES MELLITUS WITH DIABETIC POLYNEUROPATHY, WITH LONG-TERM CURRENT USE OF INSULIN (HCC): ICD-10-CM

## 2022-02-14 DIAGNOSIS — E11.9 TYPE 2 DIABETES MELLITUS WITHOUT COMPLICATION, WITHOUT LONG-TERM CURRENT USE OF INSULIN (HCC): ICD-10-CM

## 2022-02-14 RX ORDER — PEN NEEDLE, DIABETIC 32GX 5/32"
NEEDLE, DISPOSABLE MISCELLANEOUS
Qty: 100 EACH | Refills: 3 | Status: SHIPPED | OUTPATIENT
Start: 2022-02-14 | End: 2022-07-05

## 2022-02-22 DIAGNOSIS — E11.9 TYPE 2 DIABETES MELLITUS WITHOUT COMPLICATION, WITHOUT LONG-TERM CURRENT USE OF INSULIN (HCC): ICD-10-CM

## 2022-02-22 RX ORDER — SITAGLIPTIN 100 MG/1
TABLET, FILM COATED ORAL
Qty: 30 TABLET | Refills: 1 | Status: SHIPPED | OUTPATIENT
Start: 2022-02-22 | End: 2022-04-26

## 2022-03-08 NOTE — ASSESSMENT & PLAN NOTE
· Patient was recently admitted at the end of April for MSSA left prepatellar bursitis and under I&D at that time but conitnued to have persistent drainage from the   · POD #1 s/p I&D  · Management per primary service  · Infectious disease following  · On high-dose IV Ancef    · OR cultures pending Azithromycin Pregnancy And Lactation Text: This medication is considered safe during pregnancy and is also secreted in breast milk.

## 2022-03-09 DIAGNOSIS — Z79.4 TYPE 2 DIABETES MELLITUS WITH DIABETIC POLYNEUROPATHY, WITH LONG-TERM CURRENT USE OF INSULIN (HCC): ICD-10-CM

## 2022-03-09 DIAGNOSIS — E11.9 TYPE 2 DIABETES MELLITUS WITHOUT COMPLICATION, WITHOUT LONG-TERM CURRENT USE OF INSULIN (HCC): ICD-10-CM

## 2022-03-09 DIAGNOSIS — E13.9 DIABETES MELLITUS OF OTHER TYPE WITHOUT COMPLICATION, UNSPECIFIED WHETHER LONG TERM INSULIN USE (HCC): ICD-10-CM

## 2022-03-09 DIAGNOSIS — E11.42 TYPE 2 DIABETES MELLITUS WITH DIABETIC POLYNEUROPATHY, WITH LONG-TERM CURRENT USE OF INSULIN (HCC): ICD-10-CM

## 2022-03-09 DIAGNOSIS — J45.909 UNCOMPLICATED ASTHMA, UNSPECIFIED ASTHMA SEVERITY, UNSPECIFIED WHETHER PERSISTENT: ICD-10-CM

## 2022-03-09 RX ORDER — ALBUTEROL SULFATE 90 UG/1
2 AEROSOL, METERED RESPIRATORY (INHALATION) EVERY 4 HOURS PRN
Qty: 18 G | Refills: 1 | Status: SHIPPED | OUTPATIENT
Start: 2022-03-09 | End: 2022-05-06

## 2022-03-09 RX ORDER — BLOOD SUGAR DIAGNOSTIC
STRIP MISCELLANEOUS
Qty: 100 STRIP | Refills: 3 | Status: SHIPPED | OUTPATIENT
Start: 2022-03-09 | End: 2022-05-20

## 2022-03-09 RX ORDER — INSULIN GLARGINE 100 [IU]/ML
50 INJECTION, SOLUTION SUBCUTANEOUS EVERY 12 HOURS SCHEDULED
Qty: 30 ML | Refills: 1 | Status: SHIPPED | OUTPATIENT
Start: 2022-03-09 | End: 2022-06-08

## 2022-03-09 RX ORDER — LIRAGLUTIDE 6 MG/ML
1.8 INJECTION SUBCUTANEOUS DAILY
Qty: 9 ML | Refills: 0 | Status: SHIPPED | OUTPATIENT
Start: 2022-03-09 | End: 2022-04-01

## 2022-03-09 RX ORDER — LANCETS
EACH MISCELLANEOUS
Qty: 102 EACH | Refills: 3 | Status: SHIPPED | OUTPATIENT
Start: 2022-03-09 | End: 2022-08-03

## 2022-03-17 NOTE — TELEPHONE ENCOUNTER
Ice pack placed the back of her neck for comfort.    Please send to pharmacy  [facility]  Unfortunately it printed

## 2022-04-01 DIAGNOSIS — E13.9 DIABETES MELLITUS OF OTHER TYPE WITHOUT COMPLICATION, UNSPECIFIED WHETHER LONG TERM INSULIN USE (HCC): ICD-10-CM

## 2022-04-01 DIAGNOSIS — E11.9 TYPE 2 DIABETES MELLITUS WITHOUT COMPLICATION, WITHOUT LONG-TERM CURRENT USE OF INSULIN (HCC): ICD-10-CM

## 2022-04-01 RX ORDER — LIRAGLUTIDE 6 MG/ML
1.8 INJECTION SUBCUTANEOUS DAILY
Qty: 9 ML | Refills: 0 | Status: SHIPPED | OUTPATIENT
Start: 2022-04-01 | End: 2022-05-20

## 2022-04-26 DIAGNOSIS — E11.9 TYPE 2 DIABETES MELLITUS WITHOUT COMPLICATION, WITHOUT LONG-TERM CURRENT USE OF INSULIN (HCC): ICD-10-CM

## 2022-04-26 RX ORDER — SITAGLIPTIN 100 MG/1
TABLET, FILM COATED ORAL
Qty: 30 TABLET | Refills: 1 | Status: SHIPPED | OUTPATIENT
Start: 2022-04-26 | End: 2022-07-01

## 2022-05-06 DIAGNOSIS — J45.909 UNCOMPLICATED ASTHMA, UNSPECIFIED ASTHMA SEVERITY, UNSPECIFIED WHETHER PERSISTENT: ICD-10-CM

## 2022-05-06 RX ORDER — ALBUTEROL SULFATE 90 UG/1
2 AEROSOL, METERED RESPIRATORY (INHALATION) EVERY 4 HOURS PRN
Qty: 18 G | Refills: 1 | Status: SHIPPED | OUTPATIENT
Start: 2022-05-06 | End: 2022-07-05

## 2022-05-12 NOTE — TELEPHONE ENCOUNTER
Patient is going to Dr La Nena Delcid Debridement Text: The wound edges were debrided prior to proceeding with the closure to facilitate wound healing.

## 2022-05-19 ENCOUNTER — APPOINTMENT (OUTPATIENT)
Dept: LAB | Facility: CLINIC | Age: 57
End: 2022-05-19
Payer: COMMERCIAL

## 2022-05-19 DIAGNOSIS — E11.610 TYPE 2 DIABETES MELLITUS WITH DIABETIC NEUROPATHIC ARTHROPATHY, WITH LONG-TERM CURRENT USE OF INSULIN (HCC): ICD-10-CM

## 2022-05-19 DIAGNOSIS — Z79.4 TYPE 2 DIABETES MELLITUS WITH DIABETIC NEUROPATHIC ARTHROPATHY, WITH LONG-TERM CURRENT USE OF INSULIN (HCC): ICD-10-CM

## 2022-05-19 DIAGNOSIS — E55.9 VITAMIN D DEFICIENCY: ICD-10-CM

## 2022-05-19 DIAGNOSIS — I10 BENIGN ESSENTIAL HYPERTENSION: ICD-10-CM

## 2022-05-19 DIAGNOSIS — M05.79 RHEUMATOID ARTHRITIS INVOLVING MULTIPLE SITES WITH POSITIVE RHEUMATOID FACTOR (HCC): ICD-10-CM

## 2022-05-19 DIAGNOSIS — E78.2 MIXED HYPERLIPIDEMIA: ICD-10-CM

## 2022-05-19 LAB
ALBUMIN SERPL BCP-MCNC: 3.7 G/DL (ref 3.5–5)
ALP SERPL-CCNC: 47 U/L (ref 46–116)
ALT SERPL W P-5'-P-CCNC: 47 U/L (ref 12–78)
ANION GAP SERPL CALCULATED.3IONS-SCNC: 6 MMOL/L (ref 4–13)
AST SERPL W P-5'-P-CCNC: 27 U/L (ref 5–45)
BASOPHILS # BLD AUTO: 0.07 THOUSANDS/ΜL (ref 0–0.1)
BASOPHILS NFR BLD AUTO: 1 % (ref 0–1)
BILIRUB SERPL-MCNC: 0.74 MG/DL (ref 0.2–1)
BUN SERPL-MCNC: 20 MG/DL (ref 5–25)
CALCIUM SERPL-MCNC: 9.1 MG/DL (ref 8.3–10.1)
CHLORIDE SERPL-SCNC: 106 MMOL/L (ref 100–108)
CHOLEST SERPL-MCNC: 139 MG/DL
CO2 SERPL-SCNC: 25 MMOL/L (ref 21–32)
CREAT SERPL-MCNC: 1.09 MG/DL (ref 0.6–1.3)
CREAT UR-MCNC: 233 MG/DL
EOSINOPHIL # BLD AUTO: 0.15 THOUSAND/ΜL (ref 0–0.61)
EOSINOPHIL NFR BLD AUTO: 2 % (ref 0–6)
ERYTHROCYTE [DISTWIDTH] IN BLOOD BY AUTOMATED COUNT: 13.5 % (ref 11.6–15.1)
EST. AVERAGE GLUCOSE BLD GHB EST-MCNC: 163 MG/DL
GFR SERPL CREATININE-BSD FRML MDRD: 76 ML/MIN/1.73SQ M
GLUCOSE P FAST SERPL-MCNC: 191 MG/DL (ref 65–99)
HBA1C MFR BLD: 7.3 %
HCT VFR BLD AUTO: 46.4 % (ref 36.5–49.3)
HDLC SERPL-MCNC: 35 MG/DL
HGB BLD-MCNC: 15.7 G/DL (ref 12–17)
IMM GRANULOCYTES # BLD AUTO: 0.05 THOUSAND/UL (ref 0–0.2)
IMM GRANULOCYTES NFR BLD AUTO: 1 % (ref 0–2)
LDLC SERPL CALC-MCNC: 78 MG/DL (ref 0–100)
LYMPHOCYTES # BLD AUTO: 1.18 THOUSANDS/ΜL (ref 0.6–4.47)
LYMPHOCYTES NFR BLD AUTO: 19 % (ref 14–44)
MCH RBC QN AUTO: 30 PG (ref 26.8–34.3)
MCHC RBC AUTO-ENTMCNC: 33.8 G/DL (ref 31.4–37.4)
MCV RBC AUTO: 89 FL (ref 82–98)
MICROALBUMIN UR-MCNC: 12.7 MG/L (ref 0–20)
MICROALBUMIN/CREAT 24H UR: 5 MG/G CREATININE (ref 0–30)
MONOCYTES # BLD AUTO: 0.85 THOUSAND/ΜL (ref 0.17–1.22)
MONOCYTES NFR BLD AUTO: 14 % (ref 4–12)
NEUTROPHILS # BLD AUTO: 3.96 THOUSANDS/ΜL (ref 1.85–7.62)
NEUTS SEG NFR BLD AUTO: 63 % (ref 43–75)
NONHDLC SERPL-MCNC: 104 MG/DL
NRBC BLD AUTO-RTO: 0 /100 WBCS
PLATELET # BLD AUTO: 212 THOUSANDS/UL (ref 149–390)
PMV BLD AUTO: 11.8 FL (ref 8.9–12.7)
POTASSIUM SERPL-SCNC: 4 MMOL/L (ref 3.5–5.3)
PROT SERPL-MCNC: 7.1 G/DL (ref 6.4–8.2)
RBC # BLD AUTO: 5.24 MILLION/UL (ref 3.88–5.62)
SODIUM SERPL-SCNC: 137 MMOL/L (ref 136–145)
TRIGL SERPL-MCNC: 131 MG/DL
TSH SERPL DL<=0.05 MIU/L-ACNC: 1.46 UIU/ML (ref 0.45–4.5)
WBC # BLD AUTO: 6.26 THOUSAND/UL (ref 4.31–10.16)

## 2022-05-19 PROCEDURE — 82570 ASSAY OF URINE CREATININE: CPT

## 2022-05-19 PROCEDURE — 82652 VIT D 1 25-DIHYDROXY: CPT

## 2022-05-19 PROCEDURE — 82043 UR ALBUMIN QUANTITATIVE: CPT

## 2022-05-19 PROCEDURE — 80061 LIPID PANEL: CPT

## 2022-05-19 PROCEDURE — 36415 COLL VENOUS BLD VENIPUNCTURE: CPT

## 2022-05-19 PROCEDURE — 83036 HEMOGLOBIN GLYCOSYLATED A1C: CPT

## 2022-05-19 PROCEDURE — 80053 COMPREHEN METABOLIC PANEL: CPT

## 2022-05-19 PROCEDURE — 85025 COMPLETE CBC W/AUTO DIFF WBC: CPT

## 2022-05-19 PROCEDURE — 3051F HG A1C>EQUAL 7.0%<8.0%: CPT | Performed by: FAMILY MEDICINE

## 2022-05-19 PROCEDURE — 84443 ASSAY THYROID STIM HORMONE: CPT

## 2022-05-20 ENCOUNTER — OFFICE VISIT (OUTPATIENT)
Dept: FAMILY MEDICINE CLINIC | Facility: CLINIC | Age: 57
End: 2022-05-20
Payer: COMMERCIAL

## 2022-05-20 ENCOUNTER — TELEPHONE (OUTPATIENT)
Dept: FAMILY MEDICINE CLINIC | Facility: CLINIC | Age: 57
End: 2022-05-20

## 2022-05-20 VITALS
SYSTOLIC BLOOD PRESSURE: 144 MMHG | HEART RATE: 80 BPM | HEIGHT: 72 IN | OXYGEN SATURATION: 98 % | DIASTOLIC BLOOD PRESSURE: 76 MMHG | RESPIRATION RATE: 16 BRPM | WEIGHT: 246 LBS | BODY MASS INDEX: 33.32 KG/M2

## 2022-05-20 DIAGNOSIS — R10.11 PAIN, ABDOMINAL, RUQ: ICD-10-CM

## 2022-05-20 DIAGNOSIS — E11.610 CHARCOT FOOT DUE TO DIABETES MELLITUS (HCC): ICD-10-CM

## 2022-05-20 DIAGNOSIS — E11.9 TYPE 2 DIABETES MELLITUS WITHOUT COMPLICATION, WITHOUT LONG-TERM CURRENT USE OF INSULIN (HCC): ICD-10-CM

## 2022-05-20 DIAGNOSIS — R10.11 RIGHT UPPER QUADRANT ABDOMINAL PAIN: ICD-10-CM

## 2022-05-20 DIAGNOSIS — Z72.0 TOBACCO USE: ICD-10-CM

## 2022-05-20 DIAGNOSIS — I10 BENIGN ESSENTIAL HYPERTENSION: ICD-10-CM

## 2022-05-20 DIAGNOSIS — M05.79 RHEUMATOID ARTHRITIS INVOLVING MULTIPLE SITES WITH POSITIVE RHEUMATOID FACTOR (HCC): Primary | ICD-10-CM

## 2022-05-20 DIAGNOSIS — K82.8 BILIARY DYSKINESIA: ICD-10-CM

## 2022-05-20 DIAGNOSIS — E11.610 TYPE 2 DIABETES MELLITUS WITH DIABETIC NEUROPATHIC ARTHROPATHY, WITH LONG-TERM CURRENT USE OF INSULIN (HCC): ICD-10-CM

## 2022-05-20 DIAGNOSIS — E55.9 VITAMIN D DEFICIENCY: ICD-10-CM

## 2022-05-20 DIAGNOSIS — Z79.4 TYPE 2 DIABETES MELLITUS WITH DIABETIC NEUROPATHIC ARTHROPATHY, WITH LONG-TERM CURRENT USE OF INSULIN (HCC): ICD-10-CM

## 2022-05-20 DIAGNOSIS — E13.9 DIABETES MELLITUS OF OTHER TYPE WITHOUT COMPLICATION, UNSPECIFIED WHETHER LONG TERM INSULIN USE (HCC): ICD-10-CM

## 2022-05-20 DIAGNOSIS — Z12.5 PROSTATE CANCER SCREENING: ICD-10-CM

## 2022-05-20 DIAGNOSIS — Z79.4 TYPE 2 DIABETES MELLITUS WITH DIABETIC NEUROPATHIC ARTHROPATHY, WITH LONG-TERM CURRENT USE OF INSULIN (HCC): Primary | ICD-10-CM

## 2022-05-20 DIAGNOSIS — E11.610 TYPE 2 DIABETES MELLITUS WITH DIABETIC NEUROPATHIC ARTHROPATHY, WITH LONG-TERM CURRENT USE OF INSULIN (HCC): Primary | ICD-10-CM

## 2022-05-20 DIAGNOSIS — H10.13 ALLERGIC CONJUNCTIVITIS OF BOTH EYES: ICD-10-CM

## 2022-05-20 DIAGNOSIS — E78.2 MIXED HYPERLIPIDEMIA: ICD-10-CM

## 2022-05-20 PROCEDURE — 3078F DIAST BP <80 MM HG: CPT | Performed by: FAMILY MEDICINE

## 2022-05-20 PROCEDURE — 99215 OFFICE O/P EST HI 40 MIN: CPT | Performed by: FAMILY MEDICINE

## 2022-05-20 PROCEDURE — 3077F SYST BP >= 140 MM HG: CPT | Performed by: FAMILY MEDICINE

## 2022-05-20 RX ORDER — OLOPATADINE HYDROCHLORIDE 1 MG/ML
1 SOLUTION/ DROPS OPHTHALMIC 2 TIMES DAILY
Qty: 5 ML | Refills: 1 | Status: SHIPPED | OUTPATIENT
Start: 2022-05-20

## 2022-05-20 RX ORDER — BLOOD-GLUCOSE METER
EACH MISCELLANEOUS
Qty: 1 KIT | Refills: 0 | Status: SHIPPED | OUTPATIENT
Start: 2022-05-20

## 2022-05-20 RX ORDER — LIRAGLUTIDE 6 MG/ML
1.8 INJECTION SUBCUTANEOUS DAILY
Qty: 9 ML | Refills: 0 | Status: SHIPPED | OUTPATIENT
Start: 2022-05-20 | End: 2022-06-08

## 2022-05-20 RX ORDER — NICOTINE 21 MG/24HR
1 PATCH, TRANSDERMAL 24 HOURS TRANSDERMAL EVERY 24 HOURS
Qty: 28 PATCH | Refills: 1 | Status: SHIPPED | OUTPATIENT
Start: 2022-05-20

## 2022-05-20 RX ORDER — BLOOD-GLUCOSE METER
EACH MISCELLANEOUS
Qty: 1 KIT | Refills: 0 | Status: SHIPPED | OUTPATIENT
Start: 2022-05-20 | End: 2022-05-20

## 2022-05-20 NOTE — ASSESSMENT & PLAN NOTE
Patient has seen GI as well as general surgeon in the past   Dysfunctional gallbladder on HIDA scan back in 2020   Re-evaluation by general surgeon

## 2022-05-20 NOTE — TELEPHONE ENCOUNTER
Patient needs the One Touch Verio Flex testing device this is what is covered by insurance  Please send this with supplies to [facility]

## 2022-05-20 NOTE — ASSESSMENT & PLAN NOTE
Vitamin-D level ordered to be done in 4 months    Previously on vitamin-D supplementation prescribed by rheumatologist

## 2022-05-20 NOTE — ASSESSMENT & PLAN NOTE
Cholesterol is adequately controlled on atorvastatin 40 mg once daily    Repeat lipid panel to be done in 4 months

## 2022-05-20 NOTE — ASSESSMENT & PLAN NOTE
Patient wishes to switch rheumatologist from a [facility] to [address] [place] [address]  He remains on Plaquenil    In the past he was on immunosuppressant biologic agent however those were discontinued after he developed infection in his left lateral thigh

## 2022-05-20 NOTE — ASSESSMENT & PLAN NOTE
Lab Results   Component Value Date    HGBA1C 7 3 (H) 05/19/2022     Patient does have documented diabetic neuropathy    He does need follow-up with podiatrist

## 2022-05-20 NOTE — ASSESSMENT & PLAN NOTE
Lab Results   Component Value Date    HGBA1C 7 3 (H) 05/19/2022     Patient previously have follow-up with endocrinology  Hemoglobin A1c is still slightly increased    Patient is to remain on same insulin regimen along with Victoza

## 2022-05-20 NOTE — PROGRESS NOTES
Subjective:      Patient ID: Marleen Michel  is a 49 y o  male  60-year-old male with past medical history of diabetes mellitus type 2 that is insulin requiring, COPD, chronic tobacco abuse, rheumatoid arthritis, nonhealing wound left knee, diabetic neuropathy, hypertension presents with his wife for follow-up of chronic conditions  Patient does continue to complain of episodes of nausea with vomiting after eating as well as right upper quadrant abdominal discomfort  In the past the patient did have workup back in 2020 which showed no evidence of cholelithiasis but his gallbladder ejection fraction was low at 23%  He did see general surgeon consultation at that time but he had just undergone surgery for Charcot foot and did not want to perform cholecystectomy at that point  Patient states that the episodes seemed of got worse  These have been occurring since 2018  No fevers or chills  Patient does feel excessively fatigued with these episodes  Not certain if he is also getting hypoglycemic with these episodes    Labs reviewed which showed hemoglobin A1c at 7 3%       Past Medical History:   Diagnosis Date    Abscess of left thigh 5/11/2021    Arthritis     At risk for falls     Broken foot     right    Cataract     giacomo    Cellulitis of left lower extremity 4/26/2021    [facility]     Diabetes mellitus ([facility]     Hx MRSA infection     Per wife patient hadf MRSa in a wound awhile ago    Hyperlipidemia     Kidney stone     Neuropathy     RA (rheumatoid arthritis) ([facility]     Rheumatoid arthritis (HCC)     Seasonal allergies     Sepsis ([facility] [address] ) 4/26/2021    Snores     Uses wheelchair     and crutches- NWB RLE    Wears glasses        Family History   Problem Relation Age of Onset    Diabetes Mother     Stroke Mother     Mental illness Mother     Diabetes Father     Stroke Father     Alcohol abuse Brother     Coronary artery disease Family Age 51-55    Diabetes type II Family     Heart disease Family        Past Surgical History:   Procedure Laterality Date    APPENDECTOMY      CLOSED REDUCTION FOOT DISLOCATION Right 2/12/2019    Procedure: C/R FRACTURE;  Surgeon: Rahel Somers DPM;  [facility];  Service: Podiatry    COLONOSCOPY      FOOT SURGERY Right 07/2019    Removal of the bone graft and cleaned out infection, and placed new bone graft    IR PICC PLACEMENT SINGLE LUMEN  8/5/2019    AK DEBRIDEMENT, SKIN, SUB-Q TISSUE,=<20 SQ CM Left 5/24/2021    Procedure: INCISION AND DRAINAGE THIGH;  Surgeon: Bc Melo MD;  [facility];  Service: Orthopedics    AK [address] [place] [address] Dr <0 5 CM REMAINDER BODY N/A 3/28/2018    Procedure: EXCISION WIDE LESION HEAD/FACIAL/NECK;  Surgeon: New Bowles MD;  [facility];  Service: Surgical Oncology    AK GASTROCNEMIUS RECESSION Right 2/12/2019    Procedure: ENDO GASTROC RECESSION, APPLICATION OF EXTERNAL FIXATOR;  Surgeon: Rahel Somers DPM;  [facility];  Service: Podiatry    AK REMOVE EXTERN BONE FIX DEV W ANESTH Right 4/18/2019    Procedure: FRAME REMOVAL HARDWARE FOOT WITH APPLICATION OF GRAFT;  Surgeon: Rahel Somers DPM;  [facility];  Service: Podiatry    SKIN BIOPSY      scalp    WISDOM TOOTH EXTRACTION  1998    WOUND DEBRIDEMENT Left 4/29/2021    Procedure: KNEE INCISION AND DRAINAGE, EXCISIONAL DEBRIDEMENT OF PREPATELLAR BURSA; Surgeon: Bc Melo MD;  [facility];  Service: Orthopedics        reports that he has been smoking cigarettes  He has a 40 00 pack-year smoking history  He has never used smokeless tobacco  He reports current alcohol use  He reports current drug use  Frequency: 7 00 times per week  Drug: Marijuana        Current Outpatient Medications:     Accu-Chek FastClix Lancets MISC, TEST BLOOD SUGAR FOUR TIMES A DAY BEFORE MEALS AND BEDTIME, Disp: 102 each, Rfl: 3    Accu-Chek Guide test strip, TEST BEFORE MEALS AND AT BEDTIME, Disp: 100 strip, Rfl: 3    albuterol (PROVENTIL HFA,VENTOLIN HFA) 90 mcg/act inhaler, INHALE 2 PUFFS EVERY 4 (FOUR) HOURS AS NEEDED FOR WHEEZING, Disp: 18 g, Rfl: 1    Ascorbic Acid (vitamin C) 1000 MG tablet, Take 1,000 mg by mouth daily, Disp: , Rfl:     atorvastatin (LIPITOR) 40 mg tablet, TAKE 1 TABLET AT BEDTIME , Disp: 90 tablet, Rfl: 1    Basaglar KwikPen 100 units/mL injection pen, INJECT 50 UNITS UNDER THE SKIN EVERY 12 (TWELVE) HOURS, Disp: 30 mL, Rfl: 1    BD Pen Needle Maureen U/F 32G X 4 MM MISC, ONE PEN NEEDLE THREE TIMES A DAY 2-BASAGLAR 1- VICTOZA, Disp: 100 each, Rfl: 3    Blood Glucose Monitoring Suppl (Accu-Chek Guide) w/Device KIT, Use 3 (three) times a day with meals, Disp: 1 kit, Rfl: 0    calcium carbonate (OS-NERY) 600 MG tablet, Take 600 mg by mouth daily Dinnertime, Disp: , Rfl:     ergocalciferol (VITAMIN D2) 50,000 units, Take 50,000 Units by mouth once a week Takes on Saturday, Disp: , Rfl:     fluticasone (FLONASE) 50 mcg/act nasal spray, USE 2 SPRAYS IN EACH NOSTRIL ONCE DAILY, Disp: 16 g, Rfl: 11    hydroxychloroquine (PLAQUENIL) 200 mg tablet, Take 200 mg by mouth 2 (two) times a day  , Disp: , Rfl:     Januvia 100 MG tablet, TAKE 1 TABLET (100 MG TOTAL) BY MOUTH DAILY AFTER DINNER, Disp: 30 tablet, Rfl: 1    leflunomide (ARAVA) 20 MG tablet, Take 1 tablet by mouth daily with dinner , Disp: , Rfl:     losartan-hydrochlorothiazide (HYZAAR) 100-12 5 MG per tablet, TAKE 1 TABLET BY MOUTH DAILY, Disp: 90 tablet, Rfl: 1    naproxen (NAPROSYN) 500 mg tablet, Take 1 tablet by mouth every 12 (twelve) hours, Disp: , Rfl:     nicotine (NICODERM CQ) 21 mg/24 hr TD 24 hr patch, Place 1 patch on the skin every 24 hours, Disp: 28 patch, Rfl: 1    olopatadine (PATANOL) 0 1 % ophthalmic solution, Administer 1 drop to both eyes in the morning and 1 drop in the evening , Disp: 5 mL, Rfl: 1    pantoprazole (PROTONIX) 40 mg tablet, TAKE 1 TABLET (40 MG TOTAL) BY MOUTH DAILY, Disp: 29 tablet, Rfl: 11    saccharomyces boulardii (FLORASTOR) 250 mg capsule, Take 1 capsule (250 mg total) by mouth 2 (two) times a day, Disp: 60 capsule, Rfl: 0    sucralfate (CARAFATE) 1 g tablet, TAKE 1 TABLET (1 G TOTAL) BY MOUTH 4 (FOUR) TIMES A DAY, Disp: 120 tablet, Rfl: 4    ULTICARE MICRO PEN NEEDLES 32G X 4 MM MISC, INJECT UNDER THE SKIN 3 (THREE) TIMES A DAY, Disp: 100 each, Rfl: 3    Victoza injection, INJECT 0 3 ML (1 8 MG TOTAL) UNDER THE SKIN DAILY, Disp: 9 mL, Rfl: 0    The following portions of the patient's history were reviewed and updated as appropriate: allergies, current medications, past family history, past medical history, past social history, past surgical history and problem list     Review of Systems   Constitutional: Positive for fatigue  HENT: Negative  Eyes: Positive for itching  Respiratory: Negative  Cardiovascular: Negative  Negative for leg swelling  Gastrointestinal: Positive for abdominal pain (Mild right upper quadrant), nausea ( postprandial) and vomiting  Endocrine: Negative  Genitourinary: Negative  Musculoskeletal: Negative  Skin: Positive for wound ( left lateral thigh just above the knee)  Allergic/Immunologic: Negative  Neurological: Positive for numbness (Bilateral feet)  Hematological: Negative  Psychiatric/Behavioral: Negative  Negative for dysphoric mood and suicidal ideas  The patient is not nervous/anxious  All other systems reviewed and are negative  Objective:    /76   Pulse 80   Resp 16   Ht 6' (1 829 m)   Wt 112 kg (246 lb)   SpO2 98%   BMI 33 36 kg/m²      Physical Exam  Vitals and nursing note reviewed  Constitutional:       General: He is not in acute distress  Appearance: Normal appearance  He is well-developed  He is obese  He is not ill-appearing  HENT:      Head: Normocephalic and atraumatic        Right Ear: Tympanic membrane, ear canal and external ear normal       Left Ear: Tympanic membrane, ear canal and external ear normal       Nose: Nose normal       Mouth/Throat:      Mouth: Mucous membranes are moist    Eyes:      Extraocular Movements: Extraocular movements intact  Pupils: Pupils are equal, round, and reactive to light  Comments: Allergic conjunctivitis bilaterally   Cardiovascular:      Rate and Rhythm: Normal rate and regular rhythm  Pulses: Normal pulses  Heart sounds: Normal heart sounds  No murmur heard  Pulmonary:      Effort: Pulmonary effort is normal       Breath sounds: Normal breath sounds  Abdominal:      General: Abdomen is flat  Bowel sounds are normal       Palpations: Abdomen is soft  Musculoskeletal:         General: Deformity (Rheumatic nodules) present  Normal range of motion  Cervical back: Normal range of motion and neck supple  Right lower leg: No edema  Left lower leg: No edema  Skin:     General: Skin is warm and dry  Neurological:      General: No focal deficit present  Mental Status: He is alert and oriented to person, place, and time  Cranial Nerves: No cranial nerve deficit  Psychiatric:         Mood and Affect: Mood normal          Behavior: Behavior normal          Thought Content:  Thought content normal          Judgment: Judgment normal            Recent Results (from the past 1008 hour(s))   CBC and differential    Collection Time: 05/19/22  8:58 AM   Result Value Ref Range    WBC 6 26 4 31 - 10 16 Thousand/uL    RBC 5 24 3 88 - 5 62 Million/uL    Hemoglobin 15 7 12 0 - 17 0 g/dL    Hematocrit 46 4 36 5 - 49 3 %    MCV 89 82 - 98 fL    MCH 30 0 26 8 - 34 3 pg    MCHC 33 8 31 4 - 37 4 g/dL    RDW 13 5 11 6 - 15 1 %    MPV 11 8 8 9 - 12 7 fL    Platelets 803 569 - 774 Thousands/uL    nRBC 0 /100 WBCs    Neutrophils Relative 63 43 - 75 %    Immat GRANS % 1 0 - 2 %    Lymphocytes Relative 19 14 - 44 %    Monocytes Relative 14 (H) 4 - 12 %    Eosinophils Relative 2 0 - 6 %    Basophils Relative 1 0 - 1 %    Neutrophils Absolute 3 96 1 85 - 7 62 Thousands/µL    Immature Grans Absolute 0 05 0 00 - 0 20 Thousand/uL    Lymphocytes Absolute 1 18 0 60 - 4 47 Thousands/µL    Monocytes Absolute 0 85 0 17 - 1 22 Thousand/µL    Eosinophils Absolute 0 15 0 00 - 0 61 Thousand/µL    Basophils Absolute 0 07 0 00 - 0 10 Thousands/µL   Comprehensive metabolic panel    Collection Time: 05/19/22  8:58 AM   Result Value Ref Range    Sodium 137 136 - 145 mmol/L    Potassium 4 0 3 5 - 5 3 mmol/L    Chloride 106 100 - 108 mmol/L    CO2 25 21 - 32 mmol/L    ANION GAP 6 4 - 13 mmol/L    BUN 20 5 - 25 mg/dL    Creatinine 1 09 0 60 - 1 30 mg/dL    Glucose, Fasting 191 (H) 65 - 99 mg/dL    Calcium 9 1 8 3 - 10 1 mg/dL    AST 27 5 - 45 U/L    ALT 47 12 - 78 U/L    Alkaline Phosphatase 47 46 - 116 U/L    Total Protein 7 1 6 4 - 8 2 g/dL    Albumin 3 7 3 5 - 5 0 g/dL    Total Bilirubin 0 74 0 20 - 1 00 mg/dL    eGFR 76 ml/min/1 73sq m   Microalbumin / creatinine urine ratio    Collection Time: 05/19/22  8:58 AM   Result Value Ref Range    Creatinine, Ur 233 0 mg/dL    Microalbum  ,U,Random 12 7 0 0 - 20 0 mg/L    Microalb Creat Ratio 5 0 - 30 mg/g creatinine   TSH, 3rd generation with Free T4 reflex    Collection Time: 05/19/22  8:58 AM   Result Value Ref Range    TSH 3RD GENERATON 1 460 0 450 - 4 500 uIU/mL   Hemoglobin A1C    Collection Time: 05/19/22  8:58 AM   Result Value Ref Range    Hemoglobin A1C 7 3 (H) Normal 3 8-5 6%; PreDiabetic 5 7-6 4%; Diabetic >=6 5%; Glycemic control for adults with diabetes <7 0% %     mg/dl   Lipid panel    Collection Time: 05/19/22  8:58 AM   Result Value Ref Range    Cholesterol 139 See Comment mg/dL    Triglycerides 131 See Comment mg/dL    HDL, Direct 35 (L) >=40 mg/dL    LDL Calculated 78 0 - 100 mg/dL    Non-HDL-Chol (CHOL-HDL) 104 mg/dl       Assessment/Plan:    Biliary dyskinesia  Patient did have prior decreased ejection fraction of 23% on HIDA scan that was done 2 years ago    Patient is having postprandial right upper quadrant pain along with nausea and vomiting  Does not have acute findings on abdominal examination  I would like to refer back to general surgeon for re-evaluation  I am certain that his biliary ejection fraction did not increase  Not certain if it would be worthwhile to pursue performing right upper quadrant ultrasound, repeat HIDA scan for re-evaluation of a previously known dysfunctional gallbladder    Type 2 diabetes mellitus with diabetic neuropathic arthropathy, with long-term current use of insulin ([address] )    Lab Results   Component Value Date    HGBA1C 7 3 (H) 05/19/2022     Patient previously have follow-up with endocrinology  Hemoglobin A1c is still slightly increased  Patient is to remain on same insulin regimen along with Victoza    Charcot foot due to diabetes mellitus [facility])    Lab Results   Component Value Date    HGBA1C 7 3 (H) 05/19/2022     Patient does have documented diabetic neuropathy  He does need follow-up with podiatrist     Benign essential hypertension  Adequately controlled on losartan/hydrochlorothiazide  Re-evaluate in 4 months    Rheumatoid arthritis involving multiple sites with positive rheumatoid factor [facility])  Patient wishes to switch rheumatologist from a [facility] to [facility]  Order placed  He remains on Plaquenil  In the past he was on immunosuppressant biologic agent however those were discontinued after he developed infection in his left lateral thigh    Vitamin D deficiency  Vitamin-D level ordered to be done in 4 months  Previously on vitamin-D supplementation prescribed by rheumatologist    Tobacco use  Counseled on benefits of smoking cessation  Right upper quadrant abdominal pain  Patient has seen GI as well as general surgeon in the past   Dysfunctional gallbladder on HIDA scan back in 2020   Re-evaluation by general surgeon    Mixed hyperlipidemia  Cholesterol is adequately controlled on atorvastatin 40 mg once daily  Repeat lipid panel to be done in 4 months    Allergic conjunctivitis of both eyes  Patanol eyedrops prescribed          Problem List Items Addressed This Visit        Digestive    Biliary dyskinesia     Patient did have prior decreased ejection fraction of 23% on HIDA scan that was done 2 years ago  Patient is having postprandial right upper quadrant pain along with nausea and vomiting  Does not have acute findings on abdominal examination  I would like to refer back to general surgeon for re-evaluation  I am certain that his biliary ejection fraction did not increase  Not certain if it would be worthwhile to pursue performing right upper quadrant ultrasound, repeat HIDA scan for re-evaluation of a previously known dysfunctional gallbladder           Relevant Orders    Ambulatory Referral to General Surgery       Endocrine    Charcot foot due to diabetes mellitus [facility])       Lab Results   Component Value Date    HGBA1C 7 3 (H) 05/19/2022     Patient does have documented diabetic neuropathy  He does need follow-up with podiatrist            Type 2 diabetes mellitus with diabetic neuropathic arthropathy, with long-term current use of insulin ([address] )       Lab Results   Component Value Date    HGBA1C 7 3 (H) 05/19/2022     Patient previously have follow-up with endocrinology  Hemoglobin A1c is still slightly increased  Patient is to remain on same insulin regimen along with Victoza           Relevant Medications    Blood Glucose Monitoring Suppl (Accu-Chek Guide) w/Device KIT    Other Relevant Orders    CBC and differential    Comprehensive metabolic panel    Hemoglobin A1C    Lipid panel    Microalbumin / creatinine urine ratio       Cardiovascular and Mediastinum    Benign essential hypertension     Adequately controlled on losartan/hydrochlorothiazide    Re-evaluate in 4 months           Relevant Orders    TSH, 3rd generation with Free T4 reflex       Musculoskeletal and Integument    Rheumatoid arthritis involving multiple sites with positive rheumatoid factor ([facility] [address] ) - Primary     Patient wishes to switch rheumatologist from a [facility] to [facility]  Order placed  He remains on Plaquenil  In the past he was on immunosuppressant biologic agent however those were discontinued after he developed infection in his left lateral thigh           Relevant Orders    Ambulatory Referral to Rheumatology       Other    Allergic conjunctivitis of both eyes     Patanol eyedrops prescribed           Relevant Medications    olopatadine (PATANOL) 0 1 % ophthalmic solution    Mixed hyperlipidemia     Cholesterol is adequately controlled on atorvastatin 40 mg once daily  Repeat lipid panel to be done in 4 months           RESOLVED: Pain, abdominal, RUQ    Prostate cancer screening    Relevant Orders    PSA, Total Screen    Right upper quadrant abdominal pain     Patient has seen GI as well as general surgeon in the past   Dysfunctional gallbladder on HIDA scan back in 2020  Re-evaluation by general surgeon           Relevant Orders    Ambulatory Referral to General Surgery    Tobacco use     Counseled on benefits of smoking cessation  Relevant Medications    nicotine (NICODERM CQ) 21 mg/24 hr TD 24 hr patch    Vitamin D deficiency     Vitamin-D level ordered to be done in 4 months    Previously on vitamin-D supplementation prescribed by rheumatologist           Relevant Orders    Vitamin D 1,25 dihydroxy

## 2022-05-20 NOTE — ASSESSMENT & PLAN NOTE
Patient did have prior decreased ejection fraction of 23% on HIDA scan that was done 2 years ago  Patient is having postprandial right upper quadrant pain along with nausea and vomiting  Does not have acute findings on abdominal examination  I would like to refer back to general surgeon for re-evaluation  I am certain that his biliary ejection fraction did not increase    Not certain if it would be worthwhile to pursue performing right upper quadrant ultrasound, repeat HIDA scan for re-evaluation of a previously known dysfunctional gallbladder

## 2022-05-21 LAB — 1,25(OH)2D3 SERPL-MCNC: 32.6 PG/ML (ref 19.9–79.3)

## 2022-06-08 DIAGNOSIS — E11.9 TYPE 2 DIABETES MELLITUS WITHOUT COMPLICATION, WITHOUT LONG-TERM CURRENT USE OF INSULIN (HCC): ICD-10-CM

## 2022-06-08 DIAGNOSIS — E13.9 DIABETES MELLITUS OF OTHER TYPE WITHOUT COMPLICATION, UNSPECIFIED WHETHER LONG TERM INSULIN USE (HCC): ICD-10-CM

## 2022-06-08 RX ORDER — LIRAGLUTIDE 6 MG/ML
1.8 INJECTION SUBCUTANEOUS DAILY
Qty: 9 ML | Refills: 0 | Status: SHIPPED | OUTPATIENT
Start: 2022-06-08 | End: 2022-07-26

## 2022-06-08 RX ORDER — INSULIN GLARGINE 100 [IU]/ML
50 INJECTION, SOLUTION SUBCUTANEOUS EVERY 12 HOURS SCHEDULED
Qty: 30 ML | Refills: 1 | Status: SHIPPED | OUTPATIENT
Start: 2022-06-08 | End: 2022-07-01

## 2022-06-14 ENCOUNTER — CONSULT (OUTPATIENT)
Dept: SURGERY | Facility: CLINIC | Age: 57
End: 2022-06-14
Payer: COMMERCIAL

## 2022-06-14 VITALS
BODY MASS INDEX: 33.86 KG/M2 | HEIGHT: 72 IN | SYSTOLIC BLOOD PRESSURE: 135 MMHG | WEIGHT: 250 LBS | HEART RATE: 111 BPM | DIASTOLIC BLOOD PRESSURE: 75 MMHG

## 2022-06-14 DIAGNOSIS — K31.84 GASTROPARESIS: Primary | ICD-10-CM

## 2022-06-14 DIAGNOSIS — R10.11 RIGHT UPPER QUADRANT ABDOMINAL PAIN: ICD-10-CM

## 2022-06-14 DIAGNOSIS — K82.8 BILIARY DYSKINESIA: ICD-10-CM

## 2022-06-14 PROCEDURE — 99213 OFFICE O/P EST LOW 20 MIN: CPT | Performed by: SURGERY

## 2022-06-14 PROCEDURE — 3075F SYST BP GE 130 - 139MM HG: CPT | Performed by: SURGERY

## 2022-06-14 PROCEDURE — 3078F DIAST BP <80 MM HG: CPT | Performed by: SURGERY

## 2022-06-14 NOTE — PROGRESS NOTES
Assessment/Plan:  Patient is a 65-year-old diabetic who has been seen previously with upper abdominal discomfort  He has history of epigastric discomfort, right upper quadrant abdominal pain, substernal pyrosis  An upper endoscopy was completed previously with minimal gastritis  He states that he has had progressive episodes of daily abdominal discomfort  His epigastric and right upper quadrant  His bloating, gas, daily nausea  His nausea is upon arising from sleep even before breakfast   He has significant vomiting every 2 weeks  Denies diarrhea  Ultrasound reveals no evidence for cholelithiasis  HIDA scan reveals a low ejection fraction  I explained that his symptoms are more pronounced in simple biliary colic  I am concerned about gastroparesis  A nuclear gastric emptying test has been ordered with follow-up with Dr Elif Seay of Gastroenterology  If the test is normal, I would recommend laparoscopic cholecystectomy  Often, laparoscopic cholecystectomy follows an abnormal nuclear medicine gastric emptying test simply take the gallbladder out of the picture-but I would leave this to the recommendation of Gastroenterology  Patient understands these recommendations  All questions answered  Diagnoses and all orders for this visit:    Biliary dyskinesia  -     Ambulatory Referral to General Surgery    Right upper quadrant abdominal pain  -     Ambulatory Referral to General Surgery        Subjective:      Patient ID: Med Luna  is a 49 y o  male  Patient presents for gallbladder follow up  States he continues to have episodes of RUQ pain after eating  No recent testing  Abdominal Pain  The current episode started more than 1 year ago  The problem occurs intermittently  The problem has been unchanged  The pain is located in the RUQ  The quality of the pain is dull  The abdominal pain does not radiate  Associated symptoms include diarrhea, flatus, nausea and vomiting   The pain is aggravated by eating  The following portions of the patient's history were reviewed and updated as appropriate:     He  has a past medical history of Abscess of left thigh (5/11/2021), Arthritis, At risk for falls, Broken foot, Cataract, Cellulitis of left lower extremity (4/26/2021), COPD (chronic obstructive pulmonary disease) ([place] [address] ), Diabetes mellitus ([place] [address] ), MRSA infection, Hyperlipidemia, Kidney stone, Neuropathy, RA (rheumatoid arthritis) ([place] [address] ), Rheumatoid arthritis ([place] [address] ), Seasonal allergies, Sepsis ([place] [address] ) (4/26/2021), Snores, Uses wheelchair, and Wears glasses  He  has a past surgical history that includes Skin biopsy; Appendectomy; [place] tooth extraction (1998); pr exc skin malig <0 5 cm remainder body (N/A, 3/28/2018); pr gastrocnemius recession (Right, 2/12/2019); Closed reduction foot dislocation (Right, 2/12/2019); pr remove extern bone fix dev w anesth (Right, 4/18/2019); Foot surgery (Right, 07/2019); Colonoscopy; IR PICC placement single lumen (8/5/2019); Wound debridement (Left, 4/29/2021); and pr debridement, skin, sub-q tissue,=<20 sq cm (Left, 5/24/2021)  His family history includes Alcohol abuse in his brother; Coronary artery disease in his family; Diabetes in his father and mother; Diabetes type II in his family; Heart disease in his family; Mental illness in his mother; Stroke in his father and mother  He  reports that he has been smoking cigarettes  He has a 40 00 pack-year smoking history  He has never used smokeless tobacco  He reports current alcohol use  He reports current drug use  Frequency: 7 00 times per week  Drug: Marijuana    Current Outpatient Medications   Medication Sig Dispense Refill    Accu-Chek FastClix Lancets MISC TEST BLOOD SUGAR FOUR TIMES A DAY BEFORE MEALS AND BEDTIME 102 each 3    albuterol (PROVENTIL HFA,VENTOLIN HFA) 90 mcg/act inhaler INHALE 2 PUFFS EVERY 4 (FOUR) HOURS AS NEEDED FOR WHEEZING 18 g 1    Ascorbic Acid (vitamin C) 1000 MG tablet Take 1,000 mg by mouth daily      atorvastatin (LIPITOR) 40 mg tablet TAKE 1 TABLET AT BEDTIME  90 tablet 1    Basaglar KwikPen 100 units/mL injection pen INJECT 50 UNITS UNDER THE SKIN EVERY 12 (TWELVE) HOURS 30 mL 1    BD Pen Needle Maureen U/F 32G X 4 MM MISC ONE PEN NEEDLE THREE TIMES A DAY 2-BASAGLAR 1- VICTOZA 100 each 3    Blood Glucose Monitoring Suppl (OneTouch Verio Flex System) w/Device KIT Use 3 (three) times a day with meals 1 kit 0    calcium carbonate (OS-NERY) 600 MG tablet Take 600 mg by mouth daily Dinnertime      ergocalciferol (VITAMIN D2) 50,000 units Take 50,000 Units by mouth once a week Takes on Saturday      fluticasone (FLONASE) 50 mcg/act nasal spray USE 2 SPRAYS IN EACH NOSTRIL ONCE DAILY 16 g 11    glucose blood test strip Check glucose before meals 100 each 3    hydroxychloroquine (PLAQUENIL) 200 mg tablet Take 200 mg by mouth 2 (two) times a day        Januvia 100 MG tablet TAKE 1 TABLET (100 MG TOTAL) BY MOUTH DAILY AFTER DINNER 30 tablet 1    leflunomide (ARAVA) 20 MG tablet Take 1 tablet by mouth daily with dinner       losartan-hydrochlorothiazide (HYZAAR) 100-12 5 MG per tablet TAKE 1 TABLET BY MOUTH DAILY 90 tablet 1    naproxen (NAPROSYN) 500 mg tablet Take 1 tablet by mouth every 12 (twelve) hours      nicotine (NICODERM CQ) 21 mg/24 hr TD 24 hr patch Place 1 patch on the skin every 24 hours 28 patch 1    olopatadine (PATANOL) 0 1 % ophthalmic solution Administer 1 drop to both eyes in the morning and 1 drop in the evening   5 mL 1    pantoprazole (PROTONIX) 40 mg tablet TAKE 1 TABLET (40 MG TOTAL) BY MOUTH DAILY 29 tablet 11    saccharomyces boulardii (FLORASTOR) 250 mg capsule Take 1 capsule (250 mg total) by mouth 2 (two) times a day 60 capsule 0    sucralfate (CARAFATE) 1 g tablet TAKE 1 TABLET (1 G TOTAL) BY MOUTH 4 (FOUR) TIMES A  tablet 4    ULTICARE MICRO PEN NEEDLES 32G X 4 MM MISC INJECT UNDER THE SKIN 3 (THREE) TIMES A  each 3    Victoza injection INJECT 0 3 ML (1 8 MG TOTAL) UNDER THE SKIN DAILY 9 mL 0     No current facility-administered medications for this visit  He has No Known Allergies       Review of Systems   Constitutional: Negative  Negative for activity change  HENT: Negative  Eyes: Negative  Respiratory: Negative  Cardiovascular: Negative  Gastrointestinal: Positive for abdominal pain, diarrhea, flatus, nausea and vomiting  Endocrine: Negative  Genitourinary: Negative  Musculoskeletal: Negative  Skin: Negative  Allergic/Immunologic: Negative  Neurological: Negative  Psychiatric/Behavioral: Negative for agitation, behavioral problems and confusion  The patient is not nervous/anxious  Objective:      /75   Pulse (!) 111   Ht 6' (1 829 m)   Wt 113 kg (250 lb)   BMI 33 91 kg/m²          Physical Exam  Constitutional:       Appearance: He is well-developed  He is not diaphoretic  HENT:      Head: Normocephalic and atraumatic  Right Ear: External ear normal       Left Ear: External ear normal    Eyes:      General: No scleral icterus  Right eye: No discharge  Left eye: No discharge  Extraocular Movements: Extraocular movements intact  Conjunctiva/sclera: Conjunctivae normal    Neck:      Thyroid: No thyromegaly  Trachea: No tracheal deviation  Cardiovascular:      Rate and Rhythm: Normal rate  Heart sounds: No murmur heard  No friction rub  Pulmonary:      Effort: Pulmonary effort is normal  No respiratory distress  Breath sounds: No stridor  No wheezing  Chest:      Chest wall: No tenderness  Abdominal:      General: There is no distension  Palpations: Abdomen is soft  There is no mass  Tenderness: There is no abdominal tenderness  There is no guarding or rebound  Musculoskeletal:         General: No tenderness  Normal range of motion  Cervical back: Normal range of motion and neck supple     Lymphadenopathy: Cervical: No cervical adenopathy  Skin:     General: Skin is warm and dry  Findings: No erythema or rash  Neurological:      Mental Status: He is alert and oriented to person, place, and time  Cranial Nerves: No cranial nerve deficit        Coordination: Coordination normal    Psychiatric:         Behavior: Behavior normal          Judgment: Judgment normal

## 2022-07-01 DIAGNOSIS — E11.9 TYPE 2 DIABETES MELLITUS WITHOUT COMPLICATION, WITHOUT LONG-TERM CURRENT USE OF INSULIN (HCC): ICD-10-CM

## 2022-07-01 DIAGNOSIS — I10 ESSENTIAL HYPERTENSION: ICD-10-CM

## 2022-07-01 DIAGNOSIS — E78.2 MIXED HYPERLIPIDEMIA: ICD-10-CM

## 2022-07-01 RX ORDER — INSULIN GLARGINE 100 [IU]/ML
50 INJECTION, SOLUTION SUBCUTANEOUS EVERY 12 HOURS SCHEDULED
Qty: 30 ML | Refills: 1 | Status: SHIPPED | OUTPATIENT
Start: 2022-07-01 | End: 2022-08-30

## 2022-07-01 RX ORDER — LOSARTAN POTASSIUM AND HYDROCHLOROTHIAZIDE 12.5; 1 MG/1; MG/1
1 TABLET ORAL DAILY
Qty: 90 TABLET | Refills: 1 | Status: SHIPPED | OUTPATIENT
Start: 2022-07-01

## 2022-07-01 RX ORDER — ATORVASTATIN CALCIUM 40 MG/1
TABLET, FILM COATED ORAL
Qty: 90 TABLET | Refills: 1 | Status: SHIPPED | OUTPATIENT
Start: 2022-07-01

## 2022-07-01 RX ORDER — SITAGLIPTIN 100 MG/1
TABLET, FILM COATED ORAL
Qty: 30 TABLET | Refills: 1 | Status: SHIPPED | OUTPATIENT
Start: 2022-07-01 | End: 2022-08-22

## 2022-07-05 DIAGNOSIS — E13.9 DIABETES MELLITUS OF OTHER TYPE WITHOUT COMPLICATION, UNSPECIFIED WHETHER LONG TERM INSULIN USE (HCC): ICD-10-CM

## 2022-07-05 DIAGNOSIS — J45.909 UNCOMPLICATED ASTHMA, UNSPECIFIED ASTHMA SEVERITY, UNSPECIFIED WHETHER PERSISTENT: ICD-10-CM

## 2022-07-05 DIAGNOSIS — E11.9 TYPE 2 DIABETES MELLITUS WITHOUT COMPLICATION, WITHOUT LONG-TERM CURRENT USE OF INSULIN (HCC): ICD-10-CM

## 2022-07-05 DIAGNOSIS — Z79.4 TYPE 2 DIABETES MELLITUS WITH DIABETIC POLYNEUROPATHY, WITH LONG-TERM CURRENT USE OF INSULIN (HCC): ICD-10-CM

## 2022-07-05 DIAGNOSIS — E11.42 TYPE 2 DIABETES MELLITUS WITH DIABETIC POLYNEUROPATHY, WITH LONG-TERM CURRENT USE OF INSULIN (HCC): ICD-10-CM

## 2022-07-05 RX ORDER — PEN NEEDLE, DIABETIC 32GX 5/32"
NEEDLE, DISPOSABLE MISCELLANEOUS
Qty: 100 EACH | Refills: 3 | Status: SHIPPED | OUTPATIENT
Start: 2022-07-05

## 2022-07-05 RX ORDER — ALBUTEROL SULFATE 90 UG/1
2 AEROSOL, METERED RESPIRATORY (INHALATION) EVERY 4 HOURS PRN
Qty: 18 G | Refills: 1 | Status: SHIPPED | OUTPATIENT
Start: 2022-07-05 | End: 2022-08-30

## 2022-07-14 NOTE — PROGRESS NOTES
Assessment and Plan:   Patient is a 61-year-old male with seropositive RA who presents to establish rheumatology care  Records were reviewed from his most recent Rheumatology at [facility]  He previously tried methotrexate many years ago and presents today still on treatment with leflunomide and hydroxychloroquine  In terms of biologics he recalls that Enbrel worked really well for him but after he went off it and then back on it after his infection healed at that time, the drug seem to be ineffective at that point  Then tried the Humira but he reports that this did not seem to help him at all  He is most interested today in a retrial the Enbrel  On exam today he has some mild swelling in the 3rd MCP joints and in the PIP joints in the hands along with the right knee  We discussed that we need updated blood work but will also submit for authorization of the Enbrel  He will continue with the leflunomide and hydroxychloroquine  He is also using naproxen for now which seems to be helping  We discussed in detail that I do not think the right upper extremity pain is related to his RA as this seems more of a cervical radiculopathy  He again is not interested in returning to pain management which is okay but we discussed otherwise I really do not have much to offer for treatment for this and he was understanding  We will get x-rays of the cervical spine however to rule out any damage from rheumatoid arthritis that could be contributing to this  Plan:  Diagnoses and all orders for this visit:    Seropositive rheumatoid arthritis ([facility] [address] )  -     Comprehensive metabolic panel; Standing  -     CBC; Standing  -     C-reactive protein; Standing  -     Sedimentation rate, automated; Standing  -     Quantiferon TB Gold Plus  -     Chronic Hepatitis Panel  -     Comprehensive metabolic panel  -     CBC  -     C-reactive protein  -     Sedimentation rate, automated  -     leflunomide (ARAVA) 20 MG tablet;  Take 1 tablet (20 mg total) by mouth daily with dinner  -     naproxen (NAPROSYN) 500 mg tablet; Take 1 tablet (500 mg total) by mouth every 12 (twelve) hours as needed for mild pain (with food)  -     hydroxychloroquine (PLAQUENIL) 200 mg tablet; Take 1 tablet (200 mg total) by mouth 2 (two) times a day    Screening-pulmonary TB  -     Quantiferon TB Gold Plus    Encounter for screening for other viral diseases  -     Chronic Hepatitis Panel    High risk medication use  -     Comprehensive metabolic panel; Standing  -     CBC; Standing  -     C-reactive protein; Standing  -     Sedimentation rate, automated; Standing  -     Quantiferon TB Gold Plus  -     Chronic Hepatitis Panel  -     Comprehensive metabolic panel  -     CBC  -     C-reactive protein  -     Sedimentation rate, automated    Long-term use of Plaquenil    Rheumatoid arthritis involving multiple sites with positive rheumatoid factor ([facility] )  -     Ambulatory Referral to Rheumatology    Cervical radiculopathy  -     XR spine cervical complete 4 or 5 vw non injury; Future  -     XR spine cervical complete 6+ vw flex/ext/obl; Future  -     naproxen (NAPROSYN) 500 mg tablet; Take 1 tablet (500 mg total) by mouth every 12 (twelve) hours as needed for mild pain (with food)        Follow-up plan: 4 months      HPI  Xander Garcia  is a 49 y o   male with HTN, DM2, IRMA, COPD, tobacco abuse, GERD, charcot joint s/p surgery, HLD who presents for rheumatology consult by request of Dr Ester Lagunas for RA  Presenting to [facility] for seropositive RA (+RF, +CCP>250), dx around age 30-36, previously saw Dr Claudine Hameed, then [facility]  -Prior meds: MTX, HCQ (still on), leflunomide (still on), Enbrel (stopped 2020, worked the best), Humira (8/2020-spring 2021, D/C after having abscess on leg, ineffective)  He remains on HCQ and LEF    Patient presents to establish care for his RA  We discussed his prior rheumatology records that were reviewed    Reports he was off/on the meds so much because of multiple surgeries and infections  However at this point he would like to restart on something due to progressive joint pain again  He has been having pain numbness and burning in the RUE radiating down the arm into the hand  This can cause 10/10 pain  Does not necessarily have neck pain but mainly pain into the upper extremity and hand  Wrists, knees get very stiff  Has neuropathy in feet and had the charcot foot in R foot  He had 3 surgeries on this foot and in that timeframe had open wound x4 months  Has had both MSSA and MRSA  He is having   Got bit by a dog spring last year and developed MSSA abscess  He came off the humira at this point  Did not feel the humira worked for him at all  [place] the enbrel worked very well for him, but came off years ago do to these various infections and was on/off  He had some sort of skin cancer on the top of his head previously, had it removed  He is taking the naproxen 500mg BID  In the morning he has stiffness in his hands and knees but reports again that the predominant issue in the morning is this ongoing right upper extremity pain and it takes a while to get improvement from that  He does report seeing pain management in the past and had cervical injections, he is not interested in repeating this  Started smoking marijuana when he had the R foot surgeries which helped his pain at that point, but when he has tried it now it has not helped at all for his current pain  He continues to smoke and has been smoking since age 7  Review of Systems  Review of Systems   Constitutional: Negative for fatigue, fever and unexpected weight change  HENT: Negative for mouth sores  Respiratory: Negative for cough and shortness of breath  Cardiovascular: Negative for chest pain and leg swelling  Gastrointestinal: Negative for abdominal pain, constipation and diarrhea     Musculoskeletal: Positive for arthralgias, myalgias and neck pain  Negative for back pain and joint swelling  Skin: Negative for color change and rash  Neurological: Positive for numbness  Negative for weakness  Hematological: Negative for adenopathy  Psychiatric/Behavioral: Negative for sleep disturbance         Allergies  No Known Allergies    Home Medications    Current Outpatient Medications:     hydroxychloroquine (PLAQUENIL) 200 mg tablet, Take 1 tablet (200 mg total) by mouth 2 (two) times a day, Disp: 180 tablet, Rfl: 1    leflunomide (ARAVA) 20 MG tablet, Take 1 tablet (20 mg total) by mouth daily with dinner, Disp: 90 tablet, Rfl: 1    naproxen (NAPROSYN) 500 mg tablet, Take 1 tablet (500 mg total) by mouth every 12 (twelve) hours as needed for mild pain (with food), Disp: 60 tablet, Rfl: 2    Accu-Chek FastClix Lancets MISC, TEST BLOOD SUGAR FOUR TIMES A DAY BEFORE MEALS AND BEDTIME, Disp: 102 each, Rfl: 3    albuterol (PROVENTIL HFA,VENTOLIN HFA) 90 mcg/act inhaler, INHALE 2 PUFFS EVERY 4 (FOUR) HOURS AS NEEDED FOR WHEEZING, Disp: 18 g, Rfl: 1    Ascorbic Acid (vitamin C) 1000 MG tablet, Take 1,000 mg by mouth daily, Disp: , Rfl:     atorvastatin (LIPITOR) 40 mg tablet, TAKE 1 TABLET AT BEDTIME , Disp: 90 tablet, Rfl: 1    Basaglar KwikPen 100 units/mL injection pen, INJECT 50 UNITS UNDER THE SKIN EVERY 12 (TWELVE) HOURS, Disp: 30 mL, Rfl: 1    BD Pen Needle Maureen U/F 32G X 4 MM MISC, ONE PEN NEEDLE THREE TIMES A DAY 2-BASAGLAR 1- VICTOZA, Disp: 100 each, Rfl: 3    Blood Glucose Monitoring Suppl (OneTouch Verio Flex System) w/Device KIT, Use 3 (three) times a day with meals, Disp: 1 kit, Rfl: 0    calcium carbonate (OS-NERY) 600 MG tablet, Take 600 mg by mouth daily Dinnertime, Disp: , Rfl:     ergocalciferol (VITAMIN D2) 50,000 units, Take 50,000 Units by mouth once a week Takes on Saturday, Disp: , Rfl:     fluticasone (FLONASE) 50 mcg/act nasal spray, USE 2 SPRAYS IN EACH NOSTRIL ONCE DAILY, Disp: 16 g, Rfl: 11    glucose blood test strip, Check glucose before meals, Disp: 100 each, Rfl: 3    Januvia 100 MG tablet, TAKE 1 TABLET (100 MG TOTAL) BY MOUTH DAILY AFTER DINNER, Disp: 30 tablet, Rfl: 1    losartan-hydrochlorothiazide (HYZAAR) 100-12 5 MG per tablet, TAKE 1 TABLET BY MOUTH DAILY, Disp: 90 tablet, Rfl: 1    nicotine (NICODERM CQ) 21 mg/24 hr TD 24 hr patch, Place 1 patch on the skin every 24 hours, Disp: 28 patch, Rfl: 1    olopatadine (PATANOL) 0 1 % ophthalmic solution, Administer 1 drop to both eyes in the morning and 1 drop in the evening , Disp: 5 mL, Rfl: 1    pantoprazole (PROTONIX) 40 mg tablet, TAKE 1 TABLET (40 MG TOTAL) BY MOUTH DAILY, Disp: 29 tablet, Rfl: 11    saccharomyces boulardii (FLORASTOR) 250 mg capsule, Take 1 capsule (250 mg total) by mouth 2 (two) times a day, Disp: 60 capsule, Rfl: 0    sucralfate (CARAFATE) 1 g tablet, TAKE 1 TABLET (1 G TOTAL) BY MOUTH 4 (FOUR) TIMES A DAY, Disp: 120 tablet, Rfl: 4    ULTICARE MICRO PEN NEEDLES 32G X 4 MM MISC, INJECT UNDER THE SKIN 3 (THREE) TIMES A DAY, Disp: 100 each, Rfl: 3    Victoza injection, INJECT 0 3 ML (1 8 MG TOTAL) UNDER THE SKIN DAILY, Disp: 9 mL, Rfl: 0    Past Medical History  Past Medical History:   Diagnosis Date    Abscess of left thigh 5/11/2021    Arthritis     At risk for falls     Broken foot     right    Cataract     giacomo    Cellulitis of left lower extremity 4/26/2021    COPD (chronic obstructive pulmonary disease) (HCC)     Diabetes mellitus (HCC)     Hx MRSA infection     Per wife patient hadf MRSa in a wound awhile ago    Hyperlipidemia     Kidney stone     Neuropathy     RA (rheumatoid arthritis) (HCC)     Rheumatoid arthritis (HCC)     Seasonal allergies     Sepsis ([address] ) 4/26/2021    Snores     Uses wheelchair     and crutches- NWB RLE    Wears glasses        Past Surgical History   Past Surgical History:   Procedure Laterality Date    APPENDECTOMY      CLOSED REDUCTION FOOT DISLOCATION Right 2/12/2019    Procedure: C/R FRACTURE;  Surgeon: Nel Anna DPM;  [facility]: [facility];  Service: Podiatry    COLONOSCOPY      FOOT SURGERY Right 07/2019    Removal of the bone graft and cleaned out infection, and placed new bone graft    IR PICC PLACEMENT SINGLE LUMEN  8/5/2019    NH DEBRIDEMENT, SKIN, SUB-Q TISSUE,=<20 SQ CM Left 5/24/2021    Procedure: INCISION AND DRAINAGE THIGH;  Surgeon: John Manjarrez MD;  [facility];  Service: Orthopedics    NH [address] [place] [address] Dr <0 5 CM REMAINDER BODY N/A 3/28/2018    Procedure: EXCISION WIDE LESION HEAD/FACIAL/NECK;  Surgeon: Ariane Cross MD;  [facility];  Service: Surgical Oncology    NH GASTROCNEMIUS RECESSION Right 2/12/2019    Procedure: ENDO GASTROC RECESSION, APPLICATION OF EXTERNAL FIXATOR;  Surgeon: Nel Anna DPM;  [facility];  Service: Podiatry    NH REMOVE EXTERN BONE FIX DEV W ANESTH Right 4/18/2019    Procedure: FRAME REMOVAL HARDWARE FOOT WITH APPLICATION OF GRAFT;  Surgeon: Nel Anna DPM;  [facility];  Service: Podiatry    SKIN BIOPSY      scalp    WISDOM TOOTH EXTRACTION  1998    WOUND DEBRIDEMENT Left 4/29/2021    Procedure: KNEE INCISION AND DRAINAGE, EXCISIONAL DEBRIDEMENT OF PREPATELLAR BURSA; Surgeon: John Manjarrez MD;  [facility];  Service: Orthopedics       Family History  No known family history of autoimmune or inflammatory diseases      Family History   Problem Relation Age of Onset    Diabetes Mother     Stroke Mother     Mental illness Mother     Diabetes Father     Stroke Father     Alcohol abuse Brother     Coronary artery disease Family         Age 51-55    Diabetes type II Family     Heart disease Family        Social History  Occupation: disabled   Social History     Substance and Sexual Activity   Alcohol Use Yes    Comment: Socially     Social History     Substance and Sexual Activity   Drug Use Yes    Frequency: 7 0 times per week    Types: Marijuana    Comment: Daily for Pain Social History     Tobacco Use   Smoking Status Current Every Day Smoker    Packs/day: 1 00    Years: 40 00    Pack years: 40 00    Types: Cigarettes   Smokeless Tobacco Never Used       Objective:    Vitals:    07/15/22 1343   BP: 144/74   Pulse: 97   SpO2: 97%   Weight: 111 kg (244 lb 6 4 oz)   Height: 6' (1 829 m)       Physical Exam    Imaging:   XRs L knee 4/2022:  Images personally reviewed in PACS and my impression is:  Soft tissue swelling present but normal joint space    Labs:   Component      Latest Ref Rng & Units 5/19/2022   WBC      4 31 - 10 16 Thousand/uL 6 26   Red Blood Cell Count      3 88 - 5 62 Million/uL 5 24   Hemoglobin      12 0 - 17 0 g/dL 15 7   HCT      36 5 - 49 3 % 46 4   MCV      82 - 98 fL 89   MCH      26 8 - 34 3 pg 30 0   MCHC      31 4 - 37 4 g/dL 33 8   RDW      11 6 - 15 1 % 13 5   MPV      8 9 - 12 7 fL 11 8   Platelet Count      089 - 390 Thousands/uL 212   nRBC      /100 WBCs 0   Neutrophils %      43 - 75 % 63   Immat GRANS %      0 - 2 % 1   Lymphocytes Relative      14 - 44 % 19   Monocytes Relative      4 - 12 % 14 (H)   Eosinophils      0 - 6 % 2   Basophils Relative      0 - 1 % 1   Absolute Neutrophils      1 85 - 7 62 Thousands/µL 3 96   Immature Grans Absolute      0 00 - 0 20 Thousand/uL 0 05   Lymphocytes Absolute      0 60 - 4 47 Thousands/µL 1 18   Absolute Monocytes      0 17 - 1 22 Thousand/µL 0 85   Absolute Eosinophils      0 00 - 0 61 Thousand/µL 0 15   Basophils Absolute      0 00 - 0 10 Thousands/µL 0 07   Sodium      136 - 145 mmol/L 137   Potassium      3 5 - 5 3 mmol/L 4 0   Chloride      100 - 108 mmol/L 106   CO2      21 - 32 mmol/L 25   Anion Gap      4 - 13 mmol/L 6   BUN      5 - 25 mg/dL 20   Creatinine      0 60 - 1 30 mg/dL 1 09   GLUCOSE FASTING      65 - 99 mg/dL 191 (H)   Calcium      8 3 - 10 1 mg/dL 9 1   AST      5 - 45 U/L 27   ALT      12 - 78 U/L 47   Alkaline Phosphatase      46 - 116 U/L 47   Total Protein      6 4 - 8 2 g/dL 7 1   Albumin      3 5 - 5 0 g/dL 3 7   TOTAL BILIRUBIN      0 20 - 1 00 mg/dL 0 74   eGFR      ml/min/1 73sq m 76   Hemoglobin A1C      Normal 3 8-5 6%; PreDiabetic 5 7-6 4%;  Diabetic >=6 5%; Glycemic control for adults with diabetes <7 0% % 7 3 (H)   eAG, EST AVG Glucose      mg/dl 163   TSH 3RD GENERATON      0 450 - 4 500 uIU/mL 1 460   VITAMIN D 1,25-DIHYDROXY      19 9 - 79 3 pg/mL 32 6

## 2022-07-15 ENCOUNTER — OFFICE VISIT (OUTPATIENT)
Dept: RHEUMATOLOGY | Facility: CLINIC | Age: 57
End: 2022-07-15
Payer: COMMERCIAL

## 2022-07-15 ENCOUNTER — TELEPHONE (OUTPATIENT)
Dept: RHEUMATOLOGY | Facility: CLINIC | Age: 57
End: 2022-07-15

## 2022-07-15 VITALS
BODY MASS INDEX: 33.1 KG/M2 | DIASTOLIC BLOOD PRESSURE: 74 MMHG | OXYGEN SATURATION: 97 % | WEIGHT: 244.4 LBS | HEART RATE: 97 BPM | SYSTOLIC BLOOD PRESSURE: 144 MMHG | HEIGHT: 72 IN

## 2022-07-15 DIAGNOSIS — M54.12 CERVICAL RADICULOPATHY: ICD-10-CM

## 2022-07-15 DIAGNOSIS — Z79.899 LONG-TERM USE OF PLAQUENIL: ICD-10-CM

## 2022-07-15 DIAGNOSIS — M05.9 SEROPOSITIVE RHEUMATOID ARTHRITIS (HCC): Primary | ICD-10-CM

## 2022-07-15 DIAGNOSIS — M05.79 RHEUMATOID ARTHRITIS INVOLVING MULTIPLE SITES WITH POSITIVE RHEUMATOID FACTOR (HCC): ICD-10-CM

## 2022-07-15 DIAGNOSIS — Z79.899 HIGH RISK MEDICATION USE: ICD-10-CM

## 2022-07-15 DIAGNOSIS — Z11.59 ENCOUNTER FOR SCREENING FOR OTHER VIRAL DISEASES: ICD-10-CM

## 2022-07-15 DIAGNOSIS — Z11.1 SCREENING-PULMONARY TB: ICD-10-CM

## 2022-07-15 PROCEDURE — 3078F DIAST BP <80 MM HG: CPT | Performed by: INTERNAL MEDICINE

## 2022-07-15 PROCEDURE — 3077F SYST BP >= 140 MM HG: CPT | Performed by: INTERNAL MEDICINE

## 2022-07-15 PROCEDURE — 99245 OFF/OP CONSLTJ NEW/EST HI 55: CPT | Performed by: INTERNAL MEDICINE

## 2022-07-15 RX ORDER — LEFLUNOMIDE 20 MG/1
20 TABLET ORAL
Qty: 90 TABLET | Refills: 1 | Status: SHIPPED | OUTPATIENT
Start: 2022-07-15

## 2022-07-15 RX ORDER — NAPROXEN 500 MG/1
500 TABLET ORAL EVERY 12 HOURS PRN
Qty: 60 TABLET | Refills: 2 | Status: SHIPPED | OUTPATIENT
Start: 2022-07-15 | End: 2022-09-16

## 2022-07-15 RX ORDER — HYDROXYCHLOROQUINE SULFATE 200 MG/1
200 TABLET, FILM COATED ORAL 2 TIMES DAILY
Qty: 180 TABLET | Refills: 1 | Status: SHIPPED | OUTPATIENT
Start: 2022-07-15 | End: 2022-10-13

## 2022-07-15 NOTE — TELEPHONE ENCOUNTER
Please submit prior auth for enbrel 50mg subcutaneous pen every 7 days for RA  Failed methotrexate, leflunomide, hydroxychloroquine, and humira  He is getting updated TB and hepatitis testing in the next few days

## 2022-07-18 ENCOUNTER — APPOINTMENT (OUTPATIENT)
Dept: LAB | Facility: CLINIC | Age: 57
End: 2022-07-18
Payer: COMMERCIAL

## 2022-07-18 LAB
ALBUMIN SERPL BCP-MCNC: 4.1 G/DL (ref 3.5–5)
ALP SERPL-CCNC: 55 U/L (ref 46–116)
ALT SERPL W P-5'-P-CCNC: 45 U/L (ref 12–78)
ANION GAP SERPL CALCULATED.3IONS-SCNC: 7 MMOL/L (ref 4–13)
AST SERPL W P-5'-P-CCNC: 26 U/L (ref 5–45)
BILIRUB SERPL-MCNC: 0.76 MG/DL (ref 0.2–1)
BUN SERPL-MCNC: 21 MG/DL (ref 5–25)
CALCIUM SERPL-MCNC: 9.2 MG/DL (ref 8.3–10.1)
CHLORIDE SERPL-SCNC: 105 MMOL/L (ref 96–108)
CO2 SERPL-SCNC: 27 MMOL/L (ref 21–32)
CREAT SERPL-MCNC: 1.12 MG/DL (ref 0.6–1.3)
CRP SERPL QL: <3 MG/L
ERYTHROCYTE [DISTWIDTH] IN BLOOD BY AUTOMATED COUNT: 13.9 % (ref 11.6–15.1)
ERYTHROCYTE [SEDIMENTATION RATE] IN BLOOD: 24 MM/HOUR (ref 0–19)
GFR SERPL CREATININE-BSD FRML MDRD: 74 ML/MIN/1.73SQ M
GLUCOSE SERPL-MCNC: 112 MG/DL (ref 65–140)
HBV CORE AB SER QL: NORMAL
HBV CORE IGM SER QL: NORMAL
HBV SURFACE AG SER QL: NORMAL
HCT VFR BLD AUTO: 48.6 % (ref 36.5–49.3)
HCV AB SER QL: NORMAL
HGB BLD-MCNC: 16.3 G/DL (ref 12–17)
MCH RBC QN AUTO: 29.7 PG (ref 26.8–34.3)
MCHC RBC AUTO-ENTMCNC: 33.5 G/DL (ref 31.4–37.4)
MCV RBC AUTO: 89 FL (ref 82–98)
PLATELET # BLD AUTO: 206 THOUSANDS/UL (ref 149–390)
PMV BLD AUTO: 11.2 FL (ref 8.9–12.7)
POTASSIUM SERPL-SCNC: 4 MMOL/L (ref 3.5–5.3)
PROT SERPL-MCNC: 7.9 G/DL (ref 6.4–8.4)
RBC # BLD AUTO: 5.49 MILLION/UL (ref 3.88–5.62)
SODIUM SERPL-SCNC: 139 MMOL/L (ref 135–147)
WBC # BLD AUTO: 6.9 THOUSAND/UL (ref 4.31–10.16)

## 2022-07-18 PROCEDURE — 85652 RBC SED RATE AUTOMATED: CPT | Performed by: INTERNAL MEDICINE

## 2022-07-18 PROCEDURE — 86705 HEP B CORE ANTIBODY IGM: CPT | Performed by: INTERNAL MEDICINE

## 2022-07-18 PROCEDURE — 86140 C-REACTIVE PROTEIN: CPT | Performed by: INTERNAL MEDICINE

## 2022-07-18 PROCEDURE — 85027 COMPLETE CBC AUTOMATED: CPT | Performed by: INTERNAL MEDICINE

## 2022-07-18 PROCEDURE — 87340 HEPATITIS B SURFACE AG IA: CPT | Performed by: INTERNAL MEDICINE

## 2022-07-18 PROCEDURE — 86803 HEPATITIS C AB TEST: CPT | Performed by: INTERNAL MEDICINE

## 2022-07-18 PROCEDURE — 86704 HEP B CORE ANTIBODY TOTAL: CPT | Performed by: INTERNAL MEDICINE

## 2022-07-18 PROCEDURE — 86480 TB TEST CELL IMMUN MEASURE: CPT | Performed by: INTERNAL MEDICINE

## 2022-07-18 PROCEDURE — 80053 COMPREHEN METABOLIC PANEL: CPT | Performed by: INTERNAL MEDICINE

## 2022-07-18 PROCEDURE — 36415 COLL VENOUS BLD VENIPUNCTURE: CPT | Performed by: INTERNAL MEDICINE

## 2022-07-18 RX ORDER — ETANERCEPT 50 MG/ML
50 SOLUTION SUBCUTANEOUS WEEKLY
Qty: 4 ML | Refills: 5 | Status: SHIPPED | OUTPATIENT
Start: 2022-07-18

## 2022-07-18 NOTE — TELEPHONE ENCOUNTER
Please let patient know I sent this in  You can also let him know his labs look good without any concerns; he only has mild inflammation, so we will have to wait and see how much enbrel helps him

## 2022-07-18 NOTE — TELEPHONE ENCOUNTER
Left detailed message for patient relayed Dr Christina Trinidad message and asked him to call back with any questions

## 2022-07-19 LAB
GAMMA INTERFERON BACKGROUND BLD IA-ACNC: 0.07 IU/ML
M TB IFN-G BLD-IMP: NEGATIVE
M TB IFN-G CD4+ BCKGRND COR BLD-ACNC: 0 IU/ML
M TB IFN-G CD4+ BCKGRND COR BLD-ACNC: 0.02 IU/ML
MITOGEN IGNF BCKGRD COR BLD-ACNC: 9.72 IU/ML

## 2022-07-26 DIAGNOSIS — E13.9 DIABETES MELLITUS OF OTHER TYPE WITHOUT COMPLICATION, UNSPECIFIED WHETHER LONG TERM INSULIN USE (HCC): ICD-10-CM

## 2022-07-26 DIAGNOSIS — E11.9 TYPE 2 DIABETES MELLITUS WITHOUT COMPLICATION, WITHOUT LONG-TERM CURRENT USE OF INSULIN (HCC): ICD-10-CM

## 2022-07-26 RX ORDER — LIRAGLUTIDE 6 MG/ML
1.8 INJECTION SUBCUTANEOUS DAILY
Qty: 9 ML | Refills: 0 | Status: SHIPPED | OUTPATIENT
Start: 2022-07-26 | End: 2022-08-03

## 2022-08-03 DIAGNOSIS — E11.42 TYPE 2 DIABETES MELLITUS WITH DIABETIC POLYNEUROPATHY, WITH LONG-TERM CURRENT USE OF INSULIN (HCC): ICD-10-CM

## 2022-08-03 DIAGNOSIS — K25.3 ACUTE GASTRIC ULCER WITHOUT HEMORRHAGE OR PERFORATION: ICD-10-CM

## 2022-08-03 DIAGNOSIS — E13.9 DIABETES MELLITUS OF OTHER TYPE WITHOUT COMPLICATION, UNSPECIFIED WHETHER LONG TERM INSULIN USE (HCC): ICD-10-CM

## 2022-08-03 DIAGNOSIS — E11.9 TYPE 2 DIABETES MELLITUS WITHOUT COMPLICATION, WITHOUT LONG-TERM CURRENT USE OF INSULIN (HCC): ICD-10-CM

## 2022-08-03 DIAGNOSIS — Z79.4 TYPE 2 DIABETES MELLITUS WITH DIABETIC POLYNEUROPATHY, WITH LONG-TERM CURRENT USE OF INSULIN (HCC): ICD-10-CM

## 2022-08-03 RX ORDER — LIRAGLUTIDE 6 MG/ML
1.8 INJECTION SUBCUTANEOUS DAILY
Qty: 9 ML | Refills: 0 | Status: SHIPPED | OUTPATIENT
Start: 2022-08-03 | End: 2022-09-28

## 2022-08-03 RX ORDER — SUCRALFATE 1 G/1
1 TABLET ORAL 4 TIMES DAILY
Qty: 120 TABLET | Refills: 4 | Status: SHIPPED | OUTPATIENT
Start: 2022-08-03

## 2022-08-03 RX ORDER — LANCETS
EACH MISCELLANEOUS
Qty: 102 EACH | Refills: 3 | Status: SHIPPED | OUTPATIENT
Start: 2022-08-03

## 2022-08-12 ENCOUNTER — HOSPITAL ENCOUNTER (OUTPATIENT)
Dept: NUCLEAR MEDICINE | Facility: HOSPITAL | Age: 57
Discharge: HOME | End: 2022-08-12
Payer: COMMERCIAL

## 2022-08-12 DIAGNOSIS — K31.84 GASTROPARESIS: ICD-10-CM

## 2022-08-12 PROCEDURE — A9541 TC99M SULFUR COLLOID: HCPCS

## 2022-08-12 PROCEDURE — G1004 CDSM NDSC: HCPCS

## 2022-08-12 PROCEDURE — 78264 GASTRIC EMPTYING IMG STUDY: CPT

## 2022-08-15 ENCOUNTER — OFFICE VISIT (OUTPATIENT)
Dept: GASTROENTEROLOGY | Facility: AMBULARY SURGERY CENTER | Age: 57
End: 2022-08-15
Payer: COMMERCIAL

## 2022-08-15 VITALS
DIASTOLIC BLOOD PRESSURE: 80 MMHG | BODY MASS INDEX: 32.23 KG/M2 | WEIGHT: 238 LBS | HEIGHT: 72 IN | HEART RATE: 87 BPM | SYSTOLIC BLOOD PRESSURE: 142 MMHG | OXYGEN SATURATION: 98 %

## 2022-08-15 DIAGNOSIS — K82.8 DYSKINESIA OF GALLBLADDER: ICD-10-CM

## 2022-08-15 DIAGNOSIS — K31.84 GASTROPARESIS: Primary | ICD-10-CM

## 2022-08-15 PROCEDURE — 99214 OFFICE O/P EST MOD 30 MIN: CPT | Performed by: INTERNAL MEDICINE

## 2022-08-15 NOTE — ASSESSMENT & PLAN NOTE
Gastroparesis secondary to chronic diabetes  Had couple of upper endoscopies in 2020      -explained to patient in detail about diabetic gastroparesis and advised him about small frequent low-fat diet     -I also discussed about the medication Reglan and concerns with side effects

## 2022-08-15 NOTE — ASSESSMENT & PLAN NOTE
Patient reports having chronic symptoms and he is convinced that his symptoms are from gallbladder dysfunction  He understands that he might continue with symptoms even after the gallbladder surgery but requesting the gallbladder to be removed      -follow-up with Dr Reema Flores for cholecystectomy

## 2022-08-15 NOTE — PROGRESS NOTES
Follow-up Note -  [facility]  Aron Camp  1965 male         Reason:  Follow-up regarding gastroparesis    HPI:  Mr  Joel Quintana was seen couple of years ago when I did EGD and colonoscopies on him  He was noted to have few small benign gastric ulcers that were healed on the repeat EGD  He takes Protonix regularly  Denies any abdominal pain or heartburn  Complaining about constant dull pain in the right upper quadrant that gets worse at times  Reports having episodes of vomiting in the morning when his blood sugar goes down and he eats more  He had gastric emptying study which showed delayed gastric emptying  Regular bowel movements and denies any blood or mucus in the stool  He follows with surgeon Dr David Lorenzo regarding low gallbladder function about 23 percent EF and he is pretty much convinced that his symptoms are due to gallbladder dysfunction  Chaperon: Ms Hadley Kil: Review of Systems   Constitutional: Negative for activity change, appetite change, chills, diaphoresis, fatigue, fever and unexpected weight change  HENT: Negative for ear discharge, ear pain, facial swelling, hearing loss, nosebleeds, sore throat, tinnitus and voice change  Eyes: Negative for pain, discharge, redness, itching and visual disturbance  Respiratory: Negative for apnea, cough, chest tightness, shortness of breath and wheezing  Cardiovascular: Negative for chest pain and palpitations  Gastrointestinal:        As noted in HPI   Endocrine: Negative for cold intolerance, heat intolerance and polyuria  Genitourinary: Negative for difficulty urinating, dysuria, flank pain, hematuria and urgency  Musculoskeletal: Positive for arthralgias  Negative for back pain, gait problem, joint swelling and myalgias  Skin: Negative for rash and wound  Neurological: Positive for headaches   Negative for dizziness, tremors, seizures, speech difficulty, light-headedness and numbness  Hematological: Negative for adenopathy  Does not bruise/bleed easily  Psychiatric/Behavioral: Negative for agitation, behavioral problems and confusion  The patient is not nervous/anxious           Past Medical History:   Diagnosis Date    Abscess of left thigh 5/11/2021    Arthritis     At risk for falls     Broken foot     right    Cataract     giacomo    Cellulitis of left lower extremity 4/26/2021    [facility]     Diabetes mellitus ([facility] )     Hx MRSA infection     Per wife patient hadf MRSa in a wound awhile ago    Hyperlipidemia     Kidney stone     Neuropathy     RA [facility]rheumatoid arthritis) [facility]     [facility]     Seasonal allergies     Sepsis ([facility] ) 4/26/2021    Snores     Uses wheelchair     and crutches- NWB RLE    Wears glasses       Past Surgical History:   Procedure Laterality Date    APPENDECTOMY      CLOSED REDUCTION FOOT DISLOCATION Right 2/12/2019    Procedure: C/R FRACTURE;  Surgeon: Baljit Bowman DPM;  [facility];  Service: Podiatry    COLONOSCOPY      FOOT SURGERY Right 07/2019    Removal of the bone graft and cleaned out infection, and placed new bone graft    IR PICC PLACEMENT SINGLE LUMEN  8/5/2019    IN DEBRIDEMENT, SKIN, SUB-Q TISSUE,=<20 SQ CM Left 5/24/2021    Procedure: INCISION AND DRAINAGE THIGH;  Surgeon: Yeyo Harrell MD;  [facility];  Service: Orthopedics    [address] <0 5 CM REMAINDER BODY N/A 3/28/2018    Procedure: EXCISION WIDE LESION HEAD/FACIAL/NECK;  Surgeon: Nona Young MD;  [facility];  Service: Surgical Oncology    IN GASTROCNEMIUS RECESSION Right 2/12/2019    Procedure: ENDO GASTROC RECESSION, APPLICATION OF EXTERNAL FIXATOR;  Surgeon: Baljit Bowman DPM;  [facility];  Service: Podiatry    IN REMOVE EXTERN BONE FIX DEV W ANESTH Right 4/18/2019    Procedure: FRAME REMOVAL HARDWARE FOOT WITH APPLICATION OF GRAFT;  Surgeon: Abdi Nolan NADYA Lentz;  [facility];  Service: Podiatry    SKIN BIOPSY      scalp    WISDOM TOOTH EXTRACTION  1998    WOUND DEBRIDEMENT Left 4/29/2021    Procedure: KNEE INCISION AND DRAINAGE, EXCISIONAL DEBRIDEMENT OF PREPATELLAR BURSA; Surgeon: Edwin Good MD;  [facility];  Service: Orthopedics     Social History     Socioeconomic History    Marital status: /Civil Union     Spouse name: Not on file    Number of children: Not on file    Years of education: Not on file    Highest education level: Not on file   Occupational History    Occupation: [employer]    Tobacco Use    Smoking status: Current Every Day Smoker     Packs/day: 1 00     Years: 40 00     Pack years: 40 00     Types: Cigarettes    Smokeless tobacco: Never Used   Vaping Use    Vaping Use: Never used   Substance and Sexual Activity    Alcohol use: Yes     Comment: Socially    Drug use: Yes     Frequency: 7 0 times per week     Types: Marijuana     Comment: Daily for Pain    Sexual activity: Not on file   Other Topics Concern    Not on file   Social History Narrative    Caffeine Use- Coffee and Tea      Social Determinants of Health     Financial Resource Strain: Not on file   Food Insecurity: Not on file   Transportation Needs: Not on file   Physical Activity: Not on file   Stress: Not on file   Social Connections: Not on file   Intimate Partner Violence: Not on file   Housing Stability: Not on file     Family History   Problem Relation Age of Onset    Diabetes Mother     Stroke Mother     Mental illness Mother     Diabetes Father     Stroke Father     Alcohol abuse Brother     Coronary artery disease Family         Age 51-55    Diabetes type II Family     Heart disease Family      Patient has no known allergies    Current Outpatient Medications   Medication Sig Dispense Refill    Accu-Chek FastClix Lancets MISC TEST BLOOD SUGAR FOUR TIMES A DAY BEFORE MEALS AND BEDTIME 102 each 3    albuterol (PROVENTIL HFA,VENTOLIN HFA) 90 mcg/act inhaler INHALE 2 PUFFS EVERY 4 (FOUR) HOURS AS NEEDED FOR WHEEZING 18 g 1    Ascorbic Acid (vitamin C) 1000 MG tablet Take 1,000 mg by mouth daily      atorvastatin (LIPITOR) 40 mg tablet TAKE 1 TABLET AT BEDTIME  90 tablet 1    Basaglar KwikPen 100 units/mL injection pen INJECT 50 UNITS UNDER THE SKIN EVERY 12 (TWELVE) HOURS 30 mL 1    BD Pen Needle Maureen U/F 32G X 4 MM MISC ONE PEN NEEDLE THREE TIMES A DAY 2-BASAGLAR 1- VICTOZA 100 each 3    Blood Glucose Monitoring Suppl (OneTouch Verio Flex System) w/Device KIT Use 3 (three) times a day with meals 1 kit 0    calcium carbonate (OS-NERY) 600 MG tablet Take 600 mg by mouth daily Dinnertime      ergocalciferol (VITAMIN D2) 50,000 units Take 50,000 Units by mouth once a week Takes on Saturday      etanercept (Enbrel SureClick) 50 MG/ML injection Inject 1 mL (50 mg total) under the skin once a week 4 mL 5    fluticasone (FLONASE) 50 mcg/act nasal spray USE 2 SPRAYS IN EACH NOSTRIL ONCE DAILY 16 g 11    glucose blood test strip Check glucose before meals 100 each 3    hydroxychloroquine (PLAQUENIL) 200 mg tablet Take 1 tablet (200 mg total) by mouth 2 (two) times a day 180 tablet 1    Januvia 100 MG tablet TAKE 1 TABLET (100 MG TOTAL) BY MOUTH DAILY AFTER DINNER 30 tablet 1    leflunomide (ARAVA) 20 MG tablet Take 1 tablet (20 mg total) by mouth daily with dinner 90 tablet 1    losartan-hydrochlorothiazide (HYZAAR) 100-12 5 MG per tablet TAKE 1 TABLET BY MOUTH DAILY 90 tablet 1    naproxen (NAPROSYN) 500 mg tablet Take 1 tablet (500 mg total) by mouth every 12 (twelve) hours as needed for mild pain (with food) 60 tablet 2    nicotine (NICODERM CQ) 21 mg/24 hr TD 24 hr patch Place 1 patch on the skin every 24 hours 28 patch 1    olopatadine (PATANOL) 0 1 % ophthalmic solution Administer 1 drop to both eyes in the morning and 1 drop in the evening   5 mL 1    pantoprazole (PROTONIX) 40 mg tablet TAKE 1 TABLET (40 MG TOTAL) BY MOUTH DAILY 29 tablet 11    saccharomyces boulardii (FLORASTOR) 250 mg capsule Take 1 capsule (250 mg total) by mouth 2 (two) times a day 60 capsule 0    sucralfate (CARAFATE) 1 g tablet TAKE 1 TABLET (1 G TOTAL) BY MOUTH 4 (FOUR) TIMES A  tablet 4    ULTICARE MICRO PEN NEEDLES 32G X 4 MM MISC INJECT UNDER THE SKIN 3 (THREE) TIMES A  each 3    Victoza injection INJECT 0 3 ML (1 8 MG TOTAL) UNDER THE SKIN DAILY 9 mL 0     No current facility-administered medications for this visit  Blood pressure 142/80, pulse 87, height 6' (1 829 m), weight 108 kg (238 lb), SpO2 98 %  PHYSICAL EXAM: Physical Exam  Constitutional:       Appearance: He is well-developed  HENT:      Head: Normocephalic and atraumatic  Eyes:      General: No scleral icterus  Right eye: No discharge  Left eye: No discharge  Conjunctiva/sclera: Conjunctivae normal       Pupils: Pupils are equal, round, and reactive to light  Neck:      Thyroid: No thyromegaly  Vascular: No JVD  Trachea: No tracheal deviation  Cardiovascular:      Rate and Rhythm: Normal rate and regular rhythm  Heart sounds: Normal heart sounds  No murmur heard  No friction rub  No gallop  Pulmonary:      Effort: Pulmonary effort is normal  No respiratory distress  Breath sounds: Normal breath sounds  No wheezing or rales  Chest:      Chest wall: No tenderness  Abdominal:      General: Bowel sounds are normal  There is no distension  Palpations: Abdomen is soft  There is no mass  Tenderness: There is no abdominal tenderness  There is no guarding or rebound  Hernia: No hernia is present  Musculoskeletal:      Cervical back: Neck supple  Lymphadenopathy:      Cervical: No cervical adenopathy  Skin:     General: Skin is warm and dry  Findings: No erythema or rash  Neurological:      Mental Status: He is alert and oriented to person, place, and time     Psychiatric:         Behavior: Behavior normal          Thought Content: Thought content normal           Lab Results   Component Value Date    WBC 6 90 07/18/2022    HGB 16 3 07/18/2022    HCT 48 6 07/18/2022    MCV 89 07/18/2022     07/18/2022     Lab Results   Component Value Date    CALCIUM 9 2 07/18/2022     (L) 01/23/2017    K 4 0 07/18/2022    CO2 27 07/18/2022     07/18/2022    BUN 21 07/18/2022    CREATININE 1 12 07/18/2022     Lab Results   Component Value Date    ALT 45 07/18/2022    AST 26 07/18/2022    ALKPHOS 55 07/18/2022    BILITOT 0 6 01/23/2017     Lab Results   Component Value Date    INR 0 94 04/11/2019    PROTIME 12 7 04/11/2019       NM gastric emptying    Result Date: 8/12/2022  Impression: 1  Delayed rate of gastric emptying  Workstation performed: [id number]       ASSESSMENT & PLAN:    Gastroparesis  Gastroparesis secondary to chronic diabetes  Had couple of upper endoscopies in 2020  -explained to patient in detail about diabetic gastroparesis and advised him about small frequent low-fat diet     -I also discussed about the medication Reglan and concerns with side effects      Dyskinesia of gallbladder  Patient reports having chronic symptoms and he is convinced that his symptoms are from gallbladder dysfunction  He understands that he might continue with symptoms even after the gallbladder surgery but requesting the gallbladder to be removed      -follow-up with Dr Dora Wang for cholecystectomy

## 2022-08-22 DIAGNOSIS — E11.9 TYPE 2 DIABETES MELLITUS WITHOUT COMPLICATION, WITHOUT LONG-TERM CURRENT USE OF INSULIN (HCC): ICD-10-CM

## 2022-08-22 RX ORDER — SITAGLIPTIN 100 MG/1
TABLET, FILM COATED ORAL
Qty: 30 TABLET | Refills: 1 | Status: SHIPPED | OUTPATIENT
Start: 2022-08-22 | End: 2022-10-27

## 2022-08-23 ENCOUNTER — PREP FOR PROCEDURE (OUTPATIENT)
Dept: SURGERY | Facility: CLINIC | Age: 57
End: 2022-08-23

## 2022-08-23 ENCOUNTER — OFFICE VISIT (OUTPATIENT)
Dept: SURGERY | Facility: CLINIC | Age: 57
End: 2022-08-23
Payer: COMMERCIAL

## 2022-08-23 VITALS
SYSTOLIC BLOOD PRESSURE: 134 MMHG | HEART RATE: 94 BPM | WEIGHT: 235 LBS | BODY MASS INDEX: 31.83 KG/M2 | DIASTOLIC BLOOD PRESSURE: 76 MMHG | HEIGHT: 72 IN

## 2022-08-23 DIAGNOSIS — K82.8 BILIARY DYSKINESIA: Primary | ICD-10-CM

## 2022-08-23 PROCEDURE — 3078F DIAST BP <80 MM HG: CPT | Performed by: SURGERY

## 2022-08-23 PROCEDURE — 99214 OFFICE O/P EST MOD 30 MIN: CPT | Performed by: SURGERY

## 2022-08-23 PROCEDURE — 3075F SYST BP GE 130 - 139MM HG: CPT | Performed by: SURGERY

## 2022-08-23 RX ORDER — TAMSULOSIN HYDROCHLORIDE 0.4 MG/1
0.4 CAPSULE ORAL
Qty: 10 CAPSULE | Refills: 0 | Status: SHIPPED | OUTPATIENT
Start: 2022-08-23

## 2022-08-23 NOTE — PROGRESS NOTES
Assessment/Plan:  Patient with a known history for gastroparesis as well as biliary dyskinesia  He is trouble with upper abdominal pains  He is a dull ache in the right upper quadrant intermittently  He is episodes of vomiting associated with his gastroparesis  He denies diarrhea, fever, chills  He is daily nausea, gas and bloating  Symptoms may be mixed between gastro paresis and biliary dyskinesia  He is in favor of removing the gallbladder so that the gallbladder is not a component of his symptoms  Risks and benefits to laparoscopic cholecystectomy discussed in detail  He is agreeable  History of Enbrel use  Will discontinue for week before surgery  History of insulin-dependent diabetes, patient to contact family physician in this regard  We will prescribe Flomax preoperatively to help prevent urinary retention  There are no diagnoses linked to this encounter  Subjective:      Patient ID: Payal Small  is a 49 y o  male  Patient presents for follow up to discuss laparoscopic cholecystectomy  The following portions of the patient's history were reviewed and updated as appropriate:     He  has a past medical history of Abscess of left thigh (5/11/2021), Arthritis, At risk for falls, Broken foot, Cataract, Cellulitis of left lower extremity (4/26/2021), COPD (chronic obstructive pulmonary disease) ([facility] Diabetes mellitus ([facility] ), MRSA infection, Hyperlipidemia, Kidney stone, Neuropathy, RA (rheumatoid arthritis) ([facility] ), Rheumatoid arthritis ([facility] ), Seasonal allergies, Sepsis ([facility] ) (4/26/2021), Snores, Uses wheelchair, and Wears glasses  He  has a past surgical history that includes Skin biopsy; Appendectomy; [place] tooth extraction (1998); pr exc skin malig <0 5 cm remainder body (N/A, 3/28/2018); pr gastrocnemius recession (Right, 2/12/2019); Closed reduction foot dislocation (Right, 2/12/2019); pr remove extern bone fix dev w anesth (Right, 4/18/2019);  Foot surgery (Right, 07/2019); Colonoscopy; IR PICC placement single lumen (8/5/2019); Wound debridement (Left, 4/29/2021); and pr debridement, skin, sub-q tissue,=<20 sq cm (Left, 5/24/2021)  His family history includes Alcohol abuse in his brother; Coronary artery disease in his family; Diabetes in his father and mother; Diabetes type II in his family; Heart disease in his family; Mental illness in his mother; Stroke in his father and mother  He  reports that he has been smoking cigarettes  He has a 40 00 pack-year smoking history  He has never used smokeless tobacco  He reports current alcohol use  He reports current drug use  Frequency: 7 00 times per week  Drug: Marijuana  Current Outpatient Medications   Medication Sig Dispense Refill    Accu-Chek FastClix Lancets MISC TEST BLOOD SUGAR FOUR TIMES A DAY BEFORE MEALS AND BEDTIME 102 each 3    albuterol (PROVENTIL HFA,VENTOLIN HFA) 90 mcg/act inhaler INHALE 2 PUFFS EVERY 4 (FOUR) HOURS AS NEEDED FOR WHEEZING 18 g 1    Ascorbic Acid (vitamin C) 1000 MG tablet Take 1,000 mg by mouth daily      atorvastatin (LIPITOR) 40 mg tablet TAKE 1 TABLET AT BEDTIME   90 tablet 1    Basaglar KwikPen 100 units/mL injection pen INJECT 50 UNITS UNDER THE SKIN EVERY 12 (TWELVE) HOURS 30 mL 1    BD Pen Needle Maureen U/F 32G X 4 MM MISC ONE PEN NEEDLE THREE TIMES A DAY 2-BASAGLAR 1- VICTOZA 100 each 3    Blood Glucose Monitoring Suppl (OneTouch Verio Flex System) w/Device KIT Use 3 (three) times a day with meals 1 kit 0    calcium carbonate (OS-NERY) 600 MG tablet Take 600 mg by mouth daily Dinnertime      ergocalciferol (VITAMIN D2) 50,000 units Take 50,000 Units by mouth once a week Takes on Saturday      etanercept (Enbrel SureClick) 50 MG/ML injection Inject 1 mL (50 mg total) under the skin once a week 4 mL 5    fluticasone (FLONASE) 50 mcg/act nasal spray USE 2 SPRAYS IN EACH NOSTRIL ONCE DAILY 16 g 11    glucose blood test strip Check glucose before meals 100 each 3    hydroxychloroquine (PLAQUENIL) 200 mg tablet Take 1 tablet (200 mg total) by mouth 2 (two) times a day 180 tablet 1    Januvia 100 MG tablet TAKE 1 TABLET (100 MG TOTAL) BY MOUTH DAILY AFTER DINNER 30 tablet 1    leflunomide (ARAVA) 20 MG tablet Take 1 tablet (20 mg total) by mouth daily with dinner 90 tablet 1    losartan-hydrochlorothiazide (HYZAAR) 100-12 5 MG per tablet TAKE 1 TABLET BY MOUTH DAILY 90 tablet 1    naproxen (NAPROSYN) 500 mg tablet Take 1 tablet (500 mg total) by mouth every 12 (twelve) hours as needed for mild pain (with food) 60 tablet 2    nicotine (NICODERM CQ) 21 mg/24 hr TD 24 hr patch Place 1 patch on the skin every 24 hours 28 patch 1    olopatadine (PATANOL) 0 1 % ophthalmic solution Administer 1 drop to both eyes in the morning and 1 drop in the evening  5 mL 1    pantoprazole (PROTONIX) 40 mg tablet TAKE 1 TABLET (40 MG TOTAL) BY MOUTH DAILY 29 tablet 11    saccharomyces boulardii (FLORASTOR) 250 mg capsule Take 1 capsule (250 mg total) by mouth 2 (two) times a day 60 capsule 0    sucralfate (CARAFATE) 1 g tablet TAKE 1 TABLET (1 G TOTAL) BY MOUTH 4 (FOUR) TIMES A  tablet 4    ULTICARE MICRO PEN NEEDLES 32G X 4 MM MISC INJECT UNDER THE SKIN 3 (THREE) TIMES A  each 3    Victoza injection INJECT 0 3 ML (1 8 MG TOTAL) UNDER THE SKIN DAILY 9 mL 0     No current facility-administered medications for this visit  He has No Known Allergies       Review of Systems   Constitutional: Negative  Negative for activity change  HENT: Negative  Eyes: Negative  Respiratory: Negative  Cardiovascular: Negative  Gastrointestinal: Positive for abdominal pain, diarrhea, nausea and vomiting  Endocrine: Negative  Genitourinary: Negative  Musculoskeletal: Negative  Skin: Negative  Allergic/Immunologic: Negative  Neurological: Negative  Psychiatric/Behavioral: Negative for agitation, behavioral problems and confusion   The patient is not nervous/anxious  Objective:      /76   Pulse 94   Ht 6' (1 829 m)   Wt 107 kg (235 lb)   BMI 31 87 kg/m²          Physical Exam  Constitutional:       Appearance: He is well-developed  He is not diaphoretic  HENT:      Head: Normocephalic and atraumatic  Eyes:      General: No scleral icterus  Right eye: No discharge  Left eye: No discharge  Extraocular Movements: Extraocular movements intact  Conjunctiva/sclera: Conjunctivae normal    Neck:      Thyroid: No thyromegaly  Trachea: No tracheal deviation  Cardiovascular:      Rate and Rhythm: Normal rate and regular rhythm  Heart sounds: Normal heart sounds  No murmur heard  No friction rub  Pulmonary:      Effort: Pulmonary effort is normal  No respiratory distress  Breath sounds: Normal breath sounds  No stridor  No wheezing  Chest:      Chest wall: No tenderness  Abdominal:      General: There is no distension  Palpations: Abdomen is soft  There is no mass  Tenderness: There is no abdominal tenderness  There is no guarding or rebound  Musculoskeletal:         General: No tenderness  Normal range of motion  Cervical back: Normal range of motion and neck supple  Right lower leg: No edema  Left lower leg: No edema  Lymphadenopathy:      Cervical: No cervical adenopathy  Skin:     General: Skin is warm and dry  Findings: No erythema or rash  Neurological:      Mental Status: He is alert and oriented to person, place, and time  Cranial Nerves: No cranial nerve deficit        Coordination: Coordination normal    Psychiatric:         Behavior: Behavior normal          Judgment: Judgment normal

## 2022-08-30 DIAGNOSIS — E11.9 TYPE 2 DIABETES MELLITUS WITHOUT COMPLICATION, WITHOUT LONG-TERM CURRENT USE OF INSULIN (HCC): ICD-10-CM

## 2022-08-30 DIAGNOSIS — J45.909 UNCOMPLICATED ASTHMA, UNSPECIFIED ASTHMA SEVERITY, UNSPECIFIED WHETHER PERSISTENT: ICD-10-CM

## 2022-08-30 RX ORDER — ALBUTEROL SULFATE 90 UG/1
2 AEROSOL, METERED RESPIRATORY (INHALATION) EVERY 4 HOURS PRN
Qty: 18 G | Refills: 1 | Status: SHIPPED | OUTPATIENT
Start: 2022-08-30 | End: 2022-10-27

## 2022-08-30 RX ORDER — INSULIN GLARGINE 100 [IU]/ML
50 INJECTION, SOLUTION SUBCUTANEOUS EVERY 12 HOURS SCHEDULED
Qty: 30 ML | Refills: 1 | Status: SHIPPED | OUTPATIENT
Start: 2022-08-30 | End: 2022-10-27

## 2022-09-16 DIAGNOSIS — M54.12 CERVICAL RADICULOPATHY: ICD-10-CM

## 2022-09-16 DIAGNOSIS — M05.9 SEROPOSITIVE RHEUMATOID ARTHRITIS (HCC): ICD-10-CM

## 2022-09-16 RX ORDER — NAPROXEN 500 MG/1
500 TABLET ORAL EVERY 12 HOURS PRN
Qty: 60 TABLET | Refills: 2 | Status: SHIPPED | OUTPATIENT
Start: 2022-09-16

## 2022-09-19 ENCOUNTER — APPOINTMENT (OUTPATIENT)
Dept: LAB | Facility: CLINIC | Age: 57
End: 2022-09-19
Payer: COMMERCIAL

## 2022-09-19 DIAGNOSIS — K82.8 BILIARY DYSKINESIA: ICD-10-CM

## 2022-09-19 LAB
ALBUMIN SERPL BCP-MCNC: 3.6 G/DL (ref 3.5–5)
ALP SERPL-CCNC: 51 U/L (ref 46–116)
ALT SERPL W P-5'-P-CCNC: 40 U/L (ref 12–78)
ANION GAP SERPL CALCULATED.3IONS-SCNC: 4 MMOL/L (ref 4–13)
AST SERPL W P-5'-P-CCNC: 20 U/L (ref 5–45)
BILIRUB SERPL-MCNC: 0.5 MG/DL (ref 0.2–1)
BUN SERPL-MCNC: 23 MG/DL (ref 5–25)
CALCIUM SERPL-MCNC: 8.7 MG/DL (ref 8.3–10.1)
CHLORIDE SERPL-SCNC: 107 MMOL/L (ref 96–108)
CO2 SERPL-SCNC: 27 MMOL/L (ref 21–32)
CREAT SERPL-MCNC: 1.08 MG/DL (ref 0.6–1.3)
ERYTHROCYTE [DISTWIDTH] IN BLOOD BY AUTOMATED COUNT: 13.7 % (ref 11.6–15.1)
EST. AVERAGE GLUCOSE BLD GHB EST-MCNC: 128 MG/DL
GFR SERPL CREATININE-BSD FRML MDRD: 77 ML/MIN/1.73SQ M
GLUCOSE P FAST SERPL-MCNC: 157 MG/DL (ref 65–99)
HBA1C MFR BLD: 6.1 %
HCT VFR BLD AUTO: 46.7 % (ref 36.5–49.3)
HGB BLD-MCNC: 15.7 G/DL (ref 12–17)
MCH RBC QN AUTO: 29.7 PG (ref 26.8–34.3)
MCHC RBC AUTO-ENTMCNC: 33.6 G/DL (ref 31.4–37.4)
MCV RBC AUTO: 88 FL (ref 82–98)
PLATELET # BLD AUTO: 174 THOUSANDS/UL (ref 149–390)
PMV BLD AUTO: 10.9 FL (ref 8.9–12.7)
POTASSIUM SERPL-SCNC: 3.7 MMOL/L (ref 3.5–5.3)
PROT SERPL-MCNC: 7 G/DL (ref 6.4–8.4)
RBC # BLD AUTO: 5.28 MILLION/UL (ref 3.88–5.62)
SODIUM SERPL-SCNC: 138 MMOL/L (ref 135–147)
WBC # BLD AUTO: 6.49 THOUSAND/UL (ref 4.31–10.16)

## 2022-09-19 PROCEDURE — 36415 COLL VENOUS BLD VENIPUNCTURE: CPT

## 2022-09-19 PROCEDURE — 3044F HG A1C LEVEL LT 7.0%: CPT | Performed by: FAMILY MEDICINE

## 2022-09-19 PROCEDURE — 83036 HEMOGLOBIN GLYCOSYLATED A1C: CPT

## 2022-09-19 PROCEDURE — 85027 COMPLETE CBC AUTOMATED: CPT

## 2022-09-19 PROCEDURE — 80053 COMPREHEN METABOLIC PANEL: CPT

## 2022-09-23 ENCOUNTER — APPOINTMENT (OUTPATIENT)
Dept: RADIOLOGY | Facility: CLINIC | Age: 57
End: 2022-09-23
Payer: COMMERCIAL

## 2022-09-23 ENCOUNTER — OFFICE VISIT (OUTPATIENT)
Dept: FAMILY MEDICINE CLINIC | Facility: CLINIC | Age: 57
End: 2022-09-23
Payer: COMMERCIAL

## 2022-09-23 VITALS
HEIGHT: 72 IN | RESPIRATION RATE: 16 BRPM | HEART RATE: 74 BPM | OXYGEN SATURATION: 98 % | BODY MASS INDEX: 32.1 KG/M2 | WEIGHT: 237 LBS | DIASTOLIC BLOOD PRESSURE: 78 MMHG | SYSTOLIC BLOOD PRESSURE: 132 MMHG

## 2022-09-23 DIAGNOSIS — K82.8 DYSKINESIA OF GALLBLADDER: Primary | ICD-10-CM

## 2022-09-23 DIAGNOSIS — E11.610 TYPE 2 DIABETES MELLITUS WITH DIABETIC NEUROPATHIC ARTHROPATHY, WITH LONG-TERM CURRENT USE OF INSULIN (HCC): ICD-10-CM

## 2022-09-23 DIAGNOSIS — M54.12 CERVICAL RADICULOPATHY: ICD-10-CM

## 2022-09-23 DIAGNOSIS — M05.79 RHEUMATOID ARTHRITIS INVOLVING MULTIPLE SITES WITH POSITIVE RHEUMATOID FACTOR (HCC): ICD-10-CM

## 2022-09-23 DIAGNOSIS — I10 BENIGN ESSENTIAL HYPERTENSION: ICD-10-CM

## 2022-09-23 DIAGNOSIS — Z01.818 PRE-OP EVALUATION: ICD-10-CM

## 2022-09-23 DIAGNOSIS — Z79.4 TYPE 2 DIABETES MELLITUS WITH DIABETIC NEUROPATHIC ARTHROPATHY, WITH LONG-TERM CURRENT USE OF INSULIN (HCC): ICD-10-CM

## 2022-09-23 PROCEDURE — 99214 OFFICE O/P EST MOD 30 MIN: CPT | Performed by: FAMILY MEDICINE

## 2022-09-23 PROCEDURE — 3075F SYST BP GE 130 - 139MM HG: CPT | Performed by: FAMILY MEDICINE

## 2022-09-23 PROCEDURE — 72052 X-RAY EXAM NECK SPINE 6/>VWS: CPT

## 2022-09-23 PROCEDURE — 99244 OFF/OP CNSLTJ NEW/EST MOD 40: CPT | Performed by: FAMILY MEDICINE

## 2022-09-23 PROCEDURE — 93000 ELECTROCARDIOGRAM COMPLETE: CPT | Performed by: FAMILY MEDICINE

## 2022-09-23 PROCEDURE — 3078F DIAST BP <80 MM HG: CPT | Performed by: FAMILY MEDICINE

## 2022-09-23 NOTE — PROGRESS NOTES
PRE-OPERATIVE EVALUATION  [facility]    NAME: Colonel Hawkins  AGE: 49 y o  SEX: male  : 1965     DATE: 2022     Pre-Operative Evaluation      Chief Complaint: Pre-operative Evaluation     Surgery:  Elective laparoscopic cholecystectomy  Anticipated Date of Surgery:  2022  Referring Provider:  Mirza Means MD     History of Present Illness:     Colonel Hawkins  is a 49 y o  male who presents to the office today for a preoperative consultation at the request of surgeon, Dr Patricia Carroll, who plans on performing laparoscopic cholecystectomy on 2022  Planned anesthesia is general  Patient has a bleeding risk of: no recent abnormal bleeding  Patient does not have objections to receiving blood products if needed  Current anti-platelet/anti-coagulation medications that the patient is prescribed includes: None  Assessment of Chronic Conditions:   - Diabetes Mellitus: Optimized, last hemoglobin A1c 6 1%   - Hypertension: Well controlled  - Rheumatoid arthritis controlled on immunosuppressive Enbrel     Assessment of Cardiac Risk:  Denies unstable or severe angina or MI in the last 6 weeks or history of stent placement in the last year   Denies decompensated heart failure (e g  New onset heart failure, NYHA functional class IV heart failure, or worsening existing heart failure)  Denies significant arrhythmias such as high grade AV block, symptomatic ventricular arrhythmia, newly recognized ventricular tachycardia, supraventricular tachycardia with resting heart rate >100, or symptomatic bradycardia  Denies severe heart valve disease including aortic stenosis or symptomatic mitral stenosis     Exercise Capacity:  Able to walk 4 blocks without symptoms?: Yes  Able to walk 2 flights without symptoms?: Yes    Prior Anesthesia Reactions: No     Personal history of venous thromboembolic disease? No    History of steroid use for >2 weeks within last year? No    STOP-BANG Sleep Apnea Screening Questionnaire:         Review of Systems:     Review of Systems   Constitutional: Positive for fatigue  HENT: Negative  Eyes: Negative for itching  Respiratory: Negative  Cardiovascular: Negative  Negative for leg swelling  Gastrointestinal: Positive for abdominal pain (Mild right upper quadrant), nausea ( postprandial) and vomiting  Endocrine: Negative  Genitourinary: Negative  Musculoskeletal: Negative  Allergic/Immunologic: Negative  Neurological: Positive for numbness (Bilateral feet)  Hematological: Negative  Psychiatric/Behavioral: Negative  Negative for dysphoric mood and suicidal ideas  The patient is not nervous/anxious  All other systems reviewed and are negative  Current Problem List:     Patient Active Problem List   Diagnosis    Benign essential hypertension    Carotid artery calcification    Reduced libido    Type 2 diabetes mellitus with diabetic neuropathic arthropathy, with long-term current use of insulin (HCC)    Mixed hyperlipidemia    Obesity (BMI 30 0-34  9)    Vitamin D deficiency    Rheumatoid arthritis involving multiple sites with positive rheumatoid factor (HCC)    Clear cell hidradenoma    Colon cancer screening    Prostate cancer screening    Charcot foot due to diabetes mellitus ([facility] [address] )    Pre-op evaluation    S/P foot surgery, right    Tobacco use    Physical exam, annual    Chronic pain of both shoulders    Biliary dyskinesia    Right upper quadrant abdominal pain    Gastroesophageal reflux disease without esophagitis    Swelling of joint, knee, left    Cellulitis of left knee    Pain and swelling of left knee due to prepatellar bursitis    Marital problem    Open knee wound, left, subsequent encounter    Allergic conjunctivitis of both eyes    Gastroparesis    Dyskinesia of gallbladder       Allergies:     No Known Allergies    Current Medications:       Current Outpatient Medications:     Accu-Chek FastClix Lancets MISC, TEST BLOOD SUGAR FOUR TIMES A DAY BEFORE MEALS AND BEDTIME, Disp: 102 each, Rfl: 3    albuterol (PROVENTIL HFA,VENTOLIN HFA) 90 mcg/act inhaler, INHALE 2 PUFFS EVERY 4 (FOUR) HOURS AS NEEDED FOR WHEEZING, Disp: 18 g, Rfl: 1    Ascorbic Acid (vitamin C) 1000 MG tablet, Take 1,000 mg by mouth daily, Disp: , Rfl:     atorvastatin (LIPITOR) 40 mg tablet, TAKE 1 TABLET AT BEDTIME , Disp: 90 tablet, Rfl: 1    Basaglar KwikPen 100 units/mL SOPN, INJECT 50 UNITS UNDER THE SKIN EVERY 12 (TWELVE) HOURS, Disp: 30 mL, Rfl: 1    BD Pen Needle Maureen U/F 32G X 4 MM MISC, ONE PEN NEEDLE THREE TIMES A DAY 2-BASAGLAR 1- VICTOZA, Disp: 100 each, Rfl: 3    Blood Glucose Monitoring Suppl (OneTouch Verio Flex System) w/Device KIT, Use 3 (three) times a day with meals, Disp: 1 kit, Rfl: 0    calcium carbonate (OS-NERY) 600 MG tablet, Take 600 mg by mouth daily Dinnertime, Disp: , Rfl:     ergocalciferol (VITAMIN D2) 50,000 units, Take 50,000 Units by mouth once a week Takes on Saturday, Disp: , Rfl:     etanercept (Enbrel SureClick) 50 MG/ML injection, Inject 1 mL (50 mg total) under the skin once a week, Disp: 4 mL, Rfl: 5    fluticasone (FLONASE) 50 mcg/act nasal spray, USE 2 SPRAYS IN EACH NOSTRIL ONCE DAILY, Disp: 16 g, Rfl: 11    glucose blood test strip, Check glucose before meals, Disp: 100 each, Rfl: 3    hydroxychloroquine (PLAQUENIL) 200 mg tablet, Take 1 tablet (200 mg total) by mouth 2 (two) times a day, Disp: 180 tablet, Rfl: 1    Januvia 100 MG tablet, TAKE 1 TABLET (100 MG TOTAL) BY MOUTH DAILY AFTER DINNER, Disp: 30 tablet, Rfl: 1    leflunomide (ARAVA) 20 MG tablet, Take 1 tablet (20 mg total) by mouth daily with dinner, Disp: 90 tablet, Rfl: 1    losartan-hydrochlorothiazide (HYZAAR) 100-12 5 MG per tablet, TAKE 1 TABLET BY MOUTH DAILY, Disp: 90 tablet, Rfl: 1    naproxen (NAPROSYN) 500 mg tablet, TAKE 1 TABLET (500 MG TOTAL) BY MOUTH EVERY 12 (TWELVE) HOURS AS NEEDED FOR MILD PAIN (WITH FOOD), Disp: 60 tablet, Rfl: 2    olopatadine (PATANOL) 0 1 % ophthalmic solution, Administer 1 drop to both eyes in the morning and 1 drop in the evening , Disp: 5 mL, Rfl: 1    pantoprazole (PROTONIX) 40 mg tablet, TAKE 1 TABLET (40 MG TOTAL) BY MOUTH DAILY, Disp: 29 tablet, Rfl: 11    saccharomyces boulardii (FLORASTOR) 250 mg capsule, Take 1 capsule (250 mg total) by mouth 2 (two) times a day, Disp: 60 capsule, Rfl: 0    sucralfate (CARAFATE) 1 g tablet, TAKE 1 TABLET (1 G TOTAL) BY MOUTH 4 (FOUR) TIMES A DAY, Disp: 120 tablet, Rfl: 4    tamsulosin (FLOMAX) 0 4 mg, Take 1 capsule (0 4 mg total) by mouth daily with dinner Begin 5 days preop, Disp: 10 capsule, Rfl: 0    ULTICARE MICRO PEN NEEDLES 32G X 4 MM MISC, INJECT UNDER THE SKIN 3 (THREE) TIMES A DAY, Disp: 100 each, Rfl: 3    Victoza injection, INJECT 0 3 ML (1 8 MG TOTAL) UNDER THE SKIN DAILY, Disp: 9 mL, Rfl: 0    nicotine (NICODERM CQ) 21 mg/24 hr TD 24 hr patch, Place 1 patch on the skin every 24 hours (Patient not taking: Reported on 9/23/2022), Disp: 28 patch, Rfl: 1    Past Medical History:       Past Medical History:   Diagnosis Date    Abscess of left thigh 5/11/2021    Arthritis     At risk for falls     Broken foot     right    Cataract     giacomo    Cellulitis of left lower extremity 4/26/2021    [facility] disease[facility]     Diabetes mellitus ([facility]     Hx MRSA infection     Per wife patient hadf MRSa in a wound awhile ago    Hyperlipidemia     Kidney stone     Neuropathy     RA (rheumatoid arthritis) ([facility])     Rheumatoid arthritis ([address] )     Seasonal allergies     Sepsis ([address] ) 4/26/2021    Snores     Uses wheelchair     and crutches- NWB RLE    Wears glasses         Past Surgical History:   Procedure Laterality Date    APPENDECTOMY      CLOSED REDUCTION FOOT DISLOCATION Right 2/12/2019    Procedure: C/R FRACTURE;  Surgeon: Sandra Bradley DPM;  [facility];  Service: Podiatry    COLONOSCOPY      FOOT SURGERY Right 07/2019    Removal of the bone graft and cleaned out infection, and placed new bone graft    IR PICC PLACEMENT SINGLE LUMEN  8/5/2019    WA DEBRIDEMENT, SKIN, SUB-Q TISSUE,=<20 SQ CM Left 5/24/2021    Procedure: INCISION AND DRAINAGE THIGH;  Surgeon: Eddie Ivy MD;  [facility];  Service: Orthopedics    WA [address] [place] [address] Dr <0 5 CM REMAINDER BODY N/A 3/28/2018    Procedure: EXCISION WIDE LESION HEAD/FACIAL/NECK;  Surgeon: Travis Al MD;  [facility];  Service: Surgical Oncology    WA GASTROCNEMIUS RECESSION Right 2/12/2019    Procedure: ENDO GASTROC RECESSION, APPLICATION OF EXTERNAL FIXATOR;  Surgeon: Ulisses Brown DPM;  [facility];  Service: Podiatry    WA REMOVE EXTERN BONE FIX DEV W ANESTH Right 4/18/2019    Procedure: FRAME REMOVAL HARDWARE FOOT WITH APPLICATION OF GRAFT;  Surgeon: Ulisses Brown DPM;  [facility];  Service: Podiatry    SKIN BIOPSY      scalp    WISDOM TOOTH EXTRACTION  1998    WOUND DEBRIDEMENT Left 4/29/2021    Procedure: KNEE INCISION AND DRAINAGE, EXCISIONAL DEBRIDEMENT OF PREPATELLAR BURSA;   Surgeon: Eddie Ivy MD;  [facility];  Service: Orthopedics        Family History   Problem Relation Age of Onset    Diabetes Mother     Stroke Mother     Mental illness Mother     Diabetes Father     Stroke Father     Alcohol abuse Brother     Coronary artery disease Family         Age 51-55    Diabetes type II Family     Heart disease Family         Social History     Socioeconomic History    Marital status: /Civil Union     Spouse name: Not on file    Number of children: Not on file    Years of education: Not on file    Highest education level: Not on file   Occupational History    Occupation: [employer]    Tobacco Use    Smoking status: Current Every Day Smoker     Packs/day: 1 00     Years: 40 00     Pack years: 40 00     Types: Cigarettes    Smokeless tobacco: Never Used   Vaping Use    Vaping Use: Never used   Substance and Sexual Activity    Alcohol use: Yes     Comment: Socially    Drug use: Yes     Frequency: 7 0 times per week     Types: Marijuana     Comment: Daily for Pain    Sexual activity: Not on file   Other Topics Concern    Not on file   Social History Narrative    Caffeine Use- Coffee and Tea      Social Determinants of Health     Financial Resource Strain: Not on file   Food Insecurity: Not on file   Transportation Needs: Not on file   Physical Activity: Not on file   Stress: Not on file   Social Connections: Not on file   Intimate Partner Violence: Not on file   Housing Stability: Not on file        Physical Exam:     Physical Exam  Vitals and nursing note reviewed  Constitutional:       General: He is not in acute distress  Appearance: Normal appearance  He is well-developed  He is obese  He is not ill-appearing  HENT:      Head: Normocephalic and atraumatic  Right Ear: Tympanic membrane, ear canal and external ear normal       Left Ear: Tympanic membrane, ear canal and external ear normal       Nose: Nose normal       Mouth/Throat:      Mouth: Mucous membranes are moist    Eyes:      Extraocular Movements: Extraocular movements intact  Pupils: Pupils are equal, round, and reactive to light  Comments: Allergic conjunctivitis bilaterally   Cardiovascular:      Rate and Rhythm: Normal rate and regular rhythm  Pulses: Normal pulses  Heart sounds: Normal heart sounds  No murmur heard  Pulmonary:      Effort: Pulmonary effort is normal       Breath sounds: Normal breath sounds  Abdominal:      General: Abdomen is flat  Bowel sounds are normal  There is no distension  Palpations: Abdomen is soft  There is no mass  Musculoskeletal:         General: Deformity (Rheumatic nodules) present  Normal range of motion  Cervical back: Normal range of motion and neck supple        Right lower leg: No edema  Left lower leg: No edema  Skin:     General: Skin is warm and dry  Neurological:      General: No focal deficit present  Mental Status: He is alert and oriented to person, place, and time  Cranial Nerves: No cranial nerve deficit  Psychiatric:         Mood and Affect: Mood normal          Behavior: Behavior normal          Thought Content: Thought content normal          Judgment: Judgment normal           Data:     Pre-operative work-up    Laboratory Results: I have personally reviewed the pertinent laboratory results/reports     EKG: I have personally reviewed pertinent films in PACS        Assessment & Recommendations:     1  Dyskinesia of gallbladder     2  Type 2 diabetes mellitus with diabetic neuropathic arthropathy, with long-term current use of insulin ([facility] [address] )     3  Benign essential hypertension     4  Pre-op evaluation  POCT ECG   5  Rheumatoid arthritis involving multiple sites with positive rheumatoid factor (HCC)         Pre-Op Evaluation Assessment  49 y o  male with planned surgery:  Laparoscopic cholecystectomy  Known risk factors for perioperative complications: Diabetes mellitus  Rheumatoid arthritis  Cardiac Risk Estimation: per the Revised Cardiac Risk Index (Circ  [id number], 1999), the patient's risk factors for cardiac complications include on insulin, putting him in: RCI RISK CLASS II (1 risk factor, risk of major cardiac compl  appr  1 3%)  Current medications which may produce withdrawal symptoms if withheld perioperatively:  None  Pre-Op Evaluation Plan  1  Further preoperative workup as follows:   - None; no further preoperative work-up is required    2  Medication Management/Recommendations:   - Patient should continue antihypertensive medications up through and including the day of surgery  - Patient should continue his statin medication up through and including the day of surgery    - Insulin Management: Hold Basaglar night before surgery  - Patient has been instructed to avoid aspirin containing medications or non-steroidal anti-inflammatory drugs for the week preceding surgery   - Hold the following sedative medications 24 hours preoperatively: None     Patient instructed to hold Enbrel injection  He should would be normally receiving Enbrel injection on September 29th  Needs to be held  Stop naproxen as of today, hold Januvia and Victoza morning of surgery    3  Prophylaxis for cardiac events with perioperative beta-blockers: not indicated  4  Patient requires further consultation with: None    Clearance  Patient is CLEARED for surgery without any additional cardiac testing       Percy Roman DO  [facility]  [address] [address]  Phone#  [phone]  Fax#  [phone]

## 2022-09-23 NOTE — H&P (VIEW-ONLY)
PRE-OPERATIVE EVALUATION  [facility]    NAME: Valerio Ch  AGE: 49 y o  SEX: male  : 1965     DATE: 2022     Pre-Operative Evaluation      Chief Complaint: Pre-operative Evaluation     Surgery:  Elective laparoscopic cholecystectomy  Anticipated Date of Surgery:  2022  Referring Provider:  Alison Theodore MD     History of Present Illness:     Valerio Ch  is a 49 y o  male who presents to the office today for a preoperative consultation at the request of surgeon, Dr Ritchie Baez, who plans on performing laparoscopic cholecystectomy on 2022  Planned anesthesia is general  Patient has a bleeding risk of: no recent abnormal bleeding  Patient does not have objections to receiving blood products if needed  Current anti-platelet/anti-coagulation medications that the patient is prescribed includes: None  Assessment of Chronic Conditions:   - Diabetes Mellitus: Optimized, last hemoglobin A1c 6 1%   - Hypertension: Well controlled  - Rheumatoid arthritis controlled on immunosuppressive Enbrel     Assessment of Cardiac Risk:  Denies unstable or severe angina or MI in the last 6 weeks or history of stent placement in the last year   Denies decompensated heart failure (e g  New onset heart failure, NYHA functional class IV heart failure, or worsening existing heart failure)  Denies significant arrhythmias such as high grade AV block, symptomatic ventricular arrhythmia, newly recognized ventricular tachycardia, supraventricular tachycardia with resting heart rate >100, or symptomatic bradycardia  Denies severe heart valve disease including aortic stenosis or symptomatic mitral stenosis     Exercise Capacity:  Able to walk 4 blocks without symptoms?: Yes  Able to walk 2 flights without symptoms?: Yes    Prior Anesthesia Reactions: No     Personal history of venous thromboembolic disease? No    History of steroid use for >2 weeks within last year? No    STOP-BANG Sleep Apnea Screening Questionnaire:         Review of Systems:     Review of Systems   Constitutional: Positive for fatigue  HENT: Negative  Eyes: Negative for itching  Respiratory: Negative  Cardiovascular: Negative  Negative for leg swelling  Gastrointestinal: Positive for abdominal pain (Mild right upper quadrant), nausea ( postprandial) and vomiting  Endocrine: Negative  Genitourinary: Negative  Musculoskeletal: Negative  Allergic/Immunologic: Negative  Neurological: Positive for numbness (Bilateral feet)  Hematological: Negative  Psychiatric/Behavioral: Negative  Negative for dysphoric mood and suicidal ideas  The patient is not nervous/anxious  All other systems reviewed and are negative  Current Problem List:     Patient Active Problem List   Diagnosis   • Benign essential hypertension   • Carotid artery calcification   • Reduced libido   • Type 2 diabetes mellitus with diabetic neuropathic arthropathy, with long-term current use of insulin (HCC)   • Mixed hyperlipidemia   • Obesity (BMI 30 0-34  9)   • Vitamin D deficiency   • Rheumatoid arthritis involving multiple sites with positive rheumatoid factor (HCC)   • Clear cell hidradenoma   • Colon cancer screening   • Prostate cancer screening   • Charcot foot due to diabetes mellitus ([facility] [address] )   • Pre-op evaluation   • S/P foot surgery, right   • Tobacco use   • Physical exam, annual   • Chronic pain of both shoulders   • Biliary dyskinesia   • Right upper quadrant abdominal pain   • Gastroesophageal reflux disease without esophagitis   • Swelling of joint, knee, left   • Cellulitis of left knee   • Pain and swelling of left knee due to prepatellar bursitis   • Marital problem   • Open knee wound, left, subsequent encounter   • Allergic conjunctivitis of both eyes   • Gastroparesis   • Dyskinesia of gallbladder       Allergies:     No Known Allergies    Current Medications:       Current Outpatient Medications:   •  Accu-Chek FastClix Lancets MISC, TEST BLOOD SUGAR FOUR TIMES A DAY BEFORE MEALS AND BEDTIME, Disp: 102 each, Rfl: 3  •  albuterol (PROVENTIL HFA,VENTOLIN HFA) 90 mcg/act inhaler, INHALE 2 PUFFS EVERY 4 (FOUR) HOURS AS NEEDED FOR WHEEZING, Disp: 18 g, Rfl: 1  •  Ascorbic Acid (vitamin C) 1000 MG tablet, Take 1,000 mg by mouth daily, Disp: , Rfl:   •  atorvastatin (LIPITOR) 40 mg tablet, TAKE 1 TABLET AT BEDTIME , Disp: 90 tablet, Rfl: 1  •  Basaglar KwikPen 100 units/mL SOPN, INJECT 50 UNITS UNDER THE SKIN EVERY 12 (TWELVE) HOURS, Disp: 30 mL, Rfl: 1  •  BD Pen Needle Maureen U/F 32G X 4 MM MISC, ONE PEN NEEDLE THREE TIMES A DAY 2-BASAGLAR 1- VICTOZA, Disp: 100 each, Rfl: 3  •  Blood Glucose Monitoring Suppl (OneTouch Verio Flex System) w/Device KIT, Use 3 (three) times a day with meals, Disp: 1 kit, Rfl: 0  •  calcium carbonate (OS-NERY) 600 MG tablet, Take 600 mg by mouth daily Dinnertime, Disp: , Rfl:   •  ergocalciferol (VITAMIN D2) 50,000 units, Take 50,000 Units by mouth once a week Takes on Saturday, Disp: , Rfl:   •  etanercept (Enbrel SureClick) 50 MG/ML injection, Inject 1 mL (50 mg total) under the skin once a week, Disp: 4 mL, Rfl: 5  •  fluticasone (FLONASE) 50 mcg/act nasal spray, USE 2 SPRAYS IN EACH NOSTRIL ONCE DAILY, Disp: 16 g, Rfl: 11  •  glucose blood test strip, Check glucose before meals, Disp: 100 each, Rfl: 3  •  hydroxychloroquine (PLAQUENIL) 200 mg tablet, Take 1 tablet (200 mg total) by mouth 2 (two) times a day, Disp: 180 tablet, Rfl: 1  •  Januvia 100 MG tablet, TAKE 1 TABLET (100 MG TOTAL) BY MOUTH DAILY AFTER DINNER, Disp: 30 tablet, Rfl: 1  •  leflunomide (ARAVA) 20 MG tablet, Take 1 tablet (20 mg total) by mouth daily with dinner, Disp: 90 tablet, Rfl: 1  •  losartan-hydrochlorothiazide (HYZAAR) 100-12 5 MG per tablet, TAKE 1 TABLET BY MOUTH DAILY, Disp: 90 tablet, Rfl: 1  •  naproxen (NAPROSYN) 500 mg tablet, TAKE 1 TABLET (500 MG TOTAL) BY MOUTH EVERY 12 (TWELVE) HOURS AS NEEDED FOR MILD PAIN (WITH FOOD), Disp: 60 tablet, Rfl: 2  •  olopatadine (PATANOL) 0 1 % ophthalmic solution, Administer 1 drop to both eyes in the morning and 1 drop in the evening , Disp: 5 mL, Rfl: 1  •  pantoprazole (PROTONIX) 40 mg tablet, TAKE 1 TABLET (40 MG TOTAL) BY MOUTH DAILY, Disp: 29 tablet, Rfl: 11  •  saccharomyces boulardii (FLORASTOR) 250 mg capsule, Take 1 capsule (250 mg total) by mouth 2 (two) times a day, Disp: 60 capsule, Rfl: 0  •  sucralfate (CARAFATE) 1 g tablet, TAKE 1 TABLET (1 G TOTAL) BY MOUTH 4 (FOUR) TIMES A DAY, Disp: 120 tablet, Rfl: 4  •  tamsulosin (FLOMAX) 0 4 mg, Take 1 capsule (0 4 mg total) by mouth daily with dinner Begin 5 days preop, Disp: 10 capsule, Rfl: 0  •  ULTICARE MICRO PEN NEEDLES 32G X 4 MM MISC, INJECT UNDER THE SKIN 3 (THREE) TIMES A DAY, Disp: 100 each, Rfl: 3  •  Victoza injection, INJECT 0 3 ML (1 8 MG TOTAL) UNDER THE SKIN DAILY, Disp: 9 mL, Rfl: 0  •  nicotine (NICODERM CQ) 21 mg/24 hr TD 24 hr patch, Place 1 patch on the skin every 24 hours (Patient not taking: Reported on 9/23/2022), Disp: 28 patch, Rfl: 1    Past Medical History:       Past Medical History:   Diagnosis Date   • Abscess of left thigh 5/11/2021   • Arthritis    • At risk for falls    • Broken foot     right   • Cataract     giacomo   • Cellulitis of left lower extremity 4/26/2021   • COPD (chronic obstructive pulmonary disease) ([facility])    • Diabetes mellitus ([facility])    • Hx MRSA infection     Per wife patient hadf MRSa in a wound awhile ago   • Hyperlipidemia    • Kidney stone    • Neuropathy    • RA (rheumatoid arthritis) ([facility])    • Rheumatoid arthritis ([facility])    • Seasonal allergies    • Sepsis ([facility] [address] ) 4/26/2021   • Snores    • Uses wheelchair     and crutches- NWB RLE   • Wears glasses         Past Surgical History:   Procedure Laterality Date   • APPENDECTOMY     • CLOSED REDUCTION FOOT DISLOCATION Right 2/12/2019    Procedure: C/R FRACTURE;  Surgeon: Eugene Cartwright DPM;  [facility];  Service: Podiatry   • COLONOSCOPY     • FOOT SURGERY Right 07/2019    Removal of the bone graft and cleaned out infection, and placed new bone graft   • IR PICC PLACEMENT SINGLE LUMEN  8/5/2019   • NE DEBRIDEMENT, SKIN, SUB-Q TISSUE,=<20 SQ CM Left 5/24/2021    Procedure: INCISION AND DRAINAGE THIGH;  Surgeon: Alex De Paz MD;  [facility];  Service: Orthopedics   • NE [address] [place] [address] Dr <0 5 CM REMAINDER BODY N/A 3/28/2018    Procedure: EXCISION WIDE LESION HEAD/FACIAL/NECK;  Surgeon: Johnson Carvajal MD;  [facility];  Service: Surgical Oncology   • NE GASTROCNEMIUS RECESSION Right 2/12/2019    Procedure: ENDO GASTROC RECESSION, APPLICATION OF EXTERNAL FIXATOR;  Surgeon: Milvia Glass DPM;  [facility];  Service: Podiatry   • NE REMOVE EXTERN BONE FIX DEV W ANESTH Right 4/18/2019    Procedure: FRAME REMOVAL HARDWARE FOOT WITH APPLICATION OF GRAFT;  Surgeon: Milvia Glass DPM;  [facility];  Service: Podiatry   • SKIN BIOPSY      scalp   • WISDOM TOOTH EXTRACTION  1998   • WOUND DEBRIDEMENT Left 4/29/2021    Procedure: KNEE INCISION AND DRAINAGE, EXCISIONAL DEBRIDEMENT OF PREPATELLAR BURSA;   Surgeon: Alex De Paz MD;  [facility];  Service: Orthopedics        Family History   Problem Relation Age of Onset   • Diabetes Mother    • Stroke Mother    • Mental illness Mother    • Diabetes Father    • Stroke Father    • Alcohol abuse Brother    • Coronary artery disease Family         Age 51-55   • Diabetes type II Family    • Heart disease Family         Social History     Socioeconomic History   • Marital status: /Civil Union     Spouse name: Not on file   • Number of children: Not on file   • Years of education: Not on file   • Highest education level: Not on file   Occupational History   • Occupation:     Tobacco Use   • Smoking status: Current Every Day Smoker     Packs/day: 1 00     Years: 40 00     Pack years: 40 00     Types: Cigarettes   • Smokeless tobacco: Never Used   Vaping Use   • Vaping Use: Never used   Substance and Sexual Activity   • Alcohol use: Yes     Comment: Socially   • Drug use: Yes     Frequency: 7 0 times per week     Types: Marijuana     Comment: Daily for Pain   • Sexual activity: Not on file   Other Topics Concern   • Not on file   Social History Narrative    Caffeine Use- Coffee and Tea      Social Determinants of Health     Financial Resource Strain: Not on file   Food Insecurity: Not on file   Transportation Needs: Not on file   Physical Activity: Not on file   Stress: Not on file   Social Connections: Not on file   Intimate Partner Violence: Not on file   Housing Stability: Not on file        Physical Exam:     Physical Exam  Vitals and nursing note reviewed  Constitutional:       General: He is not in acute distress  Appearance: Normal appearance  He is well-developed  He is obese  He is not ill-appearing  HENT:      Head: Normocephalic and atraumatic  Right Ear: Tympanic membrane, ear canal and external ear normal       Left Ear: Tympanic membrane, ear canal and external ear normal       Nose: Nose normal       Mouth/Throat:      Mouth: Mucous membranes are moist    Eyes:      Extraocular Movements: Extraocular movements intact  Pupils: Pupils are equal, round, and reactive to light  Comments: Allergic conjunctivitis bilaterally   Cardiovascular:      Rate and Rhythm: Normal rate and regular rhythm  Pulses: Normal pulses  Heart sounds: Normal heart sounds  No murmur heard  Pulmonary:      Effort: Pulmonary effort is normal       Breath sounds: Normal breath sounds  Abdominal:      General: Abdomen is flat  Bowel sounds are normal  There is no distension  Palpations: Abdomen is soft  There is no mass  Musculoskeletal:         General: Deformity (Rheumatic nodules) present  Normal range of motion  Cervical back: Normal range of motion and neck supple        Right lower leg: No edema  Left lower leg: No edema  Skin:     General: Skin is warm and dry  Neurological:      General: No focal deficit present  Mental Status: He is alert and oriented to person, place, and time  Cranial Nerves: No cranial nerve deficit  Psychiatric:         Mood and Affect: Mood normal          Behavior: Behavior normal          Thought Content: Thought content normal          Judgment: Judgment normal           Data:     Pre-operative work-up    Laboratory Results: I have personally reviewed the pertinent laboratory results/reports     EKG: I have personally reviewed pertinent films in PACS        Assessment & Recommendations:     1  Dyskinesia of gallbladder     2  Type 2 diabetes mellitus with diabetic neuropathic arthropathy, with long-term current use of insulin ([facility] [address] )     3  Benign essential hypertension     4  Pre-op evaluation  POCT ECG   5  Rheumatoid arthritis involving multiple sites with positive rheumatoid factor (HCC)         Pre-Op Evaluation Assessment  49 y o  male with planned surgery:  Laparoscopic cholecystectomy  Known risk factors for perioperative complications: Diabetes mellitus  Rheumatoid arthritis  Cardiac Risk Estimation: per the Revised Cardiac Risk Index (Circ  100:1043, 1999), the patient's risk factors for cardiac complications include on insulin, putting him in: RCI RISK CLASS II (1 risk factor, risk of major cardiac compl  appr  1 3%)  Current medications which may produce withdrawal symptoms if withheld perioperatively:  None  Pre-Op Evaluation Plan  1  Further preoperative workup as follows:   - None; no further preoperative work-up is required    2  Medication Management/Recommendations:   - Patient should continue antihypertensive medications up through and including the day of surgery  - Patient should continue his statin medication up through and including the day of surgery    - Insulin Management: Hold Basaglar night before surgery  - Patient has been instructed to avoid aspirin containing medications or non-steroidal anti-inflammatory drugs for the week preceding surgery   - Hold the following sedative medications 24 hours preoperatively: None     Patient instructed to hold Enbrel injection  He should would be normally receiving Enbrel injection on September 29th  Needs to be held  Stop naproxen as of today, hold Januvia and Victoza morning of surgery    3  Prophylaxis for cardiac events with perioperative beta-blockers: not indicated  4  Patient requires further consultation with: None    Clearance  Patient is CLEARED for surgery without any additional cardiac testing       DO Victoriano Ashraf [address]  [facility] [address] [address]  Phone#  [phone]  Fax#  [phone]

## 2022-09-23 NOTE — PROGRESS NOTES
Subjective:      Patient ID: Isela Higginbotham  is a 49 y o  male      HPI    Past Medical History:   Diagnosis Date    Abscess of left thigh 5/11/2021    Arthritis     At risk for falls     Broken foot     right    Cataract     giacomo    Cellulitis of left lower extremity 4/26/2021    COPD (chronic obstructive pulmonary disease) (HCC)     Diabetes mellitus (HCC)     Hx MRSA infection     Per wife patient hadf MRSa in a wound awhile ago    Hyperlipidemia     Kidney stone     Neuropathy     [facility] [address] [facility]     Rheumatoid arthritis ([facility] [address] [facility]     Seasonal allergies     Sepsis ([facility] [address] ) 4/26/2021    Snores     Uses wheelchair     and crutches- NWB RLE    Wears glasses        Family History   Problem Relation Age of Onset    Diabetes Mother     Stroke Mother     Mental illness Mother     Diabetes Father     Stroke Father     Alcohol abuse Brother     Coronary artery disease Family         Age 51-55    Diabetes type II Family     Heart disease Family        Past Surgical History:   Procedure Laterality Date    APPENDECTOMY      CLOSED REDUCTION FOOT DISLOCATION Right 2/12/2019    Procedure: C/R FRACTURE;  Surgeon: Main Hu DPM;  [facility];  Service: Podiatry    COLONOSCOPY      FOOT SURGERY Right 07/2019    Removal of the bone graft and cleaned out infection, and placed new bone graft    IR PICC PLACEMENT SINGLE LUMEN  8/5/2019    IN DEBRIDEMENT, SKIN, SUB-Q TISSUE,=<20 SQ CM Left 5/24/2021    Procedure: INCISION AND DRAINAGE THIGH;  Surgeon: Tatiana Castillo MD;  [facility];  Service: Orthopedics    [address] Dr <0 5 CM REMAINDER BODY N/A 3/28/2018    Procedure: EXCISION WIDE LESION HEAD/FACIAL/NECK;  Surgeon: Jeronimo Grijalva MD;  [facility];  Service: Surgical Oncology    IN GASTROCNEMIUS RECESSION Right 2/12/2019    Procedure: ENDO GASTROC RECESSION, APPLICATION OF EXTERNAL FIXATOR;  Surgeon: Main Hu DPM;  [facility]; Service: Podiatry    LA REMOVE EXTERN BONE FIX DEV W ANESTH Right 4/18/2019    Procedure: Miguel A Ore REMOVAL HARDWARE FOOT WITH APPLICATION OF GRAFT;  Surgeon: Allyson Beltre DPM;  [facility];  Service: Podiatry    SKIN BIOPSY      scalp    WISDOM TOOTH EXTRACTION  1998    WOUND DEBRIDEMENT Left 4/29/2021    Procedure: KNEE INCISION AND DRAINAGE, EXCISIONAL DEBRIDEMENT OF PREPATELLAR BURSA; Surgeon: Aleksander Flores MD;  [facility];  Service: Orthopedics        reports that he has been smoking cigarettes  He has a 40 00 pack-year smoking history  He has never used smokeless tobacco  He reports current alcohol use  He reports current drug use  Frequency: 7 00 times per week  Drug: Marijuana        Current Outpatient Medications:     Accu-Chek FastClix Lancets MISC, TEST BLOOD SUGAR FOUR TIMES A DAY BEFORE MEALS AND BEDTIME, Disp: 102 each, Rfl: 3    albuterol (PROVENTIL HFA,VENTOLIN HFA) 90 mcg/act inhaler, INHALE 2 PUFFS EVERY 4 (FOUR) HOURS AS NEEDED FOR WHEEZING, Disp: 18 g, Rfl: 1    Ascorbic Acid (vitamin C) 1000 MG tablet, Take 1,000 mg by mouth daily, Disp: , Rfl:     atorvastatin (LIPITOR) 40 mg tablet, TAKE 1 TABLET AT BEDTIME , Disp: 90 tablet, Rfl: 1    Basaglar KwikPen 100 units/mL SOPN, INJECT 50 UNITS UNDER THE SKIN EVERY 12 (TWELVE) HOURS, Disp: 30 mL, Rfl: 1    BD Pen Needle Maureen U/F 32G X 4 MM MISC, ONE PEN NEEDLE THREE TIMES A DAY 2-BASAGLAR 1- VICTOZA, Disp: 100 each, Rfl: 3    Blood Glucose Monitoring Suppl (OneTouch Verio Flex System) w/Device KIT, Use 3 (three) times a day with meals, Disp: 1 kit, Rfl: 0    calcium carbonate (OS-NERY) 600 MG tablet, Take 600 mg by mouth daily Dinnertime, Disp: , Rfl:     ergocalciferol (VITAMIN D2) 50,000 units, Take 50,000 Units by mouth once a week Takes on Saturday, Disp: , Rfl:     etanercept (Enbrel SureClick) 50 MG/ML injection, Inject 1 mL (50 mg total) under the skin once a week, Disp: 4 mL, Rfl: 5    fluticasone (FLONASE) 50 mcg/act nasal spray, USE 2 SPRAYS IN EACH NOSTRIL ONCE DAILY, Disp: 16 g, Rfl: 11    glucose blood test strip, Check glucose before meals, Disp: 100 each, Rfl: 3    hydroxychloroquine (PLAQUENIL) 200 mg tablet, Take 1 tablet (200 mg total) by mouth 2 (two) times a day, Disp: 180 tablet, Rfl: 1    Januvia 100 MG tablet, TAKE 1 TABLET (100 MG TOTAL) BY MOUTH DAILY AFTER DINNER, Disp: 30 tablet, Rfl: 1    leflunomide (ARAVA) 20 MG tablet, Take 1 tablet (20 mg total) by mouth daily with dinner, Disp: 90 tablet, Rfl: 1    losartan-hydrochlorothiazide (HYZAAR) 100-12 5 MG per tablet, TAKE 1 TABLET BY MOUTH DAILY, Disp: 90 tablet, Rfl: 1    naproxen (NAPROSYN) 500 mg tablet, TAKE 1 TABLET (500 MG TOTAL) BY MOUTH EVERY 12 (TWELVE) HOURS AS NEEDED FOR MILD PAIN (WITH FOOD), Disp: 60 tablet, Rfl: 2    olopatadine (PATANOL) 0 1 % ophthalmic solution, Administer 1 drop to both eyes in the morning and 1 drop in the evening , Disp: 5 mL, Rfl: 1    pantoprazole (PROTONIX) 40 mg tablet, TAKE 1 TABLET (40 MG TOTAL) BY MOUTH DAILY, Disp: 29 tablet, Rfl: 11    saccharomyces boulardii (FLORASTOR) 250 mg capsule, Take 1 capsule (250 mg total) by mouth 2 (two) times a day, Disp: 60 capsule, Rfl: 0    sucralfate (CARAFATE) 1 g tablet, TAKE 1 TABLET (1 G TOTAL) BY MOUTH 4 (FOUR) TIMES A DAY, Disp: 120 tablet, Rfl: 4    tamsulosin (FLOMAX) 0 4 mg, Take 1 capsule (0 4 mg total) by mouth daily with dinner Begin 5 days preop, Disp: 10 capsule, Rfl: 0    ULTICARE MICRO PEN NEEDLES 32G X 4 MM MISC, INJECT UNDER THE SKIN 3 (THREE) TIMES A DAY, Disp: 100 each, Rfl: 3    Victoza injection, INJECT 0 3 ML (1 8 MG TOTAL) UNDER THE SKIN DAILY, Disp: 9 mL, Rfl: 0    nicotine (NICODERM CQ) 21 mg/24 hr TD 24 hr patch, Place 1 patch on the skin every 24 hours (Patient not taking: Reported on 9/23/2022), Disp: 28 patch, Rfl: 1    The following portions of the patient's history were reviewed and updated as appropriate: allergies, current medications, past family history, past medical history, past social history, past surgical history and problem list     Review of Systems        Objective:    /78   Pulse 74   Resp 16   Ht 6' (1 829 m)   Wt 108 kg (237 lb)   SpO2 98%   BMI 32 14 kg/m²      Physical Exam      Recent Results (from the past 1008 hour(s))   CBC and Platelet    Collection Time: 09/19/22  8:51 AM   Result Value Ref Range    WBC 6 49 4 31 - 10 16 Thousand/uL    RBC 5 28 3 88 - 5 62 Million/uL    Hemoglobin 15 7 12 0 - 17 0 g/dL    Hematocrit 46 7 36 5 - 49 3 %    MCV 88 82 - 98 fL    MCH 29 7 26 8 - 34 3 pg    MCHC 33 6 31 4 - 37 4 g/dL    RDW 13 7 11 6 - 15 1 %    Platelets 941 658 - 969 Thousands/uL    MPV 10 9 8 9 - 12 7 fL   Comprehensive metabolic panel    Collection Time: 09/19/22  8:51 AM   Result Value Ref Range    Sodium 138 135 - 147 mmol/L    Potassium 3 7 3 5 - 5 3 mmol/L    Chloride 107 96 - 108 mmol/L    CO2 27 21 - 32 mmol/L    ANION GAP 4 4 - 13 mmol/L    BUN 23 5 - 25 mg/dL    Creatinine 1 08 0 60 - 1 30 mg/dL    Glucose, Fasting 157 (H) 65 - 99 mg/dL    Calcium 8 7 8 3 - 10 1 mg/dL    AST 20 5 - 45 U/L    ALT 40 12 - 78 U/L    Alkaline Phosphatase 51 46 - 116 U/L    Total Protein 7 0 6 4 - 8 4 g/dL    Albumin 3 6 3 5 - 5 0 g/dL    Total Bilirubin 0 50 0 20 - 1 00 mg/dL    eGFR 77 ml/min/1 73sq m   HEMOGLOBIN A1C W/ EAG ESTIMATION    Collection Time: 09/19/22  8:51 AM   Result Value Ref Range    Hemoglobin A1C 6 1 (H) Normal 3 8-5 6%; PreDiabetic 5 7-6 4%; Diabetic >=6 5%; Glycemic control for adults with diabetes <7 0% %     mg/dl       Assessment/Plan:    No problem-specific Assessment & Plan notes found for this encounter          {[email or web address]}

## 2022-09-23 NOTE — PATIENT INSTRUCTIONS

## 2022-09-27 NOTE — DISCHARGE INSTR - AVS FIRST PAGE
Please call the office when you leave to schedule an appointment for 2 weeks  Please call [phone]                      [facility]uloFormer[facility] named [facility] [address], [place], [address]  Off of [address] between [facility] and [facility]  Activity:    May lift 10 lb as many times as desired the 1st week,       20 lb in 2 weeks,       30 lb in 3 weeks  Walking is encouraged  Normal daily activities including climbing steps are okay  Do not engage in strenuous activity ( sit-ups or crutches) or contact sports for 4-6 weeks post-operatively    Return to Work:   Okay to return to work when you feel well if you desire  Diet:   You may return to your normal healthy diet  Wound Care: Your wound is closed with dissolvable stitches and glue  It is okay to shower  Wash incision gently with soap and water and pat dry  Do not soak incisions in bath water or swim for two weeks  Do not apply any creams or ointments  Pain Medication:   Please take as directed if needed  May use Advil or Motrin in addition  Recall, the pain medicine and anesthesia is associated with constipation  No driving while taking narcotic pain medications  Other: It is normal to developed a “healing ridge” / firm incision after surgery  This is your body making scar tissue  It is a good sign  Constipation is very common after general anesthesia  Please use milk of magnesia as needed in order to help prevent constipation  It is normal to get bruising after surgery  If you have questions after discharge please call the office      If you have increased pain, fever >101 5, increased drainage, redness or a bad smell at your surgery site, please call us immediately or come directly to the Emergency Room

## 2022-09-27 NOTE — PRE-PROCEDURE INSTRUCTIONS
Pre-Surgery Instructions:   Medication Instructions   • albuterol (PROVENTIL HFA,VENTOLIN HFA) 90 mcg/act inhaler Uses PRN- OK to take day of surgery   • Ascorbic Acid (vitamin C) 1000 MG tablet Stop taking 7 days prior to surgery  • atorvastatin (LIPITOR) 40 mg tablet Take night before surgery   • Basaglar KwikPen 100 units/mL SOPN Instructions provided by MD-Instructed to Hold DOS   • calcium carbonate (OS-NERY) 600 MG tablet Stop taking 7 days prior to surgery  • ergocalciferol (VITAMIN D2) 50,000 units Stop taking 7 days prior to surgery  • etanercept (Enbrel SureClick) 50 MG/ML injection Instructions provided by MD to hold 9/28   • fluticasone (FLONASE) 50 mcg/act nasal spray Take night before surgery   • hydroxychloroquine (PLAQUENIL) 200 mg tablet Take day of surgery  • Januvia 100 MG tablet Hold day of surgery  • leflunomide (ARAVA) 20 MG tablet Take night before surgery   • losartan-hydrochlorothiazide (HYZAAR) 100-12 5 MG per tablet Hold day of surgery  • naproxen (NAPROSYN) 500 mg tablet Stop taking 3 days prior to surgery  • olopatadine (PATANOL) 0 1 % ophthalmic solution Uses PRN- OK to take day of surgery   • pantoprazole (PROTONIX) 40 mg tablet Take day of surgery  • saccharomyces boulardii (FLORASTOR) 250 mg capsule Stop taking 7 days prior to surgery  • sucralfate (CARAFATE) 1 g tablet Hold day of surgery  • tamsulosin (FLOMAX) 0 4 mg Take night before surgery   • Victoza injection Take day of surgery  Pre op instructions per My Surgical Experience booklet,medications per anesthesia guidelines and showering instructions per [facility] protocol reviewed-Patient has CHG  Pt  Verbalized understanding of current visitor restrictions    Instructed to call surgeon with any illness or covid exposure now till DOS  All medications instructed to take DOS are with sips water only  Instructed to avoid all ASA and OTC Vit/Supp 1 week prior to surgery and to avoid NSAIDs 3 days prior to surgery per anesthesia instructions  Tylenol ok to take prn  Pt  Verbalized an understanding of all instructions reviewed and offers no concerns at this time

## 2022-09-28 DIAGNOSIS — Z79.4 TYPE 2 DIABETES MELLITUS WITH DIABETIC NEUROPATHIC ARTHROPATHY, WITH LONG-TERM CURRENT USE OF INSULIN (HCC): ICD-10-CM

## 2022-09-28 DIAGNOSIS — E11.610 TYPE 2 DIABETES MELLITUS WITH DIABETIC NEUROPATHIC ARTHROPATHY, WITH LONG-TERM CURRENT USE OF INSULIN (HCC): ICD-10-CM

## 2022-09-28 DIAGNOSIS — E11.9 TYPE 2 DIABETES MELLITUS WITHOUT COMPLICATION, WITHOUT LONG-TERM CURRENT USE OF INSULIN (HCC): ICD-10-CM

## 2022-09-28 DIAGNOSIS — E13.9 DIABETES MELLITUS OF OTHER TYPE WITHOUT COMPLICATION, UNSPECIFIED WHETHER LONG TERM INSULIN USE (HCC): ICD-10-CM

## 2022-09-28 RX ORDER — LIRAGLUTIDE 6 MG/ML
1.8 INJECTION SUBCUTANEOUS DAILY
Qty: 9 ML | Refills: 0 | Status: SHIPPED | OUTPATIENT
Start: 2022-09-28 | End: 2022-10-28

## 2022-09-28 RX ORDER — BLOOD SUGAR DIAGNOSTIC
STRIP MISCELLANEOUS
Qty: 100 STRIP | Refills: 3 | Status: SHIPPED | OUTPATIENT
Start: 2022-09-28

## 2022-09-29 ENCOUNTER — ANESTHESIA EVENT (OUTPATIENT)
Dept: PERIOP | Facility: HOSPITAL | Age: 57
End: 2022-09-29
Payer: COMMERCIAL

## 2022-09-30 ENCOUNTER — HOSPITAL ENCOUNTER (OUTPATIENT)
Facility: HOSPITAL | Age: 57
Setting detail: OUTPATIENT SURGERY
Discharge: HOME | End: 2022-09-30
Attending: SURGERY | Admitting: SURGERY
Payer: COMMERCIAL

## 2022-09-30 ENCOUNTER — ANESTHESIA (OUTPATIENT)
Dept: PERIOP | Facility: HOSPITAL | Age: 57
End: 2022-09-30
Payer: COMMERCIAL

## 2022-09-30 VITALS
OXYGEN SATURATION: 98 % | HEIGHT: 72 IN | TEMPERATURE: 97.4 F | RESPIRATION RATE: 18 BRPM | BODY MASS INDEX: 32.1 KG/M2 | SYSTOLIC BLOOD PRESSURE: 131 MMHG | WEIGHT: 237 LBS | HEART RATE: 92 BPM | DIASTOLIC BLOOD PRESSURE: 68 MMHG

## 2022-09-30 DIAGNOSIS — K82.8 DYSKINESIA OF GALLBLADDER: Primary | ICD-10-CM

## 2022-09-30 DIAGNOSIS — K82.8 BILIARY DYSKINESIA: ICD-10-CM

## 2022-09-30 LAB
GLUCOSE SERPL-MCNC: 143 MG/DL (ref 65–140)
GLUCOSE SERPL-MCNC: 167 MG/DL (ref 65–140)

## 2022-09-30 PROCEDURE — 47562 LAPAROSCOPIC CHOLECYSTECTOMY: CPT | Performed by: SURGERY

## 2022-09-30 PROCEDURE — NC001 PR NO CHARGE: Performed by: SURGERY

## 2022-09-30 PROCEDURE — 82948 REAGENT STRIP/BLOOD GLUCOSE: CPT

## 2022-09-30 PROCEDURE — 88304 TISSUE EXAM BY PATHOLOGIST: CPT | Performed by: PATHOLOGY

## 2022-09-30 PROCEDURE — 47562 LAPAROSCOPIC CHOLECYSTECTOMY: CPT | Performed by: PHYSICIAN ASSISTANT

## 2022-09-30 RX ORDER — ROCURONIUM BROMIDE 10 MG/ML
INJECTION, SOLUTION INTRAVENOUS AS NEEDED
Status: DISCONTINUED | OUTPATIENT
Start: 2022-09-30 | End: 2022-09-30

## 2022-09-30 RX ORDER — KETOROLAC TROMETHAMINE 30 MG/ML
INJECTION, SOLUTION INTRAMUSCULAR; INTRAVENOUS AS NEEDED
Status: DISCONTINUED | OUTPATIENT
Start: 2022-09-30 | End: 2022-09-30

## 2022-09-30 RX ORDER — ONDANSETRON 2 MG/ML
4 INJECTION INTRAMUSCULAR; INTRAVENOUS ONCE AS NEEDED
Status: COMPLETED | OUTPATIENT
Start: 2022-09-30 | End: 2022-09-30

## 2022-09-30 RX ORDER — SODIUM CHLORIDE, SODIUM LACTATE, POTASSIUM CHLORIDE, CALCIUM CHLORIDE 600; 310; 30; 20 MG/100ML; MG/100ML; MG/100ML; MG/100ML
INJECTION, SOLUTION INTRAVENOUS CONTINUOUS PRN
Status: DISCONTINUED | OUTPATIENT
Start: 2022-09-30 | End: 2022-09-30

## 2022-09-30 RX ORDER — SODIUM CHLORIDE, SODIUM LACTATE, POTASSIUM CHLORIDE, CALCIUM CHLORIDE 600; 310; 30; 20 MG/100ML; MG/100ML; MG/100ML; MG/100ML
100 INJECTION, SOLUTION INTRAVENOUS CONTINUOUS
Status: DISCONTINUED | OUTPATIENT
Start: 2022-09-30 | End: 2022-09-30 | Stop reason: HOSPADM

## 2022-09-30 RX ORDER — BUPIVACAINE HYDROCHLORIDE AND EPINEPHRINE 2.5; 5 MG/ML; UG/ML
INJECTION, SOLUTION EPIDURAL; INFILTRATION; INTRACAUDAL; PERINEURAL AS NEEDED
Status: DISCONTINUED | OUTPATIENT
Start: 2022-09-30 | End: 2022-09-30 | Stop reason: HOSPADM

## 2022-09-30 RX ORDER — MIDAZOLAM HYDROCHLORIDE 2 MG/2ML
INJECTION, SOLUTION INTRAMUSCULAR; INTRAVENOUS AS NEEDED
Status: DISCONTINUED | OUTPATIENT
Start: 2022-09-30 | End: 2022-09-30

## 2022-09-30 RX ORDER — CEFAZOLIN SODIUM 2 G/50ML
2000 SOLUTION INTRAVENOUS ONCE
Status: DISCONTINUED | OUTPATIENT
Start: 2022-09-30 | End: 2022-09-30 | Stop reason: HOSPADM

## 2022-09-30 RX ORDER — SODIUM CHLORIDE 9 MG/ML
INJECTION, SOLUTION INTRAVENOUS AS NEEDED
Status: DISCONTINUED | OUTPATIENT
Start: 2022-09-30 | End: 2022-09-30 | Stop reason: HOSPADM

## 2022-09-30 RX ORDER — ACETAMINOPHEN 325 MG/1
975 TABLET ORAL ONCE
Status: COMPLETED | OUTPATIENT
Start: 2022-09-30 | End: 2022-09-30

## 2022-09-30 RX ORDER — SUCCINYLCHOLINE/SOD CL,ISO/PF 100 MG/5ML
SYRINGE (ML) INTRAVENOUS AS NEEDED
Status: DISCONTINUED | OUTPATIENT
Start: 2022-09-30 | End: 2022-09-30

## 2022-09-30 RX ORDER — OXYCODONE HYDROCHLORIDE 5 MG/1
5 TABLET ORAL EVERY 4 HOURS PRN
Status: DISCONTINUED | OUTPATIENT
Start: 2022-09-30 | End: 2022-09-30 | Stop reason: HOSPADM

## 2022-09-30 RX ORDER — FENTANYL CITRATE/PF 50 MCG/ML
25 SYRINGE (ML) INJECTION
Status: DISCONTINUED | OUTPATIENT
Start: 2022-09-30 | End: 2022-09-30 | Stop reason: HOSPADM

## 2022-09-30 RX ORDER — LIDOCAINE HYDROCHLORIDE 20 MG/ML
INJECTION, SOLUTION EPIDURAL; INFILTRATION; INTRACAUDAL; PERINEURAL AS NEEDED
Status: DISCONTINUED | OUTPATIENT
Start: 2022-09-30 | End: 2022-09-30

## 2022-09-30 RX ORDER — OXYCODONE HYDROCHLORIDE AND ACETAMINOPHEN 5; 325 MG/1; MG/1
1 TABLET ORAL EVERY 4 HOURS PRN
Qty: 10 TABLET | Refills: 0 | Status: SHIPPED | OUTPATIENT
Start: 2022-09-30

## 2022-09-30 RX ORDER — IPRATROPIUM BROMIDE AND ALBUTEROL SULFATE 2.5; .5 MG/3ML; MG/3ML
3 SOLUTION RESPIRATORY (INHALATION) ONCE
Status: COMPLETED | OUTPATIENT
Start: 2022-09-30 | End: 2022-09-30

## 2022-09-30 RX ORDER — FAMOTIDINE 10 MG/ML
20 INJECTION, SOLUTION INTRAVENOUS ONCE
Status: COMPLETED | OUTPATIENT
Start: 2022-09-30 | End: 2022-09-30

## 2022-09-30 RX ORDER — DEXAMETHASONE SODIUM PHOSPHATE 10 MG/ML
INJECTION, SOLUTION INTRAMUSCULAR; INTRAVENOUS AS NEEDED
Status: DISCONTINUED | OUTPATIENT
Start: 2022-09-30 | End: 2022-09-30

## 2022-09-30 RX ORDER — MAGNESIUM HYDROXIDE 1200 MG/15ML
LIQUID ORAL AS NEEDED
Status: DISCONTINUED | OUTPATIENT
Start: 2022-09-30 | End: 2022-09-30 | Stop reason: HOSPADM

## 2022-09-30 RX ORDER — FENTANYL CITRATE 50 UG/ML
INJECTION, SOLUTION INTRAMUSCULAR; INTRAVENOUS AS NEEDED
Status: DISCONTINUED | OUTPATIENT
Start: 2022-09-30 | End: 2022-09-30

## 2022-09-30 RX ORDER — CEFAZOLIN SODIUM 2 G/50ML
SOLUTION INTRAVENOUS AS NEEDED
Status: DISCONTINUED | OUTPATIENT
Start: 2022-09-30 | End: 2022-09-30

## 2022-09-30 RX ORDER — PROPOFOL 10 MG/ML
INJECTION, EMULSION INTRAVENOUS AS NEEDED
Status: DISCONTINUED | OUTPATIENT
Start: 2022-09-30 | End: 2022-09-30

## 2022-09-30 RX ADMIN — KETOROLAC TROMETHAMINE 15 MG: 30 INJECTION, SOLUTION INTRAMUSCULAR at 11:22

## 2022-09-30 RX ADMIN — ROCURONIUM BROMIDE 50 MG: 50 INJECTION INTRAVENOUS at 10:49

## 2022-09-30 RX ADMIN — CEFAZOLIN SODIUM 2000 MG: 2 SOLUTION INTRAVENOUS at 10:37

## 2022-09-30 RX ADMIN — IPRATROPIUM BROMIDE AND ALBUTEROL SULFATE 3 ML: 2.5; .5 SOLUTION RESPIRATORY (INHALATION) at 10:18

## 2022-09-30 RX ADMIN — DEXAMETHASONE SODIUM PHOSPHATE 10 MG: 10 INJECTION, SOLUTION INTRAMUSCULAR; INTRAVENOUS at 10:49

## 2022-09-30 RX ADMIN — SODIUM CHLORIDE, SODIUM LACTATE, POTASSIUM CHLORIDE, AND CALCIUM CHLORIDE: .6; .31; .03; .02 INJECTION, SOLUTION INTRAVENOUS at 10:37

## 2022-09-30 RX ADMIN — FAMOTIDINE 20 MG: 10 INJECTION, SOLUTION INTRAVENOUS at 10:18

## 2022-09-30 RX ADMIN — Medication 120 MG: at 10:43

## 2022-09-30 RX ADMIN — ACETAMINOPHEN 975 MG: 325 TABLET ORAL at 10:19

## 2022-09-30 RX ADMIN — PROPOFOL 200 MG: 10 INJECTION, EMULSION INTRAVENOUS at 10:43

## 2022-09-30 RX ADMIN — OXYCODONE HYDROCHLORIDE 5 MG: 5 TABLET ORAL at 13:31

## 2022-09-30 RX ADMIN — FENTANYL CITRATE 100 MCG: 50 INJECTION INTRAMUSCULAR; INTRAVENOUS at 10:43

## 2022-09-30 RX ADMIN — MIDAZOLAM 2 MG: 1 INJECTION INTRAMUSCULAR; INTRAVENOUS at 10:37

## 2022-09-30 RX ADMIN — SUGAMMADEX 400 MG: 100 INJECTION, SOLUTION INTRAVENOUS at 11:32

## 2022-09-30 RX ADMIN — LIDOCAINE HYDROCHLORIDE 100 MG: 20 INJECTION, SOLUTION EPIDURAL; INFILTRATION; INTRACAUDAL at 10:43

## 2022-09-30 RX ADMIN — ONDANSETRON 4 MG: 2 INJECTION INTRAMUSCULAR; INTRAVENOUS at 11:59

## 2022-09-30 NOTE — OP NOTE
OPERATIVE REPORT  PATIENT NAME: Shana Kidd  :  1965  MRN: [id number]  Pt Location: [facility] OR ROOM 01    SURGERY DATE: 2022    Surgeon(s) and Role:     * Tino Patel MD - Primary     * Joy Gagnon PA-C - Assisting    Preop Diagnosis:  Biliary dyskinesia [K82 8]    Post-Op Diagnosis Codes:     * Biliary dyskinesia [K82 8]    Procedure(s) (LRB):  CHOLECYSTECTOMY LAPAROSCOPIC (N/A)    Specimen(s):  ID Type Source Tests Collected by Time Destination   1 :  Tissue Gallbladder TISSUE EXAM Tino Patel MD 2022 11:15 AM        Estimated Blood Loss:   Minimal    Drains:  [REMOVED] NG/OG/Enteral Tube Orogastric 18 Fr Right mouth (Removed)   Number of days: 0       Anesthesia Type:   General    Operative Indications:  Biliary dyskinesia [K82 8]    Independent, nonsmoker, ASA 3, wound class 2, BMI 32, weight 240, height 72  Hypertension, non-insulin-dependent diabetes, obesity, rheumatoid arthritis                                                                                                      •    •    •    •            Complications:   None    Procedure and Technique:  Shana Kidd  is a 49 y o  male was brought into the operative suite and identified visually and by arm band  The patient was placed in the supine position  Careful attention towards positioning of extremities was completed  After sterile prep and drape a timeout was completed  Athrombic pumps in place  Antibiotics provided  After instillation of local analgesia an incision was made at the umbilicus   With blunt dissection the peritoneum was identified  This was pulled upward using Kocher clamps  An incision was made  Hemostat was used to bluntly puncture the peritoneum  Intra-abdominal location was verified  A trocar was then inserted  CO2 was then insufflated with a back pressure of 15   After Marcaine instillation at each additional site, additional trochars are placed under direct vision into the upper aspect of the abdomen  Laparoscopic visualization revealed a normal liver and normal stomach  No excess peritoneal fluid  The gallbladder was pulled with traction inferiorly, and the liver was pushed superior  A dome down technique was completed using electrocautery  This technique carried down to the level of Grossman's pouch  Careful attention was made towards location of the right hepatic artery, cystic artery, common bile duct, right hepatic duct and the cystic duct  Careful attention was made towards the critical anatomy at this region  The cystic duct was identified inserting into the base of the gallbladder  Cystic artery was identified also inserting into the base of the gallbladder  The cystic duct was then clipped ×3 and divided  The cystic artery was clipped ×3 and divided  The gallbladder was then removed from the liver bed with additional electrocautery  The area was copiously irrigated  Hemostasis was assured  The gallbladder was placed into an Endo-Catch bag, and was then removed through the umbilical incision  Fascia was closed with 0 Vicryl suture  The subcutaneous components were irrigated  The subcuticular incisions were then closed  Histacryl was then applied  The patient was awakened from general anesthesia and transferred to the recovery room in stable condition  Sponge and instrument count correct ×2  A postoperative deep briefing was completed       I was present for the entire procedure    Patient Disposition:  PACU         SIGNATURE: Mushtaq Soni MD  DATE: September 30, 2022  TIME: 12:00 PM

## 2022-09-30 NOTE — ANESTHESIA PREPROCEDURE EVALUATION
Procedure:  CHOLECYSTECTOMY LAPAROSCOPIC (N/A Abdomen)    Past Medical History:   Diagnosis Date    Abscess of left thigh 5/11/2021    Arthritis     At risk for falls     Broken foot     right    Cataract     giacomo    Cellulitis of left lower extremity 4/26/2021    [facility]     Diabetes mellitus ([facility] )     Hx MRSA infection     Per wife patient hadf MRSa in a wound awhile ago    Hyperlipidemia     Kidney stone     Neuropathy     RA [facility]oid [facility]) [facility]     [facility]     Seasonal allergies     Sepsis ([facility] ) 4/26/2021    Snores     Uses wheelchair     and crutches- NWB RLE    Wears glasses      Lab Results   Component Value Date    WBC 6 49 09/19/2022    HGB 15 7 09/19/2022    HCT 46 7 09/19/2022    MCV 88 09/19/2022     09/19/2022       Chemistry        Component Value Date/Time     (L) 01/23/2017 1300    K 3 7 09/19/2022 0851    K 4 2 01/23/2017 1300     09/19/2022 0851     01/23/2017 1300    CO2 27 09/19/2022 0851    CO2 22 01/23/2017 1300    BUN 23 09/19/2022 0851    BUN 15 01/23/2017 1300    CREATININE 1 08 09/19/2022 0851    CREATININE 0 91 01/23/2017 1300        Component Value Date/Time    CALCIUM 8 7 09/19/2022 0851    CALCIUM 8 9 01/23/2017 1300    ALKPHOS 51 09/19/2022 0851    ALKPHOS 37 (L) 01/23/2017 1300    AST 20 09/19/2022 0851    AST 20 01/23/2017 1300    ALT 40 09/19/2022 0851    ALT 36 01/23/2017 1300    BILITOT 0 6 01/23/2017 1300          Physical Exam    Airway    Mallampati score: II  TM Distance: >3 FB  Neck ROM: full     Dental   No notable dental hx     Cardiovascular  Rhythm: regular, Rate: normal,     Pulmonary  Breath sounds clear to auscultation,     Other Findings  Intercisor Distance > 3cm          Anesthesia Plan  ASA Score- 3     Anesthesia Type- general with ASA Monitors  Additional Monitors:   Airway Plan: ETT      Comment: Discussed benefits/risks of general anesthesia including possibility of mouth/throat pain, injury to lips/teeth, nausea/vomiting, and surgical pain along with more rare complications such as stroke, MI, pneumonia, aspiration, and injury to blood vessels  Patient understands and wishes to proceed  All questions answered          Plan Factors-Exercise tolerance (METS): >4 METS  Chart reviewed  EKG reviewed  Existing labs reviewed  Induction- intravenous and rapid sequence induction  Postoperative Plan- Plan for postoperative opioid use  Planned trial extubation    Informed Consent- Anesthetic plan and risks discussed with patient  I personally reviewed this patient with the CRNA  Discussed and agreed on the Anesthesia Plan with the CRNA  Nicole Simpson

## 2022-09-30 NOTE — ANESTHESIA POSTPROCEDURE EVALUATION
Post-Op Assessment Note    CV Status:  Stable  Pain Score: 0    Pain management: adequate     Mental Status:  Alert and awake   Hydration Status:  Euvolemic   PONV Controlled:  Controlled   Airway Patency:  Patent      Post Op Vitals Reviewed: Yes      Staff: Anesthesiologist, CRNA         No complications documented      BP   145/75   Temp  98   Pulse  76   Resp   16   SpO2   100

## 2022-09-30 NOTE — PROGRESS NOTES
[address] 32  49 y o  male MRN: [id number]  Unit/Bed#: OR [place] Encounter: [id number]          ASSESSMENT: biliary dyskinesia     PLAN: lap juan       Review of Systems  Constitutional:  Denies fever or chills   Eyes:  Denies change in visual acuity   HENT:  Denies nasal congestion or sore throat   Respiratory:  Denies cough or shortness of breath   Cardiovascular:  Denies chest pain or edema   GI:  Denies abdominal pain, nausea, vomiting, bloody stools or diarrhea   Musculoskeletal:  Denies back pain or joint pain   Integument:  Denies rash   Neurologic:  Denies headache, focal weakness or sensory changes   Endocrine:  Denies polyuria or polydipsia   Lymphatic:  Denies swollen glands   Psychiatric:  Denies depression or anxiety     Historical Information   Past Medical History:   Diagnosis Date   • Abscess of left thigh 5/11/2021   • Arthritis    • At risk for falls    • Broken foot     right   • Cataract     giacomo   • Cellulitis of left lower extremity 4/26/2021   • COPD [facility]ch[facility] obstructive [facility] disease[facility] )    • Diabetes mellitus ([facility] )    • Hx MRSA infection     Per wife patient hadf MRSa in a wound awhile ago   • Hyperlipidemia    • Kidney stone    • Neuropathy    • RA (rheumatoid arthritis) ([facility] )    • Rheumatoid arthritis ([facility] )    • Seasonal allergies    • Sepsis ([facility] ) 4/26/2021   • Snores    • Uses wheelchair     and crutches- NWB RLE   • Wears glasses      Past Surgical History:   Procedure Laterality Date   • APPENDECTOMY     • CLOSED REDUCTION FOOT DISLOCATION Right 2/12/2019    Procedure: C/R FRACTURE;  Surgeon: Emmie James DPM;  Location: [facility] OR;  Service: Podiatry   • COLONOSCOPY     • FOOT SURGERY Right 07/2019    Removal of the bone graft and cleaned out infection, and placed new bone graft   • IR PICC PLACEMENT SINGLE LUMEN  8/5/2019   • NE DEBRIDEMENT, SKIN, SUB-Q TISSUE,=<20 SQ CM Left 5/24/2021    Procedure: INCISION AND DRAINAGE THIGH;  Surgeon: Mary Jane Murillo Natalee Rodriguez MD;  Location: [facility] OR;  Service: Orthopedics   • MN EXC SKIN MALIG <0 5 CM REMAINDER BODY N/A 3/28/2018    Procedure: EXCISION WIDE LESION HEAD/FACIAL/NECK;  Surgeon: Mattie Rebolledo MD;  Location: [facility];  Service: Surgical Oncology   • MN GASTROCNEMIUS RECESSION Right 2/12/2019    Procedure: ENDO GASTROC RECESSION, APPLICATION OF EXTERNAL FIXATOR;  Surgeon: Daniele Dubon DPM;  [facility];  Service: Podiatry   • MN REMOVE EXTERN BONE FIX DEV W ANESTH Right 4/18/2019    Procedure: FRAME REMOVAL HARDWARE FOOT WITH APPLICATION OF GRAFT;  Surgeon: Daniele Dubon DPM;  [facility];  Service: Podiatry   • SKIN BIOPSY      scalp   • WISDOM TOOTH EXTRACTION  1998   • WOUND DEBRIDEMENT Left 4/29/2021    Procedure: KNEE INCISION AND DRAINAGE, EXCISIONAL DEBRIDEMENT OF PREPATELLAR BURSA;   Surgeon: Morgan Arredondo MD;  Location: [facility];  Service: Orthopedics     Social History   Social History     Substance and Sexual Activity   Alcohol Use Yes    Comment: Socially     Social History     Substance and Sexual Activity   Drug Use Yes   • Frequency: 7 0 times per week   • Types: Marijuana    Comment: Daily for Pain     Social History     Tobacco Use   Smoking Status Current Every Day Smoker   • Packs/day: 1 00   • Years: 40 00   • Pack years: 40 00   • Types: Cigarettes   Smokeless Tobacco Never Used     Family History   Problem Relation Age of Onset   • Diabetes Mother    • Stroke Mother    • Mental illness Mother    • Diabetes Father    • Stroke Father    • Alcohol abuse Brother    • Coronary artery disease Family         Age 51-55   • Diabetes type II Family    • Heart disease Family        Meds/Allergies     Medications Prior to Admission   Medication   • albuterol (PROVENTIL HFA,VENTOLIN HFA) 90 mcg/act inhaler   • Ascorbic Acid (vitamin C) 1000 MG tablet   • atorvastatin (LIPITOR) 40 mg tablet   • Basaglar KwikPen 100 units/mL SOPN   • calcium carbonate (OS-NERY) 600 MG tablet   • etanercept (Enbrel SureClick) 50 MG/ML injection   • fluticasone (FLONASE) 50 mcg/act nasal spray   • hydroxychloroquine (PLAQUENIL) 200 mg tablet   • Januvia 100 MG tablet   • leflunomide (ARAVA) 20 MG tablet   • losartan-hydrochlorothiazide (HYZAAR) 100-12 5 MG per tablet   • naproxen (NAPROSYN) 500 mg tablet   • nicotine (NICODERM CQ) 21 mg/24 hr TD 24 hr patch   • olopatadine (PATANOL) 0 1 % ophthalmic solution   • pantoprazole (PROTONIX) 40 mg tablet   • saccharomyces boulardii (FLORASTOR) 250 mg capsule   • sucralfate (CARAFATE) 1 g tablet   • tamsulosin (FLOMAX) 0 4 mg   • Victoza injection   • Accu-Chek FastClix Lancets MISC   • BD Pen Needle Maureen U/F 32G X 4 MM MISC   • Blood Glucose Monitoring Suppl (OneTouch Verio Flex System) w/Device KIT   • ergocalciferol (VITAMIN D2) 50,000 units   • glucose blood (OneTouch Verio) test strip   • ULTICARE MICRO PEN NEEDLES 32G X 4 MM MISC     Current Facility-Administered Medications   Medication Dose Route Frequency   • ceFAZolin (ANCEF) IVPB (premix in dextrose) 2,000 mg 50 mL  2,000 mg Intravenous Once       No Known Allergies    Objective     Blood pressure 133/83, pulse 94, temperature (!) 97 °F (36 1 °C), temperature source Temporal, resp  rate 18, height 6' (1 829 m), weight 108 kg (237 lb), SpO2 99 %  No intake or output data in the 24 hours ending 09/30/22 1000    PHYSICAL EXAM  General appearance: alert and oriented, in no acute distress  Lungs: clear to auscultation bilaterally  Heart: regular rate and rhythm, S1, S2 normal, no murmur, click, rub or gallop  Abdomen: soft, non-tender; bowel sounds normal; no masses,  no organomegaly  Skin: Skin color, texture, turgor normal  No rashes or lesions    Lab Results:   No visits with results within 1 Day(s) from this visit     Latest known visit with results is:   Appointment on 09/19/2022   Component Date Value   • WBC 09/19/2022 6 49    • RBC 09/19/2022 5 28    • Hemoglobin 09/19/2022 15 7    • Hematocrit 09/19/2022 46 7    • MCV 09/19/2022 88    • MCH 09/19/2022 29 7    • MCHC 09/19/2022 33 6    • RDW 09/19/2022 13 7    • Platelets 53/53/5351 174    • MPV 09/19/2022 10 9    • Sodium 09/19/2022 138    • Potassium 09/19/2022 3 7    • Chloride 09/19/2022 107    • CO2 09/19/2022 27    • ANION GAP 09/19/2022 4    • BUN 09/19/2022 23    • Creatinine 09/19/2022 1 08    • Glucose, Fasting 09/19/2022 157 (A)   • Calcium 09/19/2022 8 7    • AST 09/19/2022 20    • ALT 09/19/2022 40    • Alkaline Phosphatase 09/19/2022 51    • Total Protein 09/19/2022 7 0    • Albumin 09/19/2022 3 6    • Total Bilirubin 09/19/2022 0 50    • eGFR 09/19/2022 77    • Hemoglobin A1C 09/19/2022 6 1 (A)   • EAG 09/19/2022 128      Imaging Studies: I have personally reviewed pertinent reports  XR spine cervical complete 6+ vw flex/ext/obl    Result Date: 9/29/2022  Narrative: CERVICAL SPINE INDICATION:   M54 12: Radiculopathy, cervical region  COMPARISON:  12/8/2015 VIEWS:  XR SPINE CERVICAL COMPLETE 6+ VW FLEX /EXT /OBL FINDINGS: No fracture  Straightening of the cervical lordosis may be related to patient positioning or muscle spasm  No evidence of dynamic malalignment  The intervertebral disc spaces are preserved  No evidence of dynamic instability seen with flexion or extension  The prevertebral soft tissues are within normal limits  Mild right C4-5 foraminal stenosis and mild left C5-6 foraminal stenosis secondary to uncovertebral and facet hypertrophy  The lung apices are clear  Impression: Mild degenerative change  Stable alignment on dynamic imaging  Workstation performed: [id number]        Counseling / Coordination of Care  Total time spent today  15 minutes  Greater than 50% of total time was spent with the patient and / or family counseling and / or coordination of care

## 2022-09-30 NOTE — INTERVAL H&P NOTE
H&P reviewed  After examining the patient I find no changes in the patients condition since the H&P had been written    Chronic calculous juan-  for lap juan   Vitals:    09/30/22 0942   BP: 133/83   Pulse: 94   Resp: 18   Temp: (!) 97 °F (36 1 °C)   SpO2: 99%

## 2022-10-08 PROCEDURE — 88304 TISSUE EXAM BY PATHOLOGIST: CPT | Performed by: PATHOLOGY

## 2022-10-09 ENCOUNTER — APPOINTMENT (OUTPATIENT)
Dept: RADIOLOGY | Facility: HOSPITAL | Age: 57
End: 2022-10-09
Payer: COMMERCIAL

## 2022-10-09 ENCOUNTER — HOSPITAL ENCOUNTER (EMERGENCY)
Facility: HOSPITAL | Age: 57
Discharge: HOME | End: 2022-10-09
Attending: EMERGENCY MEDICINE
Payer: COMMERCIAL

## 2022-10-09 VITALS
SYSTOLIC BLOOD PRESSURE: 173 MMHG | WEIGHT: 238.1 LBS | DIASTOLIC BLOOD PRESSURE: 88 MMHG | RESPIRATION RATE: 20 BRPM | BODY MASS INDEX: 32.29 KG/M2 | HEART RATE: 98 BPM | OXYGEN SATURATION: 98 % | TEMPERATURE: 98.4 F

## 2022-10-09 DIAGNOSIS — S49.91XA INJURY OF RIGHT SHOULDER, INITIAL ENCOUNTER: Primary | ICD-10-CM

## 2022-10-09 PROCEDURE — 99283 EMERGENCY DEPT VISIT LOW MDM: CPT

## 2022-10-09 PROCEDURE — 73030 X-RAY EXAM OF SHOULDER: CPT

## 2022-10-09 PROCEDURE — 99284 EMERGENCY DEPT VISIT MOD MDM: CPT | Performed by: PHYSICIAN ASSISTANT

## 2022-10-09 PROCEDURE — 96372 THER/PROPH/DIAG INJ SC/IM: CPT

## 2022-10-09 RX ORDER — HYDROCODONE BITARTRATE AND ACETAMINOPHEN 5; 325 MG/1; MG/1
1 TABLET ORAL ONCE
Status: COMPLETED | OUTPATIENT
Start: 2022-10-09 | End: 2022-10-09

## 2022-10-09 RX ORDER — LIDOCAINE 50 MG/G
1 PATCH TOPICAL ONCE
Status: DISCONTINUED | OUTPATIENT
Start: 2022-10-09 | End: 2022-10-09 | Stop reason: HOSPADM

## 2022-10-09 RX ORDER — PREDNISONE 20 MG/1
40 TABLET ORAL DAILY
Qty: 14 TABLET | Refills: 0 | Status: SHIPPED | OUTPATIENT
Start: 2022-10-09 | End: 2022-10-16

## 2022-10-09 RX ORDER — KETOROLAC TROMETHAMINE 30 MG/ML
15 INJECTION, SOLUTION INTRAMUSCULAR; INTRAVENOUS ONCE
Status: COMPLETED | OUTPATIENT
Start: 2022-10-09 | End: 2022-10-09

## 2022-10-09 RX ORDER — HYDROCODONE BITARTRATE AND ACETAMINOPHEN 5; 325 MG/1; MG/1
1 TABLET ORAL EVERY 6 HOURS PRN
Qty: 12 TABLET | Refills: 0 | Status: SHIPPED | OUTPATIENT
Start: 2022-10-09 | End: 2022-10-12

## 2022-10-09 RX ADMIN — LIDOCAINE 5% 1 PATCH: 700 PATCH TOPICAL at 13:51

## 2022-10-09 RX ADMIN — HYDROCODONE BITARTRATE AND ACETAMINOPHEN 1 TABLET: 5; 325 TABLET ORAL at 13:53

## 2022-10-09 RX ADMIN — KETOROLAC TROMETHAMINE 15 MG: 30 INJECTION, SOLUTION INTRAMUSCULAR at 13:53

## 2022-10-09 NOTE — ED PROVIDER NOTES
History  Chief Complaint   Patient presents with   • Shoulder Pain     Per "he has been having some right shoulder pain with tingling in hid s arm down into his finger "     73-year-old male presents emergency room for evaluation of right shoulder pain  States 2 days ago he used his right arm to push himself up and out of bed when he felt something pull/tear  He recently had gallbladder surgery so he was putting extra pressure on his arm to help get out of bed  Denies any complications with the gallbladder surgery  States he seems to be healing up well from that side of things  Patient denies previous right shoulder surgery  Pain radiates down his arm and into his neck  Pain is increased with trying to raise his arm  States he has to put it in certain funny positions to relieve the pain  He tried taking Percocet ibuprofen and Tylenol with minimal relief  States it just upsets his stomach  Denies chest pain or shortness of breath  Denies hand weakness  History provided by:  Patient  Shoulder Pain  Associated symptoms: no fever        Prior to Admission Medications   Prescriptions Last Dose Informant Patient Reported? Taking?    Accu-Chek FastClix Lancets MISC  Self No No   Sig: TEST BLOOD SUGAR FOUR TIMES A DAY BEFORE MEALS AND BEDTIME   Ascorbic Acid (vitamin C) 1000 MG tablet  Self Yes No   Sig: Take 1,000 mg by mouth daily   BD Pen Needle Maureen U/F 32G X 4 MM MISC  Self No No   Sig: ONE PEN NEEDLE THREE TIMES A DAY 2-BASAGLAR 1- VICTOZA   Basaglar KwikPen 100 units/mL SOPN   No No   Sig: INJECT 50 UNITS UNDER THE SKIN EVERY 12 (TWELVE) HOURS   Blood Glucose Monitoring Suppl (OneTouch Verio Flex System) w/Device KIT  Self No No   Sig: Use 3 (three) times a day with meals   Januvia 100 MG tablet   No No   Sig: TAKE 1 TABLET (100 MG TOTAL) BY MOUTH DAILY AFTER DINNER   Patient taking differently: Take 100 mg by mouth daily with dinner   ULTICARE MICRO PEN NEEDLES 32G X 4 MM MISC  Self No No   Sig: INJECT UNDER THE SKIN 3 (THREE) TIMES A DAY   Victoza injection   No No   Sig: INJECT 0 3 ML (1 8 MG TOTAL) UNDER THE SKIN DAILY   albuterol (PROVENTIL HFA,VENTOLIN HFA) 90 mcg/act inhaler   No No   Sig: INHALE 2 PUFFS EVERY 4 (FOUR) HOURS AS NEEDED FOR WHEEZING   atorvastatin (LIPITOR) 40 mg tablet   No No   Sig: TAKE 1 TABLET AT BEDTIME  Patient taking differently: Take 40 mg by mouth daily with dinner   calcium carbonate (OS-NERY) 600 MG tablet  Self Yes No   Sig: Take 600 mg by mouth daily with dinner   ergocalciferol (VITAMIN D2) 50,000 units  Self Yes No   Sig: Take 50,000 Units by mouth once a week Takes on Saturday   etanercept (Enbrel SureClick) 50 MG/ML injection   No No   Sig: Inject 1 mL (50 mg total) under the skin once a week   Patient taking differently: Inject 50 mg under the skin once a week Takes on Wednesdays   fluticasone (FLONASE) 50 mcg/act nasal spray   No No   Sig: USE 2 SPRAYS IN EACH NOSTRIL ONCE DAILY   Patient taking differently: 1 spray into each nostril daily at bedtime   glucose blood (OneTouch Verio) test strip   No No   Sig: CHECK GLUCOSE BEFORE MEALS   hydroxychloroquine (PLAQUENIL) 200 mg tablet  Self No No   Sig: Take 1 tablet (200 mg total) by mouth 2 (two) times a day   leflunomide (ARAVA) 20 MG tablet  Self No No   Sig: Take 1 tablet (20 mg total) by mouth daily with dinner   losartan-hydrochlorothiazide (HYZAAR) 100-12 5 MG per tablet   No No   Sig: TAKE 1 TABLET BY MOUTH DAILY   Patient taking differently: Take 1 tablet by mouth daily in the early morning   naproxen (NAPROSYN) 500 mg tablet   No No   Sig: TAKE 1 TABLET (500 MG TOTAL) BY MOUTH EVERY 12 (TWELVE) HOURS AS NEEDED FOR MILD PAIN (WITH FOOD)   nicotine (NICODERM CQ) 21 mg/24 hr TD 24 hr patch   No No   Sig: Place 1 patch on the skin every 24 hours   olopatadine (PATANOL) 0 1 % ophthalmic solution  Self No No   Sig: Administer 1 drop to both eyes in the morning and 1 drop in the evening     Patient taking differently: Administer 1 drop to both eyes if needed   oxyCODONE-acetaminophen (PERCOCET) 5-325 mg per tablet   No No   Sig: Take 1 tablet by mouth every 4 (four) hours as needed for moderate pain for up to 10 doses Max Daily Amount: 6 tablets   pantoprazole (PROTONIX) 40 mg tablet   No No   Sig: TAKE 1 TABLET (40 MG TOTAL) BY MOUTH DAILY   Patient taking differently: Take 40 mg by mouth daily in the early morning   saccharomyces boulardii (FLORASTOR) 250 mg capsule  Self No No   Sig: Take 1 capsule (250 mg total) by mouth 2 (two) times a day   sucralfate (CARAFATE) 1 g tablet  Self No No   Sig: TAKE 1 TABLET (1 G TOTAL) BY MOUTH 4 (FOUR) TIMES A DAY   tamsulosin (FLOMAX) 0 4 mg   No No   Sig: Take 1 capsule (0 4 mg total) by mouth daily with dinner Begin 5 days preop      Facility-Administered Medications: None       Past Medical History:   Diagnosis Date   • Abscess of left thigh 5/11/2021   • Arthritis    • At risk for falls    • Broken foot     right   • Cataract     giacomo   • Cellulitis of left lower extremity 4/26/2021   • COPD (chronic obstructive pulmonary disease) ([facility])    • Diabetes mellitus ([facility])    • Hx MRSA infection     Per wife patient hadf MRSa in a wound awhile ago   • Hyperlipidemia    • Kidney stone    • Neuropathy    • RA (rheumatoid arthritis) ([facility])    • Rheumatoid arthritis ([facility])    • Seasonal allergies    • Sepsis ([facility] [address] ) 4/26/2021   • Snores    • Uses wheelchair     and crutches- NWB RLE   • Wears glasses        Past Surgical History:   Procedure Laterality Date   • APPENDECTOMY     • CLOSED REDUCTION FOOT DISLOCATION Right 2/12/2019    Procedure: C/R FRACTURE;  Surgeon: Miguel Blanco DPM;  [facility];  Service: Podiatry   • COLONOSCOPY     • FOOT SURGERY Right 07/2019    Removal of the bone graft and cleaned out infection, and placed new bone graft   • IR PICC PLACEMENT SINGLE LUMEN  8/5/2019   • WA DEBRIDEMENT, SKIN, SUB-Q TISSUE,=<20 SQ CM Left 5/24/2021    Procedure: INCISION AND DRAINAGE THIGH;  Surgeon: Mariia Owen MD;  [facility];  Service: Orthopedics   • MO [address] [place] [address] Dr <0 5 CM REMAINDER BODY N/A 3/28/2018    Procedure: EXCISION WIDE LESION HEAD/FACIAL/NECK;  Surgeon: Sueellen Habermann, MD;  [facility];  Service: Surgical Oncology   • MO GASTROCNEMIUS RECESSION Right 2/12/2019    Procedure: ENDO GASTROC RECESSION, APPLICATION OF EXTERNAL FIXATOR;  Surgeon: Segundo Campbell DPM;  [facility];  Service: Podiatry   • MO LAP,CHOLECYSTECTOMY N/A 9/30/2022    Procedure: CHOLECYSTECTOMY LAPAROSCOPIC;  Surgeon: Arcelia Loera MD;  [facility];  Service: General   • MO REMOVE EXTERN BONE FIX DEV W ANESTH Right 4/18/2019    Procedure: Laurel Dodson REMOVAL HARDWARE FOOT WITH APPLICATION OF GRAFT;  Surgeon: Segundo Campbell DPM;  [facility];  Service: Podiatry   • SKIN BIOPSY      scalp   • WISDOM TOOTH EXTRACTION  1998   • WOUND DEBRIDEMENT Left 4/29/2021    Procedure: KNEE INCISION AND DRAINAGE, EXCISIONAL DEBRIDEMENT OF PREPATELLAR BURSA; Surgeon: Mariia Owen MD;  [facility];  Service: Orthopedics       Family History   Problem Relation Age of Onset   • Diabetes Mother    • Stroke Mother    • Mental illness Mother    • Diabetes Father    • Stroke Father    • Alcohol abuse Brother    • Coronary artery disease Family         Age 51-55   • Diabetes type II Family    • Heart disease Family      I have reviewed and agree with the history as documented      E-Cigarette/Vaping   • E-Cigarette Use Never User      E-Cigarette/Vaping Substances     Social History     Tobacco Use   • Smoking status: Current Every Day Smoker     Packs/day: 1 00     Years: 40 00     Pack years: 40 00     Types: Cigarettes   • Smokeless tobacco: Never Used   Vaping Use   • Vaping Use: Never used   Substance Use Topics   • Alcohol use: Yes     Comment: Socially   • Drug use: Yes     Frequency: 7 0 times per week     Types: Marijuana     Comment: Daily for Pain       Review of Systems   Constitutional: Negative for chills and fever  Musculoskeletal: Negative for joint swelling  Right shoulder pain   Skin: Negative for rash and wound  Neurological: Negative for weakness and numbness  Physical Exam  Physical Exam  Vitals and nursing note reviewed  Constitutional:       Appearance: Normal appearance  He is well-developed  HENT:      Head: Atraumatic  Eyes:      Conjunctiva/sclera: Conjunctivae normal    Cardiovascular:      Pulses: Normal pulses  Musculoskeletal:      Right shoulder: Tenderness and bony tenderness present  No swelling, deformity, effusion or crepitus  Decreased range of motion  Decreased strength  Normal pulse  Right upper arm: Normal       Right forearm: Normal       Right hand: No tenderness or bony tenderness  Normal range of motion  Normal strength  Normal sensation  Normal capillary refill  Normal pulse  Arms:       Cervical back: Normal    Skin:     General: Skin is warm and dry  Capillary Refill: Capillary refill takes less than 2 seconds  Neurological:      Mental Status: He is alert     Psychiatric:         Mood and Affect: Mood normal          Vital Signs  ED Triage Vitals [10/09/22 1223]   Temperature Pulse Respirations Blood Pressure SpO2   98 4 °F (36 9 °C) 98 20 (!) 173/88 98 %      Temp Source Heart Rate Source Patient Position - Orthostatic VS BP Location FiO2 (%)   Oral Monitor Sitting Left arm --      Pain Score       9           Vitals:    10/09/22 1223   BP: (!) 173/88   Pulse: 98   Patient Position - Orthostatic VS: Sitting         Visual Acuity      ED Medications  Medications   lidocaine (LIDODERM) 5 % patch 1 patch (1 patch Topical Medication Applied 10/9/22 1351)   HYDROcodone-acetaminophen (NORCO) 5-325 mg per tablet 1 tablet (1 tablet Oral Given 10/9/22 1353)   ketorolac (TORADOL) injection 15 mg (15 mg Intramuscular Given 10/9/22 1353)       Diagnostic Studies  Results Reviewed     None XR shoulder 2+ views RIGHT   ED Interpretation by Maida Rowland PA-C (10/09 2104)   NAD                 Procedures  Procedures         ED Course                                             MDM  Number of Diagnoses or Management Options     Amount and/or Complexity of Data Reviewed  Tests in the radiology section of CPT®: ordered and reviewed    Risk of Complications, Morbidity, and/or Mortality  General comments: Right arm sling applied by RN    Patient Progress  Patient progress: stable      Disposition  Final diagnoses:   Injury of right shoulder, initial encounter     Time reflects when diagnosis was documented in both MDM as applicable and the Disposition within this note     Time User Action Codes Description Comment    10/9/2022  1:39 PM Ruiz Dominguez Add [S49 [id number]] Injury of right shoulder, initial encounter       ED Disposition     ED Disposition   Discharge    Condition   Stable    Date/Time   Sun Oct 9, 2022  1:39 PM    Comment   Gris Burgos  discharge to home/self care                 Follow-up Information     Follow up With Specialties Details Why Contact Info Additional Information    [address] [facility] [address] Orthopedic Surgery In 3 days  [address] [place] [address] [facility] [address], [address], [address] [place], [place], [place]    [address] Emergency Department Emergency Medicine  If symptoms worsen [address] [facility] [id number] [place] [address] Emergency Department, [address], [address], [address] [facility] ER, [address]          Discharge Medication List as of 10/9/2022  1:48 PM      START taking these medications    Details   Diclofenac Sodium (VOLTAREN) 1 % Apply 2 g topically 4 (four) times a day for 10 days, Starting Sun 10/9/2022, Until Wed 10/19/2022, Normal      HYDROcodone-acetaminophen ([facility]) 5325 mg per tablet Take 1 tablet by mouth every 6 (six) hours as needed for pain for up to 3 days Max Daily Amount: 4 tablets, Starting Sun 10/9/2022, Until Wed 10/12/2022 at 2359, Normal      predniSONE 20 mg tablet Take 2 tablets (40 mg total) by mouth daily for 7 days, Starting Sun 10/9/2022, Until Sun 10/16/2022, Normal         CONTINUE these medications which have NOT CHANGED    Details   Accu-Chek FastClix Lancets MISC TEST BLOOD SUGAR FOUR TIMES A DAY BEFORE MEALS AND BEDTIME, Normal      albuterol (PROVENTIL HFA,VENTOLIN HFA) 90 mcg/act inhaler INHALE 2 PUFFS EVERY 4 (FOUR) HOURS AS NEEDED FOR WHEEZING, Starting Tue 8/30/2022, Normal      Ascorbic Acid (vitamin C) 1000 MG tablet Take 1,000 mg by mouth daily, Historical Med      atorvastatin (LIPITOR) 40 mg tablet TAKE 1 TABLET AT BEDTIME , Normal      Basaglar KwikPen 100 units/mL SOPN INJECT 50 UNITS UNDER THE SKIN EVERY 12 (TWELVE) HOURS, Starting Tue 8/30/2022, Normal      !!  BD Pen Needle Maureen U/F 32G X 4 MM MISC ONE PEN NEEDLE THREE TIMES A DAY 2-BASAGLAR 1- VICTOZA, Normal      Blood Glucose Monitoring Suppl (OneTouch Verio Flex System) w/Device KIT Use 3 (three) times a day with meals, Starting Fri 5/20/2022, Normal      calcium carbonate (OS-NERY) 600 MG tablet Take 600 mg by mouth daily with dinner, Historical Med      ergocalciferol (VITAMIN D2) 50,000 units Take 50,000 Units by mouth once a week Takes on Saturday, Starting Thu 12/27/2018, Historical Med      etanercept (Enbrel SureClick) 50 MG/ML injection Inject 1 mL (50 mg total) under the skin once a week, Starting Mon 7/18/2022, Normal      fluticasone (FLONASE) 50 mcg/act nasal spray USE 2 SPRAYS IN EACH NOSTRIL ONCE DAILY, Normal      glucose blood (OneTouch Verio) test strip CHECK GLUCOSE BEFORE MEALS, Normal      hydroxychloroquine (PLAQUENIL) 200 mg tablet Take 1 tablet (200 mg total) by mouth 2 (two) times a day, Starting Fri 7/15/2022, Until Thu 10/13/2022, Normal      Januvia 100 MG tablet TAKE 1 TABLET (100 MG TOTAL) BY MOUTH DAILY AFTER DINNER, Normal      leflunomide (ARAVA) 20 MG tablet Take 1 tablet (20 mg total) by mouth daily with dinner, Starting Fri 7/15/2022, Normal      losartan-hydrochlorothiazide (HYZAAR) 100-12 5 MG per tablet TAKE 1 TABLET BY MOUTH DAILY, Starting Fri 7/1/2022, Normal      naproxen (NAPROSYN) 500 mg tablet TAKE 1 TABLET (500 MG TOTAL) BY MOUTH EVERY 12 (TWELVE) HOURS AS NEEDED FOR MILD PAIN (WITH FOOD), Starting Fri 9/16/2022, Normal      nicotine (NICODERM CQ) 21 mg/24 hr TD 24 hr patch Place 1 patch on the skin every 24 hours, Starting Fri 5/20/2022, Normal      olopatadine (PATANOL) 0 1 % ophthalmic solution Administer 1 drop to both eyes in the morning and 1 drop in the evening , Starting Fri 5/20/2022, Normal      oxyCODONE-acetaminophen (PERCOCET) 5-325 mg per tablet Take 1 tablet by mouth every 4 (four) hours as needed for moderate pain for up to 10 doses Max Daily Amount: 6 tablets, Starting Fri 9/30/2022, Normal      pantoprazole (PROTONIX) 40 mg tablet TAKE 1 TABLET (40 MG TOTAL) BY MOUTH DAILY, Starting Wed 1/12/2022, Normal      saccharomyces boulardii (FLORASTOR) 250 mg capsule Take 1 capsule (250 mg total) by mouth 2 (two) times a day, Starting Mon 6/7/2021, Normal      sucralfate (CARAFATE) 1 g tablet TAKE 1 TABLET (1 G TOTAL) BY MOUTH 4 (FOUR) TIMES A DAY, Starting Wed 8/3/2022, Normal      tamsulosin (FLOMAX) 0 4 mg Take 1 capsule (0 4 mg total) by mouth daily with dinner Begin 5 days preop, Starting Tue 8/23/2022, Normal      !! ULTICARE MICRO PEN NEEDLES 32G X 4 MM MISC INJECT UNDER THE SKIN 3 (THREE) TIMES A DAY, Starting Wed 8/7/2019, Normal      Victoza injection INJECT 0 3 ML (1 8 MG TOTAL) UNDER THE SKIN DAILY, Starting Wed 9/28/2022, Normal       !! - Potential duplicate medications found  Please discuss with provider  No discharge procedures on file      PDMP Review       Value Time User    PDMP Reviewed  Yes 10/9/2022  1:40 PM Ivette Rowe Kenton Bowen          ED Provider  Electronically Signed by           Maida Rowland PA-C  10/09/22 7903

## 2022-10-11 PROBLEM — H10.13 ALLERGIC CONJUNCTIVITIS OF BOTH EYES: Status: RESOLVED | Noted: 2022-05-20 | Resolved: 2022-10-11

## 2022-10-12 ENCOUNTER — OFFICE VISIT (OUTPATIENT)
Dept: OBGYN CLINIC | Facility: CLINIC | Age: 57
End: 2022-10-12
Payer: COMMERCIAL

## 2022-10-12 VITALS — OXYGEN SATURATION: 96 % | HEART RATE: 103 BPM | WEIGHT: 236.6 LBS | HEIGHT: 72 IN | BODY MASS INDEX: 32.05 KG/M2

## 2022-10-12 DIAGNOSIS — M54.12 RADICULOPATHY, CERVICAL REGION: Primary | ICD-10-CM

## 2022-10-12 DIAGNOSIS — M75.51 BURSITIS OF RIGHT SHOULDER: ICD-10-CM

## 2022-10-12 PROCEDURE — 99213 OFFICE O/P EST LOW 20 MIN: CPT | Performed by: PHYSICIAN ASSISTANT

## 2022-10-12 NOTE — PROGRESS NOTES
Assessment/Plan   Diagnoses and all orders for this visit:    Radiculopathy, cervical region    Bursitis of right shoulder    - Start PT for both  - Continue oral steroid rx until it is gone  - Rotator cuff injury remains in the differential, but I believe the majority of his symptoms are coming from the cspine at this time  - Will hold off on further imaging for a trial of PT first   - Follow up with Dr Deon Oakley for the shoulder and Dr Robert Hein for the neck after a 4-6week PT trial          Subjective   Patient ID: Smiley Schilling  is a 49 y o  male  Vitals:    10/12/22 1346   Pulse: 103   SpO2: 96%     55yo male comes in for an evaluation of his right arm  He has been having radiating pain down the arm, heaviness, and numbness for about a year and a half with no specific injury  Then, a week ago, he developed increased pain and weakness in the shoulder  Again, no injury  He has had xrays of the neck and shoulder done  He was treated by the ED with oral steroids and is feeling much better today  + history of RA    He does not have a neck specialist or an orthopedic specialist       The following portions of the patient's history were reviewed and updated as appropriate: allergies, current medications, past family history, past medical history, past social history, past surgical history and problem list     Review of Systems  Ortho Exam  Past Medical History:   Diagnosis Date   • Abscess of left thigh 5/11/2021   • Arthritis    • At risk for falls    • Broken foot     right   • Cataract     giacomo   • Cellulitis of left lower extremity 4/26/2021   • COPD (chronic obstructive pulmonary disease) ([address] )    • Diabetes mellitus ([address] )    • Hx MRSA infection     Per wife patient hadf MRSa in a wound awhile ago   • Hyperlipidemia    • Kidney stone    • Neuropathy    • RA (rheumatoid arthritis) ([address] )    • Rheumatoid arthritis ([address] )    • Seasonal allergies    • Sepsis ([address] ) 4/26/2021   • Snores    • Uses wheelchair and crutches- NWB RLE   • Wears glasses      Past Surgical History:   Procedure Laterality Date   • APPENDECTOMY     • CLOSED REDUCTION FOOT DISLOCATION Right 2/12/2019    Procedure: C/R FRACTURE;  Surgeon: Sandra Bradley DPM;  [facility];  Service: Podiatry   • COLONOSCOPY     • FOOT SURGERY Right 07/2019    Removal of the bone graft and cleaned out infection, and placed new bone graft   • IR PICC PLACEMENT SINGLE LUMEN  8/5/2019   • MA DEBRIDEMENT, SKIN, SUB-Q TISSUE,=<20 SQ CM Left 5/24/2021    Procedure: INCISION AND DRAINAGE THIGH;  Surgeon: Jose Rodriguez MD;  [facility];  Service: Orthopedics   • MA [address] [place] [address] Dr <0 5 CM REMAINDER BODY N/A 3/28/2018    Procedure: EXCISION WIDE LESION HEAD/FACIAL/NECK;  Surgeon: Driss Watts MD;  [facility];  Service: Surgical Oncology   • MA GASTROCNEMIUS RECESSION Right 2/12/2019    Procedure: ENDO GASTROC RECESSION, APPLICATION OF EXTERNAL FIXATOR;  Surgeon: Sandra Bradley DPM;  [facility];  Service: Podiatry   • MA LAP,CHOLECYSTECTOMY N/A 9/30/2022    Procedure: CHOLECYSTECTOMY LAPAROSCOPIC;  Surgeon: Opal Mckinley MD;  [facility];  Service: General   • MA REMOVE EXTERN BONE FIX DEV W ANESTH Right 4/18/2019    Procedure: Miguel A Relic REMOVAL HARDWARE FOOT WITH APPLICATION OF GRAFT;  Surgeon: Sandra Bradley DPM;  [facility];  Service: Podiatry   • SKIN BIOPSY      scalp   • WISDOM TOOTH EXTRACTION  1998   • WOUND DEBRIDEMENT Left 4/29/2021    Procedure: KNEE INCISION AND DRAINAGE, EXCISIONAL DEBRIDEMENT OF PREPATELLAR BURSA;   Surgeon: Jose Rodriguez MD;  [facility];  Service: Orthopedics     Family History   Problem Relation Age of Onset   • Diabetes Mother    • Stroke Mother    • Mental illness Mother    • Diabetes Father    • Stroke Father    • Alcohol abuse Brother    • Coronary artery disease Family         Age 51-55   • Diabetes type II Family    • Heart disease Family      Social History Occupational History   • Occupation: [employer]    Tobacco Use   • Smoking status: Current Every Day Smoker     Packs/day: 1 00     Years: 40 00     Pack years: 40 00     Types: Cigarettes   • Smokeless tobacco: Never Used   Vaping Use   • Vaping Use: Never used   Substance and Sexual Activity   • Alcohol use: Yes     Comment: Socially   • Drug use: Yes     Frequency: 7 0 times per week     Types: Marijuana     Comment: Daily for Pain   • Sexual activity: Not on file       Review of Systems   Constitutional: Negative  HENT: Negative  Eyes: Negative  Respiratory: Negative  Cardiovascular: Negative  Gastrointestinal: Negative  Endocrine: Negative  Genitourinary: Negative  Musculoskeletal: As below      Allergic/Immunologic: Negative  Neurological: Negative  Hematological: Negative  Psychiatric/Behavioral: Negative  Objective   Physical Exam      I have personally reviewed pertinent films in PACS and my interpretation is No acute displaced fracture on the shoulder xray  Neck xray- straightening and foraminal stenosis         · Constitutional: Awake, Alert, Oriented  · Eyes: EOMI  · Psych: Mood and affect appropriate  · Heart: regular rate   · Lungs: No audible wheezing  · Abdomen: No guarding  · Lymph: no lymphedema    • right Neck / CSpine and shoulder:  - Appearance  • Inspection of neck is normal with normal lordosis and no scoliosis, erythema, ecchymosis, or rash  - Palpation  • No tenderness of the midline bony landmarks, paraspinal musculature, or trapezius bilaterally  - ROM  • Neck- full ROM  Pain radiating to the shoulder with extension     • Shoulder: flexion 130, ER90, IR L4    Pain with flexion and with IR  - Motor  • Normal 5/5 strength in UE myotomes bilaterally, no muscle wasting or atrophy and no fasciculations noted  • Except - weakness with resisted shoulder flexion  - Sensation  • Sensation intact to light touch in UE dermatomes b/l - his numbness is transient and not present now    - Special Tests  • + spurlings right  • + empty can test right

## 2022-10-20 NOTE — PROGRESS NOTES
10/20/2022     Chief Complaint   Patient presents with    Diabetes     Follow up     Other     ,Cyst on Finger , also needing Flu Shot     Neck Pain     Pain Causing headache,     Shoulder Pain       Aminata Millan (:  1949) is a 72 y.o. female with past medical history of DM II, HTN, HLD, hypothyroidism, depression, BPD, arthritis who is  here for evaluation of the following medical concerns:    HPI  Diabetes Mellitus Type 2: Current symptoms/problems include none. Well controlled  Off of Metformin since   A1C 5.2 today  Has had some low blood sugar readings if fasting, drinks OJ and comes right back up  Stays active  Enjoys singing    Lab Results   Component Value Date    LABA1C 5.2 10/20/2022    LABA1C 5.3 2022    LABA1C 5.5 2021     Lab Results   Component Value Date    LABMICR Not Indicated 2019    CREATININE 0.6 2021     Lab Results   Component Value Date    ALT 21 2021    ALT 21 2021    AST 24 2021    AST 24 2021     Lab Results   Component Value Date    CHOL 180 2021    TRIG 58 2021    HDL 84 (H) 2021    LDLCALC 84 2021        Hypothyroidism: Recent symptoms: fatigue (chronic). She denies weight gain, weight loss, cold intolerance, and heat intolerance. Patient is  taking her medication consistently on an empty stomach. Lab Results   Component Value Date/Time    TSHREFLEX 1.32 2019 07:32 AM    TSHREFLEX 1.92 2017 09:09 AM    TSHREFLEX 2.06 2016 09:00 AM     Lab Results   Component Value Date    TSH 5.50 (H) 2022    TSH 2.47 2021    TSH 3.03 2018     Hyperlipidemia:  she is on Repatha, through her cardiologist. Will check Lipid panel today.     Lab Results   Component Value Date    CHOL 180 2021    TRIG 58 2021    HDL 84 (H) 2021    LDLCALC 84 2021     Lab Results   Component Value Date    ALT 21 2021    ALT 21 2021    AST 24 2021    AST 24 Subjective:      Patient ID: Vilma Cooks  is a 50 y o  male  53-year-old male presents with his wife for follow-up of chronic conditions including insulin-requiring diabetes mellitus, hypertension, hyperlipidemia, rheumatoid arthritis  Patient is following with Dermatology, Rheumatology  Patient has had improvement in his hemoglobin A1c which is now down to 7 4%  Normal CBC, CMP with the exception of impaired fasting glucose of 174, total cholesterol 134, HDL 40, LDL 49  Patient does have history of Charcot foot  He does follow with podiatrist   Patient is still smoking  He does complain of dizziness especially when he extends his neck  If patient is looking up at a ceiling he will feel that he is off balance and quite lightheaded    That resolves very spontaneously after he straightens his neck out      Past Medical History:   Diagnosis Date    Arthritis     At risk for falls     Broken foot     right    Cataract     giacomo    [facility]ronic obstructive [facility]     Diabetes mellitus ([facility]     Hyperlipidemia     Hypertension     Kidney stone     Neuropathy     Rheumatoid arthritis (HCC)     Seasonal allergies     Snores     Uses wheelchair     and crutches- NWB RLE    Wears glasses        Family History   Problem Relation Age of Onset    Diabetes Mother     Stroke Mother     Mental illness Mother     Diabetes Father     Stroke Father     Alcohol abuse Brother     Coronary artery disease Family         Age 51-55    Diabetes type II Family     Heart disease Family        Past Surgical History:   Procedure Laterality Date    APPENDECTOMY      CLOSED REDUCTION FOOT DISLOCATION Right 2/12/2019    Procedure: C/R FRACTURE;  Surgeon: Suzy Millan DPM;  Location: [facility];  Service: Podiatry    COLONOSCOPY      FOOT SURGERY Right 07/2019    Removal of the bone graft and cleaned out infection, and placed new bone graft    IA EXC SKIN MALIG <0 5 CM REMAINDER 05/20/2021        Mood: stable, managed by psychiatry (Dr. Shoaib Meza)     Joint pain: neck, bilat knees, bilat shoulders, left hip, right wrist. Hx of bilateral shoulder and knee replacements. Meloxicam, muscle relaxer,gabapentin does not help. Review of Systems   Constitutional:  Negative for chills, fatigue and fever. Respiratory:  Negative for cough and shortness of breath. Cardiovascular:  Positive for leg swelling (chronic swelling left lower extremity worsening). Negative for chest pain. Gastrointestinal:  Negative for diarrhea, nausea and vomiting. Endocrine: Negative for polydipsia, polyphagia and polyuria. Musculoskeletal:  Positive for arthralgias, joint swelling, myalgias and neck pain. Negative for back pain, gait problem and neck stiffness. Neurological:  Negative for dizziness and headaches. Trigeminal neuralgia- she is on Tegretol (Dr. Antonio Christina)    All other systems reviewed and are negative. Prior to Visit Medications    Medication Sig Taking?  Authorizing Provider   spironolactone (ALDACTONE) 50 MG tablet TAKE 1 TABLET BY MOUTH EVERY DAY Yes LUIS Escobar CNP   rOPINIRole (REQUIP) 2 MG tablet TAKE 1-2 TABLETS BY MOUTH EVERY DAY AT NIGHT FOR REST LEG SYNDROME Yes LUIS Escobar CNP   levothyroxine (SYNTHROID) 75 MCG tablet TAKE 1 TABLET BY MOUTH EVERY DAY Yes LUIS Escobar CNP   carBAMazepine (TEGRETOL) 200 MG tablet Take 200 mg by mouth 3 times daily Yes Historical Provider, MD   lithium 300 MG capsule  Yes Historical Provider, MD   alirocumab (PRALUENT) 75 MG/ML SOAJ injection pen Inject 75 mg into the skin every 14 days Yes Historical Provider, MD   Coenzyme Q10 (COQ-10) 400 MG CAPS Take by mouth Yes Historical Provider, MD   desvenlafaxine succinate (PRISTIQ) 50 MG TB24 extended release tablet TAKE 1 TABLET BY MOUTH EVERY EVENING Yes Historical Provider, MD   CVS D3 25 MCG (1000 UT) CAPS TAKE 1 CAPSULE BY MOUTH EVERY DAY Yes BODY N/A 3/28/2018    Procedure: EXCISION WIDE LESION HEAD/FACIAL/NECK;  Surgeon: Allan Perez MD;  [facility];  Service: Surgical Oncology    ID GASTROCNEMIUS RECESSION Right 2/12/2019    Procedure: ENDO GASTROC RECESSION, APPLICATION OF EXTERNAL FIXATOR;  Surgeon: Clare Pitt DPM;  [facility];  Service: Podiatry    ID REMOVE EXTERN BONE FIX DEV W ANESTH Right 4/18/2019    Procedure: FRAME REMOVAL HARDWARE FOOT WITH APPLICATION OF GRAFT;  Surgeon: Clare Pitt DPM;  [facility];  Service: Podiatry    SKIN BIOPSY      scalp   [address]        reports that he has been smoking cigarettes  He has a 40 00 pack-year smoking history  He has never used smokeless tobacco  He reports current alcohol use of about 1 0 standard drinks of alcohol per week  He reports current drug use  Drug: Marijuana        Current Outpatient Medications:     Accu-Chek FastClix Lancets MISC, TEST BLOOD SUGAR FOUR TIMES A DAY BEFORE MEALS AND BEDTIME, Disp: 102 each, Rfl: 3    Accu-Chek Guide test strip, TEST BEFORE MEALS AND AT BEDTIME, Disp: 100 each, Rfl: 3    Adalimumab (Humira Pen) 40 MG/0 4ML PNKT, Humira(CF) Pen 40 mg/0 4 mL subcutaneous kit, Disp: , Rfl:     albuterol (PROVENTIL HFA,VENTOLIN HFA) 90 mcg/act inhaler, INHALE 2 PUFFS EVERY 4 (FOUR) HOURS AS NEEDED FOR WHEEZING, Disp: 18 g, Rfl: 1    atorvastatin (LIPITOR) 40 mg tablet, TAKE 1 TABLET AT BEDTIME , Disp: 90 tablet, Rfl: 1    Basaglar KwikPen 100 units/mL injection pen, INJECT 50 UNITS UNDER THE SKIN EVERY 12 (TWELVE) HOURS, Disp: 30 mL, Rfl: 1    BD Pen Needle Maureen U/F 32G X 4 MM MISC, ONE PEN NEEDLE THREE TIMES A DAY 2-BASAGLAR 1- VICTOZA, Disp: 100 each, Rfl: 3    calcium carbonate (OS-NERY) 600 MG tablet, Take 600 mg by mouth 2 (two) times a day with meals, Disp: , Rfl:     clindamycin (CLEOCIN T) 1 % external solution, Apply topically to scalp and beard twice a day, Disp: 60 mL, Rfl: 11    cyclobenzaprine (FLEXERIL) 10 mg tablet, TAKE 1 TABLET (10 MG) BY MOUTH 3 (THREE) TIMES A DAY AS NEEDED FOR MUSCLE SPASMS, Disp: 90 tablet, Rfl: 3    ergocalciferol (VITAMIN D2) 50,000 units, Take 50,000 Units by mouth once a week , Disp: , Rfl:     fluticasone (FLONASE) 50 mcg/act nasal spray, USE 2 SPRAYS IN EACH NOSTRIL ONCE DAILY, Disp: 16 g, Rfl: 11    hydroxychloroquine (PLAQUENIL) 200 mg tablet, Take 200 mg by mouth 2 (two) times a day  , Disp: , Rfl:     Januvia 100 MG tablet, TAKE ONE TABLET EVERY DAY, Disp: 90 tablet, Rfl: 1    leflunomide (ARAVA) 20 MG tablet, Take 1 tablet by mouth daily with dinner On hold for surgery, Disp: , Rfl:     losartan-hydrochlorothiazide (HYZAAR) 100-12 5 MG per tablet, TAKE 1 TABLET BY MOUTH DAILY, Disp: 90 tablet, Rfl: 1    naproxen (NAPROSYN) 500 mg tablet, Take 1 tablet by mouth every 12 (twelve) hours, Disp: , Rfl:     pantoprazole (PROTONIX) 40 mg tablet, Take 1 tablet (40 mg total) by mouth daily, Disp: 30 tablet, Rfl: 11    sucralfate (CARAFATE) 1 g tablet, TAKE 1 TABLET (1 G TOTAL) BY MOUTH 4 (FOUR) TIMES A DAY, Disp: 120 tablet, Rfl: 4    ULTICARE MICRO PEN NEEDLES 32G X 4 MM MISC, INJECT UNDER THE SKIN 3 (THREE) TIMES A DAY, Disp: 100 each, Rfl: 3    Victoza injection, INJECT 1 8 MG DAILY, Disp: 9 mL, Rfl: 4    The following portions of the patient's history were reviewed and updated as appropriate: allergies, current medications, past family history, past medical history, past social history, past surgical history and problem list     Review of Systems   Constitutional: Negative  Negative for unexpected weight change  HENT: Negative  Eyes: Negative  Respiratory: Negative  Cardiovascular: Negative  Gastrointestinal: Negative  Endocrine: Negative  Genitourinary: Negative  Musculoskeletal: Positive for arthralgias (Right foot)  Skin: Negative  Allergic/Immunologic: Negative  Neurological: Positive for dizziness  Hematological: Negative  LUIS Madrigal CNP   linaCLOtide (LINZESS) 72 MCG CAPS capsule Take 145 mcg by mouth every morning (before breakfast)  Yes Historical Provider, MD   cetirizine (ZYRTEC) 10 MG tablet Take 10 mg by mouth daily Yes Historical Provider, MD   QUEtiapine (SEROQUEL) 100 MG tablet Take 150 mg by mouth nightly  Yes Historical Provider, MD   CVS MELATONIN 3 MG TABS tablet TAKE 1 TABLET BY MOUTH NIGHTLY Yes LUIS Madrigal CNP   B Complex Vitamins (VITAMIN B COMPLEX) TABS Take 1 tablet by mouth 2 times daily  Patient taking differently: Take 1 tablet by mouth daily Yes LUIS Madrigal CNP   bisacodyl (DULCOLAX) 5 MG EC tablet Take 10 mg by mouth 2 times daily  Yes Historical Provider, MD        Social History     Tobacco Use    Smoking status: Former     Packs/day: 2.00     Years: 20.00     Pack years: 40.00     Types: Cigarettes     Start date: 1971     Quit date: 1991     Years since quittin.9    Smokeless tobacco: Never   Substance Use Topics    Alcohol use: Yes     Comment: rarely        Vitals:    10/20/22 0706   BP: 116/80   Site: Left Upper Arm   Position: Sitting   Pulse: 70   Resp: 16   Temp: 97.1 °F (36.2 °C)   TempSrc: Temporal   SpO2: 98%   Weight: 162 lb (73.5 kg)     Estimated body mass index is 26.15 kg/m² as calculated from the following:    Height as of 6/15/22: 5' 6\" (1.676 m). Weight as of this encounter: 162 lb (73.5 kg). Physical Exam  Vitals and nursing note reviewed. Constitutional:       General: She is not in acute distress. Appearance: Normal appearance. She is well-developed. She is not ill-appearing, toxic-appearing or diaphoretic. HENT:      Head: Normocephalic and atraumatic. Right Ear: External ear normal.      Left Ear: External ear normal.   Eyes:      General: No scleral icterus. Right eye: No discharge. Left eye: No discharge. Extraocular Movements: Extraocular movements intact. Conjunctiva/sclera: Conjunctivae normal.      Pupils: Pupils are equal, round, and reactive to light. Cardiovascular:      Rate and Rhythm: Normal rate and regular rhythm. Pulses:           Dorsalis pedis pulses are 2+ on the right side and 2+ on the left side. Heart sounds: Normal heart sounds, S1 normal and S2 normal. No murmur heard. No friction rub. No gallop. Pulmonary:      Effort: Pulmonary effort is normal. No respiratory distress. Breath sounds: Normal breath sounds. No stridor. No wheezing or rales. Musculoskeletal:      Right foot: Normal range of motion. Left foot: Normal range of motion. Feet:      Right foot:      Protective Sensation: 5 sites tested. 0 sites sensed. Left foot:      Protective Sensation: 5 sites tested. 0 sites sensed. Neurological:      General: No focal deficit present. Mental Status: She is alert and oriented to person, place, and time. Mental status is at baseline. Cranial Nerves: No cranial nerve deficit. Psychiatric:         Mood and Affect: Mood normal.         Speech: Speech normal.       ASSESSMENT/PLAN:  1. Type 2 diabetes mellitus without complication, without long-term current use of insulin ([address]- controlled, not medicated has been off of Metformin since 2019.  - Diabetic Foot Exam  - POCT microalbumin  - POCT glycosylated hemoglobin (Hb A1C)    2. Essential hypertension- controlled  - Comprehensive Metabolic Panel; Future  - CBC; Future  - Lipid, Fasting; Future    3. Hypothyroidism, unspecified type- clinically controlled will check TSH  - TSH with Reflex; Future    4. Arthritis, multiple joint involvement- multiple joint pain, quite bothersome. Will refer to rheumatology  - External Referral To Rheumatology    5. Chronic pain of multiple joints  - External Referral To Rheumatology    6. Chronic constipation- controlled, on Linzess        Return in about 6 months (around 4/20/2023).     An electronic signature was used Psychiatric/Behavioral: Negative  All other systems reviewed and are negative  Objective:    /78   Pulse 80   Resp 18   Ht 6' (1 829 m)   Wt 113 kg (250 lb)   SpO2 99%   BMI 33 91 kg/m²      Physical Exam  Vitals signs and nursing note reviewed  Constitutional:       General: He is not in acute distress  Appearance: Normal appearance  He is well-developed  He is obese  He is not ill-appearing  HENT:      Head: Normocephalic and atraumatic  Right Ear: Tympanic membrane, ear canal and external ear normal       Left Ear: Tympanic membrane, ear canal and external ear normal    Eyes:      Extraocular Movements: Extraocular movements intact  Conjunctiva/sclera: Conjunctivae normal       Pupils: Pupils are equal, round, and reactive to light  Neck:      Musculoskeletal: Normal range of motion and neck supple  Cardiovascular:      Rate and Rhythm: Normal rate and regular rhythm  Pulses: Normal pulses  Heart sounds: Normal heart sounds  No murmur  Pulmonary:      Effort: Pulmonary effort is normal       Breath sounds: Normal breath sounds  Abdominal:      General: Abdomen is flat  Bowel sounds are normal       Palpations: Abdomen is soft  Musculoskeletal: Normal range of motion  Skin:     General: Skin is warm and dry  Neurological:      General: No focal deficit present  Mental Status: He is alert and oriented to person, place, and time  Cranial Nerves: No cranial nerve deficit  Comments: Reproducible dizziness with neck extension while in in a seated position  Radial pulses remain intact   Psychiatric:         Mood and Affect: Mood normal          Behavior: Behavior normal          Thought Content:  Thought content normal          Judgment: Judgment normal            Recent Results (from the past 1008 hour(s))   CBC and differential    Collection Time: 03/05/21  8:19 AM   Result Value Ref Range    WBC 7 04 4 31 - 10 16 Thousand/uL    RBC 5  59 3 88 - 5 62 Million/uL    Hemoglobin 16 8 12 0 - 17 0 g/dL    Hematocrit 50 0 (H) 36 5 - 49 3 %    MCV 89 82 - 98 fL    MCH 30 1 26 8 - 34 3 pg    MCHC 33 6 31 4 - 37 4 g/dL    RDW 13 3 11 6 - 15 1 %    MPV 10 7 8 9 - 12 7 fL    Platelets 376 610 - 533 Thousands/uL    nRBC 0 /100 WBCs    Neutrophils Relative 58 43 - 75 %    Immat GRANS % 0 0 - 2 %    Lymphocytes Relative 24 14 - 44 %    Monocytes Relative 14 (H) 4 - 12 %    Eosinophils Relative 3 0 - 6 %    Basophils Relative 1 0 - 1 %    Neutrophils Absolute 4 08 1 85 - 7 62 Thousands/µL    Immature Grans Absolute 0 03 0 00 - 0 20 Thousand/uL    Lymphocytes Absolute 1 68 0 60 - 4 47 Thousands/µL    Monocytes Absolute 0 95 0 17 - 1 22 Thousand/µL    Eosinophils Absolute 0 21 0 00 - 0 61 Thousand/µL    Basophils Absolute 0 09 0 00 - 0 10 Thousands/µL   Comprehensive metabolic panel    Collection Time: 03/05/21  8:19 AM   Result Value Ref Range    Sodium 139 136 - 145 mmol/L    Potassium 3 9 3 5 - 5 3 mmol/L    Chloride 105 100 - 108 mmol/L    CO2 28 21 - 32 mmol/L    ANION GAP 6 4 - 13 mmol/L    BUN 25 5 - 25 mg/dL    Creatinine 1 07 0 60 - 1 30 mg/dL    Glucose, Fasting 174 (H) 65 - 99 mg/dL    Calcium 9 3 8 3 - 10 1 mg/dL    AST 23 5 - 45 U/L    ALT 45 12 - 78 U/L    Alkaline Phosphatase 50 46 - 116 U/L    Total Protein 7 4 6 4 - 8 2 g/dL    Albumin 4 1 3 5 - 5 0 g/dL    Total Bilirubin 0 53 0 20 - 1 00 mg/dL    eGFR 79 ml/min/1 73sq m   Hemoglobin A1C    Collection Time: 03/05/21  8:19 AM   Result Value Ref Range    Hemoglobin A1C 7 4 (H) Normal 3 8-5 6%; PreDiabetic 5 7-6 4%;  Diabetic >=6 5%; Glycemic control for adults with diabetes <7 0% %     mg/dl   Lipid panel    Collection Time: 03/05/21  8:19 AM   Result Value Ref Range    Cholesterol 134 50 - 200 mg/dL    Triglycerides 225 (H) <=150 mg/dL    HDL, Direct 40 >=40 mg/dL    LDL Calculated 49 0 - 100 mg/dL    Non-HDL-Chol (CHOL-HDL) 94 mg/dl   TSH, 3rd generation with Free T4 reflex    Collection to authenticate this note.     --LUIS Brooks - CNP on 10/20/2022 at 8:13 AM Time: 03/05/21  8:19 AM   Result Value Ref Range    TSH 3RD GENERATON 1 830 0 358 - 3 740 uIU/mL   Microalbumin / creatinine urine ratio    Collection Time: 03/05/21  8:19 AM   Result Value Ref Range    Creatinine, Ur 265 0 mg/dL    Microalbum  ,U,Random 21 0 (H) 0 0 - 20 0 mg/L    Microalb Creat Ratio 8 0 - 30 mg/g creatinine       Assessment/Plan:    Charcot foot due to diabetes mellitus ([facility] )    Lab Results   Component Value Date    HGBA1C 7 4 (H) 03/05/2021     Patient does have improved glycemic control  He does follow up with podiatrist on a monthly or even every other month basis  Type 2 diabetes mellitus with diabetic neuropathic arthropathy, with long-term current use of insulin ([facility])    Lab Results   Component Value Date    HGBA1C 7 4 (H) 03/05/2021     Significant improvement in hemoglobin A1c  Patient remains on Basaglar 50 units twice daily, Victoza and Januvia  Numbers could be better  Repeat diabetic parameters in 4 months    Benign essential hypertension  Remains well controlled on losartan/hydrochlorothiazide  Continue same  Re-evaluate in 4 months    Carotid artery calcification  Patient is due for repeat carotid Doppler  Will also need vertebrobasilar evaluation given the dizziness he X is experiencing when extending his neck    Rheumatoid arthritis ([facility] )  Managed by rheumatologist    Dizziness after extension of neck  Concern for vertebrobasilar insufficiency  Patient will be sent for Doppler  May need CT angiogram pending findings    Mixed hyperlipidemia  Cholesterol remains adequately controlled on atorvastatin 40 mg once daily  Watch dietary intake of fat, cholesterol, carbohydrate  Prostate cancer screening  PSA do with next set of labs    Vitamin D deficiency  Check vitamin-D level with next set of labs    Patient remains on vitamin-D supplementation          Problem List Items Addressed This Visit        Endocrine    Charcot foot due to diabetes mellitus ([facility] )       Lab Results   Component Value Date    HGBA1C 7 4 (H) 03/05/2021     Patient does have improved glycemic control  He does follow up with podiatrist on a monthly or even every other month basis  Type 2 diabetes mellitus with diabetic neuropathic arthropathy, with long-term current use of insulin ([facility])       Lab Results   Component Value Date    HGBA1C 7 4 (H) 03/05/2021     Significant improvement in hemoglobin A1c  Patient remains on Basaglar 50 units twice daily, Victoza and Januvia  Numbers could be better  Repeat diabetic parameters in 4 months         Relevant Orders    Hemoglobin A1C    Microalbumin / creatinine urine ratio       Cardiovascular and Mediastinum    Benign essential hypertension     Remains well controlled on losartan/hydrochlorothiazide  Continue same  Re-evaluate in 4 months         Relevant Orders    CBC and differential    TSH, 3rd generation with Free T4 reflex    Carotid artery calcification     Patient is due for repeat carotid Doppler  Will also need vertebrobasilar evaluation given the dizziness he X is experiencing when extending his neck         Relevant Orders    VAS carotid complete study    CBC and differential       Other    Dizziness after extension of neck     Concern for vertebrobasilar insufficiency  Patient will be sent for Doppler  May need CT angiogram pending findings         Relevant Orders    VAS carotid complete study    Mixed hyperlipidemia     Cholesterol remains adequately controlled on atorvastatin 40 mg once daily  Watch dietary intake of fat, cholesterol, carbohydrate  Relevant Orders    Comprehensive metabolic panel    Lipid panel    Prostate cancer screening     PSA do with next set of labs         Relevant Orders    PSA, Total Screen    Vitamin D deficiency - Primary     Check vitamin-D level with next set of labs  Patient remains on vitamin-D supplementation         Relevant Orders    Vitamin D 1,25 dihydroxy          BMI Counseling:  Body mass index is 33 91 kg/m²  The BMI is above normal  Nutrition recommendations include moderation in carbohydrate intake, increasing intake of lean protein, reducing intake of saturated fat and trans fat and reducing intake of cholesterol  I have spent 42 minutes with Patient and family today in which greater than 50% of this time was spent in counseling/coordination of care regarding Diagnostic results, Prognosis, Risks and benefits of tx options, Intructions for management, Patient and family education, Importance of tx compliance, Risk factor reductions and Impressions

## 2022-10-27 DIAGNOSIS — J45.909 UNCOMPLICATED ASTHMA, UNSPECIFIED ASTHMA SEVERITY, UNSPECIFIED WHETHER PERSISTENT: ICD-10-CM

## 2022-10-27 DIAGNOSIS — E11.9 TYPE 2 DIABETES MELLITUS WITHOUT COMPLICATION, WITHOUT LONG-TERM CURRENT USE OF INSULIN (HCC): ICD-10-CM

## 2022-10-27 RX ORDER — SITAGLIPTIN 100 MG/1
TABLET, FILM COATED ORAL
Qty: 30 TABLET | Refills: 1 | Status: SHIPPED | OUTPATIENT
Start: 2022-10-27

## 2022-10-27 RX ORDER — ALBUTEROL SULFATE 90 UG/1
2 AEROSOL, METERED RESPIRATORY (INHALATION) EVERY 4 HOURS PRN
Qty: 18 G | Refills: 1 | Status: SHIPPED | OUTPATIENT
Start: 2022-10-27

## 2022-10-27 RX ORDER — INSULIN GLARGINE 100 [IU]/ML
50 INJECTION, SOLUTION SUBCUTANEOUS EVERY 12 HOURS SCHEDULED
Qty: 30 ML | Refills: 1 | Status: SHIPPED | OUTPATIENT
Start: 2022-10-27

## 2022-10-28 DIAGNOSIS — E11.9 TYPE 2 DIABETES MELLITUS WITHOUT COMPLICATION, WITHOUT LONG-TERM CURRENT USE OF INSULIN (HCC): ICD-10-CM

## 2022-10-28 DIAGNOSIS — E13.9 DIABETES MELLITUS OF OTHER TYPE WITHOUT COMPLICATION, UNSPECIFIED WHETHER LONG TERM INSULIN USE (HCC): ICD-10-CM

## 2022-10-28 RX ORDER — LIRAGLUTIDE 6 MG/ML
1.8 INJECTION SUBCUTANEOUS DAILY
Qty: 9 ML | Refills: 0 | Status: SHIPPED | OUTPATIENT
Start: 2022-10-28

## 2022-11-14 ENCOUNTER — TELEPHONE (OUTPATIENT)
Dept: OBGYN CLINIC | Facility: HOSPITAL | Age: 57
End: 2022-11-14

## 2022-11-14 NOTE — TELEPHONE ENCOUNTER
Caller: Patient    Doctor: Jarvis Lawson    Reason for call: Patient needs to cancel 11/15 appt due to illness  Patient is requesting virtual visit      Call back#: [phone]

## 2022-11-15 ENCOUNTER — TELEPHONE (OUTPATIENT)
Dept: RHEUMATOLOGY | Facility: CLINIC | Age: 57
End: 2022-11-15

## 2022-11-15 ENCOUNTER — TELEMEDICINE (OUTPATIENT)
Dept: RHEUMATOLOGY | Facility: CLINIC | Age: 57
End: 2022-11-15

## 2022-11-15 DIAGNOSIS — Z79.899 HIGH RISK MEDICATION USE: ICD-10-CM

## 2022-11-15 DIAGNOSIS — M05.9 SEROPOSITIVE RHEUMATOID ARTHRITIS (HCC): Primary | ICD-10-CM

## 2022-11-15 DIAGNOSIS — M54.12 CERVICAL RADICULOPATHY: ICD-10-CM

## 2022-11-15 DIAGNOSIS — Z79.899 LONG-TERM USE OF PLAQUENIL: ICD-10-CM

## 2022-11-15 NOTE — PROGRESS NOTES
Assessment and Plan:   Patient is a 53-year-old male who presents for rheumatology follow-up for seropositive RA  At last visit, we continued with hydroxychloroquine and leflunomide and we subsequently started Enbrel due to uncontrolled disease  He was previously on Enbrel with good efficacy and so we decided to retry this  Unfortunately he reports it does not seem to be nearly as effective for him and he is still having a lot of issues with pain and stiffness in his upper extremity joints  He previously also failed Humira and we discussed that it would be most appropriate to go to a different drug class such as the JAK inhibitors  He was in agreement to try [place] after discussing the risks and benefits  We will submit prior authorization for this in the meantime he will continue hydroxychloroquine, leflunomide, and will continue Enbrel until we are able to get [place] approved  In terms of the shoulder and neck pain this does not sound related to his rheumatoid and sounds more like an orthopedic or radicular issue  I did encourage him to try PT and also follow-up with Orthopedics  Will get updated blood work in the next 2 weeks  Plan:  Diagnoses and all orders for this visit:    Seropositive rheumatoid arthritis ([facility] [address] )    Cervical radiculopathy    Long-term use of Plaquenil    High risk medication use        Follow-up plan: 3-4 months       Rheumatic Disease Summary  1  Seropositive RA   -Est care for seropositive RA (+RF, +CCP>250), dx around age 28-36, previously saw Dr Miracle Barcenas, then [address]   -Prior meds: MTX, HCQ (still on), leflunomide (still on), Enbrel (stopped 2020, worked best), Humira (8/2020-spring 2021, D/C after having abscess on leg, ineffective)  -Initial visit: on HCQ and LEF and naproxen, mild activity on exam, prior auth for enbrel  -Visit 11/15/22 (telemed): enbrel not effective, prior auth for xeljanz, cont HCQ and LEF  2   Comorbidities: HTN, DM2, IRMA, COPD, tobacco abuse, GERD, charcot joint s/p surgery, HLD     HPI  Quirino San  is a 49 y o   male who presents for rheumatology follow-up for seropositive RA  Last visit, he presented to \[facility]\"" care and was on treatment with hydroxychloroquine and leflunomide  He still had active disease and previously did well with Enbrel, so we had this reapproved and he started on treatment  He ended up going to ED for severe shoulder and neck pain  Uli Bolaños He also saw ortho about 1 month ago for this and they recommended physical therapy 1st before any additional imaging  He feels he has too much pain for PT  In terms of his RA, he unfortunately feels the Enbrel is not working for him anymore  We restarted Enbrel as this previously worked very well for him but he reports that since he has been on it over the last 3-4 months he has not seen any significant improvement  He is still on hydroxychloroquine and leflunomide  Still having issues with his elbows, hands, shoulders  Most flares are in his hands and elbows and shoulders  Morning stiffness is for a few hours and then feels ok as long as he keeps moving  Then will start to stiffen up again around 3pm    He may need to have surgery again on his charcot foot but really does not want to do this  The following portions of the patient's history were reviewed and updated as appropriate: allergies, current medications, past family history, past medical history, past social history, past surgical history and problem list     Review of Systems:   Review of Systems   Constitutional: Positive for fatigue  HENT: Positive for congestion  Musculoskeletal: Positive for arthralgias, joint swelling and neck pain         Home Medications:    Current Outpatient Medications:   •  Accu-Chek FastClix Lancets MISC, TEST BLOOD SUGAR FOUR TIMES A DAY BEFORE MEALS AND BEDTIME, Disp: 102 each, Rfl: 3  •  albuterol (PROVENTIL HFA,VENTOLIN HFA) 90 mcg/act inhaler, INHALE 2 PUFFS EVERY 4 (FOUR) HOURS AS NEEDED FOR WHEEZING, Disp: 18 g, Rfl: 1  •  Ascorbic Acid (vitamin C) 1000 MG tablet, Take 1,000 mg by mouth daily, Disp: , Rfl:   •  atorvastatin (LIPITOR) 40 mg tablet, TAKE 1 TABLET AT BEDTIME  (Patient taking differently: Take 40 mg by mouth daily with dinner), Disp: 90 tablet, Rfl: 1  •  Basaglar KwikPen 100 units/mL SOPN, INJECT 50 UNITS UNDER THE SKIN EVERY 12 (TWELVE) HOURS, Disp: 30 mL, Rfl: 1  •  BD Pen Needle Maureen U/F 32G X 4 MM MISC, ONE PEN NEEDLE THREE TIMES A DAY 2-BASAGLAR 1- VICTOZA, Disp: 100 each, Rfl: 3  •  Blood Glucose Monitoring Suppl (OneTouch Verio Flex System) w/Device KIT, Use 3 (three) times a day with meals, Disp: 1 kit, Rfl: 0  •  calcium carbonate (OS-NERY) 600 MG tablet, Take 600 mg by mouth daily with dinner, Disp: , Rfl:   •  Diclofenac Sodium (VOLTAREN) 1 %, Apply 2 g topically 4 (four) times a day for 10 days, Disp: 100 g, Rfl: 0  •  ergocalciferol (VITAMIN D2) 50,000 units, Take 50,000 Units by mouth once a week Takes on Saturday, Disp: , Rfl:   •  etanercept (Enbrel SureClick) 50 MG/ML injection, Inject 1 mL (50 mg total) under the skin once a week (Patient taking differently: Inject 50 mg under the skin once a week Takes on Wednesdays), Disp: 4 mL, Rfl: 5  •  fluticasone (FLONASE) 50 mcg/act nasal spray, USE 2 SPRAYS IN EACH NOSTRIL ONCE DAILY (Patient taking differently: 1 spray into each nostril daily at bedtime), Disp: 16 g, Rfl: 11  •  glucose blood (OneTouch Verio) test strip, CHECK GLUCOSE BEFORE MEALS, Disp: 100 strip, Rfl: 3  •  hydroxychloroquine (PLAQUENIL) 200 mg tablet, Take 1 tablet (200 mg total) by mouth 2 (two) times a day, Disp: 180 tablet, Rfl: 1  •  Januvia 100 MG tablet, TAKE 1 TABLET (100 MG TOTAL) BY MOUTH DAILY AFTER DINNER, Disp: 30 tablet, Rfl: 1  •  leflunomide (ARAVA) 20 MG tablet, Take 1 tablet (20 mg total) by mouth daily with dinner, Disp: 90 tablet, Rfl: 1  •  losartan-hydrochlorothiazide (HYZAAR) 100-12 5 MG per tablet, TAKE 1 TABLET BY MOUTH DAILY (Patient taking differently: Take 1 tablet by mouth daily in the early morning), Disp: 90 tablet, Rfl: 1  •  naproxen (NAPROSYN) 500 mg tablet, TAKE 1 TABLET (500 MG TOTAL) BY MOUTH EVERY 12 (TWELVE) HOURS AS NEEDED FOR MILD PAIN (WITH FOOD), Disp: 60 tablet, Rfl: 2  •  nicotine (NICODERM CQ) 21 mg/24 hr TD 24 hr patch, Place 1 patch on the skin every 24 hours, Disp: 28 patch, Rfl: 1  •  olopatadine (PATANOL) 0 1 % ophthalmic solution, Administer 1 drop to both eyes in the morning and 1 drop in the evening   (Patient taking differently: Administer 1 drop to both eyes if needed), Disp: 5 mL, Rfl: 1  •  oxyCODONE-acetaminophen (PERCOCET) 5-325 mg per tablet, Take 1 tablet by mouth every 4 (four) hours as needed for moderate pain for up to 10 doses Max Daily Amount: 6 tablets, Disp: 10 tablet, Rfl: 0  •  pantoprazole (PROTONIX) 40 mg tablet, TAKE 1 TABLET (40 MG TOTAL) BY MOUTH DAILY (Patient taking differently: Take 40 mg by mouth daily in the early morning), Disp: 29 tablet, Rfl: 11  •  saccharomyces boulardii (FLORASTOR) 250 mg capsule, Take 1 capsule (250 mg total) by mouth 2 (two) times a day, Disp: 60 capsule, Rfl: 0  •  sucralfate (CARAFATE) 1 g tablet, TAKE 1 TABLET (1 G TOTAL) BY MOUTH 4 (FOUR) TIMES A DAY, Disp: 120 tablet, Rfl: 4  •  tamsulosin (FLOMAX) 0 4 mg, Take 1 capsule (0 4 mg total) by mouth daily with dinner Begin 5 days preop, Disp: 10 capsule, Rfl: 0  •  ULTICARE MICRO PEN NEEDLES 32G X 4 MM MISC, INJECT UNDER THE SKIN 3 (THREE) TIMES A DAY, Disp: 100 each, Rfl: 3  •  Victoza injection, INJECT 0 3 ML (1 8 MG TOTAL) UNDER THE SKIN DAILY, Disp: 9 mL, Rfl: 0      Imaging:   XR cervical 9/23/22:     FINDINGS:     No fracture      Straightening of the cervical lordosis may be related to patient positioning or muscle spasm   No evidence of dynamic malalignment      The intervertebral disc spaces are preserved      No evidence of dynamic instability seen with flexion or extension      The prevertebral soft tissues are within normal limits        Mild right C4-5 foraminal stenosis and mild left C5-6 foraminal stenosis secondary to uncovertebral and facet hypertrophy      The lung apices are clear      IMPRESSION:  Mild degenerative change   Stable alignment on dynamic imaging      Labs:   Component      Latest Ref Rng & Units 7/18/2022 9/19/2022   Sodium      135 - 147 mmol/L 139 138   Potassium      3 5 - 5 3 mmol/L 4 0 3 7   Chloride      96 - 108 mmol/L 105 107   CO2      21 - 32 mmol/L 27 27   Anion Gap      4 - 13 mmol/L 7 4   BUN      5 - 25 mg/dL 21 23   Creatinine      0 60 - 1 30 mg/dL 1 12 1 08   Glucose, Random      65 - 140 mg/dL 112     Calcium      8 3 - 10 1 mg/dL 9 2 8 7   AST      5 - 45 U/L 26 20   ALT      12 - 78 U/L 45 40   Alkaline Phosphatase      46 - 116 U/L 55 51   Total Protein      6 4 - 8 4 g/dL 7 9 7 0   Albumin      3 5 - 5 0 g/dL 4 1 3 6   TOTAL BILIRUBIN      0 20 - 1 00 mg/dL 0 76 0 50   eGFR      ml/min/1 73sq m 74 77   GLUCOSE FASTING      65 - 99 mg/dL   157 (H)   WBC      4 31 - 10 16 Thousand/uL 6 90 6 49   Red Blood Cell Count      3 88 - 5 62 Million/uL 5 49 5 28   Hemoglobin      12 0 - 17 0 g/dL 16 3 15 7   HCT      36 5 - 49 3 % 48 6 46 7   MCV      82 - 98 fL 89 88   MCH      26 8 - 34 3 pg 29 7 29 7   MCHC      31 4 - 37 4 g/dL 33 5 33 6   RDW      11 6 - 15 1 % 13 9 13 7   Platelet Count      189 - 390 Thousands/uL 206 174   MPV      8 9 - 12 7 fL 11 2 10 9   QFT Nil      0 - 8 0 IU/ml 0 07     QFT TB1-NIL      IU/ml 0 02     QFT TB2-NIL      IU/ml 0 00     QFT Mitogen-NIL      IU/ml 9 72     QFT Final Interpretation      Negative Negative     HEPATITIS B SURFACE ANTIGEN      Non-reactive, NonReactive - Confirmed Non-reactive     HEPATITIS C ANTIBODY      Non-reactive Non-reactive     HEPATITIS B CORE IGM ANTIBODY      Non-reactive Non-reactive     HEPATITIS B CORE TOTAL ANTIBODY      Non-reactive Non-reactive     C-REACTIVE PROTEIN <3 0 mg/L <3 0     Sed Rate      0 - 19 mm/hour 24 (H)        The visit today was conducted via telephone  Total time spent on the visit including record review and discussion with patient regarding symptoms and treatment plan was 20 minutes

## 2022-11-15 NOTE — TELEPHONE ENCOUNTER
Please submit auth for Kishan Love 11mg daily for RA  Failed MTX, plaquenil, leflunomide, enbrel and humira

## 2022-11-21 RX ORDER — TOFACITINIB 11 MG/1
11 TABLET, FILM COATED, EXTENDED RELEASE ORAL DAILY
Qty: 30 TABLET | Refills: 5 | Status: SHIPPED | OUTPATIENT
Start: 2022-11-21

## 2022-11-22 DIAGNOSIS — E11.9 TYPE 2 DIABETES MELLITUS WITHOUT COMPLICATION, WITHOUT LONG-TERM CURRENT USE OF INSULIN (HCC): ICD-10-CM

## 2022-11-22 DIAGNOSIS — E11.42 TYPE 2 DIABETES MELLITUS WITH DIABETIC POLYNEUROPATHY, WITH LONG-TERM CURRENT USE OF INSULIN (HCC): ICD-10-CM

## 2022-11-22 DIAGNOSIS — Z79.4 TYPE 2 DIABETES MELLITUS WITH DIABETIC POLYNEUROPATHY, WITH LONG-TERM CURRENT USE OF INSULIN (HCC): ICD-10-CM

## 2022-11-22 DIAGNOSIS — E13.9 DIABETES MELLITUS OF OTHER TYPE WITHOUT COMPLICATION, UNSPECIFIED WHETHER LONG TERM INSULIN USE (HCC): ICD-10-CM

## 2022-11-22 RX ORDER — SITAGLIPTIN 100 MG/1
TABLET, FILM COATED ORAL
Qty: 30 TABLET | Refills: 1 | Status: SHIPPED | OUTPATIENT
Start: 2022-11-22

## 2022-11-22 RX ORDER — PEN NEEDLE, DIABETIC 32GX 5/32"
NEEDLE, DISPOSABLE MISCELLANEOUS
Qty: 100 EACH | Refills: 3 | Status: SHIPPED | OUTPATIENT
Start: 2022-11-22

## 2022-11-25 ENCOUNTER — TELEMEDICINE (OUTPATIENT)
Dept: FAMILY MEDICINE CLINIC | Facility: CLINIC | Age: 57
End: 2022-11-25

## 2022-11-25 DIAGNOSIS — U07.1 COVID-19: Primary | ICD-10-CM

## 2022-11-25 RX ORDER — AZITHROMYCIN 250 MG/1
TABLET, FILM COATED ORAL
Qty: 6 TABLET | Refills: 0 | Status: SHIPPED | OUTPATIENT
Start: 2022-11-25 | End: 2022-11-29

## 2022-11-25 NOTE — PROGRESS NOTES
COVID-19 Outpatient Progress Note    Assessment/Plan:    Problem List Items Addressed This Visit        Other    COVID-19 - Primary    Relevant Medications    azithromycin (ZITHROMAX) 250 mg tablet      Disposition:     Patient has asymptomatic or mild COVID-19 infection  Based off CDC guidelines, they were recommended to isolate for 5 days  If they are asymptomatic or symptoms are improving with no fevers in the past 24 hours, isolation may be ended followed by 5 days of wearing a mask when around othes to minimize risk of infecting others  If still have a fever or other symptoms have not improved, continue to isolate until they improve  Regardless of when they end isolation, avoid being around people who are more likely to get very sick from COVID-19 until at least day 11  I have spent 8 minutes directly with the patient  Greater than 50% of this time was spent in counseling/coordination of care regarding: diagnostic results, prognosis, risks and benefits of treatment options, instructions for management, patient and family education and impressions  - Advised on supportive care measures regarding COVID-19 infection    - Recommend that the patient self isolate in his home for a total of 5 days  Diagnosed with COVID-19 infection on 11/24/2022 and the earliest he would be released from self isolation would be on 11/29/2022     - Advised patient to contact the office should he develop any significant respiratory symptoms such as dyspnea on exertion or overt shortness of breath  Encounter provider: Claude Crease, DO     Provider located at: [address] [place] [id number]     Recent Visits  No visits were found meeting these conditions    Showing recent visits within past 7 days and meeting all other requirements  Today's Visits  Date Type Provider Dept   11/25/22 Telemedicine Claude Crease, DO [facility]   Showing today's visits and meeting all other requirements  Future Appointments  No visits were found meeting these conditions  Showing future appointments within next 150 days and meeting all other requirements     This virtual check-in was done via [employer] and patient was informed that this is a secure, HIPAA-compliant platform  He agrees to proceed  Patient agrees to participate in a virtual check in via telephone or video visit instead of presenting to the office to address urgent/immediate medical needs  Patient is aware this is a billable service  He acknowledged consent and understanding of privacy and security of the video platform  The patient has agreed to participate and understands they can discontinue the visit at any time  After connecting through [facility], the patient was identified by name and date of birth  Natalio Walton  was informed that this was a telemedicine visit and that the exam was being conducted confidentially over secure lines  My office door was closed  No one else was in the room  Natalio Walton  acknowledged consent and understanding of privacy and security of the telemedicine visit  I informed the patient that I have reviewed his record in Epic and presented the opportunity for him to ask any questions regarding the visit today  The patient agreed to participate  Verification of patient location:  Patient is located in the following state in which I hold an active license: [place]    Subjective:   Natalio Walton  is a 49 y o  male who has been screened for COVID-19  Symptom change since last report: improving  Patient's symptoms include fatigue, nasal congestion, sore throat, cough and headache  Patient denies fever, chills, malaise, rhinorrhea, anosmia, loss of taste, shortness of breath, chest tightness, abdominal pain, nausea, vomiting, diarrhea and myalgias  - Date of symptom onset: 11/23/2022  - Date of positive COVID-19 test: 11/24/2022  Type of test: Home antigen  Patient with typical symptoms of COVID-19 and they attest that they were positive on home rapid antigen testing  Image of positive result is not able to be uploaded into their chart  COVID-19 vaccination status: Fully vaccinated (primary series)    Arlet Hernández has been staying home and has isolated themselves in his home  He is taking care to not share personal items and is cleaning all surfaces that are touched often, like counters, tabletops, and doorknobs using household cleaning sprays or wipes  He is wearing a mask when he leaves his room  Lab Results   Component Value Date    SARSCOV2 Negative 12/27/2021    SARSCOV2 Not Detected 07/19/2020       Review of Systems   Constitutional: Positive for fatigue  Negative for chills and fever  HENT: Positive for congestion and sore throat  Negative for rhinorrhea  Respiratory: Positive for cough  Negative for chest tightness and shortness of breath  Gastrointestinal: Negative for abdominal pain, diarrhea, nausea and vomiting  Musculoskeletal: Negative for myalgias  Neurological: Positive for headaches       Current Outpatient Medications on File Prior to Visit   Medication Sig   • Accu-Chek FastClix Lancets MISC TEST BLOOD SUGAR FOUR TIMES A DAY BEFORE MEALS AND BEDTIME   • albuterol (PROVENTIL HFA,VENTOLIN HFA) 90 mcg/act inhaler INHALE 2 PUFFS EVERY 4 (FOUR) HOURS AS NEEDED FOR WHEEZING   • Ascorbic Acid (vitamin C) 1000 MG tablet Take 1,000 mg by mouth daily   • atorvastatin (LIPITOR) 40 mg tablet TAKE 1 TABLET AT BEDTIME  (Patient taking differently: Take 40 mg by mouth daily with dinner)   • Basaglar KwikPen 100 units/mL SOPN INJECT 50 UNITS UNDER THE SKIN EVERY 12 (TWELVE) HOURS   • BD Pen Needle Maureen U/F 32G X 4 MM MISC ONE PEN NEEDLE THREE TIMES A DAY 2-BASAGLAR 1- VICTOZA   • Blood Glucose Monitoring Suppl (OneTouch Verio Flex System) w/Device KIT Use 3 (three) times a day with meals   • calcium carbonate (OS-NERY) 600 MG tablet Take 600 mg by mouth daily with dinner   • Diclofenac Sodium (VOLTAREN) 1 % Apply 2 g topically 4 (four) times a day for 10 days   • ergocalciferol (VITAMIN D2) 50,000 units Take 50,000 Units by mouth once a week Takes on Saturday   • fluticasone (FLONASE) 50 mcg/act nasal spray USE 2 SPRAYS IN EACH NOSTRIL ONCE DAILY (Patient taking differently: 1 spray into each nostril daily at bedtime)   • glucose blood (OneTouch Verio) test strip CHECK GLUCOSE BEFORE MEALS   • hydroxychloroquine (PLAQUENIL) 200 mg tablet Take 1 tablet (200 mg total) by mouth 2 (two) times a day   • Januvia 100 MG tablet TAKE 1 TABLET (100 MG TOTAL) BY MOUTH DAILY AFTER DINNER   • leflunomide (ARAVA) 20 MG tablet Take 1 tablet (20 mg total) by mouth daily with dinner   • losartan-hydrochlorothiazide (HYZAAR) 100-12 5 MG per tablet TAKE 1 TABLET BY MOUTH DAILY (Patient taking differently: Take 1 tablet by mouth daily in the early morning)   • naproxen (NAPROSYN) 500 mg tablet TAKE 1 TABLET (500 MG TOTAL) BY MOUTH EVERY 12 (TWELVE) HOURS AS NEEDED FOR MILD PAIN (WITH FOOD)   • nicotine (NICODERM CQ) 21 mg/24 hr TD 24 hr patch Place 1 patch on the skin every 24 hours   • olopatadine (PATANOL) 0 1 % ophthalmic solution Administer 1 drop to both eyes in the morning and 1 drop in the evening   (Patient taking differently: Administer 1 drop to both eyes if needed)   • oxyCODONE-acetaminophen (PERCOCET) 5-325 mg per tablet Take 1 tablet by mouth every 4 (four) hours as needed for moderate pain for up to 10 doses Max Daily Amount: 6 tablets   • pantoprazole (PROTONIX) 40 mg tablet TAKE 1 TABLET (40 MG TOTAL) BY MOUTH DAILY (Patient taking differently: Take 40 mg by mouth daily in the early morning)   • saccharomyces boulardii (FLORASTOR) 250 mg capsule Take 1 capsule (250 mg total) by mouth 2 (two) times a day   • sucralfate (CARAFATE) 1 g tablet TAKE 1 TABLET (1 G TOTAL) BY MOUTH 4 (FOUR) TIMES A DAY   • tamsulosin (FLOMAX) 0 4 mg Take 1 capsule (0 4 mg total) by mouth daily with dinner Begin 5 days preop   • Tofacitinib Citrate ER (Xeljanz XR) 11 MG TB24 Take 1 tablet (11 mg total) by mouth daily   • ULTICARE MICRO PEN NEEDLES 32G X 4 MM MISC INJECT UNDER THE SKIN 3 (THREE) TIMES A DAY   • Victoza injection INJECT 0 3 ML (1 8 MG TOTAL) UNDER THE SKIN DAILY       Objective: There were no vitals taken for this visit  Physical Exam  Vitals and nursing note reviewed  Constitutional:       General: He is not in acute distress  Appearance: Normal appearance  He is well-developed  He is not ill-appearing or diaphoretic  HENT:      Head: Normocephalic and atraumatic  Eyes:      Extraocular Movements: Extraocular movements intact  Pupils: Pupils are equal, round, and reactive to light  Pulmonary:      Effort: Pulmonary effort is normal    Neurological:      General: No focal deficit present  Mental Status: He is alert and oriented to person, place, and time  Psychiatric:         Mood and Affect: Mood normal          Behavior: Behavior normal          Thought Content:  Thought content normal          Judgment: Judgment normal        Mu Wilkins DO

## 2022-11-30 DIAGNOSIS — E13.9 DIABETES MELLITUS OF OTHER TYPE WITHOUT COMPLICATION, UNSPECIFIED WHETHER LONG TERM INSULIN USE (HCC): ICD-10-CM

## 2022-11-30 DIAGNOSIS — E11.9 TYPE 2 DIABETES MELLITUS WITHOUT COMPLICATION, WITHOUT LONG-TERM CURRENT USE OF INSULIN (HCC): ICD-10-CM

## 2022-11-30 RX ORDER — LIRAGLUTIDE 6 MG/ML
1.8 INJECTION SUBCUTANEOUS DAILY
Qty: 9 ML | Refills: 0 | Status: SHIPPED | OUTPATIENT
Start: 2022-11-30

## 2022-12-21 ENCOUNTER — TELEPHONE (OUTPATIENT)
Dept: FAMILY MEDICINE CLINIC | Facility: CLINIC | Age: 57
End: 2022-12-21

## 2022-12-21 NOTE — TELEPHONE ENCOUNTER
Patient's wife called and stated there was supposed to be a prior auth for Januvia 100mg? I didn't see anything in [address] [place] [address] and we did sample 2 boxes for him (1 month supply)

## 2022-12-30 DIAGNOSIS — E11.9 TYPE 2 DIABETES MELLITUS WITHOUT COMPLICATION, WITHOUT LONG-TERM CURRENT USE OF INSULIN (HCC): ICD-10-CM

## 2022-12-30 DIAGNOSIS — E13.9 DIABETES MELLITUS OF OTHER TYPE WITHOUT COMPLICATION, UNSPECIFIED WHETHER LONG TERM INSULIN USE (HCC): ICD-10-CM

## 2022-12-30 RX ORDER — LIRAGLUTIDE 6 MG/ML
1.8 INJECTION SUBCUTANEOUS DAILY
Qty: 9 ML | Refills: 0 | Status: SHIPPED | OUTPATIENT
Start: 2022-12-30

## 2023-01-13 ENCOUNTER — TELEPHONE (OUTPATIENT)
Dept: FAMILY MEDICINE CLINIC | Facility: CLINIC | Age: 58
End: 2023-01-13

## 2023-01-13 NOTE — TELEPHONE ENCOUNTER
Dr Penney Frankel called patients insurance for a peer to peer  The Januvia is nolonger covered along with the Victoza   Patient will remain on Victoza alone  Patient's wife was advised

## 2023-01-16 DIAGNOSIS — E11.42 TYPE 2 DIABETES MELLITUS WITH DIABETIC POLYNEUROPATHY, WITH LONG-TERM CURRENT USE OF INSULIN (HCC): ICD-10-CM

## 2023-01-16 DIAGNOSIS — K25.3 ACUTE GASTRIC ULCER WITHOUT HEMORRHAGE OR PERFORATION: ICD-10-CM

## 2023-01-16 DIAGNOSIS — Z79.4 TYPE 2 DIABETES MELLITUS WITH DIABETIC POLYNEUROPATHY, WITH LONG-TERM CURRENT USE OF INSULIN (HCC): ICD-10-CM

## 2023-01-16 RX ORDER — SUCRALFATE 1 G/1
1 TABLET ORAL 4 TIMES DAILY
Qty: 120 TABLET | Refills: 4 | Status: SHIPPED | OUTPATIENT
Start: 2023-01-16

## 2023-01-16 RX ORDER — LANCETS
EACH MISCELLANEOUS
Qty: 102 EACH | Refills: 3 | Status: SHIPPED | OUTPATIENT
Start: 2023-01-16

## 2023-01-18 DIAGNOSIS — E11.9 TYPE 2 DIABETES MELLITUS WITHOUT COMPLICATION, WITHOUT LONG-TERM CURRENT USE OF INSULIN (HCC): ICD-10-CM

## 2023-01-18 DIAGNOSIS — E13.9 DIABETES MELLITUS OF OTHER TYPE WITHOUT COMPLICATION, UNSPECIFIED WHETHER LONG TERM INSULIN USE (HCC): ICD-10-CM

## 2023-01-18 RX ORDER — LIRAGLUTIDE 6 MG/ML
1.8 INJECTION SUBCUTANEOUS DAILY
Qty: 9 ML | Refills: 0 | Status: SHIPPED | OUTPATIENT
Start: 2023-01-18

## 2023-01-20 DIAGNOSIS — K25.3 ACUTE GASTRIC ULCER WITHOUT HEMORRHAGE OR PERFORATION: ICD-10-CM

## 2023-01-20 RX ORDER — PANTOPRAZOLE SODIUM 40 MG/1
40 TABLET, DELAYED RELEASE ORAL DAILY
Qty: 30 TABLET | Refills: 11 | Status: SHIPPED | OUTPATIENT
Start: 2023-01-20

## 2023-02-03 ENCOUNTER — TELEMEDICINE (OUTPATIENT)
Dept: FAMILY MEDICINE CLINIC | Facility: CLINIC | Age: 58
End: 2023-02-03

## 2023-02-03 DIAGNOSIS — E11.610 CHARCOT FOOT DUE TO DIABETES MELLITUS (HCC): ICD-10-CM

## 2023-02-03 DIAGNOSIS — E78.2 MIXED HYPERLIPIDEMIA: ICD-10-CM

## 2023-02-03 DIAGNOSIS — Z79.4 TYPE 2 DIABETES MELLITUS WITH DIABETIC NEUROPATHIC ARTHROPATHY, WITH LONG-TERM CURRENT USE OF INSULIN (HCC): Primary | ICD-10-CM

## 2023-02-03 DIAGNOSIS — S81.002D OPEN KNEE WOUND, LEFT, SUBSEQUENT ENCOUNTER: ICD-10-CM

## 2023-02-03 DIAGNOSIS — E66.9 OBESITY (BMI 30.0-34.9): ICD-10-CM

## 2023-02-03 DIAGNOSIS — E11.610 TYPE 2 DIABETES MELLITUS WITH DIABETIC NEUROPATHIC ARTHROPATHY, WITH LONG-TERM CURRENT USE OF INSULIN (HCC): Primary | ICD-10-CM

## 2023-02-03 DIAGNOSIS — M05.79 RHEUMATOID ARTHRITIS INVOLVING MULTIPLE SITES WITH POSITIVE RHEUMATOID FACTOR (HCC): ICD-10-CM

## 2023-02-03 PROBLEM — K82.8 BILIARY DYSKINESIA: Status: RESOLVED | Noted: 2020-03-16 | Resolved: 2023-02-03

## 2023-02-03 PROBLEM — K82.8 DYSKINESIA OF GALLBLADDER: Status: RESOLVED | Noted: 2022-08-15 | Resolved: 2023-02-03

## 2023-02-03 PROBLEM — Z63.0 MARITAL PROBLEM: Status: RESOLVED | Noted: 2021-07-15 | Resolved: 2023-02-03

## 2023-02-03 PROBLEM — L03.116 CELLULITIS OF LEFT KNEE: Status: RESOLVED | Noted: 2021-04-26 | Resolved: 2023-02-03

## 2023-02-03 NOTE — ASSESSMENT & PLAN NOTE
Patient remains on atorvastatin 40 mg once daily    He is overdue for repeat lipid panel and will have that done within the next month

## 2023-02-03 NOTE — ASSESSMENT & PLAN NOTE
Lab Results   Component Value Date    HGBA1C 6 1 (H) 09/19/2022   Better glycemic control    Patient is overdue for follow-up with podiatrist

## 2023-02-03 NOTE — ASSESSMENT & PLAN NOTE
Patient has been seeing Heidi Richard rheumatologist   That rheumatologist left the network  Seeing new rheumatologist and did have virtual visit  Does need repeat lab testing including C-reactive protein, sed rate    Remains on [place]

## 2023-02-03 NOTE — ASSESSMENT & PLAN NOTE
Lab Results   Component Value Date    HGBA1C 6 1 (H) 09/19/2022     Patient is overdue for repeat testing  Insurance is no longer covering [facility]  Patient remains on [facility]    Patient will have repeat testing done within a month

## 2023-02-03 NOTE — PROGRESS NOTES
Virtual Regular Visit    Verification of patient location:    Patient is located in the following state in which I hold an active license [place]      Assessment/Plan:    Problem List Items Addressed This Visit        Endocrine    Charcot foot due to diabetes mellitus ([facility] [address] )       Lab Results   Component Value Date    HGBA1C 6 1 (H) 09/19/2022   Better glycemic control  Patient is overdue for follow-up with podiatrist         Type 2 diabetes mellitus with diabetic neuropathic arthropathy, with long-term current use of insulin ([facility] [address] ) - Primary       Lab Results   Component Value Date    HGBA1C 6 1 (H) 09/19/2022     Patient is overdue for repeat testing  Insurance is no longer covering [address] [facility]  Patient remains on [facility]  Patient will have repeat testing done within a month         Relevant Orders    Hemoglobin A1C    Comprehensive metabolic panel    CBC and differential    Microalbumin / creatinine urine ratio    Lipid Panel with Direct LDL reflex    TSH, 3rd generation with Free T4 reflex       Musculoskeletal and Integument    Rheumatoid arthritis involving multiple sites with positive rheumatoid factor ([facility] [address] )     Patient has been seeing Heidi Richard rheumatologist   That rheumatologist left the network  Seeing new rheumatologist and did have virtual visit  Does need repeat lab testing including C-reactive protein, sed rate  Remains on [place]            Other    Mixed hyperlipidemia     Patient remains on atorvastatin 40 mg once daily  He is overdue for repeat lipid panel and will have that done within the next month         Relevant Orders    Lipid Panel with Direct LDL reflex    Obesity (BMI 30 0-34  9)    Relevant Orders    TSH, 3rd generation with Free T4 reflex    RESOLVED: Open knee wound, left, subsequent encounter       BMI Counseling: There is no height or weight on file to calculate BMI   The BMI is above normal  Nutrition recommendations include moderation in carbohydrate intake, increasing intake of lean protein and reducing intake of cholesterol  Exercise recommendations include exercising 3-5 times per week  Rationale for BMI follow-up plan is due to patient being overweight or obese  Depression Screening and Follow-up Plan: Patient was screened for depression during today's encounter  They screened negative with a PHQ-2 score of 0  Reason for visit is   Chief Complaint   Patient presents with   • Virtual Regular Brief   • Virtual Regular Visit        Encounter provider Dilan Sanchez DO    Provider located at [address] [place] [id number]      Recent Visits  No visits were found meeting these conditions  Showing recent visits within past 7 days and meeting all other requirements  Today's Visits  Date Type Provider Dept   02/03/23 Telemedicine Dilan Sanchez DO [facility]   Showing today's visits and meeting all other requirements  Future Appointments  No visits were found meeting these conditions  Showing future appointments within next 150 days and meeting all other requirements       The patient was identified by name and date of birth  Vargascassidy Hunter  was informed that this is a telemedicine visit and that the visit is being conducted through the [address] [employer] platform  He agrees to proceed     My office door was closed  No one else was in the room  He acknowledged consent and understanding of privacy and security of the video platform  The patient has agreed to participate and understands they can discontinue the visit at any time  Patient is aware this is a billable service  Juliette Park  is a 56 y o  male    71-year-old male presents via video virtual visit for follow-up in regards to chronic conditions  Patient does have a new rheumatologist and did perform virtual video visit  He has not had his blood work done since September  He knows that he is overdue  Occasionally he does develop mild episodes of hypoglycemia  He can feel his blood sugars get lower  He has been active so he is not always eating 3 full meals throughout the course of the day  Sometimes he will have a banana for breakfast, soup for lunch and then will have a larger dinner at night  He knows that he should change his meal pattern so that he has full meals  Insurance no longer covering [address]  He remains on Basaglar  No foot ulcers    He is overdue for follow-up with podiatrist        Past Medical History:   Diagnosis Date   • Abscess of left thigh 5/11/2021   • Arthritis    • At risk for falls    • Biliary dyskinesia 3/16/2020   • Broken foot     right   • Cataract     giacomo   • Cellulitis of left lower extremity 4/26/2021   • COPD (chronic obstructive pulmonary disease) ([facility])    • Diabetes mellitus ([facility] [address] )    • Dyskinesia of gallbladder 8/15/2022   • Hx MRSA infection     Per wife patient hadf MRSa in a wound awhile ago   • Hyperlipidemia    • Kidney stone    • Neuropathy    • RA (rheumatoid arthritis) ([facility] [address] )    • Rheumatoid arthritis ([facility] [address] )    • Seasonal allergies    • Sepsis ([facility] [address] ) 4/26/2021   • Snores    • Uses wheelchair     and crutches- NWB RLE   • Wears glasses        Past Surgical History:   Procedure Laterality Date   • APPENDECTOMY     • CLOSED REDUCTION FOOT DISLOCATION Right 2/12/2019    Procedure: C/R FRACTURE;  Surgeon: Gilbert Zapata DPM;  [facility]: [facility];  Service: Podiatry   • COLONOSCOPY     • FOOT SURGERY Right 07/2019    Removal of the bone graft and cleaned out infection, and placed new bone graft   • IR PICC PLACEMENT SINGLE LUMEN  8/5/2019   • MA DEBRIDEMENT SUBCUTANEOUS TISSUE 20 SQ CM/< Left 5/24/2021    Procedure: INCISION AND DRAINAGE THIGH;  Surgeon: Aurora Telles MD;  [facility];  Service: Orthopedics   • MA EXCISION MALIGNANT LESION S/N/H/F/G 0 5 CM/< N/A 3/28/2018    Procedure: EXCISION WIDE LESION HEAD/FACIAL/NECK;  Surgeon: New Wagner MD; [facility];  Service: Surgical Oncology   • VT GASTROCNEMIUS RECESSION Right 2/12/2019    Procedure: ENDO GASTROC RECESSION, APPLICATION OF EXTERNAL FIXATOR;  Surgeon: Helio Quiles DPM;  [facility];  Service: Podiatry   • VT LAPAROSCOPY SURG CHOLECYSTECTOMY N/A 9/30/2022    Procedure: CHOLECYSTECTOMY LAPAROSCOPIC;  Surgeon: Viki Sosa MD;  [facility];  Service: General   • VT REMOVAL EXTERNAL FIXATION SYSTEM UNDER ANES Right 4/18/2019    Procedure: Merl Eaton REMOVAL HARDWARE FOOT WITH APPLICATION OF GRAFT;  Surgeon: Helio Quiles DPM;  [facility];  Service: Podiatry   • SKIN BIOPSY      scalp   • WISDOM TOOTH EXTRACTION  1998   • WOUND DEBRIDEMENT Left 4/29/2021    Procedure: KNEE INCISION AND DRAINAGE, EXCISIONAL DEBRIDEMENT OF PREPATELLAR BURSA; Surgeon: Kwame Hyde MD;  [facility];  Service: Orthopedics       Current Outpatient Medications   Medication Sig Dispense Refill   • Accu-Chek FastClix Lancets MISC TEST BLOOD SUGAR FOUR TIMES A DAY BEFORE MEALS AND BEDTIME 102 each 3   • albuterol (PROVENTIL HFA,VENTOLIN HFA) 90 mcg/act inhaler INHALE 2 PUFFS EVERY 4 (FOUR) HOURS AS NEEDED FOR WHEEZING 18 g 1   • Ascorbic Acid (vitamin C) 1000 MG tablet Take 1,000 mg by mouth daily     • atorvastatin (LIPITOR) 40 mg tablet TAKE 1 TABLET AT BEDTIME   90 tablet 1   • Basaglar KwikPen 100 units/mL SOPN INJECT 50 UNITS UNDER THE SKIN EVERY 12 (TWELVE) HOURS 30 mL 1   • BD Pen Needle Maureen U/F 32G X 4 MM MISC ONE PEN NEEDLE THREE TIMES A DAY 2-BASAGLAR 1- VICTOZA 100 each 3   • Blood Glucose Monitoring Suppl (OneTouch Verio Flex System) w/Device KIT Use 3 (three) times a day with meals 1 kit 0   • calcium carbonate (OS-NERY) 600 MG tablet Take 600 mg by mouth daily with dinner     • ergocalciferol (VITAMIN D2) 50,000 units Take 50,000 Units by mouth once a week Takes on Saturday     • fluticasone (FLONASE) 50 mcg/act nasal spray USE 2 SPRAYS IN EACH NOSTRIL ONCE DAILY 16 g 11   • glucose blood (OneTouch Verio) test strip CHECK GLUCOSE BEFORE MEALS 100 strip 3   • leflunomide (ARAVA) 20 MG tablet Take 1 tablet (20 mg total) by mouth daily with dinner 90 tablet 1   • losartan-hydrochlorothiazide (HYZAAR) 100-12 5 MG per tablet TAKE 1 TABLET BY MOUTH DAILY 90 tablet 1   • naproxen (NAPROSYN) 500 mg tablet TAKE 1 TABLET (500 MG TOTAL) BY MOUTH EVERY 12 (TWELVE) HOURS AS NEEDED FOR MILD PAIN (WITH FOOD) 60 tablet 2   • olopatadine (PATANOL) 0 1 % ophthalmic solution Administer 1 drop to both eyes in the morning and 1 drop in the evening   (Patient taking differently: Administer 1 drop to both eyes if needed) 5 mL 1   • oxyCODONE-acetaminophen (PERCOCET) 5-325 mg per tablet Take 1 tablet by mouth every 4 (four) hours as needed for moderate pain for up to 10 doses Max Daily Amount: 6 tablets 10 tablet 0   • pantoprazole (PROTONIX) 40 mg tablet TAKE 1 TABLET (40 MG TOTAL) BY MOUTH DAILY 30 tablet 11   • saccharomyces boulardii (FLORASTOR) 250 mg capsule Take 1 capsule (250 mg total) by mouth 2 (two) times a day 60 capsule 0   • tamsulosin (FLOMAX) 0 4 mg Take 1 capsule (0 4 mg total) by mouth daily with dinner Begin 5 days preop 10 capsule 0   • Tofacitinib Citrate ER (Xeljanz XR) 11 MG TB24 Take 1 tablet (11 mg total) by mouth daily 30 tablet 5   • ULTICARE MICRO PEN NEEDLES 32G X 4 MM MISC INJECT UNDER THE SKIN 3 (THREE) TIMES A  each 3   • Victoza injection INJECT 0 3 ML (1 8 MG TOTAL) UNDER THE SKIN DAILY 9 mL 0   • Diclofenac Sodium (VOLTAREN) 1 % Apply 2 g topically 4 (four) times a day for 10 days 100 g 0   • hydroxychloroquine (PLAQUENIL) 200 mg tablet Take 1 tablet (200 mg total) by mouth 2 (two) times a day 180 tablet 1   • nicotine (NICODERM CQ) 21 mg/24 hr TD 24 hr patch Place 1 patch on the skin every 24 hours (Patient not taking: Reported on 2/3/2023) 28 patch 1   • sucralfate (CARAFATE) 1 g tablet TAKE 1 TABLET (1 G TOTAL) BY MOUTH 4 (FOUR) TIMES A  tablet 4     No current facility-administered medications for this visit  No Known Allergies    Review of Systems   Constitutional: Positive for appetite change ( Decreased appetite)  Negative for fatigue  HENT: Negative  Eyes: Negative for itching  Respiratory: Negative  Cardiovascular: Negative  Negative for leg swelling  Gastrointestinal: Negative for abdominal pain, nausea ( postprandial) and vomiting  Endocrine: Negative  Genitourinary: Negative  Musculoskeletal: Negative  Allergic/Immunologic: Negative  Neurological: Positive for numbness (Bilateral feet)  Hematological: Negative  Psychiatric/Behavioral: Negative  Negative for dysphoric mood and suicidal ideas  The patient is not nervous/anxious  All other systems reviewed and are negative  Video Exam    There were no vitals filed for this visit  Physical Exam  Vitals and nursing note reviewed  Constitutional:       General: He is not in acute distress  Appearance: Normal appearance  He is well-developed and normal weight  He is not ill-appearing  HENT:      Head: Normocephalic and atraumatic  Eyes:      Extraocular Movements: Extraocular movements intact  Conjunctiva/sclera: Conjunctivae normal       Pupils: Pupils are equal, round, and reactive to light  Pulmonary:      Effort: Pulmonary effort is normal  No respiratory distress  Neurological:      General: No focal deficit present  Mental Status: He is alert and oriented to person, place, and time  Psychiatric:         Mood and Affect: Mood normal          Behavior: Behavior normal          Thought Content:  Thought content normal          Judgment: Judgment normal                 I spent 28 minutes directly with the patient during this visit

## 2023-02-13 DIAGNOSIS — E11.9 TYPE 2 DIABETES MELLITUS WITHOUT COMPLICATION, WITHOUT LONG-TERM CURRENT USE OF INSULIN (HCC): ICD-10-CM

## 2023-02-13 DIAGNOSIS — Z79.4 TYPE 2 DIABETES MELLITUS WITH DIABETIC NEUROPATHIC ARTHROPATHY, WITH LONG-TERM CURRENT USE OF INSULIN (HCC): ICD-10-CM

## 2023-02-13 DIAGNOSIS — E11.610 TYPE 2 DIABETES MELLITUS WITH DIABETIC NEUROPATHIC ARTHROPATHY, WITH LONG-TERM CURRENT USE OF INSULIN (HCC): ICD-10-CM

## 2023-02-13 RX ORDER — BLOOD SUGAR DIAGNOSTIC
STRIP MISCELLANEOUS
Qty: 100 STRIP | Refills: 3 | Status: SHIPPED | OUTPATIENT
Start: 2023-02-13

## 2023-02-13 RX ORDER — INSULIN GLARGINE 100 [IU]/ML
50 INJECTION, SOLUTION SUBCUTANEOUS EVERY 12 HOURS SCHEDULED
Qty: 30 ML | Refills: 1 | Status: SHIPPED | OUTPATIENT
Start: 2023-02-13

## 2023-02-17 LAB
CREAT ?TM UR-SCNC: 160 UMOL/L
EXT MICROALBUMIN URINE RANDOM: 9.5
HBA1C MFR BLD HPLC: 5.9 %
MICROALBUMIN/CREAT UR: 6 MG/G{CREAT}

## 2023-02-18 LAB
ALBUMIN SERPL-MCNC: 4.4 G/DL (ref 3.8–4.9)
ALBUMIN/CREAT UR: 6 MG/G CREAT (ref 0–29)
ALBUMIN/GLOB SERPL: 2 {RATIO} (ref 1.2–2.2)
ALP SERPL-CCNC: 46 IU/L (ref 44–121)
ALT SERPL-CCNC: 28 IU/L (ref 0–44)
AST SERPL-CCNC: 29 IU/L (ref 0–40)
BASOPHILS # BLD AUTO: 0.1 X10E3/UL (ref 0–0.2)
BASOPHILS NFR BLD AUTO: 1 %
BILIRUB SERPL-MCNC: 0.5 MG/DL (ref 0–1.2)
BUN SERPL-MCNC: 18 MG/DL (ref 6–24)
BUN/CREAT SERPL: 20 (ref 9–20)
CALCIUM SERPL-MCNC: 9 MG/DL (ref 8.7–10.2)
CHLORIDE SERPL-SCNC: 105 MMOL/L (ref 96–106)
CHOLEST SERPL-MCNC: 140 MG/DL (ref 100–199)
CO2 SERPL-SCNC: 25 MMOL/L (ref 20–29)
CREAT SERPL-MCNC: 0.92 MG/DL (ref 0.76–1.27)
CREAT UR-MCNC: 160 MG/DL
EGFR: 98 ML/MIN/1.73
EOSINOPHIL # BLD AUTO: 0.1 X10E3/UL (ref 0–0.4)
EOSINOPHIL NFR BLD AUTO: 2 %
ERYTHROCYTE [DISTWIDTH] IN BLOOD BY AUTOMATED COUNT: 13.4 % (ref 11.6–15.4)
GLOBULIN SER-MCNC: 2.2 G/DL (ref 1.5–4.5)
GLUCOSE SERPL-MCNC: 102 MG/DL (ref 70–99)
HBA1C MFR BLD: 5.9 % (ref 4.8–5.6)
HCT VFR BLD AUTO: 43.8 % (ref 37.5–51)
HDLC SERPL-MCNC: 48 MG/DL
HGB BLD-MCNC: 15.3 G/DL (ref 13–17.7)
IMM GRANULOCYTES # BLD: 0 X10E3/UL (ref 0–0.1)
IMM GRANULOCYTES NFR BLD: 1 %
LDLC SERPL DIRECT ASSAY-MCNC: 67 MG/DL (ref 0–99)
LDLC/HDLC SERPL: 1.4 RATIO (ref 0–3.6)
LYMPHOCYTES # BLD AUTO: 1.6 X10E3/UL (ref 0.7–3.1)
LYMPHOCYTES NFR BLD AUTO: 28 %
MCH RBC QN AUTO: 30.1 PG (ref 26.6–33)
MCHC RBC AUTO-ENTMCNC: 34.9 G/DL (ref 31.5–35.7)
MCV RBC AUTO: 86 FL (ref 79–97)
MICROALBUMIN UR-MCNC: 9.5 UG/ML
MONOCYTES # BLD AUTO: 0.8 X10E3/UL (ref 0.1–0.9)
MONOCYTES NFR BLD AUTO: 13 %
NEUTROPHILS # BLD AUTO: 3.3 X10E3/UL (ref 1.4–7)
NEUTROPHILS NFR BLD AUTO: 55 %
PLATELET # BLD AUTO: 189 X10E3/UL (ref 150–450)
POTASSIUM SERPL-SCNC: 4.4 MMOL/L (ref 3.5–5.2)
PROT SERPL-MCNC: 6.6 G/DL (ref 6–8.5)
RBC # BLD AUTO: 5.08 X10E6/UL (ref 4.14–5.8)
SODIUM SERPL-SCNC: 141 MMOL/L (ref 134–144)
T4 FREE SERPL-MCNC: 1.03 NG/DL (ref 0.82–1.77)
TRIGL SERPL-MCNC: 116 MG/DL (ref 0–149)
TSH SERPL DL<=0.005 MIU/L-ACNC: 1.67 UIU/ML (ref 0.45–4.5)
WBC # BLD AUTO: 5.9 X10E3/UL (ref 3.4–10.8)

## 2023-02-22 DIAGNOSIS — E11.9 TYPE 2 DIABETES MELLITUS WITHOUT COMPLICATION, WITHOUT LONG-TERM CURRENT USE OF INSULIN (HCC): ICD-10-CM

## 2023-02-22 DIAGNOSIS — E13.9 DIABETES MELLITUS OF OTHER TYPE WITHOUT COMPLICATION, UNSPECIFIED WHETHER LONG TERM INSULIN USE (HCC): ICD-10-CM

## 2023-02-22 RX ORDER — LIRAGLUTIDE 6 MG/ML
1.8 INJECTION SUBCUTANEOUS DAILY
Qty: 9 ML | Refills: 0 | Status: SHIPPED | OUTPATIENT
Start: 2023-02-22

## 2023-03-01 ENCOUNTER — TELEPHONE (OUTPATIENT)
Dept: PSYCHIATRY | Facility: CLINIC | Age: 58
End: 2023-03-01

## 2023-03-01 NOTE — TELEPHONE ENCOUNTER
Contacted patient off of [facility]  wait list to verify needs of services in attempts to update list  spoke with patient whom stated they have therapy weekly with different facility and no longer require our services         Patient removed from list per request

## 2023-03-03 ENCOUNTER — TELEPHONE (OUTPATIENT)
Dept: OBGYN CLINIC | Facility: HOSPITAL | Age: 58
End: 2023-03-03

## 2023-03-03 NOTE — TELEPHONE ENCOUNTER
Caller: patient     Doctor: Rita Llanes    Reason for call: patient called in asking for follow up on 3/7 to be a virtual visit due to being out of town helping a sick relative  Patient is not sure if lab work was completed      Call back#: [phone]

## 2023-03-07 ENCOUNTER — TELEMEDICINE (OUTPATIENT)
Dept: RHEUMATOLOGY | Facility: CLINIC | Age: 58
End: 2023-03-07

## 2023-03-07 DIAGNOSIS — M05.9 SEROPOSITIVE RHEUMATOID ARTHRITIS (HCC): ICD-10-CM

## 2023-03-07 DIAGNOSIS — Z79.899 HIGH RISK MEDICATION USE: Primary | ICD-10-CM

## 2023-03-07 DIAGNOSIS — M54.12 CERVICAL RADICULOPATHY: ICD-10-CM

## 2023-03-07 RX ORDER — HYDROXYCHLOROQUINE SULFATE 200 MG/1
200 TABLET, FILM COATED ORAL 2 TIMES DAILY
Qty: 180 TABLET | Refills: 1 | Status: SHIPPED | OUTPATIENT
Start: 2023-03-07 | End: 2023-06-05

## 2023-03-07 RX ORDER — TOFACITINIB 11 MG/1
11 TABLET, FILM COATED, EXTENDED RELEASE ORAL DAILY
Qty: 30 TABLET | Refills: 5 | Status: SHIPPED | OUTPATIENT
Start: 2023-03-07

## 2023-03-07 RX ORDER — LEFLUNOMIDE 20 MG/1
20 TABLET ORAL
Qty: 90 TABLET | Refills: 1 | Status: SHIPPED | OUTPATIENT
Start: 2023-03-07

## 2023-03-07 RX ORDER — NAPROXEN 500 MG/1
500 TABLET ORAL EVERY 12 HOURS PRN
Qty: 60 TABLET | Refills: 2 | Status: SHIPPED | OUTPATIENT
Start: 2023-03-07

## 2023-03-07 NOTE — PROGRESS NOTES
Virtual Brief Visit    Patient is located in the following state in which I hold an active license [place]      Assessment/Plan:    Problem List Items Addressed This Visit    None  Visit Diagnoses     Seropositive rheumatoid arthritis ([address] )        Relevant Medications    hydroxychloroquine (PLAQUENIL) 200 mg tablet    leflunomide (ARAVA) 20 MG tablet    Tofacitinib Citrate ER (Xeljanz XR) 11 MG TB24    naproxen (NAPROSYN) 500 mg tablet    Cervical radiculopathy        Relevant Medications    naproxen (NAPROSYN) 500 mg tablet          Recent Visits  Date Type Provider Dept   03/03/23 Telephone Nathan Peña MD [facility] [place] within past 7 days and meeting all other requirements  Today's Visits  Date Type Provider Dept   03/07/23 Telemedicine Nathan Peña MD  Rheumatology [facility]   Showing today's visits and meeting all other requirements  Future Appointments  No visits were found meeting these conditions  Showing future appointments within next 150 days and meeting all other requirements         Visit Time    Visit Start Time:   Visit Stop Time:   Total Visit Duration: 20 minutes   Assessment and Plan:   Seropositive RA--tried and failed Enbrel and Humira  Currently taking Plaquenil 400mg daily, Arava 20mg daily and Xeljanz 11mg daily with much improvement in his symptoms  Continue Plaquenil 400mg daily  Yearly opthal evaluations including visual field testing  Continue Arava (leflunomide) 20mg daily for another 90 days then discontinue  Continue Xeljanz 11mg daily  Anti-inflammatory diet/exercise/weight control  Increase CV fitness/aerobic activity  Patient to continue to monitor symptoms  Topical NSAIDs/analgesics PRN joint pain  Continue to monitor labs including CRP, CBC, CBP, CRP and lipid panel every 3 months  F/u in 6 mos  or sooner if necessary      Rheumatic Disease Summary  As above    Here for f/u virtual visit  Last visit with Marlene Enter in [address]    He is s/p COVID infection in Fall 2022  Once symptoms of COVID infection resolved he began taking Tyesha Glass 11mg daily with much improvement in his arthritis symptoms  No side effects on Xeljanz  Still taking Plaquenil 400mg daily and Arava  Counseled on monitoring labs every 12 weeks  And discontinuing Arava after 3 mos  and he is amenable  The following portions of the patient's history were reviewed and updated as appropriate: allergies, current medications, past family history, past medical history, past social history, past surgical history and problem list     Review of Systems:   Review of Systems   Constitutional: Negative for fatigue  HENT: Negative for mouth sores  Eyes: Negative for pain  Respiratory: Negative for shortness of breath  Cardiovascular: Negative for leg swelling  Musculoskeletal: Negative for arthralgias and joint swelling  Skin: Negative for rash  Neurological: Negative for weakness  Hematological: Negative for adenopathy  Psychiatric/Behavioral: Negative for sleep disturbance         Home Medications:    Current Outpatient Medications:   •  hydroxychloroquine (PLAQUENIL) 200 mg tablet, Take 1 tablet (200 mg total) by mouth 2 (two) times a day, Disp: 180 tablet, Rfl: 1  •  leflunomide (ARAVA) 20 MG tablet, Take 1 tablet (20 mg total) by mouth daily with dinner, Disp: 90 tablet, Rfl: 1  •  naproxen (NAPROSYN) 500 mg tablet, Take 1 tablet (500 mg total) by mouth every 12 (twelve) hours as needed for mild pain (with food), Disp: 60 tablet, Rfl: 2  •  Tofacitinib Citrate ER (Xeljanz XR) 11 MG TB24, Take 1 tablet (11 mg total) by mouth daily, Disp: 30 tablet, Rfl: 5  •  Accu-Chek FastClix Lancets MISC, TEST BLOOD SUGAR FOUR TIMES A DAY BEFORE MEALS AND BEDTIME, Disp: 102 each, Rfl: 3  •  albuterol (PROVENTIL HFA,VENTOLIN HFA) 90 mcg/act inhaler, INHALE 2 PUFFS EVERY 4 (FOUR) HOURS AS NEEDED FOR WHEEZING, Disp: 18 g, Rfl: 1  •  Ascorbic Acid (vitamin C) 1000 MG tablet, Take 1,000 mg by mouth daily, Disp: , Rfl:   •  atorvastatin (LIPITOR) 40 mg tablet, TAKE 1 TABLET AT BEDTIME , Disp: 90 tablet, Rfl: 1  •  Basaglar KwikPen 100 units/mL SOPN, INJECT 50 UNITS UNDER THE SKIN EVERY 12 (TWELVE) HOURS, Disp: 30 mL, Rfl: 1  •  BD Pen Needle Maureen U/F 32G X 4 MM MISC, ONE PEN NEEDLE THREE TIMES A DAY 2-BASAGLAR 1- VICTOZA, Disp: 100 each, Rfl: 3  •  Blood Glucose Monitoring Suppl (OneTouch Verio Flex System) w/Device KIT, Use 3 (three) times a day with meals, Disp: 1 kit, Rfl: 0  •  calcium carbonate (OS-NERY) 600 MG tablet, Take 600 mg by mouth daily with dinner, Disp: , Rfl:   •  Diclofenac Sodium (VOLTAREN) 1 %, Apply 2 g topically 4 (four) times a day for 10 days, Disp: 100 g, Rfl: 0  •  ergocalciferol (VITAMIN D2) 50,000 units, Take 50,000 Units by mouth once a week Takes on Saturday, Disp: , Rfl:   •  fluticasone (FLONASE) 50 mcg/act nasal spray, USE 2 SPRAYS IN EACH NOSTRIL ONCE DAILY, Disp: 16 g, Rfl: 11  •  losartan-hydrochlorothiazide (HYZAAR) 100-12 5 MG per tablet, TAKE 1 TABLET BY MOUTH DAILY, Disp: 90 tablet, Rfl: 1  •  nicotine (NICODERM CQ) 21 mg/24 hr TD 24 hr patch, Place 1 patch on the skin every 24 hours (Patient not taking: Reported on 2/3/2023), Disp: 28 patch, Rfl: 1  •  olopatadine (PATANOL) 0 1 % ophthalmic solution, Administer 1 drop to both eyes in the morning and 1 drop in the evening   (Patient taking differently: Administer 1 drop to both eyes if needed), Disp: 5 mL, Rfl: 1  •  OneTouch Verio test strip, CHECK GLUCOSE BEFORE MEALS, Disp: 100 strip, Rfl: 3  •  oxyCODONE-acetaminophen (PERCOCET) 5-325 mg per tablet, Take 1 tablet by mouth every 4 (four) hours as needed for moderate pain for up to 10 doses Max Daily Amount: 6 tablets, Disp: 10 tablet, Rfl: 0  •  pantoprazole (PROTONIX) 40 mg tablet, TAKE 1 TABLET (40 MG TOTAL) BY MOUTH DAILY, Disp: 30 tablet, Rfl: 11  •  saccharomyces boulardii (FLORASTOR) 250 mg capsule, Take 1 capsule (250 mg total) by mouth 2 (two) times a day, Disp: 60 capsule, Rfl: 0  •  sucralfate (CARAFATE) 1 g tablet, TAKE 1 TABLET (1 G TOTAL) BY MOUTH 4 (FOUR) TIMES A DAY, Disp: 120 tablet, Rfl: 4  •  tamsulosin (FLOMAX) 0 4 mg, Take 1 capsule (0 4 mg total) by mouth daily with dinner Begin 5 days preop, Disp: 10 capsule, Rfl: 0  •  ULTICARE MICRO PEN NEEDLES 32G X 4 MM MISC, INJECT UNDER THE SKIN 3 (THREE) TIMES A DAY, Disp: 100 each, Rfl: 3  •  Victoza injection, INJECT 0 3 ML (1 8 MG TOTAL) UNDER THE SKIN DAILY, Disp: 9 mL, Rfl: 0    Objective: There were no vitals filed for this visit  Physical Exam  Constitutional:       General: He is not in acute distress  HENT:      Head: Normocephalic and atraumatic  Eyes:      Conjunctiva/sclera: Conjunctivae normal    Pulmonary:      Effort: Pulmonary effort is normal  No respiratory distress  Musculoskeletal:      Cervical back: Neck supple  Skin:     Coloration: Skin is not pale  Neurological:      Mental Status: He is alert  Mental status is at baseline  Psychiatric:         Mood and Affect: Mood normal          Behavior: Behavior normal          Reviewed labs and imaging  Imaging:   No results found  Labs:   Orders Only on 02/17/2023   Component Date Value Ref Range Status   • Cholesterol, Total 02/17/2023 140  100 - 199 mg/dL Final   • Triglycerides 02/17/2023 116  0 - 149 mg/dL Final   • HDL 02/17/2023 48  >39 mg/dL Final   • LDL Direct 02/17/2023 67  0 - 99 mg/dL Final   • LDl/HDL Ratio 02/17/2023 1 4  0 0 - 3 6 ratio Final    Comment:                                              LDL/HDL                                              Men  Women                                 1/2 Avg  Risk 1 0   1 5                                     Avg Risk 3 6   3 2                                  2X Avg  Risk 6 3   5 0                                  3X Avg  Risk 8 0   6 1     • TSH 02/17/2023 1 670  0 450 - 4 500 uIU/mL Final   • Free t4 02/17/2023 1 03  0 82 - 1 77 ng/dL Final   • White Blood Cell Count 02/17/2023 5 9  3 4 - 10 8 x10E3/uL Final   • Red Blood Cell Count 02/17/2023 5 08  4 14 - 5 80 x10E6/uL Final   • Hemoglobin 02/17/2023 15 3  13 0 - 17 7 g/dL Final   • HCT 02/17/2023 43 8  37 5 - 51 0 % Final   • MCV 02/17/2023 86  79 - 97 fL Final   • MCH 02/17/2023 30 1  26 6 - 33 0 pg Final   • MCHC 02/17/2023 34 9  31 5 - 35 7 g/dL Final   • RDW 02/17/2023 13 4  11 6 - 15 4 % Final   • Platelet Count 40/02/8775 189  150 - 450 x10E3/uL Final   • Neutrophils 02/17/2023 55  Not Estab  % Final   • Lymphocytes 02/17/2023 28  Not Estab  % Final   • Monocytes 02/17/2023 13  Not Estab  % Final   • Eosinophils 02/17/2023 2  Not Estab  % Final   • Basophils PCT 02/17/2023 1  Not Estab  % Final   • Neutrophils (Absolute) 02/17/2023 3 3  1 4 - 7 0 x10E3/uL Final   • Lymphocytes (Absolute) 02/17/2023 1 6  0 7 - 3 1 x10E3/uL Final   • Monocytes (Absolute) 02/17/2023 0 8  0 1 - 0 9 x10E3/uL Final   • Eosinophils (Absolute) 02/17/2023 0 1  0 0 - 0 4 x10E3/uL Final   • Basophils ABS 02/17/2023 0 1  0 0 - 0 2 x10E3/uL Final   • Immature Granulocytes 02/17/2023 1  Not Estab  % Final   • Immature Granulocytes (Absolute) 02/17/2023 0 0  0 0 - 0 1 x10E3/uL Final    Comment: A hand-written panel/profile was received from your office  In  accordance with the [facility] Ambiguous Test Code Policy dated July 3632, we have assigned CBC with Differential/Platelet, Test Code  #000613 to this request  If this is not the testing you wished to  receive on this specimen, please contact the [facility] Department to clarify the test order  We  appreciate your business       • Glucose, Random 02/17/2023 102 (H)  70 - 99 mg/dL Final   • BUN 02/17/2023 18  6 - 24 mg/dL Final   • Creatinine 02/17/2023 0 92  0 76 - 1 27 mg/dL Final   • eGFR 02/17/2023 98  >59 mL/min/1 73 Final   • SL AMB BUN/CREATININE RATIO 02/17/2023 20  9 - 20 Final   • Sodium 02/17/2023 141  134 - 144 mmol/L Final   • Potassium 02/17/2023 4 4  3 5 - 5 2 mmol/L Final   • Chloride 02/17/2023 105  96 - 106 mmol/L Final   • CO2 02/17/2023 25  20 - 29 mmol/L Final   • CALCIUM 02/17/2023 9 0  8 7 - 10 2 mg/dL Final   • Protein, Total 02/17/2023 6 6  6 0 - 8 5 g/dL Final   • Albumin 02/17/2023 4 4  3 8 - 4 9 g/dL Final   • Globulin, Total 02/17/2023 2 2  1 5 - 4 5 g/dL Final   • Albumin/Globulin Ratio 02/17/2023 2 0  1 2 - 2 2 Final   • TOTAL BILIRUBIN 02/17/2023 0 5  0 0 - 1 2 mg/dL Final   • Alk Phos Isoenzymes 02/17/2023 46  44 - 121 IU/L Final   • AST 02/17/2023 29  0 - 40 IU/L Final   • ALT 02/17/2023 28  0 - 44 IU/L Final   • Creatinine, Urine 02/17/2023 160 0  Not Estab  mg/dL Final   • Microalbum  ,U,Random 02/17/2023 9 5  Not Estab  ug/mL Final   • Microalb/Creat Ratio 02/17/2023 6  0 - 29 mg/g creat Final    Comment:                        Normal:                0 -  29                         Moderately increased: 30 - 300                         Severely increased:       >300     • Hemoglobin A1C 02/17/2023 5 9 (H)  4 8 - 5 6 % Final    Comment:          Prediabetes: 5 7 - 6 4           Diabetes: >6 4           Glycemic control for adults with diabetes: <7 0     Orders Only on 02/17/2023   Component Date Value Ref Range Status   • EXT Creatinine Urine 02/17/2023 160 0   Final   • Microalbum  ,U,Random 02/17/2023 9 5   Final   • EXTERNAL Microalb/Creat Ratio 02/17/2023 6   Final   • Hemoglobin A1C 02/17/2023 5 9   Final   Admission on 09/30/2022, Discharged on 09/30/2022   Component Date Value Ref Range Status   • POC Glucose 09/30/2022 143 (H)  65 - 140 mg/dl Final   • Case Report 09/30/2022    Final                    Value:Surgical Pathology Report                         Case: [id number]                                   Authorizing Provider:  Yuliya Dunham MD     Collected:           09/30/2022 1115              Ordering Location:     [facility]        Received:            09/30/2022 [address] [facility] Operating Room                                                      Pathologist:           Artur Menjivar MD                                                                 Specimen:    Gallbladder                                                                               • Final Diagnosis 09/30/2022    Final                    Value: This result contains rich text formatting which cannot be displayed here  • Additional Information 09/30/2022    Final                    Value: This result contains rich text formatting which cannot be displayed here  • Gross Description 09/30/2022    Final                    Value: This result contains rich text formatting which cannot be displayed here     • POC Glucose 09/30/2022 167 (H)  65 - 140 mg/dl Final   Appointment on 09/19/2022   Component Date Value Ref Range Status   • WBC 09/19/2022 6 49  4 31 - 10 16 Thousand/uL Final   • RBC 09/19/2022 5 28  3 88 - 5 62 Million/uL Final   • Hemoglobin 09/19/2022 15 7  12 0 - 17 0 g/dL Final   • Hematocrit 09/19/2022 46 7  36 5 - 49 3 % Final   • MCV 09/19/2022 88  82 - 98 fL Final   • MCH 09/19/2022 29 7  26 8 - 34 3 pg Final   • MCHC 09/19/2022 33 6  31 4 - 37 4 g/dL Final   • RDW 09/19/2022 13 7  11 6 - 15 1 % Final   • Platelets 71/85/9804 174  149 - 390 Thousands/uL Final   • MPV 09/19/2022 10 9  8 9 - 12 7 fL Final   • Sodium 09/19/2022 138  135 - 147 mmol/L Final   • Potassium 09/19/2022 3 7  3 5 - 5 3 mmol/L Final   • Chloride 09/19/2022 107  96 - 108 mmol/L Final   • CO2 09/19/2022 27  21 - 32 mmol/L Final   • ANION GAP 09/19/2022 4  4 - 13 mmol/L Final   • BUN 09/19/2022 23  5 - 25 mg/dL Final   • Creatinine 09/19/2022 1 08  0 60 - 1 30 mg/dL Final    Standardized to IDMS reference method   • Glucose, Fasting 09/19/2022 157 (H)  65 - 99 mg/dL Final    Specimen collection should occur prior to Sulfasalazine administration due to the potential for falsely depressed results  Specimen collection should occur prior to Sulfapyridine administration due to the potential for falsely elevated results  • Calcium 09/19/2022 8 7  8 3 - 10 1 mg/dL Final   • AST 09/19/2022 20  5 - 45 U/L Final    Specimen collection should occur prior to Sulfasalazine administration due to the potential for falsely depressed results  • ALT 09/19/2022 40  12 - 78 U/L Final    Specimen collection should occur prior to Sulfasalazine and/or Sulfapyridine administration due to the potential for falsely depressed results  • Alkaline Phosphatase 09/19/2022 51  46 - 116 U/L Final   • Total Protein 09/19/2022 7 0  6 4 - 8 4 g/dL Final   • Albumin 09/19/2022 3 6  3 5 - 5 0 g/dL Final   • Total Bilirubin 09/19/2022 0 50  0 20 - 1 00 mg/dL Final    Use of this assay is not recommended for patients undergoing treatment with eltrombopag due to the potential for falsely elevated results  • eGFR 09/19/2022 77  ml/min/1 73sq m Final   • Hemoglobin A1C 09/19/2022 6 1 (H)  Normal 3 8-5 6%; PreDiabetic 5 7-6 4%; Diabetic >=6 5%; Glycemic control for adults with diabetes <7 0% % Final   • EAG 09/19/2022 128  mg/dl Final   Office Visit on 07/15/2022   Component Date Value Ref Range Status   • QFT Nil 07/18/2022 0 07  0 - 8 0 IU/ml Final   • QFT TB1-NIL 07/18/2022 0 02  IU/ml Final   • QFT TB2-NIL 07/18/2022 0 00  IU/ml Final   • QFT Mitogen-NIL 07/18/2022 9 72  IU/ml Final   • QFT Final Interpretation 07/18/2022 Negative  Negative Final    No Interferon-gamma response to M  tuberculosis antigens detected  Infection with M  tuberculosis is unlikely  A single negative result does not exclude infection with M  tuberculosis  In patients at high risk for M  tuberculosis infection, a second test should be considered in accordance with the 2017 ATS/IDSA/CDC Clinical Practice Guidelines for Diagnosis of Tuberculosis in Adults and Children   False negative results can be a result of incorrect blood sample collection or handling of the specimen affecting lymphocyte function  • Hepatitis B Surface Ag 07/18/2022 Non-reactive  Non-reactive, NonReactive - Confirmed Final   • Hepatitis C Ab 07/18/2022 Non-reactive  Non-reactive Final   • Hep B C IgM 07/18/2022 Non-reactive  Non-reactive Final   • Hep B Core Total Ab 07/18/2022 Non-reactive  Non-reactive Final   • Sodium 07/18/2022 139  135 - 147 mmol/L Final   • Potassium 07/18/2022 4 0  3 5 - 5 3 mmol/L Final   • Chloride 07/18/2022 105  96 - 108 mmol/L Final   • CO2 07/18/2022 27  21 - 32 mmol/L Final   • ANION GAP 07/18/2022 7  4 - 13 mmol/L Final   • BUN 07/18/2022 21  5 - 25 mg/dL Final   • Creatinine 07/18/2022 1 12  0 60 - 1 30 mg/dL Final    Standardized to IDMS reference method   • Glucose 07/18/2022 112  65 - 140 mg/dL Final    If the patient is fasting, the ADA then defines impaired fasting glucose as > 100 mg/dL and diabetes as > or equal to 123 mg/dL  Specimen collection should occur prior to Sulfasalazine administration due to the potential for falsely depressed results  Specimen collection should occur prior to Sulfapyridine administration due to the potential for falsely elevated results  • Calcium 07/18/2022 9 2  8 3 - 10 1 mg/dL Final   • AST 07/18/2022 26  5 - 45 U/L Final    Specimen collection should occur prior to Sulfasalazine administration due to the potential for falsely depressed results  • ALT 07/18/2022 45  12 - 78 U/L Final    Specimen collection should occur prior to Sulfasalazine and/or Sulfapyridine administration due to the potential for falsely depressed results      • Alkaline Phosphatase 07/18/2022 55  46 - 116 U/L Final   • Total Protein 07/18/2022 7 9  6 4 - 8 4 g/dL Final   • Albumin 07/18/2022 4 1  3 5 - 5 0 g/dL Final   • Total Bilirubin 07/18/2022 0 76  0 20 - 1 00 mg/dL Final    Use of this assay is not recommended for patients undergoing treatment with eltrombopag due to the potential for falsely elevated results     • eGFR 07/18/2022 74  ml/min/1 73sq m Final   • WBC 07/18/2022 6 90  4 31 - 10 16 Thousand/uL Final   • RBC 07/18/2022 5 49  3 88 - 5 62 Million/uL Final   • Hemoglobin 07/18/2022 16 3  12 0 - 17 0 g/dL Final   • Hematocrit 07/18/2022 48 6  36 5 - 49 3 % Final   • MCV 07/18/2022 89  82 - 98 fL Final   • MCH 07/18/2022 29 7  26 8 - 34 3 pg Final   • MCHC 07/18/2022 33 5  31 4 - 37 4 g/dL Final   • RDW 07/18/2022 13 9  11 6 - 15 1 % Final   • Platelets 72/90/9795 206  149 - 390 Thousands/uL Final   • MPV 07/18/2022 11 2  8 9 - 12 7 fL Final   • CRP 07/18/2022 <3 0  <3 0 mg/L Final   • Sed Rate 07/18/2022 24 (H)  0 - 19 mm/hour Final   Appointment on 05/19/2022   Component Date Value Ref Range Status   • WBC 05/19/2022 6 26  4 31 - 10 16 Thousand/uL Final   • RBC 05/19/2022 5 24  3 88 - 5 62 Million/uL Final   • Hemoglobin 05/19/2022 15 7  12 0 - 17 0 g/dL Final   • Hematocrit 05/19/2022 46 4  36 5 - 49 3 % Final   • MCV 05/19/2022 89  82 - 98 fL Final   • MCH 05/19/2022 30 0  26 8 - 34 3 pg Final   • MCHC 05/19/2022 33 8  31 4 - 37 4 g/dL Final   • RDW 05/19/2022 13 5  11 6 - 15 1 % Final   • MPV 05/19/2022 11 8  8 9 - 12 7 fL Final   • Platelets 21/11/5882 212  149 - 390 Thousands/uL Final   • nRBC 05/19/2022 0  /100 WBCs Final   • Neutrophils Relative 05/19/2022 63  43 - 75 % Final   • Immat GRANS % 05/19/2022 1  0 - 2 % Final   • Lymphocytes Relative 05/19/2022 19  14 - 44 % Final   • Monocytes Relative 05/19/2022 14 (H)  4 - 12 % Final   • Eosinophils Relative 05/19/2022 2  0 - 6 % Final   • Basophils Relative 05/19/2022 1  0 - 1 % Final   • Neutrophils Absolute 05/19/2022 3 96  1 85 - 7 62 Thousands/µL Final   • Immature Grans Absolute 05/19/2022 0 05  0 00 - 0 20 Thousand/uL Final   • Lymphocytes Absolute 05/19/2022 1 18  0 60 - 4 47 Thousands/µL Final   • Monocytes Absolute 05/19/2022 0 85  0 17 - 1 22 Thousand/µL Final   • Eosinophils Absolute 05/19/2022 0 15  0 00 - 0 61 Thousand/µL Final   • Basophils Absolute 05/19/2022 0 07  0 00 - 0 10 Thousands/µL Final   • Sodium 05/19/2022 137  136 - 145 mmol/L Final   • Potassium 05/19/2022 4 0  3 5 - 5 3 mmol/L Final   • Chloride 05/19/2022 106  100 - 108 mmol/L Final   • CO2 05/19/2022 25  21 - 32 mmol/L Final   • ANION GAP 05/19/2022 6  4 - 13 mmol/L Final   • BUN 05/19/2022 20  5 - 25 mg/dL Final   • Creatinine 05/19/2022 1 09  0 60 - 1 30 mg/dL Final    Standardized to IDMS reference method   • Glucose, Fasting 05/19/2022 191 (H)  65 - 99 mg/dL Final    Specimen collection should occur prior to Sulfasalazine administration due to the potential for falsely depressed results  Specimen collection should occur prior to Sulfapyridine administration due to the potential for falsely elevated results  • Calcium 05/19/2022 9 1  8 3 - 10 1 mg/dL Final   • AST 05/19/2022 27  5 - 45 U/L Final    Specimen collection should occur prior to Sulfasalazine administration due to the potential for falsely depressed results  • ALT 05/19/2022 47  12 - 78 U/L Final    Specimen collection should occur prior to Sulfasalazine and/or Sulfapyridine administration due to the potential for falsely depressed results  • Alkaline Phosphatase 05/19/2022 47  46 - 116 U/L Final   • Total Protein 05/19/2022 7 1  6 4 - 8 2 g/dL Final   • Albumin 05/19/2022 3 7  3 5 - 5 0 g/dL Final   • Total Bilirubin 05/19/2022 0 74  0 20 - 1 00 mg/dL Final    Use of this assay is not recommended for patients undergoing treatment with eltrombopag due to the potential for falsely elevated results  • eGFR 05/19/2022 76  ml/min/1 73sq m Final   • Creatinine, Ur 05/19/2022 233 0  mg/dL Final   • Microalbum  ,U,Random 05/19/2022 12 7  0 0 - 20 0 mg/L Final   • Microalb Creat Ratio 05/19/2022 5  0 - 30 mg/g creatinine Final   • TSH 3RD GENERATON 05/19/2022 1 460  0 450 - 4 500 uIU/mL Final    Adult TSH (3rd generation) reference range follows the recommended guidelines of the American Thyroid Association, January, 2020  • Vit D, 1,25-Dihydroxy 05/19/2022 32 6  19 9 - 79 3 pg/mL Final    **Effective June 6, 2022 Calcitriol(1,25 di-OH Vit D)**    reference interval will be changing to:     pg/mL                    0 - 6 months:  44 3 - 212 9             7 months -   1 year:  40 3 - 112 4                         >1 year:  24 8 -  81 5   • Hemoglobin A1C 05/19/2022 7 3 (H)  Normal 3 8-5 6%; PreDiabetic 5 7-6 4%; Diabetic >=6 5%; Glycemic control for adults with diabetes <7 0% % Final   • EAG 05/19/2022 163  mg/dl Final   • Cholesterol 05/19/2022 139  See Comment mg/dL Final    Cholesterol:         Pediatric <18 Years        Desirable          <170 mg/dL      Borderline High    170-199 mg/dL      High               >=200 mg/dL        Adult >=18 Years            Desirable         <200 mg/dL      Borderline High   200-239 mg/dL      High              >239 mg/dL     • Triglycerides 05/19/2022 131  See Comment mg/dL Final    Triglyceride:     0-9Y            <75mg/dL     10Y-17Y         <90 mg/dL       >=18Y     Normal          <150 mg/dL     Borderline High 150-199 mg/dL     High            200-499 mg/dL        Very High       >499 mg/dL    Specimen collection should occur prior to N-Acetylcysteine or Metamizole administration due to the potential for falsely depressed results  • HDL, Direct 05/19/2022 35 (L)  >=40 mg/dL Final    Specimen collection should occur prior to Metamizole administration due to the potential for falsley depressed results  • LDL Calculated 05/19/2022 78  0 - 100 mg/dL Final    LDL Cholesterol:     Optimal           <100 mg/dl     Near Optimal      100-129 mg/dl     Above Optimal       Borderline High 130-159 mg/dl       High            160-189 mg/dl       Very High       >189 mg/dl         This screening LDL is a calculated result  It does not have the accuracy of the Direct Measured LDL in the monitoring of patients with hyperlipidemia and/or statin therapy     Direct Measure LDL (FEQ342) must be ordered separately in these patients     • Non-HDL-Chol (CHOL-HDL) 05/19/2022 104  mg/dl Final

## 2023-03-07 NOTE — PATIENT INSTRUCTIONS
Continue taking the leflunomide for another 3 months  Continue taking the Plaquenil 2 tabs daily  Please make an appointment with your ophthalmologist every year to have your visual fields tested because you are taking Plaquenil  Please continue to have your blood work checked every 3 months

## 2023-03-16 DIAGNOSIS — E13.9 DIABETES MELLITUS OF OTHER TYPE WITHOUT COMPLICATION, UNSPECIFIED WHETHER LONG TERM INSULIN USE (HCC): ICD-10-CM

## 2023-03-16 DIAGNOSIS — Z79.4 TYPE 2 DIABETES MELLITUS WITH DIABETIC POLYNEUROPATHY, WITH LONG-TERM CURRENT USE OF INSULIN (HCC): ICD-10-CM

## 2023-03-16 DIAGNOSIS — E11.9 TYPE 2 DIABETES MELLITUS WITHOUT COMPLICATION, WITHOUT LONG-TERM CURRENT USE OF INSULIN (HCC): ICD-10-CM

## 2023-03-16 DIAGNOSIS — E11.42 TYPE 2 DIABETES MELLITUS WITH DIABETIC POLYNEUROPATHY, WITH LONG-TERM CURRENT USE OF INSULIN (HCC): ICD-10-CM

## 2023-03-16 DIAGNOSIS — J45.909 UNCOMPLICATED ASTHMA, UNSPECIFIED ASTHMA SEVERITY, UNSPECIFIED WHETHER PERSISTENT: ICD-10-CM

## 2023-03-16 RX ORDER — PEN NEEDLE, DIABETIC 32GX 5/32"
NEEDLE, DISPOSABLE MISCELLANEOUS
Qty: 100 EACH | Refills: 3 | Status: SHIPPED | OUTPATIENT
Start: 2023-03-16

## 2023-03-16 RX ORDER — ALBUTEROL SULFATE 90 UG/1
2 AEROSOL, METERED RESPIRATORY (INHALATION) EVERY 4 HOURS PRN
Qty: 18 G | Refills: 1 | Status: SHIPPED | OUTPATIENT
Start: 2023-03-16

## 2023-03-20 DIAGNOSIS — E11.9 TYPE 2 DIABETES MELLITUS WITHOUT COMPLICATION, WITHOUT LONG-TERM CURRENT USE OF INSULIN (HCC): ICD-10-CM

## 2023-03-20 DIAGNOSIS — E13.9 DIABETES MELLITUS OF OTHER TYPE WITHOUT COMPLICATION, UNSPECIFIED WHETHER LONG TERM INSULIN USE (HCC): ICD-10-CM

## 2023-03-20 RX ORDER — LIRAGLUTIDE 6 MG/ML
1.8 INJECTION SUBCUTANEOUS DAILY
Qty: 9 ML | Refills: 0 | Status: SHIPPED | OUTPATIENT
Start: 2023-03-20

## 2023-04-03 ENCOUNTER — TELEPHONE (OUTPATIENT)
Dept: OBGYN CLINIC | Facility: HOSPITAL | Age: 58
End: 2023-04-03

## 2023-04-03 DIAGNOSIS — M05.9 SEROPOSITIVE RHEUMATOID ARTHRITIS (HCC): ICD-10-CM

## 2023-04-03 DIAGNOSIS — M54.12 CERVICAL RADICULOPATHY: ICD-10-CM

## 2023-04-03 RX ORDER — NAPROXEN 500 MG/1
500 TABLET ORAL EVERY 12 HOURS PRN
Qty: 60 TABLET | Refills: 2 | Status: SHIPPED | OUTPATIENT
Start: 2023-04-03

## 2023-04-03 NOTE — TELEPHONE ENCOUNTER
Caller: patient wife    Doctor: Jose E     Reason for call: requesting refill of Naproxen    Preferred pharmacy is [address] [place]    Call back#: [phone]

## 2023-05-17 DIAGNOSIS — Z79.4 TYPE 2 DIABETES MELLITUS WITH DIABETIC POLYNEUROPATHY, WITH LONG-TERM CURRENT USE OF INSULIN (HCC): ICD-10-CM

## 2023-05-17 DIAGNOSIS — E13.9 DIABETES MELLITUS OF OTHER TYPE WITHOUT COMPLICATION, UNSPECIFIED WHETHER LONG TERM INSULIN USE (HCC): ICD-10-CM

## 2023-05-17 DIAGNOSIS — E11.42 TYPE 2 DIABETES MELLITUS WITH DIABETIC POLYNEUROPATHY, WITH LONG-TERM CURRENT USE OF INSULIN (HCC): ICD-10-CM

## 2023-05-17 DIAGNOSIS — J45.909 UNCOMPLICATED ASTHMA, UNSPECIFIED ASTHMA SEVERITY, UNSPECIFIED WHETHER PERSISTENT: ICD-10-CM

## 2023-05-17 DIAGNOSIS — E11.9 TYPE 2 DIABETES MELLITUS WITHOUT COMPLICATION, WITHOUT LONG-TERM CURRENT USE OF INSULIN (HCC): ICD-10-CM

## 2023-05-17 RX ORDER — ALBUTEROL SULFATE 90 UG/1
2 AEROSOL, METERED RESPIRATORY (INHALATION) EVERY 4 HOURS PRN
Qty: 18 G | Refills: 1 | Status: SHIPPED | OUTPATIENT
Start: 2023-05-17

## 2023-05-17 RX ORDER — LIRAGLUTIDE 6 MG/ML
1.8 INJECTION SUBCUTANEOUS DAILY
Qty: 9 ML | Refills: 0 | Status: SHIPPED | OUTPATIENT
Start: 2023-05-17

## 2023-05-17 RX ORDER — LANCETS
EACH MISCELLANEOUS
Qty: 102 EACH | Refills: 3 | Status: SHIPPED | OUTPATIENT
Start: 2023-05-17

## 2023-05-31 ENCOUNTER — APPOINTMENT (OUTPATIENT)
Dept: LAB | Facility: HOSPITAL | Age: 58
End: 2023-05-31
Payer: COMMERCIAL

## 2023-05-31 DIAGNOSIS — Z79.4 TYPE 2 DIABETES MELLITUS WITH DIABETIC NEUROPATHIC ARTHROPATHY, WITH LONG-TERM CURRENT USE OF INSULIN (HCC): ICD-10-CM

## 2023-05-31 DIAGNOSIS — E11.610 TYPE 2 DIABETES MELLITUS WITH DIABETIC NEUROPATHIC ARTHROPATHY, WITH LONG-TERM CURRENT USE OF INSULIN (HCC): ICD-10-CM

## 2023-05-31 LAB
BASOPHILS # BLD AUTO: 0.04 THOUSANDS/ÂΜL (ref 0–0.1)
BASOPHILS NFR BLD AUTO: 1 % (ref 0–1)
EOSINOPHIL # BLD AUTO: 0.08 THOUSAND/ÂΜL (ref 0–0.61)
EOSINOPHIL NFR BLD AUTO: 1 % (ref 0–6)
ERYTHROCYTE [DISTWIDTH] IN BLOOD BY AUTOMATED COUNT: 13.4 % (ref 11.6–15.1)
HCT VFR BLD AUTO: 46.6 % (ref 36.5–49.3)
HGB BLD-MCNC: 16.1 G/DL (ref 12–17)
IMM GRANULOCYTES # BLD AUTO: 0.05 THOUSAND/UL (ref 0–0.2)
IMM GRANULOCYTES NFR BLD AUTO: 1 % (ref 0–2)
LYMPHOCYTES # BLD AUTO: 1.69 THOUSANDS/ÂΜL (ref 0.6–4.47)
LYMPHOCYTES NFR BLD AUTO: 29 % (ref 14–44)
MCH RBC QN AUTO: 30.8 PG (ref 26.8–34.3)
MCHC RBC AUTO-ENTMCNC: 34.5 G/DL (ref 31.4–37.4)
MCV RBC AUTO: 89 FL (ref 82–98)
MONOCYTES # BLD AUTO: 0.67 THOUSAND/ÂΜL (ref 0.17–1.22)
MONOCYTES NFR BLD AUTO: 11 % (ref 4–12)
NEUTROPHILS # BLD AUTO: 3.34 THOUSANDS/ÂΜL (ref 1.85–7.62)
NEUTS SEG NFR BLD AUTO: 57 % (ref 43–75)
NRBC BLD AUTO-RTO: 0 /100 WBCS
PLATELET # BLD AUTO: 219 THOUSANDS/UL (ref 149–390)
PMV BLD AUTO: 10.3 FL (ref 8.9–12.7)
RBC # BLD AUTO: 5.22 MILLION/UL (ref 3.88–5.62)
WBC # BLD AUTO: 5.87 THOUSAND/UL (ref 4.31–10.16)

## 2023-05-31 PROCEDURE — 36415 COLL VENOUS BLD VENIPUNCTURE: CPT

## 2023-05-31 PROCEDURE — 85025 COMPLETE CBC W/AUTO DIFF WBC: CPT

## 2023-06-12 ENCOUNTER — LAB (OUTPATIENT)
Dept: LAB | Facility: CLINIC | Age: 58
End: 2023-06-12
Payer: COMMERCIAL

## 2023-06-12 ENCOUNTER — OFFICE VISIT (OUTPATIENT)
Dept: FAMILY MEDICINE CLINIC | Facility: CLINIC | Age: 58
End: 2023-06-12
Payer: COMMERCIAL

## 2023-06-12 VITALS
OXYGEN SATURATION: 98 % | BODY MASS INDEX: 32.78 KG/M2 | SYSTOLIC BLOOD PRESSURE: 130 MMHG | HEART RATE: 86 BPM | WEIGHT: 242 LBS | HEIGHT: 72 IN | DIASTOLIC BLOOD PRESSURE: 78 MMHG

## 2023-06-12 DIAGNOSIS — E78.2 MIXED HYPERLIPIDEMIA: ICD-10-CM

## 2023-06-12 DIAGNOSIS — M54.12 CERVICAL RADICULOPATHY: ICD-10-CM

## 2023-06-12 DIAGNOSIS — Z79.4 TYPE 2 DIABETES MELLITUS WITH DIABETIC NEUROPATHIC ARTHROPATHY, WITH LONG-TERM CURRENT USE OF INSULIN (HCC): ICD-10-CM

## 2023-06-12 DIAGNOSIS — I10 BENIGN ESSENTIAL HYPERTENSION: ICD-10-CM

## 2023-06-12 DIAGNOSIS — E11.610 TYPE 2 DIABETES MELLITUS WITH DIABETIC NEUROPATHIC ARTHROPATHY, WITH LONG-TERM CURRENT USE OF INSULIN (HCC): ICD-10-CM

## 2023-06-12 DIAGNOSIS — E11.610 TYPE 2 DIABETES MELLITUS WITH DIABETIC NEUROPATHIC ARTHROPATHY, WITH LONG-TERM CURRENT USE OF INSULIN (HCC): Primary | ICD-10-CM

## 2023-06-12 DIAGNOSIS — M05.9 SEROPOSITIVE RHEUMATOID ARTHRITIS (HCC): ICD-10-CM

## 2023-06-12 DIAGNOSIS — M05.79 RHEUMATOID ARTHRITIS INVOLVING MULTIPLE SITES WITH POSITIVE RHEUMATOID FACTOR (HCC): ICD-10-CM

## 2023-06-12 DIAGNOSIS — Z79.4 TYPE 2 DIABETES MELLITUS WITH DIABETIC NEUROPATHIC ARTHROPATHY, WITH LONG-TERM CURRENT USE OF INSULIN (HCC): Primary | ICD-10-CM

## 2023-06-12 DIAGNOSIS — K25.3 ACUTE GASTRIC ULCER WITHOUT HEMORRHAGE OR PERFORATION: ICD-10-CM

## 2023-06-12 DIAGNOSIS — E11.9 TYPE 2 DIABETES MELLITUS WITHOUT COMPLICATION, WITHOUT LONG-TERM CURRENT USE OF INSULIN (HCC): ICD-10-CM

## 2023-06-12 DIAGNOSIS — I10 ESSENTIAL HYPERTENSION: ICD-10-CM

## 2023-06-12 DIAGNOSIS — E55.9 VITAMIN D DEFICIENCY: ICD-10-CM

## 2023-06-12 DIAGNOSIS — Z72.0 TOBACCO USE: ICD-10-CM

## 2023-06-12 DIAGNOSIS — E11.610 CHARCOT FOOT DUE TO DIABETES MELLITUS (HCC): ICD-10-CM

## 2023-06-12 PROBLEM — U07.1 COVID-19: Status: RESOLVED | Noted: 2022-11-25 | Resolved: 2023-06-12

## 2023-06-12 LAB
CHOLEST SERPL-MCNC: 156 MG/DL
HDLC SERPL-MCNC: 50 MG/DL
LDLC SERPL CALC-MCNC: 84 MG/DL (ref 0–100)
SL AMB POCT HEMOGLOBIN AIC: 6.4 (ref ?–6.5)
TRIGL SERPL-MCNC: 112 MG/DL

## 2023-06-12 PROCEDURE — 80061 LIPID PANEL: CPT

## 2023-06-12 PROCEDURE — 83036 HEMOGLOBIN GLYCOSYLATED A1C: CPT | Performed by: FAMILY MEDICINE

## 2023-06-12 PROCEDURE — 36415 COLL VENOUS BLD VENIPUNCTURE: CPT

## 2023-06-12 PROCEDURE — 99214 OFFICE O/P EST MOD 30 MIN: CPT | Performed by: FAMILY MEDICINE

## 2023-06-12 PROCEDURE — 99396 PREV VISIT EST AGE 40-64: CPT | Performed by: FAMILY MEDICINE

## 2023-06-12 RX ORDER — NAPROXEN 500 MG/1
500 TABLET ORAL EVERY 12 HOURS PRN
Qty: 60 TABLET | Refills: 2 | Status: SHIPPED | OUTPATIENT
Start: 2023-06-12

## 2023-06-12 RX ORDER — ERGOCALCIFEROL 1.25 MG/1
50000 CAPSULE ORAL WEEKLY
Qty: 12 CAPSULE | Refills: 3 | Status: SHIPPED | OUTPATIENT
Start: 2023-06-12

## 2023-06-12 RX ORDER — ATORVASTATIN CALCIUM 40 MG/1
40 TABLET, FILM COATED ORAL
Qty: 90 TABLET | Refills: 1 | Status: SHIPPED | OUTPATIENT
Start: 2023-06-12

## 2023-06-12 RX ORDER — INSULIN GLARGINE 100 [IU]/ML
50 INJECTION, SOLUTION SUBCUTANEOUS EVERY 12 HOURS SCHEDULED
Qty: 30 ML | Refills: 1 | Status: SHIPPED | OUTPATIENT
Start: 2023-06-12

## 2023-06-12 RX ORDER — SUCRALFATE 1 G/1
1 TABLET ORAL 4 TIMES DAILY
Qty: 120 TABLET | Refills: 4 | Status: SHIPPED | OUTPATIENT
Start: 2023-06-12

## 2023-06-12 RX ORDER — BLOOD SUGAR DIAGNOSTIC
STRIP MISCELLANEOUS
Qty: 100 STRIP | Refills: 3 | Status: SHIPPED | OUTPATIENT
Start: 2023-06-12

## 2023-06-12 RX ORDER — LOSARTAN POTASSIUM AND HYDROCHLOROTHIAZIDE 12.5; 1 MG/1; MG/1
1 TABLET ORAL DAILY
Qty: 90 TABLET | Refills: 1 | Status: SHIPPED | OUTPATIENT
Start: 2023-06-12

## 2023-06-12 RX ORDER — HYDROCODONE BITARTRATE AND ACETAMINOPHEN 5; 325 MG/1; MG/1
1 TABLET ORAL EVERY 6 HOURS PRN
Qty: 40 TABLET | Refills: 0 | Status: SHIPPED | OUTPATIENT
Start: 2023-06-12

## 2023-06-12 NOTE — ASSESSMENT & PLAN NOTE
Diabetes is adequately controlled    Follow-up with podiatrist as scheduled  Lab Results   Component Value Date    HGBA1C 6 4 06/12/2023

## 2023-06-12 NOTE — ASSESSMENT & PLAN NOTE
Remains on atorvastatin  Cholesterol levels pending  Watch dietary intake of fats and cholesterol    Reevaluate in 4 months

## 2023-06-12 NOTE — PROGRESS NOTES
Subjective:      Patient ID: Starlyn Goldberg  is a 56 y o  male  65-year-old male with past medical history of diabetes mellitus type 2 that is insulin requiring, COPD, chronic tobacco abuse, rheumatoid arthritis,  diabetic neuropathy, hypertension presents for follow-up of chronic conditions  Patient does continue to complain of episodes of nausea with vomiting after eating as well as right upper quadrant abdominal discomfort  He did have his gallbladder removed which did not provide any improvement  Times when he will have the pain and then his blood sugar will go low  He did have blood work performed for rheumatologist   Cholesterol levels pending    Hemoglobin A1c done in the office at 6 5%      Past Medical History:   Diagnosis Date   • Abscess of left thigh 5/11/2021   • Arthritis    • At risk for falls    • Biliary dyskinesia 3/16/2020   • Broken foot     right   • Cataract     giacomo   • Cellulitis of left lower extremity 4/26/2021   • COPD (chronic obstructive pulmonary disease) (HCC)    • Diabetes mellitus (HCC)    • Dyskinesia of gallbladder 8/15/2022   • Hx MRSA infection     Per wife patient hadf MRSa in a wound awhile ago   • Hyperlipidemia    • Kidney stone    • Neuropathy    • RA (rheumatoid arthritis) ([facility] [address] [address] )    • Rheumatoid arthritis (HCC)    • Seasonal allergies    • Sepsis ([facility] [address] ) 4/26/2021   • Snores    • Uses wheelchair     and crutches- NWB RLE   • Wears glasses        Family History   Problem Relation Age of Onset   • Diabetes Mother    • Stroke Mother    • Mental illness Mother    • Diabetes Father    • Stroke Father    • Alcohol abuse Brother    • Coronary artery disease Family         Age 51-55   • Diabetes type II Family    • Heart disease Family        Past Surgical History:   Procedure Laterality Date   • APPENDECTOMY     • CLOSED REDUCTION FOOT DISLOCATION Right 2/12/2019    Procedure: C/R FRACTURE;  Surgeon: Justice Schmidt DPM;  [facility]: [facility];  Service: Podiatry   • COLONOSCOPY     • FOOT SURGERY Right 07/2019    Removal of the bone graft and cleaned out infection, and placed new bone graft   • IR PICC PLACEMENT SINGLE LUMEN  8/5/2019   • NC DEBRIDEMENT SUBCUTANEOUS TISSUE 20 SQ CM/< Left 5/24/2021    Procedure: INCISION AND DRAINAGE THIGH;  Surgeon: Manuela Rene MD;  [facility];  Service: Orthopedics   • NC EXCISION MALIGNANT LESION S/N/H/F/G 0 5 CM/< N/A 3/28/2018    Procedure: EXCISION WIDE LESION HEAD/FACIAL/NECK;  Surgeon: Shaun Montanez MD;  [facility];  Service: Surgical Oncology   • NC GASTROCNEMIUS RECESSION Right 2/12/2019    Procedure: ENDO GASTROC RECESSION, APPLICATION OF EXTERNAL FIXATOR;  Surgeon: Brit Dover DPM;  [facility];  Service: Podiatry   • NC LAPAROSCOPY SURG CHOLECYSTECTOMY N/A 9/30/2022    Procedure: CHOLECYSTECTOMY LAPAROSCOPIC;  Surgeon: Hilda Slaughter MD;  [facility];  Service: General   • NC REMOVAL EXTERNAL FIXATION SYSTEM UNDER ANES Right 4/18/2019    Procedure: Vinnie Jayson REMOVAL HARDWARE FOOT WITH APPLICATION OF GRAFT;  Surgeon: Brit Dover DPM;  [facility];  Service: Podiatry   • SKIN BIOPSY      scalp   • WISDOM TOOTH EXTRACTION  1998   • WOUND DEBRIDEMENT Left 4/29/2021    Procedure: KNEE INCISION AND DRAINAGE, EXCISIONAL DEBRIDEMENT OF PREPATELLAR BURSA; Surgeon: Manuela Rene MD;  [facility];  Service: Orthopedics        reports that he has been smoking cigarettes  He has a 40 00 pack-year smoking history  He has never used smokeless tobacco  He reports current alcohol use  He reports current drug use  Frequency: 7 00 times per week  Drug: Marijuana        Current Outpatient Medications:   •  Accu-Chek FastClix Lancets MISC, TEST BLOOD SUGAR FOUR TIMES A DAY BEFORE MEALS AND BEDTIME, Disp: 102 each, Rfl: 3  •  albuterol (PROVENTIL HFA,VENTOLIN HFA) 90 mcg/act inhaler, INHALE 2 PUFFS EVERY 4 (FOUR) HOURS AS NEEDED FOR WHEEZING, Disp: 18 g, Rfl: 1  •  Ascorbic Acid (vitamin C) 1000 MG tablet, Take 1,000 mg by mouth daily, Disp: , Rfl:   •  atorvastatin (LIPITOR) 40 mg tablet, Take 1 tablet (40 mg total) by mouth daily at bedtime, Disp: 90 tablet, Rfl: 1  •  Basaglar KwikPen 100 units/mL SOPN, INJECT 50 UNITS UNDER THE SKIN EVERY 12 (TWELVE) HOURS, Disp: 30 mL, Rfl: 1  •  BD Pen Needle Maureen U/F 32G X 4 MM MISC, ONE PEN NEEDLE THREE TIMES A DAY 2-BASAGLAR 1- VICTOZA, Disp: 100 each, Rfl: 3  •  Blood Glucose Monitoring Suppl (OneTouch Verio Flex System) w/Device KIT, Use 3 (three) times a day with meals, Disp: 1 kit, Rfl: 0  •  calcium carbonate (OS-NERY) 600 MG tablet, Take 600 mg by mouth daily with dinner, Disp: , Rfl:   •  ergocalciferol (VITAMIN D2) 50,000 units, Take 1 capsule (50,000 Units total) by mouth once a week Takes on Saturday, Disp: 12 capsule, Rfl: 3  •  fluticasone (FLONASE) 50 mcg/act nasal spray, USE 2 SPRAYS IN EACH NOSTRIL ONCE DAILY, Disp: 16 g, Rfl: 11  •  HYDROcodone-acetaminophen (Norco) 5-325 mg per tablet, Take 1 tablet by mouth every 6 (six) hours as needed for pain Max Daily Amount: 4 tablets, Disp: 40 tablet, Rfl: 0  •  leflunomide (ARAVA) 20 MG tablet, Take 1 tablet (20 mg total) by mouth daily with dinner, Disp: 90 tablet, Rfl: 1  •  losartan-hydrochlorothiazide (HYZAAR) 100-12 5 MG per tablet, Take 1 tablet by mouth daily, Disp: 90 tablet, Rfl: 1  •  naproxen (NAPROSYN) 500 mg tablet, Take 1 tablet (500 mg total) by mouth every 12 (twelve) hours as needed for mild pain (with food), Disp: 60 tablet, Rfl: 2  •  OneTouch Verio test strip, CHECK GLUCOSE BEFORE MEALS, Disp: 100 strip, Rfl: 3  •  pantoprazole (PROTONIX) 40 mg tablet, TAKE 1 TABLET (40 MG TOTAL) BY MOUTH DAILY, Disp: 30 tablet, Rfl: 11  •  saccharomyces boulardii (FLORASTOR) 250 mg capsule, Take 1 capsule (250 mg total) by mouth 2 (two) times a day, Disp: 60 capsule, Rfl: 0  •  sucralfate (CARAFATE) 1 g tablet, TAKE 1 TABLET (1 G TOTAL) BY MOUTH 4 (FOUR) TIMES A DAY, Disp: 120 tablet, Rfl: 4  • Tofacitinib Citrate ER (Xeljanz XR) 11 MG TB24, Take 1 tablet (11 mg total) by mouth daily, Disp: 30 tablet, Rfl: 5  •  ULTICARE MICRO PEN NEEDLES 32G X 4 MM MISC, INJECT UNDER THE SKIN 3 (THREE) TIMES A DAY, Disp: 100 each, Rfl: 3  •  Victoza injection, INJECT 0 3 ML (1 8 MG TOTAL) UNDER THE SKIN DAILY, Disp: 9 mL, Rfl: 0  •  Diclofenac Sodium (VOLTAREN) 1 %, Apply 2 g topically 4 (four) times a day for 10 days, Disp: 100 g, Rfl: 0  •  hydroxychloroquine (PLAQUENIL) 200 mg tablet, Take 1 tablet (200 mg total) by mouth 2 (two) times a day, Disp: 180 tablet, Rfl: 1  •  olopatadine (PATANOL) 0 1 % ophthalmic solution, Administer 1 drop to both eyes in the morning and 1 drop in the evening  (Patient not taking: Reported on 6/12/2023), Disp: 5 mL, Rfl: 1    The following portions of the patient's history were reviewed and updated as appropriate: allergies, current medications, past family history, past medical history, past social history, past surgical history and problem list     Review of Systems   Constitutional: Positive for appetite change ( Decreased appetite)  Negative for fatigue and unexpected weight change  HENT: Negative  Eyes: Negative for itching  Respiratory: Negative  Cardiovascular: Negative  Negative for leg swelling  Gastrointestinal: Negative for abdominal pain, nausea ( postprandial) and vomiting  Endocrine: Negative  Genitourinary: Negative  Musculoskeletal: Negative  Negative for gait problem  Allergic/Immunologic: Negative  Neurological: Positive for numbness (Bilateral feet)  Hematological: Negative  Psychiatric/Behavioral: Negative  Negative for dysphoric mood and suicidal ideas  The patient is not nervous/anxious  All other systems reviewed and are negative  Objective:    /78   Pulse 86   Ht 6' (1 829 m)   Wt 110 kg (242 lb)   SpO2 98%   BMI 32 82 kg/m²      Physical Exam  Vitals and nursing note reviewed     Constitutional:       General: He is not in acute distress  Appearance: Normal appearance  He is well-developed  He is obese  He is not ill-appearing  HENT:      Head: Normocephalic and atraumatic  Right Ear: Tympanic membrane, ear canal and external ear normal       Left Ear: Tympanic membrane, ear canal and external ear normal       Nose: Nose normal       Mouth/Throat:      Mouth: Mucous membranes are moist    Eyes:      Extraocular Movements: Extraocular movements intact  Pupils: Pupils are equal, round, and reactive to light  Comments: Allergic conjunctivitis bilaterally   Cardiovascular:      Rate and Rhythm: Normal rate and regular rhythm  Pulses: Normal pulses  Heart sounds: Normal heart sounds  No murmur heard  Pulmonary:      Effort: Pulmonary effort is normal       Breath sounds: Normal breath sounds  Abdominal:      General: Abdomen is flat  Bowel sounds are normal  There is no distension  Palpations: Abdomen is soft  There is no mass  Musculoskeletal:         General: Deformity (Rheumatic nodules) present  Normal range of motion  Cervical back: Normal range of motion and neck supple  Right lower leg: No edema  Left lower leg: No edema  Skin:     General: Skin is warm and dry  Neurological:      General: No focal deficit present  Mental Status: He is alert and oriented to person, place, and time  Cranial Nerves: No cranial nerve deficit  Psychiatric:         Mood and Affect: Mood normal          Behavior: Behavior normal          Thought Content:  Thought content normal          Judgment: Judgment normal            Recent Results (from the past 5040 hour(s))   Microalbumin / creatinine urine ratio    Collection Time: 02/17/23 12:00 AM   Result Value Ref Range    EXT Creatinine Urine 160 0     Albumin,U,Random 9 5     Alb/Creat Ratio 6    Hemoglobin A1C    Collection Time: 02/17/23 12:00 AM   Result Value Ref Range    Hemoglobin A1C 5 9    LP+LDL DIRECT Collection Time: 02/17/23  9:05 AM   Result Value Ref Range    Cholesterol, Total 140 100 - 199 mg/dL    Triglycerides 116 0 - 149 mg/dL    HDL 48 >39 mg/dL    LDL Direct 67 0 - 99 mg/dL    LDl/HDL Ratio 1 4 0 0 - 3 6 ratio   TSH WITH REFLEX TO FREE T4    Collection Time: 02/17/23  9:05 AM   Result Value Ref Range    TSH 1 670 0 450 - 4 500 uIU/mL    Free t4 1 03 0 82 - 1 77 ng/dL   [place] Default    Collection Time: 02/17/23  9:05 AM   Result Value Ref Range    White Blood Cell Count 5 9 3 4 - 10 8 x10E3/uL    Red Blood Cell Count 5 08 4 14 - 5 80 x10E6/uL    Hemoglobin 15 3 13 0 - 17 7 g/dL    HCT 43 8 37 5 - 51 0 %    MCV 86 79 - 97 fL    MCH 30 1 26 6 - 33 0 pg    MCHC 34 9 31 5 - 35 7 g/dL    RDW 13 4 11 6 - 15 4 %    Platelet Count 759 045 - 450 x10E3/uL    Neutrophils 55 Not Estab  %    Lymphocytes 28 Not Estab  %    Monocytes 13 Not Estab  %    Eosinophils 2 Not Estab  %    Basophils PCT 1 Not Estab  %    Neutrophils (Absolute) 3 3 1 4 - 7 0 x10E3/uL    Lymphocytes (Absolute) 1 6 0 7 - 3 1 x10E3/uL    Monocytes (Absolute) 0 8 0 1 - 0 9 x10E3/uL    Eosinophils (Absolute) 0 1 0 0 - 0 4 x10E3/uL    Basophils ABS 0 1 0 0 - 0 2 x10E3/uL    Immature Granulocytes 1 Not Estab  %    Immature Granulocytes (Absolute) 0 0 0 0 - 0 1 x10E3/uL   Comprehensive metabolic panel    Collection Time: 02/17/23  9:05 AM   Result Value Ref Range    Glucose, Random 102 (H) 70 - 99 mg/dL    BUN 18 6 - 24 mg/dL    Creatinine 0 92 0 76 - 1 27 mg/dL    eGFR 98 >59 mL/min/1 73    SL AMB BUN/CREATININE RATIO 20 9 - 20    Sodium 141 134 - 144 mmol/L    Potassium 4 4 3 5 - 5 2 mmol/L    Chloride 105 96 - 106 mmol/L    CO2 25 20 - 29 mmol/L    CALCIUM 9 0 8 7 - 10 2 mg/dL    Protein, Total 6 6 6 0 - 8 5 g/dL    Albumin 4 4 3 8 - 4 9 g/dL    Globulin, Total 2 2 1 5 - 4 5 g/dL    Albumin/Globulin Ratio 2 0 1 2 - 2 2    TOTAL BILIRUBIN 0 5 0 0 - 1 2 mg/dL    Alk Phos Isoenzymes 46 44 - 121 IU/L    AST 29 0 - 40 IU/L    ALT 28 0 - 44 IU/L Microalbumin / creatinine urine ratio    Collection Time: 02/17/23  9:05 AM   Result Value Ref Range    Creatinine, Urine 160 0 Not Estab  mg/dL    Albumin,U,Random 9 5 Not Estab  ug/mL    Microalb/Creat Ratio 6 0 - 29 mg/g creat   Hemoglobin A1c (w/out EAG)    Collection Time: 02/17/23  9:05 AM   Result Value Ref Range    Hemoglobin A1C 5 9 (H) 4 8 - 5 6 %   Comprehensive metabolic panel    Collection Time: 05/31/23 10:45 AM   Result Value Ref Range    Sodium 137 135 - 147 mmol/L    Potassium 4 3 3 5 - 5 3 mmol/L    Chloride 102 96 - 108 mmol/L    CO2 30 21 - 32 mmol/L    ANION GAP 5 4 - 13 mmol/L    BUN 18 5 - 25 mg/dL    Creatinine 0 95 0 60 - 1 30 mg/dL    Glucose 212 (H) 65 - 140 mg/dL    Calcium 9 3 8 4 - 10 2 mg/dL    AST 22 13 - 39 U/L    ALT 28 7 - 52 U/L    Alkaline Phosphatase 40 34 - 104 U/L    Total Protein 7 2 6 4 - 8 4 g/dL    Albumin 4 2 3 5 - 5 0 g/dL    Total Bilirubin 0 52 0 20 - 1 00 mg/dL    eGFR 89 ml/min/1 73sq m   C-reactive protein    Collection Time: 05/31/23 10:45 AM   Result Value Ref Range    CRP 1 0 <3 0 mg/L   Sedimentation rate, automated    Collection Time: 05/31/23 10:45 AM   Result Value Ref Range    Sed Rate 6 0 - 20 mm/hour   CBC and differential    Collection Time: 05/31/23 10:45 AM   Result Value Ref Range    WBC 5 87 4 31 - 10 16 Thousand/uL    RBC 5 22 3 88 - 5 62 Million/uL    Hemoglobin 16 1 12 0 - 17 0 g/dL    Hematocrit 46 6 36 5 - 49 3 %    MCV 89 82 - 98 fL    MCH 30 8 26 8 - 34 3 pg    MCHC 34 5 31 4 - 37 4 g/dL    RDW 13 4 11 6 - 15 1 %    MPV 10 3 8 9 - 12 7 fL    Platelets 298 153 - 417 Thousands/uL    nRBC 0 /100 WBCs    Neutrophils Relative 57 43 - 75 %    Immat GRANS % 1 0 - 2 %    Lymphocytes Relative 29 14 - 44 %    Monocytes Relative 11 4 - 12 %    Eosinophils Relative 1 0 - 6 %    Basophils Relative 1 0 - 1 %    Neutrophils Absolute 3 34 1 85 - 7 62 Thousands/µL    Immature Grans Absolute 0 05 0 00 - 0 20 Thousand/uL    Lymphocytes Absolute 1 69 0 60 - 4 47 Thousands/µL    Monocytes Absolute 0 67 0 17 - 1 22 Thousand/µL    Eosinophils Absolute 0 08 0 00 - 0 61 Thousand/µL    Basophils Absolute 0 04 0 00 - 0 10 Thousands/µL   POCT hemoglobin A1c    Collection Time: 06/12/23  9:36 AM   Result Value Ref Range    Hemoglobin A1C 6 4 6 5       Assessment/Plan:    Type 2 diabetes mellitus with diabetic neuropathic arthropathy, with long-term current use of insulin ([facility])    Lab Results   Component Value Date    HGBA1C 6 4 06/12/2023   Hemoglobin A1c at 6 5%  Adequately controlled  Continue on same regimen of medications  Repeat diabetic parameters in 4 months    Charcot foot due to diabetes mellitus ([place] [id number] )  Diabetes is adequately controlled  Follow-up with podiatrist as scheduled  Lab Results   Component Value Date    HGBA1C 6 4 06/12/2023       Benign essential hypertension  Hypertension remains well controlled on losartan/hydrochlorothiazide  Reevaluate in 4 months    Rheumatoid arthritis involving multiple sites with positive rheumatoid factor ([place] [address] )  Managed by rheumatologist    Vitamin D deficiency  Continue on vitamin D supplementation  Repeat MND level in 4 months    Tobacco use  Yet again counseled on the benefits of smoking cessation and life    Mixed hyperlipidemia  Remains on atorvastatin  Cholesterol levels pending  Watch dietary intake of fats and cholesterol  Reevaluate in 4 months          Problem List Items Addressed This Visit        Endocrine    Charcot foot due to diabetes mellitus ([facility] )     Diabetes is adequately controlled  Follow-up with podiatrist as scheduled  Lab Results   Component Value Date    HGBA1C 6 4 06/12/2023            Relevant Medications    HYDROcodone-acetaminophen (Norco) 5-325 mg per tablet    Type 2 diabetes mellitus with diabetic neuropathic arthropathy, with long-term current use of insulin ([place] [id number] ) - Primary       Lab Results   Component Value Date    HGBA1C 6 4 06/12/2023   Hemoglobin A1c at 6 5%  Adequately controlled  Continue on same regimen of medications  Repeat diabetic parameters in 4 months         Relevant Orders    POCT hemoglobin A1c (Completed)    Albumin / creatinine urine ratio    Hemoglobin A1C       Cardiovascular and Mediastinum    Benign essential hypertension     Hypertension remains well controlled on losartan/hydrochlorothiazide  Reevaluate in 4 months         Relevant Medications    losartan-hydrochlorothiazide (HYZAAR) 100-12 5 MG per tablet    Other Relevant Orders    CBC and differential    TSH, 3rd generation with Free T4 reflex       Musculoskeletal and Integument    Rheumatoid arthritis involving multiple sites with positive rheumatoid factor ([facility] )     Managed by rheumatologist         Relevant Orders    CBC and differential       Other    Mixed hyperlipidemia     Remains on atorvastatin  Cholesterol levels pending  Watch dietary intake of fats and cholesterol  Reevaluate in 4 months         Relevant Medications    atorvastatin (LIPITOR) 40 mg tablet    Other Relevant Orders    Lipid Panel with Direct LDL reflex    Comprehensive metabolic panel    Tobacco use     Yet again counseled on the benefits of smoking cessation and life         Vitamin D deficiency     Continue on vitamin D supplementation    Repeat MND level in 4 months         Relevant Medications    ergocalciferol (VITAMIN D2) 50,000 units   Other Visit Diagnoses     Essential hypertension        Relevant Medications    losartan-hydrochlorothiazide (HYZAAR) 100-12 5 MG per tablet    Other Relevant Orders    CBC and differential

## 2023-06-13 ENCOUNTER — TELEPHONE (OUTPATIENT)
Dept: FAMILY MEDICINE CLINIC | Facility: CLINIC | Age: 58
End: 2023-06-13

## 2023-06-13 NOTE — RESULT ENCOUNTER NOTE
Please call patient and notify that his cholesterol has been better but still is pretty good  No changes in medications    See him in October

## 2023-06-13 NOTE — TELEPHONE ENCOUNTER
----- Message from Rosa Elena Cartwright DO sent at 6/13/2023  9:04 AM EDT -----  Please call patient and notify that his cholesterol has been better but still is pretty good  No changes in medications    See him in October

## 2023-06-21 DIAGNOSIS — E13.9 DIABETES MELLITUS OF OTHER TYPE WITHOUT COMPLICATION, UNSPECIFIED WHETHER LONG TERM INSULIN USE (HCC): ICD-10-CM

## 2023-06-21 DIAGNOSIS — E11.9 TYPE 2 DIABETES MELLITUS WITHOUT COMPLICATION, WITHOUT LONG-TERM CURRENT USE OF INSULIN (HCC): ICD-10-CM

## 2023-06-21 RX ORDER — LIRAGLUTIDE 6 MG/ML
1.8 INJECTION SUBCUTANEOUS DAILY
Qty: 9 ML | Refills: 0 | Status: SHIPPED | OUTPATIENT
Start: 2023-06-21

## 2023-07-12 DIAGNOSIS — Z79.4 TYPE 2 DIABETES MELLITUS WITH DIABETIC POLYNEUROPATHY, WITH LONG-TERM CURRENT USE OF INSULIN (HCC): ICD-10-CM

## 2023-07-12 DIAGNOSIS — E11.42 TYPE 2 DIABETES MELLITUS WITH DIABETIC POLYNEUROPATHY, WITH LONG-TERM CURRENT USE OF INSULIN (HCC): ICD-10-CM

## 2023-07-12 DIAGNOSIS — J45.909 UNCOMPLICATED ASTHMA, UNSPECIFIED ASTHMA SEVERITY, UNSPECIFIED WHETHER PERSISTENT: ICD-10-CM

## 2023-07-12 DIAGNOSIS — E11.9 TYPE 2 DIABETES MELLITUS WITHOUT COMPLICATION, WITHOUT LONG-TERM CURRENT USE OF INSULIN (HCC): ICD-10-CM

## 2023-07-12 DIAGNOSIS — E13.9 DIABETES MELLITUS OF OTHER TYPE WITHOUT COMPLICATION, UNSPECIFIED WHETHER LONG TERM INSULIN USE (HCC): ICD-10-CM

## 2023-07-12 RX ORDER — ALBUTEROL SULFATE 90 UG/1
2 AEROSOL, METERED RESPIRATORY (INHALATION) EVERY 4 HOURS PRN
Qty: 18 G | Refills: 1 | Status: SHIPPED | OUTPATIENT
Start: 2023-07-12

## 2023-07-12 RX ORDER — PEN NEEDLE, DIABETIC 32GX 5/32"
NEEDLE, DISPOSABLE MISCELLANEOUS
Qty: 100 EACH | Refills: 3 | Status: SHIPPED | OUTPATIENT
Start: 2023-07-12

## 2023-08-02 DIAGNOSIS — E11.9 TYPE 2 DIABETES MELLITUS WITHOUT COMPLICATION, WITHOUT LONG-TERM CURRENT USE OF INSULIN (HCC): ICD-10-CM

## 2023-08-02 DIAGNOSIS — E13.9 DIABETES MELLITUS OF OTHER TYPE WITHOUT COMPLICATION, UNSPECIFIED WHETHER LONG TERM INSULIN USE (HCC): ICD-10-CM

## 2023-08-02 RX ORDER — LIRAGLUTIDE 6 MG/ML
1.8 INJECTION SUBCUTANEOUS DAILY
Qty: 9 ML | Refills: 0 | Status: SHIPPED | OUTPATIENT
Start: 2023-08-02

## 2023-08-08 ENCOUNTER — OFFICE VISIT (OUTPATIENT)
Dept: RHEUMATOLOGY | Facility: CLINIC | Age: 58
End: 2023-08-08
Payer: COMMERCIAL

## 2023-08-08 VITALS
BODY MASS INDEX: 32.78 KG/M2 | SYSTOLIC BLOOD PRESSURE: 130 MMHG | HEIGHT: 72 IN | DIASTOLIC BLOOD PRESSURE: 80 MMHG | WEIGHT: 242 LBS

## 2023-08-08 DIAGNOSIS — M05.9 SEROPOSITIVE RHEUMATOID ARTHRITIS (HCC): ICD-10-CM

## 2023-08-08 DIAGNOSIS — M54.2 NECK PAIN: ICD-10-CM

## 2023-08-08 DIAGNOSIS — Z79.899 HIGH RISK MEDICATION USE: Primary | ICD-10-CM

## 2023-08-08 PROCEDURE — 99214 OFFICE O/P EST MOD 30 MIN: CPT | Performed by: INTERNAL MEDICINE

## 2023-08-08 RX ORDER — LANCETS
EACH MISCELLANEOUS
COMMUNITY
Start: 2023-07-12

## 2023-08-08 RX ORDER — AMITRIPTYLINE HYDROCHLORIDE 10 MG/1
10 TABLET, FILM COATED ORAL
Qty: 90 TABLET | Refills: 2 | Status: SHIPPED | OUTPATIENT
Start: 2023-08-08

## 2023-08-08 RX ORDER — HYDROXYCHLOROQUINE SULFATE 200 MG/1
200 TABLET, FILM COATED ORAL 2 TIMES DAILY
Qty: 180 TABLET | Refills: 1 | Status: SHIPPED | OUTPATIENT
Start: 2023-08-08 | End: 2023-11-06

## 2023-08-08 RX ORDER — TOFACITINIB 11 MG/1
11 TABLET, FILM COATED, EXTENDED RELEASE ORAL DAILY
Qty: 30 TABLET | Refills: 5 | Status: SHIPPED | OUTPATIENT
Start: 2023-08-08

## 2023-08-08 NOTE — PROGRESS NOTES
Assessment and Plan:   Seropositive RA--tried and failed Enbrel and Humira. Currently taking Plaquenil 400mg daily, and Xeljanz 11mg daily with much improvement in his symptoms  Continue Plaquenil 400mg daily. Yearly opthal.evaluations including visual field testing  Continue Xeljanz 11mg daily  Anti-inflammatory diet/exercise/weight control  Increase CV fitness/aerobic activity  MRI C-spine as patient with worsening neck pain and referred pain  Add Elavil 10-20mg at bedtime for neuropathic pain  Patient to continue to monitor symptoms  Topical NSAIDs/analgesics PRN joint pain  Continue to monitor labs including CRP, CBC, CBP, CRP and lipid panel every 3 months. F/u in 6 mos. or sooner if necessary      Rheumatic Disease Summary  As above    Here for f/u visit. Last visit in March, 2023. Taking Xeljanz and Plaquenil for his RA. Doing well. All recent labs reviewed and esr/crp normal.  We discontinued the [employer] last visit. Right foot Charcot joint and sees Podiatry. No medication side effects. Worsening neck pain and referred pain to right UE. MRI ordered. The following portions of the patient's history were reviewed and updated as appropriate: allergies, current medications, past family history, past medical history, past social history, past surgical history and problem list.    Review of Systems:   Review of Systems   Constitutional: Negative for fatigue. HENT: Negative for mouth sores. Eyes: Negative for pain. Respiratory: Negative for shortness of breath. Cardiovascular: Negative for leg swelling. Musculoskeletal: Positive for neck pain. Negative for arthralgias and joint swelling. Skin: Negative for rash. Neurological: Negative for weakness. Hematological: Negative for adenopathy. Psychiatric/Behavioral: Negative for sleep disturbance.        Home Medications:    Current Outpatient Medications:   •  Accu-Chek FastClix Lancets MISC, TEST BLOOD SUGAR FOUR TIMES A DAY BEFORE MEALS AND BEDTIME, Disp: 102 each, Rfl: 3  •  albuterol (PROVENTIL HFA,VENTOLIN HFA) 90 mcg/act inhaler, INHALE 2 PUFFS EVERY 4 (FOUR) HOURS AS NEEDED FOR WHEEZING, Disp: 18 g, Rfl: 1  •  Ascorbic Acid (vitamin C) 1000 MG tablet, Take 1,000 mg by mouth daily, Disp: , Rfl:   •  atorvastatin (LIPITOR) 40 mg tablet, Take 1 tablet (40 mg total) by mouth daily at bedtime, Disp: 90 tablet, Rfl: 1  •  Basaglar KwikPen 100 units/mL SOPN, INJECT 50 UNITS UNDER THE SKIN EVERY 12 (TWELVE) HOURS, Disp: 30 mL, Rfl: 1  •  BD Pen Needle Maureen U/F 32G X 4 MM MISC, ONE PEN NEEDLE THREE TIMES A DAY 2-BASAGLAR 1- VICTOZA, Disp: 100 each, Rfl: 3  •  Blood Glucose Monitoring Suppl (OneTouch Verio Flex System) w/Device KIT, Use 3 (three) times a day with meals, Disp: 1 kit, Rfl: 0  •  calcium carbonate (OS-NERY) 600 MG tablet, Take 600 mg by mouth daily with dinner, Disp: , Rfl:   •  Diclofenac Sodium (VOLTAREN) 1 %, Apply 2 g topically 4 (four) times a day for 10 days, Disp: 100 g, Rfl: 0  •  ergocalciferol (VITAMIN D2) 50,000 units, Take 1 capsule (50,000 Units total) by mouth once a week Takes on Saturday, Disp: 12 capsule, Rfl: 3  •  fluticasone (FLONASE) 50 mcg/act nasal spray, USE 2 SPRAYS IN EACH NOSTRIL ONCE DAILY, Disp: 16 g, Rfl: 11  •  HYDROcodone-acetaminophen (Norco) 5-325 mg per tablet, Take 1 tablet by mouth every 6 (six) hours as needed for pain Max Daily Amount: 4 tablets, Disp: 40 tablet, Rfl: 0  •  hydroxychloroquine (PLAQUENIL) 200 mg tablet, Take 1 tablet (200 mg total) by mouth 2 (two) times a day, Disp: 180 tablet, Rfl: 1  •  leflunomide (ARAVA) 20 MG tablet, Take 1 tablet (20 mg total) by mouth daily with dinner, Disp: 90 tablet, Rfl: 1  •  losartan-hydrochlorothiazide (HYZAAR) 100-12.5 MG per tablet, Take 1 tablet by mouth daily, Disp: 90 tablet, Rfl: 1  •  naproxen (NAPROSYN) 500 mg tablet, TAKE 1 TABLET (500 MG TOTAL) BY MOUTH EVERY 12 (TWELVE) HOURS AS NEEDED FOR MILD PAIN (WITH FOOD), Disp: 60 tablet, Rfl: 2  • olopatadine (PATANOL) 0.1 % ophthalmic solution, Administer 1 drop to both eyes in the morning and 1 drop in the evening. (Patient not taking: Reported on 6/12/2023), Disp: 5 mL, Rfl: 1  •  OneTouch Verio test strip, CHECK GLUCOSE BEFORE MEALS, Disp: 100 strip, Rfl: 3  •  pantoprazole (PROTONIX) 40 mg tablet, TAKE 1 TABLET (40 MG TOTAL) BY MOUTH DAILY, Disp: 30 tablet, Rfl: 11  •  saccharomyces boulardii (FLORASTOR) 250 mg capsule, Take 1 capsule (250 mg total) by mouth 2 (two) times a day, Disp: 60 capsule, Rfl: 0  •  sucralfate (CARAFATE) 1 g tablet, TAKE 1 TABLET (1 G TOTAL) BY MOUTH 4 (FOUR) TIMES A DAY, Disp: 120 tablet, Rfl: 4  •  Tofacitinib Citrate ER (Xeljanz XR) 11 MG TB24, Take 1 tablet (11 mg total) by mouth daily, Disp: 30 tablet, Rfl: 5  •  ULTICARE MICRO PEN NEEDLES 32G X 4 MM MISC, INJECT UNDER THE SKIN 3 (THREE) TIMES A DAY, Disp: 100 each, Rfl: 3  •  Victoza injection, INJECT 0.3 ML (1.8 MG TOTAL) UNDER THE SKIN DAILY, Disp: 9 mL, Rfl: 0    Objective: There were no vitals filed for this visit. Physical Exam  Constitutional:       General: He is not in acute distress. HENT:      Head: Normocephalic and atraumatic. Eyes:      Conjunctiva/sclera: Conjunctivae normal.   Cardiovascular:      Rate and Rhythm: Normal rate and regular rhythm. Heart sounds: S1 normal and S2 normal.      No friction rub. Pulmonary:      Effort: Pulmonary effort is normal. No respiratory distress. Breath sounds: Normal breath sounds. No wheezing, rhonchi or rales. Musculoskeletal:      Cervical back: Neck supple. Tenderness present. Right foot: Decreased range of motion. Tenderness present. Skin:     Coloration: Skin is not pale. Neurological:      Mental Status: He is alert. Mental status is at baseline. Psychiatric:         Mood and Affect: Mood normal.         Behavior: Behavior normal.         Reviewed labs and imaging. Imaging:   No results found.     Labs:   Office Visit on 06/12/2023   Component Date Value Ref Range Status   • Hemoglobin A1C 06/12/2023 6.4  6.5 Final   Lab on 06/12/2023   Component Date Value Ref Range Status   • Cholesterol 06/12/2023 156  See Comment mg/dL Final    Cholesterol:         Pediatric <18 Years        Desirable          <170 mg/dL      Borderline High    170-199 mg/dL      High               >=200 mg/dL        Adult >=18 Years            Desirable         <200 mg/dL      Borderline High   200-239 mg/dL      High              >239 mg/dL     • Triglycerides 06/12/2023 112  See Comment mg/dL Final    Triglyceride:     0-9Y            <75mg/dL     10Y-17Y         <90 mg/dL       >=18Y     Normal          <150 mg/dL     Borderline High 150-199 mg/dL     High            200-499 mg/dL        Very High       >499 mg/dL    Specimen collection should occur prior to N-Acetylcysteine or Metamizole administration due to the potential for falsely depressed results. • HDL, Direct 06/12/2023 50  >=40 mg/dL Final    Specimen collection should occur prior to Metamizole administration due to the potential for falsley depressed results. • LDL Calculated 06/12/2023 84  0 - 100 mg/dL Final    LDL Cholesterol:     Optimal           <100 mg/dl     Near Optimal      100-129 mg/dl     Above Optimal       Borderline High 130-159 mg/dl       High            160-189 mg/dl       Very High       >189 mg/dl         This screening LDL is a calculated result. It does not have the accuracy of the Direct Measured LDL in the monitoring of patients with hyperlipidemia and/or statin therapy. Direct Measure LDL (RPI049) must be ordered separately in these patients.    Appointment on 05/31/2023   Component Date Value Ref Range Status   • WBC 05/31/2023 5.87  4.31 - 10.16 Thousand/uL Final   • RBC 05/31/2023 5.22  3.88 - 5.62 Million/uL Final   • Hemoglobin 05/31/2023 16.1  12.0 - 17.0 g/dL Final   • Hematocrit 05/31/2023 46.6  36.5 - 49.3 % Final   • MCV 05/31/2023 89  82 - 98 fL Final • MCH 05/31/2023 30.8  26.8 - 34.3 pg Final   • MCHC 05/31/2023 34.5  31.4 - 37.4 g/dL Final   • RDW 05/31/2023 13.4  11.6 - 15.1 % Final   • MPV 05/31/2023 10.3  8.9 - 12.7 fL Final   • Platelets 20/97/2929 219  149 - 390 Thousands/uL Final   • nRBC 05/31/2023 0  /100 WBCs Final   • Neutrophils Relative 05/31/2023 57  43 - 75 % Final   • Immat GRANS % 05/31/2023 1  0 - 2 % Final   • Lymphocytes Relative 05/31/2023 29  14 - 44 % Final   • Monocytes Relative 05/31/2023 11  4 - 12 % Final   • Eosinophils Relative 05/31/2023 1  0 - 6 % Final   • Basophils Relative 05/31/2023 1  0 - 1 % Final   • Neutrophils Absolute 05/31/2023 3.34  1.85 - 7.62 Thousands/µL Final   • Immature Grans Absolute 05/31/2023 0.05  0.00 - 0.20 Thousand/uL Final   • Lymphocytes Absolute 05/31/2023 1.69  0.60 - 4.47 Thousands/µL Final   • Monocytes Absolute 05/31/2023 0.67  0.17 - 1.22 Thousand/µL Final   • Eosinophils Absolute 05/31/2023 0.08  0.00 - 0.61 Thousand/µL Final   • Basophils Absolute 05/31/2023 0.04  0.00 - 0.10 Thousands/µL Final   Orders Only on 02/17/2023   Component Date Value Ref Range Status   • Cholesterol, Total 02/17/2023 140  100 - 199 mg/dL Final   • Triglycerides 02/17/2023 116  0 - 149 mg/dL Final   • HDL 02/17/2023 48  >39 mg/dL Final   • LDL Direct 02/17/2023 67  0 - 99 mg/dL Final   • LDl/HDL Ratio 02/17/2023 1.4  0.0 - 3.6 ratio Final    Comment:                                              LDL/HDL                                              Men  Women                                 1/2 Avg. Risk 1.0   1.5                                     Avg.Risk 3.6   3.2                                  2X Avg. Risk 6.3   5.0                                  3X Avg. Risk 8.0   6.1     • TSH 02/17/2023 1.670  0.450 - 4.500 uIU/mL Final   • Free t4 02/17/2023 1.03  0.82 - 1.77 ng/dL Final   • White Blood Cell Count 02/17/2023 5.9  3.4 - 10.8 x10E3/uL Final   • Red Blood Cell Count 02/17/2023 5.08  4.14 - 5.80 x10E6/uL Final   • Hemoglobin 02/17/2023 15.3  13.0 - 17.7 g/dL Final   • HCT 02/17/2023 43.8  37.5 - 51.0 % Final   • MCV 02/17/2023 86  79 - 97 fL Final   • MCH 02/17/2023 30.1  26.6 - 33.0 pg Final   • MCHC 02/17/2023 34.9  31.5 - 35.7 g/dL Final   • RDW 02/17/2023 13.4  11.6 - 15.4 % Final   • Platelet Count 21/22/0715 189  150 - 450 x10E3/uL Final   • Neutrophils 02/17/2023 55  Not Estab. % Final   • Lymphocytes 02/17/2023 28  Not Estab. % Final   • Monocytes 02/17/2023 13  Not Estab. % Final   • Eosinophils 02/17/2023 2  Not Estab. % Final   • Basophils PCT 02/17/2023 1  Not Estab. % Final   • Neutrophils (Absolute) 02/17/2023 3.3  1.4 - 7.0 x10E3/uL Final   • Lymphocytes (Absolute) 02/17/2023 1.6  0.7 - 3.1 x10E3/uL Final   • Monocytes (Absolute) 02/17/2023 0.8  0.1 - 0.9 x10E3/uL Final   • Eosinophils (Absolute) 02/17/2023 0.1  0.0 - 0.4 x10E3/uL Final   • Basophils ABS 02/17/2023 0.1  0.0 - 0.2 x10E3/uL Final   • Immature Granulocytes 02/17/2023 1  Not Estab. % Final   • Immature Granulocytes (Absolute) 02/17/2023 0.0  0.0 - 0.1 x10E3/uL Final    Comment: A hand-written panel/profile was received from your office. In  accordance with the [facility] Ambiguous Test Code Policy dated July 6325, we have assigned CBC with Differential/Platelet, Test Code  #[id number] to this request. If this is not the testing you wished to  receive on this specimen, please contact the [facility] Department to clarify the test order. We  appreciate your business.      • Glucose, Random 02/17/2023 102 (H)  70 - 99 mg/dL Final   • BUN 02/17/2023 18  6 - 24 mg/dL Final   • Creatinine 02/17/2023 0.92  0.76 - 1.27 mg/dL Final   • eGFR 02/17/2023 98  >59 mL/min/1.73 Final   • SL AMB BUN/CREATININE RATIO 02/17/2023 20  9 - 20 Final   • Sodium 02/17/2023 141  134 - 144 mmol/L Final   • Potassium 02/17/2023 4.4  3.5 - 5.2 mmol/L Final   • Chloride 02/17/2023 105  96 - 106 mmol/L Final   • CO2 02/17/2023 25  20 - 29 mmol/L Final • CALCIUM 02/17/2023 9.0  8.7 - 10.2 mg/dL Final   • Protein, Total 02/17/2023 6.6  6.0 - 8.5 g/dL Final   • Albumin 02/17/2023 4.4  3.8 - 4.9 g/dL Final   • Globulin, Total 02/17/2023 2.2  1.5 - 4.5 g/dL Final   • Albumin/Globulin Ratio 02/17/2023 2.0  1.2 - 2.2 Final   • TOTAL BILIRUBIN 02/17/2023 0.5  0.0 - 1.2 mg/dL Final   • Alk Phos Isoenzymes 02/17/2023 46  44 - 121 IU/L Final   • AST 02/17/2023 29  0 - 40 IU/L Final   • ALT 02/17/2023 28  0 - 44 IU/L Final   • Creatinine, Urine 02/17/2023 160.0  Not Estab. mg/dL Final   • Albumin,U,Random 02/17/2023 9.5  Not Estab. ug/mL Final   • Microalb/Creat Ratio 02/17/2023 6  0 - 29 mg/g creat Final    Comment:                        Normal:                0 -  29                         Moderately increased: 30 - 300                         Severely increased:       >300     • Hemoglobin A1C 02/17/2023 5.9 (H)  4.8 - 5.6 % Final    Comment:          Prediabetes: 5.7 - 6.4           Diabetes: >6.4           Glycemic control for adults with diabetes: <7.0     Orders Only on 02/17/2023   Component Date Value Ref Range Status   • EXT Creatinine Urine 02/17/2023 160.0   Final   • Albumin,U,Random 02/17/2023 9.5   Final   • Alb/Creat Ratio 02/17/2023 6   Final   • Hemoglobin A1C 02/17/2023 5.9   Final   Ancillary Orders on 10/07/2022   Component Date Value Ref Range Status   • Sodium 05/31/2023 137  135 - 147 mmol/L Final   • Potassium 05/31/2023 4.3  3.5 - 5.3 mmol/L Final   • Chloride 05/31/2023 102  96 - 108 mmol/L Final   • CO2 05/31/2023 30  21 - 32 mmol/L Final   • ANION GAP 05/31/2023 5  4 - 13 mmol/L Final   • BUN 05/31/2023 18  5 - 25 mg/dL Final   • Creatinine 05/31/2023 0.95  0.60 - 1.30 mg/dL Final    Standardized to IDMS reference method   • Glucose 05/31/2023 212 (H)  65 - 140 mg/dL Final    If the patient is fasting, the ADA then defines impaired fasting glucose as > 100 mg/dL and diabetes as > or equal to 123 mg/dL.    • Calcium 05/31/2023 9.3  8.4 - 10.2 mg/dL Final   • AST 05/31/2023 22  13 - 39 U/L Final   • ALT 05/31/2023 28  7 - 52 U/L Final    Specimen collection should occur prior to Sulfasalazine administration due to the potential for falsely depressed results. • Alkaline Phosphatase 05/31/2023 40  34 - 104 U/L Final   • Total Protein 05/31/2023 7.2  6.4 - 8.4 g/dL Final   • Albumin 05/31/2023 4.2  3.5 - 5.0 g/dL Final   • Total Bilirubin 05/31/2023 0.52  0.20 - 1.00 mg/dL Final    Use of this assay is not recommended for patients undergoing treatment with eltrombopag due to the potential for falsely elevated results. N-acetyl-p-benzoquinone imine (metabolite of Acetaminophen) will generate erroneously low results in samples for patients that have taken an overdose of Acetaminophen. • eGFR 05/31/2023 89  ml/min/1.73sq m Final   • CRP 05/31/2023 1.0  <3.0 mg/L Final   • Sed Rate 05/31/2023 6  0 - 20 mm/hour Final   Admission on 09/30/2022, Discharged on 09/30/2022   Component Date Value Ref Range Status   • POC Glucose 09/30/2022 143 (H)  65 - 140 mg/dl Final   • Case Report 09/30/2022    Final                    Value:Surgical Pathology Report                         Case: [id number]                                   Authorizing Provider:  Aby Pizarro MD     Collected:           09/30/2022 1115              Ordering Location:     [facility]        Received:            09/30/2022 [address] [place] [address] Operating Room                                                      Pathologist:           Eber Tirado MD                                                                 Specimen:    Gallbladder                                                                               • Final Diagnosis 09/30/2022    Final                    Value: This result contains rich text formatting which cannot be displayed here.    • Additional Information 09/30/2022    Final                    Value: This result contains rich text formatting which cannot be displayed here. • Gross Description 09/30/2022    Final                    Value: This result contains rich text formatting which cannot be displayed here. • POC Glucose 09/30/2022 167 (H)  65 - 140 mg/dl Final   Appointment on 09/19/2022   Component Date Value Ref Range Status   • WBC 09/19/2022 6.49  4.31 - 10.16 Thousand/uL Final   • RBC 09/19/2022 5.28  3.88 - 5.62 Million/uL Final   • Hemoglobin 09/19/2022 15.7  12.0 - 17.0 g/dL Final   • Hematocrit 09/19/2022 46.7  36.5 - 49.3 % Final   • MCV 09/19/2022 88  82 - 98 fL Final   • MCH 09/19/2022 29.7  26.8 - 34.3 pg Final   • MCHC 09/19/2022 33.6  31.4 - 37.4 g/dL Final   • RDW 09/19/2022 13.7  11.6 - 15.1 % Final   • Platelets 76/86/3362 174  149 - 390 Thousands/uL Final   • MPV 09/19/2022 10.9  8.9 - 12.7 fL Final   • Sodium 09/19/2022 138  135 - 147 mmol/L Final   • Potassium 09/19/2022 3.7  3.5 - 5.3 mmol/L Final   • Chloride 09/19/2022 107  96 - 108 mmol/L Final   • CO2 09/19/2022 27  21 - 32 mmol/L Final   • ANION GAP 09/19/2022 4  4 - 13 mmol/L Final   • BUN 09/19/2022 23  5 - 25 mg/dL Final   • Creatinine 09/19/2022 1.08  0.60 - 1.30 mg/dL Final    Standardized to IDMS reference method   • Glucose, Fasting 09/19/2022 157 (H)  65 - 99 mg/dL Final    Specimen collection should occur prior to Sulfasalazine administration due to the potential for falsely depressed results. Specimen collection should occur prior to Sulfapyridine administration due to the potential for falsely elevated results. • Calcium 09/19/2022 8.7  8.3 - 10.1 mg/dL Final   • AST 09/19/2022 20  5 - 45 U/L Final    Specimen collection should occur prior to Sulfasalazine administration due to the potential for falsely depressed results.     • ALT 09/19/2022 40  12 - 78 U/L Final    Specimen collection should occur prior to Sulfasalazine and/or Sulfapyridine administration due to the potential for falsely depressed results. • Alkaline Phosphatase 09/19/2022 51  46 - 116 U/L Final   • Total Protein 09/19/2022 7.0  6.4 - 8.4 g/dL Final   • Albumin 09/19/2022 3.6  3.5 - 5.0 g/dL Final   • Total Bilirubin 09/19/2022 0.50  0.20 - 1.00 mg/dL Final    Use of this assay is not recommended for patients undergoing treatment with eltrombopag due to the potential for falsely elevated results. • eGFR 09/19/2022 77  ml/min/1.73sq m Final   • Hemoglobin A1C 09/19/2022 6.1 (H)  Normal 3.8-5.6%; PreDiabetic 5.7-6.4%;  Diabetic >=6.5%; Glycemic control for adults with diabetes <7.0% % Final   • EAG 09/19/2022 128  mg/dl Final

## 2023-08-08 NOTE — PATIENT INSTRUCTIONS
Please have your blood work done every 3 months because you are taking the [place]. Take amitriptyline at bedime. If after 1 week you have no improvement in you symptoms then take 2 tabs at bedtime. I placed an order for you to have an MRI of your neck.

## 2023-08-10 ENCOUNTER — VBI (OUTPATIENT)
Dept: ADMINISTRATIVE | Facility: OTHER | Age: 58
End: 2023-08-10

## 2023-08-10 DIAGNOSIS — E11.9 TYPE 2 DIABETES MELLITUS WITHOUT COMPLICATION, WITHOUT LONG-TERM CURRENT USE OF INSULIN (HCC): ICD-10-CM

## 2023-08-10 RX ORDER — INSULIN GLARGINE 100 [IU]/ML
50 INJECTION, SOLUTION SUBCUTANEOUS EVERY 12 HOURS SCHEDULED
Qty: 30 ML | Refills: 1 | Status: SHIPPED | OUTPATIENT
Start: 2023-08-10

## 2023-08-18 ENCOUNTER — TELEPHONE (OUTPATIENT)
Dept: RHEUMATOLOGY | Facility: CLINIC | Age: 58
End: 2023-08-18

## 2023-08-22 ENCOUNTER — TELEPHONE (OUTPATIENT)
Age: 58
End: 2023-08-22

## 2023-08-22 DIAGNOSIS — E11.9 TYPE 2 DIABETES MELLITUS WITHOUT COMPLICATION, WITHOUT LONG-TERM CURRENT USE OF INSULIN (HCC): ICD-10-CM

## 2023-08-22 DIAGNOSIS — E13.9 DIABETES MELLITUS OF OTHER TYPE WITHOUT COMPLICATION, UNSPECIFIED WHETHER LONG TERM INSULIN USE (HCC): ICD-10-CM

## 2023-08-22 RX ORDER — LIRAGLUTIDE 6 MG/ML
1.8 INJECTION SUBCUTANEOUS DAILY
Qty: 9 ML | Refills: 0 | Status: SHIPPED | OUTPATIENT
Start: 2023-08-22

## 2023-08-22 NOTE — TELEPHONE ENCOUNTER
Caller:ROMAINE    Doctor: Stacey Guevara     Reason for call: [employer]AINE-is requesting more information more information for auth for MRI.    Tracking number- [id number]     Call back#: [phone]

## 2023-08-22 NOTE — TELEPHONE ENCOUNTER
ROMAINE call returned, they are requiring documentation that the patient has had some type of previous exercise, physical therapy or chiropractic care prior to MRI being approved. Dr. Chaim Osorio was notified of what was needed. I spoke with Sam Casey # [id number].

## 2023-08-23 NOTE — TELEPHONE ENCOUNTER
Extra documentation was printed and faxed to   Mandy Siegel for review-tracking # [id number] approval.

## 2023-08-23 NOTE — TELEPHONE ENCOUNTER
Patient's insurance is requesting documentation as to when patient's exercise program was given to the patient to preform.  When he was to start the program, how many days a week he was to preform his exercises, what type of exercises were given, end date for exercise program, and if there were any changes to the patient's condition after he ended the exercise program.

## 2023-09-05 DIAGNOSIS — E78.2 MIXED HYPERLIPIDEMIA: ICD-10-CM

## 2023-09-05 DIAGNOSIS — M05.9 SEROPOSITIVE RHEUMATOID ARTHRITIS (HCC): ICD-10-CM

## 2023-09-05 DIAGNOSIS — E11.42 TYPE 2 DIABETES MELLITUS WITH DIABETIC POLYNEUROPATHY, WITH LONG-TERM CURRENT USE OF INSULIN (HCC): ICD-10-CM

## 2023-09-05 DIAGNOSIS — M54.12 CERVICAL RADICULOPATHY: ICD-10-CM

## 2023-09-05 DIAGNOSIS — K25.3 ACUTE GASTRIC ULCER WITHOUT HEMORRHAGE OR PERFORATION: ICD-10-CM

## 2023-09-05 DIAGNOSIS — I10 ESSENTIAL HYPERTENSION: ICD-10-CM

## 2023-09-05 DIAGNOSIS — J45.909 UNCOMPLICATED ASTHMA, UNSPECIFIED ASTHMA SEVERITY, UNSPECIFIED WHETHER PERSISTENT: ICD-10-CM

## 2023-09-05 DIAGNOSIS — Z79.4 TYPE 2 DIABETES MELLITUS WITH DIABETIC POLYNEUROPATHY, WITH LONG-TERM CURRENT USE OF INSULIN (HCC): ICD-10-CM

## 2023-09-05 RX ORDER — ALBUTEROL SULFATE 90 UG/1
2 AEROSOL, METERED RESPIRATORY (INHALATION) EVERY 4 HOURS PRN
Qty: 18 G | Refills: 1 | Status: SHIPPED | OUTPATIENT
Start: 2023-09-05

## 2023-09-05 RX ORDER — LANCETS
EACH MISCELLANEOUS
Qty: 102 EACH | Refills: 3 | Status: SHIPPED | OUTPATIENT
Start: 2023-09-05

## 2023-09-05 RX ORDER — ATORVASTATIN CALCIUM 40 MG/1
40 TABLET, FILM COATED ORAL
Qty: 90 TABLET | Refills: 1 | Status: SHIPPED | OUTPATIENT
Start: 2023-09-05

## 2023-09-05 RX ORDER — PANTOPRAZOLE SODIUM 40 MG/1
40 TABLET, DELAYED RELEASE ORAL DAILY
Qty: 30 TABLET | Refills: 0 | Status: SHIPPED | OUTPATIENT
Start: 2023-09-05

## 2023-09-05 RX ORDER — LOSARTAN POTASSIUM AND HYDROCHLOROTHIAZIDE 12.5; 1 MG/1; MG/1
1 TABLET ORAL DAILY
Qty: 90 TABLET | Refills: 1 | Status: SHIPPED | OUTPATIENT
Start: 2023-09-05

## 2023-09-05 NOTE — TELEPHONE ENCOUNTER
Patient needs an appointment. Please contact patient to schedule an appointment. 30 day supply ordered with 0 refills.

## 2023-09-06 RX ORDER — NAPROXEN 500 MG/1
500 TABLET ORAL EVERY 12 HOURS PRN
Qty: 60 TABLET | Refills: 2 | Status: SHIPPED | OUTPATIENT
Start: 2023-09-06

## 2023-09-06 NOTE — TELEPHONE ENCOUNTER
Left a message for patient to call back to schedule an appointment for a medication check and any further refills.

## 2023-09-08 NOTE — TELEPHONE ENCOUNTER
Left a message for patient to call back to schedule an appointment for a medication check and any further refills

## 2023-09-25 DIAGNOSIS — E11.9 TYPE 2 DIABETES MELLITUS WITHOUT COMPLICATION, WITHOUT LONG-TERM CURRENT USE OF INSULIN (HCC): ICD-10-CM

## 2023-09-25 DIAGNOSIS — E13.9 DIABETES MELLITUS OF OTHER TYPE WITHOUT COMPLICATION, UNSPECIFIED WHETHER LONG TERM INSULIN USE (HCC): ICD-10-CM

## 2023-09-25 RX ORDER — LIRAGLUTIDE 6 MG/ML
1.8 INJECTION SUBCUTANEOUS DAILY
Qty: 9 ML | Refills: 0 | Status: SHIPPED | OUTPATIENT
Start: 2023-09-25

## 2023-09-27 ENCOUNTER — APPOINTMENT (OUTPATIENT)
Dept: LAB | Facility: CLINIC | Age: 58
End: 2023-09-27
Payer: COMMERCIAL

## 2023-09-27 LAB
ALBUMIN SERPL BCP-MCNC: 4.1 G/DL (ref 3.5–5)
ALP SERPL-CCNC: 42 U/L (ref 34–104)
ALT SERPL W P-5'-P-CCNC: 33 U/L (ref 7–52)
ANION GAP SERPL CALCULATED.3IONS-SCNC: 7 MMOL/L
AST SERPL W P-5'-P-CCNC: 28 U/L (ref 13–39)
BASOPHILS # BLD AUTO: 0.05 THOUSANDS/ÂΜL (ref 0–0.1)
BASOPHILS NFR BLD AUTO: 1 % (ref 0–1)
BILIRUB SERPL-MCNC: 0.83 MG/DL (ref 0.2–1)
BUN SERPL-MCNC: 19 MG/DL (ref 5–25)
CALCIUM SERPL-MCNC: 9.5 MG/DL (ref 8.4–10.2)
CHLORIDE SERPL-SCNC: 104 MMOL/L (ref 96–108)
CHOLEST SERPL-MCNC: 132 MG/DL
CO2 SERPL-SCNC: 29 MMOL/L (ref 21–32)
CREAT SERPL-MCNC: 1.03 MG/DL (ref 0.6–1.3)
CRP SERPL QL: <1 MG/L
EOSINOPHIL # BLD AUTO: 0.09 THOUSAND/ÂΜL (ref 0–0.61)
EOSINOPHIL NFR BLD AUTO: 1 % (ref 0–6)
ERYTHROCYTE [DISTWIDTH] IN BLOOD BY AUTOMATED COUNT: 13.6 % (ref 11.6–15.1)
GFR SERPL CREATININE-BSD FRML MDRD: 81 ML/MIN/1.73SQ M
GLUCOSE SERPL-MCNC: 75 MG/DL (ref 65–140)
HCT VFR BLD AUTO: 45.1 % (ref 36.5–49.3)
HDLC SERPL-MCNC: 46 MG/DL
HGB BLD-MCNC: 15.4 G/DL (ref 12–17)
IMM GRANULOCYTES # BLD AUTO: 0.08 THOUSAND/UL (ref 0–0.2)
IMM GRANULOCYTES NFR BLD AUTO: 1 % (ref 0–2)
LDLC SERPL CALC-MCNC: 59 MG/DL (ref 0–100)
LYMPHOCYTES # BLD AUTO: 1.74 THOUSANDS/ÂΜL (ref 0.6–4.47)
LYMPHOCYTES NFR BLD AUTO: 21 % (ref 14–44)
MCH RBC QN AUTO: 31.2 PG (ref 26.8–34.3)
MCHC RBC AUTO-ENTMCNC: 34.1 G/DL (ref 31.4–37.4)
MCV RBC AUTO: 92 FL (ref 82–98)
MONOCYTES # BLD AUTO: 0.95 THOUSAND/ÂΜL (ref 0.17–1.22)
MONOCYTES NFR BLD AUTO: 11 % (ref 4–12)
NEUTROPHILS # BLD AUTO: 5.58 THOUSANDS/ÂΜL (ref 1.85–7.62)
NEUTS SEG NFR BLD AUTO: 65 % (ref 43–75)
NRBC BLD AUTO-RTO: 0 /100 WBCS
PLATELET # BLD AUTO: 227 THOUSANDS/UL (ref 149–390)
PMV BLD AUTO: 10.7 FL (ref 8.9–12.7)
POTASSIUM SERPL-SCNC: 4.1 MMOL/L (ref 3.5–5.3)
PROT SERPL-MCNC: 6.9 G/DL (ref 6.4–8.4)
RBC # BLD AUTO: 4.93 MILLION/UL (ref 3.88–5.62)
SODIUM SERPL-SCNC: 140 MMOL/L (ref 135–147)
TRIGL SERPL-MCNC: 137 MG/DL
WBC # BLD AUTO: 8.49 THOUSAND/UL (ref 4.31–10.16)

## 2023-10-11 ENCOUNTER — HOSPITAL ENCOUNTER (OUTPATIENT)
Dept: MRI IMAGING | Facility: HOSPITAL | Age: 58
Discharge: HOME | End: 2023-10-11
Attending: INTERNAL MEDICINE
Payer: COMMERCIAL

## 2023-10-11 DIAGNOSIS — M05.9 SEROPOSITIVE RHEUMATOID ARTHRITIS (HCC): ICD-10-CM

## 2023-10-11 PROCEDURE — 72141 MRI NECK SPINE W/O DYE: CPT

## 2023-10-11 PROCEDURE — G1004 CDSM NDSC: HCPCS

## 2023-10-16 DIAGNOSIS — M54.12 CERVICAL RADICULOPATHY: Primary | ICD-10-CM

## 2023-10-17 ENCOUNTER — APPOINTMENT (OUTPATIENT)
Dept: LAB | Facility: CLINIC | Age: 58
End: 2023-10-17
Payer: COMMERCIAL

## 2023-10-17 DIAGNOSIS — E78.2 MIXED HYPERLIPIDEMIA: ICD-10-CM

## 2023-10-17 DIAGNOSIS — Z79.4 TYPE 2 DIABETES MELLITUS WITH DIABETIC NEUROPATHIC ARTHROPATHY, WITH LONG-TERM CURRENT USE OF INSULIN (HCC): ICD-10-CM

## 2023-10-17 DIAGNOSIS — M05.79 RHEUMATOID ARTHRITIS INVOLVING MULTIPLE SITES WITH POSITIVE RHEUMATOID FACTOR (HCC): ICD-10-CM

## 2023-10-17 DIAGNOSIS — I10 ESSENTIAL HYPERTENSION: ICD-10-CM

## 2023-10-17 DIAGNOSIS — E11.610 TYPE 2 DIABETES MELLITUS WITH DIABETIC NEUROPATHIC ARTHROPATHY, WITH LONG-TERM CURRENT USE OF INSULIN (HCC): ICD-10-CM

## 2023-10-17 DIAGNOSIS — I10 BENIGN ESSENTIAL HYPERTENSION: ICD-10-CM

## 2023-10-17 LAB
ALBUMIN SERPL BCP-MCNC: 3.9 G/DL (ref 3.5–5)
ALP SERPL-CCNC: 43 U/L (ref 34–104)
ALT SERPL W P-5'-P-CCNC: 31 U/L (ref 7–52)
ANION GAP SERPL CALCULATED.3IONS-SCNC: 5 MMOL/L
AST SERPL W P-5'-P-CCNC: 27 U/L (ref 13–39)
BASOPHILS # BLD AUTO: 0.06 THOUSANDS/ÂΜL (ref 0–0.1)
BASOPHILS NFR BLD AUTO: 1 % (ref 0–1)
BILIRUB SERPL-MCNC: 0.52 MG/DL (ref 0.2–1)
BUN SERPL-MCNC: 20 MG/DL (ref 5–25)
CALCIUM SERPL-MCNC: 9.5 MG/DL (ref 8.4–10.2)
CHLORIDE SERPL-SCNC: 104 MMOL/L (ref 96–108)
CHOLEST SERPL-MCNC: 157 MG/DL
CO2 SERPL-SCNC: 29 MMOL/L (ref 21–32)
CREAT SERPL-MCNC: 0.95 MG/DL (ref 0.6–1.3)
CREAT UR-MCNC: 157.7 MG/DL
EOSINOPHIL # BLD AUTO: 0.18 THOUSAND/ÂΜL (ref 0–0.61)
EOSINOPHIL NFR BLD AUTO: 3 % (ref 0–6)
ERYTHROCYTE [DISTWIDTH] IN BLOOD BY AUTOMATED COUNT: 13.9 % (ref 11.6–15.1)
EST. AVERAGE GLUCOSE BLD GHB EST-MCNC: 148 MG/DL
GFR SERPL CREATININE-BSD FRML MDRD: 89 ML/MIN/1.73SQ M
GLUCOSE P FAST SERPL-MCNC: 142 MG/DL (ref 65–99)
HBA1C MFR BLD: 6.8 %
HCT VFR BLD AUTO: 47.4 % (ref 36.5–49.3)
HDLC SERPL-MCNC: 49 MG/DL
HGB BLD-MCNC: 15.8 G/DL (ref 12–17)
IMM GRANULOCYTES # BLD AUTO: 0.08 THOUSAND/UL (ref 0–0.2)
IMM GRANULOCYTES NFR BLD AUTO: 1 % (ref 0–2)
LDLC SERPL CALC-MCNC: 88 MG/DL (ref 0–100)
LYMPHOCYTES # BLD AUTO: 1.64 THOUSANDS/ÂΜL (ref 0.6–4.47)
LYMPHOCYTES NFR BLD AUTO: 26 % (ref 14–44)
MCH RBC QN AUTO: 30.9 PG (ref 26.8–34.3)
MCHC RBC AUTO-ENTMCNC: 33.3 G/DL (ref 31.4–37.4)
MCV RBC AUTO: 93 FL (ref 82–98)
MICROALBUMIN UR-MCNC: 7.7 MG/L
MICROALBUMIN/CREAT 24H UR: 5 MG/G CREATININE (ref 0–30)
MONOCYTES # BLD AUTO: 0.89 THOUSAND/ÂΜL (ref 0.17–1.22)
MONOCYTES NFR BLD AUTO: 14 % (ref 4–12)
NEUTROPHILS # BLD AUTO: 3.54 THOUSANDS/ÂΜL (ref 1.85–7.62)
NEUTS SEG NFR BLD AUTO: 55 % (ref 43–75)
NRBC BLD AUTO-RTO: 0 /100 WBCS
PLATELET # BLD AUTO: 250 THOUSANDS/UL (ref 149–390)
PMV BLD AUTO: 10.6 FL (ref 8.9–12.7)
POTASSIUM SERPL-SCNC: 4.4 MMOL/L (ref 3.5–5.3)
PROT SERPL-MCNC: 6.9 G/DL (ref 6.4–8.4)
RBC # BLD AUTO: 5.12 MILLION/UL (ref 3.88–5.62)
SODIUM SERPL-SCNC: 138 MMOL/L (ref 135–147)
TRIGL SERPL-MCNC: 99 MG/DL
TSH SERPL DL<=0.05 MIU/L-ACNC: 2.33 UIU/ML (ref 0.45–4.5)
WBC # BLD AUTO: 6.39 THOUSAND/UL (ref 4.31–10.16)

## 2023-10-17 PROCEDURE — 82570 ASSAY OF URINE CREATININE: CPT

## 2023-10-17 PROCEDURE — 83036 HEMOGLOBIN GLYCOSYLATED A1C: CPT

## 2023-10-17 PROCEDURE — 85025 COMPLETE CBC W/AUTO DIFF WBC: CPT

## 2023-10-17 PROCEDURE — 84443 ASSAY THYROID STIM HORMONE: CPT

## 2023-10-17 PROCEDURE — 80061 LIPID PANEL: CPT

## 2023-10-17 PROCEDURE — 80053 COMPREHEN METABOLIC PANEL: CPT

## 2023-10-17 PROCEDURE — 82043 UR ALBUMIN QUANTITATIVE: CPT

## 2023-10-17 PROCEDURE — 36415 COLL VENOUS BLD VENIPUNCTURE: CPT

## 2023-10-18 ENCOUNTER — OFFICE VISIT (OUTPATIENT)
Dept: FAMILY MEDICINE CLINIC | Facility: CLINIC | Age: 58
End: 2023-10-18
Payer: COMMERCIAL

## 2023-10-18 VITALS
DIASTOLIC BLOOD PRESSURE: 84 MMHG | HEART RATE: 82 BPM | RESPIRATION RATE: 16 BRPM | SYSTOLIC BLOOD PRESSURE: 126 MMHG | OXYGEN SATURATION: 98 % | HEIGHT: 72 IN | BODY MASS INDEX: 33.72 KG/M2 | WEIGHT: 249 LBS

## 2023-10-18 DIAGNOSIS — D23.9 CLEAR CELL HIDRADENOMA: ICD-10-CM

## 2023-10-18 DIAGNOSIS — E11.610 TYPE 2 DIABETES MELLITUS WITH DIABETIC NEUROPATHIC ARTHROPATHY, WITH LONG-TERM CURRENT USE OF INSULIN (HCC): ICD-10-CM

## 2023-10-18 DIAGNOSIS — E11.610 CHARCOT FOOT DUE TO DIABETES MELLITUS (HCC): ICD-10-CM

## 2023-10-18 DIAGNOSIS — Z79.4 TYPE 2 DIABETES MELLITUS WITH DIABETIC NEUROPATHIC ARTHROPATHY, WITH LONG-TERM CURRENT USE OF INSULIN (HCC): ICD-10-CM

## 2023-10-18 DIAGNOSIS — M25.551 RIGHT HIP PAIN: ICD-10-CM

## 2023-10-18 DIAGNOSIS — M05.79 RHEUMATOID ARTHRITIS INVOLVING MULTIPLE SITES WITH POSITIVE RHEUMATOID FACTOR (HCC): Primary | ICD-10-CM

## 2023-10-18 DIAGNOSIS — E78.2 MIXED HYPERLIPIDEMIA: ICD-10-CM

## 2023-10-18 DIAGNOSIS — E55.9 VITAMIN D DEFICIENCY: ICD-10-CM

## 2023-10-18 DIAGNOSIS — Z12.5 PROSTATE CANCER SCREENING: ICD-10-CM

## 2023-10-18 DIAGNOSIS — I10 BENIGN ESSENTIAL HYPERTENSION: ICD-10-CM

## 2023-10-18 PROBLEM — R10.11 RIGHT UPPER QUADRANT ABDOMINAL PAIN: Status: RESOLVED | Noted: 2020-04-21 | Resolved: 2023-10-18

## 2023-10-18 PROCEDURE — 99215 OFFICE O/P EST HI 40 MIN: CPT | Performed by: FAMILY MEDICINE

## 2023-10-18 RX ORDER — HYDROCODONE BITARTRATE AND ACETAMINOPHEN 5; 325 MG/1; MG/1
1 TABLET ORAL EVERY 6 HOURS PRN
Qty: 60 TABLET | Refills: 0 | Status: SHIPPED | OUTPATIENT
Start: 2023-10-18

## 2023-10-18 NOTE — PROGRESS NOTES
Subjective:      Patient ID: Vadim Meeks is a 56 y.o. male. 77-year-old male with past medical history of diabetes mellitus type 2 that is insulin requiring, COPD, chronic tobacco abuse, rheumatoid arthritis,  diabetic neuropathy, hypertension presents with his wife for follow-up of chronic conditions. Patient apparently is looking towards having another reconstruction done on his right foot for Charcot foot. Podiatrist has been following him closely. Does have episodes of pain in his right foot as well as right hip. Reviewed which showed hemoglobin A1c at 6.8%, total cholesterol 157, LDL 88, urine is negative for microalbumin. Patient does follow-up with rheumatologist.  He has on occasion taken as needed Vicodin which does help with some of his arthritic pain especially in his right foot. Does admit to some dietary indiscretion and not following a diabetic diet. When he does follow diabetic diet sometimes his blood glucose will go low in the 30s or 40s which she is symptomatic with. He tries to make adjustments to his [address] Taking Victoza. Blood sugar started to increase after insurance company denied coverage for Januvia because he was on Victoza.         Past Medical History:   Diagnosis Date   • Abscess of left thigh 5/11/2021   • Arthritis    • At risk for falls    • Biliary dyskinesia 3/16/2020   • Broken foot     right   • Cataract     giacomo   • Cellulitis of left lower extremity 4/26/2021   • COPD (chronic obstructive pulmonary disease) (HCC)    • Diabetes mellitus (HCC)    • Dyskinesia of gallbladder 8/15/2022   • Hx MRSA infection     Per wife patient hadf MRSa in a wound awhile ago   • Hyperlipidemia    • Kidney stone    • Neuropathy    • RA (rheumatoid arthritis) ([address])    • Rheumatoid arthritis (HCC)    • Seasonal allergies    • Sepsis ([address]) 4/26/2021   • Snores    • Uses wheelchair     and crutches- NWB RLE   • Wears glasses        Family History   Problem Relation Age of Onset   • Diabetes Mother    • Stroke Mother    • Mental illness Mother    • Diabetes Father    • Stroke Father    • Alcohol abuse Brother    • Coronary artery disease Family         Age 52-50   • Diabetes type II Family    • Heart disease Family        Past Surgical History:   Procedure Laterality Date   • APPENDECTOMY     • CLOSED REDUCTION FOOT DISLOCATION Right 2/12/2019    Procedure: C/R FRACTURE;  Surgeon: Zander Resendiz DPM;  [facility];  Service: Podiatry   • COLONOSCOPY     • FOOT SURGERY Right 07/2019    Removal of the bone graft and cleaned out infection, and placed new bone graft   • IR PICC PLACEMENT SINGLE LUMEN  8/5/2019   • ID DEBRIDEMENT SUBCUTANEOUS TISSUE 20 SQ CM/< Left 5/24/2021    Procedure: INCISION AND DRAINAGE THIGH;  Surgeon: Maricarmen Cespedes MD;  [facility];  Service: Orthopedics   • ID EXCISION MALIGNANT LESION S/N/H/F/G 0.5 CM/< N/A 3/28/2018    Procedure: EXCISION WIDE LESION HEAD/FACIAL/NECK;  Surgeon: Sonali Esquivel MD;  [facility];  Service: Surgical Oncology   • ID GASTROCNEMIUS RECESSION Right 2/12/2019    Procedure: ENDO GASTROC RECESSION, APPLICATION OF EXTERNAL FIXATOR;  Surgeon: Zander Resendiz DPM;  [facility];  Service: Podiatry   • ID LAPAROSCOPY SURG CHOLECYSTECTOMY N/A 9/30/2022    Procedure: CHOLECYSTECTOMY LAPAROSCOPIC;  Surgeon: Nestor Roberson MD;  [facility];  Service: General   • ID REMOVAL EXTERNAL FIXATION SYSTEM UNDER ANES Right 4/18/2019    Procedure: Denece Labrador REMOVAL HARDWARE FOOT WITH APPLICATION OF GRAFT;  Surgeon: Zander Resendiz DPM;  [facility];  Service: Podiatry   • SKIN BIOPSY      scalp   • WISDOM TOOTH EXTRACTION  1998   • WOUND DEBRIDEMENT Left 4/29/2021    Procedure: KNEE INCISION AND DRAINAGE, EXCISIONAL DEBRIDEMENT OF PREPATELLAR BURSA; Surgeon: Maricarmen Cespedes MD;  [facility];  Service: Orthopedics        reports that he has been smoking cigarettes.  He has a 40.00 pack-year smoking history. He has never used smokeless tobacco. He reports current alcohol use. He reports current drug use. Frequency: 7.00 times per week. Drug: Marijuana.       Current Outpatient Medications:   •  Accu-Chek FastClix Lancets MISC, , Disp: , Rfl:   •  Accu-Chek FastClix Lancets MISC, TEST BLOOD SUGAR FOUR TIMES A DAY BEFORE MEALS AND BEDTIME, Disp: 102 each, Rfl: 3  •  albuterol (PROVENTIL HFA,VENTOLIN HFA) 90 mcg/act inhaler, INHALE 2 PUFFS EVERY 4 (FOUR) HOURS AS NEEDED FOR WHEEZING, Disp: 18 g, Rfl: 1  •  amitriptyline (ELAVIL) 10 mg tablet, Take 1 tablet (10 mg total) by mouth daily at bedtime, Disp: 90 tablet, Rfl: 2  •  Ascorbic Acid (vitamin C) 1000 MG tablet, Take 1,000 mg by mouth daily, Disp: , Rfl:   •  atorvastatin (LIPITOR) 40 mg tablet, TAKE 1 TABLET (40 MG TOTAL) BY MOUTH DAILY AT BEDTIME, Disp: 90 tablet, Rfl: 1  •  Basaglar KwikPen 100 units/mL SOPN, INJECT 50 UNITS UNDER THE SKIN EVERY 12 (TWELVE) HOURS, Disp: 30 mL, Rfl: 1  •  BD Pen Needle Maureen U/F 32G X 4 MM MISC, ONE PEN NEEDLE THREE TIMES A DAY 2-BASAGLAR 1- VICTOZA, Disp: 100 each, Rfl: 3  •  Blood Glucose Monitoring Suppl (OneTouch Verio Flex System) w/Device KIT, Use 3 (three) times a day with meals, Disp: 1 kit, Rfl: 0  •  calcium carbonate (OS-NERY) 600 MG tablet, Take 600 mg by mouth daily with dinner, Disp: , Rfl:   •  Continuous Blood Gluc Sensor (FreeStyle Laura 2 Sensor) MISC, Check blood sugars multiple times per day, Disp: 6 each, Rfl: 3  •  ergocalciferol (VITAMIN D2) 50,000 units, Take 1 capsule (50,000 Units total) by mouth once a week Takes on Saturday, Disp: 12 capsule, Rfl: 3  •  fluticasone (FLONASE) 50 mcg/act nasal spray, USE 2 SPRAYS IN EACH NOSTRIL ONCE DAILY, Disp: 16 g, Rfl: 11  •  HYDROcodone-acetaminophen (Norco) 5-325 mg per tablet, Take 1 tablet by mouth every 6 (six) hours as needed for pain Max Daily Amount: 4 tablets, Disp: 60 tablet, Rfl: 0  •  hydroxychloroquine (PLAQUENIL) 200 mg tablet, Take 1 tablet (200 mg total) by mouth 2 (two) times a day, Disp: 180 tablet, Rfl: 1  •  losartan-hydrochlorothiazide (HYZAAR) 100-12.5 MG per tablet, TAKE 1 TABLET BY MOUTH DAILY, Disp: 90 tablet, Rfl: 1  •  naproxen (NAPROSYN) 500 mg tablet, TAKE 1 TABLET (500 MG TOTAL) BY MOUTH EVERY 12 (TWELVE) HOURS AS NEEDED FOR MILD PAIN (WITH FOOD), Disp: 60 tablet, Rfl: 2  •  OneTouch Verio test strip, CHECK GLUCOSE BEFORE MEALS, Disp: 100 strip, Rfl: 3  •  pantoprazole (PROTONIX) 40 mg tablet, TAKE 1 TABLET (40 MG TOTAL) BY MOUTH DAILY, Disp: 30 tablet, Rfl: 0  •  saccharomyces boulardii (FLORASTOR) 250 mg capsule, Take 1 capsule (250 mg total) by mouth 2 (two) times a day, Disp: 60 capsule, Rfl: 0  •  sucralfate (CARAFATE) 1 g tablet, TAKE 1 TABLET (1 G TOTAL) BY MOUTH 4 (FOUR) TIMES A DAY, Disp: 120 tablet, Rfl: 4  •  Tofacitinib Citrate ER (Xeljanz XR) 11 MG TB24, Take 1 tablet (11 mg total) by mouth daily, Disp: 30 tablet, Rfl: 5  •  ULTICARE MICRO PEN NEEDLES 32G X 4 MM MISC, INJECT UNDER THE SKIN 3 (THREE) TIMES A DAY, Disp: 100 each, Rfl: 3  •  Victoza injection, INJECT 0.3 ML (1.8 MG TOTAL) UNDER THE SKIN DAILY, Disp: 9 mL, Rfl: 0  •  Diclofenac Sodium (VOLTAREN) 1 %, Apply 2 g topically 4 (four) times a day for 10 days, Disp: 100 g, Rfl: 0  •  olopatadine (PATANOL) 0.1 % ophthalmic solution, Administer 1 drop to both eyes in the morning and 1 drop in the evening. (Patient not taking: Reported on 6/12/2023), Disp: 5 mL, Rfl: 1    The following portions of the patient's history were reviewed and updated as appropriate: allergies, current medications, past family history, past medical history, past social history, past surgical history and problem list.    Review of Systems   Constitutional: Negative. HENT: Negative. Eyes: Negative. Respiratory: Negative. Cardiovascular: Negative. Gastrointestinal: Negative. Endocrine: Negative. Genitourinary: Negative.     Musculoskeletal:  Positive for arthralgias and joint swelling. Skin: Negative. Allergic/Immunologic: Negative. Neurological: Negative. Hematological: Negative. Psychiatric/Behavioral: Negative. All other systems reviewed and are negative. Objective:    /84   Pulse 82   Resp 16   Ht 6' (1.829 m)   Wt 113 kg (249 lb)   SpO2 98%   BMI 33.77 kg/m²      Physical Exam  Vitals and nursing note reviewed. Constitutional:       General: He is not in acute distress. Appearance: Normal appearance. He is well-developed. He is obese. He is not ill-appearing. HENT:      Head: Normocephalic and atraumatic. Right Ear: Tympanic membrane, ear canal and external ear normal.      Left Ear: Tympanic membrane, ear canal and external ear normal.      Nose: Nose normal.      Mouth/Throat:      Mouth: Mucous membranes are moist.   Eyes:      Extraocular Movements: Extraocular movements intact. Pupils: Pupils are equal, round, and reactive to light. Comments: Allergic conjunctivitis bilaterally   Cardiovascular:      Rate and Rhythm: Normal rate and regular rhythm. Pulses: Normal pulses. Heart sounds: Normal heart sounds. No murmur heard. Pulmonary:      Effort: Pulmonary effort is normal.      Breath sounds: Normal breath sounds. Abdominal:      General: Abdomen is flat. Bowel sounds are normal. There is no distension. Palpations: Abdomen is soft. There is no mass. Musculoskeletal:         General: Tenderness and deformity (Rheumatic nodules) present. Normal range of motion. Cervical back: Normal range of motion and neck supple. Right lower leg: No edema. Left lower leg: No edema. Comments: Positive TORY maneuver on the right   Skin:     General: Skin is warm and dry. Neurological:      General: No focal deficit present. Mental Status: He is alert and oriented to person, place, and time. Cranial Nerves: No cranial nerve deficit.    Psychiatric:         Mood and Affect: Mood normal. Behavior: Behavior normal.         Thought Content:  Thought content normal.         Judgment: Judgment normal.           Recent Results (from the past 1008 hour(s))   Comprehensive metabolic panel    Collection Time: 09/27/23  2:06 PM   Result Value Ref Range    Sodium 140 135 - 147 mmol/L    Potassium 4.1 3.5 - 5.3 mmol/L    Chloride 104 96 - 108 mmol/L    CO2 29 21 - 32 mmol/L    ANION GAP 7 mmol/L    BUN 19 5 - 25 mg/dL    Creatinine 1.03 0.60 - 1.30 mg/dL    Glucose 75 65 - 140 mg/dL    Calcium 9.5 8.4 - 10.2 mg/dL    AST 28 13 - 39 U/L    ALT 33 7 - 52 U/L    Alkaline Phosphatase 42 34 - 104 U/L    Total Protein 6.9 6.4 - 8.4 g/dL    Albumin 4.1 3.5 - 5.0 g/dL    Total Bilirubin 0.83 0.20 - 1.00 mg/dL    eGFR 81 ml/min/1.73sq m   CBC and differential    Collection Time: 09/27/23  2:06 PM   Result Value Ref Range    WBC 8.49 4.31 - 10.16 Thousand/uL    RBC 4.93 3.88 - 5.62 Million/uL    Hemoglobin 15.4 12.0 - 17.0 g/dL    Hematocrit 45.1 36.5 - 49.3 %    MCV 92 82 - 98 fL    MCH 31.2 26.8 - 34.3 pg    MCHC 34.1 31.4 - 37.4 g/dL    RDW 13.6 11.6 - 15.1 %    MPV 10.7 8.9 - 12.7 fL    Platelets 143 754 - 985 Thousands/uL    nRBC 0 /100 WBCs    Neutrophils Relative 65 43 - 75 %    Immat GRANS % 1 0 - 2 %    Lymphocytes Relative 21 14 - 44 %    Monocytes Relative 11 4 - 12 %    Eosinophils Relative 1 0 - 6 %    Basophils Relative 1 0 - 1 %    Neutrophils Absolute 5.58 1.85 - 7.62 Thousands/µL    Immature Grans Absolute 0.08 0.00 - 0.20 Thousand/uL    Lymphocytes Absolute 1.74 0.60 - 4.47 Thousands/µL    Monocytes Absolute 0.95 0.17 - 1.22 Thousand/µL    Eosinophils Absolute 0.09 0.00 - 0.61 Thousand/µL    Basophils Absolute 0.05 0.00 - 0.10 Thousands/µL   C-reactive protein    Collection Time: 09/27/23  2:06 PM   Result Value Ref Range    CRP <1.0 <3.0 mg/L   Lipid Panel with Direct LDL reflex    Collection Time: 09/27/23  2:06 PM   Result Value Ref Range    Cholesterol 132 See Comment mg/dL    Triglycerides 137 See Comment mg/dL    HDL, Direct 46 >=40 mg/dL    LDL Calculated 59 0 - 100 mg/dL   CBC and differential    Collection Time: 10/17/23  9:53 AM   Result Value Ref Range    WBC 6.39 4.31 - 10.16 Thousand/uL    RBC 5.12 3.88 - 5.62 Million/uL    Hemoglobin 15.8 12.0 - 17.0 g/dL    Hematocrit 47.4 36.5 - 49.3 %    MCV 93 82 - 98 fL    MCH 30.9 26.8 - 34.3 pg    MCHC 33.3 31.4 - 37.4 g/dL    RDW 13.9 11.6 - 15.1 %    MPV 10.6 8.9 - 12.7 fL    Platelets 512 109 - 530 Thousands/uL    nRBC 0 /100 WBCs    Neutrophils Relative 55 43 - 75 %    Immat GRANS % 1 0 - 2 %    Lymphocytes Relative 26 14 - 44 %    Monocytes Relative 14 (H) 4 - 12 %    Eosinophils Relative 3 0 - 6 %    Basophils Relative 1 0 - 1 %    Neutrophils Absolute 3.54 1.85 - 7.62 Thousands/µL    Immature Grans Absolute 0.08 0.00 - 0.20 Thousand/uL    Lymphocytes Absolute 1.64 0.60 - 4.47 Thousands/µL    Monocytes Absolute 0.89 0.17 - 1.22 Thousand/µL    Eosinophils Absolute 0.18 0.00 - 0.61 Thousand/µL    Basophils Absolute 0.06 0.00 - 0.10 Thousands/µL   TSH, 3rd generation with Free T4 reflex    Collection Time: 10/17/23  9:53 AM   Result Value Ref Range    TSH 3RD GENERATON 2.327 0.450 - 4.500 uIU/mL   Lipid Panel with Direct LDL reflex    Collection Time: 10/17/23  9:53 AM   Result Value Ref Range    Cholesterol 157 See Comment mg/dL    Triglycerides 99 See Comment mg/dL    HDL, Direct 49 >=40 mg/dL    LDL Calculated 88 0 - 100 mg/dL   Albumin / creatinine urine ratio    Collection Time: 10/17/23  9:53 AM   Result Value Ref Range    Creatinine, Ur 157.7 Reference range not established. mg/dL    Albumin,U,Random 7.7 <20.0 mg/L    Albumin Creat Ratio 5 0 - 30 mg/g creatinine   Comprehensive metabolic panel    Collection Time: 10/17/23  9:53 AM   Result Value Ref Range    Sodium 138 135 - 147 mmol/L    Potassium 4.4 3.5 - 5.3 mmol/L    Chloride 104 96 - 108 mmol/L    CO2 29 21 - 32 mmol/L    ANION GAP 5 mmol/L    BUN 20 5 - 25 mg/dL    Creatinine 0.95 0.60 - 1.30 mg/dL    Glucose, Fasting 142 (H) 65 - 99 mg/dL    Calcium 9.5 8.4 - 10.2 mg/dL    AST 27 13 - 39 U/L    ALT 31 7 - 52 U/L    Alkaline Phosphatase 43 34 - 104 U/L    Total Protein 6.9 6.4 - 8.4 g/dL    Albumin 3.9 3.5 - 5.0 g/dL    Total Bilirubin 0.52 0.20 - 1.00 mg/dL    eGFR 89 ml/min/1.73sq m   Hemoglobin A1C    Collection Time: 10/17/23  9:53 AM   Result Value Ref Range    Hemoglobin A1C 6.8 (H) Normal 4.0-5.6%; PreDiabetic 5.7-6.4%; Diabetic >=6.5%; Glycemic control for adults with diabetes <7.0% %     mg/dl       Assessment/Plan:    Type 2 diabetes mellitus with diabetic neuropathic arthropathy, with long-term current use of insulin ([facility])    Lab Results   Component Value Date    HGBA1C 6.8 (H) 10/17/2023   Patient should be more diligent with following diabetic diet. Can make adjustments to his Basaglar. Unfortunately insurance would not coverage Januvia so blood glucoses increase. Continue on Victoza. Continuous blood glucose monitor provided    Charcot foot due to diabetes mellitus ([address])    Lab Results   Component Value Date    HGBA1C 6.8 (H) 10/17/2023   Managed by podiatry    Benign essential hypertension  Continue on losartan/hydrochlorothiazide. Reevaluate in 4 months    Rheumatoid arthritis involving multiple sites with positive rheumatoid factor ([address])  Managed by rheumatology    Clear cell hidradenoma  Needs to follow-up with surgical oncology. Some lesions on his scalp    Right hip pain  Check x-ray of the right hip    Mixed hyperlipidemia  Continue on atorvastatin. Cholesterol has improved. Watch dietary intake of fats and cholesterol. No change in his atorvastatin dose. He is already on 40 mg at bedtime.   Reevaluate in 4 months          Problem List Items Addressed This Visit        Endocrine    Charcot foot due to diabetes mellitus [facility])       Lab Results   Component Value Date    HGBA1C 6.8 (H) 10/17/2023   Managed by podiatry         Relevant Medications HYDROcodone-acetaminophen (Norco) 5-325 mg per tablet    Type 2 diabetes mellitus with diabetic neuropathic arthropathy, with long-term current use of insulin ([facility])       Lab Results   Component Value Date    HGBA1C 6.8 (H) 10/17/2023   Patient should be more diligent with following diabetic diet. Can make adjustments to his Basaglar. Unfortunately insurance would not coverage Januvia so blood glucoses increase. Continue on Victoza. Continuous blood glucose monitor provided         Relevant Medications    Continuous Blood Gluc Sensor (FreeStyle Laura 2 Sensor) MISC    Other Relevant Orders    Hemoglobin A1C    Albumin / creatinine urine ratio       Cardiovascular and Mediastinum    Benign essential hypertension     Continue on losartan/hydrochlorothiazide. Reevaluate in 4 months         Relevant Orders    CBC and differential    TSH, 3rd generation with Free T4 reflex       Musculoskeletal and Integument    Clear cell hidradenoma     Needs to follow-up with surgical oncology. Some lesions on his scalp         Rheumatoid arthritis involving multiple sites with positive rheumatoid factor ([address]) - Primary     Managed by rheumatology            Other    Mixed hyperlipidemia     Continue on atorvastatin. Cholesterol has improved. Watch dietary intake of fats and cholesterol. No change in his atorvastatin dose. He is already on 40 mg at bedtime.   Reevaluate in 4 months         Relevant Orders    Comprehensive metabolic panel    Lipid panel    Prostate cancer screening    Relevant Orders    PSA, Total Screen    Right hip pain     Check x-ray of the right hip         Relevant Orders    XR hip/pelv 2-3 vws right if performed    Vitamin D deficiency    Relevant Orders    Vitamin D 1,25 dihydroxy

## 2023-10-18 NOTE — ASSESSMENT & PLAN NOTE
Lab Results   Component Value Date    HGBA1C 6.8 (H) 10/17/2023   Patient should be more diligent with following diabetic diet. Can make adjustments to his Basaglar. Unfortunately insurance would not coverage Januvia so blood glucoses increase. Continue on Victoza.   Continuous blood glucose monitor provided

## 2023-10-18 NOTE — ASSESSMENT & PLAN NOTE
Continue on atorvastatin. Cholesterol has improved. Watch dietary intake of fats and cholesterol. No change in his atorvastatin dose. He is already on 40 mg at bedtime.   Reevaluate in 4 months

## 2023-10-23 ENCOUNTER — TELEPHONE (OUTPATIENT)
Dept: OTHER | Facility: OTHER | Age: 58
End: 2023-10-23

## 2023-10-23 NOTE — TELEPHONE ENCOUNTER
Patient called to get an update on the status of the pre authorization for   HYDROcodone-acetaminophen (Norco) 5-325 mg per tablet   Their insurance is limiting the amount of medication they are allowed to purchase out of pocket

## 2023-10-23 NOTE — LETTER
Date: 10-24-23      ATTN: Coco Pat Prior [address]  [address] Appeal Dept, Fax: [phone]    RE: David Escobar - : 89- - HYDROcodone-acetaminophen (Norco) 5-325 mg           Patient has Charcot foot due to diabetes mellitus which causes severe pain. Patient has tried and failed medication that does not contain opioids such as Tylenol and Ibuprofen (NSAIDS). The doctor has assessed possible risks for opioid misuse or use disorder.         Prior Authorization Dept  Fax: [phone]

## 2023-10-24 NOTE — TELEPHONE ENCOUNTER
No PA request was ever received on this medication.    HYDROcodone-acetaminophen (Norco) 5-325 mg PA created / Submitted with ov notes via CMM (Key: [id number])  Waiting on determination

## 2023-10-24 NOTE — TELEPHONE ENCOUNTER
Patients wife is requesting a call back from the office regarding her husbands medication and if has been approved.  Please call back with status

## 2023-10-25 ENCOUNTER — TELEPHONE (OUTPATIENT)
Age: 58
End: 2023-10-25

## 2023-10-25 NOTE — TELEPHONE ENCOUNTER
Yuli Matamoros from [facility] to advise they started case on 10/25 for Patient's Hydrocodone.

## 2023-10-26 NOTE — TELEPHONE ENCOUNTER
HYDROcodone-acetaminophen (Norco) 5-325 mg PA Appeal Approved. Pt and pharmacy made aware. Approval letter in media.

## 2023-11-02 DIAGNOSIS — E11.9 TYPE 2 DIABETES MELLITUS WITHOUT COMPLICATION, WITHOUT LONG-TERM CURRENT USE OF INSULIN (HCC): ICD-10-CM

## 2023-11-02 DIAGNOSIS — J45.909 UNCOMPLICATED ASTHMA, UNSPECIFIED ASTHMA SEVERITY, UNSPECIFIED WHETHER PERSISTENT: ICD-10-CM

## 2023-11-02 DIAGNOSIS — K25.3 ACUTE GASTRIC ULCER WITHOUT HEMORRHAGE OR PERFORATION: ICD-10-CM

## 2023-11-02 DIAGNOSIS — E13.9 DIABETES MELLITUS OF OTHER TYPE WITHOUT COMPLICATION, UNSPECIFIED WHETHER LONG TERM INSULIN USE (HCC): ICD-10-CM

## 2023-11-02 RX ORDER — SUCRALFATE 1 G/1
1 TABLET ORAL 4 TIMES DAILY
Qty: 360 TABLET | Refills: 0 | Status: SHIPPED | OUTPATIENT
Start: 2023-11-02

## 2023-11-02 RX ORDER — ALBUTEROL SULFATE 90 UG/1
2 AEROSOL, METERED RESPIRATORY (INHALATION) EVERY 4 HOURS PRN
Qty: 18 G | Refills: 1 | Status: SHIPPED | OUTPATIENT
Start: 2023-11-02

## 2023-11-02 RX ORDER — FLUTICASONE PROPIONATE 50 MCG
SPRAY, SUSPENSION (ML) NASAL
Qty: 16 G | Refills: 11 | Status: SHIPPED | OUTPATIENT
Start: 2023-11-02

## 2023-11-03 ENCOUNTER — TELEPHONE (OUTPATIENT)
Dept: RHEUMATOLOGY | Facility: CLINIC | Age: 58
End: 2023-11-03

## 2023-11-12 DIAGNOSIS — M05.9 SEROPOSITIVE RHEUMATOID ARTHRITIS (HCC): ICD-10-CM

## 2023-11-12 RX ORDER — TOFACITINIB 11 MG/1
11 TABLET, FILM COATED, EXTENDED RELEASE ORAL DAILY
Qty: 30 TABLET | Refills: 5 | Status: SHIPPED | OUTPATIENT
Start: 2023-11-12

## 2023-11-13 ENCOUNTER — OFFICE VISIT (OUTPATIENT)
Dept: NEUROSURGERY | Facility: CLINIC | Age: 58
End: 2023-11-13
Payer: COMMERCIAL

## 2023-11-13 VITALS
SYSTOLIC BLOOD PRESSURE: 122 MMHG | WEIGHT: 250 LBS | HEIGHT: 72 IN | BODY MASS INDEX: 33.86 KG/M2 | OXYGEN SATURATION: 96 % | HEART RATE: 96 BPM | RESPIRATION RATE: 14 BRPM | TEMPERATURE: 98.2 F | DIASTOLIC BLOOD PRESSURE: 70 MMHG

## 2023-11-13 DIAGNOSIS — M54.12 CERVICAL RADICULOPATHY: ICD-10-CM

## 2023-11-13 PROBLEM — M47.22 OSTEOARTHRITIS OF SPINE WITH RADICULOPATHY, CERVICAL REGION: Status: ACTIVE | Noted: 2023-11-13

## 2023-11-13 PROCEDURE — 99244 OFF/OP CNSLTJ NEW/EST MOD 40: CPT | Performed by: NEUROLOGICAL SURGERY

## 2023-11-13 NOTE — PROGRESS NOTES
Patient seen with Mirna Streeter today. R C4/5 disk osteophyte likely contributing to right neck, shoulder, and arm pain complaints, though I would not expect his symptoms to go past the shoulder from this, and he complains of numbness into all fingers of his hand on the right. Left side is less symptomatic, though the disk/osteophyte at C5/6 on the left  Plan for [facility] [place]  He has numerous contributing dx including diabetes, rheumatoid, carpal tunnel and has issues with his foot that may need to be addressed before his neck. He is not myelopathic. I would consider EMG at his next visit in approx 2-3 months if no durable benefit from [facility] [place].   He may be a candidate for AC4/5D with TDR or Fusion, ultimately  All questions answered

## 2023-11-13 NOTE — LETTER
November 13, 2023     Fabiana EndyEmil    Patient: Jamia Garay YOB: 1965   Date of Visit: 11/13/2023       Dear Dr. Saúl Correa: Thank you for referring Jaclyn Hubbard to me for evaluation. Below are my notes for this consultation. If you have questions, please do not hesitate to call me. I look forward to following your patient along with you. Sincerely,        Leigha Westfall MD        CC: DO Rowdy Oneill PA-C  11/13/2023  3:55 PM  Incomplete  Neurosurgery Office Note  Jamia Kruger. 56 y.o. male MRN: [id number]      Assessment/Plan    Osteoarthritis of spine with radiculopathy, cervical region  Presents today as a new patient for evaluation of right greater than left shoulder pain. Patient with extensive history of rheumatoid arthritis as well and Charcot foot status post surgery  History of shoulder pain but now complaining of pain radiating from the lateral neck down across the shoulder and into his diffuse fingers. Associated numbness and heaviness. At times numbness causes problems with fine motor function of his hands. Imaging:   MRI cervical spine without 10/11/2023: Multilevel cervical degenerative changes most noted at C4-5 with severe right foraminal stenosis. No significant central canal stenosis. Moderate bilateral foraminal narrowing at C5-6. Possible OPLL    Plan:   Continue to monitor neurological symptoms  MRI imaging reviewed in detail with patient demonstrating findings as discussed above. Discussed with patient pain radiating from his neck burning across the shoulder is likely related to the cervical foraminal stenosis. We also discussed partial shoulder pain which is exacerbated with arm movement would be more associated with shoulder etiology. We discussed the significant stenosis on the right at C4-5 would not explain symptoms radiating to all fingers. On exam he had positive Tinel and likely has a component related to carpal tunnel and rheumatoid. Given patient without cord compression or cervical myelopathy, discussed options for maximization of conservative management. Referral made for pain management for consideration of injections. Continue pain management with medications as prescribed by PCP. Ongoing follow-up with rheumatology. We will plan outpatient follow-up in 6 to 12 weeks after completion of injections. Discussed consideration for EMGs pending on response to injections. Patient was seen in conjunction by Dr. Shavonne Rehman. Questions were answered. Patient agreeable with plan of care. Diagnoses and all orders for this visit:    Cervical radiculopathy  -     Ambulatory Referral to Neurosurgery  -     Ambulatory referral to Spine & Pain Management; Future          I have spent a total time of 50 minutes on 11/13/23 in caring for this patient including Diagnostic results, Risks and benefits of tx options, Instructions for management, Patient and family education, Impressions, Documenting in the medical record, Reviewing / ordering tests, medicine, procedures  , Obtaining or reviewing history  , and Communicating with other healthcare professionals . CHIEF COMPLAINT    No chief complaint on file. HISTORY    History of Present Illness    56y.o. year old male     This is a 30-year-old male past medical history significant for rheumatoid arthritis, diabetes, Charcot foot, history of MRSA infection, neuropathy, diabetes (A1C 6.8) and COPD who presents today for evaluation of shoulder pain. Patient describes 3-year history of shoulder pain generally on the right but more recent symptoms on the left over the last year.   Given patient's significant rheumatoid arthritis he describes a history of bilateral shoulder pain with consideration for surgical intervention in the past.  Subsequent to that his shoulder pain improved and he had been doing well until approximately 3 years ago. At first it appeared to be his shoulder and was worse with driving or overhead activities. This sharp burning pain now travels from the lateral neck across to shoulder down into his arm and diffusely through all fingers. He describes an associated numbness and heaviness. At times this causes difficulty with fine motor function of his hands. Patient has baseline difficulty with ambulation secondary to Charcot foot. More recently is noted right hip pain and is pending further evaluation for this. He states his neck and shoulder pain are worse in the morning and late in the evening causing difficulty with sleeping. Patient uses marijuana and Norco at nighttime to assist with sleep and pain. He has not completed physical therapy or pain management at this time. See Discussion    REVIEW OF SYSTEMS    Review of Systems   Endocrine:        H/o DM    Musculoskeletal:  Positive for gait problem and neck pain. No previous spine surgeries   Neck pain, shoulder pain into b/l arms down to fingertips ( was originally on Right arm)  w N/W ( from shoulder to fingers  and b/l difficulty fine finger motions ( sometimes)  x 3 yrs  Pt also c/o Charcot foot ( R Foot) and HA, Right HIP is also painful   No recent PT/PM/LACY  Meds Marijuana, [place]      Neurological:  Positive for weakness (fine finger motion) and headaches. Psychiatric/Behavioral:  Positive for sleep disturbance (Pain is worse int he AM and at bedtime). All other systems reviewed and are negative. ROS obtained by MA. Reviewed. See HPI.      Meds/Allergies    Current Outpatient Medications   Medication Sig Dispense Refill   • albuterol (PROVENTIL HFA,VENTOLIN HFA) 90 mcg/act inhaler INHALE 2 PUFFS EVERY 4 (FOUR) HOURS AS NEEDED FOR WHEEZING 18 g 1   • amitriptyline (ELAVIL) 10 mg tablet Take 1 tablet (10 mg total) by mouth daily at bedtime 90 tablet 2   • Ascorbic Acid (vitamin C) 1000 MG tablet Take 1,000 mg by mouth daily     • atorvastatin (LIPITOR) 40 mg tablet TAKE 1 TABLET (40 MG TOTAL) BY MOUTH DAILY AT BEDTIME 90 tablet 1   • Basaglar KwikPen 100 units/mL SOPN INJECT 50 UNITS UNDER THE SKIN EVERY 12 (TWELVE) HOURS 30 mL 1   • ergocalciferol (VITAMIN D2) 50,000 units Take 1 capsule (50,000 Units total) by mouth once a week Takes on Saturday 12 capsule 3   • fluticasone (FLONASE) 50 mcg/act nasal spray USE 2 SPRAYS IN EACH NOSTRIL ONCE DAILY 16 g 11   • HYDROcodone-acetaminophen (Norco) 5-325 mg per tablet Take 1 tablet by mouth every 6 (six) hours as needed for pain Max Daily Amount: 4 tablets 60 tablet 0   • hydroxychloroquine (PLAQUENIL) 200 mg tablet Take 1 tablet (200 mg total) by mouth 2 (two) times a day 180 tablet 1   • losartan-hydrochlorothiazide (HYZAAR) 100-12.5 MG per tablet TAKE 1 TABLET BY MOUTH DAILY 90 tablet 1   • naproxen (NAPROSYN) 500 mg tablet TAKE 1 TABLET (500 MG TOTAL) BY MOUTH EVERY 12 (TWELVE) HOURS AS NEEDED FOR MILD PAIN (WITH FOOD) 60 tablet 2   • pantoprazole (PROTONIX) 40 mg tablet TAKE 1 TABLET (40 MG TOTAL) BY MOUTH DAILY 30 tablet 0   • Tofacitinib Citrate ER (Xeljanz XR) 11 MG TB24 Take 1 tablet (11 mg total) by mouth daily 30 tablet 5   • Victoza injection INJECT 0.3 ML (1.8 MG TOTAL) UNDER THE SKIN DAILY 9 mL 0   • Accu-Chek FastClix Lancets MISC  (Patient not taking: Reported on 11/13/2023)     • Accu-Chek FastClix Lancets MISC TEST BLOOD SUGAR FOUR TIMES A DAY BEFORE MEALS AND BEDTIME (Patient not taking: Reported on 11/13/2023) 102 each 3   • BD Pen Needle Maureen U/F 32G X 4 MM MISC ONE PEN NEEDLE THREE TIMES A DAY 2-BASAGLAR 1- VICTOZA (Patient not taking: Reported on 11/13/2023) 100 each 3   • Blood Glucose Monitoring Suppl (OneTouch Verio Flex System) w/Device KIT Use 3 (three) times a day with meals (Patient not taking: Reported on 11/13/2023) 1 kit 0   • calcium carbonate (OS-NERY) 600 MG tablet Take 600 mg by mouth daily with dinner     • Continuous Blood Gluc Sensor (FreeStyle Laura 2 Sensor) MISC Check blood sugars multiple times per day 6 each 3   • Diclofenac Sodium (VOLTAREN) 1 % Apply 2 g topically 4 (four) times a day for 10 days (Patient not taking: Reported on 11/13/2023) 100 g 0   • olopatadine (PATANOL) 0.1 % ophthalmic solution Administer 1 drop to both eyes in the morning and 1 drop in the evening. (Patient not taking: Reported on 11/13/2023) 5 mL 1   • OneTouch Verio test strip CHECK GLUCOSE BEFORE MEALS (Patient not taking: Reported on 11/13/2023) 100 strip 3   • saccharomyces boulardii (FLORASTOR) 250 mg capsule Take 1 capsule (250 mg total) by mouth 2 (two) times a day 60 capsule 0   • sucralfate (CARAFATE) 1 g tablet TAKE 1 TABLET (1 G TOTAL) BY MOUTH 4 (FOUR) TIMES A DAY (Patient not taking: Reported on 11/13/2023) 360 tablet 0   • ULTICARE MICRO PEN NEEDLES 32G X 4 MM MISC INJECT UNDER THE SKIN 3 (THREE) TIMES A DAY (Patient not taking: Reported on 11/13/2023) 100 each 3     No current facility-administered medications for this visit.        Allergies   Allergen Reactions   • Other Other (See Comments)     Hay fever         PAST HISTORY    Past Medical History:   Diagnosis Date   • Abscess of left thigh 5/11/2021   • Arthritis    • At risk for falls    • Biliary dyskinesia 3/16/2020   • Broken foot     right   • Cataract     giacomo   • Cellulitis of left lower extremity 4/26/2021   • COPD (chronic obstructive pulmonary disease) ([facility])    • Diabetes mellitus ([facility])    • Dyskinesia of gallbladder 8/15/2022   • Hx MRSA infection     Per wife patient hadf MRSa in a wound awhile ago   • Hyperlipidemia    • Kidney stone    • Neuropathy    • RA (rheumatoid arthritis) ([address])    • Rheumatoid arthritis ([address])    • Seasonal allergies    • Sepsis ([address]) 4/26/2021   • Snores    • Uses wheelchair     and crutches- NWB RLE   • Wears glasses        Past Surgical History:   Procedure Laterality Date   • APPENDECTOMY     • CLOSED REDUCTION FOOT DISLOCATION Right 2/12/2019 Procedure: C/R FRACTURE;  Surgeon: Louie Flanagan DPM;  [facility]: [facility];  Service: Podiatry   • COLONOSCOPY     • FOOT SURGERY Right 07/2019    Removal of the bone graft and cleaned out infection, and placed new bone graft   • IR PICC PLACEMENT SINGLE LUMEN  8/5/2019   • NY DEBRIDEMENT SUBCUTANEOUS TISSUE 20 SQ CM/< Left 5/24/2021    Procedure: INCISION AND DRAINAGE THIGH;  Surgeon: Heidy Tam MD;  [facility];  Service: Orthopedics   • NY EXCISION MALIGNANT LESION S/N/H/F/G 0.5 CM/< N/A 3/28/2018    Procedure: EXCISION WIDE LESION HEAD/FACIAL/NECK;  Surgeon: Darren Brennan MD;  [facility];  Service: Surgical Oncology   • NY GASTROCNEMIUS RECESSION Right 2/12/2019    Procedure: ENDO GASTROC RECESSION, APPLICATION OF EXTERNAL FIXATOR;  Surgeon: Louie Flanagan DPM;  [facility];  Service: Podiatry   • NY LAPAROSCOPY SURG CHOLECYSTECTOMY N/A 9/30/2022    Procedure: CHOLECYSTECTOMY LAPAROSCOPIC;  Surgeon: Laura Wood MD;  [facility];  Service: General   • NY REMOVAL EXTERNAL FIXATION SYSTEM UNDER ANES Right 4/18/2019    Procedure: Refugia Miracle REMOVAL HARDWARE FOOT WITH APPLICATION OF GRAFT;  Surgeon: Louie Flanagan DPM;  [facility];  Service: Podiatry   • SKIN BIOPSY      scalp   • WISDOM TOOTH EXTRACTION  1998   • WOUND DEBRIDEMENT Left 4/29/2021    Procedure: KNEE INCISION AND DRAINAGE, EXCISIONAL DEBRIDEMENT OF PREPATELLAR BURSA;   Surgeon: Heidy Tam MD;  [facility];  Service: Orthopedics       Social History     Tobacco Use   • Smoking status: Every Day     Packs/day: 1.00     Years: 40.00     Total pack years: 40.00     Types: Cigarettes   • Smokeless tobacco: Never   Vaping Use   • Vaping Use: Never used   Substance Use Topics   • Alcohol use: Yes     Comment: Socially   • Drug use: Yes     Frequency: 7.0 times per week     Types: Marijuana     Comment: Daily for Pain       Family History   Problem Relation Age of Onset   • Diabetes Mother • Stroke Mother    • Mental illness Mother    • Diabetes Father    • Stroke Father    • Alcohol abuse Brother    • Coronary artery disease Family         Age 52-50   • Diabetes type II Family    • Heart disease Family          Above history personally reviewed. EXAM    Vitals:Blood pressure 122/70, pulse 96, temperature 98.2 °F (36.8 °C), temperature source Temporal, resp. rate 14, height 6' (1.829 m), weight 113 kg (250 lb), SpO2 96 %. ,Body mass index is 33.91 kg/m². Physical Exam  Constitutional:       General: He is not in acute distress. Appearance: Normal appearance. He is well-developed. He is not ill-appearing. HENT:      Head: Normocephalic and atraumatic. Right Ear: External ear normal.      Left Ear: External ear normal.      Nose: Nose normal.      Mouth/Throat:      Mouth: Mucous membranes are moist.   Eyes:      General: No scleral icterus. Right eye: No discharge. Left eye: No discharge. Extraocular Movements: EOM normal.      Conjunctiva/sclera: Conjunctivae normal.   Cardiovascular:      Rate and Rhythm: Normal rate. Pulmonary:      Effort: Pulmonary effort is normal. No respiratory distress. Abdominal:      General: There is no distension. Palpations: Abdomen is soft. Tenderness: There is no abdominal tenderness. Musculoskeletal:      Cervical back: Normal range of motion and neck supple. No tenderness. Skin:     General: Skin is warm and dry. Neurological:      Mental Status: He is alert. Deep Tendon Reflexes:      Reflex Scores:       Bicep reflexes are 2+ on the right side and 2+ on the left side. Brachioradialis reflexes are 2+ on the right side and 2+ on the left side. Patellar reflexes are 2+ on the right side and 2+ on the left side.   Psychiatric:         Mood and Affect: Mood normal.         Speech: Speech normal.         Behavior: Behavior normal.         Neurologic Exam     Mental Status   Follows 2 step commands. Attention: normal. Concentration: normal.   Speech: speech is normal   Level of consciousness: alert  Knowledge: good. Normal comprehension. Cranial Nerves     CN III, IV, VI   Extraocular motions are normal.   Conjugate gaze: present    CN VII   Facial expression full, symmetric. CN VIII   Hearing: intact    CN XI   Right trapezius strength: normal  Left trapezius strength: normal    Motor Exam   Muscle bulk: normal  Overall muscle tone: normal    Strength   Strength 5/5 except as noted. Left  4+  IO 4  HF 4+     Sensory Exam   Light touch normal.   PP inconsistent     Gait, Coordination, and Reflexes     Tremor   Resting tremor: absent  Intention tremor: absent  Action tremor: absent    Reflexes   Right brachioradialis: 2+  Left brachioradialis: 2+  Right biceps: 2+  Left biceps: 2+  Right patellar: 2+  Left patellar: 2+  Right Penn: absent  Left Penn: absent  Right ankle clonus: absent  Left ankle clonus: absent        MEDICAL DECISION MAKING    Imaging Studies:     MRI cervical spine    I have personally reviewed pertinent reports. and I have personally reviewed pertinent films in PACS    Uriah Sylvester MD  11/13/2023  3:34 PM  Sign when Signing Visit  Patient seen with Leroy Cheadle today. R C4/5 disk osteophyte likely contributing to right neck, shoulder, and arm pain complaints, though I would not expect his symptoms to go past the shoulder from this, and he complains of numbness into all fingers of his hand on the right. Left side is less symptomatic, though the disk/osteophyte at C5/6 on the left  Plan for [facility]  He has numerous contributing dx including diabetes, rheumatoid, carpal tunnel and has issues with his foot that may need to be addressed before his neck. He is not myelopathic. I would consider EMG at his next visit in approx 2-3 months if no durable benefit from [facility].   He may be a candidate for AC4/5D with TDR or Fusion, ultimately  All questions answered

## 2023-11-13 NOTE — ASSESSMENT & PLAN NOTE
Presents today as a new patient for evaluation of right greater than left shoulder pain. Patient with extensive history of rheumatoid arthritis as well and Charcot foot status post surgery  History of shoulder pain but now complaining of pain radiating from the lateral neck down across the shoulder and into his diffuse fingers. Associated numbness and heaviness. At times numbness causes problems with fine motor function of his hands. Imaging:   MRI cervical spine without 10/11/2023: Multilevel cervical degenerative changes most noted at C4-5 with severe right foraminal stenosis. No significant central canal stenosis. Moderate bilateral foraminal narrowing at C5-6. Possible OPLL    Plan:   Continue to monitor neurological symptoms  MRI imaging reviewed in detail with patient demonstrating findings as discussed above. Discussed with patient pain radiating from his neck burning across the shoulder is likely related to the cervical foraminal stenosis. We also discussed partial shoulder pain which is exacerbated with arm movement would be more associated with shoulder etiology. We discussed the significant stenosis on the right at C4-5 would not explain symptoms radiating to all fingers. On exam he had positive Tinel and likely has a component related to carpal tunnel and rheumatoid. Given patient without cord compression or cervical myelopathy, discussed options for maximization of conservative management. Referral made for pain management for consideration of injections. Continue pain management with medications as prescribed by PCP. Ongoing follow-up with rheumatology. We will plan outpatient follow-up in 6 to 12 weeks after completion of injections. Discussed consideration for EMGs pending on response to injections. Patient was seen in conjunction by Dr. Nasim Pimentel. Questions were answered. Patient agreeable with plan of care.

## 2023-11-13 NOTE — PROGRESS NOTES
Neurosurgery Office Note  Nohemy Banegas. 56 y.o. male MRN: [id number]      Assessment/Plan     Osteoarthritis of spine with radiculopathy, cervical region  Presents today as a new patient for evaluation of right greater than left shoulder pain. Patient with extensive history of rheumatoid arthritis as well and Charcot foot status post surgery  History of shoulder pain but now complaining of pain radiating from the lateral neck down across the shoulder and into his diffuse fingers. Associated numbness and heaviness. At times numbness causes problems with fine motor function of his hands. Imaging:   MRI cervical spine without 10/11/2023: Multilevel cervical degenerative changes most noted at C4-5 with severe right foraminal stenosis. No significant central canal stenosis. Moderate bilateral foraminal narrowing at C5-6. Possible OPLL    Plan:   Continue to monitor neurological symptoms  MRI imaging reviewed in detail with patient demonstrating findings as discussed above. Discussed with patient pain radiating from his neck burning across the shoulder is likely related to the cervical foraminal stenosis. We also discussed partial shoulder pain which is exacerbated with arm movement would be more associated with shoulder etiology. We discussed the significant stenosis on the right at C4-5 would not explain symptoms radiating to all fingers. On exam he had positive Tinel and likely has a component related to carpal tunnel and rheumatoid. Given patient without cord compression or cervical myelopathy, discussed options for maximization of conservative management. Referral made for pain management for consideration of injections. Continue pain management with medications as prescribed by PCP. Ongoing follow-up with rheumatology. We will plan outpatient follow-up in 6 to 12 weeks after completion of injections. Discussed consideration for EMGs pending on response to injections.   Patient was seen in conjunction by Dr. Reymundo Gaona. Questions were answered. Patient agreeable with plan of care. Diagnoses and all orders for this visit:    Cervical radiculopathy  -     Ambulatory Referral to Neurosurgery  -     Ambulatory referral to Spine & Pain Management; Future          I have spent a total time of 50 minutes on 11/13/23 in caring for this patient including Diagnostic results, Risks and benefits of tx options, Instructions for management, Patient and family education, Impressions, Documenting in the medical record, Reviewing / ordering tests, medicine, procedures  , Obtaining or reviewing history  , and Communicating with other healthcare professionals . CHIEF COMPLAINT    Right shoulder pain      HISTORY    History of Present Illness     56y.o. year old male     This is a 79-year-old male past medical history significant for rheumatoid arthritis, diabetes, Charcot foot, history of MRSA infection, neuropathy, diabetes (A1C 6.8) and COPD who presents today for evaluation of shoulder pain. Patient describes 3-year history of shoulder pain generally on the right but more recent symptoms on the left over the last year. Given patient's significant rheumatoid arthritis he describes a history of bilateral shoulder pain with consideration for surgical intervention in the past.  Subsequent to that his shoulder pain improved and he had been doing well until approximately 3 years ago. At first it appeared to be his shoulder and was worse with driving or overhead activities. This sharp burning pain now travels from the lateral neck across to shoulder down into his arm and diffusely through all fingers. He describes an associated numbness and heaviness. At times this causes difficulty with fine motor function of his hands. Patient has baseline difficulty with ambulation secondary to Charcot foot. More recently is noted right hip pain and is pending further evaluation for this.   He states his neck and shoulder pain are worse in the morning and late in the evening causing difficulty with sleeping. Patient uses marijuana and Norco at nighttime to assist with sleep and pain. He has not completed physical therapy or pain management at this time. See Discussion    REVIEW OF SYSTEMS    Review of Systems   Endocrine:        H/o DM    Musculoskeletal:  Positive for gait problem and neck pain. No previous spine surgeries   Neck pain, shoulder pain into b/l arms down to fingertips ( was originally on Right arm)  w N/W ( from shoulder to fingers  and b/l difficulty fine finger motions ( sometimes)  x 3 yrs  Pt also c/o Charcot foot ( R Foot) and HA, Right HIP is also painful   No recent PT/PM/LACY  Meds Marijuana, [place]      Neurological:  Positive for weakness (fine finger motion) and headaches. Psychiatric/Behavioral:  Positive for sleep disturbance (Pain is worse int he AM and at bedtime). All other systems reviewed and are negative. ROS obtained by MA. Reviewed. See HPI.      Meds/Allergies     Current Outpatient Medications   Medication Sig Dispense Refill    albuterol (PROVENTIL HFA,VENTOLIN HFA) 90 mcg/act inhaler INHALE 2 PUFFS EVERY 4 (FOUR) HOURS AS NEEDED FOR WHEEZING 18 g 1    amitriptyline (ELAVIL) 10 mg tablet Take 1 tablet (10 mg total) by mouth daily at bedtime 90 tablet 2    Ascorbic Acid (vitamin C) 1000 MG tablet Take 1,000 mg by mouth daily      atorvastatin (LIPITOR) 40 mg tablet TAKE 1 TABLET (40 MG TOTAL) BY MOUTH DAILY AT BEDTIME 90 tablet 1    Basaglar KwikPen 100 units/mL SOPN INJECT 50 UNITS UNDER THE SKIN EVERY 12 (TWELVE) HOURS 30 mL 1    ergocalciferol (VITAMIN D2) 50,000 units Take 1 capsule (50,000 Units total) by mouth once a week Takes on Saturday 12 capsule 3    fluticasone (FLONASE) 50 mcg/act nasal spray USE 2 SPRAYS IN EACH NOSTRIL ONCE DAILY 16 g 11    HYDROcodone-acetaminophen (Norco) 5-325 mg per tablet Take 1 tablet by mouth every 6 (six) hours as needed for pain Max Daily Amount: 4 tablets 60 tablet 0    hydroxychloroquine (PLAQUENIL) 200 mg tablet Take 1 tablet (200 mg total) by mouth 2 (two) times a day 180 tablet 1    losartan-hydrochlorothiazide (HYZAAR) 100-12.5 MG per tablet TAKE 1 TABLET BY MOUTH DAILY 90 tablet 1    naproxen (NAPROSYN) 500 mg tablet TAKE 1 TABLET (500 MG TOTAL) BY MOUTH EVERY 12 (TWELVE) HOURS AS NEEDED FOR MILD PAIN (WITH FOOD) 60 tablet 2    pantoprazole (PROTONIX) 40 mg tablet TAKE 1 TABLET (40 MG TOTAL) BY MOUTH DAILY 30 tablet 0    Tofacitinib Citrate ER (Xeljanz XR) 11 MG TB24 Take 1 tablet (11 mg total) by mouth daily 30 tablet 5    Victoza injection INJECT 0.3 ML (1.8 MG TOTAL) UNDER THE SKIN DAILY 9 mL 0    Accu-Chek FastClix Lancets MISC  (Patient not taking: Reported on 11/13/2023)      Accu-Chek FastClix Lancets MISC TEST BLOOD SUGAR FOUR TIMES A DAY BEFORE MEALS AND BEDTIME (Patient not taking: Reported on 11/13/2023) 102 each 3    BD Pen Needle Maureen U/F 32G X 4 MM MISC ONE PEN NEEDLE THREE TIMES A DAY 2-BASAGLAR 1- VICTOZA (Patient not taking: Reported on 11/13/2023) 100 each 3    Blood Glucose Monitoring Suppl (OneTouch Verio Flex System) w/Device KIT Use 3 (three) times a day with meals (Patient not taking: Reported on 11/13/2023) 1 kit 0    calcium carbonate (OS-NERY) 600 MG tablet Take 600 mg by mouth daily with dinner      Continuous Blood Gluc Sensor (FreeStyle Laura 2 Sensor) MISC Check blood sugars multiple times per day 6 each 3    Diclofenac Sodium (VOLTAREN) 1 % Apply 2 g topically 4 (four) times a day for 10 days (Patient not taking: Reported on 11/13/2023) 100 g 0    olopatadine (PATANOL) 0.1 % ophthalmic solution Administer 1 drop to both eyes in the morning and 1 drop in the evening.  (Patient not taking: Reported on 11/13/2023) 5 mL 1    OneTouch Verio test strip CHECK GLUCOSE BEFORE MEALS (Patient not taking: Reported on 11/13/2023) 100 strip 3    saccharomyces boulardii (FLORASTOR) 250 mg capsule Take 1 capsule (250 mg total) by mouth 2 (two) times a day 60 capsule 0    sucralfate (CARAFATE) 1 g tablet TAKE 1 TABLET (1 G TOTAL) BY MOUTH 4 (FOUR) TIMES A DAY (Patient not taking: Reported on 11/13/2023) 360 tablet 0    ULTICARE MICRO PEN NEEDLES 32G X 4 MM MISC INJECT UNDER THE SKIN 3 (THREE) TIMES A DAY (Patient not taking: Reported on 11/13/2023) 100 each 3     No current facility-administered medications for this visit.        Allergies   Allergen Reactions    Other Other (See Comments)     Hay fever         PAST HISTORY    Past Medical History:   Diagnosis Date    Abscess of left thigh 5/11/2021    Arthritis     At risk for falls     Biliary dyskinesia 3/16/2020    Broken foot     right    Cataract     giacomo    Cellulitis of left lower extremity 4/26/2021    COPD (chronic obstructive pulmonary disease) ([address])     Diabetes mellitus ([address])     Dyskinesia of gallbladder 8/15/2022    Hx MRSA infection     Per wife patient hadf MRSa in a wound awhile ago    Hyperlipidemia     Kidney stone     Neuropathy     RA (rheumatoid arthritis) ([address])     Rheumatoid arthritis ([address])     Seasonal allergies     Sepsis ([address]) 4/26/2021    Snores     Uses wheelchair     and crutches- NWB RLE    Wears glasses        Past Surgical History:   Procedure Laterality Date    APPENDECTOMY      CLOSED REDUCTION FOOT DISLOCATION Right 2/12/2019    Procedure: C/R FRACTURE;  Surgeon: Jennifer Manzanares DPM;  Location: [facility];  Service: Podiatry    COLONOSCOPY      FOOT SURGERY Right 07/2019    Removal of the bone graft and cleaned out infection, and placed new bone graft    IR PICC PLACEMENT SINGLE LUMEN  8/5/2019    MI DEBRIDEMENT SUBCUTANEOUS TISSUE 20 SQ CM/< Left 5/24/2021    Procedure: INCISION AND DRAINAGE THIGH;  Surgeon: Sarah Mccarty MD;  [facility];  Service: Orthopedics    MI EXCISION MALIGNANT LESION S/N/H/F/G 0.5 CM/< N/A 3/28/2018    Procedure: EXCISION WIDE LESION HEAD/FACIAL/NECK;  Surgeon: Stephanie Finnegan MD;  [facility]; Service: Surgical Oncology    DC GASTROCNEMIUS RECESSION Right 2/12/2019    Procedure: ENDO GASTROC RECESSION, APPLICATION OF EXTERNAL FIXATOR;  Surgeon: Delfina Pickering DPM;  [facility];  Service: Podiatry    DC LAPAROSCOPY SURG CHOLECYSTECTOMY N/A 9/30/2022    Procedure: CHOLECYSTECTOMY LAPAROSCOPIC;  Surgeon: Shaq Richardson MD;  [facility];  Service: General    DC REMOVAL EXTERNAL FIXATION SYSTEM UNDER ANES Right 4/18/2019    Procedure: [place] REMOVAL HARDWARE FOOT WITH APPLICATION OF GRAFT;  Surgeon: Delfina Pickering DPM;  [facility];  Service: Podiatry    SKIN BIOPSY      scalp    [address] Left 4/29/2021    Procedure: KNEE INCISION AND DRAINAGE, EXCISIONAL DEBRIDEMENT OF PREPATELLAR BURSA; Surgeon: Todd Salazar MD;  [facility];  Service: Orthopedics       Social History     Tobacco Use    Smoking status: Every Day     Packs/day: 1.00     Years: 40.00     Total pack years: 40.00     Types: Cigarettes    Smokeless tobacco: Never   Vaping Use    Vaping Use: Never used   Substance Use Topics    Alcohol use: Yes     Comment: Socially    Drug use: Yes     Frequency: 7.0 times per week     Types: Marijuana     Comment: Daily for Pain       Family History   Problem Relation Age of Onset    Diabetes Mother     Stroke Mother     Mental illness Mother     Diabetes Father     Stroke Father     Alcohol abuse Brother     Coronary artery disease Family         Age 52-50    Diabetes type II Family     Heart disease Family          Above history personally reviewed. EXAM    Vitals:Blood pressure 122/70, pulse 96, temperature 98.2 °F (36.8 °C), temperature source Temporal, resp. rate 14, height 6' (1.829 m), weight 113 kg (250 lb), SpO2 96 %. ,Body mass index is 33.91 kg/m². Physical Exam  Constitutional:       General: He is not in acute distress. Appearance: Normal appearance. He is well-developed. He is not ill-appearing. HENT:      Head: Normocephalic and atraumatic. Right Ear: External ear normal.      Left Ear: External ear normal.      Nose: Nose normal.      Mouth/Throat:      Mouth: Mucous membranes are moist.   Eyes:      General: No scleral icterus. Right eye: No discharge. Left eye: No discharge. Extraocular Movements: EOM normal.      Conjunctiva/sclera: Conjunctivae normal.   Cardiovascular:      Rate and Rhythm: Normal rate. Pulmonary:      Effort: Pulmonary effort is normal. No respiratory distress. Abdominal:      General: There is no distension. Palpations: Abdomen is soft. Tenderness: There is no abdominal tenderness. Musculoskeletal:      Cervical back: Normal range of motion and neck supple. No tenderness. Skin:     General: Skin is warm and dry. Neurological:      Mental Status: He is alert. Deep Tendon Reflexes:      Reflex Scores:       Bicep reflexes are 2+ on the right side and 2+ on the left side. Brachioradialis reflexes are 2+ on the right side and 2+ on the left side. Patellar reflexes are 2+ on the right side and 2+ on the left side. Psychiatric:         Mood and Affect: Mood normal.         Speech: Speech normal.         Behavior: Behavior normal.         Neurologic Exam     Mental Status   Follows 2 step commands. Attention: normal. Concentration: normal.   Speech: speech is normal   Level of consciousness: alert  Knowledge: good. Normal comprehension. Cranial Nerves     CN III, IV, VI   Extraocular motions are normal.   Conjugate gaze: present    CN VII   Facial expression full, symmetric. CN VIII   Hearing: intact    CN XI   Right trapezius strength: normal  Left trapezius strength: normal    Motor Exam   Muscle bulk: normal  Overall muscle tone: normal    Strength   Strength 5/5 except as noted.  Left  4+  IO 4  HF 4+     Sensory Exam   Light touch normal.   PP inconsistent     Gait, Coordination, and Reflexes     Tremor Resting tremor: absent  Intention tremor: absent  Action tremor: absent    Reflexes   Right brachioradialis: 2+  Left brachioradialis: 2+  Right biceps: 2+  Left biceps: 2+  Right patellar: 2+  Left patellar: 2+  Right Penn: absent  Left Penn: absent  Right ankle clonus: absent  Left ankle clonus: absent        MEDICAL DECISION MAKING    Imaging Studies:     MRI cervical spine    I have personally reviewed pertinent reports.    and I have personally reviewed pertinent films in PACS

## 2023-11-27 DIAGNOSIS — K25.3 ACUTE GASTRIC ULCER WITHOUT HEMORRHAGE OR PERFORATION: ICD-10-CM

## 2023-11-27 DIAGNOSIS — E11.9 TYPE 2 DIABETES MELLITUS WITHOUT COMPLICATION, WITHOUT LONG-TERM CURRENT USE OF INSULIN (HCC): ICD-10-CM

## 2023-11-27 RX ORDER — PANTOPRAZOLE SODIUM 40 MG/1
40 TABLET, DELAYED RELEASE ORAL DAILY
Qty: 30 TABLET | Refills: 5 | Status: SHIPPED | OUTPATIENT
Start: 2023-11-27

## 2023-11-27 RX ORDER — INSULIN GLARGINE 100 [IU]/ML
50 INJECTION, SOLUTION SUBCUTANEOUS EVERY 12 HOURS SCHEDULED
Qty: 30 ML | Refills: 1 | Status: SHIPPED | OUTPATIENT
Start: 2023-11-27

## 2023-12-06 DIAGNOSIS — E11.9 TYPE 2 DIABETES MELLITUS WITHOUT COMPLICATION, WITHOUT LONG-TERM CURRENT USE OF INSULIN (HCC): ICD-10-CM

## 2023-12-06 DIAGNOSIS — E13.9 DIABETES MELLITUS OF OTHER TYPE WITHOUT COMPLICATION, UNSPECIFIED WHETHER LONG TERM INSULIN USE (HCC): ICD-10-CM

## 2023-12-06 RX ORDER — LIRAGLUTIDE 6 MG/ML
1.8 INJECTION SUBCUTANEOUS DAILY
Qty: 9 ML | Refills: 5 | Status: SHIPPED | OUTPATIENT
Start: 2023-12-06

## 2023-12-26 DIAGNOSIS — E11.42 TYPE 2 DIABETES MELLITUS WITH DIABETIC POLYNEUROPATHY, WITH LONG-TERM CURRENT USE OF INSULIN (HCC): ICD-10-CM

## 2023-12-26 DIAGNOSIS — E13.9 DIABETES MELLITUS OF OTHER TYPE WITHOUT COMPLICATION, UNSPECIFIED WHETHER LONG TERM INSULIN USE (HCC): ICD-10-CM

## 2023-12-26 DIAGNOSIS — E11.9 TYPE 2 DIABETES MELLITUS WITHOUT COMPLICATION, WITHOUT LONG-TERM CURRENT USE OF INSULIN (HCC): ICD-10-CM

## 2023-12-26 DIAGNOSIS — Z79.4 TYPE 2 DIABETES MELLITUS WITH DIABETIC POLYNEUROPATHY, WITH LONG-TERM CURRENT USE OF INSULIN (HCC): ICD-10-CM

## 2023-12-26 DIAGNOSIS — J45.909 UNCOMPLICATED ASTHMA, UNSPECIFIED ASTHMA SEVERITY, UNSPECIFIED WHETHER PERSISTENT: ICD-10-CM

## 2023-12-27 RX ORDER — PEN NEEDLE, DIABETIC 32GX 5/32"
NEEDLE, DISPOSABLE MISCELLANEOUS
Qty: 100 EACH | Refills: 3 | Status: SHIPPED | OUTPATIENT
Start: 2023-12-27

## 2023-12-27 RX ORDER — ALBUTEROL SULFATE 90 UG/1
2 AEROSOL, METERED RESPIRATORY (INHALATION) EVERY 4 HOURS PRN
Qty: 18 G | Refills: 1 | Status: SHIPPED | OUTPATIENT
Start: 2023-12-27

## 2024-01-15 ENCOUNTER — TELEPHONE (OUTPATIENT)
Age: 59
End: 2024-01-15

## 2024-01-15 NOTE — TELEPHONE ENCOUNTER
Patient wife calling stating that patient has pink eye. Patient wife was told that she would need an appt and she stated that Dr. Vargas would  not want patient going in with pink eye to the office. I stated to wife that it is protocol for patient to have an appt so that he can be diagnosed. She then started saying how she does not like the access center, and that its ridiculous with the back and forth and that she has spoken to different patients and people from the office and that they have expressed their dislike of the new phone system. I tried to explain the best way of what our role is in the access center but unfortunately there was no success.   Raquel Thacker

## 2024-01-16 ENCOUNTER — TELEMEDICINE (OUTPATIENT)
Dept: FAMILY MEDICINE CLINIC | Facility: CLINIC | Age: 59
End: 2024-01-16
Payer: COMMERCIAL

## 2024-01-16 VITALS — BODY MASS INDEX: 33.86 KG/M2 | HEIGHT: 72 IN | WEIGHT: 250 LBS

## 2024-01-16 DIAGNOSIS — E11.610 CHARCOT FOOT DUE TO DIABETES MELLITUS (HCC): ICD-10-CM

## 2024-01-16 DIAGNOSIS — H10.33 ACUTE BACTERIAL CONJUNCTIVITIS OF BOTH EYES: Primary | ICD-10-CM

## 2024-01-16 PROCEDURE — 99213 OFFICE O/P EST LOW 20 MIN: CPT | Performed by: FAMILY MEDICINE

## 2024-01-16 RX ORDER — HYDROCODONE BITARTRATE AND ACETAMINOPHEN 5; 325 MG/1; MG/1
1 TABLET ORAL EVERY 6 HOURS PRN
Qty: 60 TABLET | Refills: 0 | Status: SHIPPED | OUTPATIENT
Start: 2024-01-16

## 2024-01-16 RX ORDER — OFLOXACIN 3 MG/ML
1 SOLUTION/ DROPS OPHTHALMIC 4 TIMES DAILY
Qty: 5 ML | Refills: 0 | Status: SHIPPED | OUTPATIENT
Start: 2024-01-16

## 2024-01-16 NOTE — PROGRESS NOTES
Virtual Regular Visit    Verification of patient location:    Patient is located at Home in the following state in which I hold an active license [place]      Assessment/Plan:    Problem List Items Addressed This Visit        Endocrine    Charcot foot due to diabetes mellitus (HCC)    Relevant Medications    HYDROcodone-acetaminophen (Norco) 5-325 mg per tablet       Other    Acute bacterial conjunctivitis of both eyes - Primary     - Patient will be placed on ofloxacin ophthalmic solution 1 drop both eyes 4 times daily    -Advised on using warm compresses to his eyes    -Definitely advised patient to stop taking the Bactrim DS tablets that were prescribed for his daughter    -Follow-up if no improvement or resolution of symptoms         Relevant Medications    ofloxacin (OCUFLOX) 0.3 % ophthalmic solution         Tobacco Cessation Counseling: Tobacco cessation counseling was provided. The patient is sincerely urged to quit consumption of tobacco. He is not ready to quit tobacco. Medication options and side effects of medication not discussed. Patient agreed to medication.         Reason for visit is   Chief Complaint   Patient presents with   • Conjunctivitis   • Virtual Regular Visit   • Virtual Regular Visit          Encounter provider Neftali Vargas DO    Provider located at [address] [place] [id number]      Recent Visits  No visits were found meeting these conditions.  Showing recent visits within past 7 days and meeting all other requirements  Today's Visits  Date Type Provider Dept   01/16/24 Telemedicine Neftali Vargas DO [facility]   Showing today's visits and meeting all other requirements  Future Appointments  No visits were found meeting these conditions.  Showing future appointments within next 150 days and meeting all other requirements       The patient was identified by name and date of birth. Renny Jean Baptiste Jr. was informed that this is  a telemedicine visit and that the visit is being conducted through the [employer] platform. He agrees to proceed..  My office door was closed. No one else was in the room.  He acknowledged consent and understanding of privacy and security of the video platform. The patient has agreed to participate and understands they can discontinue the visit at any time.    Patient is aware this is a billable service.     Subjective  Renny Jean Baptiste Jr. is a 58 y.o. male  .      58-year-old male presents to the office via video virtual visit for evaluation of redness, itching, crusting of bilateral eyes.  Grandchildren were sick with pinkeye and then patient's daughter and he also developed symptoms.  No ocular pain.  No significant blurring of vision.  Patient is insulin requiring diabetic.  No fevers or chills.  Crusting especially in the morning.  States that his daughter was prescribed Bactrim tablets by her physician and he had taken 2 days worth         Past Medical History:   Diagnosis Date   • Abscess of left thigh 05/11/2021   • Arthritis    • At risk for falls    • Biliary dyskinesia 03/16/2020   • Broken foot     right   • Cataract     giacomo   • Cellulitis of left lower extremity 04/26/2021   • Clear cell hidradenoma    • COPD (chronic obstructive pulmonary disease) (formerly [facility])    • Diabetes mellitus (formerly [facility])    • Dyskinesia of gallbladder 08/15/2022   • Hx MRSA infection     Per wife patient hadf MRSa in a wound awhile ago   • Hyperlipidemia    • Kidney stone    • Neuropathy    • RA (rheumatoid arthritis) (formerly [facility])    • Rheumatoid arthritis (formerly [facility])    • Seasonal allergies    • Sepsis (formerly [facility]) 04/26/2021   • Snores    • Uses wheelchair     and crutches- NWB RLE   • Wears glasses        Past Surgical History:   Procedure Laterality Date   • APPENDECTOMY     • CLOSED REDUCTION FOOT DISLOCATION Right 2/12/2019    Procedure: C/R FRACTURE;  Surgeon: Phillip Lentz DPM;  [facility];  Service: Podiatry   • COLONOSCOPY     • FOOT  SURGERY Right 07/2019    Removal of the bone graft and cleaned out infection, and placed new bone graft   • IR PICC PLACEMENT SINGLE LUMEN  8/5/2019   • NH DEBRIDEMENT SUBCUTANEOUS TISSUE 1ST 20 SQ CM/< Left 5/24/2021    Procedure: INCISION AND DRAINAGE THIGH;  Surgeon: Rom Figueroa MD;  [facility];  Service: Orthopedics   • NH EXCISION MALIGNANT LESION S/N/H/F/G 0.5 CM/< N/A 3/28/2018    Procedure: EXCISION WIDE LESION HEAD/FACIAL/NECK;  Surgeon: Dank Petersen MD;  [facility];  Service: Surgical Oncology   • NH GASTROCNEMIUS RECESSION Right 2/12/2019    Procedure: ENDO GASTROC RECESSION, APPLICATION OF EXTERNAL FIXATOR;  Surgeon: Phillip Lentz DPM;  [facility];  Service: Podiatry   • NH LAPAROSCOPY SURG CHOLECYSTECTOMY N/A 9/30/2022    Procedure: CHOLECYSTECTOMY LAPAROSCOPIC;  Surgeon: Pk Mayo MD;  [facility];  Service: General   • NH REMOVAL EXTERNAL FIXATION SYSTEM UNDER ANES Right 4/18/2019    Procedure: FRAME REMOVAL HARDWARE FOOT WITH APPLICATION OF GRAFT;  Surgeon: Phillip Lentz DPM;  [facility];  Service: Podiatry   • SKIN BIOPSY      scalp   • WISDOM TOOTH EXTRACTION  1998   • WOUND DEBRIDEMENT Left 4/29/2021    Procedure: KNEE INCISION AND DRAINAGE, EXCISIONAL DEBRIDEMENT OF PREPATELLAR BURSA;  Surgeon: Rom Figueroa MD;  [facility];  Service: Orthopedics       Current Outpatient Medications   Medication Sig Dispense Refill   • albuterol (PROVENTIL HFA,VENTOLIN HFA) 90 mcg/act inhaler INHALE 2 PUFFS EVERY 4 (FOUR) HOURS AS NEEDED FOR WHEEZING 18 g 1   • amitriptyline (ELAVIL) 10 mg tablet TAKE 1 TABLET (10 MG TOTAL) BY MOUTH DAILY AT BEDTIME 73 tablet 0   • Ascorbic Acid (vitamin C) 1000 MG tablet Take 1,000 mg by mouth daily     • atorvastatin (LIPITOR) 40 mg tablet TAKE 1 TABLET (40 MG TOTAL) BY MOUTH DAILY AT BEDTIME 90 tablet 1   • BD Pen Needle Maureen U/F 32G X 4 MM MISC ONE PEN NEEDLE THREE TIMES A DAY 2-BASAGLAR 1- VICTOZA  100 each 3   • calcium carbonate (OS-NERY) 600 MG tablet Take 600 mg by mouth daily with dinner     • Continuous Blood Gluc Sensor (FreeStyle Laura 2 Sensor) MISC Check blood sugars multiple times per day 6 each 3   • ergocalciferol (VITAMIN D2) 50,000 units Take 1 capsule (50,000 Units total) by mouth once a week Takes on Saturday 12 capsule 3   • fluticasone (FLONASE) 50 mcg/act nasal spray USE 2 SPRAYS IN EACH NOSTRIL ONCE DAILY 16 g 11   • HYDROcodone-acetaminophen (Norco) 5-325 mg per tablet Take 1 tablet by mouth every 6 (six) hours as needed for pain Max Daily Amount: 4 tablets 60 tablet 0   • Insulin Glargine Solostar (Basaglar KwikPen) 100 UNIT/ML SOPN INJECT 50 UNITS UNDER THE SKIN EVERY 12 (TWELVE) HOURS 30 mL 1   • liraglutide (Victoza) injection INJECT 0.3 ML (1.8 MG TOTAL) UNDER THE SKIN DAILY 9 mL 5   • losartan-hydrochlorothiazide (HYZAAR) 100-12.5 MG per tablet TAKE 1 TABLET BY MOUTH DAILY 90 tablet 1   • naproxen (NAPROSYN) 500 mg tablet TAKE 1 TABLET (500 MG TOTAL) BY MOUTH EVERY 12 (TWELVE) HOURS AS NEEDED FOR MILD PAIN (WITH FOOD) 60 tablet 2   • ofloxacin (OCUFLOX) 0.3 % ophthalmic solution Administer 1 drop to both eyes 4 (four) times a day 5 mL 0   • pantoprazole (PROTONIX) 40 mg tablet TAKE 1 TABLET (40 MG TOTAL) BY MOUTH DAILY 30 tablet 5   • saccharomyces boulardii (FLORASTOR) 250 mg capsule Take 1 capsule (250 mg total) by mouth 2 (two) times a day 60 capsule 0   • Tofacitinib Citrate ER (Xeljanz XR) 11 MG TB24 Take 1 tablet (11 mg total) by mouth daily 30 tablet 5   • Accu-Chek FastClix Lancets MISC  (Patient not taking: Reported on 11/13/2023)     • Accu-Chek FastClix Lancets MISC TEST BLOOD SUGAR FOUR TIMES A DAY BEFORE MEALS AND BEDTIME (Patient not taking: Reported on 11/13/2023) 102 each 3   • Blood Glucose Monitoring Suppl (OneTouch Verio Flex System) w/Device KIT Use 3 (three) times a day with meals (Patient not taking: Reported on 11/13/2023) 1 kit 0   • Diclofenac Sodium  (VOLTAREN) 1 % Apply 2 g topically 4 (four) times a day for 10 days (Patient not taking: Reported on 11/13/2023) 100 g 0   • hydroxychloroquine (PLAQUENIL) 200 mg tablet Take 1 tablet (200 mg total) by mouth 2 (two) times a day 180 tablet 1   • olopatadine (PATANOL) 0.1 % ophthalmic solution Administer 1 drop to both eyes in the morning and 1 drop in the evening. (Patient not taking: Reported on 11/13/2023) 5 mL 1   • OneTouch Verio test strip CHECK GLUCOSE BEFORE MEALS (Patient not taking: Reported on 11/13/2023) 100 strip 3   • sucralfate (CARAFATE) 1 g tablet TAKE 1 TABLET (1 G TOTAL) BY MOUTH 4 (FOUR) TIMES A DAY (Patient not taking: Reported on 11/13/2023) 360 tablet 0   • ULTICARE MICRO PEN NEEDLES 32G X 4 MM MISC INJECT UNDER THE SKIN 3 (THREE) TIMES A DAY (Patient not taking: Reported on 11/13/2023) 100 each 3     No current facility-administered medications for this visit.        Allergies   Allergen Reactions   • Other Other (See Comments)     Hay fever         Review of Systems   Constitutional:  Positive for fatigue. Negative for chills and fever.   HENT:  Negative for congestion, ear discharge, ear pain and rhinorrhea.    Eyes:  Positive for discharge, redness and itching. Negative for photophobia, pain and visual disturbance.   Respiratory:  Negative for cough and wheezing.    Musculoskeletal:  Negative for neck pain.   Skin:  Negative for rash.   Neurological:  Negative for headaches.       Video Exam    Vitals:    01/16/24 0947   Weight: 113 kg (250 lb)   Height: 6' (1.829 m)       Physical Exam  Vitals and nursing note reviewed.   Constitutional:       General: He is not in acute distress.     Appearance: He is well-developed. He is not ill-appearing.   HENT:      Head: Normocephalic and atraumatic.   Eyes:      General:         Right eye: No foreign body.         Left eye: No foreign body.      Extraocular Movements: Extraocular movements intact.      Conjunctiva/sclera:      Right eye: Right  conjunctiva is injected. No exudate or hemorrhage.     Left eye: Left conjunctiva is injected. No exudate or hemorrhage.     Pupils: Pupils are equal, round, and reactive to light.   Neurological:      Mental Status: He is alert.          Visit Time  Total Visit Duration: 8

## 2024-01-16 NOTE — ASSESSMENT & PLAN NOTE
- Patient will be placed on ofloxacin ophthalmic solution 1 drop both eyes 4 times daily    -Advised on using warm compresses to his eyes    -Definitely advised patient to stop taking the Bactrim DS tablets that were prescribed for his daughter    -Follow-up if no improvement or resolution of symptoms

## 2024-01-26 DIAGNOSIS — E11.9 TYPE 2 DIABETES MELLITUS WITHOUT COMPLICATION, WITHOUT LONG-TERM CURRENT USE OF INSULIN (HCC): ICD-10-CM

## 2024-01-26 DIAGNOSIS — M05.9 SEROPOSITIVE RHEUMATOID ARTHRITIS (HCC): ICD-10-CM

## 2024-01-26 RX ORDER — HYDROXYCHLOROQUINE SULFATE 200 MG/1
200 TABLET, FILM COATED ORAL 2 TIMES DAILY
Qty: 90 TABLET | Refills: 1 | Status: SHIPPED | OUTPATIENT
Start: 2024-01-26 | End: 2024-04-25

## 2024-01-26 RX ORDER — INSULIN GLARGINE 100 [IU]/ML
50 INJECTION, SOLUTION SUBCUTANEOUS EVERY 12 HOURS SCHEDULED
Qty: 30 ML | Refills: 1 | Status: SHIPPED | OUTPATIENT
Start: 2024-01-26

## 2024-02-08 ENCOUNTER — OFFICE VISIT (OUTPATIENT)
Dept: RHEUMATOLOGY | Facility: CLINIC | Age: 59
End: 2024-02-08

## 2024-02-08 VITALS
HEIGHT: 72 IN | SYSTOLIC BLOOD PRESSURE: 128 MMHG | WEIGHT: 260 LBS | DIASTOLIC BLOOD PRESSURE: 80 MMHG | BODY MASS INDEX: 35.21 KG/M2

## 2024-02-08 DIAGNOSIS — Z85.9 HISTORY OF CANCER: ICD-10-CM

## 2024-02-08 DIAGNOSIS — M05.9 SEROPOSITIVE RHEUMATOID ARTHRITIS (HCC): Primary | ICD-10-CM

## 2024-02-08 DIAGNOSIS — Z79.899 LONG-TERM USE OF PLAQUENIL: ICD-10-CM

## 2024-02-08 DIAGNOSIS — Z79.60 LONG-TERM USE OF IMMUNOSUPPRESSANT MEDICATION: ICD-10-CM

## 2024-02-08 DIAGNOSIS — Z79.899 HIGH RISK MEDICATION USE: ICD-10-CM

## 2024-02-08 RX ORDER — LEFLUNOMIDE 20 MG/1
20 TABLET ORAL DAILY
Qty: 90 TABLET | Refills: 2 | Status: SHIPPED | OUTPATIENT
Start: 2024-02-08

## 2024-02-08 NOTE — PROGRESS NOTES
Rheumatology Follow-up Visit  2/8/2024     CC: routine follow up     Permanent History: sero+ RA (, ACPA > 250). Had been on Humira in the past with no improvement. had good result w Enbrel in the past but stopped due to infection then when restarted had lost efficacy. Enbrel retried when established with Dr. Dobbs in 2022; not effective so Xeljanz started. Has also remained on hydroxychloroquine and LEF.    Other PMH tobacco use, HTN, T2DM, some sort of cancer of the head    Assessment: 58 y.o. male here for/with the above.    Will STOP Xeljanz due to cancer history and also not having enough effectiveness    Will precert for Cimzia as he had good response to a TNFi in the past    Will restart leflunomide; he says it was stopped when Xeljanz was started because he was told he wouldn't need both    Is going to get shingles vacc so will wait until 2 weeks after to start the above    Patient's rheumatologic disease(s) threaten long-term function if not appropriately treated.    Patient's rheumatologic medication(s) require intensive monitoring for toxicity.    Encounter Diagnosis     ICD-10-CM    1. Seropositive rheumatoid arthritis (HCC)  M05.9 leflunomide (ARAVA) 20 MG tablet      2. High risk medication use  Z79.899 CBC and differential     Comprehensive metabolic panel     CBC and differential     Comprehensive metabolic panel     Hepatitis B surface antibody     Hepatitis B core antibody, total     Hepatitis B surface antigen     Hepatitis C antibody     Quantiferon TB Gold Plus Assay      3. Long-term use of Plaquenil  Z79.899       4. History of cancer  Z85.9       5. Long-term use of immunosuppressant medication  Z79.60           Plan:  -As above, instructed not to start until 2 weeks after shingles vaccine  -RTC 4 mos or sooner PRN    Rm Kitchen DO, DIANDRA Kitchen DO, DIANDRA LARA Rheumatology      Interval History:     Past medical history, allergies, and family history reviewed. Active  medications and social history as below.     Outpatient Medications Marked as Taking for the 2/8/24 encounter (Office Visit) with Rm Kitchen DO   Medication    leflunomide (ARAVA) 20 MG tablet       Social History     Tobacco Use    Smoking status: Every Day     Current packs/day: 1.00     Average packs/day: 1 pack/day for 40.0 years (40.0 ttl pk-yrs)     Types: Cigarettes    Smokeless tobacco: Never   Vaping Use    Vaping status: Never Used   Substance Use Topics    Alcohol use: Yes     Comment: Socially    Drug use: Yes     Frequency: 7.0 times per week     Types: Marijuana     Comment: Daily for Pain       Review of Systems:  Pertinent findings documented in HPI  ___________________________________    Physical Exam:    /80   Ht 6' (1.829 m)   Wt 118 kg (260 lb)   BMI 35.26 kg/m²     General: Well appearing, in no distress.   Eyes: Sclera non-icteric. EOMI  HENT: No oral ulcers. MMM.   Extremities: Warm, well perfused, no edema.   Neuro: Alert and oriented. No gross focal neurological deficits.   Skin: Lac on dorsal L wrist  MSK exam: tenderness to palpation R wrist w synovitis, few PIPs w tenderness to palpation wo synovitis  CDAI   Swollen 1/28   Tender 3/28   Patient 6/10   Physician 6/10   Total 16/76     ____________________________    Lab Results: relevant labs reviewed    Imaging Results: relevant images reviewed

## 2024-02-08 NOTE — PATIENT INSTRUCTIONS
STOP Xeljanz    Two weeks ater you finish getting your shingles shots:  Please start leflunomide 20 mg daily    Get blood work (CBC, CMP) 2 and 4 weeks after starting leflunomide, then every 3 months after    I will send in a precert to your insurance for Cimzia

## 2024-02-16 ENCOUNTER — TELEPHONE (OUTPATIENT)
Dept: RHEUMATOLOGY | Facility: CLINIC | Age: 59
End: 2024-02-16

## 2024-02-16 NOTE — TELEPHONE ENCOUNTER
PA for Cimzia 200 MG Maintenance    Submitted via    [x]CMM-KEY [id number]  []Surescripts-Case ID #    []Faxed to plan   []Other website    []Phone call Case ID #      Office notes sent, clinical questions answered. Awaiting determination    Turnaround time for your insurance to make a decision on your Prior Authorization can take 7-21 business days.

## 2024-02-16 NOTE — TELEPHONE ENCOUNTER
PA for Cimzia Starter Kit    Submitted via    [x]CMM-KEY [id number]  [][employer]-Case ID #    []Faxed to plan   []Other website    []Phone call Case ID #      Office notes sent, clinical questions answered. Awaiting determination    Turnaround time for your insurance to make a decision on your Prior Authorization can take 7-21 business days.

## 2024-02-16 NOTE — TELEPHONE ENCOUNTER
Rheumatology Pre-Certification Request     Medication/Disease State Information:   Diagnosis:   Medication: Cimzia, subcutaneous,  Initial: 400 mg, repeat dose 2 and 4 weeks after initial dose; Maintenance: 200 mg every other week.   Failed or Intolerant to or Contraindicated: Eddie, Enbrel, hydroxychloroquine and leflunomide alone  Comments: Enbrel lost efficacy, Eddie contraindicated due to cancer history  Note: PLEASE SEND SEPARATE PRECERTS FOR LOADING DOSE AND MAINTENANCE DOSE    Rm Kitchen DO, CCD  NPI: [id number]  Lic: [id number]

## 2024-02-16 NOTE — TELEPHONE ENCOUNTER
[facility]     Marcelino De Jesus     Pharmacy called to let us know that they are discontinuing the following medication:  XELJANZ XR    Any concerns please call: [phone]

## 2024-02-19 NOTE — TELEPHONE ENCOUNTER
PA for Cimzia Starter Kit Denied    Reason:(Screenshot if applicable)          Message sent to office clinical pool Yes    Denial letter scanned into Media Yes    Appeal started No Awaiting Lab completion and results

## 2024-02-19 NOTE — TELEPHONE ENCOUNTER
PA for Cimzia Maintenace Dose Denied    Reason:(Screenshot if applicable)        Message sent to office clinical pool Yes    Denial letter scanned into Media Yes    Appeal started No Awaiting lab completion and results

## 2024-02-20 DIAGNOSIS — E55.9 VITAMIN D DEFICIENCY: ICD-10-CM

## 2024-02-20 DIAGNOSIS — M54.12 CERVICAL RADICULOPATHY: ICD-10-CM

## 2024-02-20 DIAGNOSIS — M05.9 SEROPOSITIVE RHEUMATOID ARTHRITIS (HCC): ICD-10-CM

## 2024-02-20 DIAGNOSIS — K25.3 ACUTE GASTRIC ULCER WITHOUT HEMORRHAGE OR PERFORATION: ICD-10-CM

## 2024-02-20 DIAGNOSIS — J45.909 UNCOMPLICATED ASTHMA, UNSPECIFIED ASTHMA SEVERITY, UNSPECIFIED WHETHER PERSISTENT: ICD-10-CM

## 2024-02-20 DIAGNOSIS — I10 ESSENTIAL HYPERTENSION: ICD-10-CM

## 2024-02-20 DIAGNOSIS — E78.2 MIXED HYPERLIPIDEMIA: ICD-10-CM

## 2024-02-20 DIAGNOSIS — Z79.899 HIGH RISK MEDICATION USE: ICD-10-CM

## 2024-02-20 RX ORDER — AMITRIPTYLINE HYDROCHLORIDE 10 MG/1
10 TABLET, FILM COATED ORAL
Qty: 60 TABLET | Refills: 5 | Status: SHIPPED | OUTPATIENT
Start: 2024-02-20

## 2024-02-20 RX ORDER — LOSARTAN POTASSIUM AND HYDROCHLOROTHIAZIDE 12.5; 1 MG/1; MG/1
1 TABLET ORAL DAILY
Qty: 90 TABLET | Refills: 1 | Status: SHIPPED | OUTPATIENT
Start: 2024-02-20

## 2024-02-20 RX ORDER — SUCRALFATE 1 G/1
1 TABLET ORAL 4 TIMES DAILY
Qty: 120 TABLET | Refills: 5 | Status: SHIPPED | OUTPATIENT
Start: 2024-02-20

## 2024-02-20 RX ORDER — NAPROXEN 500 MG/1
500 TABLET ORAL EVERY 12 HOURS PRN
Qty: 60 TABLET | Refills: 2 | Status: SHIPPED | OUTPATIENT
Start: 2024-02-20

## 2024-02-20 RX ORDER — ERGOCALCIFEROL 1.25 MG/1
50000 CAPSULE ORAL WEEKLY
Qty: 12 CAPSULE | Refills: 3 | Status: SHIPPED | OUTPATIENT
Start: 2024-02-20

## 2024-02-20 RX ORDER — ATORVASTATIN CALCIUM 40 MG/1
40 TABLET, FILM COATED ORAL
Qty: 90 TABLET | Refills: 1 | Status: SHIPPED | OUTPATIENT
Start: 2024-02-20

## 2024-02-20 RX ORDER — ALBUTEROL SULFATE 90 UG/1
2 AEROSOL, METERED RESPIRATORY (INHALATION) EVERY 4 HOURS PRN
Qty: 18 G | Refills: 1 | Status: SHIPPED | OUTPATIENT
Start: 2024-02-20

## 2024-02-20 NOTE — TELEPHONE ENCOUNTER
I ordered the labs at our visit, please remind him to get them done and let him know that the insurance won't approve his medication until they're done

## 2024-02-20 NOTE — TELEPHONE ENCOUNTER
Patient's wife states he was sick over the weekend and was unable to do bloodwork, but is going tomorrow morning to have it taken care of.

## 2024-02-21 PROBLEM — Z12.5 PROSTATE CANCER SCREENING: Status: RESOLVED | Noted: 2019-02-04 | Resolved: 2024-02-21

## 2024-02-21 PROBLEM — Z12.11 COLON CANCER SCREENING: Status: RESOLVED | Noted: 2018-09-12 | Resolved: 2024-02-21

## 2024-02-21 PROBLEM — H10.33 ACUTE BACTERIAL CONJUNCTIVITIS OF BOTH EYES: Status: RESOLVED | Noted: 2024-01-16 | Resolved: 2024-02-21

## 2024-02-26 ENCOUNTER — APPOINTMENT (OUTPATIENT)
Dept: LAB | Facility: CLINIC | Age: 59
End: 2024-02-26
Payer: COMMERCIAL

## 2024-02-26 DIAGNOSIS — Z79.4 TYPE 2 DIABETES MELLITUS WITH DIABETIC NEUROPATHIC ARTHROPATHY, WITH LONG-TERM CURRENT USE OF INSULIN (HCC): ICD-10-CM

## 2024-02-26 DIAGNOSIS — E11.610 TYPE 2 DIABETES MELLITUS WITH DIABETIC NEUROPATHIC ARTHROPATHY, WITH LONG-TERM CURRENT USE OF INSULIN (HCC): ICD-10-CM

## 2024-02-26 DIAGNOSIS — S49.91XA INJURY OF RIGHT SHOULDER, INITIAL ENCOUNTER: ICD-10-CM

## 2024-02-26 DIAGNOSIS — Z79.899 HIGH RISK MEDICATION USE: ICD-10-CM

## 2024-02-26 DIAGNOSIS — Z12.5 PROSTATE CANCER SCREENING: ICD-10-CM

## 2024-02-26 DIAGNOSIS — E55.9 VITAMIN D DEFICIENCY: ICD-10-CM

## 2024-02-26 DIAGNOSIS — E78.2 MIXED HYPERLIPIDEMIA: ICD-10-CM

## 2024-02-26 DIAGNOSIS — I10 BENIGN ESSENTIAL HYPERTENSION: ICD-10-CM

## 2024-02-26 LAB
ALBUMIN SERPL BCP-MCNC: 3.9 G/DL (ref 3.5–5)
ALP SERPL-CCNC: 56 U/L (ref 34–104)
ALT SERPL W P-5'-P-CCNC: 46 U/L (ref 7–52)
ANION GAP SERPL CALCULATED.3IONS-SCNC: 9 MMOL/L
AST SERPL W P-5'-P-CCNC: 33 U/L (ref 13–39)
BASOPHILS # BLD MANUAL: 0 THOUSAND/UL (ref 0–0.1)
BASOPHILS NFR MAR MANUAL: 0 % (ref 0–1)
BILIRUB SERPL-MCNC: 0.57 MG/DL (ref 0.2–1)
BUN SERPL-MCNC: 17 MG/DL (ref 5–25)
CALCIUM SERPL-MCNC: 9.2 MG/DL (ref 8.4–10.2)
CHLORIDE SERPL-SCNC: 98 MMOL/L (ref 96–108)
CO2 SERPL-SCNC: 27 MMOL/L (ref 21–32)
CREAT SERPL-MCNC: 1.03 MG/DL (ref 0.6–1.3)
CREAT UR-MCNC: 121.5 MG/DL
EOSINOPHIL # BLD MANUAL: 0.08 THOUSAND/UL (ref 0–0.4)
EOSINOPHIL NFR BLD MANUAL: 1 % (ref 0–6)
ERYTHROCYTE [DISTWIDTH] IN BLOOD BY AUTOMATED COUNT: 13 % (ref 11.6–15.1)
EST. AVERAGE GLUCOSE BLD GHB EST-MCNC: 212 MG/DL
GFR SERPL CREATININE-BSD FRML MDRD: 80 ML/MIN/1.73SQ M
GLUCOSE SERPL-MCNC: 325 MG/DL (ref 65–140)
HBA1C MFR BLD: 9 %
HBV CORE AB SER QL: NORMAL
HBV SURFACE AB SER-ACNC: <3 MIU/ML
HBV SURFACE AG SER QL: NORMAL
HCT VFR BLD AUTO: 46.5 % (ref 36.5–49.3)
HCV AB SER QL: NORMAL
HGB BLD-MCNC: 15.6 G/DL (ref 12–17)
LYMPHOCYTES # BLD AUTO: 1.01 THOUSAND/UL (ref 0.6–4.47)
LYMPHOCYTES # BLD AUTO: 11 % (ref 14–44)
MCH RBC QN AUTO: 30.4 PG (ref 26.8–34.3)
MCHC RBC AUTO-ENTMCNC: 33.5 G/DL (ref 31.4–37.4)
MCV RBC AUTO: 91 FL (ref 82–98)
METAMYELOCYTES NFR BLD MANUAL: 1 % (ref 0–1)
MICROALBUMIN UR-MCNC: <7 MG/L
MICROALBUMIN/CREAT 24H UR: <6 MG/G CREATININE (ref 0–30)
MONOCYTES # BLD AUTO: 0.5 THOUSAND/UL (ref 0–1.22)
MONOCYTES NFR BLD: 6 % (ref 4–12)
NEUTROPHILS # BLD MANUAL: 6.73 THOUSAND/UL (ref 1.85–7.62)
NEUTS BAND NFR BLD MANUAL: 1 % (ref 0–8)
NEUTS SEG NFR BLD AUTO: 79 % (ref 43–75)
PLATELET # BLD AUTO: 162 THOUSANDS/UL (ref 149–390)
PLATELET BLD QL SMEAR: ADEQUATE
PMV BLD AUTO: 11.1 FL (ref 8.9–12.7)
POTASSIUM SERPL-SCNC: 4.2 MMOL/L (ref 3.5–5.3)
PROT SERPL-MCNC: 6.5 G/DL (ref 6.4–8.4)
PSA SERPL-MCNC: 0.32 NG/ML (ref 0–4)
RBC # BLD AUTO: 5.14 MILLION/UL (ref 3.88–5.62)
RBC MORPH BLD: NORMAL
SODIUM SERPL-SCNC: 134 MMOL/L (ref 135–147)
TSH SERPL DL<=0.05 MIU/L-ACNC: 1.55 UIU/ML (ref 0.45–4.5)
VARIANT LYMPHS # BLD AUTO: 1 %
WBC # BLD AUTO: 8.41 THOUSAND/UL (ref 4.31–10.16)

## 2024-02-26 PROCEDURE — 82570 ASSAY OF URINE CREATININE: CPT

## 2024-02-26 PROCEDURE — 84443 ASSAY THYROID STIM HORMONE: CPT

## 2024-02-26 PROCEDURE — G0103 PSA SCREENING: HCPCS

## 2024-02-26 PROCEDURE — 36415 COLL VENOUS BLD VENIPUNCTURE: CPT

## 2024-02-26 PROCEDURE — 82652 VIT D 1 25-DIHYDROXY: CPT

## 2024-02-26 PROCEDURE — 83036 HEMOGLOBIN GLYCOSYLATED A1C: CPT

## 2024-02-26 PROCEDURE — 82043 UR ALBUMIN QUANTITATIVE: CPT

## 2024-02-26 PROCEDURE — 80053 COMPREHEN METABOLIC PANEL: CPT

## 2024-02-26 PROCEDURE — 87340 HEPATITIS B SURFACE AG IA: CPT

## 2024-02-26 PROCEDURE — 86803 HEPATITIS C AB TEST: CPT

## 2024-02-26 PROCEDURE — 85027 COMPLETE CBC AUTOMATED: CPT

## 2024-02-26 PROCEDURE — 86480 TB TEST CELL IMMUN MEASURE: CPT

## 2024-02-26 PROCEDURE — 85007 BL SMEAR W/DIFF WBC COUNT: CPT

## 2024-02-26 PROCEDURE — 86706 HEP B SURFACE ANTIBODY: CPT

## 2024-02-26 PROCEDURE — 86704 HEP B CORE ANTIBODY TOTAL: CPT

## 2024-02-27 LAB
1,25(OH)2D3 SERPL-MCNC: 35.6 PG/ML (ref 24.8–81.5)
GAMMA INTERFERON BACKGROUND BLD IA-ACNC: 0.07 IU/ML
M TB IFN-G BLD-IMP: NEGATIVE
M TB IFN-G CD4+ BCKGRND COR BLD-ACNC: -0.01 IU/ML
M TB IFN-G CD4+ BCKGRND COR BLD-ACNC: 0.01 IU/ML
MITOGEN IGNF BCKGRD COR BLD-ACNC: 9.93 IU/ML

## 2024-02-28 ENCOUNTER — TELEPHONE (OUTPATIENT)
Age: 59
End: 2024-02-28

## 2024-02-28 NOTE — TELEPHONE ENCOUNTER
Incoming call: Nimisha from [facility]     Re: Pt of Dr. Kitchen    Medication: Cimzia   Please send letter of medical necessity (in the meantime it was submitted for reconsideration)

## 2024-02-29 ENCOUNTER — TELEPHONE (OUTPATIENT)
Age: 59
End: 2024-02-29

## 2024-02-29 NOTE — TELEPHONE ENCOUNTER
Tammy from [facility] appeal department calling to inform that the appeal for Cimzia has been overturned and approved.    Also provided fax number for documents to be faxed over.      All questions answered. No further needs at the moment.

## 2024-02-29 NOTE — PROGRESS NOTES
Assessment  1. Cervical spinal stenosis    2. Cervical radiculopathy    3. Injury of right shoulder, initial encounter        Plan  The patient's symptoms, history/physical are consistent with pain as a result of his cervical spinal stenosis which is leading to right-sided radicular symptoms.  At this time, we discussed the recommendation to proceed with a cervical epidural injection to help reduce swelling/inflammation which is leading to pain symptoms.  We did discuss that his recent hemoglobin A1c was elevated so he would need to bring sugars down before proceeding with an epidural steroid injection.  He verbalized understanding and reports they will resume taking his diabetes medications.  He will be scheduled towards the end of the month to give him time to bring his sugars down.    Complete risks and benefits including bleeding, infection, tissue reaction, nerve injury and allergic reaction were discussed. The approach was demonstrated using models and literature was provided. Verbal and written consent was obtained.    My impressions and treatment recommendations were discussed in detail with the patient who verbalized understanding and had no further questions.  Discharge instructions were provided. I personally saw and examined the patient and I agree with the above discussed plan of care.    Orders Placed This Encounter   Procedures   • FL spine and pain procedure     Standing Status:   Future     Standing Expiration Date:   3/4/2028     Order Specific Question:   Reason for Exam:     Answer:   MAURICIO     Order Specific Question:   Anticoagulant hold needed?     Answer:   No     New Medications Ordered This Visit   Medications   • Diclofenac Sodium (VOLTAREN) 1 %     Sig: Apply 2 g topically 4 (four) times a day as needed (pain)     Dispense:  350 g     Refill:  2       History of Present Illness    Renny Jean Baptiste JrRanda is a 58 y.o. male referred by for neck pain and right-sided arm pain has been present  for more than 2 years.  He has a past medical history significant for rheumatoid arthritis and diabetes.  Pain symptoms are moderate rated 7/10 on numeric rating scale felt constantly described to be sharp, shooting, cramping with numbness and burning pain as well as tingling into the right shoulder and arm.  He gets symptoms on the left side as well.  Symptoms are aggravated turning his head, sleeping and lying down, standing, bending, walking, exercise.  Treatment history has included use of Vicodin which helps to take the edge off, naproxen which helps sometimes and marijuana which helps to relax his body and muscles.    I have personally reviewed and/or updated the patient's past medical history, past surgical history, family history, social history, current medications, allergies, and vital signs today.     Review of Systems   Constitutional:  Negative for fever and unexpected weight change.   HENT:  Negative for trouble swallowing.    Eyes:  Negative for visual disturbance.   Respiratory:  Negative for shortness of breath and wheezing.    Cardiovascular:  Negative for chest pain and palpitations.   Gastrointestinal:  Negative for constipation, diarrhea, nausea and vomiting.   Endocrine: Negative for cold intolerance, heat intolerance and polydipsia.   Genitourinary:  Negative for difficulty urinating and frequency.   Musculoskeletal:  Positive for neck pain. Negative for arthralgias, gait problem, joint swelling and myalgias.   Skin:  Negative for rash.   Neurological:  Positive for numbness and headaches. Negative for dizziness, seizures, syncope and weakness.   Hematological:  Does not bruise/bleed easily.   Psychiatric/Behavioral:  Negative for dysphoric mood.    All other systems reviewed and are negative.      Patient Active Problem List   Diagnosis   • Benign essential hypertension   • Carotid artery calcification   • Reduced libido   • Type 2 diabetes mellitus with diabetic neuropathic arthropathy, with  long-term current use of insulin ([facility])   • Mixed hyperlipidemia   • Obesity (BMI 30.0-34.9)   • Vitamin D deficiency   • Rheumatoid arthritis involving multiple sites with positive rheumatoid factor ([facility])   • Clear cell hidradenoma   • Charcot foot due to diabetes mellitus ([facility])   • Pre-op evaluation   • S/P foot surgery, right   • Tobacco use   • Physical exam, annual   • Chronic pain of both shoulders   • Gastroesophageal reflux disease without esophagitis   • Swelling of joint, knee, left   • Pain and swelling of left knee due to prepatellar bursitis   • Gastroparesis   • Right hip pain   • Osteoarthritis of spine with radiculopathy, cervical region   • History of cancer   • Long-term use of Plaquenil   • Seropositive rheumatoid arthritis ([facility])       Past Medical History:   Diagnosis Date   • Abscess of left thigh 05/11/2021   • Arthritis    • At risk for falls    • Biliary dyskinesia 03/16/2020   • Broken foot     right   • Cataract     giacomo   • Cellulitis of left lower extremity 04/26/2021   • Clear cell hidradenoma    • COPD (chronic obstructive pulmonary disease) ([facility])    • Diabetes mellitus ([facility])    • Dyskinesia of gallbladder 08/15/2022   • Hx MRSA infection     Per wife patient hadf MRSa in a wound awhile ago   • Hyperlipidemia    • Kidney stone    • Neuropathy    • RA (rheumatoid arthritis) ([facility])    • Rheumatoid arthritis ([facility])    • Seasonal allergies    • Sepsis ([facility]) 04/26/2021   • Snores    • Uses wheelchair     and crutches- NWB RLE   • Wears glasses        Past Surgical History:   Procedure Laterality Date   • APPENDECTOMY     • CLOSED REDUCTION FOOT DISLOCATION Right 2/12/2019    Procedure: C/R FRACTURE;  Surgeon: Phillip Lentz DPM;  [facility];  Service: Podiatry   • COLONOSCOPY     • FOOT SURGERY Right 07/2019    Removal of the bone graft and cleaned out infection, and placed new bone graft   • IR PICC PLACEMENT SINGLE LUMEN  8/5/2019   • MI DEBRIDEMENT SUBCUTANEOUS TISSUE 1ST 20 SQ  CM/< Left 5/24/2021    Procedure: INCISION AND DRAINAGE THIGH;  Surgeon: Rom Figueroa MD;  [facility];  Service: Orthopedics   • FL EXCISION MALIGNANT LESION S/N/H/F/G 0.5 CM/< N/A 3/28/2018    Procedure: EXCISION WIDE LESION HEAD/FACIAL/NECK;  Surgeon: Dank Petersen MD;  [facility];  Service: Surgical Oncology   • FL GASTROCNEMIUS RECESSION Right 2/12/2019    Procedure: ENDO GASTROC RECESSION, APPLICATION OF EXTERNAL FIXATOR;  Surgeon: Phillip Lentz DPM;  [facility];  Service: Podiatry   • FL LAPAROSCOPY SURG CHOLECYSTECTOMY N/A 9/30/2022    Procedure: CHOLECYSTECTOMY LAPAROSCOPIC;  Surgeon: Pk Mayo MD;  [facility];  Service: General   • FL REMOVAL EXTERNAL FIXATION SYSTEM UNDER ANES Right 4/18/2019    Procedure: FRAME REMOVAL HARDWARE FOOT WITH APPLICATION OF GRAFT;  Surgeon: Phillip Lentz DPM;  [facility];  Service: Podiatry   • SKIN BIOPSY      scalp   • WISDOM TOOTH EXTRACTION  1998   • WOUND DEBRIDEMENT Left 4/29/2021    Procedure: KNEE INCISION AND DRAINAGE, EXCISIONAL DEBRIDEMENT OF PREPATELLAR BURSA;  Surgeon: Rom Figueroa MD;  [facility];  Service: Orthopedics       Family History   Problem Relation Age of Onset   • Diabetes Mother    • Stroke Mother    • Mental illness Mother    • Diabetes Father    • Stroke Father    • Alcohol abuse Brother    • Coronary artery disease Family         Age 50-51   • Diabetes type II Family    • Heart disease Family        Social History     Occupational History   • Occupation:     Tobacco Use   • Smoking status: Every Day     Current packs/day: 1.00     Average packs/day: 1 pack/day for 40.0 years (40.0 ttl pk-yrs)     Types: Cigarettes   • Smokeless tobacco: Never   Vaping Use   • Vaping status: Never Used   Substance and Sexual Activity   • Alcohol use: Yes     Comment: Socially   • Drug use: Yes     Frequency: 7.0 times per week     Types: Marijuana     Comment: Daily for Pain   •  Sexual activity: Not on file       Current Outpatient Medications on File Prior to Visit   Medication Sig   • albuterol (PROVENTIL HFA,VENTOLIN HFA) 90 mcg/act inhaler INHALE 2 PUFFS EVERY 4 (FOUR) HOURS AS NEEDED FOR WHEEZING   • amitriptyline (ELAVIL) 10 mg tablet TAKE 1 TABLET (10 MG TOTAL) BY MOUTH DAILY AT BEDTIME   • Ascorbic Acid (vitamin C) 1000 MG tablet Take 1,000 mg by mouth daily   • atorvastatin (LIPITOR) 40 mg tablet TAKE 1 TABLET (40 MG TOTAL) BY MOUTH DAILY AT BEDTIME   • BD Pen Needle Maureen U/F 32G X 4 MM MISC ONE PEN NEEDLE THREE TIMES A DAY 2-BASAGLAR 1- VICTOZA   • calcium carbonate (OS-NERY) 600 MG tablet Take 600 mg by mouth daily with dinner   • Continuous Blood Gluc Sensor (FreeStyle Laura 2 Sensor) MISC Check blood sugars multiple times per day   • ergocalciferol (VITAMIN D2) 50,000 units TAKE 1 CAPSULE (50,000 UNITS TOTAL) BY MOUTH ONCE A WEEK TAKES ON SATURDAY   • fluticasone (FLONASE) 50 mcg/act nasal spray USE 2 SPRAYS IN EACH NOSTRIL ONCE DAILY   • HYDROcodone-acetaminophen (Norco) 5-325 mg per tablet Take 1 tablet by mouth every 6 (six) hours as needed for pain Max Daily Amount: 4 tablets   • hydroxychloroquine (PLAQUENIL) 200 mg tablet Take 1 tablet (200 mg total) by mouth 2 (two) times a day   • Insulin Glargine Solostar (Basaglar KwikPen) 100 UNIT/ML SOPN INJECT 50 UNITS UNDER THE SKIN EVERY 12 (TWELVE) HOURS   • leflunomide (ARAVA) 20 MG tablet Take 1 tablet (20 mg total) by mouth daily   • liraglutide (Victoza) injection INJECT 0.3 ML (1.8 MG TOTAL) UNDER THE SKIN DAILY   • losartan-hydrochlorothiazide (HYZAAR) 100-12.5 MG per tablet TAKE 1 TABLET BY MOUTH DAILY   • naproxen (NAPROSYN) 500 mg tablet TAKE 1 TABLET (500 MG TOTAL) BY MOUTH EVERY 12 (TWELVE) HOURS AS NEEDED FOR MILD PAIN (WITH FOOD)   • ofloxacin (OCUFLOX) 0.3 % ophthalmic solution Administer 1 drop to both eyes 4 (four) times a day   • pantoprazole (PROTONIX) 40 mg tablet TAKE 1 TABLET (40 MG TOTAL) BY MOUTH DAILY   •  saccharomyces boulardii (FLORASTOR) 250 mg capsule Take 1 capsule (250 mg total) by mouth 2 (two) times a day   • sucralfate (CARAFATE) 1 g tablet TAKE 1 TABLET (1 G TOTAL) BY MOUTH 4 (FOUR) TIMES A DAY   • [DISCONTINUED] Diclofenac Sodium (VOLTAREN) 1 % Apply 2 g topically 4 (four) times a day for 13 days   • Accu-Chek FastClix Lancets MISC  (Patient not taking: Reported on 11/13/2023)   • Accu-Chek FastClix Lancets MISC TEST BLOOD SUGAR FOUR TIMES A DAY BEFORE MEALS AND BEDTIME (Patient not taking: Reported on 11/13/2023)   • Blood Glucose Monitoring Suppl (OneTouch Verio Flex System) w/Device KIT Use 3 (three) times a day with meals (Patient not taking: Reported on 11/13/2023)   • olopatadine (PATANOL) 0.1 % ophthalmic solution Administer 1 drop to both eyes in the morning and 1 drop in the evening. (Patient not taking: Reported on 11/13/2023)   • OneTouch Verio test strip CHECK GLUCOSE BEFORE MEALS (Patient not taking: Reported on 11/13/2023)   • ULTICARE MICRO PEN NEEDLES 32G X 4 MM MISC INJECT UNDER THE SKIN 3 (THREE) TIMES A DAY (Patient not taking: Reported on 11/13/2023)     No current facility-administered medications on file prior to visit.       Allergies   Allergen Reactions   • Other Other (See Comments)     Hay fever         Physical Exam    Ht 6' (1.829 m)   Wt 120 kg (265 lb)   BMI 35.94 kg/m²     Constitutional: normal, well developed, well nourished, alert, in no distress and non-toxic and no overt pain behavior.  Eyes: anicteric  HEENT: grossly intact  Neck: supple, symmetric, trachea midline and no masses   Pulmonary:even and unlabored  Cardiovascular:No edema or pitting edema present  Skin:Normal without rashes or lesions and well hydrated  Psychiatric:Mood and affect appropriate  Neurologic:Cranial Nerves II-XII grossly intact  Musculoskeletal:normal    Cervical Spine Exam  Appearance:  Normal lordosis  Palpation/Tenderness:  right cervical paraspinal tenderness  right trapezium  tenderness  Range of Motion:  Flexion:  Minimally limited  with pain  Extension:  Moderately limited  with pain  Rotation - Left:  Minimally limited  with pain  Rotation - Right:  Minimally limited  with pain  Motor Strength:  Left Arm Flexion  5/5  Left Arm Extension  5/5  Right Arm Flexion  5/5  Right Arm Extension  5/5  Left Wrist Flexion  5/5  Left Wrist Extension  5/5  Left Finger Abduction  5/5  Right Finger Abduction  5/5  Left Pincer Grasp  5/5  Right Pincer Grasp  5/5    Imaging    Narrative & Impression   MRI CERVICAL SPINE WITHOUT CONTRAST (10/11/2023)     INDICATION: M05.9: Rheumatoid arthritis with rheumatoid factor, unspecified.     COMPARISON: 9/23/2022     TECHNIQUE:  Multiplanar, multisequence imaging of the cervical spine was performed. .        IMAGE QUALITY:  Diagnostic     FINDINGS:     ALIGNMENT:  Normal alignment of the cervical spine.  No compression fracture.  No subluxation.  No scoliosis.     MARROW SIGNAL:  Normal marrow signal is identified within the visualized bony structures.  No discrete marrow lesion.     CERVICAL AND VISUALIZED THORACIC CORD:  Normal signal within the visualized cord.     PREVERTEBRAL AND PARASPINAL SOFT TISSUES:  Normal.     VISUALIZED POSTERIOR FOSSA:  The visualized posterior fossa demonstrates no abnormal signal.     CERVICAL DISC SPACES:     C2-C3:  Normal.     C3-C4:  Normal.     C4-C5: There is a disc osteophyte complex with right-sided uncovertebral spurring. There is mild canal stenosis and flattening of the right aspect of the cord. There is severe right foraminal narrowing. There is no significant left foraminal narrowing.     C5-C6: There is a disc osteophyte complex with uncovertebral spurring. There is mild to moderate canal stenosis with mild cord flattening. There is moderate bilateral foraminal narrowing.     C6-C7: There is a left paracentral disc protrusion. There is no significant canal stenosis or foraminal narrowing.     C7-T1:  Normal.      UPPER THORACIC DISC SPACES:  Normal.     OTHER FINDINGS:  None.     IMPRESSION:     Multilevel cervical spondylosis, as described above.     Multifactorial disease results in overall mild cord flattening at C4-C5. Severe right foraminal narrowing is present at this level.     Mild cord flattening is also present at C5-C6. Moderate bilateral foraminal narrowing is present at this level.           Workstation performed: [id number]

## 2024-03-04 ENCOUNTER — CONSULT (OUTPATIENT)
Dept: PAIN MEDICINE | Facility: CLINIC | Age: 59
End: 2024-03-04
Payer: COMMERCIAL

## 2024-03-04 VITALS — WEIGHT: 265 LBS | BODY MASS INDEX: 35.89 KG/M2 | HEIGHT: 72 IN

## 2024-03-04 DIAGNOSIS — M48.02 CERVICAL SPINAL STENOSIS: Primary | ICD-10-CM

## 2024-03-04 DIAGNOSIS — M54.12 CERVICAL RADICULOPATHY: ICD-10-CM

## 2024-03-04 DIAGNOSIS — S49.91XA INJURY OF RIGHT SHOULDER, INITIAL ENCOUNTER: ICD-10-CM

## 2024-03-04 PROCEDURE — 99244 OFF/OP CNSLTJ NEW/EST MOD 40: CPT | Performed by: ANESTHESIOLOGY

## 2024-03-21 ENCOUNTER — TELEPHONE (OUTPATIENT)
Dept: NEUROSURGERY | Facility: CLINIC | Age: 59
End: 2024-03-21

## 2024-03-23 ENCOUNTER — LAB (OUTPATIENT)
Dept: LAB | Facility: CLINIC | Age: 59
End: 2024-03-23
Payer: COMMERCIAL

## 2024-03-23 DIAGNOSIS — M05.9 SEROPOSITIVE RHEUMATOID ARTHRITIS (HCC): ICD-10-CM

## 2024-03-23 DIAGNOSIS — Z79.899 HIGH RISK MEDICATION USE: ICD-10-CM

## 2024-03-23 LAB
ALBUMIN SERPL BCP-MCNC: 4.1 G/DL (ref 3.5–5)
ALP SERPL-CCNC: 59 U/L (ref 34–104)
ALT SERPL W P-5'-P-CCNC: 46 U/L (ref 7–52)
ANION GAP SERPL CALCULATED.3IONS-SCNC: 5 MMOL/L (ref 4–13)
AST SERPL W P-5'-P-CCNC: 29 U/L (ref 13–39)
BASOPHILS # BLD AUTO: 0.07 THOUSANDS/ÂΜL (ref 0–0.1)
BASOPHILS NFR BLD AUTO: 2 % (ref 0–1)
BILIRUB SERPL-MCNC: 0.68 MG/DL (ref 0.2–1)
BUN SERPL-MCNC: 15 MG/DL (ref 5–25)
CALCIUM SERPL-MCNC: 9.1 MG/DL (ref 8.4–10.2)
CHLORIDE SERPL-SCNC: 102 MMOL/L (ref 96–108)
CO2 SERPL-SCNC: 30 MMOL/L (ref 21–32)
CREAT SERPL-MCNC: 1.1 MG/DL (ref 0.6–1.3)
CRP SERPL QL: 1.4 MG/L
EOSINOPHIL # BLD AUTO: 0.17 THOUSAND/ÂΜL (ref 0–0.61)
EOSINOPHIL NFR BLD AUTO: 4 % (ref 0–6)
ERYTHROCYTE [DISTWIDTH] IN BLOOD BY AUTOMATED COUNT: 13.3 % (ref 11.6–15.1)
GFR SERPL CREATININE-BSD FRML MDRD: 74 ML/MIN/1.73SQ M
GLUCOSE P FAST SERPL-MCNC: 135 MG/DL (ref 65–99)
HCT VFR BLD AUTO: 49.8 % (ref 36.5–49.3)
HGB BLD-MCNC: 16.6 G/DL (ref 12–17)
IMM GRANULOCYTES # BLD AUTO: 0.02 THOUSAND/UL (ref 0–0.2)
IMM GRANULOCYTES NFR BLD AUTO: 0 % (ref 0–2)
LYMPHOCYTES # BLD AUTO: 1.3 THOUSANDS/ÂΜL (ref 0.6–4.47)
LYMPHOCYTES NFR BLD AUTO: 27 % (ref 14–44)
MCH RBC QN AUTO: 30 PG (ref 26.8–34.3)
MCHC RBC AUTO-ENTMCNC: 33.3 G/DL (ref 31.4–37.4)
MCV RBC AUTO: 90 FL (ref 82–98)
MONOCYTES # BLD AUTO: 0.94 THOUSAND/ÂΜL (ref 0.17–1.22)
MONOCYTES NFR BLD AUTO: 20 % (ref 4–12)
NEUTROPHILS # BLD AUTO: 2.3 THOUSANDS/ÂΜL (ref 1.85–7.62)
NEUTS SEG NFR BLD AUTO: 47 % (ref 43–75)
NRBC BLD AUTO-RTO: 0 /100 WBCS
PLATELET # BLD AUTO: 172 THOUSANDS/UL (ref 149–390)
PMV BLD AUTO: 10.9 FL (ref 8.9–12.7)
POTASSIUM SERPL-SCNC: 4.2 MMOL/L (ref 3.5–5.3)
PROT SERPL-MCNC: 6.8 G/DL (ref 6.4–8.4)
RBC # BLD AUTO: 5.54 MILLION/UL (ref 3.88–5.62)
SODIUM SERPL-SCNC: 137 MMOL/L (ref 135–147)
WBC # BLD AUTO: 4.8 THOUSAND/UL (ref 4.31–10.16)

## 2024-03-23 PROCEDURE — 80053 COMPREHEN METABOLIC PANEL: CPT

## 2024-03-23 PROCEDURE — 86140 C-REACTIVE PROTEIN: CPT

## 2024-03-23 PROCEDURE — 36415 COLL VENOUS BLD VENIPUNCTURE: CPT

## 2024-03-23 PROCEDURE — 85025 COMPLETE CBC W/AUTO DIFF WBC: CPT

## 2024-03-25 DIAGNOSIS — E11.9 TYPE 2 DIABETES MELLITUS WITHOUT COMPLICATION, WITHOUT LONG-TERM CURRENT USE OF INSULIN (HCC): ICD-10-CM

## 2024-03-26 ENCOUNTER — HOSPITAL ENCOUNTER (OUTPATIENT)
Dept: RADIOLOGY | Facility: CLINIC | Age: 59
Discharge: HOME | End: 2024-03-26
Payer: COMMERCIAL

## 2024-03-26 VITALS
SYSTOLIC BLOOD PRESSURE: 134 MMHG | HEART RATE: 89 BPM | TEMPERATURE: 98 F | OXYGEN SATURATION: 95 % | DIASTOLIC BLOOD PRESSURE: 73 MMHG | RESPIRATION RATE: 20 BRPM

## 2024-03-26 DIAGNOSIS — M48.02 CERVICAL SPINAL STENOSIS: ICD-10-CM

## 2024-03-26 DIAGNOSIS — M54.12 CERVICAL RADICULOPATHY: ICD-10-CM

## 2024-03-26 PROCEDURE — 62321 NJX INTERLAMINAR CRV/THRC: CPT | Performed by: ANESTHESIOLOGY

## 2024-03-26 RX ORDER — INSULIN GLARGINE 100 [IU]/ML
50 INJECTION, SOLUTION SUBCUTANEOUS EVERY 12 HOURS SCHEDULED
Qty: 30 ML | Refills: 1 | Status: SHIPPED | OUTPATIENT
Start: 2024-03-26 | End: 2024-03-28 | Stop reason: SDUPTHER

## 2024-03-26 RX ORDER — METHYLPREDNISOLONE ACETATE 80 MG/ML
80 INJECTION, SUSPENSION INTRA-ARTICULAR; INTRALESIONAL; INTRAMUSCULAR; PARENTERAL; SOFT TISSUE ONCE
Status: COMPLETED | OUTPATIENT
Start: 2024-03-26 | End: 2024-03-26

## 2024-03-26 RX ADMIN — IOHEXOL 1 ML: 300 INJECTION, SOLUTION INTRAVENOUS at 14:03

## 2024-03-26 RX ADMIN — METHYLPREDNISOLONE ACETATE 80 MG: 80 INJECTION, SUSPENSION INTRA-ARTICULAR; INTRALESIONAL; INTRAMUSCULAR; PARENTERAL; SOFT TISSUE at 14:03

## 2024-03-26 NOTE — H&P
History of Present Illness: The patient is a 58 y.o. male who presents with complaints of neck pain is here today for cervical epidural steroid injection    Past Medical History:   Diagnosis Date    Abscess of left thigh 05/11/2021    Arthritis     At risk for falls     Biliary dyskinesia 03/16/2020    Broken foot     right    Cataract     giacomo    Cellulitis of left lower extremity 04/26/2021    Clear cell hidradenoma     COPD (chronic obstructive pulmonary disease) ([facility])     Diabetes mellitus (HCC)     Dyskinesia of gallbladder 08/15/2022    Hx MRSA infection     Per wife patient hadf MRSa in a wound awhile ago    Hyperlipidemia     Kidney stone     Neuropathy     RA (rheumatoid arthritis) ([facility])     Rheumatoid arthritis (HCC)     Seasonal allergies     Sepsis ([facility]) 04/26/2021    Snores     Uses wheelchair     and crutches- NWB RLE    Wears glasses        Past Surgical History:   Procedure Laterality Date    APPENDECTOMY      CLOSED REDUCTION FOOT DISLOCATION Right 2/12/2019    Procedure: C/R FRACTURE;  Surgeon: Phillip Lentz DPM;  [facility];  Service: Podiatry    COLONOSCOPY      FOOT SURGERY Right 07/2019    Removal of the bone graft and cleaned out infection, and placed new bone graft    IR PICC PLACEMENT SINGLE LUMEN  8/5/2019    KS DEBRIDEMENT SUBCUTANEOUS TISSUE 1ST 20 SQ CM/< Left 5/24/2021    Procedure: INCISION AND DRAINAGE THIGH;  Surgeon: Rom Figueroa MD;  [facility];  Service: Orthopedics    KS EXCISION MALIGNANT LESION S/N/H/F/G 0.5 CM/< N/A 3/28/2018    Procedure: EXCISION WIDE LESION HEAD/FACIAL/NECK;  Surgeon: Dank Petersen MD;  [facility];  Service: Surgical Oncology    KS GASTROCNEMIUS RECESSION Right 2/12/2019    Procedure: ENDO GASTROC RECESSION, APPLICATION OF EXTERNAL FIXATOR;  Surgeon: Phillip Lentz DPM;  [facility];  Service: Podiatry    KS LAPAROSCOPY SURG CHOLECYSTECTOMY N/A 9/30/2022    Procedure: CHOLECYSTECTOMY LAPAROSCOPIC;  Surgeon: Pk  Jefferson Mayo MD;  [facility];  Service: General    AR REMOVAL EXTERNAL FIXATION SYSTEM UNDER ANES Right 4/18/2019    Procedure: FRAME REMOVAL HARDWARE FOOT WITH APPLICATION OF GRAFT;  Surgeon: Phillip Lentz DPM;  [facility];  Service: Podiatry    SKIN BIOPSY      scalp    WISDOM TOOTH EXTRACTION  1998    WOUND DEBRIDEMENT Left 4/29/2021    Procedure: KNEE INCISION AND DRAINAGE, EXCISIONAL DEBRIDEMENT OF PREPATELLAR BURSA;  Surgeon: Rom Figueroa MD;  [facility];  Service: Orthopedics         Current Outpatient Medications:     Accu-Chek FastClix Lancets MISC, , Disp: , Rfl:     Accu-Chek FastClix Lancets MISC, TEST BLOOD SUGAR FOUR TIMES A DAY BEFORE MEALS AND BEDTIME (Patient not taking: Reported on 11/13/2023), Disp: 102 each, Rfl: 3    albuterol (PROVENTIL HFA,VENTOLIN HFA) 90 mcg/act inhaler, INHALE 2 PUFFS EVERY 4 (FOUR) HOURS AS NEEDED FOR WHEEZING, Disp: 18 g, Rfl: 1    amitriptyline (ELAVIL) 10 mg tablet, TAKE 1 TABLET (10 MG TOTAL) BY MOUTH DAILY AT BEDTIME, Disp: 60 tablet, Rfl: 5    Ascorbic Acid (vitamin C) 1000 MG tablet, Take 1,000 mg by mouth daily, Disp: , Rfl:     atorvastatin (LIPITOR) 40 mg tablet, TAKE 1 TABLET (40 MG TOTAL) BY MOUTH DAILY AT BEDTIME, Disp: 90 tablet, Rfl: 1    BD Pen Needle Maureen U/F 32G X 4 MM MISC, ONE PEN NEEDLE THREE TIMES A DAY 2-BASAGLAR 1- VICTOZA, Disp: 100 each, Rfl: 3    Blood Glucose Monitoring Suppl (OneTouch Verio Flex System) w/Device KIT, Use 3 (three) times a day with meals (Patient not taking: Reported on 11/13/2023), Disp: 1 kit, Rfl: 0    calcium carbonate (OS-NERY) 600 MG tablet, Take 600 mg by mouth daily with dinner, Disp: , Rfl:     Continuous Blood Gluc Sensor (FreeStyle Laura 2 Sensor) MISC, Check blood sugars multiple times per day, Disp: 6 each, Rfl: 3    Diclofenac Sodium (VOLTAREN) 1 %, Apply 2 g topically 4 (four) times a day as needed (pain), Disp: 350 g, Rfl: 2    ergocalciferol (VITAMIN D2) 50,000 units, TAKE 1  CAPSULE (50,000 UNITS TOTAL) BY MOUTH ONCE A WEEK TAKES ON SATURDAY, Disp: 12 capsule, Rfl: 3    fluticasone (FLONASE) 50 mcg/act nasal spray, USE 2 SPRAYS IN EACH NOSTRIL ONCE DAILY, Disp: 16 g, Rfl: 11    HYDROcodone-acetaminophen (Norco) 5-325 mg per tablet, Take 1 tablet by mouth every 6 (six) hours as needed for pain Max Daily Amount: 4 tablets, Disp: 60 tablet, Rfl: 0    hydroxychloroquine (PLAQUENIL) 200 mg tablet, Take 1 tablet (200 mg total) by mouth 2 (two) times a day, Disp: 90 tablet, Rfl: 1    Insulin Glargine Solostar (Basaglar KwikPen) 100 UNIT/ML SOPN, INJECT 50 UNITS UNDER THE SKIN EVERY 12 (TWELVE) HOURS, Disp: 30 mL, Rfl: 1    leflunomide (ARAVA) 20 MG tablet, Take 1 tablet (20 mg total) by mouth daily, Disp: 90 tablet, Rfl: 2    liraglutide (Victoza) injection, INJECT 0.3 ML (1.8 MG TOTAL) UNDER THE SKIN DAILY, Disp: 9 mL, Rfl: 5    losartan-hydrochlorothiazide (HYZAAR) 100-12.5 MG per tablet, TAKE 1 TABLET BY MOUTH DAILY, Disp: 90 tablet, Rfl: 1    naproxen (NAPROSYN) 500 mg tablet, TAKE 1 TABLET (500 MG TOTAL) BY MOUTH EVERY 12 (TWELVE) HOURS AS NEEDED FOR MILD PAIN (WITH FOOD), Disp: 60 tablet, Rfl: 2    ofloxacin (OCUFLOX) 0.3 % ophthalmic solution, Administer 1 drop to both eyes 4 (four) times a day, Disp: 5 mL, Rfl: 0    olopatadine (PATANOL) 0.1 % ophthalmic solution, Administer 1 drop to both eyes in the morning and 1 drop in the evening. (Patient not taking: Reported on 11/13/2023), Disp: 5 mL, Rfl: 1    OneTouch Verio test strip, CHECK GLUCOSE BEFORE MEALS (Patient not taking: Reported on 11/13/2023), Disp: 100 strip, Rfl: 3    pantoprazole (PROTONIX) 40 mg tablet, TAKE 1 TABLET (40 MG TOTAL) BY MOUTH DAILY, Disp: 30 tablet, Rfl: 5    saccharomyces boulardii (FLORASTOR) 250 mg capsule, Take 1 capsule (250 mg total) by mouth 2 (two) times a day, Disp: 60 capsule, Rfl: 0    sucralfate (CARAFATE) 1 g tablet, TAKE 1 TABLET (1 G TOTAL) BY MOUTH 4 (FOUR) TIMES A DAY, Disp: 120 tablet, Rfl:  5    ULTICARE MICRO PEN NEEDLES 32G X 4 MM MISC, INJECT UNDER THE SKIN 3 (THREE) TIMES A DAY (Patient not taking: Reported on 11/13/2023), Disp: 100 each, Rfl: 3    Current Facility-Administered Medications:     iohexol (OMNIPAQUE) 300 mg/mL injection 1 mL, 1 mL, Epidural, Once, Jasper Mata MD    methylPREDNISolone acetate (DEPO-MEDROL) injection 80 mg, 80 mg, Epidural, Once, Jasper Mata MD    Allergies   Allergen Reactions    Other Other (See Comments)     Hay fever         Physical Exam:   Vitals:    03/26/24 1343   BP: 119/76   Pulse: 93   Resp: 20   Temp: 98 °F (36.7 °C)   SpO2: 92%     General: Awake, Alert, Oriented x 3, Mood and affect appropriate  Respiratory: Respirations even and unlabored  Cardiovascular: Peripheral pulses intact; no edema  Musculoskeletal Exam: Neck pain    ASA Score: 3    Patient/Chart Verification  Patient ID Verified: Verbal  Consents Confirmed: To be obtained in the Pre-Procedure area  Interval H&P(within 24 hr) Complete (required for Outpatients and Surgery Admit only): To be obtained in the Pre-Procedure area  Allergies Reviewed: Yes  Anticoag/NSAID held?: NA  Currently on antibiotics?: No    Assessment:   1. Cervical radiculopathy    2. Cervical spinal stenosis        Plan: MAURICIO     69

## 2024-03-26 NOTE — DISCHARGE INSTR - LAB
Epidural Steroid Injection   WHAT YOU NEED TO KNOW:   An epidural steroid injection (LACY) is a procedure to inject steroid medicine into the epidural space. The epidural space is between your spinal cord and vertebrae. Steroids reduce inflammation and fluid buildup in your spine that may be causing pain. You may be given pain medicine along with the steroids.          ACTIVITY  Do not drive or operate machinery today.  No strenuous activity today - bending, lifting, etc.  You may resume normal activites starting tomorrow - start slowly and as tolerated.  You may shower today, but no tub baths or hot tubs.  You may have numbness for several hours from the local anesthetic. Please use caution and common sense, especially with weight-bearing activities.    CARE OF THE INJECTION SITE  If you have soreness or pain, apply ice to the area today (20 minutes on/20 minutes off).  Starting tomorrow, you may use warm, moist heat or ice if needed.  You may have an increase or change in your discomfort for 36-48 hours after your treatment.  Apply ice and continue with any pain medication you have been prescribed.  Notify the [facility] if you have any of the following: redness, drainage, swelling, headache, stiff neck or fever above 100°F.    SPECIAL INSTRUCTIONS  Our office will contact you in approximately 7 days for a progress report.    MEDICATIONS  Continue to take all routine medications.  Our office may have instructed you to hold some medications.    As no general anesthesia was used in today's procedure, you should not experience any side effects related to anesthesia.     If you are diabetic, the steroids used in today's injection may temporarily increase your blood sugar levels after the first few days after your injection. Please keep a close eye on your sugars and alert the doctor who manages your diabetes if your sugars are significantly high from your baseline or you are symptomatic.     If you have a  problem specifically related to your procedure, please call our office at [phone].  Problems not related to your procedure should be directed to your primary care physician.

## 2024-03-28 ENCOUNTER — OFFICE VISIT (OUTPATIENT)
Dept: FAMILY MEDICINE CLINIC | Facility: CLINIC | Age: 59
End: 2024-03-28
Payer: COMMERCIAL

## 2024-03-28 VITALS
SYSTOLIC BLOOD PRESSURE: 138 MMHG | DIASTOLIC BLOOD PRESSURE: 70 MMHG | WEIGHT: 252 LBS | BODY MASS INDEX: 34.13 KG/M2 | OXYGEN SATURATION: 97 % | HEART RATE: 88 BPM | RESPIRATION RATE: 16 BRPM | HEIGHT: 72 IN

## 2024-03-28 DIAGNOSIS — E11.9 TYPE 2 DIABETES MELLITUS WITHOUT COMPLICATION, WITHOUT LONG-TERM CURRENT USE OF INSULIN (HCC): Primary | ICD-10-CM

## 2024-03-28 DIAGNOSIS — M05.9 SEROPOSITIVE RHEUMATOID ARTHRITIS (HCC): ICD-10-CM

## 2024-03-28 DIAGNOSIS — M48.02 CERVICAL SPINAL STENOSIS: ICD-10-CM

## 2024-03-28 DIAGNOSIS — E66.9 OBESITY (BMI 30.0-34.9): ICD-10-CM

## 2024-03-28 DIAGNOSIS — E55.9 VITAMIN D DEFICIENCY: ICD-10-CM

## 2024-03-28 DIAGNOSIS — D23.9 CLEAR CELL HIDRADENOMA: ICD-10-CM

## 2024-03-28 DIAGNOSIS — K62.9 ANAL LESION: ICD-10-CM

## 2024-03-28 DIAGNOSIS — I10 BENIGN ESSENTIAL HYPERTENSION: ICD-10-CM

## 2024-03-28 DIAGNOSIS — E11.610 TYPE 2 DIABETES MELLITUS WITH DIABETIC NEUROPATHIC ARTHROPATHY, WITH LONG-TERM CURRENT USE OF INSULIN (HCC): ICD-10-CM

## 2024-03-28 DIAGNOSIS — E78.2 MIXED HYPERLIPIDEMIA: ICD-10-CM

## 2024-03-28 DIAGNOSIS — E11.610 CHARCOT FOOT DUE TO DIABETES MELLITUS (HCC): ICD-10-CM

## 2024-03-28 DIAGNOSIS — Z79.4 TYPE 2 DIABETES MELLITUS WITH DIABETIC NEUROPATHIC ARTHROPATHY, WITH LONG-TERM CURRENT USE OF INSULIN (HCC): ICD-10-CM

## 2024-03-28 PROCEDURE — 99215 OFFICE O/P EST HI 40 MIN: CPT | Performed by: FAMILY MEDICINE

## 2024-03-28 RX ORDER — HYDROCODONE BITARTRATE AND ACETAMINOPHEN 5; 325 MG/1; MG/1
1 TABLET ORAL EVERY 6 HOURS PRN
Qty: 60 TABLET | Refills: 0 | Status: SHIPPED | OUTPATIENT
Start: 2024-03-28

## 2024-03-28 RX ORDER — INSULIN GLARGINE 100 [IU]/ML
45 INJECTION, SOLUTION SUBCUTANEOUS EVERY 12 HOURS SCHEDULED
Qty: 15 ML | Refills: 3
Start: 2024-03-28

## 2024-03-28 NOTE — PROGRESS NOTES
Subjective:      Patient ID: Renny Jean Baptiste Jr. is a 58 y.o. male.    58-year-old male with past medical history of diabetes mellitus type 2 that is insulin requiring, COPD, chronic tobacco abuse, rheumatoid arthritis,  diabetic neuropathy, hypertension presents with his wife for follow-up of chronic conditions.  Patient apparently is looking towards having another reconstruction done on his right foot for Charcot foot.  Podiatrist has been following him closely.  Does have episodes of pain in his right foot as well as right hip.  Reviewed labs which showed significant increase in hemoglobin A1c.  Patient had stopped taking Victoza altogether because he felt that he was getting hypoglycemic in the evening.  Continuous glucose monitor would alert him of low reading sometimes in the 30s and when he would have to drink or eat something very quickly.  He is on very high-dose Basaglar 50 units twice daily.  Hemoglobin A1c increased from 6.8 to 9.0%.  Patient does complain of raised lesions on his scalp.  Had prior questionable history of clear-cell hidradenoma for which she was seen by surgical oncology then also seen by dermatology.  No follow-up scheduled with dermatology or surgical oncology.  Patient also complains of episodes of rectal bleeding with palpable lesions in the perirectal area which she thinks are skin tags.  CMP showed fasting glucose of 135, C-reactive protein 1.4, normal CBC.  He is following up with rheumatology for rheumatoid arthritis        Past Medical History:   Diagnosis Date   • Abscess of left thigh 05/11/2021   • Arthritis    • At risk for falls    • Biliary dyskinesia 03/16/2020   • Broken foot     right   • Cataract     giacomo   • Cellulitis of left lower extremity 04/26/2021   • Clear cell hidradenoma    • COPD (chronic obstructive pulmonary disease) (HCC)    • Diabetes mellitus (HCC)    • Dyskinesia of gallbladder 08/15/2022   • Hx MRSA infection     Per wife patient hadf MRSa in a  wound awhile ago   • Hyperlipidemia    • Kidney stone    • Neuropathy    • RA (rheumatoid arthritis) (HCC)    • Rheumatoid arthritis (HCC)    • Seasonal allergies    • Sepsis (HCC) 04/26/2021   • Snores    • Uses wheelchair     and crutches- NWB RLE   • Wears glasses        Family History   Problem Relation Age of Onset   • Diabetes Mother    • Stroke Mother    • Mental illness Mother    • Diabetes Father    • Stroke Father    • Alcohol abuse Brother    • Coronary artery disease Family         Age 50-51   • Diabetes type II Family    • Heart disease Family        Past Surgical History:   Procedure Laterality Date   • APPENDECTOMY     • CLOSED REDUCTION FOOT DISLOCATION Right 2/12/2019    Procedure: C/R FRACTURE;  Surgeon: Phillip Lentz DPM;  [facility];  Service: Podiatry   • COLONOSCOPY     • FOOT SURGERY Right 07/2019    Removal of the bone graft and cleaned out infection, and placed new bone graft   • IR PICC PLACEMENT SINGLE LUMEN  8/5/2019   • AK DEBRIDEMENT SUBCUTANEOUS TISSUE 1ST 20 SQ CM/< Left 5/24/2021    Procedure: INCISION AND DRAINAGE THIGH;  Surgeon: Rom Figueroa MD;  [facility];  Service: Orthopedics   • AK EXCISION MALIGNANT LESION S/N/H/F/G 0.5 CM/< N/A 3/28/2018    Procedure: EXCISION WIDE LESION HEAD/FACIAL/NECK;  Surgeon: Dank Petersen MD;  [facility];  Service: Surgical Oncology   • AK GASTROCNEMIUS RECESSION Right 2/12/2019    Procedure: ENDO GASTROC RECESSION, APPLICATION OF EXTERNAL FIXATOR;  Surgeon: Phillip Lentz DPM;  [facility];  Service: Podiatry   • AK LAPAROSCOPY SURG CHOLECYSTECTOMY N/A 9/30/2022    Procedure: CHOLECYSTECTOMY LAPAROSCOPIC;  Surgeon: Pk Mayo MD;  [facility];  Service: General   • AK REMOVAL EXTERNAL FIXATION SYSTEM UNDER ANES Right 4/18/2019    Procedure: FRAME REMOVAL HARDWARE FOOT WITH APPLICATION OF GRAFT;  Surgeon: Phillip Lentz DPM;  [facility];  Service: Podiatry   • SKIN BIOPSY       scalp   • WISDOM TOOTH EXTRACTION  1998   • WOUND DEBRIDEMENT Left 4/29/2021    Procedure: KNEE INCISION AND DRAINAGE, EXCISIONAL DEBRIDEMENT OF PREPATELLAR BURSA;  Surgeon: Rom Figueroa MD;  [facility];  Service: Orthopedics        reports that he has been smoking cigarettes. He has a 40 pack-year smoking history. He has never used smokeless tobacco. He reports current alcohol use. He reports current drug use. Frequency: 7.00 times per week. Drug: Marijuana.      Current Outpatient Medications:   •  HYDROcodone-acetaminophen (Norco) 5-325 mg per tablet, Take 1 tablet by mouth every 6 (six) hours as needed for pain Max Daily Amount: 4 tablets, Disp: 60 tablet, Rfl: 0  •  Insulin Glargine Solostar (Basaglar KwikPen) 100 UNIT/ML SOPN, Inject 0.45 mL (45 Units total) under the skin every 12 (twelve) hours, Disp: 15 mL, Rfl: 3  •  Accu-Chek FastClix Lancets MISC, , Disp: , Rfl:   •  Accu-Chek FastClix Lancets MISC, TEST BLOOD SUGAR FOUR TIMES A DAY BEFORE MEALS AND BEDTIME (Patient not taking: Reported on 11/13/2023), Disp: 102 each, Rfl: 3  •  albuterol (PROVENTIL HFA,VENTOLIN HFA) 90 mcg/act inhaler, INHALE 2 PUFFS EVERY 4 (FOUR) HOURS AS NEEDED FOR WHEEZING, Disp: 18 g, Rfl: 1  •  amitriptyline (ELAVIL) 10 mg tablet, TAKE 1 TABLET (10 MG TOTAL) BY MOUTH DAILY AT BEDTIME, Disp: 60 tablet, Rfl: 5  •  Ascorbic Acid (vitamin C) 1000 MG tablet, Take 1,000 mg by mouth daily, Disp: , Rfl:   •  atorvastatin (LIPITOR) 40 mg tablet, TAKE 1 TABLET (40 MG TOTAL) BY MOUTH DAILY AT BEDTIME, Disp: 90 tablet, Rfl: 1  •  BD Pen Needle Maureen U/F 32G X 4 MM MISC, ONE PEN NEEDLE THREE TIMES A DAY 2-BASAGLAR 1- VICTOZA, Disp: 100 each, Rfl: 3  •  Blood Glucose Monitoring Suppl (OneTouch Verio Flex System) w/Device KIT, Use 3 (three) times a day with meals (Patient not taking: Reported on 11/13/2023), Disp: 1 kit, Rfl: 0  •  calcium carbonate (OS-NERY) 600 MG tablet, Take 600 mg by mouth daily with dinner, Disp: , Rfl:   •   Continuous Blood Gluc Sensor (FreeStyle Laura 2 Sensor) MISC, Check blood sugars multiple times per day, Disp: 6 each, Rfl: 3  •  Diclofenac Sodium (VOLTAREN) 1 %, Apply 2 g topically 4 (four) times a day as needed (pain), Disp: 350 g, Rfl: 2  •  ergocalciferol (VITAMIN D2) 50,000 units, TAKE 1 CAPSULE (50,000 UNITS TOTAL) BY MOUTH ONCE A WEEK TAKES ON SATURDAY, Disp: 12 capsule, Rfl: 3  •  fluticasone (FLONASE) 50 mcg/act nasal spray, USE 2 SPRAYS IN EACH NOSTRIL ONCE DAILY, Disp: 16 g, Rfl: 11  •  hydroxychloroquine (PLAQUENIL) 200 mg tablet, Take 1 tablet (200 mg total) by mouth 2 (two) times a day, Disp: 90 tablet, Rfl: 1  •  leflunomide (ARAVA) 20 MG tablet, Take 1 tablet (20 mg total) by mouth daily, Disp: 90 tablet, Rfl: 2  •  liraglutide (Victoza) injection, INJECT 0.3 ML (1.8 MG TOTAL) UNDER THE SKIN DAILY, Disp: 9 mL, Rfl: 5  •  losartan-hydrochlorothiazide (HYZAAR) 100-12.5 MG per tablet, TAKE 1 TABLET BY MOUTH DAILY, Disp: 90 tablet, Rfl: 1  •  naproxen (NAPROSYN) 500 mg tablet, TAKE 1 TABLET (500 MG TOTAL) BY MOUTH EVERY 12 (TWELVE) HOURS AS NEEDED FOR MILD PAIN (WITH FOOD), Disp: 60 tablet, Rfl: 2  •  olopatadine (PATANOL) 0.1 % ophthalmic solution, Administer 1 drop to both eyes in the morning and 1 drop in the evening. (Patient not taking: Reported on 11/13/2023), Disp: 5 mL, Rfl: 1  •  OneTouch Verio test strip, CHECK GLUCOSE BEFORE MEALS (Patient not taking: Reported on 11/13/2023), Disp: 100 strip, Rfl: 3  •  pantoprazole (PROTONIX) 40 mg tablet, TAKE 1 TABLET (40 MG TOTAL) BY MOUTH DAILY, Disp: 30 tablet, Rfl: 5  •  saccharomyces boulardii (FLORASTOR) 250 mg capsule, Take 1 capsule (250 mg total) by mouth 2 (two) times a day, Disp: 60 capsule, Rfl: 0  •  sucralfate (CARAFATE) 1 g tablet, TAKE 1 TABLET (1 G TOTAL) BY MOUTH 4 (FOUR) TIMES A DAY, Disp: 120 tablet, Rfl: 5  •  ULTICARE MICRO PEN NEEDLES 32G X 4 MM MISC, INJECT UNDER THE SKIN 3 (THREE) TIMES A DAY (Patient not taking: Reported on  11/13/2023), Disp: 100 each, Rfl: 3    The following portions of the patient's history were reviewed and updated as appropriate: allergies, current medications, past family history, past medical history, past social history, past surgical history and problem list.    Review of Systems   Constitutional: Negative.  Negative for fatigue.   HENT: Negative.     Eyes: Negative.    Respiratory: Negative.     Cardiovascular: Negative.    Gastrointestinal:  Positive for blood in stool and rectal pain.   Endocrine: Negative.    Genitourinary: Negative.    Musculoskeletal:  Positive for arthralgias and joint swelling.   Skin: Negative.         Skin lesions on scalp   Allergic/Immunologic: Negative.    Neurological: Negative.    Hematological: Negative.    Psychiatric/Behavioral: Negative.     All other systems reviewed and are negative.          Objective:    /70   Pulse 88   Resp 16   Ht 6' (1.829 m)   Wt 114 kg (252 lb)   SpO2 97%   BMI 34.18 kg/m²      Physical Exam  Vitals and nursing note reviewed.   Constitutional:       General: He is not in acute distress.     Appearance: Normal appearance. He is well-developed. He is obese. He is not ill-appearing.   HENT:      Head: Normocephalic and atraumatic.      Right Ear: Tympanic membrane, ear canal and external ear normal.      Left Ear: Tympanic membrane, ear canal and external ear normal.      Nose: Nose normal.      Mouth/Throat:      Mouth: Mucous membranes are moist.   Eyes:      Extraocular Movements: Extraocular movements intact.      Pupils: Pupils are equal, round, and reactive to light.      Comments: Allergic conjunctivitis bilaterally   Cardiovascular:      Rate and Rhythm: Normal rate and regular rhythm.      Pulses: Normal pulses.      Heart sounds: Normal heart sounds. No murmur heard.  Pulmonary:      Effort: Pulmonary effort is normal.      Breath sounds: Normal breath sounds.   Abdominal:      General: Abdomen is flat. Bowel sounds are normal.  There is no distension.      Palpations: Abdomen is soft. There is no mass.   Genitourinary:     Comments: In the perirectal region there appear to be flat external hemorrhoids as well as velvety appearing growths  Musculoskeletal:         General: Tenderness and deformity (Rheumatic nodules) present. Normal range of motion.      Cervical back: Normal range of motion and neck supple.      Right lower leg: No edema.      Left lower leg: No edema.      Comments: Positive TORY maneuver on the right   Skin:     General: Skin is warm and dry.      Findings: Lesion present.      Comments: Top of scalp are slightly raised papules with a waxy appearance and some with actinic characteristics   Neurological:      General: No focal deficit present.      Mental Status: He is alert and oriented to person, place, and time.      Cranial Nerves: No cranial nerve deficit.   Psychiatric:         Mood and Affect: Mood normal.         Behavior: Behavior normal.         Thought Content: Thought content normal.         Judgment: Judgment normal.           Recent Results (from the past 1008 hour(s))   C-reactive protein    Collection Time: 02/26/24 12:33 PM   Result Value Ref Range    CRP 3.9 (H) <3.0 mg/L   CBC and differential    Collection Time: 02/26/24 12:33 PM   Result Value Ref Range    WBC 8.41 4.31 - 10.16 Thousand/uL    RBC 5.14 3.88 - 5.62 Million/uL    Hemoglobin 15.6 12.0 - 17.0 g/dL    Hematocrit 46.5 36.5 - 49.3 %    MCV 91 82 - 98 fL    MCH 30.4 26.8 - 34.3 pg    MCHC 33.5 31.4 - 37.4 g/dL    RDW 13.0 11.6 - 15.1 %    MPV 11.1 8.9 - 12.7 fL    Platelets 162 149 - 390 Thousands/uL   Comprehensive metabolic panel    Collection Time: 02/26/24 12:33 PM   Result Value Ref Range    Sodium 134 (L) 135 - 147 mmol/L    Potassium 4.2 3.5 - 5.3 mmol/L    Chloride 98 96 - 108 mmol/L    CO2 27 21 - 32 mmol/L    ANION GAP 9 mmol/L    BUN 17 5 - 25 mg/dL    Creatinine 1.03 0.60 - 1.30 mg/dL    Glucose 325 (H) 65 - 140 mg/dL    Calcium  9.2 8.4 - 10.2 mg/dL    AST 33 13 - 39 U/L    ALT 46 7 - 52 U/L    Alkaline Phosphatase 56 34 - 104 U/L    Total Protein 6.5 6.4 - 8.4 g/dL    Albumin 3.9 3.5 - 5.0 g/dL    Total Bilirubin 0.57 0.20 - 1.00 mg/dL    eGFR 80 ml/min/1.73sq m   Hemoglobin A1C    Collection Time: 02/26/24 12:33 PM   Result Value Ref Range    Hemoglobin A1C 9.0 (H) Normal 4.0-5.6%; PreDiabetic 5.7-6.4%; Diabetic >=6.5%; Glycemic control for adults with diabetes <7.0% %     mg/dl   Albumin / creatinine urine ratio    Collection Time: 02/26/24 12:33 PM   Result Value Ref Range    Creatinine, Ur 121.5 Reference range not established. mg/dL    Albumin,U,Random <7.0 <20.0 mg/L    Albumin Creat Ratio <6 0 - 30 mg/g creatinine   TSH, 3rd generation with Free T4 reflex    Collection Time: 02/26/24 12:33 PM   Result Value Ref Range    TSH 3RD GENERATON 1.553 0.450 - 4.500 uIU/mL   Vitamin D 1,25 dihydroxy    Collection Time: 02/26/24 12:33 PM   Result Value Ref Range    Vit D, 1,25-Dihydroxy 35.6 24.8 - 81.5 pg/mL   PSA, Total Screen    Collection Time: 02/26/24 12:33 PM   Result Value Ref Range    PSA 0.32 0.00 - 4.00 ng/mL   Hepatitis B surface antibody    Collection Time: 02/26/24 12:33 PM   Result Value Ref Range    Hep B S Ab <3.00 3-500 mIU/mL mIU/mL   Hepatitis B core antibody, total    Collection Time: 02/26/24 12:33 PM   Result Value Ref Range    Hep B Core Total Ab Non-reactive Non-Reactive   Hepatitis B surface antigen    Collection Time: 02/26/24 12:33 PM   Result Value Ref Range    Hepatitis B Surface Ag Non-reactive Non-Reactive   Hepatitis C antibody    Collection Time: 02/26/24 12:33 PM   Result Value Ref Range    Hepatitis C Ab Non-reactive Non-Reactive   Quantiferon TB Gold Plus Gray    Collection Time: 02/26/24 12:33 PM   Result Value Ref Range    QFT Nil 0.07 0 - 8.0 IU/ml   Quantiferon TB Gold Plus Green    Collection Time: 02/26/24 12:33 PM   Result Value Ref Range    QFT TB1-NIL 0.01 IU/ml   Quantiferon TB Gold Plus  Yellow    Collection Time: 02/26/24 12:33 PM   Result Value Ref Range    QFT TB2-NIL -0.01 IU/ml   Quantiferon TB Gold Plus Purple    Collection Time: 02/26/24 12:33 PM   Result Value Ref Range    QFT Mitogen-NIL 9.93 IU/ml    QFT Final Interpretation Negative Negative   Manual Differential(PHLEBS Do Not Order)    Collection Time: 02/26/24 12:33 PM   Result Value Ref Range    Segmented % 79 (H) 43 - 75 %    Bands % 1 0 - 8 %    Lymphocytes % 11 (L) 14 - 44 %    Monocytes % 6 4 - 12 %    Eosinophils % 1 0 - 6 %    Basophils % 0 0 - 1 %    Metamyelocytes % 1 0 - 1 %    Atypical Lymphocytes % 1 (H) <=0 %    Absolute Neutrophils 6.73 1.85 - 7.62 Thousand/uL    Absolute Lymphocytes 1.01 0.60 - 4.47 Thousand/uL    Absolute Monocytes 0.50 0.00 - 1.22 Thousand/uL    Absolute Eosinophils 0.08 0.00 - 0.40 Thousand/uL    Absolute Basophils 0.00 0.00 - 0.10 Thousand/uL    Total Counted      RBC Morphology Normal     Platelet Estimate Adequate Adequate   Lipid Panel with Direct LDL reflex    Collection Time: 03/23/24 10:19 AM   Result Value Ref Range    Cholesterol 124 See Comment mg/dL    Triglycerides 80 See Comment mg/dL    HDL, Direct 41 >=40 mg/dL    LDL Calculated 67 0 - 100 mg/dL   C-reactive protein    Collection Time: 03/23/24 10:19 AM   Result Value Ref Range    CRP 1.4 <3.0 mg/L   CBC and differential    Collection Time: 03/23/24 10:19 AM   Result Value Ref Range    WBC 4.80 4.31 - 10.16 Thousand/uL    RBC 5.54 3.88 - 5.62 Million/uL    Hemoglobin 16.6 12.0 - 17.0 g/dL    Hematocrit 49.8 (H) 36.5 - 49.3 %    MCV 90 82 - 98 fL    MCH 30.0 26.8 - 34.3 pg    MCHC 33.3 31.4 - 37.4 g/dL    RDW 13.3 11.6 - 15.1 %    MPV 10.9 8.9 - 12.7 fL    Platelets 172 149 - 390 Thousands/uL    nRBC 0 /100 WBCs    Neutrophils Relative 47 43 - 75 %    Immature Grans % 0 0 - 2 %    Lymphocytes Relative 27 14 - 44 %    Monocytes Relative 20 (H) 4 - 12 %    Eosinophils Relative 4 0 - 6 %    Basophils Relative 2 (H) 0 - 1 %    Neutrophils  Absolute 2.30 1.85 - 7.62 Thousands/µL    Absolute Immature Grans 0.02 0.00 - 0.20 Thousand/uL    Absolute Lymphocytes 1.30 0.60 - 4.47 Thousands/µL    Absolute Monocytes 0.94 0.17 - 1.22 Thousand/µL    Eosinophils Absolute 0.17 0.00 - 0.61 Thousand/µL    Basophils Absolute 0.07 0.00 - 0.10 Thousands/µL   Comprehensive metabolic panel    Collection Time: 03/23/24 10:19 AM   Result Value Ref Range    Sodium 137 135 - 147 mmol/L    Potassium 4.2 3.5 - 5.3 mmol/L    Chloride 102 96 - 108 mmol/L    CO2 30 21 - 32 mmol/L    ANION GAP 5 4 - 13 mmol/L    BUN 15 5 - 25 mg/dL    Creatinine 1.10 0.60 - 1.30 mg/dL    Glucose, Fasting 135 (H) 65 - 99 mg/dL    Calcium 9.1 8.4 - 10.2 mg/dL    AST 29 13 - 39 U/L    ALT 46 7 - 52 U/L    Alkaline Phosphatase 59 34 - 104 U/L    Total Protein 6.8 6.4 - 8.4 g/dL    Albumin 4.1 3.5 - 5.0 g/dL    Total Bilirubin 0.68 0.20 - 1.00 mg/dL    eGFR 74 ml/min/1.73sq m        Media Information      Document Information    Clinical Image - Mobile Device   Skin lesions scalp   03/28/2024 2:53 PM   Attached To:   Office Visit on 3/28/24 with Neftali Vargas DO   Source Information    Neftali Vargas DO  [facility]       Assessment/Plan:    Benign essential hypertension  Continue on losartan/hydrochlorothiazide.  Reevaluate in 4 months    Anal lesion  Referral to colorectal surgeon.  Last had colonoscopy in 2020 and is not due for colonoscopy until 2025.  Not certain if velvety growths could be premalignant lesions.  May need biopsy    Type 2 diabetes mellitus with diabetic neuropathic arthropathy, with long-term current use of insulin ([facility])    Lab Results   Component Value Date    HGBA1C 9.0 (H) 02/26/2024     Significant rise in hemoglobin A1c.  Patient should not have discontinued Victoza.  Victoza is not causing him to get hypoglycemic.  Basal insulin is.  Make certain that he is eating higher protein snack before bedtime.  Reduce dose of Basaglar to 45 units twice daily.    Charcot foot due to  diabetes mellitus (HCC)    Lab Results   Component Value Date    HGBA1C 9.0 (H) 02/26/2024   Managed by podiatry    Seropositive rheumatoid arthritis (HCC)  Managed by rheumatology    Clear cell hidradenoma  Prior scalp lesions.  Patient needs to follow-up with dermatology.  Order placed    Vitamin D deficiency  Continue on vitamin D supplementation.  Repeat vitamin D level in 4 months    Mixed hyperlipidemia  Cholesterol is adequately controlled.  Continue on atorvastatin 40 mg at bedtime.  Reevaluate lipid panel in 4 months    Cervical spinal stenosis  Patient did receive corticosteroid injection through pain management.  I did provide refill of as needed Vicodin.  They are discussing the possibility of cervical spinal decompression and fusion.  Not can happen with his hemoglobin A1c at 9%.  Reiterated that to the patient          Problem List Items Addressed This Visit        Cardiovascular and Mediastinum    Benign essential hypertension     Continue on losartan/hydrochlorothiazide.  Reevaluate in 4 months         Relevant Orders    CBC and differential    TSH, 3rd generation with Free T4 reflex       Digestive    Anal lesion     Referral to colorectal surgeon.  Last had colonoscopy in 2020 and is not due for colonoscopy until 2025.  Not certain if velvety growths could be premalignant lesions.  May need biopsy         Relevant Orders    Ambulatory Referral to Colorectal Surgery       Endocrine    Charcot foot due to diabetes mellitus (HCC)       Lab Results   Component Value Date    HGBA1C 9.0 (H) 02/26/2024   Managed by podiatry         Relevant Medications    Insulin Glargine Solostar (Basaglar Josef) 100 UNIT/ML SOPN    HYDROcodone-acetaminophen (Norco) 5-325 mg per tablet    Type 2 diabetes mellitus with diabetic neuropathic arthropathy, with long-term current use of insulin (HCC)       Lab Results   Component Value Date    HGBA1C 9.0 (H) 02/26/2024     Significant rise in hemoglobin A1c.  Patient should  not have discontinued Victoza.  Victoza is not causing him to get hypoglycemic.  Basal insulin is.  Make certain that he is eating higher protein snack before bedtime.  Reduce dose of Basaglar to 45 units twice daily.         Relevant Medications    Insulin Glargine Solostar (Basaglar KwikPen) 100 UNIT/ML SOPN    Other Relevant Orders    Hemoglobin A1C    Albumin / creatinine urine ratio       Musculoskeletal and Integument    Clear cell hidradenoma     Prior scalp lesions.  Patient needs to follow-up with dermatology.  Order placed         Relevant Orders    Ambulatory Referral to Dermatology    Seropositive rheumatoid arthritis (HCC)     Managed by rheumatology            Orthopedic/Musculoskeletal    Cervical spinal stenosis     Patient did receive corticosteroid injection through pain management.  I did provide refill of as needed Vicodin.  They are discussing the possibility of cervical spinal decompression and fusion.  Not can happen with his hemoglobin A1c at 9%.  Reiterated that to the patient            Other    Mixed hyperlipidemia     Cholesterol is adequately controlled.  Continue on atorvastatin 40 mg at bedtime.  Reevaluate lipid panel in 4 months         Relevant Orders    Comprehensive metabolic panel    Lipid panel    Obesity (BMI 30.0-34.9)    Relevant Orders    CBC and differential    Vitamin D deficiency     Continue on vitamin D supplementation.  Repeat vitamin D level in 4 months         Relevant Orders    Vitamin D 25 hydroxy   Other Visit Diagnoses     Type 2 diabetes mellitus without complication, without long-term current use of insulin ([facility])    -  Primary    Relevant Medications    Insulin Glargine Solostar (Basaglar KwikPen) 100 UNIT/ML SOPN    Other Relevant Orders    Albumin / creatinine urine ratio

## 2024-03-29 NOTE — ASSESSMENT & PLAN NOTE
Patient did receive corticosteroid injection through pain management.  I did provide refill of as needed Vicodin.  They are discussing the possibility of cervical spinal decompression and fusion.  Not can happen with his hemoglobin A1c at 9%.  Reiterated that to the patient

## 2024-03-29 NOTE — ASSESSMENT & PLAN NOTE
Cholesterol is adequately controlled.  Continue on atorvastatin 40 mg at bedtime.  Reevaluate lipid panel in 4 months

## 2024-03-29 NOTE — ASSESSMENT & PLAN NOTE
Referral to colorectal surgeon.  Last had colonoscopy in 2020 and is not due for colonoscopy until 2025.  Not certain if velvety growths could be premalignant lesions.  May need biopsy

## 2024-03-29 NOTE — ASSESSMENT & PLAN NOTE
Lab Results   Component Value Date    HGBA1C 9.0 (H) 02/26/2024     Significant rise in hemoglobin A1c.  Patient should not have discontinued Victoza.  Victoza is not causing him to get hypoglycemic.  Basal insulin is.  Make certain that he is eating higher protein snack before bedtime.  Reduce dose of Basaglar to 45 units twice daily.

## 2024-04-02 ENCOUNTER — TELEPHONE (OUTPATIENT)
Dept: PAIN MEDICINE | Facility: CLINIC | Age: 59
End: 2024-04-02

## 2024-04-17 DIAGNOSIS — M05.9 SEROPOSITIVE RHEUMATOID ARTHRITIS (HCC): ICD-10-CM

## 2024-04-19 ENCOUNTER — TELEPHONE (OUTPATIENT)
Age: 59
End: 2024-04-19

## 2024-04-19 DIAGNOSIS — M05.9 SEROPOSITIVE RHEUMATOID ARTHRITIS (HCC): ICD-10-CM

## 2024-04-19 DIAGNOSIS — J45.909 UNCOMPLICATED ASTHMA, UNSPECIFIED ASTHMA SEVERITY, UNSPECIFIED WHETHER PERSISTENT: ICD-10-CM

## 2024-04-19 RX ORDER — ALBUTEROL SULFATE 90 UG/1
2 AEROSOL, METERED RESPIRATORY (INHALATION) EVERY 4 HOURS PRN
Qty: 18 G | Refills: 5 | Status: SHIPPED | OUTPATIENT
Start: 2024-04-19

## 2024-04-19 RX ORDER — HYDROXYCHLOROQUINE SULFATE 200 MG/1
200 TABLET, FILM COATED ORAL 2 TIMES DAILY
Qty: 60 TABLET | Refills: 5 | Status: SHIPPED | OUTPATIENT
Start: 2024-04-19 | End: 2024-10-16

## 2024-04-19 NOTE — TELEPHONE ENCOUNTER
Reason for call:   [x] Refill   [] Prior Auth  [x] Other: Pharmacy called they state they need maint rx for Cimzia to be sent for pt.    Office:   [] PCP/Provider -   [x] Specialty/Provider -     Medication: Certolizumab Pegol (Cimzia) 2 x 200 mg/mL       [facility] [address]

## 2024-05-14 DIAGNOSIS — E11.9 TYPE 2 DIABETES MELLITUS WITHOUT COMPLICATION, WITHOUT LONG-TERM CURRENT USE OF INSULIN (HCC): ICD-10-CM

## 2024-05-14 DIAGNOSIS — K25.3 ACUTE GASTRIC ULCER WITHOUT HEMORRHAGE OR PERFORATION: ICD-10-CM

## 2024-05-14 DIAGNOSIS — E13.9 DIABETES MELLITUS OF OTHER TYPE WITHOUT COMPLICATION, UNSPECIFIED WHETHER LONG TERM INSULIN USE (HCC): ICD-10-CM

## 2024-05-14 DIAGNOSIS — M54.12 CERVICAL RADICULOPATHY: ICD-10-CM

## 2024-05-14 DIAGNOSIS — M05.9 SEROPOSITIVE RHEUMATOID ARTHRITIS (HCC): ICD-10-CM

## 2024-05-14 RX ORDER — NAPROXEN 500 MG/1
500 TABLET ORAL EVERY 12 HOURS PRN
Qty: 60 TABLET | Refills: 2 | Status: SHIPPED | OUTPATIENT
Start: 2024-05-14

## 2024-05-15 RX ORDER — PANTOPRAZOLE SODIUM 40 MG/1
40 TABLET, DELAYED RELEASE ORAL DAILY
Qty: 30 TABLET | Refills: 5 | Status: SHIPPED | OUTPATIENT
Start: 2024-05-15

## 2024-05-15 RX ORDER — LIRAGLUTIDE 6 MG/ML
1.8 INJECTION SUBCUTANEOUS DAILY
Qty: 9 ML | Refills: 1 | Status: SHIPPED | OUTPATIENT
Start: 2024-05-15

## 2024-05-15 RX ORDER — INSULIN GLARGINE 100 [IU]/ML
INJECTION, SOLUTION SUBCUTANEOUS
Qty: 45 ML | Refills: 1 | Status: SHIPPED | OUTPATIENT
Start: 2024-05-15

## 2024-06-10 ENCOUNTER — OFFICE VISIT (OUTPATIENT)
Dept: RHEUMATOLOGY | Facility: CLINIC | Age: 59
End: 2024-06-10
Payer: COMMERCIAL

## 2024-06-10 VITALS
SYSTOLIC BLOOD PRESSURE: 132 MMHG | DIASTOLIC BLOOD PRESSURE: 86 MMHG | HEIGHT: 72 IN | WEIGHT: 250 LBS | HEART RATE: 86 BPM | OXYGEN SATURATION: 97 % | BODY MASS INDEX: 33.86 KG/M2

## 2024-06-10 DIAGNOSIS — M05.79 RHEUMATOID ARTHRITIS INVOLVING MULTIPLE SITES WITH POSITIVE RHEUMATOID FACTOR (HCC): Primary | ICD-10-CM

## 2024-06-10 DIAGNOSIS — Z79.899 HIGH RISK MEDICATION USE: ICD-10-CM

## 2024-06-10 PROCEDURE — 99215 OFFICE O/P EST HI 40 MIN: CPT | Performed by: STUDENT IN AN ORGANIZED HEALTH CARE EDUCATION/TRAINING PROGRAM

## 2024-06-10 NOTE — PROGRESS NOTES
Ambulatory Visit  Name: Renny Jean Baptiste Jr.      : 1965      MRN: [id number]  Encounter Provider: Rm Kitchen DO  Encounter Date: 6/10/2024   Encounter department: [facility] [place]    Assessment & Plan   1. Rheumatoid arthritis involving multiple sites with positive rheumatoid factor (HCC)  Assessment & Plan:  Probably too early to tell a difference with Cimzia    Continue Cimzia and leflunomide for now, if gets improvement from Cimzia but not enough we will discuss adding sulfasalazine, if gets no improvement from Cimzia then we will discuss switching.  Possibly Orencia  2. High risk medication use  -     Glom Filt Rate, Estimated; Standing  -     Creatinine, serum; Standing  -     Calcium; Standing  -     Hepatic function panel; Standing  -     CBC and differential; Standing    Patient's rheumatologic disease(s) threaten long-term function if not appropriately treated.    Patient's rheumatologic medication(s) require intensive monitoring for toxicity.     History of Present Illness       Has been on Cimiza for about a month, hasn't noticed a difference yet    Exhausted all the time    Hasn't noticed a difference in his symptoms yet    Is taking some supplements, he's not sure if they're helping    Still stiff all the time    Permanent History: sero+ RA (, ACPA > 250). Had been on Humira in the past with no improvement. Had good result w Enbrel in the past but stopped due to infection then when restarted had lost efficacy. Enbrel retried when established with Dr. Dobbs in ; not effective so Xeljanz started and leflunomide stopped. Has also remained on hydroxychloroquine.    First saw me 2024, stopped Xeljanz due to ineffectiveness as well as cancer history. Restarted leflunomide, precerted for Cimzia.    Review of Systems    Objective     /86 (BP Location: Left arm, Patient Position: Sitting, Cuff Size: Adult)   Pulse 86   Ht 6' (1.829 m)   Wt 113 kg  (250 lb)   SpO2 97%   BMI 33.91 kg/m²      General: Well appearing, in no distress.   Eyes: Sclera non-icteric. EOMI  HENT: No oral ulcers. MMM.   Extremities: Warm, well perfused, no edema.   Neuro: Alert and oriented. No gross focal neurological deficits.   Skin: No rashes.  MSK exam: no significant tenderness to palpation or synovitis today      Physical Exam  Administrative Statements

## 2024-06-10 NOTE — ASSESSMENT & PLAN NOTE
Probably too early to tell a difference with Cimzia    Continue Cimzia and leflunomide for now, if gets improvement from Cimzia but not enough we will discuss adding sulfasalazine, if gets no improvement from Cimzia then we will discuss switching.  Possibly Orencia

## 2024-06-10 NOTE — PATIENT INSTRUCTIONS
Continue leflunomide and Cimzia for now    If at next visit Cimzia isn't working we will discuss switching you to another medication. If it is helping but not enough, then we can talk about adding a medication called sulfasalazine.    Please get blood work (GFR, creatinine, calcium, LFTs, CBC) every 3 months while on leflunomide.

## 2024-06-12 ENCOUNTER — TELEPHONE (OUTPATIENT)
Age: 59
End: 2024-06-12

## 2024-06-12 DIAGNOSIS — M05.9 SEROPOSITIVE RHEUMATOID ARTHRITIS (HCC): ICD-10-CM

## 2024-06-13 DIAGNOSIS — E11.9 TYPE 2 DIABETES MELLITUS WITHOUT COMPLICATION, WITHOUT LONG-TERM CURRENT USE OF INSULIN (HCC): ICD-10-CM

## 2024-06-13 DIAGNOSIS — E13.9 DIABETES MELLITUS OF OTHER TYPE WITHOUT COMPLICATION, UNSPECIFIED WHETHER LONG TERM INSULIN USE (HCC): ICD-10-CM

## 2024-06-13 DIAGNOSIS — E11.42 TYPE 2 DIABETES MELLITUS WITH DIABETIC POLYNEUROPATHY, WITH LONG-TERM CURRENT USE OF INSULIN (HCC): ICD-10-CM

## 2024-06-13 DIAGNOSIS — Z79.4 TYPE 2 DIABETES MELLITUS WITH DIABETIC POLYNEUROPATHY, WITH LONG-TERM CURRENT USE OF INSULIN (HCC): ICD-10-CM

## 2024-06-13 RX ORDER — PEN NEEDLE, DIABETIC 32GX 5/32"
NEEDLE, DISPOSABLE MISCELLANEOUS
Qty: 100 EACH | Refills: 5 | Status: SHIPPED | OUTPATIENT
Start: 2024-06-13

## 2024-06-16 NOTE — TELEPHONE ENCOUNTER
PA for Bladimira Approved     Date(s) approved 6/16/24-6/16/25    Case #[id number]    Patient advised by          [x] Lineagenhart Message  [] Phone call   []LMOM  []L/M to call office as no active Communication consent on file  []Unable to leave detailed message as VM not approved on Communication consent       Pharmacy advised by    [x]Fax  []Phone call    Approval letter scanned into Media Yes

## 2024-06-16 NOTE — TELEPHONE ENCOUNTER
PA for Cimzia    Submitted via    [x]CMM-KEY [id number]  [][employer]-Case ID #    []Faxed to plan   []Other website    []Phone call Case ID #      Office notes sent, clinical questions answered. Awaiting determination    Turnaround time for your insurance to make a decision on your Prior Authorization can take 7-21 business days.

## 2024-06-23 DIAGNOSIS — E11.610 CHARCOT FOOT DUE TO DIABETES MELLITUS (HCC): ICD-10-CM

## 2024-06-23 DIAGNOSIS — M05.9 SEROPOSITIVE RHEUMATOID ARTHRITIS (HCC): ICD-10-CM

## 2024-06-23 DIAGNOSIS — M54.12 CERVICAL RADICULOPATHY: ICD-10-CM

## 2024-06-24 RX ORDER — NAPROXEN 500 MG/1
500 TABLET ORAL EVERY 12 HOURS PRN
Qty: 60 TABLET | Refills: 2 | Status: SHIPPED | OUTPATIENT
Start: 2024-06-24

## 2024-06-24 RX ORDER — NAPROXEN 500 MG/1
500 TABLET ORAL EVERY 12 HOURS PRN
Qty: 60 TABLET | Refills: 0 | OUTPATIENT
Start: 2024-06-24

## 2024-06-24 RX ORDER — HYDROCODONE BITARTRATE AND ACETAMINOPHEN 5; 325 MG/1; MG/1
1 TABLET ORAL EVERY 6 HOURS PRN
Qty: 60 TABLET | Refills: 0 | Status: SHIPPED | OUTPATIENT
Start: 2024-06-24

## 2024-06-24 NOTE — TELEPHONE ENCOUNTER
Reason for call: Not a duplicate Please resend to [facility], pharmacy did not receive script sent 05.14.2024    [] Refill   [] Prior Auth  [x] Other:     Pharmacist Al from [facility] [place] called stated, he is confused and need assistance, state naproxen is ordered and his records shows always ordered by dr branch rheumatology by says dr blackwell office state that on 05.14.2024 they sent the pharmacy an order, I advised according to our records, the order on 05.14.2024 was by dr branch, Al stated the why did dr case office say they sent an order, I asked if the office told him that or was it the patient, Al stated the patient, I advised the patient may have gotten confused but I can only see an order approved and sent to [facility] on 05.14.2024 by dr branch, pharmacist stated he never got that order and asked if we can resend please.      Office:   [] PCP/Provider -   [x] Specialty/Provider - Rheum     Medication: naproxen     Dose/Frequency: 500 mg Q12H prn    Quantity: 60 w 2 refills    Pharmacy: [facility]     Does the patient have enough for 3 days?   [] Yes   [] No - Send as HP to POD

## 2024-07-11 DIAGNOSIS — E11.9 TYPE 2 DIABETES MELLITUS WITHOUT COMPLICATION, WITHOUT LONG-TERM CURRENT USE OF INSULIN (HCC): ICD-10-CM

## 2024-07-11 DIAGNOSIS — K25.3 ACUTE GASTRIC ULCER WITHOUT HEMORRHAGE OR PERFORATION: ICD-10-CM

## 2024-07-11 DIAGNOSIS — E13.9 DIABETES MELLITUS OF OTHER TYPE WITHOUT COMPLICATION, UNSPECIFIED WHETHER LONG TERM INSULIN USE (HCC): ICD-10-CM

## 2024-07-12 RX ORDER — LIRAGLUTIDE 6 MG/ML
1.8 INJECTION SUBCUTANEOUS DAILY
Qty: 9 ML | Refills: 5 | Status: SHIPPED | OUTPATIENT
Start: 2024-07-12

## 2024-07-12 RX ORDER — SUCRALFATE 1 G/1
1 TABLET ORAL 4 TIMES DAILY
Qty: 120 TABLET | Refills: 0 | Status: SHIPPED | OUTPATIENT
Start: 2024-07-12

## 2024-08-07 ENCOUNTER — LAB (OUTPATIENT)
Dept: LAB | Facility: CLINIC | Age: 59
End: 2024-08-07
Payer: COMMERCIAL

## 2024-08-07 DIAGNOSIS — Z79.899 HIGH RISK MEDICATION USE: ICD-10-CM

## 2024-08-07 DIAGNOSIS — E11.9 TYPE 2 DIABETES MELLITUS WITHOUT COMPLICATION, WITHOUT LONG-TERM CURRENT USE OF INSULIN (HCC): ICD-10-CM

## 2024-08-07 DIAGNOSIS — Z79.4 TYPE 2 DIABETES MELLITUS WITH DIABETIC NEUROPATHIC ARTHROPATHY, WITH LONG-TERM CURRENT USE OF INSULIN (HCC): ICD-10-CM

## 2024-08-07 DIAGNOSIS — E55.9 VITAMIN D DEFICIENCY: ICD-10-CM

## 2024-08-07 DIAGNOSIS — E11.610 TYPE 2 DIABETES MELLITUS WITH DIABETIC NEUROPATHIC ARTHROPATHY, WITH LONG-TERM CURRENT USE OF INSULIN (HCC): ICD-10-CM

## 2024-08-07 DIAGNOSIS — I10 BENIGN ESSENTIAL HYPERTENSION: ICD-10-CM

## 2024-08-07 DIAGNOSIS — E78.2 MIXED HYPERLIPIDEMIA: ICD-10-CM

## 2024-08-07 DIAGNOSIS — E66.9 OBESITY (BMI 30.0-34.9): ICD-10-CM

## 2024-08-07 LAB
25(OH)D3 SERPL-MCNC: 29.6 NG/ML (ref 30–100)
ALBUMIN SERPL BCG-MCNC: 3.7 G/DL (ref 3.5–5)
ALP SERPL-CCNC: 44 U/L (ref 34–104)
ALT SERPL W P-5'-P-CCNC: 35 U/L (ref 7–52)
ANION GAP SERPL CALCULATED.3IONS-SCNC: 8 MMOL/L (ref 4–13)
AST SERPL W P-5'-P-CCNC: 26 U/L (ref 13–39)
BASOPHILS # BLD AUTO: 0.09 THOUSANDS/ÂΜL (ref 0–0.1)
BASOPHILS NFR BLD AUTO: 1 % (ref 0–1)
BILIRUB DIRECT SERPL-MCNC: 0.13 MG/DL (ref 0–0.2)
BILIRUB SERPL-MCNC: 0.6 MG/DL (ref 0.2–1)
BUN SERPL-MCNC: 15 MG/DL (ref 5–25)
CALCIUM SERPL-MCNC: 9.1 MG/DL (ref 8.4–10.2)
CHLORIDE SERPL-SCNC: 103 MMOL/L (ref 96–108)
CHOLEST SERPL-MCNC: 131 MG/DL
CO2 SERPL-SCNC: 30 MMOL/L (ref 21–32)
CREAT SERPL-MCNC: 1.03 MG/DL (ref 0.6–1.3)
CREAT UR-MCNC: 240.6 MG/DL
EOSINOPHIL # BLD AUTO: 0.18 THOUSAND/ÂΜL (ref 0–0.61)
EOSINOPHIL NFR BLD AUTO: 3 % (ref 0–6)
ERYTHROCYTE [DISTWIDTH] IN BLOOD BY AUTOMATED COUNT: 13.6 % (ref 11.6–15.1)
EST. AVERAGE GLUCOSE BLD GHB EST-MCNC: 160 MG/DL
GFR SERPL CREATININE-BSD FRML MDRD: 80 ML/MIN/1.73SQ M
GLUCOSE P FAST SERPL-MCNC: 97 MG/DL (ref 65–99)
HBA1C MFR BLD: 7.2 %
HCT VFR BLD AUTO: 47.7 % (ref 36.5–49.3)
HDLC SERPL-MCNC: 41 MG/DL
HGB BLD-MCNC: 15.9 G/DL (ref 12–17)
IMM GRANULOCYTES # BLD AUTO: 0.06 THOUSAND/UL (ref 0–0.2)
IMM GRANULOCYTES NFR BLD AUTO: 1 % (ref 0–2)
LDLC SERPL CALC-MCNC: 61 MG/DL (ref 0–100)
LYMPHOCYTES # BLD AUTO: 1.74 THOUSANDS/ÂΜL (ref 0.6–4.47)
LYMPHOCYTES NFR BLD AUTO: 24 % (ref 14–44)
MCH RBC QN AUTO: 29.9 PG (ref 26.8–34.3)
MCHC RBC AUTO-ENTMCNC: 33.3 G/DL (ref 31.4–37.4)
MCV RBC AUTO: 90 FL (ref 82–98)
MICROALBUMIN UR-MCNC: 11.9 MG/L
MICROALBUMIN/CREAT 24H UR: 5 MG/G CREATININE (ref 0–30)
MONOCYTES # BLD AUTO: 1 THOUSAND/ÂΜL (ref 0.17–1.22)
MONOCYTES NFR BLD AUTO: 14 % (ref 4–12)
NEUTROPHILS # BLD AUTO: 4.09 THOUSANDS/ÂΜL (ref 1.85–7.62)
NEUTS SEG NFR BLD AUTO: 57 % (ref 43–75)
NONHDLC SERPL-MCNC: 90 MG/DL
NRBC BLD AUTO-RTO: 0 /100 WBCS
PLATELET # BLD AUTO: 222 THOUSANDS/UL (ref 149–390)
PMV BLD AUTO: 11.7 FL (ref 8.9–12.7)
POTASSIUM SERPL-SCNC: 4.3 MMOL/L (ref 3.5–5.3)
PROT SERPL-MCNC: 6.6 G/DL (ref 6.4–8.4)
RBC # BLD AUTO: 5.31 MILLION/UL (ref 3.88–5.62)
SODIUM SERPL-SCNC: 141 MMOL/L (ref 135–147)
TRIGL SERPL-MCNC: 145 MG/DL
TSH SERPL DL<=0.05 MIU/L-ACNC: 1.1 UIU/ML (ref 0.45–4.5)
WBC # BLD AUTO: 7.16 THOUSAND/UL (ref 4.31–10.16)

## 2024-08-07 PROCEDURE — 36415 COLL VENOUS BLD VENIPUNCTURE: CPT

## 2024-08-07 PROCEDURE — 82248 BILIRUBIN DIRECT: CPT

## 2024-08-07 PROCEDURE — 82306 VITAMIN D 25 HYDROXY: CPT

## 2024-08-07 PROCEDURE — 85025 COMPLETE CBC W/AUTO DIFF WBC: CPT

## 2024-08-07 PROCEDURE — 84443 ASSAY THYROID STIM HORMONE: CPT

## 2024-08-07 PROCEDURE — 80053 COMPREHEN METABOLIC PANEL: CPT

## 2024-08-07 PROCEDURE — 83036 HEMOGLOBIN GLYCOSYLATED A1C: CPT

## 2024-08-07 PROCEDURE — 82570 ASSAY OF URINE CREATININE: CPT

## 2024-08-07 PROCEDURE — 80061 LIPID PANEL: CPT

## 2024-08-07 PROCEDURE — 82043 UR ALBUMIN QUANTITATIVE: CPT

## 2024-08-08 ENCOUNTER — OFFICE VISIT (OUTPATIENT)
Dept: FAMILY MEDICINE CLINIC | Facility: CLINIC | Age: 59
End: 2024-08-08
Payer: COMMERCIAL

## 2024-08-08 VITALS
RESPIRATION RATE: 16 BRPM | OXYGEN SATURATION: 97 % | WEIGHT: 243 LBS | BODY MASS INDEX: 32.91 KG/M2 | HEIGHT: 72 IN | SYSTOLIC BLOOD PRESSURE: 138 MMHG | HEART RATE: 96 BPM | DIASTOLIC BLOOD PRESSURE: 78 MMHG

## 2024-08-08 DIAGNOSIS — I65.29 CAROTID ARTERY CALCIFICATION, UNSPECIFIED LATERALITY: ICD-10-CM

## 2024-08-08 DIAGNOSIS — M05.9 SEROPOSITIVE RHEUMATOID ARTHRITIS (HCC): ICD-10-CM

## 2024-08-08 DIAGNOSIS — I10 ESSENTIAL HYPERTENSION: ICD-10-CM

## 2024-08-08 DIAGNOSIS — E55.9 VITAMIN D DEFICIENCY: ICD-10-CM

## 2024-08-08 DIAGNOSIS — E78.2 MIXED HYPERLIPIDEMIA: ICD-10-CM

## 2024-08-08 DIAGNOSIS — D23.9 CLEAR CELL HIDRADENOMA: ICD-10-CM

## 2024-08-08 DIAGNOSIS — Z12.83 SKIN CANCER SCREENING: ICD-10-CM

## 2024-08-08 DIAGNOSIS — Z72.0 TOBACCO USE: ICD-10-CM

## 2024-08-08 DIAGNOSIS — Z79.4 TYPE 2 DIABETES MELLITUS WITH DIABETIC NEUROPATHIC ARTHROPATHY, WITH LONG-TERM CURRENT USE OF INSULIN (HCC): ICD-10-CM

## 2024-08-08 DIAGNOSIS — I10 BENIGN ESSENTIAL HYPERTENSION: ICD-10-CM

## 2024-08-08 DIAGNOSIS — E11.9 TYPE 2 DIABETES MELLITUS WITHOUT COMPLICATION, WITHOUT LONG-TERM CURRENT USE OF INSULIN (HCC): ICD-10-CM

## 2024-08-08 DIAGNOSIS — I10 ESSENTIAL HYPERTENSION: Primary | ICD-10-CM

## 2024-08-08 DIAGNOSIS — E11.610 TYPE 2 DIABETES MELLITUS WITH DIABETIC NEUROPATHIC ARTHROPATHY, WITH LONG-TERM CURRENT USE OF INSULIN (HCC): ICD-10-CM

## 2024-08-08 PROCEDURE — 99215 OFFICE O/P EST HI 40 MIN: CPT | Performed by: FAMILY MEDICINE

## 2024-08-08 RX ORDER — LOSARTAN POTASSIUM AND HYDROCHLOROTHIAZIDE 12.5; 1 MG/1; MG/1
1 TABLET ORAL DAILY
Qty: 90 TABLET | Refills: 1 | Status: SHIPPED | OUTPATIENT
Start: 2024-08-08 | End: 2024-08-09 | Stop reason: SDUPTHER

## 2024-08-08 RX ORDER — ERGOCALCIFEROL 1.25 MG/1
50000 CAPSULE ORAL WEEKLY
Qty: 12 CAPSULE | Refills: 3 | Status: SHIPPED | OUTPATIENT
Start: 2024-08-08 | End: 2024-08-09 | Stop reason: SDUPTHER

## 2024-08-08 RX ORDER — LOSARTAN POTASSIUM AND HYDROCHLOROTHIAZIDE 12.5; 1 MG/1; MG/1
1 TABLET ORAL DAILY
Qty: 90 TABLET | Refills: 1 | OUTPATIENT
Start: 2024-08-08

## 2024-08-08 RX ORDER — INSULIN GLARGINE 100 [IU]/ML
INJECTION, SOLUTION SUBCUTANEOUS
Qty: 30 ML | Refills: 1 | Status: SHIPPED | OUTPATIENT
Start: 2024-08-08

## 2024-08-08 RX ORDER — ATORVASTATIN CALCIUM 40 MG/1
40 TABLET, FILM COATED ORAL
Qty: 90 TABLET | Refills: 1 | Status: SHIPPED | OUTPATIENT
Start: 2024-08-08 | End: 2024-08-09 | Stop reason: SDUPTHER

## 2024-08-08 RX ORDER — ATORVASTATIN CALCIUM 40 MG/1
40 TABLET, FILM COATED ORAL
Qty: 90 TABLET | Refills: 1 | OUTPATIENT
Start: 2024-08-08

## 2024-08-08 NOTE — PROGRESS NOTES
Subjective:      Patient ID: Renny Jean Baptiste Jr. is a 58 y.o. male.    58-year-old male with past medical history of diabetes mellitus type 2 that is insulin requiring, COPD, chronic tobacco abuse, rheumatoid arthritis,  diabetic neuropathy, hypertension presents for annual physical examination follow-up of chronic conditions.  Patient is still seeing podiatry.  He still is working despite having history of Charcot foot.  He is seeing rheumatologist.  He is presently on Cimzia.  Just recently got over COVID-19 infection and was awaiting his Cimzia injections while he was sick.  Labs reviewed which showed tremendous improvement in hemoglobin A1c which is down from 9.0 to 7.2%.  He is not experiencing significant hypoglycemic episodes.  He remains on all of his medications.  Still smoking cigarettes.  Still working.  Has not had follow-up with dermatology in a few years.  Had history of clear-cell hidradenoma.  Still smoking        Past Medical History:   Diagnosis Date   • Abscess of left thigh 05/11/2021   • Arthritis    • At risk for falls    • Biliary dyskinesia 03/16/2020   • Broken foot     right   • Cancer ([facility])    • Cataract     giacomo   • Cellulitis of left lower extremity 04/26/2021   • Clear cell hidradenoma    • COPD (chronic obstructive pulmonary disease) ([facility])    • Diabetes mellitus ([facility])    • Dyskinesia of gallbladder 08/15/2022   • Hx MRSA infection     Per wife patient hadf MRSa in a wound awhile ago   • Hyperlipidemia    • Hypertension    • Kidney stone    • Neuropathy    • RA (rheumatoid arthritis) ([facility])    • Rheumatoid arthritis ([facility])    • Seasonal allergies    • Sepsis ([facility]) 04/26/2021   • Snores    • Uses wheelchair     and crutches- NWB RLE   • Wears glasses        Family History   Problem Relation Age of Onset   • Diabetes Mother    • Stroke Mother    • Mental illness Mother    • Arthritis Mother    • Diabetes Father    • Stroke Father    • Arthritis Father    • COPD Father    • Vision loss  Father    • Alcohol abuse Brother    • Coronary artery disease Family         Age 50-51   • Diabetes type II Family    • Heart disease Family        Past Surgical History:   Procedure Laterality Date   • APPENDECTOMY     • CLOSED REDUCTION FOOT DISLOCATION Right 2/12/2019    Procedure: C/R FRACTURE;  Surgeon: Phillip Lentz DPM;  [facility];  Service: Podiatry   • COLONOSCOPY     • FOOT SURGERY Right 07/2019    Removal of the bone graft and cleaned out infection, and placed new bone graft   • IR PICC PLACEMENT SINGLE LUMEN  8/5/2019   • OH DEBRIDEMENT SUBCUTANEOUS TISSUE 1ST 20 SQ CM/< Left 5/24/2021    Procedure: INCISION AND DRAINAGE THIGH;  Surgeon: Rom Figueroa MD;  [facility];  Service: Orthopedics   • OH EXCISION MALIGNANT LESION S/N/H/F/G 0.5 CM/< N/A 3/28/2018    Procedure: EXCISION WIDE LESION HEAD/FACIAL/NECK;  Surgeon: Dank Petersen MD;  [facility];  Service: Surgical Oncology   • OH GASTROCNEMIUS RECESSION Right 2/12/2019    Procedure: ENDO GASTROC RECESSION, APPLICATION OF EXTERNAL FIXATOR;  Surgeon: Phillip Lentz DPM;  [facility];  Service: Podiatry   • OH LAPAROSCOPY SURG CHOLECYSTECTOMY N/A 9/30/2022    Procedure: CHOLECYSTECTOMY LAPAROSCOPIC;  Surgeon: Pk Mayo MD;  [facility];  Service: General   • OH REMOVAL EXTERNAL FIXATION SYSTEM UNDER ANES Right 4/18/2019    Procedure: FRAME REMOVAL HARDWARE FOOT WITH APPLICATION OF GRAFT;  Surgeon: Phillip Lentz DPM;  [facility];  Service: Podiatry   • SKIN BIOPSY      scalp   • WISDOM TOOTH EXTRACTION  1998   • WOUND DEBRIDEMENT Left 4/29/2021    Procedure: KNEE INCISION AND DRAINAGE, EXCISIONAL DEBRIDEMENT OF PREPATELLAR BURSA;  Surgeon: Rom Figueroa MD;  [facility];  Service: Orthopedics        reports that he has been smoking cigarettes. He has a 80 pack-year smoking history. He has never used smokeless tobacco. He reports current alcohol use. He reports current drug  use. Frequency: 7.00 times per week. Drug: Marijuana.      Current Outpatient Medications:   •  albuterol (PROVENTIL HFA,VENTOLIN HFA) 90 mcg/act inhaler, INHALE 2 PUFFS EVERY 4 (FOUR) HOURS AS NEEDED FOR WHEEZING, Disp: 18 g, Rfl: 5  •  amitriptyline (ELAVIL) 10 mg tablet, TAKE 1 TABLET (10 MG TOTAL) BY MOUTH DAILY AT BEDTIME, Disp: 60 tablet, Rfl: 5  •  Ascorbic Acid (vitamin C) 1000 MG tablet, Take 1,000 mg by mouth daily, Disp: , Rfl:   •  atorvastatin (LIPITOR) 40 mg tablet, Take 1 tablet (40 mg total) by mouth daily at bedtime, Disp: 90 tablet, Rfl: 1  •  BD Pen Needle Maureen U/F 32G X 4 MM MISC, ONE PEN NEEDLE THREE TIMES A DAY 2-BASAGLAR 1- VICTOZA, Disp: 100 each, Rfl: 5  •  calcium carbonate (OS-NERY) 600 MG tablet, Take 600 mg by mouth daily with dinner, Disp: , Rfl:   •  Certolizumab Pegol (Cimzia Starter Kit) 6 X 200 MG/ML PSKT, Inject 400 mg (2 mL) under the skin on day 0, day 14, and day 28, then switch to maintenance dose, Disp: 1 kit, Rfl: 0  •  Certolizumab Pegol (Cimzia) 2 x 200 mg/mL, Inject 200 mg (1 syringe) under the skin every other week., Disp: 1 kit, Rfl: 11  •  Continuous Blood Gluc Sensor (FreeStyle Laura 2 Sensor) MISC, Check blood sugars multiple times per day, Disp: 6 each, Rfl: 3  •  Diclofenac Sodium (VOLTAREN) 1 %, Apply 2 g topically 4 (four) times a day as needed (pain), Disp: 350 g, Rfl: 2  •  ergocalciferol (VITAMIN D2) 50,000 units, Take 1 capsule (50,000 Units total) by mouth once a week Takes on Saturday, Disp: 12 capsule, Rfl: 3  •  fluticasone (FLONASE) 50 mcg/act nasal spray, USE 2 SPRAYS IN EACH NOSTRIL ONCE DAILY, Disp: 16 g, Rfl: 11  •  HYDROcodone-acetaminophen (Norco) 5-325 mg per tablet, Take 1 tablet by mouth every 6 (six) hours as needed for pain Max Daily Amount: 4 tablets, Disp: 60 tablet, Rfl: 0  •  hydroxychloroquine (PLAQUENIL) 200 mg tablet, Take 1 tablet (200 mg total) by mouth 2 (two) times a day, Disp: 60 tablet, Rfl: 5  •  leflunomide (ARAVA) 20 MG tablet,  Take 1 tablet (20 mg total) by mouth daily, Disp: 90 tablet, Rfl: 2  •  liraglutide (Victoza) injection, INJECT 0.3 ML (1.8 MG TOTAL) UNDER THE SKIN DAILY, Disp: 9 mL, Rfl: 5  •  losartan-hydrochlorothiazide (HYZAAR) 100-12.5 MG per tablet, Take 1 tablet by mouth daily, Disp: 90 tablet, Rfl: 1  •  naproxen (NAPROSYN) 500 mg tablet, Take 1 tablet (500 mg total) by mouth every 12 (twelve) hours as needed for mild pain (with food), Disp: 60 tablet, Rfl: 2  •  pantoprazole (PROTONIX) 40 mg tablet, TAKE 1 TABLET (40 MG TOTAL) BY MOUTH DAILY, Disp: 30 tablet, Rfl: 5  •  saccharomyces boulardii (FLORASTOR) 250 mg capsule, Take 1 capsule (250 mg total) by mouth 2 (two) times a day, Disp: 60 capsule, Rfl: 0  •  sucralfate (CARAFATE) 1 g tablet, TAKE 1 TABLET (1 G TOTAL) BY MOUTH 4 (FOUR) TIMES A DAY, Disp: 120 tablet, Rfl: 0  •  Accu-Chek FastClix Lancets MISC, , Disp: , Rfl:   •  Accu-Chek FastClix Lancets MISC, TEST BLOOD SUGAR FOUR TIMES A DAY BEFORE MEALS AND BEDTIME (Patient not taking: Reported on 11/13/2023), Disp: 102 each, Rfl: 3  •  Basaglar KwikPen 100 units/mL SOPN, INJECT 50 UNITS UNDER THE SKIN EVERY 12 (TWELVE) HOURS, Disp: 30 mL, Rfl: 1  •  Blood Glucose Monitoring Suppl (OneTouch Verio Flex System) w/Device KIT, Use 3 (three) times a day with meals (Patient not taking: Reported on 11/13/2023), Disp: 1 kit, Rfl: 0  •  olopatadine (PATANOL) 0.1 % ophthalmic solution, Administer 1 drop to both eyes in the morning and 1 drop in the evening. (Patient not taking: Reported on 8/8/2024), Disp: 5 mL, Rfl: 1  •  OneTouch Verio test strip, CHECK GLUCOSE BEFORE MEALS (Patient not taking: Reported on 11/13/2023), Disp: 100 strip, Rfl: 3  •  ULTICARE MICRO PEN NEEDLES 32G X 4 MM MISC, INJECT UNDER THE SKIN 3 (THREE) TIMES A DAY (Patient not taking: Reported on 11/13/2023), Disp: 100 each, Rfl: 3    The following portions of the patient's history were reviewed and updated as appropriate: allergies, current medications,  past family history, past medical history, past social history, past surgical history and problem list.    Review of Systems   Constitutional: Negative.    HENT: Negative.     Eyes: Negative.    Respiratory: Negative.     Cardiovascular: Negative.    Gastrointestinal: Negative.    Endocrine: Negative.    Genitourinary: Negative.    Musculoskeletal:  Positive for arthralgias and joint swelling.   Skin: Negative.    Allergic/Immunologic: Negative.    Neurological: Negative.    Hematological: Negative.    Psychiatric/Behavioral: Negative.     All other systems reviewed and are negative.          Objective:    /78   Pulse 96   Resp 16   Ht 6' (1.829 m)   Wt 110 kg (243 lb)   SpO2 97%   BMI 32.96 kg/m²      Physical Exam  Vitals and nursing note reviewed.   Constitutional:       General: He is not in acute distress.     Appearance: Normal appearance. He is well-developed. He is obese. He is not ill-appearing.   HENT:      Head: Normocephalic and atraumatic.      Right Ear: Tympanic membrane, ear canal and external ear normal.      Left Ear: Tympanic membrane, ear canal and external ear normal.      Nose: Nose normal.      Mouth/Throat:      Mouth: Mucous membranes are moist.   Eyes:      Extraocular Movements: Extraocular movements intact.      Pupils: Pupils are equal, round, and reactive to light.      Comments: Allergic conjunctivitis bilaterally   Cardiovascular:      Rate and Rhythm: Normal rate and regular rhythm.      Pulses: Normal pulses.      Heart sounds: Normal heart sounds. No murmur heard.  Pulmonary:      Effort: Pulmonary effort is normal.      Breath sounds: Normal breath sounds.   Abdominal:      General: Abdomen is flat. Bowel sounds are normal. There is no distension.      Palpations: Abdomen is soft. There is no mass.   Musculoskeletal:         General: Deformity (Rheumatic nodules) present. Normal range of motion.      Cervical back: Normal range of motion and neck supple.      Right  lower leg: No edema.      Left lower leg: No edema.   Skin:     General: Skin is warm and dry.   Neurological:      General: No focal deficit present.      Mental Status: He is alert and oriented to person, place, and time.      Cranial Nerves: No cranial nerve deficit.   Psychiatric:         Mood and Affect: Mood normal.         Behavior: Behavior normal.         Thought Content: Thought content normal.         Judgment: Judgment normal.           Recent Results (from the past 1008 hour(s))   Comprehensive metabolic panel    Collection Time: 08/07/24  8:45 AM   Result Value Ref Range    Sodium 141 135 - 147 mmol/L    Potassium 4.3 3.5 - 5.3 mmol/L    Chloride 103 96 - 108 mmol/L    CO2 30 21 - 32 mmol/L    ANION GAP 8 4 - 13 mmol/L    BUN 15 5 - 25 mg/dL    Creatinine 1.03 0.60 - 1.30 mg/dL    Glucose, Fasting 97 65 - 99 mg/dL    Calcium 9.1 8.4 - 10.2 mg/dL    AST 26 13 - 39 U/L    ALT 35 7 - 52 U/L    Alkaline Phosphatase 44 34 - 104 U/L    Total Protein 6.6 6.4 - 8.4 g/dL    Albumin 3.7 3.5 - 5.0 g/dL    Total Bilirubin 0.60 0.20 - 1.00 mg/dL    eGFR 80 ml/min/1.73sq m   Albumin / creatinine urine ratio    Collection Time: 08/07/24  8:45 AM   Result Value Ref Range    Creatinine, Ur 240.6 Reference range not established. mg/dL    Albumin,U,Random 11.9 <20.0 mg/L    Albumin Creat Ratio 5 0 - 30 mg/g creatinine   CBC and differential    Collection Time: 08/07/24  8:45 AM   Result Value Ref Range    WBC 7.16 4.31 - 10.16 Thousand/uL    RBC 5.31 3.88 - 5.62 Million/uL    Hemoglobin 15.9 12.0 - 17.0 g/dL    Hematocrit 47.7 36.5 - 49.3 %    MCV 90 82 - 98 fL    MCH 29.9 26.8 - 34.3 pg    MCHC 33.3 31.4 - 37.4 g/dL    RDW 13.6 11.6 - 15.1 %    MPV 11.7 8.9 - 12.7 fL    Platelets 222 149 - 390 Thousands/uL    nRBC 0 /100 WBCs    Segmented % 57 43 - 75 %    Immature Grans % 1 0 - 2 %    Lymphocytes % 24 14 - 44 %    Monocytes % 14 (H) 4 - 12 %    Eosinophils Relative 3 0 - 6 %    Basophils Relative 1 0 - 1 %    Absolute  Neutrophils 4.09 1.85 - 7.62 Thousands/µL    Absolute Immature Grans 0.06 0.00 - 0.20 Thousand/uL    Absolute Lymphocytes 1.74 0.60 - 4.47 Thousands/µL    Absolute Monocytes 1.00 0.17 - 1.22 Thousand/µL    Eosinophils Absolute 0.18 0.00 - 0.61 Thousand/µL    Basophils Absolute 0.09 0.00 - 0.10 Thousands/µL   Hemoglobin A1C    Collection Time: 08/07/24  8:45 AM   Result Value Ref Range    Hemoglobin A1C 7.2 (H) Normal 4.0-5.6%; PreDiabetic 5.7-6.4%; Diabetic >=6.5%; Glycemic control for adults with diabetes <7.0% %     mg/dl   Lipid panel    Collection Time: 08/07/24  8:45 AM   Result Value Ref Range    Cholesterol 131 See Comment mg/dL    Triglycerides 145 See Comment mg/dL    HDL, Direct 41 >=40 mg/dL    LDL Calculated 61 0 - 100 mg/dL    Non-HDL-Chol (CHOL-HDL) 90 mg/dl   TSH, 3rd generation with Free T4 reflex    Collection Time: 08/07/24  8:45 AM   Result Value Ref Range    TSH 3RD GENERATON 1.103 0.450 - 4.500 uIU/mL   Vitamin D 25 hydroxy    Collection Time: 08/07/24  8:45 AM   Result Value Ref Range    Vit D, 25-Hydroxy 29.6 (L) 30.0 - 100.0 ng/mL   Bilirubin, direct    Collection Time: 08/07/24  8:45 AM   Result Value Ref Range    Bilirubin, Direct 0.13 0.00 - 0.20 mg/dL       Assessment/Plan:    Benign essential hypertension  Continue on losartan/hydrochlorothiazide.  Reevaluate in 4 months    Type 2 diabetes mellitus with diabetic neuropathic arthropathy, with long-term current use of insulin ([facility])    Lab Results   Component Value Date    HGBA1C 7.2 (H) 08/07/2024     Patient remains on Victoza as well as basal insulin with Basaglar.  Significant improvement in hemoglobin A1c.  Complicated diabetic who has history of diabetic neuropathy, retinopathy.  He is seeing both podiatry as well as ophthalmology    Seropositive rheumatoid arthritis (HCC)  Managed by rheumatology    Clear cell hidradenoma  Prior scalp lesions.  Needs follow-up with dermatology.  Order placed yet again for dermatology.  He was  established patient    Tobacco use  Yet again counseled on the benefits of smoking cessation and life    Vitamin D deficiency  Continue on vitamin D supplementation.  Repeat vitamin D level in 4 months    Mixed hyperlipidemia  Cholesterol is adequately controlled.  Continue on atorvastatin 40 mg at bedtime.  Reevaluate lipid panel in 4 months          Problem List Items Addressed This Visit        Cardiovascular and Mediastinum    Benign essential hypertension     Continue on losartan/hydrochlorothiazide.  Reevaluate in 4 months         Relevant Medications    losartan-hydrochlorothiazide (HYZAAR) 100-12.5 MG per tablet    RESOLVED: Carotid artery calcification       Endocrine    Type 2 diabetes mellitus with diabetic neuropathic arthropathy, with long-term current use of insulin ([facility])       Lab Results   Component Value Date    HGBA1C 7.2 (H) 08/07/2024     Patient remains on Victoza as well as basal insulin with Basaglar.  Significant improvement in hemoglobin A1c.  Complicated diabetic who has history of diabetic neuropathy, retinopathy.  He is seeing both podiatry as well as ophthalmology         Relevant Orders    Albumin / creatinine urine ratio    Hemoglobin A1C       Musculoskeletal and Integument    Clear cell hidradenoma     Prior scalp lesions.  Needs follow-up with dermatology.  Order placed yet again for dermatology.  He was established patient         Relevant Orders    Ambulatory Referral to Dermatology    Seropositive rheumatoid arthritis (HCC)     Managed by rheumatology            Behavioral Health    Tobacco use     Yet again counseled on the benefits of smoking cessation and life            Other    Mixed hyperlipidemia     Cholesterol is adequately controlled.  Continue on atorvastatin 40 mg at bedtime.  Reevaluate lipid panel in 4 months         Relevant Medications    atorvastatin (LIPITOR) 40 mg tablet    Other Relevant Orders    Lipid panel    Comprehensive metabolic panel    Skin cancer  screening    Relevant Orders    Ambulatory Referral to Dermatology    Vitamin D deficiency     Continue on vitamin D supplementation.  Repeat vitamin D level in 4 months         Relevant Medications    ergocalciferol (VITAMIN D2) 50,000 units   Other Visit Diagnoses     Essential hypertension    -  Primary    Relevant Medications    losartan-hydrochlorothiazide (HYZAAR) 100-12.5 MG per tablet    Other Relevant Orders    CBC and differential    TSH, 3rd generation with Free T4 reflex

## 2024-08-09 DIAGNOSIS — I10 ESSENTIAL HYPERTENSION: ICD-10-CM

## 2024-08-09 DIAGNOSIS — E78.2 MIXED HYPERLIPIDEMIA: ICD-10-CM

## 2024-08-09 DIAGNOSIS — E55.9 VITAMIN D DEFICIENCY: ICD-10-CM

## 2024-08-09 PROBLEM — I65.29 CAROTID ARTERY CALCIFICATION: Status: RESOLVED | Noted: 2017-08-30 | Resolved: 2024-08-09

## 2024-08-09 RX ORDER — ERGOCALCIFEROL 1.25 MG/1
50000 CAPSULE ORAL WEEKLY
Qty: 12 CAPSULE | Refills: 3 | Status: SHIPPED | OUTPATIENT
Start: 2024-08-09

## 2024-08-09 RX ORDER — LOSARTAN POTASSIUM AND HYDROCHLOROTHIAZIDE 12.5; 1 MG/1; MG/1
1 TABLET ORAL DAILY
Qty: 90 TABLET | Refills: 1 | Status: SHIPPED | OUTPATIENT
Start: 2024-08-09

## 2024-08-09 RX ORDER — ATORVASTATIN CALCIUM 40 MG/1
40 TABLET, FILM COATED ORAL
Qty: 90 TABLET | Refills: 1 | Status: SHIPPED | OUTPATIENT
Start: 2024-08-09

## 2024-08-09 NOTE — ASSESSMENT & PLAN NOTE
Prior scalp lesions.  Needs follow-up with dermatology.  Order placed yet again for dermatology.  He was established patient

## 2024-08-09 NOTE — ASSESSMENT & PLAN NOTE
Lab Results   Component Value Date    HGBA1C 7.2 (H) 08/07/2024     Patient remains on Victoza as well as basal insulin with Basaglar.  Significant improvement in hemoglobin A1c.  Complicated diabetic who has history of diabetic neuropathy, retinopathy.  He is seeing both podiatry as well as ophthalmology

## 2024-09-06 DIAGNOSIS — M54.12 CERVICAL RADICULOPATHY: ICD-10-CM

## 2024-09-06 DIAGNOSIS — M05.9 SEROPOSITIVE RHEUMATOID ARTHRITIS (HCC): ICD-10-CM

## 2024-09-06 RX ORDER — NAPROXEN 500 MG/1
500 TABLET ORAL EVERY 12 HOURS PRN
Qty: 60 TABLET | Refills: 5 | Status: SHIPPED | OUTPATIENT
Start: 2024-09-06

## 2024-09-07 PROBLEM — Z12.83 SKIN CANCER SCREENING: Status: RESOLVED | Noted: 2024-08-08 | Resolved: 2024-09-07

## 2024-09-19 ENCOUNTER — VBI (OUTPATIENT)
Dept: ADMINISTRATIVE | Facility: OTHER | Age: 59
End: 2024-09-19

## 2024-09-19 NOTE — TELEPHONE ENCOUNTER
09/19/24 12:29 PM     Chart reviewed for Diabetic Eye Exam ; nothing is submitted to the patient's insurance at this time.     Renetta Nguyen   [facility]  
Statement Selected

## 2024-09-27 ENCOUNTER — OFFICE VISIT (OUTPATIENT)
Dept: RHEUMATOLOGY | Facility: CLINIC | Age: 59
End: 2024-09-27
Payer: COMMERCIAL

## 2024-09-27 VITALS
SYSTOLIC BLOOD PRESSURE: 154 MMHG | WEIGHT: 246 LBS | DIASTOLIC BLOOD PRESSURE: 88 MMHG | OXYGEN SATURATION: 97 % | HEIGHT: 72 IN | BODY MASS INDEX: 33.32 KG/M2 | HEART RATE: 92 BPM

## 2024-09-27 DIAGNOSIS — Z79.60 LONG-TERM USE OF IMMUNOSUPPRESSANT MEDICATION: ICD-10-CM

## 2024-09-27 DIAGNOSIS — M05.9 SEROPOSITIVE RHEUMATOID ARTHRITIS (HCC): Primary | ICD-10-CM

## 2024-09-27 PROCEDURE — 99215 OFFICE O/P EST HI 40 MIN: CPT | Performed by: STUDENT IN AN ORGANIZED HEALTH CARE EDUCATION/TRAINING PROGRAM

## 2024-09-27 RX ORDER — VALSARTAN AND HYDROCHLOROTHIAZIDE 80; 12.5 MG/1; MG/1
TABLET, FILM COATED ORAL
COMMUNITY

## 2024-09-27 NOTE — PROGRESS NOTES
Ambulatory Visit  Name: Renny Jean Baptiste Jr.      : 1965      MRN: [id number]  Encounter Provider: Rm Kitchen DO  Encounter Date: 2024   Encounter department: [facility] [place]    Assessment & Plan  Seropositive rheumatoid arthritis (HCC)  His current hand symptoms and back symptoms don't sound like they're from this. Will try to further clarify the RA activity. If does not seem like RA is active then may be a nerve issue. If it seems like RA is active, will pursue Orencia, r:b discussed, he is amenable to IV loading --> subcutaneous     Will STOP Cimzia as it doesn't seem to be helping, and he is having a lot of issues with the injections  Orders:    US MSK limited; Future    C-reactive protein; Future    Sedimentation rate, automated; Future    Long-term use of immunosuppressant medication    Orders:    Glom Filt Rate, Estimated; Standing    Creatinine, serum; Standing    Calcium; Standing    Hepatic function panel; Standing    CBC and differential; Standing      History of Present Illness       Leflunomide monitoring labs good    Doesn't feel like cimzia has been helping    Gets bursts of severe pain in the hands, only lasts for about 30 sec at a time    Some stiffness in the joints, in back and hips as well    Lots of issues with Cimzia injections - needle bluntness and volume of medication    Permanent History: sero+ RA (, ACPA > 250). Had been on Humira in the past with no improvement. Had good result w Enbrel in the past but stopped due to infection then when restarted had lost efficacy. Enbrel retried when established with Dr. Dobbs in ; not effective so Xeljanz started and leflunomide stopped. Has also remained on hydroxychloroquine.     First saw me 2024, stopped Xeljanz due to ineffectiveness as well as cancer history. Restarted leflunomide, started Cimzia.    Objective     /88   Pulse 92   Ht 6' (1.829 m)   Wt 112 kg (246 lb)   SpO2  97%   BMI 33.36 kg/m²     General: Well appearing, in no distress.   Eyes: Sclera non-icteric. EOMI  Extremities: Warm, well perfused, no edema.   Neuro: Alert and oriented. No gross focal neurological deficits.   Skin: No rashes.  MSK exam: no significant tenderness to palpation bilateral hands or synovitis today

## 2024-09-27 NOTE — PATIENT INSTRUCTIONS
You should be contacted to set up an appointment for ultrasound. Please avoid antiinflammatories including NSAIDs and steroids for 3-5 days prior to the ultrasound.    STOP Cimzia    Please get blood work (ESR, CRP)    Based on the ultrasound and blood test results, we will decide if we should start Orencia.    Get blood work (GFR, creatinine, calcium, LFTs, CBC) every 3 months while on leflunomide.    While on your prescribed rheumatologic medication(s) leflunomide: you must STOP the medication if you fall ill (ex: a viral infection, bacterial infection) and not restart it until your illness is resolved and/or you are finished with any antibiotics/antivirals/antifungals that are prescribed for you, whichever happens last.    While on the medication(s), if you are to get a vaccine, you have to STOP the medication beforehand, and not restart it until at least 2 weeks after your vaccination. See below for how long to stop your medication according to how often you take it.    Frequency When to stop When to restart   More than once weekly (ex: every day or every other day) One week prior to vaccination Two weeks after vaccination   Once weekly One week prior to vaccination Two weeks after vaccination   Less than once weekly (ex: once every month) One dosing cycle prior to vaccination (ex: for a monthly drug, do not take for one month prior to your vaccination. For an every two week drug, do not take for two weeks prior to your vaccination.) Two weeks after vaccination

## 2024-09-27 NOTE — ASSESSMENT & PLAN NOTE
His current hand symptoms and back symptoms don't sound like they're from this. Will try to further clarify the RA activity. If does not seem like RA is active then may be a nerve issue. If it seems like RA is active, will pursue Orencia, r:kaykay discussed, he is amenable to IV loading --> subcutaneous     Will STOP Cimzia as it doesn't seem to be helping, and he is having a lot of issues with the injections  Orders:    US MSK limited; Future    C-reactive protein; Future    Sedimentation rate, automated; Future

## 2024-10-01 DIAGNOSIS — E11.610 CHARCOT FOOT DUE TO DIABETES MELLITUS (HCC): ICD-10-CM

## 2024-10-02 RX ORDER — HYDROCODONE BITARTRATE AND ACETAMINOPHEN 5; 325 MG/1; MG/1
1 TABLET ORAL EVERY 6 HOURS PRN
Qty: 60 TABLET | Refills: 0 | Status: SHIPPED | OUTPATIENT
Start: 2024-10-02

## 2024-10-10 DIAGNOSIS — M05.9 SEROPOSITIVE RHEUMATOID ARTHRITIS (HCC): ICD-10-CM

## 2024-10-10 DIAGNOSIS — E13.9 DIABETES MELLITUS OF OTHER TYPE WITHOUT COMPLICATION, UNSPECIFIED WHETHER LONG TERM INSULIN USE (HCC): ICD-10-CM

## 2024-10-10 DIAGNOSIS — E11.9 TYPE 2 DIABETES MELLITUS WITHOUT COMPLICATION, WITHOUT LONG-TERM CURRENT USE OF INSULIN (HCC): ICD-10-CM

## 2024-10-10 RX ORDER — INSULIN GLARGINE 100 [IU]/ML
INJECTION, SOLUTION SUBCUTANEOUS
Qty: 30 ML | Refills: 5 | Status: SHIPPED | OUTPATIENT
Start: 2024-10-10

## 2024-10-10 RX ORDER — FLUTICASONE PROPIONATE 50 MCG
SPRAY, SUSPENSION (ML) NASAL
Qty: 48 G | Refills: 1 | Status: SHIPPED | OUTPATIENT
Start: 2024-10-10

## 2024-10-10 RX ORDER — HYDROXYCHLOROQUINE SULFATE 200 MG/1
200 TABLET, FILM COATED ORAL 2 TIMES DAILY
Qty: 60 TABLET | Refills: 5 | Status: SHIPPED | OUTPATIENT
Start: 2024-10-10 | End: 2025-04-08

## 2024-10-10 RX ORDER — LEFLUNOMIDE 20 MG/1
20 TABLET ORAL DAILY
Qty: 30 TABLET | Refills: 2 | Status: SHIPPED | OUTPATIENT
Start: 2024-10-10

## 2024-11-04 DIAGNOSIS — M05.9 SEROPOSITIVE RHEUMATOID ARTHRITIS (HCC): ICD-10-CM

## 2024-11-04 DIAGNOSIS — Z79.899 HIGH RISK MEDICATION USE: ICD-10-CM

## 2024-11-04 DIAGNOSIS — K25.3 ACUTE GASTRIC ULCER WITHOUT HEMORRHAGE OR PERFORATION: ICD-10-CM

## 2024-11-04 RX ORDER — AMITRIPTYLINE HYDROCHLORIDE 10 MG/1
10 TABLET ORAL
Qty: 90 TABLET | Refills: 1 | Status: SHIPPED | OUTPATIENT
Start: 2024-11-04

## 2024-11-05 RX ORDER — PANTOPRAZOLE SODIUM 40 MG/1
40 TABLET, DELAYED RELEASE ORAL DAILY
Qty: 30 TABLET | Refills: 0 | Status: SHIPPED | OUTPATIENT
Start: 2024-11-05

## 2024-11-05 NOTE — TELEPHONE ENCOUNTER
Called the pt to schedule a fu ov for a medication check.  Pt spouse (of file) answered the phone and said she would call back to make the appt.,

## 2024-12-09 DIAGNOSIS — E11.610 CHARCOT FOOT DUE TO DIABETES MELLITUS (HCC): ICD-10-CM

## 2024-12-11 ENCOUNTER — TELEPHONE (OUTPATIENT)
Dept: FAMILY MEDICINE CLINIC | Facility: CLINIC | Age: 59
End: 2024-12-11

## 2024-12-11 RX ORDER — HYDROCODONE BITARTRATE AND ACETAMINOPHEN 5; 325 MG/1; MG/1
1 TABLET ORAL EVERY 6 HOURS PRN
Qty: 60 TABLET | Refills: 0 | Status: SHIPPED | OUTPATIENT
Start: 2024-12-11

## 2024-12-11 NOTE — TELEPHONE ENCOUNTER
"PA has been started for patient by the pharmacy for Hydrocodone-acetaminopher 5-325mg Tablets.    Login to [email or web address] and click \"enter a key\".    Enter the patient's last name, date of birth and the key.    Key: [id number]    Complete the PA and click \"send to plan\" for approval.    " [FreeTextEntry1] : 89yo Cuban speaking F accompanied by home aid with h/o CVA, copd/asthma, lung ca (s/p resection in remission), ischemic cardiomyopathy w/PPM EF 45%, vertigo, preDM, ?sarcoid here for f/u of CKD 3, HTN, hypercalcemia and hyperkalemia:  \par \par # CKD II/III - Cr stable\par bland u/a. Likey CKD 2/2 age, HTN, ICM\par Reviewed Aug labs, Cr stable, Hyperkalemia and hypercalcemia resolvedresolved\par \par # HTN -  well controlled if not low, advised aid to STOP metoprolol and cont Atenolol as she was accidentally taking both. If sbp <120, can hold amlodipine to avoid falls\par \par # ICM EF 45% with Afib/PPM - compensated\par \par \par \par

## 2024-12-12 NOTE — TELEPHONE ENCOUNTER
Pts wife called and said he needs this for the weekend because he is going away and is in pain.  She wants to know if prior auth could be done quickly so he can have them for the weekend.  Ty

## 2024-12-12 NOTE — TELEPHONE ENCOUNTER
PA hydrocodone-acetaminophen (Norco) 5-325 mg SUBMITTED     to [facility]    via    [x]CMM-KEY: [id number]  []Surescripts-Case ID #    []Availity-Auth ID #  NDC #    []Faxed to plan   []Other website    []Phone call Case ID #      []PA sent as URGENT    All office notes, labs and other pertaining documents and studies sent. Clinical questions answered. Awaiting determination from insurance company.     Turnaround time for your insurance to make a decision on your Prior Authorization can take 7-21 business days.

## 2024-12-13 ENCOUNTER — APPOINTMENT (OUTPATIENT)
Dept: LAB | Facility: CLINIC | Age: 59
End: 2024-12-13
Payer: COMMERCIAL

## 2024-12-13 ENCOUNTER — RESULTS FOLLOW-UP (OUTPATIENT)
Dept: RHEUMATOLOGY | Facility: CLINIC | Age: 59
End: 2024-12-13

## 2024-12-13 DIAGNOSIS — E11.610 CHARCOT FOOT DUE TO DIABETES MELLITUS (HCC): Primary | ICD-10-CM

## 2024-12-13 DIAGNOSIS — Z79.60 LONG-TERM USE OF IMMUNOSUPPRESSANT MEDICATION: ICD-10-CM

## 2024-12-13 DIAGNOSIS — E11.610 TYPE 2 DIABETES MELLITUS WITH DIABETIC NEUROPATHIC ARTHROPATHY, WITH LONG-TERM CURRENT USE OF INSULIN (HCC): ICD-10-CM

## 2024-12-13 DIAGNOSIS — E78.2 MIXED HYPERLIPIDEMIA: ICD-10-CM

## 2024-12-13 DIAGNOSIS — M05.9 SEROPOSITIVE RHEUMATOID ARTHRITIS (HCC): ICD-10-CM

## 2024-12-13 DIAGNOSIS — Z79.899 HIGH RISK MEDICATION USE: ICD-10-CM

## 2024-12-13 DIAGNOSIS — I10 ESSENTIAL HYPERTENSION: ICD-10-CM

## 2024-12-13 DIAGNOSIS — F11.90 NARCOTIC DRUG USE: ICD-10-CM

## 2024-12-13 DIAGNOSIS — Z79.4 TYPE 2 DIABETES MELLITUS WITH DIABETIC NEUROPATHIC ARTHROPATHY, WITH LONG-TERM CURRENT USE OF INSULIN (HCC): ICD-10-CM

## 2024-12-13 LAB
ALBUMIN SERPL BCG-MCNC: 4.1 G/DL (ref 3.5–5)
ALP SERPL-CCNC: 46 U/L (ref 34–104)
ALT SERPL W P-5'-P-CCNC: 33 U/L (ref 7–52)
ANION GAP SERPL CALCULATED.3IONS-SCNC: 10 MMOL/L (ref 4–13)
AST SERPL W P-5'-P-CCNC: 28 U/L (ref 13–39)
BASOPHILS # BLD AUTO: 0.08 THOUSANDS/ÂΜL (ref 0–0.1)
BASOPHILS NFR BLD AUTO: 1 % (ref 0–1)
BILIRUB DIRECT SERPL-MCNC: 0.13 MG/DL (ref 0–0.2)
BILIRUB SERPL-MCNC: 0.55 MG/DL (ref 0.2–1)
BUN SERPL-MCNC: 17 MG/DL (ref 5–25)
CALCIUM SERPL-MCNC: 9.4 MG/DL (ref 8.4–10.2)
CHLORIDE SERPL-SCNC: 100 MMOL/L (ref 96–108)
CO2 SERPL-SCNC: 27 MMOL/L (ref 21–32)
CREAT SERPL-MCNC: 0.98 MG/DL (ref 0.6–1.3)
CREAT UR-MCNC: 218.7 MG/DL
EOSINOPHIL # BLD AUTO: 0.25 THOUSAND/ÂΜL (ref 0–0.61)
EOSINOPHIL NFR BLD AUTO: 4 % (ref 0–6)
ERYTHROCYTE [DISTWIDTH] IN BLOOD BY AUTOMATED COUNT: 13.2 % (ref 11.6–15.1)
ERYTHROCYTE [SEDIMENTATION RATE] IN BLOOD: 30 MM/HOUR (ref 0–19)
EST. AVERAGE GLUCOSE BLD GHB EST-MCNC: 169 MG/DL
GFR SERPL CREATININE-BSD FRML MDRD: 85 ML/MIN/1.73SQ M
GLUCOSE P FAST SERPL-MCNC: 138 MG/DL (ref 65–99)
HBA1C MFR BLD: 7.5 %
HCT VFR BLD AUTO: 49.1 % (ref 36.5–49.3)
HGB BLD-MCNC: 16.3 G/DL (ref 12–17)
IMM GRANULOCYTES # BLD AUTO: 0.03 THOUSAND/UL (ref 0–0.2)
IMM GRANULOCYTES NFR BLD AUTO: 0 % (ref 0–2)
LYMPHOCYTES # BLD AUTO: 1.27 THOUSANDS/ÂΜL (ref 0.6–4.47)
LYMPHOCYTES NFR BLD AUTO: 18 % (ref 14–44)
MCH RBC QN AUTO: 29.7 PG (ref 26.8–34.3)
MCHC RBC AUTO-ENTMCNC: 33.2 G/DL (ref 31.4–37.4)
MCV RBC AUTO: 90 FL (ref 82–98)
MICROALBUMIN UR-MCNC: 14.4 MG/L
MICROALBUMIN/CREAT 24H UR: 7 MG/G CREATININE (ref 0–30)
MONOCYTES # BLD AUTO: 1.1 THOUSAND/ÂΜL (ref 0.17–1.22)
MONOCYTES NFR BLD AUTO: 16 % (ref 4–12)
NEUTROPHILS # BLD AUTO: 4.27 THOUSANDS/ÂΜL (ref 1.85–7.62)
NEUTS SEG NFR BLD AUTO: 61 % (ref 43–75)
NRBC BLD AUTO-RTO: 0 /100 WBCS
PLATELET # BLD AUTO: 218 THOUSANDS/UL (ref 149–390)
PMV BLD AUTO: 11 FL (ref 8.9–12.7)
POTASSIUM SERPL-SCNC: 4.3 MMOL/L (ref 3.5–5.3)
PROT SERPL-MCNC: 7.1 G/DL (ref 6.4–8.4)
RBC # BLD AUTO: 5.48 MILLION/UL (ref 3.88–5.62)
SODIUM SERPL-SCNC: 137 MMOL/L (ref 135–147)
TSH SERPL DL<=0.05 MIU/L-ACNC: 2.75 UIU/ML (ref 0.45–4.5)
WBC # BLD AUTO: 7 THOUSAND/UL (ref 4.31–10.16)

## 2024-12-13 PROCEDURE — 85025 COMPLETE CBC W/AUTO DIFF WBC: CPT

## 2024-12-13 PROCEDURE — 80053 COMPREHEN METABOLIC PANEL: CPT

## 2024-12-13 PROCEDURE — 82248 BILIRUBIN DIRECT: CPT

## 2024-12-13 PROCEDURE — 84443 ASSAY THYROID STIM HORMONE: CPT

## 2024-12-13 PROCEDURE — 83036 HEMOGLOBIN GLYCOSYLATED A1C: CPT

## 2024-12-13 PROCEDURE — 85652 RBC SED RATE AUTOMATED: CPT

## 2024-12-13 PROCEDURE — 82570 ASSAY OF URINE CREATININE: CPT

## 2024-12-13 PROCEDURE — 82043 UR ALBUMIN QUANTITATIVE: CPT

## 2024-12-13 PROCEDURE — 36415 COLL VENOUS BLD VENIPUNCTURE: CPT

## 2024-12-13 NOTE — TELEPHONE ENCOUNTER
PA hydrocodone-acetaminophen (Norco) 5-325 mg DENIED    Reason:(Screenshot if applicable)        Message sent to office clinical pool Yes    Denial letter scanned into Media Yes    Appeal started No (Provider will need to decide if appeal is warranted and send clinical documentation to Prior Authorization Team for initiation.)    **Please follow up with your patient regarding denial and next steps**

## 2024-12-16 ENCOUNTER — OFFICE VISIT (OUTPATIENT)
Dept: FAMILY MEDICINE CLINIC | Facility: CLINIC | Age: 59
End: 2024-12-16
Payer: COMMERCIAL

## 2024-12-16 VITALS
HEART RATE: 96 BPM | WEIGHT: 242 LBS | HEIGHT: 72 IN | BODY MASS INDEX: 32.78 KG/M2 | RESPIRATION RATE: 18 BRPM | SYSTOLIC BLOOD PRESSURE: 138 MMHG | OXYGEN SATURATION: 99 % | DIASTOLIC BLOOD PRESSURE: 78 MMHG

## 2024-12-16 DIAGNOSIS — E11.610 CHARCOT FOOT DUE TO DIABETES MELLITUS (HCC): ICD-10-CM

## 2024-12-16 DIAGNOSIS — E55.9 VITAMIN D DEFICIENCY: ICD-10-CM

## 2024-12-16 DIAGNOSIS — L90.5 FACIAL SCAR: ICD-10-CM

## 2024-12-16 DIAGNOSIS — Z12.5 PROSTATE CANCER SCREENING: ICD-10-CM

## 2024-12-16 DIAGNOSIS — Z79.4 TYPE 2 DIABETES MELLITUS WITH DIABETIC NEUROPATHIC ARTHROPATHY, WITH LONG-TERM CURRENT USE OF INSULIN (HCC): ICD-10-CM

## 2024-12-16 DIAGNOSIS — M54.12 CERVICAL RADICULOPATHY: ICD-10-CM

## 2024-12-16 DIAGNOSIS — Z72.0 TOBACCO USE: ICD-10-CM

## 2024-12-16 DIAGNOSIS — Z23 ENCOUNTER FOR IMMUNIZATION: ICD-10-CM

## 2024-12-16 DIAGNOSIS — M48.02 CERVICAL SPINAL STENOSIS: ICD-10-CM

## 2024-12-16 DIAGNOSIS — E78.2 MIXED HYPERLIPIDEMIA: ICD-10-CM

## 2024-12-16 DIAGNOSIS — Z00.00 PHYSICAL EXAM, ANNUAL: Primary | ICD-10-CM

## 2024-12-16 DIAGNOSIS — I10 BENIGN ESSENTIAL HYPERTENSION: ICD-10-CM

## 2024-12-16 DIAGNOSIS — F12.90 MARIJUANA USE: ICD-10-CM

## 2024-12-16 DIAGNOSIS — F11.90 NARCOTIC DRUG USE: ICD-10-CM

## 2024-12-16 DIAGNOSIS — D23.9 CLEAR CELL HIDRADENOMA: ICD-10-CM

## 2024-12-16 DIAGNOSIS — E11.610 TYPE 2 DIABETES MELLITUS WITH DIABETIC NEUROPATHIC ARTHROPATHY, WITH LONG-TERM CURRENT USE OF INSULIN (HCC): ICD-10-CM

## 2024-12-16 PROCEDURE — 99214 OFFICE O/P EST MOD 30 MIN: CPT | Performed by: FAMILY MEDICINE

## 2024-12-16 PROCEDURE — 90673 RIV3 VACCINE NO PRESERV IM: CPT | Performed by: FAMILY MEDICINE

## 2024-12-16 PROCEDURE — 90471 IMMUNIZATION ADMIN: CPT | Performed by: FAMILY MEDICINE

## 2024-12-16 PROCEDURE — 99396 PREV VISIT EST AGE 40-64: CPT | Performed by: FAMILY MEDICINE

## 2024-12-16 PROCEDURE — 90677 PCV20 VACCINE IM: CPT | Performed by: FAMILY MEDICINE

## 2024-12-16 PROCEDURE — 90472 IMMUNIZATION ADMIN EACH ADD: CPT | Performed by: FAMILY MEDICINE

## 2024-12-16 RX ORDER — ATORVASTATIN CALCIUM 40 MG/1
40 TABLET, FILM COATED ORAL
Qty: 90 TABLET | Refills: 1 | Status: SHIPPED | OUTPATIENT
Start: 2024-12-16

## 2024-12-16 NOTE — ASSESSMENT & PLAN NOTE
Probably needs revision of the scar.  I believe that some of his facial hairs are caught in the scar which is causing it to become inflamed.  Once again needs to be seen by dermatology       Media Information      Document Information    Clinical Image - Mobile Device   Left facial scar   12/16/2024 5:32 PM   Attached To:   Office Visit on 12/16/24 with Neftali Vargas DO   Source Information    Neftali Vargas DO  [facility]   Document History

## 2024-12-16 NOTE — PROGRESS NOTES
Subjective:      Patient ID: Renny Jean Baptiste Jr. is a 58 y.o. male.    58-year-old male with past medical history of diabetes mellitus type 2 that is insulin requiring, COPD, chronic tobacco abuse, rheumatoid arthritis,  diabetic neuropathy, hypertension presents for annual physical examination follow-up of chronic conditions.  Patient is still seeing podiatry.  He still is working despite having history of Charcot foot.  He is seeing rheumatologist.  No longer on biologic agent.  Remains on Plaquenil.  Continues to complain of diffuse, chronic pain.  Insurance requires him to have urine drug screening now.  He does smoke marijuana which he openly admits to.  Does not use any other illicit substances.  Patient complains of significant neck pain.  He has had neck pain previously.  Significant DJD and facet arthritis noted on MRI.  He was referred to neurosurgery to discuss having fusion done but he cannot afford to be out of work.  He works hanging [employer] and does a lot of overhead work.  He will get pain as well as paresthesia into his arms especially when sleeping.  This will wake him up at night.  Patient also complains of tenderness and irritation on the left facial scar.  He did have excision performed by dermatology years back.  History of clear-cell hidradenoma.  Has not had follow-up with dermatology.  Labs reviewed.  Hemoglobin A1c increased from 7.2 to 7.5%.  He attributes this to some dietary indiscretion over the holidays no significant microalbumin in his urine.        Past Medical History:   Diagnosis Date   • Abscess of left thigh 05/11/2021   • Arthritis    • At risk for falls    • Biliary dyskinesia 03/16/2020   • Broken foot     right   • Cancer (HCC)    • Cataract     giacomo   • Cellulitis of left lower extremity 04/26/2021   • Clear cell hidradenoma    • COPD (chronic obstructive pulmonary disease) (HCC)    • Diabetes mellitus (HCC)    • Dyskinesia of gallbladder 08/15/2022   • Hx MRSA infection      Per wife patient hadf MRSa in a wound awhile ago   • Hyperlipidemia    • Hypertension    • Kidney stone    • Neuropathy    • RA (rheumatoid arthritis) ([facility])    • Rheumatoid arthritis (HCC)    • Seasonal allergies    • Sepsis ([facility]) 04/26/2021   • Snores    • Uses wheelchair     and crutches- NWB RLE   • Wears glasses        Family History   Problem Relation Age of Onset   • Diabetes Mother    • Stroke Mother    • Mental illness Mother    • Arthritis Mother    • Diabetes Father    • Stroke Father    • Arthritis Father    • COPD Father    • Vision loss Father    • Alcohol abuse Brother    • Coronary artery disease Family         Age 50-51   • Diabetes type II Family    • Heart disease Family        Past Surgical History:   Procedure Laterality Date   • APPENDECTOMY     • CLOSED REDUCTION FOOT DISLOCATION Right 2/12/2019    Procedure: C/R FRACTURE;  Surgeon: Phillip Lentz DPM;  [facility];  Service: Podiatry   • COLONOSCOPY     • FOOT SURGERY Right 07/2019    Removal of the bone graft and cleaned out infection, and placed new bone graft   • IR PICC PLACEMENT SINGLE LUMEN  8/5/2019   • DE DEBRIDEMENT SUBCUTANEOUS TISSUE 1ST 20 SQ CM/< Left 5/24/2021    Procedure: INCISION AND DRAINAGE THIGH;  Surgeon: Rom Figueroa MD;  [facility];  Service: Orthopedics   • DE EXCISION MALIGNANT LESION S/N/H/F/G 0.5 CM/< N/A 3/28/2018    Procedure: EXCISION WIDE LESION HEAD/FACIAL/NECK;  Surgeon: Dank Petersen MD;  [facility];  Service: Surgical Oncology   • DE GASTROCNEMIUS RECESSION Right 2/12/2019    Procedure: ENDO GASTROC RECESSION, APPLICATION OF EXTERNAL FIXATOR;  Surgeon: Phillip Lentz DPM;  [facility];  Service: Podiatry   • DE LAPAROSCOPY SURG CHOLECYSTECTOMY N/A 9/30/2022    Procedure: CHOLECYSTECTOMY LAPAROSCOPIC;  Surgeon: Pk Mayo MD;  [facility];  Service: General   • DE REMOVAL EXTERNAL FIXATION SYSTEM UNDER ANES Right 4/18/2019    Procedure: FRAME  REMOVAL HARDWARE FOOT WITH APPLICATION OF GRAFT;  Surgeon: Phillip Lentz DPM;  [facility];  Service: Podiatry   • SKIN BIOPSY      scalp   • WISDOM TOOTH EXTRACTION  1998   • WOUND DEBRIDEMENT Left 4/29/2021    Procedure: KNEE INCISION AND DRAINAGE, EXCISIONAL DEBRIDEMENT OF PREPATELLAR BURSA;  Surgeon: Rom Figueroa MD;  [facility];  Service: Orthopedics        reports that he has been smoking cigarettes. He has a 80 pack-year smoking history. He has never used smokeless tobacco. He reports current alcohol use. He reports current drug use. Frequency: 7.00 times per week. Drug: Marijuana.      Current Outpatient Medications:   •  albuterol (PROVENTIL HFA,VENTOLIN HFA) 90 mcg/act inhaler, INHALE 2 PUFFS EVERY 4 (FOUR) HOURS AS NEEDED FOR WHEEZING, Disp: 18 g, Rfl: 5  •  amitriptyline (ELAVIL) 10 mg tablet, TAKE 1 TABLET (10 MG TOTAL) BY MOUTH DAILY AT BEDTIME, Disp: 90 tablet, Rfl: 1  •  Ascorbic Acid (vitamin C) 1000 MG tablet, Take 1,000 mg by mouth daily, Disp: , Rfl:   •  atorvastatin (LIPITOR) 40 mg tablet, Take 1 tablet (40 mg total) by mouth daily at bedtime, Disp: 90 tablet, Rfl: 1  •  BD Pen Needle Maureen U/F 32G X 4 MM MISC, ONE PEN NEEDLE THREE TIMES A DAY 2-BASAGLAR 1- VICTOZA, Disp: 100 each, Rfl: 5  •  calcium carbonate (OS-NERY) 600 MG tablet, Take 600 mg by mouth daily with dinner, Disp: , Rfl:   •  Continuous Blood Gluc Sensor (FreeStyle Laura 2 Sensor) MISC, Check blood sugars multiple times per day, Disp: 6 each, Rfl: 3  •  Diclofenac Sodium (VOLTAREN) 1 %, Apply 2 g topically 4 (four) times a day as needed (pain), Disp: 350 g, Rfl: 2  •  ergocalciferol (VITAMIN D2) 50,000 units, Take 1 capsule (50,000 Units total) by mouth once a week Takes on Saturday, Disp: 12 capsule, Rfl: 3  •  fluticasone (FLONASE) 50 mcg/act nasal spray, USE 2 SPRAYS IN EACH NOSTRIL ONCE DAILY, Disp: 48 g, Rfl: 1  •  HYDROcodone-acetaminophen (Norco) 5-325 mg per tablet, Take 1 tablet by mouth every 6  (six) hours as needed for pain Max Daily Amount: 4 tablets, Disp: 60 tablet, Rfl: 0  •  hydroxychloroquine (PLAQUENIL) 200 mg tablet, Take 1 tablet (200 mg total) by mouth 2 (two) times a day, Disp: 60 tablet, Rfl: 5  •  Insulin Glargine Solostar (Basaglar KwikPen) 100 UNIT/ML SOPN, INJECT 50 UNITS UNDER THE SKIN EVERY 12 (TWELVE) HOURS, Disp: 30 mL, Rfl: 5  •  leflunomide (ARAVA) 20 MG tablet, TAKE 1 TABLET (20 MG TOTAL) BY MOUTH DAILY, Disp: 30 tablet, Rfl: 2  •  liraglutide (Victoza) injection, INJECT 0.3 ML (1.8 MG TOTAL) UNDER THE SKIN DAILY, Disp: 9 mL, Rfl: 5  •  losartan-hydrochlorothiazide (HYZAAR) 100-12.5 MG per tablet, Take 1 tablet by mouth daily, Disp: 90 tablet, Rfl: 1  •  pantoprazole (PROTONIX) 40 mg tablet, TAKE 1 TABLET (40 MG TOTAL) BY MOUTH DAILY, Disp: 30 tablet, Rfl: 0  •  saccharomyces boulardii (FLORASTOR) 250 mg capsule, Take 1 capsule (250 mg total) by mouth 2 (two) times a day, Disp: 60 capsule, Rfl: 0  •  sitaGLIPtin (Januvia) 100 mg tablet, , Disp: , Rfl:   •  sucralfate (CARAFATE) 1 g tablet, TAKE 1 TABLET (1 G TOTAL) BY MOUTH 4 (FOUR) TIMES A DAY, Disp: 120 tablet, Rfl: 0  •  Accu-Chek FastClix Lancets MISC, , Disp: , Rfl:   •  Accu-Chek FastClix Lancets MISC, TEST BLOOD SUGAR FOUR TIMES A DAY BEFORE MEALS AND BEDTIME (Patient not taking: Reported on 11/13/2023), Disp: 102 each, Rfl: 3  •  Blood Glucose Monitoring Suppl (OneTouch Verio Flex System) w/Device KIT, Use 3 (three) times a day with meals (Patient not taking: Reported on 11/13/2023), Disp: 1 kit, Rfl: 0  •  naproxen (NAPROSYN) 500 mg tablet, TAKE 1 TABLET (500 MG TOTAL) BY MOUTH EVERY 12 (TWELVE) HOURS AS NEEDED FOR MILD PAIN (WITH FOOD), Disp: 60 tablet, Rfl: 5  •  olopatadine (PATANOL) 0.1 % ophthalmic solution, Administer 1 drop to both eyes in the morning and 1 drop in the evening. (Patient not taking: Reported on 8/8/2024), Disp: 5 mL, Rfl: 1  •  OneTouch Verio test strip, CHECK GLUCOSE BEFORE MEALS (Patient not taking:  Reported on 11/13/2023), Disp: 100 strip, Rfl: 3  •  ULTICARE MICRO PEN NEEDLES 32G X 4 MM MISC, INJECT UNDER THE SKIN 3 (THREE) TIMES A DAY (Patient not taking: Reported on 11/13/2023), Disp: 100 each, Rfl: 3    The following portions of the patient's history were reviewed and updated as appropriate: allergies, current medications, past family history, past medical history, past social history, past surgical history and problem list.    Review of Systems   Constitutional: Negative.    HENT: Negative.     Eyes: Negative.    Respiratory: Negative.     Cardiovascular: Negative.  Negative for leg swelling.   Gastrointestinal: Negative.    Endocrine: Negative.    Genitourinary: Negative.    Musculoskeletal:  Positive for arthralgias, joint swelling and neck pain.   Skin: Negative.         Inflamed scar left side of his face   Allergic/Immunologic: Negative.    Neurological: Negative.  Negative for weakness.   Hematological: Negative.    Psychiatric/Behavioral: Negative.     All other systems reviewed and are negative.          Objective:    /78   Pulse 96   Resp 18   Ht 6' (1.829 m)   Wt 110 kg (242 lb)   SpO2 99%   BMI 32.82 kg/m²      Physical Exam  Vitals and nursing note reviewed.   Constitutional:       General: He is not in acute distress.     Appearance: Normal appearance. He is well-developed. He is obese. He is not ill-appearing.   HENT:      Head: Normocephalic and atraumatic.      Right Ear: Tympanic membrane, ear canal and external ear normal.      Left Ear: Tympanic membrane, ear canal and external ear normal.      Nose: Nose normal.      Mouth/Throat:      Mouth: Mucous membranes are moist.   Eyes:      Extraocular Movements: Extraocular movements intact.      Pupils: Pupils are equal, round, and reactive to light.      Comments: Allergic conjunctivitis bilaterally   Cardiovascular:      Rate and Rhythm: Normal rate and regular rhythm.      Pulses: Normal pulses.      Heart sounds: Normal heart  sounds. No murmur heard.  Pulmonary:      Effort: Pulmonary effort is normal.      Breath sounds: Normal breath sounds.   Abdominal:      General: Abdomen is flat. Bowel sounds are normal. There is no distension.      Palpations: Abdomen is soft. There is no mass.   Musculoskeletal:         General: Deformity (Rheumatic nodules) present. Normal range of motion.      Cervical back: Normal range of motion and neck supple.      Right lower leg: No edema.      Left lower leg: No edema.   Skin:     General: Skin is warm and dry.      Comments: Hypertrophic inflamed scar left side of the face just anterior to the ear   Neurological:      General: No focal deficit present.      Mental Status: He is alert and oriented to person, place, and time.      Cranial Nerves: No cranial nerve deficit.   Psychiatric:         Mood and Affect: Mood normal.         Behavior: Behavior normal.         Thought Content: Thought content normal.         Judgment: Judgment normal.           Recent Results (from the past 6 weeks)   Lipid Panel with Direct LDL reflex    Collection Time: 12/13/24  9:18 AM   Result Value Ref Range    Cholesterol 171 See Comment mg/dL    Triglycerides 173 (H) See Comment mg/dL    HDL, Direct 41 >=40 mg/dL    LDL Calculated 95 0 - 100 mg/dL   C-reactive protein    Collection Time: 12/13/24  9:18 AM   Result Value Ref Range    CRP 6.6 (H) <3.0 mg/L   Comprehensive metabolic panel    Collection Time: 12/13/24  9:18 AM   Result Value Ref Range    Sodium 137 135 - 147 mmol/L    Potassium 4.3 3.5 - 5.3 mmol/L    Chloride 100 96 - 108 mmol/L    CO2 27 21 - 32 mmol/L    ANION GAP 10 4 - 13 mmol/L    BUN 17 5 - 25 mg/dL    Creatinine 0.98 0.60 - 1.30 mg/dL    Glucose, Fasting 138 (H) 65 - 99 mg/dL    Calcium 9.4 8.4 - 10.2 mg/dL    AST 28 13 - 39 U/L    ALT 33 7 - 52 U/L    Alkaline Phosphatase 46 34 - 104 U/L    Total Protein 7.1 6.4 - 8.4 g/dL    Albumin 4.1 3.5 - 5.0 g/dL    Total Bilirubin 0.55 0.20 - 1.00 mg/dL    eGFR  85 ml/min/1.73sq m   Albumin / creatinine urine ratio    Collection Time: 12/13/24  9:18 AM   Result Value Ref Range    Creatinine, Ur 218.7 Reference range not established. mg/dL    Albumin,U,Random 14.4 <20.0 mg/L    Albumin Creat Ratio 7 0 - 30 mg/g creatinine   CBC and differential    Collection Time: 12/13/24  9:18 AM   Result Value Ref Range    WBC 7.00 4.31 - 10.16 Thousand/uL    RBC 5.48 3.88 - 5.62 Million/uL    Hemoglobin 16.3 12.0 - 17.0 g/dL    Hematocrit 49.1 36.5 - 49.3 %    MCV 90 82 - 98 fL    MCH 29.7 26.8 - 34.3 pg    MCHC 33.2 31.4 - 37.4 g/dL    RDW 13.2 11.6 - 15.1 %    MPV 11.0 8.9 - 12.7 fL    Platelets 218 149 - 390 Thousands/uL    nRBC 0 /100 WBCs    Segmented % 61 43 - 75 %    Immature Grans % 0 0 - 2 %    Lymphocytes % 18 14 - 44 %    Monocytes % 16 (H) 4 - 12 %    Eosinophils Relative 4 0 - 6 %    Basophils Relative 1 0 - 1 %    Absolute Neutrophils 4.27 1.85 - 7.62 Thousands/µL    Absolute Immature Grans 0.03 0.00 - 0.20 Thousand/uL    Absolute Lymphocytes 1.27 0.60 - 4.47 Thousands/µL    Absolute Monocytes 1.10 0.17 - 1.22 Thousand/µL    Eosinophils Absolute 0.25 0.00 - 0.61 Thousand/µL    Basophils Absolute 0.08 0.00 - 0.10 Thousands/µL   TSH, 3rd generation with Free T4 reflex    Collection Time: 12/13/24  9:18 AM   Result Value Ref Range    TSH 3RD GENERATON 2.749 0.450 - 4.500 uIU/mL   Hemoglobin A1C    Collection Time: 12/13/24  9:18 AM   Result Value Ref Range    Hemoglobin A1C 7.5 (H) Normal 4.0-5.6%; PreDiabetic 5.7-6.4%; Diabetic >=6.5%; Glycemic control for adults with diabetes <7.0% %     mg/dl   Sedimentation rate, automated    Collection Time: 12/13/24  9:18 AM   Result Value Ref Range    Sed Rate 30 (H) 0 - 19 mm/hour   Bilirubin, direct    Collection Time: 12/13/24  9:18 AM   Result Value Ref Range    Bilirubin, Direct 0.13 0.00 - 0.20 mg/dL       Assessment/Plan:    Benign essential hypertension  Adequately controlled on losartan/hydrochlorothiazide.  Reevaluate  in 4 months.    Type 2 diabetes mellitus with diabetic neuropathic arthropathy, with long-term current use of insulin ([facility])    Lab Results   Component Value Date    HGBA1C 7.5 (H) 12/13/2024     Very complicated diabetic with history of diabetic neuropathy, retinopathy.  Follows with both podiatry as well as ophthalmology.  He continues on Victoza, basal insulin with Basaglar as well as mealtime insulin.  Repeat diabetic parameters in 4 months    Charcot foot due to diabetes mellitus (HCC)    Lab Results   Component Value Date    HGBA1C 7.5 (H) 12/13/2024     Followed by podiatry    Cervical radiculopathy  Patient has documented severe DJD of the cervical spine.  Probably should go for fusion.  He cannot afford to be out of work for prolonged period.  Will place order for pain management have not seen the patient in the past to discuss other options for treatment    Clear cell hidradenoma  Prior scalp lesions.  Needs follow-up with dermatology.  Order placed yet again for dermatology.  He was established patient    Tobacco use  Counseled on benefits of smoking cessation.  Patient aware of this    Cervical spinal stenosis  Referral back to pain management.  He did receive corticosteroid injection from pain management but he states that this did not provide significant amount of relief.  In order to refill Vicodin he needs to complete urine drug screen.    Marijuana use  Patient does use marijuana because it does help with some of his level of.  This will show up positive on urine drug screen    Physical exam, annual  Annual physical examination performed    Mixed hyperlipidemia  Patient remains on atorvastatin 40 mg once daily at bedtime.  Continue same.  Reevaluate lipid panel in 4 months    Facial scar  Probably needs revision of the scar.  I believe that some of his facial hairs are caught in the scar which is causing it to become inflamed.  Once again needs to be seen by dermatology       Media  Information      Document Information    Clinical Image - Mobile Device   Left facial scar   12/16/2024 5:32 PM   Attached To:   Office Visit on 12/16/24 with Neftali Vargas DO   Source Information    Neftali Vargas DO  [facility]   Document History              Problem List Items Addressed This Visit        Cardiovascular and Mediastinum    Benign essential hypertension    Adequately controlled on losartan/hydrochlorothiazide.  Reevaluate in 4 months.         Relevant Orders    CBC and differential    TSH, 3rd generation with Free T4 reflex       Endocrine    Charcot foot due to diabetes mellitus (HCC)      Lab Results   Component Value Date    HGBA1C 7.5 (H) 12/13/2024     Followed by podiatry         Type 2 diabetes mellitus with diabetic neuropathic arthropathy, with long-term current use of insulin (HCC)      Lab Results   Component Value Date    HGBA1C 7.5 (H) 12/13/2024     Very complicated diabetic with history of diabetic neuropathy, retinopathy.  Follows with both podiatry as well as ophthalmology.  He continues on Victoza, basal insulin with Basaglar as well as mealtime insulin.  Repeat diabetic parameters in 4 months         Relevant Orders    Albumin / creatinine urine ratio    Hemoglobin A1C       Nervous and Auditory    Cervical radiculopathy    Patient has documented severe DJD of the cervical spine.  Probably should go for fusion.  He cannot afford to be out of work for prolonged period.  Will place order for pain management have not seen the patient in the past to discuss other options for treatment         Relevant Orders    Ambulatory referral to Spine & Pain Management       Musculoskeletal and Integument    Clear cell hidradenoma    Prior scalp lesions.  Needs follow-up with dermatology.  Order placed yet again for dermatology.  He was established patient         Relevant Orders    Ambulatory Referral to Dermatology    CBC and differential       Behavioral Health    Marijuana use    Patient  does use marijuana because it does help with some of his level of.  This will show up positive on urine drug screen         Tobacco use    Counseled on benefits of smoking cessation.  Patient aware of this            Orthopedic/Musculoskeletal    Cervical spinal stenosis    Referral back to pain management.  He did receive corticosteroid injection from pain management but he states that this did not provide significant amount of relief.  In order to refill Vicodin he needs to complete urine drug screen.            Other    Facial scar    Probably needs revision of the scar.  I believe that some of his facial hairs are caught in the scar which is causing it to become inflamed.  Once again needs to be seen by dermatology       Media Information      Document Information    Clinical Image - Mobile Device   Left facial scar   12/16/2024 5:32 PM   Attached To:   Office Visit on 12/16/24 with Neftali Vargas DO   Source Information    Neftali Vargas DO  [facility]   Document History           Relevant Orders    Ambulatory Referral to Dermatology    Mixed hyperlipidemia    Patient remains on atorvastatin 40 mg once daily at bedtime.  Continue same.  Reevaluate lipid panel in 4 months         Relevant Medications    atorvastatin (LIPITOR) 40 mg tablet    Other Relevant Orders    Comprehensive metabolic panel    Lipid panel    Physical exam, annual - Primary    Annual physical examination performed         Vitamin D deficiency   Other Visit Diagnoses       Narcotic drug use        Relevant Orders    Drug Screen Routine w /Conf and Adulteration, urine.      Prostate cancer screening        Relevant Orders    PSA, Total Screen      Encounter for immunization        Relevant Orders    influenza vaccine, recombinant, PF, 0.5 mL IM (Flublok) (Completed)    Pneumococcal Conjugate Vaccine 20-valent (Pcv20) (Completed)

## 2024-12-16 NOTE — ASSESSMENT & PLAN NOTE
Lab Results   Component Value Date    HGBA1C 7.5 (H) 12/13/2024     Very complicated diabetic with history of diabetic neuropathy, retinopathy.  Follows with both podiatry as well as ophthalmology.  He continues on Victoza, basal insulin with Basaglar as well as mealtime insulin.  Repeat diabetic parameters in 4 months

## 2024-12-16 NOTE — ASSESSMENT & PLAN NOTE
Referral back to pain management.  He did receive corticosteroid injection from pain management but he states that this did not provide significant amount of relief.  In order to refill Vicodin he needs to complete urine drug screen.

## 2024-12-16 NOTE — ASSESSMENT & PLAN NOTE
Patient does use marijuana because it does help with some of his level of.  This will show up positive on urine drug screen

## 2024-12-16 NOTE — ASSESSMENT & PLAN NOTE
Patient has documented severe DJD of the cervical spine.  Probably should go for fusion.  He cannot afford to be out of work for prolonged period.  Will place order for pain management have not seen the patient in the past to discuss other options for treatment

## 2024-12-16 NOTE — ASSESSMENT & PLAN NOTE
Patient remains on atorvastatin 40 mg once daily at bedtime.  Continue same.  Reevaluate lipid panel in 4 months

## 2024-12-19 ENCOUNTER — RESULTS FOLLOW-UP (OUTPATIENT)
Dept: RHEUMATOLOGY | Facility: CLINIC | Age: 59
End: 2024-12-19

## 2024-12-19 NOTE — RESULT ENCOUNTER NOTE
Dr. Vargas:    Our mutual patient Renny Jean Baptiste Jr. had screening lab work done for their Eddie use. It did show some elevations, which can happen with JAKis. These elevations typically stabilize after the first 12 weeks of treatment with a Eddie. Studies(1) have suggested that increased cholesterol due to JAKis is actually associated with improvement in inflammation, specifically in rheumatoid arthritis patients.    I wanted to bring the lipid abnormalities to your attention so that you and the patient can decide on whether or not you would like to treat them for this.    (1) Zac FRANCE, Savanna NASSAR, Nuha I, et al. Effects of tofacitinib and other DMARDs on lipid profiles in rheumatoid arthritis: implications for the rheumatologist. Semin Arthritis Rheum. 2016;46(1):71-80. doi:10.1016/j.semarthrit.2016.03.004

## 2025-01-02 DIAGNOSIS — E13.9 DIABETES MELLITUS OF OTHER TYPE WITHOUT COMPLICATION, UNSPECIFIED WHETHER LONG TERM INSULIN USE (HCC): ICD-10-CM

## 2025-01-02 DIAGNOSIS — E11.42 TYPE 2 DIABETES MELLITUS WITH DIABETIC POLYNEUROPATHY, WITH LONG-TERM CURRENT USE OF INSULIN (HCC): ICD-10-CM

## 2025-01-02 DIAGNOSIS — E11.9 TYPE 2 DIABETES MELLITUS WITHOUT COMPLICATION, WITHOUT LONG-TERM CURRENT USE OF INSULIN (HCC): ICD-10-CM

## 2025-01-02 DIAGNOSIS — Z79.4 TYPE 2 DIABETES MELLITUS WITH DIABETIC POLYNEUROPATHY, WITH LONG-TERM CURRENT USE OF INSULIN (HCC): ICD-10-CM

## 2025-01-02 DIAGNOSIS — M05.9 SEROPOSITIVE RHEUMATOID ARTHRITIS (HCC): ICD-10-CM

## 2025-01-02 RX ORDER — LEFLUNOMIDE 20 MG/1
20 TABLET ORAL DAILY
Qty: 90 TABLET | Refills: 2 | Status: SHIPPED | OUTPATIENT
Start: 2025-01-02

## 2025-01-03 RX ORDER — LIRAGLUTIDE 6 MG/ML
INJECTION SUBCUTANEOUS
Qty: 9 ML | Refills: 1 | Status: SHIPPED | OUTPATIENT
Start: 2025-01-03

## 2025-01-03 RX ORDER — PEN NEEDLE, DIABETIC 32GX 5/32"
NEEDLE, DISPOSABLE MISCELLANEOUS
Qty: 100 EACH | Refills: 5 | Status: SHIPPED | OUTPATIENT
Start: 2025-01-03

## 2025-01-13 ENCOUNTER — APPOINTMENT (OUTPATIENT)
Dept: LAB | Facility: CLINIC | Age: 60
End: 2025-01-13
Payer: COMMERCIAL

## 2025-01-13 ENCOUNTER — RESULTS FOLLOW-UP (OUTPATIENT)
Dept: FAMILY MEDICINE CLINIC | Facility: CLINIC | Age: 60
End: 2025-01-13

## 2025-01-13 DIAGNOSIS — Z79.4 TYPE 2 DIABETES MELLITUS WITH DIABETIC NEUROPATHIC ARTHROPATHY, WITH LONG-TERM CURRENT USE OF INSULIN (HCC): ICD-10-CM

## 2025-01-13 DIAGNOSIS — F11.90 NARCOTIC DRUG USE: ICD-10-CM

## 2025-01-13 DIAGNOSIS — E11.610 TYPE 2 DIABETES MELLITUS WITH DIABETIC NEUROPATHIC ARTHROPATHY, WITH LONG-TERM CURRENT USE OF INSULIN (HCC): ICD-10-CM

## 2025-01-13 LAB
CREAT UR-MCNC: 151.4 MG/DL
MICROALBUMIN UR-MCNC: 19.7 MG/L
MICROALBUMIN/CREAT 24H UR: 13 MG/G CREATININE (ref 0–30)

## 2025-01-13 PROCEDURE — 80307 DRUG TEST PRSMV CHEM ANLYZR: CPT

## 2025-01-13 PROCEDURE — 82570 ASSAY OF URINE CREATININE: CPT

## 2025-01-13 PROCEDURE — 82043 UR ALBUMIN QUANTITATIVE: CPT

## 2025-01-14 ENCOUNTER — TELEPHONE (OUTPATIENT)
Dept: PAIN MEDICINE | Facility: CLINIC | Age: 60
End: 2025-01-14

## 2025-01-14 NOTE — TELEPHONE ENCOUNTER
"Patient was 25 minutes late for appointment, he was in wrong department.  Unfortunately we are unable to see him, we offered to reschedule he said \"don't worry about it\"  "

## 2025-01-15 LAB
6MAM UR QL SCN: NEGATIVE NG/ML
ACCEPTABLE CREAT UR QL: 145 MG/DL
AMPHET UR QL SCN: NEGATIVE NG/ML
BARBITURATES UR QL SCN: NEGATIVE NG/ML
BENZODIAZ UR QL SCN: NEGATIVE NG/ML
BUPRENORPHINE UR QL CFM: NEGATIVE NG/ML
CANNABINOIDS UR QL SCN: NEGATIVE NG/ML
CARISOPRODOL UR QL: NEGATIVE NG/ML
COCAINE+BZE UR QL SCN: NEGATIVE NG/ML
ETHYL GLUCURONIDE UR QL SCN: NEGATIVE NG/ML
FENTANYL UR QL SCN: NEGATIVE NG/ML
GABAPENTIN SERPLBLD QL SCN: NEGATIVE UG/ML
METHADONE UR QL SCN: NEGATIVE NG/ML
NITRITE UR QL STRIP: NEGATIVE UG/ML
OPIATES UR QL SCN: NEGATIVE NG/ML
OXYCODONE+OXYMORPHONE UR QL SCN: NEGATIVE NG/ML
PCP UR QL SCN: NEGATIVE NG/ML
PROPOXYPH UR QL SCN: NEGATIVE NG/ML
SPECIMEN PH ACCEPTABLE UR: 5.9 (ref 4.5–8.9)
TAPENTADOL UR QL SCN: NEGATIVE NG/ML
TRAMADOL UR QL SCN: NEGATIVE NG/ML

## 2025-01-16 ENCOUNTER — TELEPHONE (OUTPATIENT)
Dept: FAMILY MEDICINE CLINIC | Facility: CLINIC | Age: 60
End: 2025-01-16

## 2025-01-16 DIAGNOSIS — E11.610 CHARCOT FOOT DUE TO DIABETES MELLITUS (HCC): ICD-10-CM

## 2025-01-16 RX ORDER — HYDROCODONE BITARTRATE AND ACETAMINOPHEN 5; 325 MG/1; MG/1
1 TABLET ORAL EVERY 6 HOURS PRN
Qty: 60 TABLET | Refills: 0 | Status: SHIPPED | OUTPATIENT
Start: 2025-01-16

## 2025-01-16 NOTE — TELEPHONE ENCOUNTER
"PA has been started for the patient by the pharmacy for Hydrocodone-acetaminophen 5-325mg Tablets.      Login to [email or web address] and click \"Enter a Key\".    Enter the patient's last name, date of birth and the key.    Key: [id number]    Complete the PA and click \"send to plan\" for approval.      "

## 2025-01-16 NOTE — RESULT ENCOUNTER NOTE
Please call patient and notify that results of urine drug screen were negative.  I am resending his.  Pavel to [facility].  Urine drug screen result will need to be faxed to [email or web address].  There is denial letter in the media folder.  He also needs to reschedule the appointment with pain management.  I did see their message.  He was 25 minutes late for the appointment because he was in the wrong department (?)

## 2025-01-20 NOTE — TELEPHONE ENCOUNTER
PA for Hydrocodone-acetaminphen 5-325 mg SUBMITTED to [employer]     via    [x]CMM-KEY: [id number]  []Surescripts-Case ID #   []Availity-Auth ID # NDC #   []Faxed to plan   []Other website   []Phone call Case ID #     []PA sent as URGENT    All office notes, labs and other pertaining documents and studies sent. Clinical questions answered. Awaiting determination from insurance company.     Turnaround time for your insurance to make a decision on your Prior Authorization can take 7-21 business days.

## 2025-01-22 NOTE — TELEPHONE ENCOUNTER
PA for Hydrocodone-acetaminopher 5-325mg Tablets  DENIED    Reason:(Screenshot if applicable)        Message sent to office clinical pool Yes    Denial letter scanned into Media Yes    Appeal started No (Provider will need to decide if appeal is warranted and send clinical documentation to Prior Authorization Team for initiation.)    **Please follow up with your patient regarding denial and next steps**

## 2025-01-23 NOTE — TELEPHONE ENCOUNTER
PA for hydrocodone acetaminophen 5-325 mg RESUBMITTED to [facility]     via    []CMM-KEY:   [x]Surescripts-Case ID # [id number]   []Availity-Auth ID # NDC #   []Faxed to plan   []Other website   []Phone call Case ID #     [x]PA sent as URGENT    All office notes, labs and other pertaining documents and studies sent. Clinical questions answered. Awaiting determination from insurance company.     Turnaround time for your insurance to make a decision on your Prior Authorization can take 7-21 business days.

## 2025-01-23 NOTE — TELEPHONE ENCOUNTER
Last chart note that documents that his pain was better controlled while using the medication needs to be sent to his insurance company.  He completed urine drug screen

## 2025-01-23 NOTE — TELEPHONE ENCOUNTER
PA for hydrocodone acetaminophen 5-325 mg  APPROVED     Date(s) approved  January 23, 2025 to July 23, 2025     Case #[id number]     Patient advised by          []Five Apeshart Message  [x]Phone call   []LMOM  []L/M to call office as no active Communication consent on file  []Unable to leave detailed message as VM not approved on Communication consent       Pharmacy advised by    [x]Fax  []Phone call    Approval letter scanned into Media No

## 2025-01-28 DIAGNOSIS — E78.2 MIXED HYPERLIPIDEMIA: ICD-10-CM

## 2025-01-28 DIAGNOSIS — I10 ESSENTIAL HYPERTENSION: ICD-10-CM

## 2025-01-28 RX ORDER — LOSARTAN POTASSIUM AND HYDROCHLOROTHIAZIDE 12.5; 1 MG/1; MG/1
1 TABLET ORAL DAILY
Qty: 90 TABLET | Refills: 1 | Status: SHIPPED | OUTPATIENT
Start: 2025-01-28

## 2025-01-28 RX ORDER — ATORVASTATIN CALCIUM 40 MG/1
40 TABLET, FILM COATED ORAL
Qty: 90 TABLET | Refills: 1 | Status: SHIPPED | OUTPATIENT
Start: 2025-01-28

## 2025-02-27 DIAGNOSIS — E13.9 DIABETES MELLITUS OF OTHER TYPE WITHOUT COMPLICATION, UNSPECIFIED WHETHER LONG TERM INSULIN USE (HCC): ICD-10-CM

## 2025-02-27 DIAGNOSIS — M05.9 SEROPOSITIVE RHEUMATOID ARTHRITIS (HCC): ICD-10-CM

## 2025-02-27 DIAGNOSIS — E11.9 TYPE 2 DIABETES MELLITUS WITHOUT COMPLICATION, WITHOUT LONG-TERM CURRENT USE OF INSULIN (HCC): ICD-10-CM

## 2025-02-27 DIAGNOSIS — M54.12 CERVICAL RADICULOPATHY: ICD-10-CM

## 2025-02-27 RX ORDER — NAPROXEN 500 MG/1
500 TABLET ORAL EVERY 12 HOURS PRN
Qty: 60 TABLET | Refills: 6 | Status: SHIPPED | OUTPATIENT
Start: 2025-02-27

## 2025-02-28 RX ORDER — LIRAGLUTIDE 6 MG/ML
INJECTION SUBCUTANEOUS
Qty: 9 ML | Refills: 2 | Status: SHIPPED | OUTPATIENT
Start: 2025-02-28

## 2025-03-12 DIAGNOSIS — E11.610 CHARCOT FOOT DUE TO DIABETES MELLITUS (HCC): ICD-10-CM

## 2025-03-14 RX ORDER — HYDROCODONE BITARTRATE AND ACETAMINOPHEN 5; 325 MG/1; MG/1
1 TABLET ORAL EVERY 6 HOURS PRN
Qty: 60 TABLET | Refills: 0 | Status: SHIPPED | OUTPATIENT
Start: 2025-03-14

## 2025-04-02 DIAGNOSIS — E11.9 TYPE 2 DIABETES MELLITUS WITHOUT COMPLICATION, WITHOUT LONG-TERM CURRENT USE OF INSULIN (HCC): ICD-10-CM

## 2025-04-02 RX ORDER — INSULIN GLARGINE 100 [IU]/ML
INJECTION, SOLUTION SUBCUTANEOUS
Qty: 90 ML | Refills: 1 | Status: SHIPPED | OUTPATIENT
Start: 2025-04-02

## 2025-04-23 ENCOUNTER — APPOINTMENT (OUTPATIENT)
Dept: LAB | Facility: CLINIC | Age: 60
End: 2025-04-23
Attending: STUDENT IN AN ORGANIZED HEALTH CARE EDUCATION/TRAINING PROGRAM
Payer: COMMERCIAL

## 2025-04-23 DIAGNOSIS — E11.610 TYPE 2 DIABETES MELLITUS WITH DIABETIC NEUROPATHIC ARTHROPATHY, WITH LONG-TERM CURRENT USE OF INSULIN (HCC): ICD-10-CM

## 2025-04-23 DIAGNOSIS — Z79.4 TYPE 2 DIABETES MELLITUS WITH DIABETIC NEUROPATHIC ARTHROPATHY, WITH LONG-TERM CURRENT USE OF INSULIN (HCC): ICD-10-CM

## 2025-04-23 DIAGNOSIS — I10 BENIGN ESSENTIAL HYPERTENSION: ICD-10-CM

## 2025-04-23 DIAGNOSIS — D23.9 CLEAR CELL HIDRADENOMA: ICD-10-CM

## 2025-04-23 DIAGNOSIS — Z79.60 LONG-TERM USE OF IMMUNOSUPPRESSANT MEDICATION: ICD-10-CM

## 2025-04-23 DIAGNOSIS — Z79.899 HIGH RISK MEDICATION USE: ICD-10-CM

## 2025-04-23 DIAGNOSIS — E78.2 MIXED HYPERLIPIDEMIA: ICD-10-CM

## 2025-04-23 DIAGNOSIS — Z12.5 PROSTATE CANCER SCREENING: ICD-10-CM

## 2025-04-23 LAB
ALBUMIN SERPL BCG-MCNC: 4.1 G/DL (ref 3.5–5)
ALP SERPL-CCNC: 45 U/L (ref 34–104)
ALT SERPL W P-5'-P-CCNC: 27 U/L (ref 7–52)
ANION GAP SERPL CALCULATED.3IONS-SCNC: 7 MMOL/L (ref 4–13)
AST SERPL W P-5'-P-CCNC: 21 U/L (ref 13–39)
BASOPHILS # BLD AUTO: 0.08 THOUSANDS/ÂΜL (ref 0–0.1)
BASOPHILS NFR BLD AUTO: 1 % (ref 0–1)
BILIRUB DIRECT SERPL-MCNC: 0.11 MG/DL (ref 0–0.2)
BILIRUB SERPL-MCNC: 0.54 MG/DL (ref 0.2–1)
BUN SERPL-MCNC: 17 MG/DL (ref 5–25)
CALCIUM SERPL-MCNC: 9.1 MG/DL (ref 8.4–10.2)
CHLORIDE SERPL-SCNC: 100 MMOL/L (ref 96–108)
CHOLEST SERPL-MCNC: 149 MG/DL (ref ?–200)
CO2 SERPL-SCNC: 31 MMOL/L (ref 21–32)
CREAT SERPL-MCNC: 1.07 MG/DL (ref 0.6–1.3)
EOSINOPHIL # BLD AUTO: 0.15 THOUSAND/ÂΜL (ref 0–0.61)
EOSINOPHIL NFR BLD AUTO: 2 % (ref 0–6)
ERYTHROCYTE [DISTWIDTH] IN BLOOD BY AUTOMATED COUNT: 13.6 % (ref 11.6–15.1)
EST. AVERAGE GLUCOSE BLD GHB EST-MCNC: 177 MG/DL
GFR SERPL CREATININE-BSD FRML MDRD: 76 ML/MIN/1.73SQ M
GLUCOSE P FAST SERPL-MCNC: 116 MG/DL (ref 65–99)
HBA1C MFR BLD: 7.8 %
HCT VFR BLD AUTO: 49.5 % (ref 36.5–49.3)
HDLC SERPL-MCNC: 44 MG/DL
HGB BLD-MCNC: 16.5 G/DL (ref 12–17)
IMM GRANULOCYTES # BLD AUTO: 0.04 THOUSAND/UL (ref 0–0.2)
IMM GRANULOCYTES NFR BLD AUTO: 1 % (ref 0–2)
LDLC SERPL CALC-MCNC: 81 MG/DL (ref 0–100)
LYMPHOCYTES # BLD AUTO: 1.38 THOUSANDS/ÂΜL (ref 0.6–4.47)
LYMPHOCYTES NFR BLD AUTO: 22 % (ref 14–44)
MCH RBC QN AUTO: 29.9 PG (ref 26.8–34.3)
MCHC RBC AUTO-ENTMCNC: 33.3 G/DL (ref 31.4–37.4)
MCV RBC AUTO: 90 FL (ref 82–98)
MONOCYTES # BLD AUTO: 0.87 THOUSAND/ÂΜL (ref 0.17–1.22)
MONOCYTES NFR BLD AUTO: 14 % (ref 4–12)
NEUTROPHILS # BLD AUTO: 3.74 THOUSANDS/ÂΜL (ref 1.85–7.62)
NEUTS SEG NFR BLD AUTO: 60 % (ref 43–75)
NONHDLC SERPL-MCNC: 105 MG/DL
NRBC BLD AUTO-RTO: 0 /100 WBCS
PLATELET # BLD AUTO: 201 THOUSANDS/UL (ref 149–390)
PMV BLD AUTO: 11.4 FL (ref 8.9–12.7)
POTASSIUM SERPL-SCNC: 4.1 MMOL/L (ref 3.5–5.3)
PROT SERPL-MCNC: 6.9 G/DL (ref 6.4–8.4)
PSA SERPL-MCNC: 0.39 NG/ML (ref 0–4)
RBC # BLD AUTO: 5.52 MILLION/UL (ref 3.88–5.62)
SODIUM SERPL-SCNC: 138 MMOL/L (ref 135–147)
TRIGL SERPL-MCNC: 122 MG/DL (ref ?–150)
TSH SERPL DL<=0.05 MIU/L-ACNC: 2.51 UIU/ML (ref 0.45–4.5)
WBC # BLD AUTO: 6.26 THOUSAND/UL (ref 4.31–10.16)

## 2025-04-23 PROCEDURE — 36415 COLL VENOUS BLD VENIPUNCTURE: CPT

## 2025-04-23 PROCEDURE — 85025 COMPLETE CBC W/AUTO DIFF WBC: CPT

## 2025-04-23 PROCEDURE — 82248 BILIRUBIN DIRECT: CPT

## 2025-04-23 PROCEDURE — 83036 HEMOGLOBIN GLYCOSYLATED A1C: CPT

## 2025-04-23 PROCEDURE — 80053 COMPREHEN METABOLIC PANEL: CPT

## 2025-04-23 PROCEDURE — G0103 PSA SCREENING: HCPCS

## 2025-04-23 PROCEDURE — 80061 LIPID PANEL: CPT

## 2025-04-23 PROCEDURE — 84443 ASSAY THYROID STIM HORMONE: CPT

## 2025-05-01 DIAGNOSIS — M05.9 SEROPOSITIVE RHEUMATOID ARTHRITIS (HCC): ICD-10-CM

## 2025-05-01 RX ORDER — HYDROXYCHLOROQUINE SULFATE 200 MG/1
200 TABLET, FILM COATED ORAL 2 TIMES DAILY
Qty: 60 TABLET | Refills: 5 | Status: SHIPPED | OUTPATIENT
Start: 2025-05-01 | End: 2025-10-28

## 2025-05-02 DIAGNOSIS — E11.610 CHARCOT FOOT DUE TO DIABETES MELLITUS (HCC): ICD-10-CM

## 2025-05-02 RX ORDER — HYDROCODONE BITARTRATE AND ACETAMINOPHEN 5; 325 MG/1; MG/1
1 TABLET ORAL EVERY 6 HOURS PRN
Qty: 60 TABLET | Refills: 0 | Status: SHIPPED | OUTPATIENT
Start: 2025-05-02

## 2025-05-02 NOTE — TELEPHONE ENCOUNTER
Reason for call:   [x] Refill   [] Prior Auth  [] Other:     Office:   [x] PCP/Provider -   [] Specialty/Provider -       HYDROcodone-acetaminophen (Norco) 5-325 mg per tablet 1 tablet, Oral, Every 6 hours PRN       Quantity: 30    Pharmacy: [facility]   Does the patient have enough for 3 days?   [] Yes   [x] No - Send as HP to POD    Mail Away Pharmacy   Does the patient have enough for 10 days?   [] Yes   [] No - Send as HP to POD

## 2025-05-06 RX ORDER — GENTAMICIN SULFATE 1 MG/G
CREAM TOPICAL
COMMUNITY
Start: 2025-04-30

## 2025-05-07 ENCOUNTER — OFFICE VISIT (OUTPATIENT)
Dept: FAMILY MEDICINE CLINIC | Facility: CLINIC | Age: 60
End: 2025-05-07
Payer: COMMERCIAL

## 2025-05-07 VITALS
WEIGHT: 239 LBS | HEART RATE: 60 BPM | OXYGEN SATURATION: 97 % | DIASTOLIC BLOOD PRESSURE: 78 MMHG | HEIGHT: 72 IN | RESPIRATION RATE: 18 BRPM | SYSTOLIC BLOOD PRESSURE: 126 MMHG | BODY MASS INDEX: 32.37 KG/M2

## 2025-05-07 DIAGNOSIS — M05.79 RHEUMATOID ARTHRITIS INVOLVING MULTIPLE SITES WITH POSITIVE RHEUMATOID FACTOR (HCC): ICD-10-CM

## 2025-05-07 DIAGNOSIS — Z79.4 TYPE 2 DIABETES MELLITUS WITH DIABETIC NEUROPATHIC ARTHROPATHY, WITH LONG-TERM CURRENT USE OF INSULIN (HCC): ICD-10-CM

## 2025-05-07 DIAGNOSIS — I10 BENIGN ESSENTIAL HYPERTENSION: ICD-10-CM

## 2025-05-07 DIAGNOSIS — I10 BENIGN ESSENTIAL HYPERTENSION: Primary | ICD-10-CM

## 2025-05-07 DIAGNOSIS — E11.610 TYPE 2 DIABETES MELLITUS WITH DIABETIC NEUROPATHIC ARTHROPATHY, WITH LONG-TERM CURRENT USE OF INSULIN (HCC): ICD-10-CM

## 2025-05-07 DIAGNOSIS — M05.9 SEROPOSITIVE RHEUMATOID ARTHRITIS (HCC): ICD-10-CM

## 2025-05-07 DIAGNOSIS — E11.610 CHARCOT FOOT DUE TO DIABETES MELLITUS (HCC): ICD-10-CM

## 2025-05-07 DIAGNOSIS — E11.610 TYPE 2 DIABETES MELLITUS WITH DIABETIC NEUROPATHIC ARTHROPATHY, WITH LONG-TERM CURRENT USE OF INSULIN (HCC): Primary | ICD-10-CM

## 2025-05-07 DIAGNOSIS — E78.2 MIXED HYPERLIPIDEMIA: ICD-10-CM

## 2025-05-07 DIAGNOSIS — Z79.4 TYPE 2 DIABETES MELLITUS WITH DIABETIC NEUROPATHIC ARTHROPATHY, WITH LONG-TERM CURRENT USE OF INSULIN (HCC): Primary | ICD-10-CM

## 2025-05-07 PROBLEM — K62.9 ANAL LESION: Status: RESOLVED | Noted: 2024-03-28 | Resolved: 2025-05-07

## 2025-05-07 PROBLEM — Z85.9 HISTORY OF CANCER: Status: RESOLVED | Noted: 2024-02-08 | Resolved: 2025-05-07

## 2025-05-07 LAB
LEFT EYE DIABETIC RETINOPATHY: NORMAL
LEFT EYE IMAGE QUALITY: NORMAL
LEFT EYE MACULAR EDEMA: NORMAL
LEFT EYE OTHER RETINOPATHY: NORMAL
RIGHT EYE DIABETIC RETINOPATHY: NORMAL
RIGHT EYE IMAGE QUALITY: NORMAL
RIGHT EYE MACULAR EDEMA: NORMAL
RIGHT EYE OTHER RETINOPATHY: NORMAL
SEVERITY (EYE EXAM): NORMAL

## 2025-05-07 PROCEDURE — 99215 OFFICE O/P EST HI 40 MIN: CPT | Performed by: FAMILY MEDICINE

## 2025-05-07 NOTE — ASSESSMENT & PLAN NOTE
Patient remains on atorvastatin 40 mg once daily at bedtime.  Goal LDL less than 70.  Advised patient that at no time should he ever be off statin therapy due to his history of inflammatory arthropathy as well as his history of poorly controlled diabetes.  Continue same.  Reevaluate lipid panel in 4 months

## 2025-05-07 NOTE — PROGRESS NOTES
Subjective:      Patient ID: Renny Jean Baptiste Jr. is a 59 y.o. male.    HPI    Past Medical History:   Diagnosis Date   • Abscess of left thigh 05/11/2021   • Arthritis    • At risk for falls    • Biliary dyskinesia 03/16/2020   • Broken foot     right   • Cancer (HCC)    • Cataract     giacomo   • Cellulitis of left lower extremity 04/26/2021   • Clear cell hidradenoma    • COPD (chronic obstructive pulmonary disease) ([facility])    • Diabetes mellitus (HCC)    • Dyskinesia of gallbladder 08/15/2022   • Hx MRSA infection     Per wife patient hadf MRSa in a wound awhile ago   • Hyperlipidemia    • Hypertension    • Kidney stone    • Neuropathy    • RA (rheumatoid arthritis) ([facility])    • Rheumatoid arthritis (HCC)    • Seasonal allergies    • Sepsis ([facility]) 04/26/2021   • Snores    • Uses wheelchair     and crutches- NWB RLE   • Wears glasses        Family History   Problem Relation Age of Onset   • Diabetes Mother    • Stroke Mother    • Mental illness Mother    • Arthritis Mother    • Diabetes Father    • Stroke Father    • Arthritis Father    • COPD Father    • Vision loss Father    • Alcohol abuse Brother    • Coronary artery disease Family         Age 50-51   • Diabetes type II Family    • Heart disease Family        Past Surgical History:   Procedure Laterality Date   • APPENDECTOMY     • CLOSED REDUCTION FOOT DISLOCATION Right 2/12/2019    Procedure: C/R FRACTURE;  Surgeon: Phillip Lentz DPM;  Location: [facility];  Service: Podiatry   • COLONOSCOPY     • FOOT SURGERY Right 07/2019    Removal of the bone graft and cleaned out infection, and placed new bone graft   • IR PICC PLACEMENT SINGLE LUMEN  8/5/2019   • DE DEBRIDEMENT SUBCUTANEOUS TISSUE 1ST 20 SQ CM/< Left 5/24/2021    Procedure: INCISION AND DRAINAGE THIGH;  Surgeon: Rom Figueroa MD;  [facility];  Service: Orthopedics   • DE EXCISION MALIGNANT LESION S/N/H/F/G 0.5 CM/< N/A 3/28/2018    Procedure: EXCISION WIDE LESION HEAD/FACIAL/NECK;  Surgeon:  Dank Petersen MD;  [facility];  Service: Surgical Oncology   • OH GASTROCNEMIUS RECESSION Right 2/12/2019    Procedure: ENDO GASTROC RECESSION, APPLICATION OF EXTERNAL FIXATOR;  Surgeon: Phillip Lentz DPM;  [facility];  Service: Podiatry   • OH LAPAROSCOPY SURG CHOLECYSTECTOMY N/A 9/30/2022    Procedure: CHOLECYSTECTOMY LAPAROSCOPIC;  Surgeon: Pk Mayo MD;  [facility];  Service: General   • OH REMOVAL EXTERNAL FIXATION SYSTEM UNDER ANES Right 4/18/2019    Procedure: FRAME REMOVAL HARDWARE FOOT WITH APPLICATION OF GRAFT;  Surgeon: Phillip Lentz DPM;  [facility];  Service: Podiatry   • SKIN BIOPSY      scalp   • WISDOM TOOTH EXTRACTION  1998   • WOUND DEBRIDEMENT Left 4/29/2021    Procedure: KNEE INCISION AND DRAINAGE, EXCISIONAL DEBRIDEMENT OF PREPATELLAR BURSA;  Surgeon: Rom Figueroa MD;  [facility];  Service: Orthopedics        reports that he has been smoking cigarettes. He has a 80 pack-year smoking history. He has never used smokeless tobacco. He reports current alcohol use. He reports current drug use. Frequency: 7.00 times per week. Drug: Marijuana.      Current Outpatient Medications:   •  Accu-Chek FastClix Lancets MISC, , Disp: , Rfl:   •  Accu-Chek FastClix Lancets MISC, TEST BLOOD SUGAR FOUR TIMES A DAY BEFORE MEALS AND BEDTIME (Patient not taking: Reported on 11/13/2023), Disp: 102 each, Rfl: 3  •  albuterol (PROVENTIL HFA,VENTOLIN HFA) 90 mcg/act inhaler, INHALE 2 PUFFS EVERY 4 (FOUR) HOURS AS NEEDED FOR WHEEZING, Disp: 18 g, Rfl: 5  •  amitriptyline (ELAVIL) 10 mg tablet, TAKE 1 TABLET (10 MG TOTAL) BY MOUTH DAILY AT BEDTIME, Disp: 90 tablet, Rfl: 1  •  Ascorbic Acid (vitamin C) 1000 MG tablet, Take 1,000 mg by mouth daily, Disp: , Rfl:   •  atorvastatin (LIPITOR) 40 mg tablet, TAKE 1 TABLET (40 MG TOTAL) BY MOUTH DAILY AT BEDTIME, Disp: 90 tablet, Rfl: 1  •  BD Pen Needle Maureen U/F 32G X 4 MM MISC, ONE PEN NEEDLE THREE TIMES A DAY  2-BASAGLAR 1- VICTOZA, Disp: 100 each, Rfl: 5  •  Blood Glucose Monitoring Suppl (OneTouch Verio Flex System) w/Device KIT, Use 3 (three) times a day with meals (Patient not taking: Reported on 11/13/2023), Disp: 1 kit, Rfl: 0  •  calcium carbonate (OS-NERY) 600 MG tablet, Take 600 mg by mouth daily with dinner, Disp: , Rfl:   •  Continuous Blood Gluc Sensor (FreeStyle Laura 2 Sensor) MISC, Check blood sugars multiple times per day, Disp: 6 each, Rfl: 3  •  Diclofenac Sodium (VOLTAREN) 1 %, Apply 2 g topically 4 (four) times a day as needed (pain), Disp: 350 g, Rfl: 2  •  ergocalciferol (VITAMIN D2) 50,000 units, Take 1 capsule (50,000 Units total) by mouth once a week Takes on Saturday, Disp: 12 capsule, Rfl: 3  •  fluticasone (FLONASE) 50 mcg/act nasal spray, USE 2 SPRAYS IN EACH NOSTRIL ONCE DAILY, Disp: 48 g, Rfl: 1  •  gentamicin (GARAMYCIN) 0.1 % cream, , Disp: , Rfl:   •  HYDROcodone-acetaminophen (Norco) 5-325 mg per tablet, Take 1 tablet by mouth every 6 (six) hours as needed for pain Max Daily Amount: 4 tablets, Disp: 60 tablet, Rfl: 0  •  hydroxychloroquine (PLAQUENIL) 200 mg tablet, Take 1 tablet (200 mg total) by mouth 2 (two) times a day, Disp: 60 tablet, Rfl: 5  •  Insulin Glargine Solostar (Basaglar KwikPen) 100 UNIT/ML SOPN, INJECT 50 UNITS UNDER THE SKIN EVERY 12 (TWELVE) HOURS, Disp: 90 mL, Rfl: 1  •  leflunomide (ARAVA) 20 MG tablet, TAKE 1 TABLET (20 MG TOTAL) BY MOUTH DAILY, Disp: 90 tablet, Rfl: 2  •  liraglutide (VICTOZA) injection, INJECT 0.3ML (1.8MG) UNDER THE SKIN DAILY, Disp: 9 mL, Rfl: 2  •  losartan-hydrochlorothiazide (HYZAAR) 100-12.5 MG per tablet, TAKE 1 TABLET BY MOUTH DAILY, Disp: 90 tablet, Rfl: 1  •  naproxen (NAPROSYN) 500 mg tablet, TAKE 1 TABLET (500 MG TOTAL) BY MOUTH EVERY 12 (TWELVE) HOURS AS NEEDED FOR MILD PAIN (WITH FOOD), Disp: 60 tablet, Rfl: 6  •  olopatadine (PATANOL) 0.1 % ophthalmic solution, Administer 1 drop to both eyes in the morning and 1 drop in the evening.  (Patient not taking: Reported on 8/8/2024), Disp: 5 mL, Rfl: 1  •  OneTouch Verio test strip, CHECK GLUCOSE BEFORE MEALS (Patient not taking: Reported on 5/7/2025), Disp: 100 strip, Rfl: 3  •  pantoprazole (PROTONIX) 40 mg tablet, TAKE 1 TABLET (40 MG TOTAL) BY MOUTH DAILY, Disp: 30 tablet, Rfl: 0  •  saccharomyces boulardii (FLORASTOR) 250 mg capsule, Take 1 capsule (250 mg total) by mouth 2 (two) times a day, Disp: 60 capsule, Rfl: 0  •  sitaGLIPtin (Januvia) 100 mg tablet, , Disp: , Rfl:   •  sucralfate (CARAFATE) 1 g tablet, TAKE 1 TABLET (1 G TOTAL) BY MOUTH 4 (FOUR) TIMES A DAY, Disp: 120 tablet, Rfl: 0  •  ULTICARE MICRO PEN NEEDLES 32G X 4 MM MISC, INJECT UNDER THE SKIN 3 (THREE) TIMES A DAY (Patient not taking: Reported on 11/13/2023), Disp: 100 each, Rfl: 3    The following portions of the patient's history were reviewed and updated as appropriate: allergies, current medications, past family history, past medical history, past social history, past surgical history and problem list.    Review of Systems   Constitutional: Negative.    HENT: Negative.     Eyes: Negative.    Respiratory: Negative.     Cardiovascular: Negative.    Gastrointestinal: Negative.    Endocrine: Negative.    Genitourinary: Negative.    Musculoskeletal: Negative.    Skin: Negative.    Allergic/Immunologic: Negative.    Neurological: Negative.    Hematological: Negative.    Psychiatric/Behavioral: Negative.     All other systems reviewed and are negative.          Objective:    There were no vitals taken for this visit.     Physical Exam  Vitals and nursing note reviewed.   Constitutional:       General: He is not in acute distress.     Appearance: Normal appearance. He is well-developed. He is obese. He is not ill-appearing.   HENT:      Head: Normocephalic and atraumatic.      Right Ear: Tympanic membrane, ear canal and external ear normal.      Left Ear: Tympanic membrane, ear canal and external ear normal.      Nose: Nose normal.       Mouth/Throat:      Mouth: Mucous membranes are moist.   Eyes:      Extraocular Movements: Extraocular movements intact.      Pupils: Pupils are equal, round, and reactive to light.      Comments: Allergic conjunctivitis bilaterally   Cardiovascular:      Rate and Rhythm: Normal rate and regular rhythm.      Pulses: Normal pulses.      Heart sounds: Normal heart sounds. No murmur heard.  Pulmonary:      Effort: Pulmonary effort is normal.      Breath sounds: Normal breath sounds.   Abdominal:      General: Abdomen is flat. Bowel sounds are normal. There is no distension.      Palpations: Abdomen is soft. There is no mass.   Musculoskeletal:         General: Deformity (Rheumatic nodules) present. Normal range of motion.      Cervical back: Normal range of motion and neck supple.      Right lower leg: No edema.      Left lower leg: No edema.   Skin:     General: Skin is warm and dry.      Comments: Hypertrophic inflamed scar left side of the face just anterior to the ear   Neurological:      General: No focal deficit present.      Mental Status: He is alert and oriented to person, place, and time.      Cranial Nerves: No cranial nerve deficit.   Psychiatric:         Mood and Affect: Mood normal.         Behavior: Behavior normal.         Thought Content: Thought content normal.         Judgment: Judgment normal.           Recent Results (from the past 6 weeks)   CBC and differential    Collection Time: 04/23/25  8:34 AM   Result Value Ref Range    WBC 6.26 4.31 - 10.16 Thousand/uL    RBC 5.52 3.88 - 5.62 Million/uL    Hemoglobin 16.5 12.0 - 17.0 g/dL    Hematocrit 49.5 (H) 36.5 - 49.3 %    MCV 90 82 - 98 fL    MCH 29.9 26.8 - 34.3 pg    MCHC 33.3 31.4 - 37.4 g/dL    RDW 13.6 11.6 - 15.1 %    MPV 11.4 8.9 - 12.7 fL    Platelets 201 149 - 390 Thousands/uL    nRBC 0 /100 WBCs    Segmented % 60 43 - 75 %    Immature Grans % 1 0 - 2 %    Lymphocytes % 22 14 - 44 %    Monocytes % 14 (H) 4 - 12 %    Eosinophils Relative 2 0  - 6 %    Basophils Relative 1 0 - 1 %    Absolute Neutrophils 3.74 1.85 - 7.62 Thousands/µL    Absolute Immature Grans 0.04 0.00 - 0.20 Thousand/uL    Absolute Lymphocytes 1.38 0.60 - 4.47 Thousands/µL    Absolute Monocytes 0.87 0.17 - 1.22 Thousand/µL    Eosinophils Absolute 0.15 0.00 - 0.61 Thousand/µL    Basophils Absolute 0.08 0.00 - 0.10 Thousands/µL   Comprehensive metabolic panel    Collection Time: 04/23/25  8:34 AM   Result Value Ref Range    Sodium 138 135 - 147 mmol/L    Potassium 4.1 3.5 - 5.3 mmol/L    Chloride 100 96 - 108 mmol/L    CO2 31 21 - 32 mmol/L    ANION GAP 7 4 - 13 mmol/L    BUN 17 5 - 25 mg/dL    Creatinine 1.07 0.60 - 1.30 mg/dL    Glucose, Fasting 116 (H) 65 - 99 mg/dL    Calcium 9.1 8.4 - 10.2 mg/dL    AST 21 13 - 39 U/L    ALT 27 7 - 52 U/L    Alkaline Phosphatase 45 34 - 104 U/L    Total Protein 6.9 6.4 - 8.4 g/dL    Albumin 4.1 3.5 - 5.0 g/dL    Total Bilirubin 0.54 0.20 - 1.00 mg/dL    eGFR 76 ml/min/1.73sq m   Hemoglobin A1C    Collection Time: 04/23/25  8:34 AM   Result Value Ref Range    Hemoglobin A1C 7.8 (H) Normal 4.0-5.6%; PreDiabetic 5.7-6.4%; Diabetic >=6.5%; Glycemic control for adults with diabetes <7.0% %     mg/dl   Lipid panel    Collection Time: 04/23/25  8:34 AM   Result Value Ref Range    Cholesterol 149 See Comment mg/dL    Triglycerides 122 See Comment mg/dL    HDL, Direct 44 >=40 mg/dL    LDL Calculated 81 0 - 100 mg/dL    Non-HDL-Chol (CHOL-HDL) 105 mg/dl   TSH, 3rd generation with Free T4 reflex    Collection Time: 04/23/25  8:34 AM   Result Value Ref Range    TSH 3RD GENERATON 2.509 0.450 - 4.500 uIU/mL   PSA, Total Screen    Collection Time: 04/23/25  8:34 AM   Result Value Ref Range    PSA 0.390 0.000 - 4.000 ng/mL   Bilirubin, direct    Collection Time: 04/23/25  8:34 AM   Result Value Ref Range    Bilirubin, Direct 0.11 0.00 - 0.20 mg/dL       Assessment/Plan:    No problem-specific Assessment & Plan notes found for this encounter.         {Assess/PlanSmarKindred Hospital at Morris:[id number]}

## 2025-05-07 NOTE — ASSESSMENT & PLAN NOTE
Patient is no longer on biologic injectables.  Remains on Plaquenil and leflunomide.  Needs to make follow-up appointment with rheumatology.  He missed his last follow-up appointment.  Scheduled for August

## 2025-05-07 NOTE — PROGRESS NOTES
Subjective:      Patient ID: Renny Jean Baptiste Jr. is a 59 y.o. male.    59-year-old male with past medical history of diabetes mellitus type 2 that is insulin requiring, COPD, chronic tobacco abuse, rheumatoid arthritis,  diabetic neuropathy, hypertension presents for 4-month follow-up of chronic conditions.  Patient is still seeing podiatry and just recently had infected right great toenail removed.  He still is working despite having history of Charcot foot.  He is seeing rheumatologist though he did miss his most recent appointment.  No longer on biologic agent.  Remains on leflunomide and Plaquenil.  Continues to complain of diffuse, chronic pain.  Hemoglobin A1c increased from 7.5 to 7.8% which she attributes to a very long winter as well as dietary discretion though his cholesterol has improved.  Still taking medications as instructed.        Past Medical History:   Diagnosis Date   • Abscess of left thigh 05/11/2021   • Arthritis    • At risk for falls    • Biliary dyskinesia 03/16/2020   • Broken foot     right   • Cancer ([facility])    • Cataract     giacomo   • Cellulitis of left lower extremity 04/26/2021   • Clear cell hidradenoma    • COPD (chronic obstructive pulmonary disease) ([facility])    • Diabetes mellitus ([facility])    • Dyskinesia of gallbladder 08/15/2022   • Hx MRSA infection     Per wife patient hadf MRSa in a wound awhile ago   • Hyperlipidemia    • Hypertension    • Kidney stone    • Neuropathy    • RA (rheumatoid arthritis) ([facility])    • Rheumatoid arthritis ([facility])    • Seasonal allergies    • Sepsis ([facility]) 04/26/2021   • Snores    • Uses wheelchair     and crutches- NWB RLE   • Wears glasses        Family History   Problem Relation Age of Onset   • Diabetes Mother    • Stroke Mother    • Mental illness Mother    • Arthritis Mother    • Diabetes Father    • Stroke Father    • Arthritis Father    • COPD Father    • Vision loss Father    • Alcohol abuse Brother    • Coronary artery disease Family         Age  50-51   • Diabetes type II Family    • Heart disease Family        Past Surgical History:   Procedure Laterality Date   • APPENDECTOMY     • CLOSED REDUCTION FOOT DISLOCATION Right 2/12/2019    Procedure: C/R FRACTURE;  Surgeon: Phillip Lentz DPM;  [facility];  Service: Podiatry   • COLONOSCOPY     • FOOT SURGERY Right 07/2019    Removal of the bone graft and cleaned out infection, and placed new bone graft   • IR PICC PLACEMENT SINGLE LUMEN  8/5/2019   • CO DEBRIDEMENT SUBCUTANEOUS TISSUE 1ST 20 SQ CM/< Left 5/24/2021    Procedure: INCISION AND DRAINAGE THIGH;  Surgeon: Rom Figueroa MD;  [facility];  Service: Orthopedics   • CO EXCISION MALIGNANT LESION S/N/H/F/G 0.5 CM/< N/A 3/28/2018    Procedure: EXCISION WIDE LESION HEAD/FACIAL/NECK;  Surgeon: Dank Petersen MD;  [facility];  Service: Surgical Oncology   • CO GASTROCNEMIUS RECESSION Right 2/12/2019    Procedure: ENDO GASTROC RECESSION, APPLICATION OF EXTERNAL FIXATOR;  Surgeon: Phillip Lentz DPM;  [facility];  Service: Podiatry   • CO LAPAROSCOPY SURG CHOLECYSTECTOMY N/A 9/30/2022    Procedure: CHOLECYSTECTOMY LAPAROSCOPIC;  Surgeon: Pk Mayo MD;  [facility];  Service: General   • CO REMOVAL EXTERNAL FIXATION SYSTEM UNDER ANES Right 4/18/2019    Procedure: FRAME REMOVAL HARDWARE FOOT WITH APPLICATION OF GRAFT;  Surgeon: Phillip Lentz DPM;  [facility];  Service: Podiatry   • SKIN BIOPSY      scalp   • WISDOM TOOTH EXTRACTION  1998   • WOUND DEBRIDEMENT Left 4/29/2021    Procedure: KNEE INCISION AND DRAINAGE, EXCISIONAL DEBRIDEMENT OF PREPATELLAR BURSA;  Surgeon: Rom Figueroa MD;  [facility];  Service: Orthopedics        reports that he has been smoking cigarettes. He has a 80 pack-year smoking history. He has never used smokeless tobacco. He reports current alcohol use. He reports current drug use. Frequency: 7.00 times per week. Drug: Marijuana.      Current Outpatient  Medications:   •  albuterol (PROVENTIL HFA,VENTOLIN HFA) 90 mcg/act inhaler, INHALE 2 PUFFS EVERY 4 (FOUR) HOURS AS NEEDED FOR WHEEZING, Disp: 18 g, Rfl: 5  •  amitriptyline (ELAVIL) 10 mg tablet, TAKE 1 TABLET (10 MG TOTAL) BY MOUTH DAILY AT BEDTIME, Disp: 90 tablet, Rfl: 1  •  Ascorbic Acid (vitamin C) 1000 MG tablet, Take 1,000 mg by mouth daily, Disp: , Rfl:   •  atorvastatin (LIPITOR) 40 mg tablet, TAKE 1 TABLET (40 MG TOTAL) BY MOUTH DAILY AT BEDTIME, Disp: 90 tablet, Rfl: 1  •  BD Pen Needle Maureen U/F 32G X 4 MM MISC, ONE PEN NEEDLE THREE TIMES A DAY 2-BASAGLAR 1- VICTOZA, Disp: 100 each, Rfl: 5  •  calcium carbonate (OS-NERY) 600 MG tablet, Take 600 mg by mouth daily with dinner, Disp: , Rfl:   •  Continuous Blood Gluc Sensor (FreeStyle Laura 2 Sensor) MISC, Check blood sugars multiple times per day, Disp: 6 each, Rfl: 3  •  Diclofenac Sodium (VOLTAREN) 1 %, Apply 2 g topically 4 (four) times a day as needed (pain), Disp: 350 g, Rfl: 2  •  ergocalciferol (VITAMIN D2) 50,000 units, Take 1 capsule (50,000 Units total) by mouth once a week Takes on Saturday, Disp: 12 capsule, Rfl: 3  •  fluticasone (FLONASE) 50 mcg/act nasal spray, USE 2 SPRAYS IN EACH NOSTRIL ONCE DAILY, Disp: 48 g, Rfl: 1  •  gentamicin (GARAMYCIN) 0.1 % cream, , Disp: , Rfl:   •  HYDROcodone-acetaminophen (Norco) 5-325 mg per tablet, Take 1 tablet by mouth every 6 (six) hours as needed for pain Max Daily Amount: 4 tablets, Disp: 60 tablet, Rfl: 0  •  hydroxychloroquine (PLAQUENIL) 200 mg tablet, Take 1 tablet (200 mg total) by mouth 2 (two) times a day, Disp: 60 tablet, Rfl: 5  •  Insulin Glargine Solostar (Basaglar KwikPen) 100 UNIT/ML SOPN, INJECT 50 UNITS UNDER THE SKIN EVERY 12 (TWELVE) HOURS, Disp: 90 mL, Rfl: 1  •  leflunomide (ARAVA) 20 MG tablet, TAKE 1 TABLET (20 MG TOTAL) BY MOUTH DAILY, Disp: 90 tablet, Rfl: 2  •  liraglutide (VICTOZA) injection, INJECT 0.3ML (1.8MG) UNDER THE SKIN DAILY, Disp: 9 mL, Rfl: 2  •   losartan-hydrochlorothiazide (HYZAAR) 100-12.5 MG per tablet, TAKE 1 TABLET BY MOUTH DAILY, Disp: 90 tablet, Rfl: 1  •  naproxen (NAPROSYN) 500 mg tablet, TAKE 1 TABLET (500 MG TOTAL) BY MOUTH EVERY 12 (TWELVE) HOURS AS NEEDED FOR MILD PAIN (WITH FOOD), Disp: 60 tablet, Rfl: 6  •  pantoprazole (PROTONIX) 40 mg tablet, TAKE 1 TABLET (40 MG TOTAL) BY MOUTH DAILY, Disp: 30 tablet, Rfl: 0  •  saccharomyces boulardii (FLORASTOR) 250 mg capsule, Take 1 capsule (250 mg total) by mouth 2 (two) times a day, Disp: 60 capsule, Rfl: 0  •  sitaGLIPtin (Januvia) 100 mg tablet, , Disp: , Rfl:   •  sucralfate (CARAFATE) 1 g tablet, TAKE 1 TABLET (1 G TOTAL) BY MOUTH 4 (FOUR) TIMES A DAY, Disp: 120 tablet, Rfl: 0  •  Blood Glucose Monitoring Suppl (OneTouch Verio Flex System) w/Device KIT, Use 3 (three) times a day with meals (Patient not taking: Reported on 11/13/2023), Disp: 1 kit, Rfl: 0  •  olopatadine (PATANOL) 0.1 % ophthalmic solution, Administer 1 drop to both eyes in the morning and 1 drop in the evening. (Patient not taking: Reported on 8/8/2024), Disp: 5 mL, Rfl: 1  •  OneTouch Verio test strip, CHECK GLUCOSE BEFORE MEALS (Patient not taking: Reported on 5/7/2025), Disp: 100 strip, Rfl: 3  •  ULTICARE MICRO PEN NEEDLES 32G X 4 MM MISC, INJECT UNDER THE SKIN 3 (THREE) TIMES A DAY (Patient not taking: Reported on 11/13/2023), Disp: 100 each, Rfl: 3    The following portions of the patient's history were reviewed and updated as appropriate: allergies, current medications, past family history, past medical history, past social history, past surgical history and problem list.    Review of Systems   Constitutional: Negative.    HENT: Negative.     Eyes: Negative.    Respiratory: Negative.     Cardiovascular: Negative.  Negative for leg swelling.   Gastrointestinal: Negative.    Endocrine: Negative.    Genitourinary: Negative.    Musculoskeletal:  Positive for arthralgias, joint swelling and neck pain.   Skin: Negative.     Allergic/Immunologic: Negative.    Neurological:  Positive for numbness (Feet). Negative for weakness.   Hematological: Negative.    Psychiatric/Behavioral: Negative.     All other systems reviewed and are negative.          Objective:    /78   Pulse 60   Resp 18   Ht 6' (1.829 m)   Wt 108 kg (239 lb)   SpO2 97%   BMI 32.41 kg/m²      Physical Exam  Vitals and nursing note reviewed.   Constitutional:       General: He is not in acute distress.     Appearance: Normal appearance. He is well-developed. He is obese. He is not ill-appearing.   HENT:      Head: Normocephalic and atraumatic.      Right Ear: Tympanic membrane, ear canal and external ear normal.      Left Ear: Tympanic membrane, ear canal and external ear normal.      Nose: Nose normal.      Mouth/Throat:      Mouth: Mucous membranes are moist.   Eyes:      Extraocular Movements: Extraocular movements intact.      Pupils: Pupils are equal, round, and reactive to light.   Cardiovascular:      Rate and Rhythm: Normal rate and regular rhythm.      Pulses: Normal pulses.      Heart sounds: Normal heart sounds. No murmur heard.  Pulmonary:      Effort: Pulmonary effort is normal.      Breath sounds: Normal breath sounds.   Abdominal:      General: Abdomen is flat. Bowel sounds are normal. There is no distension.      Palpations: Abdomen is soft. There is no mass.   Musculoskeletal:         General: Deformity (Rheumatic nodules) present. Normal range of motion.      Cervical back: Normal range of motion and neck supple.      Right lower leg: No edema.      Left lower leg: No edema.   Skin:     General: Skin is warm and dry.      Comments: Hypertrophic inflamed scar left side of the face just anterior to the ear   Neurological:      General: No focal deficit present.      Mental Status: He is alert and oriented to person, place, and time.      Cranial Nerves: No cranial nerve deficit.   Psychiatric:         Mood and Affect: Mood normal.          Behavior: Behavior normal.         Thought Content: Thought content normal.         Judgment: Judgment normal.           Recent Results (from the past 6 weeks)   CBC and differential    Collection Time: 04/23/25  8:34 AM   Result Value Ref Range    WBC 6.26 4.31 - 10.16 Thousand/uL    RBC 5.52 3.88 - 5.62 Million/uL    Hemoglobin 16.5 12.0 - 17.0 g/dL    Hematocrit 49.5 (H) 36.5 - 49.3 %    MCV 90 82 - 98 fL    MCH 29.9 26.8 - 34.3 pg    MCHC 33.3 31.4 - 37.4 g/dL    RDW 13.6 11.6 - 15.1 %    MPV 11.4 8.9 - 12.7 fL    Platelets 201 149 - 390 Thousands/uL    nRBC 0 /100 WBCs    Segmented % 60 43 - 75 %    Immature Grans % 1 0 - 2 %    Lymphocytes % 22 14 - 44 %    Monocytes % 14 (H) 4 - 12 %    Eosinophils Relative 2 0 - 6 %    Basophils Relative 1 0 - 1 %    Absolute Neutrophils 3.74 1.85 - 7.62 Thousands/µL    Absolute Immature Grans 0.04 0.00 - 0.20 Thousand/uL    Absolute Lymphocytes 1.38 0.60 - 4.47 Thousands/µL    Absolute Monocytes 0.87 0.17 - 1.22 Thousand/µL    Eosinophils Absolute 0.15 0.00 - 0.61 Thousand/µL    Basophils Absolute 0.08 0.00 - 0.10 Thousands/µL   Comprehensive metabolic panel    Collection Time: 04/23/25  8:34 AM   Result Value Ref Range    Sodium 138 135 - 147 mmol/L    Potassium 4.1 3.5 - 5.3 mmol/L    Chloride 100 96 - 108 mmol/L    CO2 31 21 - 32 mmol/L    ANION GAP 7 4 - 13 mmol/L    BUN 17 5 - 25 mg/dL    Creatinine 1.07 0.60 - 1.30 mg/dL    Glucose, Fasting 116 (H) 65 - 99 mg/dL    Calcium 9.1 8.4 - 10.2 mg/dL    AST 21 13 - 39 U/L    ALT 27 7 - 52 U/L    Alkaline Phosphatase 45 34 - 104 U/L    Total Protein 6.9 6.4 - 8.4 g/dL    Albumin 4.1 3.5 - 5.0 g/dL    Total Bilirubin 0.54 0.20 - 1.00 mg/dL    eGFR 76 ml/min/1.73sq m   Hemoglobin A1C    Collection Time: 04/23/25  8:34 AM   Result Value Ref Range    Hemoglobin A1C 7.8 (H) Normal 4.0-5.6%; PreDiabetic 5.7-6.4%; Diabetic >=6.5%; Glycemic control for adults with diabetes <7.0% %     mg/dl   Lipid panel    Collection Time:  25  8:34 AM   Result Value Ref Range    Cholesterol 149 See Comment mg/dL    Triglycerides 122 See Comment mg/dL    HDL, Direct 44 >=40 mg/dL    LDL Calculated 81 0 - 100 mg/dL    Non-HDL-Chol (CHOL-HDL) 105 mg/dl   TSH, 3rd generation with Free T4 reflex    Collection Time: 25  8:34 AM   Result Value Ref Range    TSH 3RD GENERATON 2.509 0.450 - 4.500 uIU/mL   PSA, Total Screen    Collection Time: 25  8:34 AM   Result Value Ref Range    PSA 0.390 0.000 - 4.000 ng/mL   Bilirubin, direct    Collection Time: 25  8:34 AM   Result Value Ref Range    Bilirubin, Direct 0.11 0.00 - 0.20 mg/dL   IRIS Diabetic eye exam    Collection Time: 25 10:28 AM   Result Value Ref Range    Severity NORMAL     Right Eye Diabetic Retinopathy None     Right Eye Macular Edema None     Right Eye Other Retinopathy None     Right Eye Image Quality Gradable Image     Left Eye Diabetic Retinopathy None     Left Eye Macular Edema None     Left Eye Other Retinopathy None     Left Eye Image Quality Gradable Image     Result Retinal Study Result for PAOLA CRUZ     Result      Result       cassidy PIERRE 58 y/o, M (: 1965, MRN: [id number])    Result       presented to [facility] on 2025 for a retinal imaging study of the left and right eyes.    Result      Result       Based on the findings of the study, the following is recommended for PAOLA CRUZ    Result       Normal Study: Re-scan the patient in 12 months or in the next calendar year.    Result      Result       Interpreting Provider's Comments:  No comments provided    Result       Diagnoses Present: [id number] - Type 2 diabetes mellitus with diabetic neuropathic arthropathy    Result      Result       Right eye findings: Negative for Diabetic Retinopathy    Result Negative for Macular Edema     Result      Result       Left eye findings: Negative for Diabetic Retinopathy    Result Negative for Macular Edema     Result       Result       This result was electronically signed by Juan Aviles MD, NPI: [id number], Taxonomy: [id number] on 05- 14:39 [facility].    Result      Result       NOTE:  Any pathology noted on this diabetic retinal evaluation should be confirmed by an appropriate ophthalmic examination.       Assessment/Plan:    Benign essential hypertension  Blood pressure remains adequately controlled on losartan/chlorothiazide.  Weight loss will be beneficial.  Patient is aware of this.  Reevaluate in 4 months    Charcot foot due to diabetes mellitus ([facility])    Lab Results   Component Value Date    HGBA1C 7.8 (H) 04/23/2025     He has better glycemic control.  Followed and managed by podiatry    Type 2 diabetes mellitus with diabetic neuropathic arthropathy, with long-term current use of insulin ([facility])    Lab Results   Component Value Date    HGBA1C 7.8 (H) 04/23/2025     Hemoglobin A1c has been better.  Has history of complicated diabetes.  IRIS eye examination performed today which did not show evidence of retinopathy.  Remains on Basaglar, Victoza, Januvia.  Needs to watch dietary intake of carbohydrates, eat healthier diet.  Repeat diabetic parameters in 4 months    Rheumatoid arthritis involving multiple sites with positive rheumatoid factor (HCC)  Patient is no longer on biologic injectables.  Remains on Plaquenil and leflunomide.  Needs to make follow-up appointment with rheumatology.  He missed his last follow-up appointment.  Scheduled for August    Seropositive rheumatoid arthritis (HCC)  Patient is no longer on biologic injectables.  Remains on Plaquenil and leflunomide.  Needs to make follow-up appointment with rheumatology.  He missed his last follow-up appointment.  Scheduled for August    Mixed hyperlipidemia  Patient remains on atorvastatin 40 mg once daily at bedtime.  Goal LDL less than 70.  Advised patient that at no time should he ever be off statin therapy due to his history of inflammatory arthropathy  as well as his history of poorly controlled diabetes.  Continue same.  Reevaluate lipid panel in 4 months          Problem List Items Addressed This Visit        Cardiovascular and Mediastinum    Benign essential hypertension - Primary    Blood pressure remains adequately controlled on losartan/chlorothiazide.  Weight loss will be beneficial.  Patient is aware of this.  Reevaluate in 4 months            Endocrine    Charcot foot due to diabetes mellitus ([facility])      Lab Results   Component Value Date    HGBA1C 7.8 (H) 04/23/2025     He has better glycemic control.  Followed and managed by podiatry         Type 2 diabetes mellitus with diabetic neuropathic arthropathy, with long-term current use of insulin ([facility])      Lab Results   Component Value Date    HGBA1C 7.8 (H) 04/23/2025     Hemoglobin A1c has been better.  Has history of complicated diabetes.  IRIS eye examination performed today which did not show evidence of retinopathy.  Remains on Basaglar, Victoza, Januvia.  Needs to watch dietary intake of carbohydrates, eat healthier diet.  Repeat diabetic parameters in 4 months            Musculoskeletal and Integument    Rheumatoid arthritis involving multiple sites with positive rheumatoid factor (HCC)    Patient is no longer on biologic injectables.  Remains on Plaquenil and leflunomide.  Needs to make follow-up appointment with rheumatology.  He missed his last follow-up appointment.  Scheduled for August         Seropositive rheumatoid arthritis (HCC)    Patient is no longer on biologic injectables.  Remains on Plaquenil and leflunomide.  Needs to make follow-up appointment with rheumatology.  He missed his last follow-up appointment.  Scheduled for August            Other    Mixed hyperlipidemia    Patient remains on atorvastatin 40 mg once daily at bedtime.  Goal LDL less than 70.  Advised patient that at no time should he ever be off statin therapy due to his history of inflammatory arthropathy as well as his  history of poorly controlled diabetes.  Continue same.  Reevaluate lipid panel in 4 months

## 2025-05-07 NOTE — ASSESSMENT & PLAN NOTE
Lab Results   Component Value Date    HGBA1C 7.8 (H) 04/23/2025     Hemoglobin A1c has been better.  Has history of complicated diabetes.  IRIS eye examination performed today which did not show evidence of retinopathy.  Remains on Basaglar, Victoza, Januvia.  Needs to watch dietary intake of carbohydrates, eat healthier diet.  Repeat diabetic parameters in 4 months

## 2025-05-07 NOTE — ASSESSMENT & PLAN NOTE
Blood pressure remains adequately controlled on losartan/chlorothiazide.  Weight loss will be beneficial.  Patient is aware of this.  Reevaluate in 4 months

## 2025-05-07 NOTE — ASSESSMENT & PLAN NOTE
Lab Results   Component Value Date    HGBA1C 7.8 (H) 04/23/2025     He has better glycemic control.  Followed and managed by podiatry

## 2025-06-02 ENCOUNTER — OFFICE VISIT (OUTPATIENT)
Dept: RHEUMATOLOGY | Facility: CLINIC | Age: 60
End: 2025-06-02
Payer: COMMERCIAL

## 2025-06-02 VITALS
WEIGHT: 242 LBS | HEART RATE: 90 BPM | DIASTOLIC BLOOD PRESSURE: 88 MMHG | BODY MASS INDEX: 32.78 KG/M2 | SYSTOLIC BLOOD PRESSURE: 128 MMHG | HEIGHT: 72 IN | OXYGEN SATURATION: 96 %

## 2025-06-02 DIAGNOSIS — Z79.60 LONG-TERM USE OF IMMUNOSUPPRESSANT MEDICATION: ICD-10-CM

## 2025-06-02 DIAGNOSIS — Z11.59 SCREENING FOR VIRAL DISEASE: ICD-10-CM

## 2025-06-02 DIAGNOSIS — M05.9 SEROPOSITIVE RHEUMATOID ARTHRITIS (HCC): Primary | ICD-10-CM

## 2025-06-02 PROCEDURE — 99215 OFFICE O/P EST HI 40 MIN: CPT | Performed by: STUDENT IN AN ORGANIZED HEALTH CARE EDUCATION/TRAINING PROGRAM

## 2025-06-02 RX ORDER — FOLIC ACID 1 MG/1
1 TABLET ORAL DAILY
Qty: 90 TABLET | Refills: 2 | Status: SHIPPED | OUTPATIENT
Start: 2025-06-02

## 2025-06-02 RX ORDER — METHOTREXATE 2.5 MG/1
TABLET ORAL
Qty: 72 TABLET | Refills: 2 | Status: SHIPPED | OUTPATIENT
Start: 2025-06-02

## 2025-06-02 NOTE — ASSESSMENT & PLAN NOTE
- Previously taking Cimzia, this was stopped 9/2024  - Endorses shoulder pain shooting down arms/hands. Suspect secondary to chronic neck issues and not related to RA  - Bursts of pain followed by several days of continued pain in hands and knuckles also do not sound typical for RA  - Remains on Plaquenil and Lefluonomide, endorses minimal improvement with current medication regimen  - Discussed stopping Lefluonamide and starting Methotrexate with folic acid. Yaritza agreeable a this time      Plan  -Patient to get blood work (creatinine, calcium, LFTs, CBC) 2 and 4 weeks after starting methotrexate, then every 3 months there  -He states he currently feels as though he has a viral URI. Patient to wait one week before starting methotrexate  -Patient to get hand x-rays completed  -Patient to complete previously ordered US completed       Orders:    methotrexate 2.5 MG tablet; Take 6 pills one day a week, 3 in the morning and 3 in the evening.    US RA Hands/Wrists; Future    XR hand 3+ vw left; Future    XR hand 3+ vw right; Future    XR wrist 2 vw left; Future    XR wrist 2 vw right; Future

## 2025-06-02 NOTE — PATIENT INSTRUCTIONS
Your shoulder pain and shooting pains down the arms into the hands are related to your neck issues; I am confident that this symptom is not related to your rheumatoid arthritis.    The bursts of pain you're getting followed by several days of continued pain don't sound typical for rheumatoid arthritis. We can retry the Enbrel since you had success with that in the past, but I'm not positive that it will help. I do think that the majority of your current active joint pain issues are related to your neck.    Please call central scheduling to set up an appointment for ultrasound. Please avoid antiinflammatories including NSAIDs and steroids for 3-5 days prior to the ultrasound.  Central scheduling: [phone]    Please get hand x-rays    STOP leflunomide    START methotrexate and folic acid    Get blood work (creatinine, calcium, LFTs, CBC) 2 and 4 weeks after starting methotrexate, then every 3 months thereafter.    While on your prescribed rheumatologic medication(s) methotrexate: you must STOP the medication if you fall ill (ex: a viral infection, bacterial infection) and not restart it until your illness is resolved and/or you are finished with any antibiotics/antivirals/antifungals that are prescribed for you, whichever happens last.    While on the medication(s), if you are to get a vaccine, you may have to STOP the medication before and after your vaccination. See below for how long to stop your medication according to how often you take it.    LIVE ATTENUATED VACCINES (ex: MMR, rotavirus, smallpox, chickenpox, yellow fever)    Hold before vaccine Hold after vaccine   Steroids 4 weeks 4 weeks   Methotrexate 4 weeks      COVID-19 VACCINE    Instructions   Abatacept (IV) Time vaccine so it is given 2 weeks before next scheduled abatacept dose, then receive abatacept as scheduled   Abatacept (SQ) Delay abatacept dose for 2 weeks after vaccine   Belimumab Delay belimumab dose for 2 weeks after vaccine    Cyclophosphamide (IV) Time cyclophosphamide administration so that it will occur 1 week after each vaccine dose   Hydroxychloroquine No changes to hydroxychloroquine timing or vaccine timing   IVIG No changes to IVIG timing or vaccine timing   Rituximab Time vaccine so that it is given 2 weeks before the next scheduled rituximab dose, then receive rituximab as scheduled   All others Delay rheumatologic medication dose for 2 weeks after vaccine, if disease activity allows     INFLUENZA VACCINE    Hold before vaccine Hold after vaccine   Methotrexate 1 week 2 weeks if able   Rituximab  n/a 2 weeks   All others CONTINUE, DO NOT HOLD CONTINUE, DO NOT HOLD     ALL OTHER VACCINES    Hold before vaccine Hold after vaccine   Methotrexate CONTINUE, DO NOT HOLD CONTINUE, DO NOT HOLD   Rituximab  n/a Time vaccination for when next dose is due, then give rituximab at least 2 weeks after vaccine   All others CONTINUE, DO NOT HOLD CONTINUE, DO NOT HOLD     These instructions are consistent with the 2022 recommendations set forth by the American College of Rheumatology (ACR).    Methotrexate (Rheumatrex, Trexall, Otrexup, Rasuvo)    Methotrexate is one of the most effective and commonly used medications in the treatment of rheumatoid arthritis and other forms of inflammatory arthritis. It may also be used to treat lupus, inflammatory myositis, vasculitis, psoriasis, scleroderma, and some forms of childhood arthritis. It is known as a disease-modifying anti-rheumatic drug (DMARD), because it not only decreases the pain and swelling of arthritis, but it also can decrease damage to joints and long-term disability.    How To Take It  Methotrexate comes either as pills or as a subcutaneous injection. Methotrexate is usually taken as a single dose once per week, although occasionally the dose is split into two to improve absorption or avoid side effects. Your doctor also may prescribe a folic acid (or folate) supplement to decrease  the chance of side effects. Alcohol should be avoided as it can significantly increase the risk for liver damage while taking methotrexate.  Contraception is highly encouraged while on methotrexate as this medication can cause fetal abnormalities. Regular laboratory monitoring is required while taking methotrexate. Symptomatic improvement can be noted within three to six weeks of initiating therapy, but full benefit of this drug may take up to 12 weeks.    Side Effects  Methotrexate can lower the ability of your immune system to fight infections. If you develop an infection while using this medication, you should stop it and contact your doctor. The most common side effects are gastrointestinal upset and elevations of liver function tests.  The most common side effects are nausea and diarrhea. Mouth sores, rash, dizziness, and abnormalities in blood counts can also be seen but are less common.  Methotrexate rarely can cause liver issues, most likely to occur in patients who already have liver problems or are using alcohol or taking other drugs that are toxic to the liver. Slow hair loss is seen in some patients but reverses once medication is stopped. This can often be managed by taking folic acid. It is important to remember that most patients do not experience side effects.    Tell Your Rheumatology Provider  You should contact your rheumatology provider if you develop symptoms of an infection, such as a fever or cough, or if you think you are having any side effects. Be sure to let your rheumatology provider know if you are pregnant, planning to get pregnant, or if you are breastfeeding. Methotrexate treatment should be discontinued for at least three months before attempting to become pregnant. Men taking methotrexate should talk to their physician prior to attempts to conceive. If you are planning on having surgery or will be receiving chemotherapy or radiation therapy, talk to your rheumatology provider  first.    Updated February 2024 by Isabell Saha MD, and reviewed by the American College of [employer]umat[employer] Committee on Communications and Marketing.

## 2025-06-02 NOTE — PROGRESS NOTES
Name: Renny Jean Baptiste Jr.      : 1965      MRN: [id number]  Encounter Provider: Rm Kitchen DO  Encounter Date: 2025   Encounter department: [facility]  :  Assessment & Plan  Seropositive rheumatoid arthritis (HCC)  - Previously taking Cimzia, this was stopped 2024  - Endorses shoulder pain shooting down arms/hands. Suspect secondary to chronic neck issues and not related to RA  - Bursts of pain followed by several days of continued pain in hands and knuckles also do not sound typical for RA  - Remains on Plaquenil and Lefluonomide, endorses minimal improvement with current medication regimen  - Discussed stopping Lefluonamide and starting Methotrexate with folic acid. [place] agreeable a this time      Plan  -Patient to get blood work (creatinine, calcium, LFTs, CBC) 2 and 4 weeks after starting methotrexate, then every 3 months there  -He states he currently feels as though he has a viral URI. Patient to wait one week before starting methotrexate  -Patient to get hand x-rays completed  -Patient to complete previously ordered US completed       Orders:    methotrexate 2.5 MG tablet; Take 6 pills one day a week, 3 in the morning and 3 in the evening.    US RA Hands/Wrists; Future    XR hand 3+ vw left; Future    XR hand 3+ vw right; Future    XR wrist 2 vw left; Future    XR wrist 2 vw right; Future    Long-term use of immunosuppressant medication  - Patient to complete monitoring labs as well as annual Hepatitis and TB lab work  Orders:    Hepatitis B core antibody, total; Future    Hepatitis B surface antibody; Future    Hepatitis B surface antigen; Future    Hepatitis C antibody; Future    Quantiferon TB Gold Plus Assay; Future    folic acid ( Folic Acid) 1 mg tablet; Take 1 tablet (1 mg total) by mouth daily    CBC and differential; Standing    CBC and differential; Standing    Creatinine, serum; Standing    Calcium; Standing    Hepatic function panel;  Standing    Creatinine, serum; Standing    Calcium; Standing    Hepatic function panel; Standing    Screening for viral disease  - Patient to complete labs as discussed   Orders:    Hepatitis B core antibody, total; Future    Hepatitis B surface antibody; Future    Hepatitis B surface antigen; Future    Hepatitis C antibody; Future        Pertinent Medical History          History of Present Illness   Renny Jean Baptiste Jr. is a 59 y.o. male who presents for follow up of a pre-existing problem.  Patient presenting for RA follow up. He states he has been having episodes of pain shooting down his arms b/l. He states the pain is keeping him up at night as he cannot lay comfortably on any arm. He states these spurts are burning and shooting from shoulders down to his fingers b/l. He states this starts every morning and lasts until noon. He says he has been told to get a spinal fusion but does not want to at this time. He states he also feels sharp pains throughout his body that occurs intermittently. He states the hands have lingering pain. Previously episodes lasted about 30 seconds with sharp hand pain. He states the sharp pain continues to be brief for about 30 seconds but less severe pain can linger for 1-5 days at a time. He is unable to lift anything or utilize hands much during this time. He reports pain is worse in the morning but improves by the evening. He smokes marijuana before bed to help him with the pain which he states provides relief. He is compliant with current medications but states he feels like he does receive relief from the medications. He states Enbril is the only thing he has tried that provides relief.              Denies:  Fever  Rash  Oral/nasal/genital ulcers  Uveitis  Dactylitis  Dysphagia/odynophagia  CP  SOB at rest  Pleurisy  Stomach pain  Constipation/bloating  Hematochezia  Gross hematuria  Raynaud's  Joint issues other than noted above        Objective   /88   Pulse 90   Ht 6'  (1.829 m)   Wt 110 kg (242 lb)   SpO2 96%   BMI 32.82 kg/m²     Physical Exam  Constitutional:       Appearance: He is obese.   HENT:      Head: Normocephalic and atraumatic.      Right Ear: External ear normal.      Left Ear: External ear normal.      Nose: Congestion present.      Mouth/Throat:      Mouth: Mucous membranes are moist.      Pharynx: Oropharynx is clear.     Eyes:      Conjunctiva/sclera: Conjunctivae normal.       Cardiovascular:      Rate and Rhythm: Normal rate and regular rhythm.   Pulmonary:      Effort: Pulmonary effort is normal.      Breath sounds: Normal breath sounds. No wheezing.     Musculoskeletal:         General: No swelling or tenderness.     Skin:     General: Skin is warm and dry.     Neurological:      Mental Status: He is alert and oriented to person, place, and time.     Psychiatric:         Mood and Affect: Mood normal.         Behavior: Behavior normal.         Recent labs:  Lab Results   Component Value Date/Time    SODIUM 138 04/23/2025 08:34 AM    SODIUM 141 02/17/2023 09:05 AM    K 4.1 04/23/2025 08:34 AM    K 4.4 02/17/2023 09:05 AM    BUN 17 04/23/2025 08:34 AM    BUN 18 02/17/2023 09:05 AM    CREATININE 1.07 04/23/2025 08:34 AM    CREATININE 0.91 01/23/2017 01:00 PM    GLUC 325 (H) 02/26/2024 12:33 PM    GLUC 102 (H) 02/17/2023 09:05 AM    CALCIUM 9.1 04/23/2025 08:34 AM    CALCIUM 8.9 01/23/2017 01:00 PM    AST 21 04/23/2025 08:34 AM    AST 29 02/17/2023 09:05 AM    ALT 27 04/23/2025 08:34 AM    ALT 28 02/17/2023 09:05 AM    ALB 4.1 04/23/2025 08:34 AM    ALB 4.1 01/23/2017 01:00 PM    TP 6.9 04/23/2025 08:34 AM    TP 6.6 02/17/2023 09:05 AM    EGFR 76 04/23/2025 08:34 AM     Lab Results   Component Value Date/Time    HGB 16.5 04/23/2025 08:34 AM    HGB 16.0 01/23/2017 01:00 PM    WBC 6.26 04/23/2025 08:34 AM    WBC 6.0 01/23/2017 01:00 PM     04/23/2025 08:34 AM     01/23/2017 01:00 PM    INR 0.94 04/11/2019 11:16 AM    PTT 22 (L) 03/19/2018 12:02 PM      Lab Results   Component Value Date/Time    TED4OKEWKPQS 2.509 04/23/2025 08:34 AM    [id number] 1.60 01/23/2017 01:00 PM

## 2025-06-11 DIAGNOSIS — E11.610 CHARCOT FOOT DUE TO DIABETES MELLITUS (HCC): ICD-10-CM

## 2025-06-11 RX ORDER — HYDROCODONE BITARTRATE AND ACETAMINOPHEN 5; 325 MG/1; MG/1
1 TABLET ORAL EVERY 6 HOURS PRN
Qty: 60 TABLET | Refills: 0 | Status: SHIPPED | OUTPATIENT
Start: 2025-06-11

## 2025-06-23 DIAGNOSIS — E13.9 DIABETES MELLITUS OF OTHER TYPE WITHOUT COMPLICATION, UNSPECIFIED WHETHER LONG TERM INSULIN USE (HCC): ICD-10-CM

## 2025-06-23 DIAGNOSIS — J45.909 UNCOMPLICATED ASTHMA, UNSPECIFIED ASTHMA SEVERITY, UNSPECIFIED WHETHER PERSISTENT: ICD-10-CM

## 2025-06-23 DIAGNOSIS — E55.9 VITAMIN D DEFICIENCY: ICD-10-CM

## 2025-06-23 DIAGNOSIS — E11.9 TYPE 2 DIABETES MELLITUS WITHOUT COMPLICATION, WITHOUT LONG-TERM CURRENT USE OF INSULIN (HCC): ICD-10-CM

## 2025-06-24 RX ORDER — ALBUTEROL SULFATE 90 UG/1
2 INHALANT RESPIRATORY (INHALATION) EVERY 4 HOURS PRN
Qty: 18 G | Refills: 2 | Status: SHIPPED | OUTPATIENT
Start: 2025-06-24

## 2025-06-24 RX ORDER — FLUTICASONE PROPIONATE 50 MCG
2 SPRAY, SUSPENSION (ML) NASAL DAILY
Qty: 48 G | Refills: 2 | Status: SHIPPED | OUTPATIENT
Start: 2025-06-24

## 2025-06-25 RX ORDER — ERGOCALCIFEROL 1.25 MG/1
50000 CAPSULE, LIQUID FILLED ORAL WEEKLY
Qty: 12 CAPSULE | Refills: 0 | Status: SHIPPED | OUTPATIENT
Start: 2025-06-25

## 2025-07-16 DIAGNOSIS — E13.9 DIABETES MELLITUS OF OTHER TYPE WITHOUT COMPLICATION, UNSPECIFIED WHETHER LONG TERM INSULIN USE (HCC): ICD-10-CM

## 2025-07-16 DIAGNOSIS — E11.9 TYPE 2 DIABETES MELLITUS WITHOUT COMPLICATION, WITHOUT LONG-TERM CURRENT USE OF INSULIN (HCC): ICD-10-CM

## 2025-07-16 DIAGNOSIS — E11.610 CHARCOT FOOT DUE TO DIABETES MELLITUS (HCC): ICD-10-CM

## 2025-07-16 RX ORDER — LIRAGLUTIDE 6 MG/ML
INJECTION SUBCUTANEOUS
Qty: 9 ML | Refills: 0 | Status: CANCELLED | OUTPATIENT
Start: 2025-07-16

## 2025-07-17 ENCOUNTER — VBI (OUTPATIENT)
Dept: ADMINISTRATIVE | Facility: OTHER | Age: 60
End: 2025-07-17

## 2025-07-17 NOTE — TELEPHONE ENCOUNTER
07/17/25 1:22 PM     Chart reviewed for Diabetic Eye Exam ; nothing is submitted to the patient's insurance at this time.     Renetta Nguyen   [facility]

## 2025-07-18 DIAGNOSIS — E11.9 TYPE 2 DIABETES MELLITUS WITHOUT COMPLICATION, WITHOUT LONG-TERM CURRENT USE OF INSULIN (HCC): ICD-10-CM

## 2025-07-18 DIAGNOSIS — E13.9 DIABETES MELLITUS OF OTHER TYPE WITHOUT COMPLICATION, UNSPECIFIED WHETHER LONG TERM INSULIN USE (HCC): ICD-10-CM

## 2025-07-18 RX ORDER — LIRAGLUTIDE 6 MG/ML
1.8 INJECTION SUBCUTANEOUS DAILY
Qty: 9 ML | Refills: 2 | Status: SHIPPED | OUTPATIENT
Start: 2025-07-18

## 2025-07-18 RX ORDER — HYDROCODONE BITARTRATE AND ACETAMINOPHEN 5; 325 MG/1; MG/1
1 TABLET ORAL EVERY 6 HOURS PRN
Qty: 60 TABLET | Refills: 0 | Status: SHIPPED | OUTPATIENT
Start: 2025-07-18

## 2025-08-07 DIAGNOSIS — Z79.899 HIGH RISK MEDICATION USE: ICD-10-CM

## 2025-08-07 DIAGNOSIS — M05.9 SEROPOSITIVE RHEUMATOID ARTHRITIS (HCC): ICD-10-CM

## 2025-08-07 RX ORDER — AMITRIPTYLINE HYDROCHLORIDE 10 MG/1
10 TABLET ORAL
Qty: 90 TABLET | Refills: 0 | Status: SHIPPED | OUTPATIENT
Start: 2025-08-07

## (undated) DEVICE — LIGAMAX 5 MM ENDOSCOPIC MULTIPLE CLIP APPLIER: Brand: LIGAMAX

## (undated) DEVICE — SUT PROLENE 0 CT-1 30 IN 8424H

## (undated) DEVICE — SUT ETHILON 4-0 PS-2 18 IN 1667H

## (undated) DEVICE — COBAN 4 IN STERILE

## (undated) DEVICE — THE SIMPULSE SOLO SYSTEM WITH ULTREX RETRACTABLE SPLASH SHIELD TIP: Brand: SIMPULSE SOLO

## (undated) DEVICE — DRESSING BIOPATCH ANTIMICROBIAL 1 IN DISC

## (undated) DEVICE — SPONGE LAP 18 X 18 IN STRL RFD

## (undated) DEVICE — SPONGE STICK WITH PVP-I: Brand: KENDALL

## (undated) DEVICE — ACE WRAP 6 IN UNSTERILE

## (undated) DEVICE — MEDI-VAC YANKAUER SUCTION HANDLE W/STRAIGHT TIP & CONTROL VENT: Brand: CARDINAL HEALTH

## (undated) DEVICE — PAD GROUNDING ADULT

## (undated) DEVICE — DRAPE C-ARM X-RAY

## (undated) DEVICE — CHLORAPREP HI-LITE 26ML ORANGE

## (undated) DEVICE — 3M™ DURAPORE™ SURGICAL TAPE 1538-3, 3 INCH X 10 YARD (7,5CM X 9,1M), 4 ROLLS/BOX: Brand: 3M™ DURAPORE™

## (undated) DEVICE — KERLIX BANDAGE ROLL: Brand: KERLIX

## (undated) DEVICE — NEEDLE 25G X 1 1/2

## (undated) DEVICE — CULTURE TUBE AEROBIC

## (undated) DEVICE — INTENDED FOR TISSUE SEPARATION, AND OTHER PROCEDURES THAT REQUIRE A SHARP SURGICAL BLADE TO PUNCTURE OR CUT.: Brand: BARD-PARKER SAFETY BLADES SIZE 10, STERILE

## (undated) DEVICE — STOCKINETTE REGULAR

## (undated) DEVICE — EGR KIT: Brand: ENDOTRAC

## (undated) DEVICE — GLOVE SRG BIOGEL 8.5

## (undated) DEVICE — GAUZE SPONGES,16 PLY: Brand: CURITY

## (undated) DEVICE — 3M™ TEGADERM™ TRANSPARENT FILM DRESSING FRAME STYLE, 1626W, 4 IN X 4-3/4 IN (10 CM X 12 CM), 50/CT 4CT/CASE: Brand: 3M™ TEGADERM™

## (undated) DEVICE — ADHESIVE SKIN HIGH VISCOSITY EXOFIN 1ML

## (undated) DEVICE — SUT MONOCRYL 4-0 PS-2 18 IN Y496G

## (undated) DEVICE — PENCIL ELECTROSURG E-Z CLEAN -0035H

## (undated) DEVICE — 10FR FRAZIER SUCTION HANDLE: Brand: CARDINAL HEALTH

## (undated) DEVICE — SCD SEQUENTIAL COMPRESSION COMFORT SLEEVE MEDIUM KNEE LENGTH: Brand: KENDALL SCD

## (undated) DEVICE — INTENDED FOR TISSUE SEPARATION, AND OTHER PROCEDURES THAT REQUIRE A SHARP SURGICAL BLADE TO PUNCTURE OR CUT.: Brand: BARD-PARKER SAFETY BLADES SIZE 15, STERILE

## (undated) DEVICE — TISSUE RETRIEVAL SYSTEM: Brand: INZII RETRIEVAL SYSTEM

## (undated) DEVICE — PAD CAST 4 IN COTTON NON STERILE

## (undated) DEVICE — 3M™ DURAPORE™ SURGICAL TAPE 2 INCHES X 10YARDS (5.0CM X 9.1M) 6ROLLS/CARTON 10CARTONS/CASE 1538-2: Brand: 3M™ DURAPORE™

## (undated) DEVICE — SYRINGE 1ML TB 26G X 3/8 SAFETY

## (undated) DEVICE — BULB SYRINGE,IRRIGATION WITH PROTECTIVE CAP: Brand: DOVER

## (undated) DEVICE — LIGHT HANDLE COVER SLEEVE DISP BLUE STELLAR

## (undated) DEVICE — SUT VICRYL 3-0 SH 27 IN J416H

## (undated) DEVICE — DISPOSABLE EQUIPMENT COVER: Brand: SMALL TOWEL DRAPE

## (undated) DEVICE — PAD CAST 6 IN COTTON NON STERILE

## (undated) DEVICE — TUBING SUCTION 5MM X 12 FT

## (undated) DEVICE — GLOVE SRG BIOGEL 7

## (undated) DEVICE — GLOVE SRG BIOGEL 6.5

## (undated) DEVICE — TUBING SMOKE EVAC W/FILTRATION DEVICE PLUMEPORT ACTIV

## (undated) DEVICE — WEBRIL 6 IN UNSTERILE

## (undated) DEVICE — PADDING,UNDERCAST,COTTON, 4"X4YD STERILE: Brand: MEDLINE

## (undated) DEVICE — BETHLEHEM UNIVERSAL  MIONR EXT: Brand: CARDINAL HEALTH

## (undated) DEVICE — CURITY NON-ADHERENT STRIPS: Brand: CURITY

## (undated) DEVICE — COTTON BALLS: Brand: DEROYAL

## (undated) DEVICE — INTENDED FOR TISSUE SEPARATION, AND OTHER PROCEDURES THAT REQUIRE A SHARP SURGICAL BLADE TO PUNCTURE OR CUT.: Brand: BARD-PARKER ® CARBON RIB-BACK BLADES

## (undated) DEVICE — INTENDED FOR TISSUE SEPARATION, AND OTHER PROCEDURES THAT REQUIRE A SHARP SURGICAL BLADE TO PUNCTURE OR CUT.: Brand: BARD-PARKER SAFETY BLADES SIZE 11, STERILE

## (undated) DEVICE — SPLINT ORTHO-GLASS 4IN X 15FT

## (undated) DEVICE — GLOVE INDICATOR PI UNDERGLOVE SZ 8.5 BLUE

## (undated) DEVICE — GLOVE SRG BIOGEL ECLIPSE 8

## (undated) DEVICE — PADDING CAST 4 IN  COTTON STRL

## (undated) DEVICE — DRESSING XEROFORM 5 X 9

## (undated) DEVICE — GLOVE INDICATOR PI UNDERGLOVE SZ 7 BLUE

## (undated) DEVICE — OCCLUSIVE GAUZE STRIP,3% BISMUTH TRIBROMOPHENATE IN PETROLATUM BLEND: Brand: XEROFORM

## (undated) DEVICE — GLOVE SRG BIOGEL ORTHOPEDIC 7.5

## (undated) DEVICE — DRESSING BIOPATCH ANTIMICROBIOL 3/4 IN DISC

## (undated) DEVICE — PACK UNIVERSAL NECK

## (undated) DEVICE — ACE WRAP 4 IN UNSTERILE

## (undated) DEVICE — REM POLYHESIVE ADULT PATIENT RETURN ELECTRODE: Brand: VALLEYLAB

## (undated) DEVICE — NEEDLE 22 G X 1 1/2 SAFETY

## (undated) DEVICE — GLOVE SRG BIOGEL ECLIPSE 7

## (undated) DEVICE — DRAPE EQUIPMENT RF WAND

## (undated) DEVICE — TROCAR: Brand: KII FIOS FIRST ENTRY

## (undated) DEVICE — UNDYED BRAIDED (POLYGLACTIN 910), SYNTHETIC ABSORBABLE SUTURE: Brand: COATED VICRYL

## (undated) DEVICE — SUT MONOCRYL 4-0 PS-2 27 IN Y426H

## (undated) DEVICE — VIAL DECANTER

## (undated) DEVICE — GLOVE INDICATOR PI UNDERGLOVE SZ 8 BLUE

## (undated) DEVICE — SKIN MARKER DUAL TIP WITH RULER CAP, FLEXIBLE RULER AND LABELS: Brand: DEVON

## (undated) DEVICE — PREP PAD BNS: Brand: CONVERTORS

## (undated) DEVICE — DRAPE SURGIKIT SADDLE BAG

## (undated) DEVICE — ACE WRAP 6 IN STERILE

## (undated) DEVICE — GLOVE INDICATOR PI UNDERGLOVE SZ 7.5 BLUE

## (undated) DEVICE — TROCAR APPPLE 5MM EXTENDED LENGTH

## (undated) DEVICE — STERILE SURGICAL LUBRICANT,  TUBE: Brand: SURGILUBE

## (undated) DEVICE — DRAPE SHEET THREE QUARTER

## (undated) DEVICE — CULTURE TUBE ANAEROBIC

## (undated) DEVICE — PROXIMATE SKIN STAPLERS (35 WIDE) CONTAINS 35 STAINLESS STEEL STAPLES (FIXED HEAD): Brand: PROXIMATE

## (undated) DEVICE — IMPERVIOUS STOCKINETTE: Brand: DEROYAL

## (undated) DEVICE — PACK PBDS LAP CHOLE RF

## (undated) DEVICE — ELECTRODE LAP J HOOK SPLIT STEM E-Z CLEAN 33CM -0021S

## (undated) DEVICE — TOWEL SURG XR DETECT GREEN STRL RFD

## (undated) DEVICE — ASTOUND STANDARD SURGICAL GOWN, XXL: Brand: CONVERTORS